# Patient Record
Sex: MALE | Race: WHITE | NOT HISPANIC OR LATINO | URBAN - METROPOLITAN AREA
[De-identification: names, ages, dates, MRNs, and addresses within clinical notes are randomized per-mention and may not be internally consistent; named-entity substitution may affect disease eponyms.]

---

## 2017-01-04 ENCOUNTER — OUTPATIENT (OUTPATIENT)
Dept: OUTPATIENT SERVICES | Facility: HOSPITAL | Age: 72
LOS: 1 days | End: 2017-01-04
Payer: COMMERCIAL

## 2017-01-04 VITALS
SYSTOLIC BLOOD PRESSURE: 147 MMHG | TEMPERATURE: 98 F | DIASTOLIC BLOOD PRESSURE: 78 MMHG | RESPIRATION RATE: 14 BRPM | HEIGHT: 68 IN | HEART RATE: 64 BPM | OXYGEN SATURATION: 98 % | WEIGHT: 224.87 LBS

## 2017-01-04 DIAGNOSIS — M17.11 UNILATERAL PRIMARY OSTEOARTHRITIS, RIGHT KNEE: ICD-10-CM

## 2017-01-04 DIAGNOSIS — Z01.818 ENCOUNTER FOR OTHER PREPROCEDURAL EXAMINATION: ICD-10-CM

## 2017-01-04 DIAGNOSIS — M19.90 UNSPECIFIED OSTEOARTHRITIS, UNSPECIFIED SITE: ICD-10-CM

## 2017-01-04 DIAGNOSIS — Z96.659 PRESENCE OF UNSPECIFIED ARTIFICIAL KNEE JOINT: Chronic | ICD-10-CM

## 2017-01-04 LAB
ALBUMIN SERPL ELPH-MCNC: 4.1 G/DL — SIGNIFICANT CHANGE UP (ref 3.3–5)
ALP SERPL-CCNC: 79 U/L — SIGNIFICANT CHANGE UP (ref 30–120)
ALT FLD-CCNC: 20 U/L DA — SIGNIFICANT CHANGE UP (ref 10–60)
ANION GAP SERPL CALC-SCNC: 5 MMOL/L — SIGNIFICANT CHANGE UP (ref 5–17)
APTT BLD: 34.5 SEC — SIGNIFICANT CHANGE UP (ref 27.5–37.4)
AST SERPL-CCNC: 14 U/L — SIGNIFICANT CHANGE UP (ref 10–40)
BILIRUB SERPL-MCNC: 0.8 MG/DL — SIGNIFICANT CHANGE UP (ref 0.2–1.2)
BLD GP AB SCN SERPL QL: SIGNIFICANT CHANGE UP
BUN SERPL-MCNC: 16 MG/DL — SIGNIFICANT CHANGE UP (ref 7–23)
CALCIUM SERPL-MCNC: 9.1 MG/DL — SIGNIFICANT CHANGE UP (ref 8.4–10.5)
CHLORIDE SERPL-SCNC: 104 MMOL/L — SIGNIFICANT CHANGE UP (ref 96–108)
CO2 SERPL-SCNC: 32 MMOL/L — HIGH (ref 22–31)
CREAT SERPL-MCNC: 0.92 MG/DL — SIGNIFICANT CHANGE UP (ref 0.5–1.3)
GLUCOSE SERPL-MCNC: 85 MG/DL — SIGNIFICANT CHANGE UP (ref 70–99)
HCT VFR BLD CALC: 41 % — SIGNIFICANT CHANGE UP (ref 39–50)
HGB BLD-MCNC: 13.2 G/DL — SIGNIFICANT CHANGE UP (ref 13–17)
INR BLD: 1.03 RATIO — SIGNIFICANT CHANGE UP (ref 0.88–1.16)
MCHC RBC-ENTMCNC: 28.4 PG — SIGNIFICANT CHANGE UP (ref 27–34)
MCHC RBC-ENTMCNC: 32.1 GM/DL — SIGNIFICANT CHANGE UP (ref 32–36)
MCV RBC AUTO: 88.6 FL — SIGNIFICANT CHANGE UP (ref 80–100)
MRSA PCR RESULT.: SIGNIFICANT CHANGE UP
PLATELET # BLD AUTO: 209 K/UL — SIGNIFICANT CHANGE UP (ref 150–400)
POTASSIUM SERPL-MCNC: 3.6 MMOL/L — SIGNIFICANT CHANGE UP (ref 3.5–5.3)
POTASSIUM SERPL-SCNC: 3.6 MMOL/L — SIGNIFICANT CHANGE UP (ref 3.5–5.3)
PROT SERPL-MCNC: 7.4 G/DL — SIGNIFICANT CHANGE UP (ref 6–8.3)
PROTHROM AB SERPL-ACNC: 11.5 SEC — SIGNIFICANT CHANGE UP (ref 10–13.1)
RBC # BLD: 4.63 M/UL — SIGNIFICANT CHANGE UP (ref 4.2–5.8)
RBC # FLD: 12 % — SIGNIFICANT CHANGE UP (ref 10.3–14.5)
S AUREUS DNA NOSE QL NAA+PROBE: SIGNIFICANT CHANGE UP
SODIUM SERPL-SCNC: 141 MMOL/L — SIGNIFICANT CHANGE UP (ref 135–145)
WBC # BLD: 5.6 K/UL — SIGNIFICANT CHANGE UP (ref 3.8–10.5)
WBC # FLD AUTO: 5.6 K/UL — SIGNIFICANT CHANGE UP (ref 3.8–10.5)

## 2017-01-04 PROCEDURE — 85027 COMPLETE CBC AUTOMATED: CPT

## 2017-01-04 PROCEDURE — 36415 COLL VENOUS BLD VENIPUNCTURE: CPT

## 2017-01-04 PROCEDURE — 80053 COMPREHEN METABOLIC PANEL: CPT

## 2017-01-04 PROCEDURE — 86900 BLOOD TYPING SEROLOGIC ABO: CPT

## 2017-01-04 PROCEDURE — 85730 THROMBOPLASTIN TIME PARTIAL: CPT

## 2017-01-04 PROCEDURE — 93010 ELECTROCARDIOGRAM REPORT: CPT

## 2017-01-04 PROCEDURE — G0463: CPT

## 2017-01-04 PROCEDURE — 86901 BLOOD TYPING SEROLOGIC RH(D): CPT

## 2017-01-04 PROCEDURE — 86850 RBC ANTIBODY SCREEN: CPT

## 2017-01-04 PROCEDURE — 93005 ELECTROCARDIOGRAM TRACING: CPT

## 2017-01-04 PROCEDURE — 85610 PROTHROMBIN TIME: CPT

## 2017-01-04 PROCEDURE — 87640 STAPH A DNA AMP PROBE: CPT

## 2017-01-04 NOTE — H&P PST ADULT - HISTORY OF PRESENT ILLNESS
This is a 70 y/o male who presents with long standing history of worsening right knee pain and swelling .Reports intermittent no pain at rest and worst pain 7/10  when walking and climbing stairs. Takes diclofenac and Cymbalta occasionally with moderate relief . Patient had left knee replacement on 10/14/ 16 with significant improvement . Now  to have right total knee replacement on 1/19/17 with Dr Montgomery This is a 72 y/o male who presents with long standing history of worsening right knee pain and swelling .Reports intermittent no pain at rest and worst pain 7/10  when walking and climbing stairs. Takes diclofenac and Cymbalta occasionally with moderate relief . Patient had left knee replacement on 10/14/ 16 with significant improvement in left knee . Now  to have right total knee replacement on 1/19/17 with Dr Montgomery This is a 72 y/o male who presents with long standing history of worsening right knee pain and swelling .Reports intermittent pain ,worst pain 7/10  when walking and climbing stairs. Takes diclofenac and Cymbalta occasionally with moderate relief . Patient had left knee replacement on 10/14/ 16 with significant improvement in left knee . Now  to have right total knee replacement on 1/19/17 with Dr Montgomery

## 2017-01-04 NOTE — H&P PST ADULT - MUSCULOSKELETAL COMMENTS
right knee pain right knee tender on palpation right knee tender on palpation, left knee healed surgical scar s/p knee replacement

## 2017-01-04 NOTE — H&P PST ADULT - NSANTHOSAYNRD_GEN_A_CORE
No. QUANG screening performed.  STOP BANG Legend: 0-2 = LOW Risk; 3-4 = INTERMEDIATE Risk; 5-8 = HIGH Risk

## 2017-01-04 NOTE — H&P PST ADULT - PROBLEM SELECTOR PLAN 1
Right knee replacement   Medical clearance   Pre op instructions Right knee replacement   Medical clearance   Pre op instructions  anesthesia consult done with previous surgery

## 2017-01-09 ENCOUNTER — APPOINTMENT (OUTPATIENT)
Dept: ORTHOPEDIC SURGERY | Facility: CLINIC | Age: 72
End: 2017-01-09

## 2017-01-09 DIAGNOSIS — M17.11 UNILATERAL PRIMARY OSTEOARTHRITIS, RIGHT KNEE: ICD-10-CM

## 2017-01-09 RX ORDER — ATORVASTATIN CALCIUM 10 MG/1
10 TABLET, FILM COATED ORAL
Qty: 30 | Refills: 0 | Status: ACTIVE | COMMUNITY
Start: 2016-05-24

## 2017-01-18 ENCOUNTER — RESULT REVIEW (OUTPATIENT)
Age: 72
End: 2017-01-18

## 2017-01-18 RX ORDER — ONDANSETRON 8 MG/1
4 TABLET, FILM COATED ORAL EVERY 6 HOURS
Qty: 0 | Refills: 0 | Status: DISCONTINUED | OUTPATIENT
Start: 2017-01-19 | End: 2017-01-21

## 2017-01-18 RX ORDER — POLYETHYLENE GLYCOL 3350 17 G/17G
17 POWDER, FOR SOLUTION ORAL DAILY
Qty: 0 | Refills: 0 | Status: DISCONTINUED | OUTPATIENT
Start: 2017-01-19 | End: 2017-01-21

## 2017-01-18 RX ORDER — CELECOXIB 200 MG/1
200 CAPSULE ORAL ONCE
Qty: 0 | Refills: 0 | Status: COMPLETED | OUTPATIENT
Start: 2017-01-19 | End: 2017-01-19

## 2017-01-18 RX ORDER — OXYCODONE HYDROCHLORIDE 5 MG/1
10 TABLET ORAL ONCE
Qty: 0 | Refills: 0 | Status: DISCONTINUED | OUTPATIENT
Start: 2017-01-19 | End: 2017-01-19

## 2017-01-18 RX ORDER — DOCUSATE SODIUM 100 MG
100 CAPSULE ORAL THREE TIMES A DAY
Qty: 0 | Refills: 0 | Status: DISCONTINUED | OUTPATIENT
Start: 2017-01-19 | End: 2017-01-21

## 2017-01-18 RX ORDER — MAGNESIUM HYDROXIDE 400 MG/1
30 TABLET, CHEWABLE ORAL DAILY
Qty: 0 | Refills: 0 | Status: DISCONTINUED | OUTPATIENT
Start: 2017-01-19 | End: 2017-01-21

## 2017-01-18 RX ORDER — SENNA PLUS 8.6 MG/1
2 TABLET ORAL AT BEDTIME
Qty: 0 | Refills: 0 | Status: DISCONTINUED | OUTPATIENT
Start: 2017-01-19 | End: 2017-01-21

## 2017-01-19 ENCOUNTER — APPOINTMENT (OUTPATIENT)
Dept: ORTHOPEDIC SURGERY | Facility: HOSPITAL | Age: 72
End: 2017-01-19

## 2017-01-19 ENCOUNTER — INPATIENT (INPATIENT)
Facility: HOSPITAL | Age: 72
LOS: 1 days | Discharge: NURSING, REHAB OR LONG-TERM CARE | DRG: 470 | End: 2017-01-21
Attending: ORTHOPAEDIC SURGERY | Admitting: ORTHOPAEDIC SURGERY
Payer: COMMERCIAL

## 2017-01-19 VITALS
SYSTOLIC BLOOD PRESSURE: 156 MMHG | WEIGHT: 220.24 LBS | TEMPERATURE: 98 F | HEIGHT: 68 IN | DIASTOLIC BLOOD PRESSURE: 71 MMHG | OXYGEN SATURATION: 97 % | RESPIRATION RATE: 20 BRPM | HEART RATE: 68 BPM

## 2017-01-19 DIAGNOSIS — M17.11 UNILATERAL PRIMARY OSTEOARTHRITIS, RIGHT KNEE: ICD-10-CM

## 2017-01-19 DIAGNOSIS — Z96.659 PRESENCE OF UNSPECIFIED ARTIFICIAL KNEE JOINT: Chronic | ICD-10-CM

## 2017-01-19 DIAGNOSIS — M17.12 UNILATERAL PRIMARY OSTEOARTHRITIS, LEFT KNEE: ICD-10-CM

## 2017-01-19 DIAGNOSIS — Z01.818 ENCOUNTER FOR OTHER PREPROCEDURAL EXAMINATION: ICD-10-CM

## 2017-01-19 LAB
ANION GAP SERPL CALC-SCNC: 10 MMOL/L — SIGNIFICANT CHANGE UP (ref 5–17)
BUN SERPL-MCNC: 12 MG/DL — SIGNIFICANT CHANGE UP (ref 7–23)
CALCIUM SERPL-MCNC: 8.6 MG/DL — SIGNIFICANT CHANGE UP (ref 8.4–10.5)
CHLORIDE SERPL-SCNC: 103 MMOL/L — SIGNIFICANT CHANGE UP (ref 96–108)
CO2 SERPL-SCNC: 27 MMOL/L — SIGNIFICANT CHANGE UP (ref 22–31)
CREAT SERPL-MCNC: 1 MG/DL — SIGNIFICANT CHANGE UP (ref 0.5–1.3)
GLUCOSE SERPL-MCNC: 193 MG/DL — HIGH (ref 70–99)
HCT VFR BLD CALC: 34.2 % — LOW (ref 39–50)
HGB BLD-MCNC: 11 G/DL — LOW (ref 13–17)
MCHC RBC-ENTMCNC: 27.8 PG — SIGNIFICANT CHANGE UP (ref 27–34)
MCHC RBC-ENTMCNC: 32.2 GM/DL — SIGNIFICANT CHANGE UP (ref 32–36)
MCV RBC AUTO: 86.3 FL — SIGNIFICANT CHANGE UP (ref 80–100)
PLATELET # BLD AUTO: 170 K/UL — SIGNIFICANT CHANGE UP (ref 150–400)
POTASSIUM SERPL-MCNC: 4.2 MMOL/L — SIGNIFICANT CHANGE UP (ref 3.5–5.3)
POTASSIUM SERPL-SCNC: 4.2 MMOL/L — SIGNIFICANT CHANGE UP (ref 3.5–5.3)
RBC # BLD: 3.96 M/UL — LOW (ref 4.2–5.8)
RBC # FLD: 12.2 % — SIGNIFICANT CHANGE UP (ref 10.3–14.5)
SODIUM SERPL-SCNC: 140 MMOL/L — SIGNIFICANT CHANGE UP (ref 135–145)
WBC # BLD: 7.5 K/UL — SIGNIFICANT CHANGE UP (ref 3.8–10.5)
WBC # FLD AUTO: 7.5 K/UL — SIGNIFICANT CHANGE UP (ref 3.8–10.5)

## 2017-01-19 PROCEDURE — 88305 TISSUE EXAM BY PATHOLOGIST: CPT | Mod: 26

## 2017-01-19 PROCEDURE — 99222 1ST HOSP IP/OBS MODERATE 55: CPT

## 2017-01-19 PROCEDURE — 27447 TOTAL KNEE ARTHROPLASTY: CPT | Mod: RT

## 2017-01-19 PROCEDURE — 73562 X-RAY EXAM OF KNEE 3: CPT | Mod: 26,RT

## 2017-01-19 PROCEDURE — 88311 DECALCIFY TISSUE: CPT | Mod: 26

## 2017-01-19 RX ORDER — CEFAZOLIN SODIUM 1 G
2000 VIAL (EA) INJECTION EVERY 8 HOURS
Qty: 0 | Refills: 0 | Status: COMPLETED | OUTPATIENT
Start: 2017-01-19 | End: 2017-01-20

## 2017-01-19 RX ORDER — TRANEXAMIC ACID 100 MG/ML
1000 INJECTION, SOLUTION INTRAVENOUS ONCE
Qty: 0 | Refills: 0 | Status: COMPLETED | OUTPATIENT
Start: 2017-01-19 | End: 2017-01-19

## 2017-01-19 RX ORDER — SODIUM CHLORIDE 9 MG/ML
1000 INJECTION, SOLUTION INTRAVENOUS
Qty: 0 | Refills: 0 | Status: DISCONTINUED | OUTPATIENT
Start: 2017-01-19 | End: 2017-01-20

## 2017-01-19 RX ORDER — ONDANSETRON 8 MG/1
4 TABLET, FILM COATED ORAL ONCE
Qty: 0 | Refills: 0 | Status: DISCONTINUED | OUTPATIENT
Start: 2017-01-19 | End: 2017-01-19

## 2017-01-19 RX ORDER — CELECOXIB 200 MG/1
200 CAPSULE ORAL
Qty: 0 | Refills: 0 | Status: DISCONTINUED | OUTPATIENT
Start: 2017-01-19 | End: 2017-01-21

## 2017-01-19 RX ORDER — ACETAMINOPHEN 500 MG
1000 TABLET ORAL EVERY 6 HOURS
Qty: 0 | Refills: 0 | Status: COMPLETED | OUTPATIENT
Start: 2017-01-19 | End: 2017-01-20

## 2017-01-19 RX ORDER — OXYCODONE HYDROCHLORIDE 5 MG/1
10 TABLET ORAL
Qty: 0 | Refills: 0 | Status: DISCONTINUED | OUTPATIENT
Start: 2017-01-19 | End: 2017-01-21

## 2017-01-19 RX ORDER — ACETAMINOPHEN 500 MG
1000 TABLET ORAL EVERY 8 HOURS
Qty: 0 | Refills: 0 | Status: DISCONTINUED | OUTPATIENT
Start: 2017-01-20 | End: 2017-01-21

## 2017-01-19 RX ORDER — LISINOPRIL 2.5 MG/1
40 TABLET ORAL DAILY
Qty: 0 | Refills: 0 | Status: DISCONTINUED | OUTPATIENT
Start: 2017-01-21 | End: 2017-01-21

## 2017-01-19 RX ORDER — HYDROMORPHONE HYDROCHLORIDE 2 MG/ML
0.5 INJECTION INTRAMUSCULAR; INTRAVENOUS; SUBCUTANEOUS
Qty: 0 | Refills: 0 | Status: DISCONTINUED | OUTPATIENT
Start: 2017-01-19 | End: 2017-01-19

## 2017-01-19 RX ORDER — ATORVASTATIN CALCIUM 80 MG/1
10 TABLET, FILM COATED ORAL AT BEDTIME
Qty: 0 | Refills: 0 | Status: DISCONTINUED | OUTPATIENT
Start: 2017-01-19 | End: 2017-01-21

## 2017-01-19 RX ORDER — PANTOPRAZOLE SODIUM 20 MG/1
40 TABLET, DELAYED RELEASE ORAL
Qty: 0 | Refills: 0 | Status: DISCONTINUED | OUTPATIENT
Start: 2017-01-19 | End: 2017-01-21

## 2017-01-19 RX ORDER — CEFAZOLIN SODIUM 1 G
2000 VIAL (EA) INJECTION ONCE
Qty: 0 | Refills: 0 | Status: COMPLETED | OUTPATIENT
Start: 2017-01-19 | End: 2017-01-19

## 2017-01-19 RX ORDER — SODIUM CHLORIDE 9 MG/ML
1000 INJECTION, SOLUTION INTRAVENOUS
Qty: 0 | Refills: 0 | Status: DISCONTINUED | OUTPATIENT
Start: 2017-01-19 | End: 2017-01-19

## 2017-01-19 RX ORDER — OXYCODONE HYDROCHLORIDE 5 MG/1
5 TABLET ORAL
Qty: 0 | Refills: 0 | Status: DISCONTINUED | OUTPATIENT
Start: 2017-01-19 | End: 2017-01-21

## 2017-01-19 RX ORDER — ACETAMINOPHEN 500 MG
1000 TABLET ORAL ONCE
Qty: 0 | Refills: 0 | Status: COMPLETED | OUTPATIENT
Start: 2017-01-19 | End: 2017-01-19

## 2017-01-19 RX ORDER — ASPIRIN/CALCIUM CARB/MAGNESIUM 324 MG
325 TABLET ORAL EVERY 12 HOURS
Qty: 0 | Refills: 0 | Status: DISCONTINUED | OUTPATIENT
Start: 2017-01-20 | End: 2017-01-21

## 2017-01-19 RX ORDER — HYDROMORPHONE HYDROCHLORIDE 2 MG/ML
0.5 INJECTION INTRAMUSCULAR; INTRAVENOUS; SUBCUTANEOUS
Qty: 0 | Refills: 0 | Status: DISCONTINUED | OUTPATIENT
Start: 2017-01-19 | End: 2017-01-21

## 2017-01-19 RX ORDER — DULOXETINE HYDROCHLORIDE 30 MG/1
60 CAPSULE, DELAYED RELEASE ORAL DAILY
Qty: 0 | Refills: 0 | Status: DISCONTINUED | OUTPATIENT
Start: 2017-01-19 | End: 2017-01-21

## 2017-01-19 RX ADMIN — OXYCODONE HYDROCHLORIDE 10 MILLIGRAM(S): 5 TABLET ORAL at 09:56

## 2017-01-19 RX ADMIN — SENNA PLUS 2 TABLET(S): 8.6 TABLET ORAL at 21:36

## 2017-01-19 RX ADMIN — HYDROMORPHONE HYDROCHLORIDE 0.5 MILLIGRAM(S): 2 INJECTION INTRAMUSCULAR; INTRAVENOUS; SUBCUTANEOUS at 15:05

## 2017-01-19 RX ADMIN — Medication 100 MILLIGRAM(S): at 21:36

## 2017-01-19 RX ADMIN — Medication 400 MILLIGRAM(S): at 18:19

## 2017-01-19 RX ADMIN — HYDROMORPHONE HYDROCHLORIDE 0.5 MILLIGRAM(S): 2 INJECTION INTRAMUSCULAR; INTRAVENOUS; SUBCUTANEOUS at 15:35

## 2017-01-19 RX ADMIN — Medication 200 MILLIGRAM(S): at 15:05

## 2017-01-19 RX ADMIN — Medication 100 MILLIGRAM(S): at 21:00

## 2017-01-19 RX ADMIN — HYDROMORPHONE HYDROCHLORIDE 0.5 MILLIGRAM(S): 2 INJECTION INTRAMUSCULAR; INTRAVENOUS; SUBCUTANEOUS at 15:20

## 2017-01-19 RX ADMIN — CELECOXIB 200 MILLIGRAM(S): 200 CAPSULE ORAL at 09:56

## 2017-01-19 RX ADMIN — ATORVASTATIN CALCIUM 10 MILLIGRAM(S): 80 TABLET, FILM COATED ORAL at 21:36

## 2017-01-19 RX ADMIN — SODIUM CHLORIDE 100 MILLILITER(S): 9 INJECTION, SOLUTION INTRAVENOUS at 15:20

## 2017-01-19 RX ADMIN — Medication 1000 MILLIGRAM(S): at 18:19

## 2017-01-19 NOTE — PHYSICAL THERAPY INITIAL EVALUATION ADULT - ADDITIONAL COMMENTS
Pt lives with wife in a house w/ 8 steps to enter with rail and 8 steps inside with rail to the main floor (high ranch house).  Pt has a rolling walker and straight cane

## 2017-01-19 NOTE — PHYSICAL THERAPY INITIAL EVALUATION ADULT - RANGE OF MOTION EXAMINATION, REHAB EVAL
bilateral upper extremity ROM was WFL (within functional limits)/R knee 0-60 deg/deficits as listed below/Left LE ROM was WFL (within functional limits)

## 2017-01-20 ENCOUNTER — TRANSCRIPTION ENCOUNTER (OUTPATIENT)
Age: 72
End: 2017-01-20

## 2017-01-20 LAB
ANION GAP SERPL CALC-SCNC: 8 MMOL/L — SIGNIFICANT CHANGE UP (ref 5–17)
BUN SERPL-MCNC: 10 MG/DL — SIGNIFICANT CHANGE UP (ref 7–23)
CALCIUM SERPL-MCNC: 8.4 MG/DL — SIGNIFICANT CHANGE UP (ref 8.4–10.5)
CHLORIDE SERPL-SCNC: 106 MMOL/L — SIGNIFICANT CHANGE UP (ref 96–108)
CO2 SERPL-SCNC: 26 MMOL/L — SIGNIFICANT CHANGE UP (ref 22–31)
CREAT SERPL-MCNC: 0.81 MG/DL — SIGNIFICANT CHANGE UP (ref 0.5–1.3)
GLUCOSE SERPL-MCNC: 109 MG/DL — HIGH (ref 70–99)
HCT VFR BLD CALC: 29.6 % — LOW (ref 39–50)
HGB BLD-MCNC: 9.9 G/DL — LOW (ref 13–17)
MCHC RBC-ENTMCNC: 28.7 PG — SIGNIFICANT CHANGE UP (ref 27–34)
MCHC RBC-ENTMCNC: 33.6 GM/DL — SIGNIFICANT CHANGE UP (ref 32–36)
MCV RBC AUTO: 85.5 FL — SIGNIFICANT CHANGE UP (ref 80–100)
PLATELET # BLD AUTO: 144 K/UL — LOW (ref 150–400)
POTASSIUM SERPL-MCNC: 3.9 MMOL/L — SIGNIFICANT CHANGE UP (ref 3.5–5.3)
POTASSIUM SERPL-SCNC: 3.9 MMOL/L — SIGNIFICANT CHANGE UP (ref 3.5–5.3)
RBC # BLD: 3.46 M/UL — LOW (ref 4.2–5.8)
RBC # FLD: 12.3 % — SIGNIFICANT CHANGE UP (ref 10.3–14.5)
SODIUM SERPL-SCNC: 140 MMOL/L — SIGNIFICANT CHANGE UP (ref 135–145)
WBC # BLD: 6.4 K/UL — SIGNIFICANT CHANGE UP (ref 3.8–10.5)
WBC # FLD AUTO: 6.4 K/UL — SIGNIFICANT CHANGE UP (ref 3.8–10.5)

## 2017-01-20 PROCEDURE — 99232 SBSQ HOSP IP/OBS MODERATE 35: CPT

## 2017-01-20 RX ORDER — OXYCODONE HYDROCHLORIDE 5 MG/1
1 TABLET ORAL
Qty: 0 | Refills: 0 | COMMUNITY
Start: 2017-01-20

## 2017-01-20 RX ORDER — ACETAMINOPHEN 500 MG
2 TABLET ORAL
Qty: 0 | Refills: 0 | COMMUNITY
Start: 2017-01-20 | End: 2017-02-02

## 2017-01-20 RX ORDER — DOCUSATE SODIUM 100 MG
1 CAPSULE ORAL
Qty: 0 | Refills: 0 | COMMUNITY
Start: 2017-01-20

## 2017-01-20 RX ORDER — SENNA PLUS 8.6 MG/1
2 TABLET ORAL
Qty: 0 | Refills: 0 | COMMUNITY
Start: 2017-01-20

## 2017-01-20 RX ORDER — ASPIRIN/CALCIUM CARB/MAGNESIUM 324 MG
1 TABLET ORAL
Qty: 0 | Refills: 0 | COMMUNITY
Start: 2017-01-20

## 2017-01-20 RX ORDER — POLYETHYLENE GLYCOL 3350 17 G/17G
17 POWDER, FOR SOLUTION ORAL
Qty: 0 | Refills: 0 | COMMUNITY
Start: 2017-01-20

## 2017-01-20 RX ADMIN — Medication 100 MILLIGRAM(S): at 22:10

## 2017-01-20 RX ADMIN — CELECOXIB 200 MILLIGRAM(S): 200 CAPSULE ORAL at 10:06

## 2017-01-20 RX ADMIN — OXYCODONE HYDROCHLORIDE 10 MILLIGRAM(S): 5 TABLET ORAL at 18:45

## 2017-01-20 RX ADMIN — Medication 100 MILLIGRAM(S): at 12:26

## 2017-01-20 RX ADMIN — Medication 100 MILLIGRAM(S): at 05:07

## 2017-01-20 RX ADMIN — OXYCODONE HYDROCHLORIDE 10 MILLIGRAM(S): 5 TABLET ORAL at 19:34

## 2017-01-20 RX ADMIN — Medication 1000 MILLIGRAM(S): at 12:26

## 2017-01-20 RX ADMIN — Medication 1000 MILLIGRAM(S): at 22:09

## 2017-01-20 RX ADMIN — CELECOXIB 200 MILLIGRAM(S): 200 CAPSULE ORAL at 09:05

## 2017-01-20 RX ADMIN — ATORVASTATIN CALCIUM 10 MILLIGRAM(S): 80 TABLET, FILM COATED ORAL at 22:10

## 2017-01-20 RX ADMIN — OXYCODONE HYDROCHLORIDE 10 MILLIGRAM(S): 5 TABLET ORAL at 22:10

## 2017-01-20 RX ADMIN — Medication 1000 MILLIGRAM(S): at 07:22

## 2017-01-20 RX ADMIN — Medication 1000 MILLIGRAM(S): at 03:58

## 2017-01-20 RX ADMIN — SENNA PLUS 2 TABLET(S): 8.6 TABLET ORAL at 22:10

## 2017-01-20 RX ADMIN — Medication 400 MILLIGRAM(S): at 07:22

## 2017-01-20 RX ADMIN — CELECOXIB 200 MILLIGRAM(S): 200 CAPSULE ORAL at 19:31

## 2017-01-20 RX ADMIN — Medication 400 MILLIGRAM(S): at 01:58

## 2017-01-20 RX ADMIN — Medication 325 MILLIGRAM(S): at 22:11

## 2017-01-20 RX ADMIN — Medication 325 MILLIGRAM(S): at 09:05

## 2017-01-20 RX ADMIN — OXYCODONE HYDROCHLORIDE 10 MILLIGRAM(S): 5 TABLET ORAL at 11:20

## 2017-01-20 RX ADMIN — OXYCODONE HYDROCHLORIDE 10 MILLIGRAM(S): 5 TABLET ORAL at 10:21

## 2017-01-20 RX ADMIN — OXYCODONE HYDROCHLORIDE 10 MILLIGRAM(S): 5 TABLET ORAL at 22:40

## 2017-01-20 RX ADMIN — Medication 1000 MILLIGRAM(S): at 22:28

## 2017-01-20 RX ADMIN — Medication 100 MILLIGRAM(S): at 05:16

## 2017-01-20 RX ADMIN — DULOXETINE HYDROCHLORIDE 60 MILLIGRAM(S): 30 CAPSULE, DELAYED RELEASE ORAL at 12:26

## 2017-01-20 RX ADMIN — CELECOXIB 200 MILLIGRAM(S): 200 CAPSULE ORAL at 18:44

## 2017-01-20 NOTE — DISCHARGE NOTE ADULT - NS AS ACTIVITY OBS
Walking-Indoors allowed/Do not drive or operate machinery/No Heavy lifting/straining/Walking-Outdoors allowed

## 2017-01-20 NOTE — DISCHARGE NOTE ADULT - CARE PROVIDER_API CALL
Chan Montgomery), Orthopaedic Surgery  [address] [place] [address]  Phone: [phone]  Fax: [phone]

## 2017-01-20 NOTE — OCCUPATIONAL THERAPY INITIAL EVALUATION ADULT - NS ASR FOLLOW COMMAND OT EVAL
Pt educated regarding fall prevention in hospital and recommendation to use call bell/ask for assistance with all ADL's/transfers etc./100% of the time

## 2017-01-20 NOTE — DISCHARGE NOTE ADULT - CARE PROVIDERS DIRECT ADDRESSES
[email or web address]

## 2017-01-20 NOTE — DISCHARGE NOTE ADULT - PLAN OF CARE
Improve ambulation, ADLs and quality of life Physical Therapy/Occupational Therapy for ambulation, transfers, stairs, ADL's, Range of Motion Exercises, Isometrics.  Full weight bearing as tolerated with rolling walker  Range of Motion Goals: Flexion 120 degrees; Extension 0 degrees  Keep incision clean and dry.  Suture/prineo dressing removal 14 days after surgery at rehab facility or Surgeon's office  May shower post-op day #5 if no drainage from incision For Constipation :   • Increase your water intake. Drink at least 8 glasses of water daily.  • Try adding fiber to your diet by eating fruits, vegetables and foods that are rich in grains.  • If you do experience constipation, you may take an over-the-counter stool softener/laxative such as Greer Colace, Senekot or  Milk of Magnesia. - Call your doctor if you experience:  • An increase in pain not controlled by pain medication or change in activity or  position.  • Temperature greater than 101° F.  • Redness, increased swelling or foul smelling drainage from or around the  incision.  • Numbness, tingling or a change in color or temperature of the operative leg.  • Call your doctor immediately if you experience chest pain, shortness of breath or calf pain.

## 2017-01-20 NOTE — DISCHARGE NOTE ADULT - CARE PLAN
Principal Discharge DX:	S/P knee replacement  Goal:	Improve ambulation, ADLs and quality of life  Instructions for follow-up, activity and diet:	Physical Therapy/Occupational Therapy for ambulation, transfers, stairs, ADL's, Range of Motion Exercises, Isometrics.  Full weight bearing as tolerated with rolling walker  Range of Motion Goals: Flexion 120 degrees; Extension 0 degrees  Keep incision clean and dry.  Suture/prineo dressing removal 14 days after surgery at rehab facility or Surgeon's office  May shower post-op day #5 if no drainage from incision  Instructions for follow-up, activity and diet:	For Constipation :   • Increase your water intake. Drink at least 8 glasses of water daily.  • Try adding fiber to your diet by eating fruits, vegetables and foods that are rich in grains.  • If you do experience constipation, you may take an over-the-counter stool softener/laxative such as Greer Colace, Senekot or  Milk of Magnesia.  Instructions for follow-up, activity and diet:	- Call your doctor if you experience:  • An increase in pain not controlled by pain medication or change in activity or  position.  • Temperature greater than 101° F.  • Redness, increased swelling or foul smelling drainage from or around the  incision.  • Numbness, tingling or a change in color or temperature of the operative leg.  • Call your doctor immediately if you experience chest pain, shortness of breath or calf pain.

## 2017-01-20 NOTE — OCCUPATIONAL THERAPY INITIAL EVALUATION ADULT - ADDITIONAL COMMENTS
Pt lives in a private house with 8 SANDY+ railing and 8 steps inside + railing. + stall shower. Pt owns a RW, SAC

## 2017-01-20 NOTE — DISCHARGE NOTE ADULT - MEDICATION SUMMARY - MEDICATIONS TO TAKE
I will START or STAY ON the medications listed below when I get home from the hospital:    acetaminophen 500 mg oral tablet  -- 2 tab(s) by mouth every 8 hours  -- Indication: For mild pain    oxyCODONE 5 mg oral tablet  -- 1 tab(s) by mouth every 3 hours, As needed, Mild Pain  -- Indication: For moderate pain    oxyCODONE 10 mg oral tablet  -- 1 tab(s) by mouth every 3 hours, As needed, Moderate Pain  -- Indication: For Severe pain    aspirin 325 mg oral delayed release tablet  -- 1 tab(s) by mouth every 12 hours  -- Indication: For Prevention of DVT clots    lisinopril 40 mg oral tablet  -- 1 tab(s) by mouth once a day  -- Indication: For blood pressure     DULoxetine 60 mg oral delayed release capsule  -- 1 cap(s) by mouth once a day  -- Indication: For mood    atorvastatin 10 mg oral tablet  -- 1 tab(s) by mouth once a day  -- Indication: For cholesterol    hydroCHLOROthiazide 25 mg oral tablet  -- 1 tab(s) by mouth once a day  -- Indication: For blood pressure    senna oral tablet  -- 2 tab(s) by mouth once a day (at bedtime)  -- Indication: For constipation    docusate sodium 100 mg oral capsule  -- 1 cap(s) by mouth 3 times a day  -- Indication: For constipation    polyethylene glycol 3350 oral powder for reconstitution  -- 17 gram(s) by mouth once a day, As needed, Constipation  -- Indication: For constipation    pantoprazole 40 mg oral delayed release tablet  -- 1 tab(s) by mouth once a day  -- Indication: For Stomach protection

## 2017-01-20 NOTE — DISCHARGE NOTE ADULT - HOSPITAL COURSE
This patient was admitted to [facility] with a history of severe degenerative joint disease of the right knee.  Patient went to Pre-Surgical Testing at [facility] and was medically cleared to undergo elective procedure. Patient underwent right total knee replacement by Dr. Chan Montgomery on 1/19/17. Procedure was well-tolerated.  No operative or cierra-operative complications arose during patients hospital course.  Patient received antibiotic according to SCIP guidelines for infection prevention. Ecotrin given for DVT prophylaxis.  Anesthesia, Medical Hospitalist, Physical Therapy and Occupational Therapy were consulted. Patient is stable for discharge with a good prognosis.  Appropriate discharge instructions and medications are provided in this document.

## 2017-01-20 NOTE — DISCHARGE NOTE ADULT - PATIENT PORTAL LINK FT
“You can access the FollowHealth Patient Portal, offered by [facility], by registering with the following website: [email or web address]”

## 2017-01-21 VITALS
DIASTOLIC BLOOD PRESSURE: 63 MMHG | TEMPERATURE: 98 F | OXYGEN SATURATION: 96 % | HEART RATE: 77 BPM | SYSTOLIC BLOOD PRESSURE: 128 MMHG | RESPIRATION RATE: 16 BRPM

## 2017-01-21 LAB
ANION GAP SERPL CALC-SCNC: 4 MMOL/L — LOW (ref 5–17)
BUN SERPL-MCNC: 13 MG/DL — SIGNIFICANT CHANGE UP (ref 7–23)
CALCIUM SERPL-MCNC: 8.4 MG/DL — SIGNIFICANT CHANGE UP (ref 8.4–10.5)
CHLORIDE SERPL-SCNC: 106 MMOL/L — SIGNIFICANT CHANGE UP (ref 96–108)
CO2 SERPL-SCNC: 31 MMOL/L — SIGNIFICANT CHANGE UP (ref 22–31)
CREAT SERPL-MCNC: 0.94 MG/DL — SIGNIFICANT CHANGE UP (ref 0.5–1.3)
GLUCOSE SERPL-MCNC: 118 MG/DL — HIGH (ref 70–99)
HCT VFR BLD CALC: 27.4 % — LOW (ref 39–50)
HGB BLD-MCNC: 9.5 G/DL — LOW (ref 13–17)
MCHC RBC-ENTMCNC: 29.6 PG — SIGNIFICANT CHANGE UP (ref 27–34)
MCHC RBC-ENTMCNC: 34.7 GM/DL — SIGNIFICANT CHANGE UP (ref 32–36)
MCV RBC AUTO: 85.4 FL — SIGNIFICANT CHANGE UP (ref 80–100)
PLATELET # BLD AUTO: 134 K/UL — LOW (ref 150–400)
POTASSIUM SERPL-MCNC: 4.3 MMOL/L — SIGNIFICANT CHANGE UP (ref 3.5–5.3)
POTASSIUM SERPL-SCNC: 4.3 MMOL/L — SIGNIFICANT CHANGE UP (ref 3.5–5.3)
RBC # BLD: 3.21 M/UL — LOW (ref 4.2–5.8)
RBC # FLD: 12.2 % — SIGNIFICANT CHANGE UP (ref 10.3–14.5)
SODIUM SERPL-SCNC: 141 MMOL/L — SIGNIFICANT CHANGE UP (ref 135–145)
WBC # BLD: 6 K/UL — SIGNIFICANT CHANGE UP (ref 3.8–10.5)
WBC # FLD AUTO: 6 K/UL — SIGNIFICANT CHANGE UP (ref 3.8–10.5)

## 2017-01-21 PROCEDURE — 99233 SBSQ HOSP IP/OBS HIGH 50: CPT

## 2017-01-21 RX ADMIN — MAGNESIUM HYDROXIDE 30 MILLILITER(S): 400 TABLET, CHEWABLE ORAL at 14:15

## 2017-01-21 RX ADMIN — DULOXETINE HYDROCHLORIDE 60 MILLIGRAM(S): 30 CAPSULE, DELAYED RELEASE ORAL at 13:37

## 2017-01-21 RX ADMIN — Medication 1000 MILLIGRAM(S): at 13:38

## 2017-01-21 RX ADMIN — CELECOXIB 200 MILLIGRAM(S): 200 CAPSULE ORAL at 08:32

## 2017-01-21 RX ADMIN — LISINOPRIL 40 MILLIGRAM(S): 2.5 TABLET ORAL at 05:42

## 2017-01-21 RX ADMIN — Medication 1000 MILLIGRAM(S): at 05:40

## 2017-01-21 RX ADMIN — CELECOXIB 200 MILLIGRAM(S): 200 CAPSULE ORAL at 08:33

## 2017-01-21 RX ADMIN — Medication 1000 MILLIGRAM(S): at 06:25

## 2017-01-21 RX ADMIN — OXYCODONE HYDROCHLORIDE 10 MILLIGRAM(S): 5 TABLET ORAL at 05:40

## 2017-01-21 RX ADMIN — OXYCODONE HYDROCHLORIDE 10 MILLIGRAM(S): 5 TABLET ORAL at 06:10

## 2017-01-21 RX ADMIN — Medication 100 MILLIGRAM(S): at 05:40

## 2017-01-21 RX ADMIN — Medication 100 MILLIGRAM(S): at 13:38

## 2017-01-21 RX ADMIN — Medication 325 MILLIGRAM(S): at 08:32

## 2017-01-24 LAB — SURGICAL PATHOLOGY FINAL REPORT - CH: SIGNIFICANT CHANGE UP

## 2017-01-28 PROBLEM — M19.90 UNSPECIFIED OSTEOARTHRITIS, UNSPECIFIED SITE: Chronic | Status: ACTIVE | Noted: 2017-01-04

## 2017-01-31 ENCOUNTER — APPOINTMENT (OUTPATIENT)
Dept: ORTHOPEDIC SURGERY | Facility: CLINIC | Age: 72
End: 2017-01-31

## 2017-01-31 VITALS
WEIGHT: 230 LBS | SYSTOLIC BLOOD PRESSURE: 121 MMHG | BODY MASS INDEX: 34.07 KG/M2 | HEART RATE: 78 BPM | DIASTOLIC BLOOD PRESSURE: 74 MMHG | HEIGHT: 69 IN

## 2017-03-07 ENCOUNTER — APPOINTMENT (OUTPATIENT)
Dept: ORTHOPEDIC SURGERY | Facility: CLINIC | Age: 72
End: 2017-03-07

## 2017-03-07 VITALS
HEIGHT: 69 IN | HEART RATE: 80 BPM | SYSTOLIC BLOOD PRESSURE: 134 MMHG | BODY MASS INDEX: 34.07 KG/M2 | WEIGHT: 230 LBS | DIASTOLIC BLOOD PRESSURE: 76 MMHG

## 2017-03-12 PROCEDURE — C1776: CPT

## 2017-03-12 PROCEDURE — 88305 TISSUE EXAM BY PATHOLOGIST: CPT

## 2017-03-12 PROCEDURE — 85027 COMPLETE CBC AUTOMATED: CPT

## 2017-03-12 PROCEDURE — 97001: CPT

## 2017-03-12 PROCEDURE — C1889: CPT

## 2017-03-12 PROCEDURE — 73562 X-RAY EXAM OF KNEE 3: CPT

## 2017-03-12 PROCEDURE — C1713: CPT

## 2017-03-12 PROCEDURE — 88311 DECALCIFY TISSUE: CPT

## 2017-03-12 PROCEDURE — 97165 OT EVAL LOW COMPLEX 30 MIN: CPT

## 2017-03-12 PROCEDURE — 80048 BASIC METABOLIC PNL TOTAL CA: CPT

## 2017-03-12 PROCEDURE — 97116 GAIT TRAINING THERAPY: CPT

## 2017-03-12 PROCEDURE — 97161 PT EVAL LOW COMPLEX 20 MIN: CPT

## 2017-03-12 PROCEDURE — 97535 SELF CARE MNGMENT TRAINING: CPT

## 2017-03-12 PROCEDURE — 97003: CPT

## 2017-03-12 PROCEDURE — 97110 THERAPEUTIC EXERCISES: CPT

## 2017-04-19 ENCOUNTER — APPOINTMENT (OUTPATIENT)
Dept: ORTHOPEDIC SURGERY | Facility: CLINIC | Age: 72
End: 2017-04-19

## 2017-04-19 VITALS
BODY MASS INDEX: 34.07 KG/M2 | DIASTOLIC BLOOD PRESSURE: 74 MMHG | SYSTOLIC BLOOD PRESSURE: 153 MMHG | HEART RATE: 74 BPM | WEIGHT: 230 LBS | HEIGHT: 69 IN

## 2017-05-17 ENCOUNTER — APPOINTMENT (OUTPATIENT)
Dept: ORTHOPEDIC SURGERY | Facility: CLINIC | Age: 72
End: 2017-05-17

## 2017-05-17 VITALS — HEIGHT: 69 IN | BODY MASS INDEX: 34.07 KG/M2 | WEIGHT: 230 LBS

## 2017-05-17 RX ORDER — NYSTATIN AND TRIAMCINOLONE ACETONIDE 100000; 1 MG/G; MG/G
100000-0.1 CREAM TOPICAL
Qty: 60 | Refills: 0 | Status: ACTIVE | COMMUNITY
Start: 2016-10-06

## 2017-05-17 RX ORDER — DICLOFENAC SODIUM AND MISOPROSTOL 75; 200 MG/1; UG/1
75-0.2 TABLET, DELAYED RELEASE ORAL
Qty: 60 | Refills: 0 | Status: ACTIVE | COMMUNITY
Start: 2017-05-17 | End: 1900-01-01

## 2017-05-17 RX ORDER — PANTOPRAZOLE 40 MG/1
40 TABLET, DELAYED RELEASE ORAL
Qty: 30 | Refills: 0 | Status: ACTIVE | COMMUNITY
Start: 2016-05-24

## 2017-07-17 ENCOUNTER — APPOINTMENT (OUTPATIENT)
Dept: ORTHOPEDIC SURGERY | Facility: CLINIC | Age: 72
End: 2017-07-17

## 2017-07-17 VITALS
SYSTOLIC BLOOD PRESSURE: 161 MMHG | DIASTOLIC BLOOD PRESSURE: 79 MMHG | HEART RATE: 69 BPM | WEIGHT: 227 LBS | HEIGHT: 69 IN | BODY MASS INDEX: 33.62 KG/M2

## 2017-07-17 RX ORDER — DICLOFENAC SODIUM AND MISOPROSTOL 75; 200 MG/1; UG/1
75-0.2 TABLET, DELAYED RELEASE ORAL
Qty: 60 | Refills: 0 | Status: ACTIVE | COMMUNITY
Start: 2017-07-17 | End: 1900-01-01

## 2017-12-05 ENCOUNTER — APPOINTMENT (OUTPATIENT)
Dept: ORTHOPEDIC SURGERY | Facility: CLINIC | Age: 72
End: 2017-12-05
Payer: MEDICARE

## 2017-12-05 VITALS
DIASTOLIC BLOOD PRESSURE: 69 MMHG | WEIGHT: 227 LBS | SYSTOLIC BLOOD PRESSURE: 108 MMHG | BODY MASS INDEX: 33.62 KG/M2 | HEIGHT: 69 IN | HEART RATE: 67 BPM

## 2017-12-05 DIAGNOSIS — M16.12 UNILATERAL PRIMARY OSTEOARTHRITIS, LEFT HIP: ICD-10-CM

## 2017-12-05 PROCEDURE — 99213 OFFICE O/P EST LOW 20 MIN: CPT

## 2017-12-05 PROCEDURE — 73501 X-RAY EXAM HIP UNI 1 VIEW: CPT

## 2017-12-11 ENCOUNTER — OUTPATIENT (OUTPATIENT)
Dept: OUTPATIENT SERVICES | Facility: HOSPITAL | Age: 72
LOS: 1 days | End: 2017-12-11
Payer: COMMERCIAL

## 2017-12-11 VITALS
HEIGHT: 68.5 IN | HEART RATE: 55 BPM | OXYGEN SATURATION: 96 % | TEMPERATURE: 98 F | SYSTOLIC BLOOD PRESSURE: 140 MMHG | RESPIRATION RATE: 16 BRPM | WEIGHT: 228.18 LBS | DIASTOLIC BLOOD PRESSURE: 80 MMHG

## 2017-12-11 DIAGNOSIS — M25.552 PAIN IN LEFT HIP: ICD-10-CM

## 2017-12-11 DIAGNOSIS — Z96.659 PRESENCE OF UNSPECIFIED ARTIFICIAL KNEE JOINT: Chronic | ICD-10-CM

## 2017-12-11 DIAGNOSIS — M16.12 UNILATERAL PRIMARY OSTEOARTHRITIS, LEFT HIP: ICD-10-CM

## 2017-12-11 DIAGNOSIS — Z01.818 ENCOUNTER FOR OTHER PREPROCEDURAL EXAMINATION: ICD-10-CM

## 2017-12-11 LAB
ALBUMIN SERPL ELPH-MCNC: 3.8 G/DL — SIGNIFICANT CHANGE UP (ref 3.3–5)
ALP SERPL-CCNC: 74 U/L — SIGNIFICANT CHANGE UP (ref 30–120)
ALT FLD-CCNC: 29 U/L DA — SIGNIFICANT CHANGE UP (ref 10–60)
ANION GAP SERPL CALC-SCNC: 4 MMOL/L — LOW (ref 5–17)
APTT BLD: 33.3 SEC — SIGNIFICANT CHANGE UP (ref 27.5–37.4)
AST SERPL-CCNC: 17 U/L — SIGNIFICANT CHANGE UP (ref 10–40)
BILIRUB SERPL-MCNC: 0.8 MG/DL — SIGNIFICANT CHANGE UP (ref 0.2–1.2)
BLD GP AB SCN SERPL QL: SIGNIFICANT CHANGE UP
BUN SERPL-MCNC: 17 MG/DL — SIGNIFICANT CHANGE UP (ref 7–23)
CALCIUM SERPL-MCNC: 9.2 MG/DL — SIGNIFICANT CHANGE UP (ref 8.4–10.5)
CHLORIDE SERPL-SCNC: 103 MMOL/L — SIGNIFICANT CHANGE UP (ref 96–108)
CO2 SERPL-SCNC: 33 MMOL/L — HIGH (ref 22–31)
CREAT SERPL-MCNC: 1.03 MG/DL — SIGNIFICANT CHANGE UP (ref 0.5–1.3)
GLUCOSE SERPL-MCNC: 108 MG/DL — HIGH (ref 70–99)
HCT VFR BLD CALC: 40.6 % — SIGNIFICANT CHANGE UP (ref 39–50)
HGB BLD-MCNC: 13.5 G/DL — SIGNIFICANT CHANGE UP (ref 13–17)
INR BLD: 1.05 RATIO — SIGNIFICANT CHANGE UP (ref 0.88–1.16)
MCHC RBC-ENTMCNC: 29.1 PG — SIGNIFICANT CHANGE UP (ref 27–34)
MCHC RBC-ENTMCNC: 33.1 GM/DL — SIGNIFICANT CHANGE UP (ref 32–36)
MCV RBC AUTO: 87.9 FL — SIGNIFICANT CHANGE UP (ref 80–100)
MRSA PCR RESULT.: SIGNIFICANT CHANGE UP
PLATELET # BLD AUTO: 182 K/UL — SIGNIFICANT CHANGE UP (ref 150–400)
POTASSIUM SERPL-MCNC: 4.6 MMOL/L — SIGNIFICANT CHANGE UP (ref 3.5–5.3)
POTASSIUM SERPL-SCNC: 4.6 MMOL/L — SIGNIFICANT CHANGE UP (ref 3.5–5.3)
PROT SERPL-MCNC: 7.6 G/DL — SIGNIFICANT CHANGE UP (ref 6–8.3)
PROTHROM AB SERPL-ACNC: 11.5 SEC — SIGNIFICANT CHANGE UP (ref 9.8–12.7)
RBC # BLD: 4.62 M/UL — SIGNIFICANT CHANGE UP (ref 4.2–5.8)
RBC # FLD: 13.7 % — SIGNIFICANT CHANGE UP (ref 10.3–14.5)
S AUREUS DNA NOSE QL NAA+PROBE: SIGNIFICANT CHANGE UP
SODIUM SERPL-SCNC: 140 MMOL/L — SIGNIFICANT CHANGE UP (ref 135–145)
WBC # BLD: 5 K/UL — SIGNIFICANT CHANGE UP (ref 3.8–10.5)
WBC # FLD AUTO: 5 K/UL — SIGNIFICANT CHANGE UP (ref 3.8–10.5)

## 2017-12-11 PROCEDURE — G0463: CPT

## 2017-12-11 PROCEDURE — 36415 COLL VENOUS BLD VENIPUNCTURE: CPT

## 2017-12-11 PROCEDURE — 85027 COMPLETE CBC AUTOMATED: CPT

## 2017-12-11 PROCEDURE — 87640 STAPH A DNA AMP PROBE: CPT

## 2017-12-11 PROCEDURE — 93010 ELECTROCARDIOGRAM REPORT: CPT | Mod: NC

## 2017-12-11 PROCEDURE — 86901 BLOOD TYPING SEROLOGIC RH(D): CPT

## 2017-12-11 PROCEDURE — 80053 COMPREHEN METABOLIC PANEL: CPT

## 2017-12-11 PROCEDURE — 93005 ELECTROCARDIOGRAM TRACING: CPT

## 2017-12-11 PROCEDURE — 85610 PROTHROMBIN TIME: CPT

## 2017-12-11 PROCEDURE — 85730 THROMBOPLASTIN TIME PARTIAL: CPT

## 2017-12-11 PROCEDURE — 86900 BLOOD TYPING SEROLOGIC ABO: CPT

## 2017-12-11 PROCEDURE — 86850 RBC ANTIBODY SCREEN: CPT

## 2017-12-11 RX ORDER — DULOXETINE HYDROCHLORIDE 30 MG/1
1 CAPSULE, DELAYED RELEASE ORAL
Qty: 0 | Refills: 0 | COMMUNITY

## 2017-12-11 NOTE — H&P PST ADULT - MUSCULOSKELETAL
details… detailed exam no joint warmth/no calf tenderness/diminished strength/decreased ROM due to pain

## 2017-12-11 NOTE — H&P PST ADULT - ASSESSMENT
Pt presents to PST.  Pre op instructions reviewed and understood.  Medical clearance apt is scheduled.

## 2017-12-11 NOTE — H&P PST ADULT - PMH
GERD (gastroesophageal reflux disease)    Hip pain, acute, left    Hyperlipidemia    Hypertension    Osteoarthritis

## 2017-12-11 NOTE — H&P PST ADULT - HISTORY OF PRESENT ILLNESS
71 yo male presents with 3-4 month history of left hip pain.  Pain is constant 8/10, mild relief with diclofenac. 73 yo male presents with 3-4 month history of left hip pain.  Pain is constant 8/10, mild relief with diclofenac.

## 2017-12-15 RX ORDER — ACETAMINOPHEN 500 MG
1000 TABLET ORAL ONCE
Qty: 0 | Refills: 0 | Status: DISCONTINUED | OUTPATIENT
Start: 2017-12-21 | End: 2017-12-21

## 2017-12-15 RX ORDER — TRANEXAMIC ACID 100 MG/ML
1000 INJECTION, SOLUTION INTRAVENOUS ONCE
Qty: 0 | Refills: 0 | Status: DISCONTINUED | OUTPATIENT
Start: 2017-12-21 | End: 2017-12-21

## 2017-12-15 RX ORDER — APREPITANT 80 MG/1
40 CAPSULE ORAL ONCE
Qty: 0 | Refills: 0 | Status: COMPLETED | OUTPATIENT
Start: 2017-12-21 | End: 2017-12-21

## 2017-12-20 RX ORDER — POLYETHYLENE GLYCOL 3350 17 G/17G
17 POWDER, FOR SOLUTION ORAL DAILY
Qty: 0 | Refills: 0 | Status: DISCONTINUED | OUTPATIENT
Start: 2017-12-21 | End: 2017-12-23

## 2017-12-20 RX ORDER — DOCUSATE SODIUM 100 MG
100 CAPSULE ORAL THREE TIMES A DAY
Qty: 0 | Refills: 0 | Status: DISCONTINUED | OUTPATIENT
Start: 2017-12-21 | End: 2017-12-23

## 2017-12-20 RX ORDER — SODIUM CHLORIDE 9 MG/ML
1000 INJECTION, SOLUTION INTRAVENOUS
Qty: 0 | Refills: 0 | Status: DISCONTINUED | OUTPATIENT
Start: 2017-12-21 | End: 2017-12-23

## 2017-12-20 RX ORDER — SENNA PLUS 8.6 MG/1
2 TABLET ORAL AT BEDTIME
Qty: 0 | Refills: 0 | Status: DISCONTINUED | OUTPATIENT
Start: 2017-12-21 | End: 2017-12-23

## 2017-12-20 RX ORDER — MAGNESIUM HYDROXIDE 400 MG/1
30 TABLET, CHEWABLE ORAL DAILY
Qty: 0 | Refills: 0 | Status: DISCONTINUED | OUTPATIENT
Start: 2017-12-21 | End: 2017-12-23

## 2017-12-20 RX ORDER — CELECOXIB 200 MG/1
200 CAPSULE ORAL ONCE
Qty: 0 | Refills: 0 | Status: COMPLETED | OUTPATIENT
Start: 2017-12-21 | End: 2017-12-21

## 2017-12-21 ENCOUNTER — APPOINTMENT (OUTPATIENT)
Dept: ORTHOPEDIC SURGERY | Facility: HOSPITAL | Age: 72
End: 2017-12-21

## 2017-12-21 ENCOUNTER — INPATIENT (INPATIENT)
Facility: HOSPITAL | Age: 72
LOS: 1 days | Discharge: HOME | DRG: 470 | End: 2017-12-23
Attending: ORTHOPAEDIC SURGERY | Admitting: ORTHOPAEDIC SURGERY
Payer: COMMERCIAL

## 2017-12-21 ENCOUNTER — TRANSCRIPTION ENCOUNTER (OUTPATIENT)
Age: 72
End: 2017-12-21

## 2017-12-21 ENCOUNTER — RESULT REVIEW (OUTPATIENT)
Age: 72
End: 2017-12-21

## 2017-12-21 VITALS
SYSTOLIC BLOOD PRESSURE: 143 MMHG | RESPIRATION RATE: 10 BRPM | OXYGEN SATURATION: 98 % | TEMPERATURE: 98 F | HEIGHT: 71 IN | DIASTOLIC BLOOD PRESSURE: 68 MMHG | HEART RATE: 63 BPM | WEIGHT: 227.74 LBS

## 2017-12-21 DIAGNOSIS — M16.12 UNILATERAL PRIMARY OSTEOARTHRITIS, LEFT HIP: ICD-10-CM

## 2017-12-21 DIAGNOSIS — Z96.659 PRESENCE OF UNSPECIFIED ARTIFICIAL KNEE JOINT: Chronic | ICD-10-CM

## 2017-12-21 LAB
ANION GAP SERPL CALC-SCNC: 10 MMOL/L — SIGNIFICANT CHANGE UP (ref 5–17)
BUN SERPL-MCNC: 14 MG/DL — SIGNIFICANT CHANGE UP (ref 7–23)
CALCIUM SERPL-MCNC: 8.8 MG/DL — SIGNIFICANT CHANGE UP (ref 8.4–10.5)
CHLORIDE SERPL-SCNC: 102 MMOL/L — SIGNIFICANT CHANGE UP (ref 96–108)
CO2 SERPL-SCNC: 24 MMOL/L — SIGNIFICANT CHANGE UP (ref 22–31)
CREAT SERPL-MCNC: 1.11 MG/DL — SIGNIFICANT CHANGE UP (ref 0.5–1.3)
GLUCOSE SERPL-MCNC: 237 MG/DL — HIGH (ref 70–99)
HCT VFR BLD CALC: 35.8 % — LOW (ref 39–50)
HGB BLD-MCNC: 11.7 G/DL — LOW (ref 13–17)
POTASSIUM SERPL-MCNC: 3.9 MMOL/L — SIGNIFICANT CHANGE UP (ref 3.5–5.3)
POTASSIUM SERPL-SCNC: 3.9 MMOL/L — SIGNIFICANT CHANGE UP (ref 3.5–5.3)
SODIUM SERPL-SCNC: 136 MMOL/L — SIGNIFICANT CHANGE UP (ref 135–145)

## 2017-12-21 PROCEDURE — 88311 DECALCIFY TISSUE: CPT | Mod: 26

## 2017-12-21 PROCEDURE — 27130 TOTAL HIP ARTHROPLASTY: CPT | Mod: LT

## 2017-12-21 PROCEDURE — 99223 1ST HOSP IP/OBS HIGH 75: CPT

## 2017-12-21 PROCEDURE — 88305 TISSUE EXAM BY PATHOLOGIST: CPT | Mod: 26

## 2017-12-21 RX ORDER — LISINOPRIL 2.5 MG/1
40 TABLET ORAL DAILY
Qty: 0 | Refills: 0 | Status: DISCONTINUED | OUTPATIENT
Start: 2017-12-23 | End: 2017-12-23

## 2017-12-21 RX ORDER — CELECOXIB 200 MG/1
200 CAPSULE ORAL
Qty: 0 | Refills: 0 | Status: DISCONTINUED | OUTPATIENT
Start: 2017-12-21 | End: 2017-12-23

## 2017-12-21 RX ORDER — ACETAMINOPHEN 500 MG
1000 TABLET ORAL EVERY 8 HOURS
Qty: 0 | Refills: 0 | Status: DISCONTINUED | OUTPATIENT
Start: 2017-12-22 | End: 2017-12-23

## 2017-12-21 RX ORDER — ATORVASTATIN CALCIUM 80 MG/1
10 TABLET, FILM COATED ORAL AT BEDTIME
Qty: 0 | Refills: 0 | Status: DISCONTINUED | OUTPATIENT
Start: 2017-12-21 | End: 2017-12-23

## 2017-12-21 RX ORDER — CEFAZOLIN SODIUM 1 G
2000 VIAL (EA) INJECTION EVERY 8 HOURS
Qty: 0 | Refills: 0 | Status: COMPLETED | OUTPATIENT
Start: 2017-12-21 | End: 2017-12-21

## 2017-12-21 RX ORDER — OXYCODONE HYDROCHLORIDE 5 MG/1
5 TABLET ORAL
Qty: 0 | Refills: 0 | Status: DISCONTINUED | OUTPATIENT
Start: 2017-12-21 | End: 2017-12-23

## 2017-12-21 RX ORDER — PANTOPRAZOLE SODIUM 20 MG/1
40 TABLET, DELAYED RELEASE ORAL
Qty: 0 | Refills: 0 | Status: DISCONTINUED | OUTPATIENT
Start: 2017-12-21 | End: 2017-12-23

## 2017-12-21 RX ORDER — PANTOPRAZOLE SODIUM 20 MG/1
40 TABLET, DELAYED RELEASE ORAL
Qty: 0 | Refills: 0 | Status: DISCONTINUED | OUTPATIENT
Start: 2017-12-21 | End: 2017-12-21

## 2017-12-21 RX ORDER — ASPIRIN/CALCIUM CARB/MAGNESIUM 324 MG
162 TABLET ORAL EVERY 12 HOURS
Qty: 0 | Refills: 0 | Status: DISCONTINUED | OUTPATIENT
Start: 2017-12-22 | End: 2017-12-23

## 2017-12-21 RX ORDER — CELECOXIB 200 MG/1
200 CAPSULE ORAL
Qty: 0 | Refills: 0 | Status: DISCONTINUED | OUTPATIENT
Start: 2017-12-21 | End: 2017-12-21

## 2017-12-21 RX ORDER — ONDANSETRON 8 MG/1
4 TABLET, FILM COATED ORAL ONCE
Qty: 0 | Refills: 0 | Status: DISCONTINUED | OUTPATIENT
Start: 2017-12-21 | End: 2017-12-21

## 2017-12-21 RX ORDER — HYDROMORPHONE HYDROCHLORIDE 2 MG/ML
0.5 INJECTION INTRAMUSCULAR; INTRAVENOUS; SUBCUTANEOUS
Qty: 0 | Refills: 0 | Status: DISCONTINUED | OUTPATIENT
Start: 2017-12-21 | End: 2017-12-23

## 2017-12-21 RX ORDER — SODIUM CHLORIDE 9 MG/ML
1000 INJECTION, SOLUTION INTRAVENOUS
Qty: 0 | Refills: 0 | Status: DISCONTINUED | OUTPATIENT
Start: 2017-12-21 | End: 2017-12-21

## 2017-12-21 RX ORDER — CEFAZOLIN SODIUM 1 G
2000 VIAL (EA) INJECTION ONCE
Qty: 0 | Refills: 0 | Status: COMPLETED | OUTPATIENT
Start: 2017-12-21 | End: 2017-12-21

## 2017-12-21 RX ORDER — HYDROMORPHONE HYDROCHLORIDE 2 MG/ML
0.5 INJECTION INTRAMUSCULAR; INTRAVENOUS; SUBCUTANEOUS
Qty: 0 | Refills: 0 | Status: DISCONTINUED | OUTPATIENT
Start: 2017-12-21 | End: 2017-12-21

## 2017-12-21 RX ORDER — OXYCODONE HYDROCHLORIDE 5 MG/1
1 TABLET ORAL
Qty: 80 | Refills: 0 | OUTPATIENT
Start: 2017-12-21

## 2017-12-21 RX ORDER — ACETAMINOPHEN 500 MG
1000 TABLET ORAL EVERY 6 HOURS
Qty: 0 | Refills: 0 | Status: COMPLETED | OUTPATIENT
Start: 2017-12-21 | End: 2017-12-22

## 2017-12-21 RX ORDER — OXYCODONE HYDROCHLORIDE 5 MG/1
10 TABLET ORAL
Qty: 0 | Refills: 0 | Status: DISCONTINUED | OUTPATIENT
Start: 2017-12-21 | End: 2017-12-23

## 2017-12-21 RX ORDER — ASPIRIN/CALCIUM CARB/MAGNESIUM 324 MG
2 TABLET ORAL
Qty: 160 | Refills: 0 | OUTPATIENT
Start: 2017-12-21 | End: 2018-01-29

## 2017-12-21 RX ORDER — ONDANSETRON 8 MG/1
4 TABLET, FILM COATED ORAL EVERY 6 HOURS
Qty: 0 | Refills: 0 | Status: DISCONTINUED | OUTPATIENT
Start: 2017-12-21 | End: 2017-12-23

## 2017-12-21 RX ORDER — ONDANSETRON 8 MG/1
4 TABLET, FILM COATED ORAL EVERY 6 HOURS
Qty: 0 | Refills: 0 | Status: DISCONTINUED | OUTPATIENT
Start: 2017-12-21 | End: 2017-12-21

## 2017-12-21 RX ORDER — CELECOXIB 200 MG/1
1 CAPSULE ORAL
Qty: 38 | Refills: 0 | OUTPATIENT
Start: 2017-12-21 | End: 2018-01-08

## 2017-12-21 RX ADMIN — Medication 1000 MILLIGRAM(S): at 21:07

## 2017-12-21 RX ADMIN — Medication 100 MILLIGRAM(S): at 21:02

## 2017-12-21 RX ADMIN — Medication 100 MILLIGRAM(S): at 16:23

## 2017-12-21 RX ADMIN — Medication 400 MILLIGRAM(S): at 15:46

## 2017-12-21 RX ADMIN — CELECOXIB 200 MILLIGRAM(S): 200 CAPSULE ORAL at 06:31

## 2017-12-21 RX ADMIN — SODIUM CHLORIDE 100 MILLILITER(S): 9 INJECTION, SOLUTION INTRAVENOUS at 10:58

## 2017-12-21 RX ADMIN — APREPITANT 40 MILLIGRAM(S): 80 CAPSULE ORAL at 06:30

## 2017-12-21 RX ADMIN — SODIUM CHLORIDE 100 MILLILITER(S): 9 INJECTION, SOLUTION INTRAVENOUS at 15:46

## 2017-12-21 RX ADMIN — SENNA PLUS 2 TABLET(S): 8.6 TABLET ORAL at 21:03

## 2017-12-21 RX ADMIN — Medication 1000 MILLIGRAM(S): at 16:30

## 2017-12-21 RX ADMIN — Medication 100 MILLIGRAM(S): at 23:25

## 2017-12-21 RX ADMIN — ATORVASTATIN CALCIUM 10 MILLIGRAM(S): 80 TABLET, FILM COATED ORAL at 21:02

## 2017-12-21 RX ADMIN — Medication 400 MILLIGRAM(S): at 21:02

## 2017-12-21 NOTE — OCCUPATIONAL THERAPY INITIAL EVALUATION ADULT - ADDITIONAL COMMENTS
Pt lives in a private house with 8 SANDY+ railing and 8 steps inside + railing. + stall shower. Pt owns a RW, SAC Pt lives in a private house with 6 SANDY 1HR and 8 SANDY 1 HR (4 x 2). + stall shower. Pt owns a RW, SAC, commode Pt lives in a private house with 6 SANDY 1HR and 8 SANDY 1 HR (4 x 2) inside. + stall shower. Pt owns a RW, SAC, commode

## 2017-12-21 NOTE — DISCHARGE NOTE ADULT - HOSPITAL COURSE
This patient was admitted to [facility] with a history of severe degenerative joint disease of the left hip .  Patient went to Pre-Surgical Testing at [facility] and was medically cleared to undergo elective procedure. Left THR performed on 12/21/17 by . No operative or cierra-operative complications arose during patients hospital course.  Patient received antibiotic according to SCIP guidelines for infection prevention.  Ecotrin was given for DVT prophylaxis.  Anesthesia, Medical Hospitalist, Physical Therapy and Occupational Therapy were consulted. Patient is stable for discharge with a good prognosis.  Appropriate discharge instructions and medications are provided in this document.

## 2017-12-21 NOTE — OCCUPATIONAL THERAPY INITIAL EVALUATION ADULT - PRECAUTIONS/LIMITATIONS, REHAB EVAL
fall precautions/surgical precautions fall precautions/surgical precautions/Anterior THPs/left hip precautions

## 2017-12-21 NOTE — OCCUPATIONAL THERAPY INITIAL EVALUATION ADULT - GENERAL OBSERVATIONS, REHAB EVAL
Pt found in bed  IV, PAS, hemovac, + foam wedges LE to prevent excessive external/internal rotation Pt found in bed  IV, PAS, hemovac,

## 2017-12-21 NOTE — OCCUPATIONAL THERAPY INITIAL EVALUATION ADULT - ANTICIPATED DISCHARGE DISPOSITION, OT EVAL
NORMAL     • Total weight loss less than 10% of birth weight Progressing home w/ level of assist/home w/ OT home w/ level of assist/(+ HCPT requested). HCOT tba

## 2017-12-21 NOTE — DISCHARGE NOTE ADULT - MEDICATION SUMMARY - MEDICATIONS TO STOP TAKING
I will STOP taking the medications listed below when I get home from the hospital:    diclofenac-misoprostol 75 mg-200 mcg oral tablet  -- 1 tab(s) by mouth 2 times a day  will stop 1 week pre op

## 2017-12-21 NOTE — DISCHARGE NOTE ADULT - MEDICATION SUMMARY - MEDICATIONS TO TAKE
I will START or STAY ON the medications listed below when I get home from the hospital:    celecoxib 200 mg oral capsule  -- 1 cap(s) by mouth 2 times a day (with meals)  -- Indication: For Prevent bony overgrowth    oxyCODONE 5 mg oral tablet  -- 1 tab(s) by mouth every 4 hours while awake, As Needed  Pain MDD:6  -- Indication: For moderate-severe pain as needed    aspirin 81 mg oral delayed release tablet  -- 2 tab(s) by mouth 2 times a day   -- Indication: For Prevent blood clots    acetaminophen 500 mg oral tablet  -- 2 tab(s) by mouth every 8 hours  -- Indication: For mild pain    lisinopril 40 mg oral tablet  -- 1 tab(s) by mouth once a day  -- Indication: For High blood pressure    atorvastatin 10 mg oral tablet  -- 1 tab(s) by mouth once a day  -- Indication: For High cholesterol    hydroCHLOROthiazide 25 mg oral tablet  -- 1 tab(s) by mouth once a day  -- Indication: For High blood pressure    senna oral tablet  -- 2 tab(s) by mouth once a day (at bedtime), As Needed  -- Indication: For constipation as needed    docusate sodium 100 mg oral capsule  -- 1 cap(s) by mouth 3 times a day, As Needed  -- Indication: For constipation as needed    pantoprazole 40 mg oral delayed release tablet  -- 1 tab(s) by mouth once a day   -- It is very important that you take or use this exactly as directed.  Do not skip doses or discontinue unless directed by your doctor.  Obtain medical advice before taking any non-prescription drugs as some may affect the action of this medication.  Swallow whole.  Do not crush.    -- Indication: For Prevent ulcers

## 2017-12-21 NOTE — DISCHARGE NOTE ADULT - INSTRUCTIONS
For Constipation :   • Increase your water intake. Drink at least 8 glasses of water daily.  • Try adding fiber to your diet by eating fruits, vegetables and foods that are rich in grains.  • If you do experience constipation, you may take an over-the-counter stool softener/laxative such as Greer Colace, Senekot or  Milk of Magnesia. left hip

## 2017-12-21 NOTE — DISCHARGE NOTE ADULT - PLAN OF CARE
Physical Therapy/Occupational Therapy for: Ambulation, transfers, stairs, & ADLs, isometrics.  Full weight bearing on both legs; Walker/cane use as instructed by Physical therapy/Occupational therapy. Anterior Total Hip Replacement precautions for  6 weeks: No straight leg raise; No external rotation of hip when extended-standing or lying flat; No hyperextension of hip when standing (kickback).   Ice packs to hip for 30 min. every 3 hours and after physical therapy.   Keep incision clean and dry. May shower 5 days after surgery if no drainage from incision.  Prineo tape/suture removal on/near after Post Op Day # 14 in rehab facility / Surgeon's office. - Call your doctor if you experience:  • An increase in pain not controlled by pain medication or change in activity or  position.  • Temperature greater than 101° F.  • Redness, increased swelling or foul smelling drainage from or around the  incision.  • Numbness, tingling or a change in color or temperature of the operative leg.  • Call your doctor immediately if you experience chest pain, shortness of breath or calf pain. Improve ambulation, ADLs and quality of life

## 2017-12-21 NOTE — OCCUPATIONAL THERAPY INITIAL EVALUATION ADULT - PHYSICAL ASSIST/NONPHYSICAL ASSIST: SIT/STAND, REHAB EVAL
verbal cues/1 person assist Pt performed functional mobility with RW  in hospital room with CG x 1/1 person assist/verbal cues

## 2017-12-21 NOTE — DISCHARGE NOTE ADULT - NS AS ACTIVITY OBS
Do not make important decisions/Do not drive or operate machinery/Showering allowed/No Heavy lifting/straining/Stairs allowed

## 2017-12-21 NOTE — DISCHARGE NOTE ADULT - PATIENT PORTAL LINK FT
“You can access the FollowHealth Patient Portal, offered by [facility], by registering with the following website: [email or web address]”

## 2017-12-21 NOTE — PROGRESS NOTE ADULT - SUBJECTIVE AND OBJECTIVE BOX
Post Op     ANTE MARKOVINA      72y        Male                                                                                                                 T(C): 37.1 (12-21-17 @ 10:05), Max: 37.1 (12-21-17 @ 10:05)  HR: 81 (12-21-17 @ 11:20) (63 - 88)  BP: 130/74 (12-21-17 @ 11:20) (130/74 - 151/74)  RR: 16 (12-21-17 @ 11:20) (10 - 16)  SpO2: 99% (12-21-17 @ 11:20) (96% - 100%)  Wt(kg): --    S/P   left total hip replacement    Patient denies shortness of breath, chest pain, dyspnea, No complaints  Pain is 3 /10    Physical Exam    Extremity: Bilaterally:  No holmon                                           No Cord                                          PAS on                                          Neurovascular intact                                          Motor intact EHL/FHL                                          Sensation intact                                          Pulses intact DP/PT                                         Calves Soft                                         Dressing Clean / Dry / Intact                                         Capillary refill with 5 seconds                                           hv  in place                                                  A/P  -- S/P total hip replacement anterior    -  Medicine To Follow   - DVT prophylaxis PAS /aspirin per protocol   - PT & OT   - Analgesia  - Incentive Spirometry  - Discharge Planning  - Safety Precautions  -  CBC , BMP daily   - PPI  _ Ho prophylaxis celebrex

## 2017-12-21 NOTE — DISCHARGE NOTE ADULT - CARE PLAN
Principal Discharge DX:	Primary osteoarthritis of left hip  Goal:	Improve ambulation, ADLs and quality of life  Instructions for follow-up, activity and diet:	Physical Therapy/Occupational Therapy for: Ambulation, transfers, stairs, & ADLs, isometrics.  Full weight bearing on both legs; Walker/cane use as instructed by Physical therapy/Occupational therapy. Anterior Total Hip Replacement precautions for  6 weeks: No straight leg raise; No external rotation of hip when extended-standing or lying flat; No hyperextension of hip when standing (kickback).   Ice packs to hip for 30 min. every 3 hours and after physical therapy.   Keep incision clean and dry. May shower 5 days after surgery if no drainage from incision.  Prineo tape/suture removal on/near after Post Op Day # 14 in rehab facility / Surgeon's office.  Instructions for follow-up, activity and diet:	- Call your doctor if you experience:  • An increase in pain not controlled by pain medication or change in activity or  position.  • Temperature greater than 101° F.  • Redness, increased swelling or foul smelling drainage from or around the  incision.  • Numbness, tingling or a change in color or temperature of the operative leg.  • Call your doctor immediately if you experience chest pain, shortness of breath or calf pain.

## 2017-12-21 NOTE — BRIEF OPERATIVE NOTE - PROCEDURE
<<-----Click on this checkbox to enter Procedure Hip arthroplasty  12/21/2017  left w anterior approach  Active  AROMANSK

## 2017-12-21 NOTE — DISCHARGE NOTE ADULT - NSFTFSERV1RD_GEN_ALL_CORE
observation and assessment/rehabilitation services/medication teaching and assessment/wound care and assessment/teaching and training

## 2017-12-22 DIAGNOSIS — E78.5 HYPERLIPIDEMIA, UNSPECIFIED: ICD-10-CM

## 2017-12-22 DIAGNOSIS — M16.12 UNILATERAL PRIMARY OSTEOARTHRITIS, LEFT HIP: ICD-10-CM

## 2017-12-22 DIAGNOSIS — I10 ESSENTIAL (PRIMARY) HYPERTENSION: ICD-10-CM

## 2017-12-22 LAB
ANION GAP SERPL CALC-SCNC: 6 MMOL/L — SIGNIFICANT CHANGE UP (ref 5–17)
BUN SERPL-MCNC: 12 MG/DL — SIGNIFICANT CHANGE UP (ref 7–23)
CALCIUM SERPL-MCNC: 8.9 MG/DL — SIGNIFICANT CHANGE UP (ref 8.4–10.5)
CHLORIDE SERPL-SCNC: 106 MMOL/L — SIGNIFICANT CHANGE UP (ref 96–108)
CO2 SERPL-SCNC: 28 MMOL/L — SIGNIFICANT CHANGE UP (ref 22–31)
CREAT SERPL-MCNC: 0.81 MG/DL — SIGNIFICANT CHANGE UP (ref 0.5–1.3)
GLUCOSE SERPL-MCNC: 129 MG/DL — HIGH (ref 70–99)
HCT VFR BLD CALC: 33.9 % — LOW (ref 39–50)
HGB BLD-MCNC: 10.4 G/DL — LOW (ref 13–17)
MCHC RBC-ENTMCNC: 27.5 PG — SIGNIFICANT CHANGE UP (ref 27–34)
MCHC RBC-ENTMCNC: 30.8 GM/DL — LOW (ref 32–36)
MCV RBC AUTO: 89.4 FL — SIGNIFICANT CHANGE UP (ref 80–100)
PLATELET # BLD AUTO: 154 K/UL — SIGNIFICANT CHANGE UP (ref 150–400)
POTASSIUM SERPL-MCNC: 3.9 MMOL/L — SIGNIFICANT CHANGE UP (ref 3.5–5.3)
POTASSIUM SERPL-SCNC: 3.9 MMOL/L — SIGNIFICANT CHANGE UP (ref 3.5–5.3)
RBC # BLD: 3.8 M/UL — LOW (ref 4.2–5.8)
RBC # FLD: 14 % — SIGNIFICANT CHANGE UP (ref 10.3–14.5)
SODIUM SERPL-SCNC: 140 MMOL/L — SIGNIFICANT CHANGE UP (ref 135–145)
WBC # BLD: 7.7 K/UL — SIGNIFICANT CHANGE UP (ref 3.8–10.5)
WBC # FLD AUTO: 7.7 K/UL — SIGNIFICANT CHANGE UP (ref 3.8–10.5)

## 2017-12-22 PROCEDURE — 99232 SBSQ HOSP IP/OBS MODERATE 35: CPT

## 2017-12-22 RX ORDER — OXYCODONE HYDROCHLORIDE 5 MG/1
1 TABLET ORAL
Qty: 80 | Refills: 0 | OUTPATIENT
Start: 2017-12-22

## 2017-12-22 RX ORDER — CELECOXIB 200 MG/1
1 CAPSULE ORAL
Qty: 40 | Refills: 0 | OUTPATIENT
Start: 2017-12-22 | End: 2018-01-10

## 2017-12-22 RX ORDER — PANTOPRAZOLE SODIUM 20 MG/1
1 TABLET, DELAYED RELEASE ORAL
Qty: 40 | Refills: 0 | OUTPATIENT
Start: 2017-12-22 | End: 2018-01-30

## 2017-12-22 RX ORDER — ASPIRIN/CALCIUM CARB/MAGNESIUM 324 MG
2 TABLET ORAL
Qty: 160 | Refills: 0 | OUTPATIENT
Start: 2017-12-22 | End: 2018-01-30

## 2017-12-22 RX ADMIN — ATORVASTATIN CALCIUM 10 MILLIGRAM(S): 80 TABLET, FILM COATED ORAL at 21:12

## 2017-12-22 RX ADMIN — CELECOXIB 200 MILLIGRAM(S): 200 CAPSULE ORAL at 08:09

## 2017-12-22 RX ADMIN — Medication 1000 MILLIGRAM(S): at 16:57

## 2017-12-22 RX ADMIN — Medication 1000 MILLIGRAM(S): at 08:10

## 2017-12-22 RX ADMIN — PANTOPRAZOLE SODIUM 40 MILLIGRAM(S): 20 TABLET, DELAYED RELEASE ORAL at 05:43

## 2017-12-22 RX ADMIN — CELECOXIB 200 MILLIGRAM(S): 200 CAPSULE ORAL at 08:08

## 2017-12-22 RX ADMIN — Medication 400 MILLIGRAM(S): at 03:20

## 2017-12-22 RX ADMIN — CELECOXIB 200 MILLIGRAM(S): 200 CAPSULE ORAL at 16:57

## 2017-12-22 RX ADMIN — Medication 100 MILLIGRAM(S): at 13:32

## 2017-12-22 RX ADMIN — CELECOXIB 200 MILLIGRAM(S): 200 CAPSULE ORAL at 18:50

## 2017-12-22 RX ADMIN — Medication 1000 MILLIGRAM(S): at 03:21

## 2017-12-22 RX ADMIN — Medication 162 MILLIGRAM(S): at 21:12

## 2017-12-22 RX ADMIN — Medication 162 MILLIGRAM(S): at 08:08

## 2017-12-22 RX ADMIN — Medication 100 MILLIGRAM(S): at 05:43

## 2017-12-22 NOTE — CONSULT NOTE ADULT - ASSESSMENT
Pt stable postop left THR.  Probably has sleep apnea--never did PSG.
72 male    1. primary severe osteoarthritis of joint s/p left thr: iv dilaudid + bowel regimen. monitor for lethargy or respiratory depression on opiate therapy.  Physical Therapy evaluation.  Discussed with orthopedic service Physician Assistant.     2. HTN: Blood pressure meds with hold parameters     3. HLD: statin    4. GERD: ppi    5. dvt prophylaxis per orthopedic protocol     6. dispo: home in 2-3 days

## 2017-12-22 NOTE — PROGRESS NOTE ADULT - SUBJECTIVE AND OBJECTIVE BOX
[facility]                                                               [id number]                                                     Allergies---No Known Allergies        Pt is a 72y year old Male s/p left THR.   Pain is 3/10.   Tolerating the diet.   The patient is A&O x 3, resting comfortably in bed with no complaints.   Denies any chest pain / shortness of breath / dyspnea/ nausea / vomiting / headaches or light headed ness.      Vital Signs Last 24 Hrs  T(F): 97.8 (12-22-17 @ 07:15), Max: 98.2 (12-21-17 @ 23:30)  HR: 70 (12-22-17 @ 07:15)  BP: 120/71 (12-22-17 @ 07:15)  RR: 16 (12-22-17 @ 07:15)  SpO2: 95% (12-22-17 @ 07:15)    I&O's Detail    21 Dec 2017 07:01  -  22 Dec 2017 07:00  --------------------------------------------------------  IN:    IV PiggyBack: 150 mL    lactated ringers.: 1200 mL    lactated ringers.: 1800 mL  Total IN: 3150 mL    OUT:    Accordian: 440 mL    Estimated Blood Loss: 100 mL    Voided: 1100 mL  Total OUT: 1640 mL    Total NET: 1510 mL          PE:   Left Lower Extremity:   Dressing is C/D/I.   NVI.   Sensation is intact to light touch distally.   No gross evidence of motor deficits.   EHL/FHL/TA intact.    +2 DP/PT pulses.   Toes are pink and mobile.   Capillary refill < 2 seconds.   Negative calf tenderness.   No evidence of impending compartment syndrome.   PAS stockings are in place.   HV in place                             10.4   7.7   )--------------( 154                          12-22-17 @ 07:34               33.9       140   |  106  |  12  -----------------------------<  129                  12-22-17 @ 07:34  3.9    |  28    |  0.81    Ca    8.9                A:   Pt is a 72y year old Male S/P left total hip replacement, Post Op Day #1        Plan:    - Follow up with Medicine   - OOB with PT/OT   - DVT ppx = PAS +  aspirin enteric coated 162 milliGRAM(s) Oral every 12 hours   - Continue hip dislocation precautions   - Pain control    - Incentive spirometry   - Labs in A.M.   - Dressing change tomorrow   - D/C planning                                                                                                                                                                           Bernardo Gonzalez RPA-C

## 2017-12-22 NOTE — PROGRESS NOTE ADULT - SUBJECTIVE AND OBJECTIVE BOX
INTERVAL HPI/OVERNIGHT EVENTS:   Patient seen and examined.  No fevers, chills, cp, sob, n/v/d, abd pain, calf pain, or focal weakness.  Eating, voiding, + flatus.    REVIEW OF SYSTEMS:  See HPI,  all others negative    PHYSICAL EXAM:  Vital Signs Last 24 Hrs  T(C): 36.6 (22 Dec 2017 07:15), Max: 36.8 (21 Dec 2017 15:48)  T(F): 97.8 (22 Dec 2017 07:15), Max: 98.3 (21 Dec 2017 19:01)  HR: 70 (22 Dec 2017 07:15) (66 - 86)  BP: 120/71 (22 Dec 2017 07:15) (120/71 - 140/57)  BP(mean): --  RR: 16 (22 Dec 2017 07:15) (16 - 18)  SpO2: 95% (22 Dec 2017 07:15) (93% - 100%)    GENERAL: NAD, well-groomed, well-developed, awake, alert, oriented x 3, fluent and coherent speech  HEAD:  Atraumatic, Normocephalic  EYES: EOMI, PERRLA, conjunctiva and sclera clear  ENMT: No tonsillar erythema, exudates, or enlargement; Moist mucous membranes, Good dentition, No lesions  NECK: Supple, No JVD, No Cervical LAD, No thyromegaly, No thyroid nodules felt  NERVOUS SYSTEM:  Good concentration; Moving all 4 extremities; No gross sensory deficits, No facial droop  CHEST WALL: No masses  CHEST/LUNG: Clear to auscultation bilaterally; No rales, rhonchi, wheezing, or rubs  HEART: Regular rate and rhythm; No murmurs, rubs, or gallops  ABDOMEN: Soft, Nontender, Nondistended, Bowel sounds present, No palpable masses or organomegaly, No bruits  EXTREMITIES:  2+ Peripheral Pulses, No clubbing, cyanosis, or edema  LYMPH: No lymphadenopathy noted  SKIN: No rashes or lesions  INCISION: dressing c/d/i    LABS:                        10.4   7.7   )-----------( 154      ( 22 Dec 2017 07:34 )             33.9     22 Dec 2017 07:34    140    |  106    |  12     ----------------------------<  129    3.9     |  28     |  0.81     Ca    8.9        22 Dec 2017 07:34

## 2017-12-22 NOTE — PROGRESS NOTE ADULT - SUBJECTIVE AND OBJECTIVE BOX
Discharge medication calendar:  ASA EC 162mg q12h x 6 weeks  APAP 1000mg q8h x 2-3 weeks  Celecoxib 200mg q12h x 21 days  Pantoprazole 40mg QAM (home med)  Narcotic PRN  Docusate 100mg TID while taking narcotic  Senna, bisacodyl, or Miralax PRN for treatment of constipation

## 2017-12-23 VITALS
HEART RATE: 71 BPM | SYSTOLIC BLOOD PRESSURE: 101 MMHG | RESPIRATION RATE: 18 BRPM | DIASTOLIC BLOOD PRESSURE: 61 MMHG | OXYGEN SATURATION: 98 % | TEMPERATURE: 98 F

## 2017-12-23 LAB
ANION GAP SERPL CALC-SCNC: 5 MMOL/L — SIGNIFICANT CHANGE UP (ref 5–17)
BUN SERPL-MCNC: 15 MG/DL — SIGNIFICANT CHANGE UP (ref 7–23)
CALCIUM SERPL-MCNC: 8.4 MG/DL — SIGNIFICANT CHANGE UP (ref 8.4–10.5)
CHLORIDE SERPL-SCNC: 106 MMOL/L — SIGNIFICANT CHANGE UP (ref 96–108)
CO2 SERPL-SCNC: 29 MMOL/L — SIGNIFICANT CHANGE UP (ref 22–31)
CREAT SERPL-MCNC: 0.86 MG/DL — SIGNIFICANT CHANGE UP (ref 0.5–1.3)
GLUCOSE SERPL-MCNC: 112 MG/DL — HIGH (ref 70–99)
HCT VFR BLD CALC: 31.7 % — LOW (ref 39–50)
HGB BLD-MCNC: 10.7 G/DL — LOW (ref 13–17)
MCHC RBC-ENTMCNC: 29.2 PG — SIGNIFICANT CHANGE UP (ref 27–34)
MCHC RBC-ENTMCNC: 33.6 GM/DL — SIGNIFICANT CHANGE UP (ref 32–36)
MCV RBC AUTO: 86.9 FL — SIGNIFICANT CHANGE UP (ref 80–100)
PLATELET # BLD AUTO: 133 K/UL — LOW (ref 150–400)
POTASSIUM SERPL-MCNC: 4.1 MMOL/L — SIGNIFICANT CHANGE UP (ref 3.5–5.3)
POTASSIUM SERPL-SCNC: 4.1 MMOL/L — SIGNIFICANT CHANGE UP (ref 3.5–5.3)
RBC # BLD: 3.65 M/UL — LOW (ref 4.2–5.8)
RBC # FLD: 13.7 % — SIGNIFICANT CHANGE UP (ref 10.3–14.5)
SODIUM SERPL-SCNC: 140 MMOL/L — SIGNIFICANT CHANGE UP (ref 135–145)
WBC # BLD: 6.1 K/UL — SIGNIFICANT CHANGE UP (ref 3.8–10.5)
WBC # FLD AUTO: 6.1 K/UL — SIGNIFICANT CHANGE UP (ref 3.8–10.5)

## 2017-12-23 PROCEDURE — 99239 HOSP IP/OBS DSCHRG MGMT >30: CPT

## 2017-12-23 PROCEDURE — 85027 COMPLETE CBC AUTOMATED: CPT

## 2017-12-23 PROCEDURE — 88305 TISSUE EXAM BY PATHOLOGIST: CPT

## 2017-12-23 PROCEDURE — 88311 DECALCIFY TISSUE: CPT

## 2017-12-23 PROCEDURE — 97110 THERAPEUTIC EXERCISES: CPT

## 2017-12-23 PROCEDURE — 85018 HEMOGLOBIN: CPT

## 2017-12-23 PROCEDURE — C1776: CPT

## 2017-12-23 PROCEDURE — 97535 SELF CARE MNGMENT TRAINING: CPT

## 2017-12-23 PROCEDURE — 97530 THERAPEUTIC ACTIVITIES: CPT

## 2017-12-23 PROCEDURE — 76000 FLUOROSCOPY <1 HR PHYS/QHP: CPT

## 2017-12-23 PROCEDURE — C1889: CPT

## 2017-12-23 PROCEDURE — 94664 DEMO&/EVAL PT USE INHALER: CPT

## 2017-12-23 PROCEDURE — 97165 OT EVAL LOW COMPLEX 30 MIN: CPT

## 2017-12-23 PROCEDURE — 97116 GAIT TRAINING THERAPY: CPT

## 2017-12-23 PROCEDURE — C1713: CPT

## 2017-12-23 PROCEDURE — 80048 BASIC METABOLIC PNL TOTAL CA: CPT

## 2017-12-23 PROCEDURE — 97161 PT EVAL LOW COMPLEX 20 MIN: CPT

## 2017-12-23 RX ORDER — SENNA PLUS 8.6 MG/1
2 TABLET ORAL
Qty: 0 | Refills: 0 | COMMUNITY
Start: 2017-12-23

## 2017-12-23 RX ORDER — DOCUSATE SODIUM 100 MG
1 CAPSULE ORAL
Qty: 0 | Refills: 0 | COMMUNITY
Start: 2017-12-23

## 2017-12-23 RX ORDER — ACETAMINOPHEN 500 MG
2 TABLET ORAL
Qty: 84 | Refills: 0 | OUTPATIENT
Start: 2017-12-23 | End: 2018-01-05

## 2017-12-23 RX ADMIN — Medication 162 MILLIGRAM(S): at 07:55

## 2017-12-23 RX ADMIN — Medication 100 MILLIGRAM(S): at 05:26

## 2017-12-23 RX ADMIN — CELECOXIB 200 MILLIGRAM(S): 200 CAPSULE ORAL at 07:56

## 2017-12-23 RX ADMIN — Medication 1000 MILLIGRAM(S): at 05:26

## 2017-12-23 RX ADMIN — LISINOPRIL 40 MILLIGRAM(S): 2.5 TABLET ORAL at 05:26

## 2017-12-23 RX ADMIN — PANTOPRAZOLE SODIUM 40 MILLIGRAM(S): 20 TABLET, DELAYED RELEASE ORAL at 05:26

## 2017-12-23 RX ADMIN — CELECOXIB 200 MILLIGRAM(S): 200 CAPSULE ORAL at 07:55

## 2017-12-23 NOTE — PROGRESS NOTE ADULT - ASSESSMENT
S/P LEFT ANT Total Hip Replacement.   POD#2   - Pain controll as per ortho.   - Bowel regimen  - PT/OT  - DVT PPx - per ortho - ASA BID x 6 weeks, then resume home dose of 81mg Daily  -Patient is medically stable for  DC  home today .     HTN  - c/w  ACEI  .   - resume HCTZ upon discharge.   Monitor BP closely.     HLD  - c/w statin    GERD- stable.   - c/w PPI
POD#1 S/P LEFT ANT THR  - Pain controlled  - Bowel regimen  - PT/OT  - DVT PPx - per ortho - ASA BID x 6 weeks, then resume home dose of 81mg Daily  - DC Planning for home tomorrow    HTN  - resume ACEI tomorrow (POD#2)  - resume HCTZ upon discharge    HLD  - c/w statin    GERD  - c/w PPI
Pt stable postop left THR.  Probably has sleep apnea--never did PSG.

## 2017-12-23 NOTE — PROGRESS NOTE ADULT - SUBJECTIVE AND OBJECTIVE BOX
PULMONARY/CRITICAL CARE      INTERVAL HPI/OVERNIGHT EVENTS:    72y MaleHPI:  Doing well, ambulating. Didn't use O2.      PAST MEDICAL & SURGICAL HISTORY:  Hip pain, acute, left  Osteoarthritis  GERD (gastroesophageal reflux disease)  Hyperlipidemia  Hypertension  S/P knee replacement: right- 1/2017  S/P knee replacement: left knee 2016        ICU Vital Signs Last 24 Hrs  T(C): 36.4 (23 Dec 2017 07:25), Max: 36.8 (22 Dec 2017 19:15)  T(F): 97.5 (23 Dec 2017 07:25), Max: 98.2 (22 Dec 2017 19:15)  HR: 71 (23 Dec 2017 07:25) (65 - 77)  BP: 101/61 (23 Dec 2017 07:25) (101/61 - 133/72)  BP(mean): --  ABP: --  ABP(mean): --  RR: 18 (23 Dec 2017 07:25) (16 - 18)  SpO2: 98% (23 Dec 2017 07:25) (93% - 98%)    Qtts:     I&O's Summary    22 Dec 2017 07:01  -  23 Dec 2017 07:00  --------------------------------------------------------  IN: 0 mL / OUT: 1270 mL / NET: -1270 mL            REVIEW OF SYSTEMS:    CONSTITUTIONAL: No fever, weight loss, or fatigue  EYES: No eye pain, visual disturbances, or discharge  ENMT:  No difficulty hearing, tinnitus, vertigo; No sinus or throat pain  NECK: No pain or stiffness  BREASTS: No pain, masses, or nipple discharge  RESPIRATORY: No cough, wheezing, chills or hemoptysis; No shortness of breath  CARDIOVASCULAR: No chest pain, palpitations, dizziness, or leg swelling  GASTROINTESTINAL: No abdominal or epigastric pain. No nausea, vomiting, or hematemesis; No diarrhea or constipation. No melena or hematochezia.  GENITOURINARY: No dysuria, frequency, hematuria, or incontinence  NEUROLOGICAL: No headaches, memory loss, loss of strength, numbness, or tremors  SKIN: No itching, burning, rashes, or lesions   LYMPH NODES: No enlarged glands  ENDOCRINE: No heat or cold intolerance; No hair loss  MUSCULOSKELETAL: mild left hip joint pain .; No muscle, back, or extremity pain, no calf tenderness  PSYCHIATRIC: No depression, anxiety, mood swings, or difficulty sleeping  HEME/LYMPH: No easy bruising, or bleeding gums  ALLERGY AND IMMUNOLOGIC: No hives or eczema      PHYSICAL EXAM:    GENERAL: NAD, well-groomed, well-developed, NAD  HEAD:  Atraumatic, Normocephalic  EYES: EOMI, PERRLA, conjunctiva and sclera clear  ENMT: No tonsillar erythema, exudates, or enlargement; Moist mucous membranes, Good dentition, No lesions  NECK: Supple, No JVD, Normal thyroid  NERVOUS SYSTEM:  Alert & Oriented X3, Good concentration; Motor Strength 5/5 B/L upper and lower extremities  CHEST/LUNG: Clear to percussion bilaterally; No rales, rhonchi, wheezing, or rubs  HEART: Regular rate and rhythm; No murmurs, rubs, or gallops  ABDOMEN: Soft, Nontender, Nondistended; Bowel sounds present  EXTREMITIES:  2+ Peripheral Pulses, No clubbing, cyanosis, or edema  left hip bandaged.  LYMPH: No lymphadenopathy noted  SKIN: No rashes or lesions        LABS:                        10.7   6.1   )-----------( 133      ( 23 Dec 2017 07:01 )             31.7     12-23    140  |  106  |  15  ----------------------------<  112<H>  4.1   |  29  |  0.86    Ca    8.4      23 Dec 2017 07:01            vanco through     RADIOLOGY & ADDITIONAL STUDIES:      CRITICAL CARE TIME SPENT:

## 2017-12-23 NOTE — PROGRESS NOTE ADULT - SUBJECTIVE AND OBJECTIVE BOX
KITTY PRITCHARD [id number]    Pt is a 72y year old Male s/p right THR. pain is 3/10. Tolerating regular diet, (+) voids.  Denies chest pain/shortness of breath/nausea/vomitting.     Vital Signs Last 24 Hrs  T(C): 36.4 (23 Dec 2017 07:25), Max: 36.8 (22 Dec 2017 19:15)  T(F): 97.5 (23 Dec 2017 07:25), Max: 98.2 (22 Dec 2017 19:15)  HR: 71 (23 Dec 2017 07:25) (65 - 77)  BP: 101/61 (23 Dec 2017 07:25) (101/61 - 133/72)  BP(mean): --  RR: 18 (23 Dec 2017 07:25) (16 - 18)  SpO2: 98% (23 Dec 2017 07:25) (93% - 98%)    I&O's Detail    22 Dec 2017 07:01  -  23 Dec 2017 07:00  --------------------------------------------------------  IN:  Total IN: 0 mL    OUT:    Accordian: 370 mL    Voided: 900 mL  Total OUT: 1270 mL    Total NET: -1270 mL                                10.7   6.1   )-----------( 133      ( 23 Dec 2017 07:01 )             31.7     12-22    140  |  106  |  12  ----------------------------<  129<H>  3.9   |  28  |  0.81    Ca    8.9      22 Dec 2017 07:34          PE:   RLE: Dressing changed, wound clean and intact, no erythema or drainage, prineo intact. Sensation intact to light touch distally, (+2) DP/PT pulses, EHL/FHL/TA intact, Capillary refill < 2 seconds. negative calf tenderness PAS on. HV removed without complication    A: 72y year old Male s/p right THR POD#2    Plan:   -DVT ppx = PAS +  aspirin enteric coated 162 milliGRAM(s) Oral every 12 hours    -PT/OT = OOB  -Hip dislocation precautions  -Pain control   -Medicine to follow   -Incentive spirometry  dispo: home today pending clearance from MD and PT KITTY PRITCHARD [id number]    Pt is a 72y year old Male s/p left THR. pain is 3/10. Tolerating regular diet, (+) voids.  Denies chest pain/shortness of breath/nausea/vomitting.     Vital Signs Last 24 Hrs  T(C): 36.4 (23 Dec 2017 07:25), Max: 36.8 (22 Dec 2017 19:15)  T(F): 97.5 (23 Dec 2017 07:25), Max: 98.2 (22 Dec 2017 19:15)  HR: 71 (23 Dec 2017 07:25) (65 - 77)  BP: 101/61 (23 Dec 2017 07:25) (101/61 - 133/72)  BP(mean): --  RR: 18 (23 Dec 2017 07:25) (16 - 18)  SpO2: 98% (23 Dec 2017 07:25) (93% - 98%)    I&O's Detail    22 Dec 2017 07:01  -  23 Dec 2017 07:00  --------------------------------------------------------  IN:  Total IN: 0 mL    OUT:    Accordian: 370 mL    Voided: 900 mL  Total OUT: 1270 mL    Total NET: -1270 mL                                10.7   6.1   )-----------( 133      ( 23 Dec 2017 07:01 )             31.7     12-22    140  |  106  |  12  ----------------------------<  129<H>  3.9   |  28  |  0.81    Ca    8.9      22 Dec 2017 07:34          PE:   LLE: Dressing changed, wound clean and intact, no erythema or drainage, prineo intact. Sensation intact to light touch distally, (+2) DP/PT pulses, EHL/FHL/TA intact, Capillary refill < 2 seconds. negative calf tenderness PAS on. HV removed without complication    A: 72y year old Male s/p left THR POD#2    Plan:   -DVT ppx = PAS +  aspirin enteric coated 162 milliGRAM(s) Oral every 12 hours    -PT/OT = OOB  -Hip dislocation precautions  -Pain control   -Medicine to follow   -Incentive spirometry  dispo: home today pending clearance from MD and PT

## 2017-12-23 NOTE — PROGRESS NOTE ADULT - SUBJECTIVE AND OBJECTIVE BOX
Patient is a 72y old  Male who presents with a chief complaint of " I have pain in my left hip." (21 Dec 2017 13:22)      INTERVAL HPI/OVERNIGHT EVENTS: pt is seen and examined at the bed side.  pt is feeling fine , ready to be discharged.     MEDICATIONS  (STANDING):  acetaminophen   Tablet 1000 milliGRAM(s) Oral every 8 hours  aspirin enteric coated 162 milliGRAM(s) Oral every 12 hours  atorvastatin 10 milliGRAM(s) Oral at bedtime  celecoxib 200 milliGRAM(s) Oral two times a day with meals  docusate sodium 100 milliGRAM(s) Oral three times a day  lactated ringers. 1000 milliLiter(s) (100 mL/Hr) IV Continuous <Continuous>  lisinopril 40 milliGRAM(s) Oral daily  pantoprazole    Tablet 40 milliGRAM(s) Oral before breakfast  senna 2 Tablet(s) Oral at bedtime    MEDICATIONS  (PRN):  aluminum hydroxide/magnesium hydroxide/simethicone Suspension 30 milliLiter(s) Oral four times a day PRN Indigestion  bisacodyl Suppository 10 milliGRAM(s) Rectal daily PRN If no bowel movement by POD#2  HYDROmorphone  Injectable 0.5 milliGRAM(s) IV Push every 3 hours PRN Severe Pain (7 - 10)  magnesium hydroxide Suspension 30 milliLiter(s) Oral daily PRN Constipation  ondansetron Injectable 4 milliGRAM(s) IV Push every 6 hours PRN Nausea and/or Vomiting  oxyCODONE    IR 5 milliGRAM(s) Oral every 3 hours PRN Mild Pain (1 - 3)  oxyCODONE    IR 10 milliGRAM(s) Oral every 3 hours PRN Moderate Pain (4 - 6)  polyethylene glycol 3350 17 Gram(s) Oral daily PRN Constipation      Allergies    No Known Allergies    Intolerances        REVIEW OF SYSTEMS:  CONSTITUTIONAL: No fever or chills  HEENT:  No headache, no sore throat  RESPIRATORY: No cough, wheezing, or shortness of breath  CARDIOVASCULAR: No chest pain, palpitations, or leg swelling  GASTROINTESTINAL: No nausea, vomiting, or diarrhea  GENITOURINARY: No dysuria, frequency, or hematuria  NEUROLOGICAL: no focal weakness or dizziness  SKIN:  No rashes or lesions   MUSCULOSKELETAL: no myalgias   PSYCHIATRIC: No depression or anxiety      Vital Signs Last 24 Hrs  T(C): 36.4 (23 Dec 2017 07:25), Max: 36.8 (22 Dec 2017 19:15)  T(F): 97.5 (23 Dec 2017 07:25), Max: 98.2 (22 Dec 2017 19:15)  HR: 71 (23 Dec 2017 07:25) (65 - 77)  BP: 101/61 (23 Dec 2017 07:25) (101/61 - 133/72)  BP(mean): --  RR: 18 (23 Dec 2017 07:25) (16 - 18)  SpO2: 98% (23 Dec 2017 07:25) (93% - 98%)    PHYSICAL EXAM:  GENERAL: NAD  HEENT:  EOMI, mmm  CHEST/LUNG:  CTA b/l, no rales, wheezes, or rhonchi  HEART:  RRR, S1, S2, []murmur  ABDOMEN:  BS+, soft, NT, ND  EXTREMITIES: no edema or calf tenderness , dressing dry and intact.   NERVOUS SYSTEM: AA&Ox3 , no focal neurology.     DIET:     Cultures:     LABS:                        10.7   6.1   )-----------( 133      ( 23 Dec 2017 07:01 )             31.7     CBC Full  -  ( 23 Dec 2017 07:01 )  WBC Count : 6.1 K/uL  Hemoglobin : 10.7 g/dL  Hematocrit : 31.7 %  Platelet Count - Automated : 133 K/uL  Mean Cell Volume : 86.9 fl  Mean Cell Hemoglobin : 29.2 pg  Mean Cell Hemoglobin Concentration : 33.6 gm/dL  Auto Neutrophil # : x  Auto Lymphocyte # : x  Auto Monocyte # : x  Auto Eosinophil # : x  Auto Basophil # : x  Auto Neutrophil % : x  Auto Lymphocyte % : x  Auto Monocyte % : x  Auto Eosinophil % : x  Auto Basophil % : x    23 Dec 2017 07:01    140    |  106    |  15     ----------------------------<  112    4.1     |  29     |  0.86     Ca    8.4        23 Dec 2017 07:01          CAPILLARY BLOOD GLUCOSE              RADIOLOGY & ADDITIONAL TESTS:    Personally reviewed.     Consultant(s) Notes Reviewed:  [x] YES  [ ] NO    Care Discussed with [ ] Consultants  [x] Patient  [ ] Family  [x]      [ ] Other; RN  DVT ppx  Advanced directive

## 2018-01-03 ENCOUNTER — APPOINTMENT (OUTPATIENT)
Dept: ORTHOPEDIC SURGERY | Facility: CLINIC | Age: 73
End: 2018-01-03
Payer: MEDICARE

## 2018-01-03 VITALS
SYSTOLIC BLOOD PRESSURE: 131 MMHG | HEART RATE: 86 BPM | HEIGHT: 69 IN | WEIGHT: 227 LBS | DIASTOLIC BLOOD PRESSURE: 82 MMHG | BODY MASS INDEX: 33.62 KG/M2

## 2018-01-03 PROCEDURE — 99024 POSTOP FOLLOW-UP VISIT: CPT

## 2018-01-03 PROCEDURE — 73502 X-RAY EXAM HIP UNI 2-3 VIEWS: CPT

## 2018-01-03 RX ORDER — AMOXICILLIN 500 MG/1
500 CAPSULE ORAL
Qty: 20 | Refills: 2 | Status: ACTIVE | COMMUNITY
Start: 2018-01-03 | End: 1900-01-01

## 2018-01-17 ENCOUNTER — APPOINTMENT (OUTPATIENT)
Dept: ORTHOPEDIC SURGERY | Facility: CLINIC | Age: 73
End: 2018-01-17
Payer: OTHER MISCELLANEOUS

## 2018-01-17 DIAGNOSIS — Z96.652 PRESENCE OF LEFT ARTIFICIAL KNEE JOINT: ICD-10-CM

## 2018-01-17 DIAGNOSIS — Z96.651 PRESENCE OF RIGHT ARTIFICIAL KNEE JOINT: ICD-10-CM

## 2018-01-17 PROCEDURE — 99214 OFFICE O/P EST MOD 30 MIN: CPT | Mod: 24

## 2018-01-17 PROCEDURE — 73562 X-RAY EXAM OF KNEE 3: CPT | Mod: LT

## 2018-02-13 ENCOUNTER — APPOINTMENT (OUTPATIENT)
Dept: ORTHOPEDIC SURGERY | Facility: CLINIC | Age: 73
End: 2018-02-13
Payer: MEDICARE

## 2018-02-13 VITALS
HEIGHT: 69 IN | WEIGHT: 227 LBS | BODY MASS INDEX: 33.62 KG/M2 | SYSTOLIC BLOOD PRESSURE: 126 MMHG | HEART RATE: 69 BPM | DIASTOLIC BLOOD PRESSURE: 76 MMHG

## 2018-02-13 PROCEDURE — 99024 POSTOP FOLLOW-UP VISIT: CPT

## 2018-02-13 PROCEDURE — 73502 X-RAY EXAM HIP UNI 2-3 VIEWS: CPT

## 2018-12-04 NOTE — CONSULT NOTE ADULT - SUBJECTIVE AND OBJECTIVE BOX
PULMONARY/CRITICAL CARE        Patient is a 72y old  Male who presents with a chief complaint of " I have pain in my left hip." (21 Dec 2017 13:22)  Had left THR. Clinically stable    BRIEF HOSPITAL COURSE: *** Has snoring. no daytime somnolence. Doesnt sleep well when on back.    Events last 24 hours: ***    PAST MEDICAL & SURGICAL HISTORY:  Hip pain, acute, left  Osteoarthritis  GERD (gastroesophageal reflux disease)  Hyperlipidemia  Hypertension  S/P knee replacement: right- 1/2017  S/P knee replacement: left knee 2016  No hx dvt  Allergies    No Known Allergies    Intolerances      FAMILY HISTORY:  Family history of heart attack (Sibling)      Review of Systems:  CONSTITUTIONAL: No fever, chills, or fatigue  EYES: No eye pain, visual disturbances, or discharge  ENMT:  No difficulty hearing, tinnitus, vertigo; No sinus or throat pain  NECK: No pain or stiffness  RESPIRATORY: No cough, wheezing, chills or hemoptysis; No shortness of breath  CARDIOVASCULAR: No chest pain, palpitations, dizziness, or leg swelling  GASTROINTESTINAL: No abdominal or epigastric pain. No nausea, vomiting, or hematemesis; No diarrhea or constipation. No melena or hematochezia.  GENITOURINARY: No dysuria, frequency, hematuria, or incontinence  NEUROLOGICAL: No headaches, memory loss, loss of strength, numbness, or tremors  SKIN: No itching, burning, rashes, or lesions   MUSCULOSKELETAL: left hip joint pain No muscle, back, or extremity pain  PSYCHIATRIC: No depression, anxiety, mood swings, or difficulty sleeping      Medications:        acetaminophen   Tablet 1000 milliGRAM(s) Oral every 8 hours  celecoxib 200 milliGRAM(s) Oral two times a day with meals  HYDROmorphone  Injectable 0.5 milliGRAM(s) IV Push every 3 hours PRN  ondansetron Injectable 4 milliGRAM(s) IV Push every 6 hours PRN  oxyCODONE    IR 5 milliGRAM(s) Oral every 3 hours PRN  oxyCODONE    IR 10 milliGRAM(s) Oral every 3 hours PRN      aspirin enteric coated 162 milliGRAM(s) Oral every 12 hours    aluminum hydroxide/magnesium hydroxide/simethicone Suspension 30 milliLiter(s) Oral four times a day PRN  docusate sodium 100 milliGRAM(s) Oral three times a day  magnesium hydroxide Suspension 30 milliLiter(s) Oral daily PRN  pantoprazole    Tablet 40 milliGRAM(s) Oral before breakfast  polyethylene glycol 3350 17 Gram(s) Oral daily PRN  senna 2 Tablet(s) Oral at bedtime      atorvastatin 10 milliGRAM(s) Oral at bedtime    lactated ringers. 1000 milliLiter(s) IV Continuous <Continuous>                ICU Vital Signs Last 24 Hrs  T(C): 36.6 (22 Dec 2017 07:15), Max: 37.1 (21 Dec 2017 10:05)  T(F): 97.8 (22 Dec 2017 07:15), Max: 98.7 (21 Dec 2017 10:05)  HR: 70 (22 Dec 2017 07:15) (66 - 88)  BP: 120/71 (22 Dec 2017 07:15) (120/71 - 151/74)  BP(mean): --  ABP: --  ABP(mean): --  RR: 16 (22 Dec 2017 07:15) (14 - 18)  SpO2: 95% (22 Dec 2017 07:15) (93% - 100%)    Vital Signs Last 24 Hrs  T(C): 36.6 (22 Dec 2017 07:15), Max: 37.1 (21 Dec 2017 10:05)  T(F): 97.8 (22 Dec 2017 07:15), Max: 98.7 (21 Dec 2017 10:05)  HR: 70 (22 Dec 2017 07:15) (66 - 88)  BP: 120/71 (22 Dec 2017 07:15) (120/71 - 151/74)  BP(mean): --  RR: 16 (22 Dec 2017 07:15) (14 - 18)  SpO2: 95% (22 Dec 2017 07:15) (93% - 100%)        I&O's Detail    21 Dec 2017 07:01  -  22 Dec 2017 07:00  --------------------------------------------------------  IN:    IV PiggyBack: 150 mL    lactated ringers.: 1200 mL    lactated ringers.: 1800 mL  Total IN: 3150 mL    OUT:    Accordian: 440 mL    Estimated Blood Loss: 100 mL    Voided: 1100 mL  Total OUT: 1640 mL    Total NET: 1510 mL            LABS:                        10.4   7.7   )-----------( 154      ( 22 Dec 2017 07:34 )             33.9     12-22    140  |  106  |  12  ----------------------------<  129<H>  3.9   |  28  |  0.81    Ca    8.9      22 Dec 2017 07:34            CAPILLARY BLOOD GLUCOSE            CULTURES:      Physical Examination:    General: No acute distress.      HEENT: Pupils equal, reactive to light.  Symmetric.    PULM: Clear to auscultation bilaterally, no significant sputum production    CVS: Regular rate and rhythm, no murmurs, rubs, or gallops    ABD: Soft, nondistended, nontender, normoactive bowel sounds, no masses    EXT: No edema, nontender  Left hip bandaged.    SKIN: Warm and well perfused, no rashes noted.    NEURO: Alert, oriented, interactive, nonfocal    RADIOLOGY: ***    CRITICAL CARE TIME SPENT: ***
Progress Notes by Gail Owen MA at 07/16/18 02:19 PM     Author:  Gail Owen MA Service:  (none) Author Type:  Medical Assistant     Filed:  07/16/18 02:19 PM Encounter Date:  7/16/2018 Status:  Signed     :  Gail Owen MA (Medical Assistant)              We have recommended to patient/parent/guardian that they should wait 15 minutes after receiving an injection.     Electronically Signed by:    Gail Owen MA , 7/16/2018[WO1.1T]      Revision History        User Key Date/Time User Provider Type Action    > WO1.1 07/16/18 02:19 PM Gail Owen MA Medical Assistant Sign    T - Template            
Patient is a 72y old  Male who presents with a chief complaint of " I have pain in my left hip." (21 Dec 2017 13:22)        HPI: s/p left thr.    feels well. pain tolerable   good appetite.  participating in PT.     HTN controlled    HLD on statin    GERD on PPI      PAST MEDICAL & SURGICAL HISTORY:  Hip pain, acute, left  Osteoarthritis  GERD (gastroesophageal reflux disease)  Hyperlipidemia  Hypertension  S/P knee replacement: right- 1/2017  S/P knee replacement: left knee 2016      REVIEW OF SYSTEMS:    negative unless otherwise specified in HPI.      MEDICATIONS  (STANDING):  acetaminophen  IVPB. 1000 milliGRAM(s) IV Intermittent every 6 hours  atorvastatin 10 milliGRAM(s) Oral at bedtime  ceFAZolin   IVPB 2000 milliGRAM(s) IV Intermittent every 8 hours  celecoxib 200 milliGRAM(s) Oral two times a day with meals  docusate sodium 100 milliGRAM(s) Oral three times a day  lactated ringers. 1000 milliLiter(s) (100 mL/Hr) IV Continuous <Continuous>  pantoprazole    Tablet 40 milliGRAM(s) Oral before breakfast  senna 2 Tablet(s) Oral at bedtime    MEDICATIONS  (PRN):  aluminum hydroxide/magnesium hydroxide/simethicone Suspension 30 milliLiter(s) Oral four times a day PRN Indigestion  HYDROmorphone  Injectable 0.5 milliGRAM(s) IV Push every 3 hours PRN Severe Pain (7 - 10)  magnesium hydroxide Suspension 30 milliLiter(s) Oral daily PRN Constipation  ondansetron Injectable 4 milliGRAM(s) IV Push every 6 hours PRN Nausea and/or Vomiting  oxyCODONE    IR 5 milliGRAM(s) Oral every 3 hours PRN Mild Pain (1 - 3)  oxyCODONE    IR 10 milliGRAM(s) Oral every 3 hours PRN Moderate Pain (4 - 6)  polyethylene glycol 3350 17 Gram(s) Oral daily PRN Constipation      Allergies    No Known Allergies    Intolerances        SOCIAL HISTORY: ex-smoker, quit 2011    FAMILY HISTORY:  Family history of heart attack (Sibling)      Vital Signs Last 24 Hrs  T(C): 36.4 (21 Dec 2017 12:26), Max: 37.1 (21 Dec 2017 10:05)  T(F): 97.6 (21 Dec 2017 12:26), Max: 98.7 (21 Dec 2017 10:05)  HR: 82 (21 Dec 2017 12:26) (63 - 88)  BP: 128/77 (21 Dec 2017 12:26) (128/77 - 151/74)  BP(mean): --  RR: 16 (21 Dec 2017 12:26) (10 - 18)  SpO2: 95% (21 Dec 2017 12:26) (95% - 100%)    PHYSICAL EXAM:  GENERAL: No apparent distress, well-groomed, well-developed  HEAD:  Atraumatic, Normocephalic  EYES: conjunctiva and sclera clear  ENMT: Moist mucous membranes  NECK: Supple, No Jugular venous distension.  NERVOUS SYSTEM:  Alert & Oriented X3, Good concentration; Bilateral Lower extremity mobile, sensation to light touch intact  CHEST/LUNG: Clear to auscultation bilaterally; No rales, rhonchi, wheezing, or rubs  HEART: Regular rate and rhythm; No murmurs, rubs, or gallops  ABDOMEN: Soft, Nontender, Nondistended; Bowel sounds present  EXTREMITIES: No clubbing, cyanosis or edema  LYMPH: No lymphadenopathy noted  SKIN: No rashes or lesions  INCISION:  Dressing dry and intact    LABS:                                              IMAGING: imaging reviewed personally    Consultant Notes Reviewed and Care Discussed with relevant Consultants/Other Providers.

## 2019-08-24 NOTE — H&P PST ADULT - NS PRO ABUSE SCREEN SUSPICION NEGLECT YN
Incision intact with glue, denies any pain or discomfort, urinating well with yellow and clear urine, VSS, up and ambulating to and from bathroom without difficulty, denies any nausea or dizziness.     Anais Fisher RN on 8/23/2019 at 6:40 PM    
Patient declined pain throughout shift. Lung sounds are clear in upper lobes and diminished in bases. Respirations 20. SpO2 91-96% RA. Reports shortness of breath on exertion. Bowel sounds are audible and active. Denies nausea. Ambulating independently. Voiding without difficulty. Vital signs:  Temp: 98.5  F (36.9  C) Temp src: Tympanic BP: 104/66 Pulse: 96 Heart Rate: 78 Resp: 20 SpO2: 91 % O2 Device: None (Room air)     Deanne Person RN on 8/24/2019 at 6:30 AM    
no

## 2021-09-28 PROBLEM — M25.552 PAIN IN LEFT HIP: Chronic | Status: ACTIVE | Noted: 2017-12-11

## 2021-10-29 ENCOUNTER — APPOINTMENT (OUTPATIENT)
Dept: ORTHOPEDIC SURGERY | Facility: CLINIC | Age: 76
End: 2021-10-29
Payer: MEDICARE

## 2021-10-29 VITALS
BODY MASS INDEX: 33.62 KG/M2 | DIASTOLIC BLOOD PRESSURE: 82 MMHG | SYSTOLIC BLOOD PRESSURE: 148 MMHG | HEIGHT: 69 IN | HEART RATE: 65 BPM | WEIGHT: 227 LBS

## 2021-10-29 DIAGNOSIS — M16.11 UNILATERAL PRIMARY OSTEOARTHRITIS, RIGHT HIP: ICD-10-CM

## 2021-10-29 DIAGNOSIS — Z96.642 PRESENCE OF LEFT ARTIFICIAL HIP JOINT: ICD-10-CM

## 2021-10-29 PROCEDURE — 99213 OFFICE O/P EST LOW 20 MIN: CPT

## 2021-10-29 PROCEDURE — 73502 X-RAY EXAM HIP UNI 2-3 VIEWS: CPT | Mod: LT

## 2021-10-29 NOTE — PHYSICAL EXAM
[Antalgic] : antalgic [UE/LE] : Sensory: Intact in bilateral upper & lower extremities [ALL] : dorsalis pedis, posterior tibial, femoral, popliteal, and radial 2+ and symmetric bilaterally [Normal RUE] : Right Upper Extremity: No scars, rashes, lesions, ulcers, skin intact [Normal LUE] : Left Upper Extremity: No scars, rashes, lesions, ulcers, skin intact [Normal RLE] : Right Lower Extremity: No scars, rashes, lesions, ulcers, skin intact [Normal LLE] : Left Lower Extremity: No scars, rashes, lesions, ulcers, skin intact [Normal] : No costovertebral angle tenderness and no spinal tenderness [de-identified] : The patient ambulates with an antalgic gait.  To the right patient using a no assistive devices for ambulation.  The patient has much difficulty getting up and down the examination table.\par Skin is clean, dry, and intact without erythema, or evidence of cellulitis.  There are left anterior thigh scar scars.\par Right hip: Skin is clean, dry, intact\par Flexion: 90 degrees\par Extension: 0 degrees\par External rotation: 20 degrees\par Internal rotation: 5 degrees\par Abduction: 15 degrees\par Adduction: 10 degrees\par Calves are soft, nontender, no evidence of DVT at this time.  Patient has good motor and sensory to both legs.\par Left hip: Well-healed surgical scar without erythema, drainage, or evidence of cellulitis.  No soft tissue swelling.\par Flexion: 100 degrees\par Extension: 0 degrees\par External rotation: 40 degrees\par Internal rotation: 35 degrees \par ABduction: 25 degrees\par Adduction: 15 degrees\par \par  [de-identified] : Intact [de-identified] : Moderate DJD to the right hip with joint space narrowing at the acetabulum the femoral head.\par \par \par Left total hip replacement, and anatomical alignment, excellent bone to prosthesis interface, there is no gross evidence of prosthetic failure, all the components are anatomically aligned.  There is no subsidence of the femoral stem.

## 2021-10-29 NOTE — DISCUSSION/SUMMARY
[Medication Risks Reviewed] : Medication risks reviewed [Surgical risks reviewed] : Surgical risks reviewed [de-identified] : The patient will continue an exercise program of walking, and strength training.  Although he does have arthritic changes to the right hip it is intermittent at this time.  We did offer him an ultrasound-guided cortisone injection in the future should he wish to temporize his symptoms through the holidays.  The patient would like to hold off on anything at this time.  He will return in several months for follow-up.  All of his questions were answered to his satisfaction.

## 2021-10-29 NOTE — END OF VISIT
[FreeTextEntry3] : I Dr Griffith personally performed the evaluation and management services for this new established patient. This includes conducting the initial examination, assessing all conditions, and establishing the plan of care. Today, my ACP Evin Lomax was here to observe my evaluation and management services for this patient to be followed going forward.\par \par Farhana Grier M.D\par \par

## 2021-10-29 NOTE — HISTORY OF PRESENT ILLNESS
[de-identified] : The patient is a 76-year-old male who presents today for follow-up of both of his hips.  He status post a left anterior total hip replacement done several years ago with excellent results.  He states he has no pain whatsoever in his left hip.  He does however have intermittent pain on the right hip in the groin.  He denies any specific injury or trauma.  He does take anti-inflammatories on a as needed basis for the discomfort.  He states it mostly bothers him when getting up from a seated position or sitting around for a period of time.  Occasionally has pain with putting her shoes and socks on or getting out of a car.  He is here for an initial evaluation of his right hip [Worsening] : worsening [5] : a current pain level of 5/10 [3] : an average pain level of 3/10 [1] : a minimum pain level of 1/10 [7] : a maximum pain level of 7/10 [Sitting] : sitting [Standing] : standing [Intermit.] : ~He/She~ states the symptoms seem to be intermittent [Hip Movement] : worsened by hip movement [Lifting] : worsened by lifting [Running] : worsened by running [Walking] : worsened by walking [Acetaminophen] : relieved by acetaminophen [Exercise Regimen] : relieved by exercise regimen [Heat] : relieved by heat [Ice] : relieved by ice [NSAIDs] : relieved by nonsteroidal anti-inflammatory drugs [Recumbency] : relieved by recumbency [Rest] : relieved by rest [Ataxia] : no ataxia [Incontinence] : no incontinence [Loss of Dexterity] : good dexterity [Urinary Ret.] : no urinary retention

## 2021-10-29 NOTE — REASON FOR VISIT
[Initial Visit] : an initial visit for [FreeTextEntry2] : Right hip pain, a history of a left anterior total hip replacement

## 2023-12-19 ENCOUNTER — INPATIENT (INPATIENT)
Facility: HOSPITAL | Age: 78
LOS: 7 days | DRG: 283 | End: 2023-12-27
Attending: STUDENT IN AN ORGANIZED HEALTH CARE EDUCATION/TRAINING PROGRAM | Admitting: INTERNAL MEDICINE
Payer: MEDICARE

## 2023-12-19 VITALS
RESPIRATION RATE: 14 BRPM | WEIGHT: 206.13 LBS | HEIGHT: 71 IN | HEART RATE: 75 BPM | TEMPERATURE: 98 F | OXYGEN SATURATION: 100 %

## 2023-12-19 DIAGNOSIS — Z96.659 PRESENCE OF UNSPECIFIED ARTIFICIAL KNEE JOINT: Chronic | ICD-10-CM

## 2023-12-19 DIAGNOSIS — I46.9 CARDIAC ARREST, CAUSE UNSPECIFIED: ICD-10-CM

## 2023-12-19 DIAGNOSIS — K13.79 OTHER LESIONS OF ORAL MUCOSA: ICD-10-CM

## 2023-12-19 LAB
ALBUMIN SERPL ELPH-MCNC: 3.6 G/DL — SIGNIFICANT CHANGE UP (ref 3.3–5)
ALBUMIN SERPL ELPH-MCNC: 3.6 G/DL — SIGNIFICANT CHANGE UP (ref 3.3–5)
ALP SERPL-CCNC: 55 U/L — SIGNIFICANT CHANGE UP (ref 40–120)
ALP SERPL-CCNC: 55 U/L — SIGNIFICANT CHANGE UP (ref 40–120)
ALT FLD-CCNC: 471 U/L — HIGH (ref 10–45)
ALT FLD-CCNC: 471 U/L — HIGH (ref 10–45)
ANION GAP SERPL CALC-SCNC: 14 MMOL/L — SIGNIFICANT CHANGE UP (ref 5–17)
ANION GAP SERPL CALC-SCNC: 14 MMOL/L — SIGNIFICANT CHANGE UP (ref 5–17)
APTT BLD: 30.8 SEC — SIGNIFICANT CHANGE UP (ref 24.5–35.6)
APTT BLD: 30.8 SEC — SIGNIFICANT CHANGE UP (ref 24.5–35.6)
AST SERPL-CCNC: 349 U/L — HIGH (ref 10–40)
AST SERPL-CCNC: 349 U/L — HIGH (ref 10–40)
BASOPHILS # BLD AUTO: 0.16 K/UL — SIGNIFICANT CHANGE UP (ref 0–0.2)
BASOPHILS # BLD AUTO: 0.16 K/UL — SIGNIFICANT CHANGE UP (ref 0–0.2)
BASOPHILS NFR BLD AUTO: 0.9 % — SIGNIFICANT CHANGE UP (ref 0–2)
BASOPHILS NFR BLD AUTO: 0.9 % — SIGNIFICANT CHANGE UP (ref 0–2)
BILIRUB SERPL-MCNC: 1.1 MG/DL — SIGNIFICANT CHANGE UP (ref 0.2–1.2)
BILIRUB SERPL-MCNC: 1.1 MG/DL — SIGNIFICANT CHANGE UP (ref 0.2–1.2)
BLD GP AB SCN SERPL QL: NEGATIVE — SIGNIFICANT CHANGE UP
BLD GP AB SCN SERPL QL: NEGATIVE — SIGNIFICANT CHANGE UP
BUN SERPL-MCNC: 26 MG/DL — HIGH (ref 7–23)
BUN SERPL-MCNC: 26 MG/DL — HIGH (ref 7–23)
CALCIUM SERPL-MCNC: 8.4 MG/DL — SIGNIFICANT CHANGE UP (ref 8.4–10.5)
CALCIUM SERPL-MCNC: 8.4 MG/DL — SIGNIFICANT CHANGE UP (ref 8.4–10.5)
CHLORIDE SERPL-SCNC: 104 MMOL/L — SIGNIFICANT CHANGE UP (ref 96–108)
CHLORIDE SERPL-SCNC: 104 MMOL/L — SIGNIFICANT CHANGE UP (ref 96–108)
CHOLEST SERPL-MCNC: 118 MG/DL — SIGNIFICANT CHANGE UP
CHOLEST SERPL-MCNC: 118 MG/DL — SIGNIFICANT CHANGE UP
CK MB CFR SERPL CALC: 129.2 NG/ML — HIGH (ref 0–6.7)
CK MB CFR SERPL CALC: 129.2 NG/ML — HIGH (ref 0–6.7)
CO2 SERPL-SCNC: 21 MMOL/L — LOW (ref 22–31)
CO2 SERPL-SCNC: 21 MMOL/L — LOW (ref 22–31)
CREAT SERPL-MCNC: 1.16 MG/DL — SIGNIFICANT CHANGE UP (ref 0.5–1.3)
CREAT SERPL-MCNC: 1.16 MG/DL — SIGNIFICANT CHANGE UP (ref 0.5–1.3)
EGFR: 65 ML/MIN/1.73M2 — SIGNIFICANT CHANGE UP
EGFR: 65 ML/MIN/1.73M2 — SIGNIFICANT CHANGE UP
EOSINOPHIL # BLD AUTO: 0 K/UL — SIGNIFICANT CHANGE UP (ref 0–0.5)
EOSINOPHIL # BLD AUTO: 0 K/UL — SIGNIFICANT CHANGE UP (ref 0–0.5)
EOSINOPHIL NFR BLD AUTO: 0 % — SIGNIFICANT CHANGE UP (ref 0–6)
EOSINOPHIL NFR BLD AUTO: 0 % — SIGNIFICANT CHANGE UP (ref 0–6)
GAS PNL BLDA: SIGNIFICANT CHANGE UP
GAS PNL BLDA: SIGNIFICANT CHANGE UP
GLUCOSE SERPL-MCNC: 288 MG/DL — HIGH (ref 70–99)
GLUCOSE SERPL-MCNC: 288 MG/DL — HIGH (ref 70–99)
HCT VFR BLD CALC: 36.8 % — LOW (ref 39–50)
HCT VFR BLD CALC: 36.8 % — LOW (ref 39–50)
HDLC SERPL-MCNC: 46 MG/DL — SIGNIFICANT CHANGE UP
HDLC SERPL-MCNC: 46 MG/DL — SIGNIFICANT CHANGE UP
HGB BLD-MCNC: 12.5 G/DL — LOW (ref 13–17)
HGB BLD-MCNC: 12.5 G/DL — LOW (ref 13–17)
INR BLD: 1.63 RATIO — HIGH (ref 0.85–1.18)
INR BLD: 1.63 RATIO — HIGH (ref 0.85–1.18)
LIPID PNL WITH DIRECT LDL SERPL: 57 MG/DL — SIGNIFICANT CHANGE UP
LIPID PNL WITH DIRECT LDL SERPL: 57 MG/DL — SIGNIFICANT CHANGE UP
LYMPHOCYTES # BLD AUTO: 11.4 % — LOW (ref 13–44)
LYMPHOCYTES # BLD AUTO: 11.4 % — LOW (ref 13–44)
LYMPHOCYTES # BLD AUTO: 2.02 K/UL — SIGNIFICANT CHANGE UP (ref 1–3.3)
LYMPHOCYTES # BLD AUTO: 2.02 K/UL — SIGNIFICANT CHANGE UP (ref 1–3.3)
MAGNESIUM SERPL-MCNC: 1.9 MG/DL — SIGNIFICANT CHANGE UP (ref 1.6–2.6)
MAGNESIUM SERPL-MCNC: 1.9 MG/DL — SIGNIFICANT CHANGE UP (ref 1.6–2.6)
MCHC RBC-ENTMCNC: 31.2 PG — SIGNIFICANT CHANGE UP (ref 27–34)
MCHC RBC-ENTMCNC: 31.2 PG — SIGNIFICANT CHANGE UP (ref 27–34)
MCHC RBC-ENTMCNC: 34 GM/DL — SIGNIFICANT CHANGE UP (ref 32–36)
MCHC RBC-ENTMCNC: 34 GM/DL — SIGNIFICANT CHANGE UP (ref 32–36)
MCV RBC AUTO: 91.8 FL — SIGNIFICANT CHANGE UP (ref 80–100)
MCV RBC AUTO: 91.8 FL — SIGNIFICANT CHANGE UP (ref 80–100)
MONOCYTES # BLD AUTO: 0.94 K/UL — HIGH (ref 0–0.9)
MONOCYTES # BLD AUTO: 0.94 K/UL — HIGH (ref 0–0.9)
MONOCYTES NFR BLD AUTO: 5.3 % — SIGNIFICANT CHANGE UP (ref 2–14)
MONOCYTES NFR BLD AUTO: 5.3 % — SIGNIFICANT CHANGE UP (ref 2–14)
NEUTROPHILS # BLD AUTO: 14.62 K/UL — HIGH (ref 1.8–7.4)
NEUTROPHILS # BLD AUTO: 14.62 K/UL — HIGH (ref 1.8–7.4)
NEUTROPHILS NFR BLD AUTO: 78.9 % — HIGH (ref 43–77)
NEUTROPHILS NFR BLD AUTO: 78.9 % — HIGH (ref 43–77)
NON HDL CHOLESTEROL: 72 MG/DL — SIGNIFICANT CHANGE UP
NON HDL CHOLESTEROL: 72 MG/DL — SIGNIFICANT CHANGE UP
NT-PROBNP SERPL-SCNC: 1232 PG/ML — HIGH (ref 0–300)
NT-PROBNP SERPL-SCNC: 1232 PG/ML — HIGH (ref 0–300)
PHOSPHATE SERPL-MCNC: 3.5 MG/DL — SIGNIFICANT CHANGE UP (ref 2.5–4.5)
PHOSPHATE SERPL-MCNC: 3.5 MG/DL — SIGNIFICANT CHANGE UP (ref 2.5–4.5)
PLATELET # BLD AUTO: 174 K/UL — SIGNIFICANT CHANGE UP (ref 150–400)
PLATELET # BLD AUTO: 174 K/UL — SIGNIFICANT CHANGE UP (ref 150–400)
POTASSIUM SERPL-MCNC: 3.5 MMOL/L — SIGNIFICANT CHANGE UP (ref 3.5–5.3)
POTASSIUM SERPL-MCNC: 3.5 MMOL/L — SIGNIFICANT CHANGE UP (ref 3.5–5.3)
POTASSIUM SERPL-SCNC: 3.5 MMOL/L — SIGNIFICANT CHANGE UP (ref 3.5–5.3)
POTASSIUM SERPL-SCNC: 3.5 MMOL/L — SIGNIFICANT CHANGE UP (ref 3.5–5.3)
PROT SERPL-MCNC: 5.7 G/DL — LOW (ref 6–8.3)
PROT SERPL-MCNC: 5.7 G/DL — LOW (ref 6–8.3)
PROTHROM AB SERPL-ACNC: 17.7 SEC — HIGH (ref 9.5–13)
PROTHROM AB SERPL-ACNC: 17.7 SEC — HIGH (ref 9.5–13)
RBC # BLD: 4.01 M/UL — LOW (ref 4.2–5.8)
RBC # BLD: 4.01 M/UL — LOW (ref 4.2–5.8)
RBC # FLD: 12.8 % — SIGNIFICANT CHANGE UP (ref 10.3–14.5)
RBC # FLD: 12.8 % — SIGNIFICANT CHANGE UP (ref 10.3–14.5)
RH IG SCN BLD-IMP: POSITIVE — SIGNIFICANT CHANGE UP
RH IG SCN BLD-IMP: POSITIVE — SIGNIFICANT CHANGE UP
SODIUM SERPL-SCNC: 139 MMOL/L — SIGNIFICANT CHANGE UP (ref 135–145)
SODIUM SERPL-SCNC: 139 MMOL/L — SIGNIFICANT CHANGE UP (ref 135–145)
TRIGL SERPL-MCNC: 72 MG/DL — SIGNIFICANT CHANGE UP
TRIGL SERPL-MCNC: 72 MG/DL — SIGNIFICANT CHANGE UP
TROPONIN T, HIGH SENSITIVITY RESULT: 1894 NG/L — HIGH (ref 0–51)
TROPONIN T, HIGH SENSITIVITY RESULT: 1894 NG/L — HIGH (ref 0–51)
WBC # BLD: 17.74 K/UL — HIGH (ref 3.8–10.5)
WBC # BLD: 17.74 K/UL — HIGH (ref 3.8–10.5)
WBC # FLD AUTO: 17.74 K/UL — HIGH (ref 3.8–10.5)
WBC # FLD AUTO: 17.74 K/UL — HIGH (ref 3.8–10.5)

## 2023-12-19 PROCEDURE — 95813 EEG EXTND MNTR 61-119 MIN: CPT | Mod: 26

## 2023-12-19 PROCEDURE — 99221 1ST HOSP IP/OBS SF/LOW 40: CPT | Mod: GC

## 2023-12-19 PROCEDURE — 42960 CONTROL THROAT BLEEDING: CPT

## 2023-12-19 PROCEDURE — 71045 X-RAY EXAM CHEST 1 VIEW: CPT | Mod: 26

## 2023-12-19 PROCEDURE — 99291 CRITICAL CARE FIRST HOUR: CPT

## 2023-12-19 PROCEDURE — 93010 ELECTROCARDIOGRAM REPORT: CPT

## 2023-12-19 PROCEDURE — 93306 TTE W/DOPPLER COMPLETE: CPT | Mod: 26

## 2023-12-19 PROCEDURE — 99292 CRITICAL CARE ADDL 30 MIN: CPT

## 2023-12-19 RX ORDER — CEFAZOLIN SODIUM 1 G
1000 VIAL (EA) INJECTION EVERY 8 HOURS
Refills: 0 | Status: DISCONTINUED | OUTPATIENT
Start: 2023-12-19 | End: 2023-12-20

## 2023-12-19 RX ORDER — CEFAZOLIN SODIUM 1 G
VIAL (EA) INJECTION
Refills: 0 | Status: DISCONTINUED | OUTPATIENT
Start: 2023-12-19 | End: 2023-12-20

## 2023-12-19 RX ORDER — DEXMEDETOMIDINE HYDROCHLORIDE IN 0.9% SODIUM CHLORIDE 4 UG/ML
0.3 INJECTION INTRAVENOUS
Qty: 200 | Refills: 0 | Status: DISCONTINUED | OUTPATIENT
Start: 2023-12-19 | End: 2023-12-21

## 2023-12-19 RX ORDER — MAGNESIUM SULFATE 500 MG/ML
2 VIAL (ML) INJECTION ONCE
Refills: 0 | Status: COMPLETED | OUTPATIENT
Start: 2023-12-19 | End: 2023-12-19

## 2023-12-19 RX ORDER — CEFAZOLIN SODIUM 1 G
1000 VIAL (EA) INJECTION ONCE
Refills: 0 | Status: COMPLETED | OUTPATIENT
Start: 2023-12-19 | End: 2023-12-19

## 2023-12-19 RX ORDER — LEVETIRACETAM 250 MG/1
2000 TABLET, FILM COATED ORAL ONCE
Refills: 0 | Status: COMPLETED | OUTPATIENT
Start: 2023-12-19 | End: 2023-12-19

## 2023-12-19 RX ORDER — LEVETIRACETAM 250 MG/1
1000 TABLET, FILM COATED ORAL EVERY 12 HOURS
Refills: 0 | Status: DISCONTINUED | OUTPATIENT
Start: 2023-12-20 | End: 2023-12-27

## 2023-12-19 RX ORDER — CHLORHEXIDINE GLUCONATE 213 G/1000ML
15 SOLUTION TOPICAL EVERY 12 HOURS
Refills: 0 | Status: DISCONTINUED | OUTPATIENT
Start: 2023-12-19 | End: 2023-12-27

## 2023-12-19 RX ORDER — POTASSIUM CHLORIDE 20 MEQ
20 PACKET (EA) ORAL
Refills: 0 | Status: COMPLETED | OUTPATIENT
Start: 2023-12-19 | End: 2023-12-19

## 2023-12-19 RX ORDER — CHLORHEXIDINE GLUCONATE 213 G/1000ML
1 SOLUTION TOPICAL DAILY
Refills: 0 | Status: DISCONTINUED | OUTPATIENT
Start: 2023-12-19 | End: 2023-12-27

## 2023-12-19 RX ORDER — PANTOPRAZOLE SODIUM 20 MG/1
40 TABLET, DELAYED RELEASE ORAL DAILY
Refills: 0 | Status: DISCONTINUED | OUTPATIENT
Start: 2023-12-19 | End: 2023-12-27

## 2023-12-19 RX ADMIN — Medication 50 MILLIEQUIVALENT(S): at 16:30

## 2023-12-19 RX ADMIN — Medication 100 MILLIGRAM(S): at 19:32

## 2023-12-19 RX ADMIN — Medication 2 MILLIGRAM(S): at 18:45

## 2023-12-19 RX ADMIN — Medication 25 GRAM(S): at 16:30

## 2023-12-19 RX ADMIN — Medication 50 MILLIEQUIVALENT(S): at 18:17

## 2023-12-19 RX ADMIN — LEVETIRACETAM 400 MILLIGRAM(S): 250 TABLET, FILM COATED ORAL at 22:06

## 2023-12-19 RX ADMIN — DEXMEDETOMIDINE HYDROCHLORIDE IN 0.9% SODIUM CHLORIDE 7.01 MICROGRAM(S)/KG/HR: 4 INJECTION INTRAVENOUS at 22:32

## 2023-12-19 NOTE — CONSULT NOTE ADULT - NS ATTEND AMEND GEN_ALL_CORE FT
ENT consulted for oral bleed    Patient is 78M w/ PMH HTN HLD presents as transfer from [facility] s/p cardiac arrest. Per reports patient had a witness fall and arrest at home. Pt was intubated  in the field.     Physical exam shows blood suctioned from posterior pharynx, area of ecchymosis noted behind left tonsillar pillar, oropharynx packed with kerlix x1.    Recommend:  Oral Bleed  - Keep packing in place, will reevaluate tomorrow  - ENT will continue to follow  - Call with questions or concerns ENT consulted for oral bleed    Patient is 78M w/ PMH HTN HLD presents as transfer from [facility] s/p cardiac arrest. Per reports patient had a witness fall and arrest at home. Pt was intubated  in the field.     Physical exam shows blood suctioned from posterior pharynx, area of ecchymosis noted behind left tonsillar pillar, oropharynx packed with kerlix x1.    Recommend:  Oral Bleed  - Keep packing in place, will reevaluate tomorrow  - ENT will continue to follow  - Call with questions or concerns

## 2023-12-19 NOTE — H&P ADULT - HISTORY OF PRESENT ILLNESS
78M w/ PMH HTN HLD presents as transfer from [facility] s/p cardiac arrest. Per reports patient had a witness fall and arrest at home. EMS performed CPR en route to hospital was noted to be in VTach and shocked x2. ROSC was obtained just prior to arrival at [facility]. Pt was intubated placed on levo gtt. On arrival pt noted to have lacerated to R forehead. Pt had CT scans that were negative for intracranial hemorrhage, C-spine fracture, facial fractures. A R frontal hematoma was noted.     On arrival patient w/ dried blood on scalp.  78M w/ PMH HTN HLD presents as transfer from [facility] s/p cardiac arrest. Per reports patient had a witness fall and arrest at home. EMS performed CPR en route to hospital was noted to be in VTach and shocked x2. ROSC was obtained just prior to arrival at [facility]. Pt was intubated placed on levo gtt. On arrival pt noted to have lacerated to R forehead. Pt had CT scans that were negative for intracranial hemorrhage, C-spine fracture, facial fractures. A R frontal hematoma was noted.     On arrival patient w/ dried blood on scalp.  78M w/ PMH HTN HLD presents as transfer from [facility] s/p cardiac arrest. Per reports patient had a witness fall and arrest at home. EMS performed CPR en route to hospital was noted to be in VTach and shocked x2. ROSC was obtained just prior to arrival at [facility]. Pt was intubated placed on levo gtt. On arrival pt noted to have lacerated to R forehead. Pt had CT scans that were negative for intracranial hemorrhage, C-spine fracture, facial fractures. A R frontal hematoma was noted.     On arrival patient w/ dried blood on scalp. Also noted to have bleeding from oral mucosa w/ clots requiring suctioning. Pt otherwise off sedation and non-responsive. Per EMS report pt did receive some sedation and paralytics at [facility].  78M w/ PMH HTN HLD presents as transfer from [facility] s/p cardiac arrest. Per reports patient had a witness fall and arrest at home. EMS performed CPR en route to hospital was noted to be in VTach and shocked x2. ROSC was obtained just prior to arrival at [facility]. Pt was intubated placed on levo gtt. On arrival pt noted to have lacerated to R forehead. Pt had CT scans that were negative for intracranial hemorrhage, C-spine fracture, facial fractures. A R frontal hematoma was noted.     On arrival patient w/ dried blood on scalp. Also noted to have bleeding from oral mucosa w/ clots requiring suctioning. Pt otherwise off sedation and non-responsive. Per EMS report pt did receive some sedation and paralytics at [facility].  78M w/ PMH HTN HLD presents as transfer from [facility] s/p cardiac arrest. Per reports patient had a witness fall and arrest at home. EMS performed CPR en route to hospital was noted to be in VTach and shocked x2. ROSC was obtained just prior to arrival at [facility]. Pt was intubated placed on levo gtt. On arrival pt noted to have lacerated to R forehead. Pt had CT scans that were negative for intracranial hemorrhage, C-spine fracture, facial fractures. A R frontal hematoma was noted.     On arrival patient w/ dried blood on scalp. Also noted to have bleeding from oral mucosa w/ clots requiring suctioning. Pt otherwise off sedation and non-responsive. Per EMS report pt did receive some sedation and paralytics at [facility].     Per family patient has been feeling 'off' but managing his day to day and did not see a physician. Pt prescribed klonopin by outpatient provider and family believes pt may have been taking more than prescribed. On day of arrest, pt was initially asymptomatic carrying out his routine. He went to the bathroom, wife heard a loud thud and found patient in a pool of blood prompting call to EMS. Pt has not followed with any cardiologist. Has not had any syncope or other events preceding this.  78M w/ PMH HTN HLD presents as transfer from [facility] s/p cardiac arrest. Per reports patient had a witness fall and arrest at home. EMS performed CPR en route to hospital was noted to be in VTach and shocked x2. ROSC was obtained just prior to arrival at [facility]. Pt was intubated placed on levo gtt. On arrival pt noted to have lacerated to R forehead. Pt had CT scans that were negative for intracranial hemorrhage, C-spine fracture, facial fractures. A R frontal hematoma was noted.     On arrival patient w/ dried blood on scalp. Also noted to have bleeding from oral mucosa w/ clots requiring suctioning. Pt otherwise off sedation and non-responsive. Per EMS report pt did receive some sedation and paralytics at [facility].     Per family patient has been feeling 'off' but managing his day to day and did not see a physician. Pt prescribed klonopin by outpatient provider and family believes pt may have been taking more than prescribed. On day of arrest, pt was initially asymptomatic carrying out his routine. He went to the bathroom, wife heard a loud thud and found patient in a pool of blood prompting call to EMS. Pt has not followed with any cardiologist. Has not had any syncope or other events preceding this.

## 2023-12-19 NOTE — PROVIDER CONTACT NOTE (OTHER) - BACKGROUND
Pt admitted s/p cardiac arrest transferred from [facility]. Pt was in VT, shocked x2, and intubated at [facility]. Bleeding from oral mucosa on admission. ENT was called and applied packing Pt admitted s/p cardiac arrest transferred from [facility]. Pt was in VT, shocked x2, and intubated at [facility]. Bleeding from oral mucosa on admission. ENT was called and applied packing

## 2023-12-19 NOTE — CHART NOTE - NSCHARTNOTEFT_GEN_A_CORE
EEG preliminary read (not final) on the initial recording hour(s) = x 1     Multiple brief <1 secs body jerking movements observed throughout the recording likely myoclonic seizures. EEG mostly obscured by myogenic and movement artifacts during these events.   Otherwise background diffusely attenuated.    Final report to follow tomorrow morning after completion of study.    [facility] EEG Reading Room Ph#: [phone]  Epilepsy Answering Service after 5PM and before 8:30AM: Ph#: [phone] EEG preliminary read (not final) on the initial recording hour(s) = x 1     Multiple brief <1 secs body jerking movements observed throughout the recording likely myoclonic seizures. EEG mostly obscured by myogenic and movement artifacts during these events.   Otherwise background diffusely attenuated.    Final report to follow tomorrow morning after completion of study.    [facility] EEG Reading Room Ph#: [phone]  Epilepsy Answering Service after 5PM and before 8:30AM: Ph#: [phone] EEG preliminary read (not final) on the initial recording hour(s) = x 1     Multiple brief <1 secs body jerking movements observed throughout the recording suggestive of myoclonus. EEG mostly obscured by myogenic and movement artifacts during these events. Cannot rule out myoclonic seizures.   Otherwise background diffusely attenuated.    Final report to follow tomorrow morning after completion of study.    [facility] EEG Reading Room Ph#: [phone]  Epilepsy Answering Service after 5PM and before 8:30AM: Ph#: [phone] EEG preliminary read (not final) on the initial recording hour(s) = x 1     Multiple brief <1 secs body jerking movements observed throughout the recording suggestive of myoclonus. EEG mostly obscured by myogenic and movement artifacts during these events. Cannot rule out myoclonic seizures.   Otherwise background diffusely attenuated.    Final report to follow tomorrow morning after completion of study.    [facility] EEG Reading Room Ph#: [phone]  Epilepsy Answering Service after 5PM and before 8:30AM: Ph#: [phone] EEG preliminary read (not final) on the initial recording hour(s) = x 1     Multiple brief <1 secs body jerking movements observed throughout the recording suggestive of myoclonus. EEG mostly obscured by myogenic and movement artifacts during these events. Cannot rule out myoclonic seizures.   Otherwise background diffusely attenuated.  Team notified over the phone.  Final report to follow tomorrow morning after completion of study.    [facility] EEG Reading Room Ph#: [phone]  Epilepsy Answering Service after 5PM and before 8:30AM: Ph#: [phone] EEG preliminary read (not final) on the initial recording hour(s) = x 1     Multiple brief <1 secs body jerking movements observed throughout the recording suggestive of myoclonus. EEG mostly obscured by myogenic and movement artifacts during these events. Cannot rule out myoclonic seizures.   Otherwise background diffusely attenuated.  Team notified over the phone.  Final report to follow tomorrow morning after completion of study.    [facility] EEG Reading Room Ph#: [phone]  Epilepsy Answering Service after 5PM and before 8:30AM: Ph#: [phone]

## 2023-12-19 NOTE — H&P ADULT - NSHPLABSRESULTS_GEN_ALL_CORE
ABG - ( 19 Dec 2023 15:15 )  pH, Arterial: 7.34  pH, Blood: x     /  pCO2: 41    /  pO2: 155   / HCO3: 22    / Base Excess: -3.5  /  SaO2: 99.1                              CAPILLARY BLOOD GLUCOSE 12.5   17.74 )-----------( 174      ( 19 Dec 2023 15:22 )             36.8       12-19    139  |  104  |  26<H>  ----------------------------<  288<H>  3.5   |  21<L>  |  1.16    Ca    8.4      19 Dec 2023 15:22  Phos  3.5     12-19  Mg     1.9     12-19    TPro  5.7<L>  /  Alb  3.6  /  TBili  1.1  /  DBili  x   /  AST  349<H>  /  ALT  471<H>  /  AlkPhos  55  12-19          ABG - ( 19 Dec 2023 15:15 )  pH, Arterial: 7.34  pH, Blood: x     /  pCO2: 41    /  pO2: 155   / HCO3: 22    / Base Excess: -3.5  /  SaO2: 99.1                Urinalysis Basic - ( 19 Dec 2023 15:22 )    Color: x / Appearance: x / SG: x / pH: x  Gluc: 288 mg/dL / Ketone: x  / Bili: x / Urobili: x   Blood: x / Protein: x / Nitrite: x   Leuk Esterase: x / RBC: x / WBC x   Sq Epi: x / Non Sq Epi: x / Bacteria: x        PT/INR - ( 19 Dec 2023 15:22 )   PT: 17.7 sec;   INR: 1.63 ratio         PTT - ( 19 Dec 2023 15:22 )  PTT:30.8 sec    CARDIAC MARKERS ( 19 Dec 2023 15:22 )  x     / x     / x     / x     / 129.2 ng/mL        CAPILLARY BLOOD GLUCOSE

## 2023-12-19 NOTE — CONSULT NOTE ADULT - ASSESSMENT
78M w/ PMH HTN HLD presents as transfer from [facility] s/p cardiac arrest. Per reports patient had a witness fall and arrest at home. Pt was intubated  in the field. ENT consulted for oral bleeding noted upon suctioning. On exam, blood suctioned from posterior pharynx, area of ecchymosis noted behind left tonsillar pillar, oropharynx packed with kerlix x1.  78M w/ PMH HTN HLD presents as transfer from [facility] s/p cardiac arrest. Per reports patient had a witness fall and arrest at home. Pt was intubated  in the field. ENT consulted for oral bleeding noted upon suctioning. On exam, blood suctioned from posterior pharynx, area of ecchymosis noted behind left tonsillar pillar, oropharynx packed with kerlix x1.

## 2023-12-19 NOTE — CONSULT NOTE ADULT - ASSESSMENT
78M w/ PMH HTN HLD presents as transfer from [facility] s/p cardiac arrest. Per reports patient had a witness fall and arrest at home. EMS performed CPR en route to hospital was noted to be in VTach and shocked x2. ROSC was obtained just prior to arrival at [facility]. Pt was intubated placed on levo gtt. On arrival pt noted to have lacerated to R forehead. Per [facility], Pt had CT scans that were negative for intracranial hemorrhage, C-spine fracture, facial fractures but radiology discs have not yet been obtained by [facility]. Trauma surgery consulted for c-spine clearance.     Plan pending    Trauma Surgery [id number] 78M w/ PMH HTN HLD presents as transfer from [facility] s/p cardiac arrest. Per reports patient had a witness fall and arrest at home. EMS performed CPR en route to hospital was noted to be in VTach and shocked x2. ROSC was obtained just prior to arrival at [facility]. Pt was intubated placed on levo gtt. On arrival pt noted to have lacerated to R forehead. Per [facility], Pt had CT scans that were negative for intracranial hemorrhage, C-spine fracture, facial fractures but radiology discs have not yet been obtained by [facility]. Trauma surgery consulted for c-spine clearance.     Plan pending    Trauma Surgery [id number] 78M w/ PMH HTN HLD presents as transfer from [facility] s/p cardiac arrest. Per reports patient had a witness fall and arrest at home. EMS performed CPR en route to hospital was noted to be in VTach and shocked x2. ROSC was obtained just prior to arrival at [facility]. Pt was intubated placed on levo gtt. On arrival pt noted to have lacerated to R forehead. Per [facility], Pt had CT scans that were negative for intracranial hemorrhage, C-spine fracture, facial fractures but radiology discs have not yet been obtained by [facility]. Trauma surgery consulted for c-spine clearance.     -Please obtain report of radiological imaging from [facility]  -Patient may be cleared of c-spine precautions if radiological read is negative for c-spine injury.  -Please reach out to Trauma surgery for official clearance once radiological read is obtained.     Plan discussed with Dr. Collins    Trauma Surgery [id number] 78M w/ PMH HTN HLD presents as transfer from [facility] s/p cardiac arrest. Per reports patient had a witness fall and arrest at home. EMS performed CPR en route to hospital was noted to be in VTach and shocked x2. ROSC was obtained just prior to arrival at [facility]. Pt was intubated placed on levo gtt. On arrival pt noted to have lacerated to R forehead. Per [facility], Pt had CT scans that were negative for intracranial hemorrhage, C-spine fracture, facial fractures but radiology discs have not yet been obtained by [facility]. Trauma surgery consulted for c-spine clearance.     -Please obtain report of radiological imaging from [facility]  -Patient may be cleared of c-spine precautions if radiological read is negative for c-spine injury.  -Please reach out to Trauma surgery for official clearance once radiological read is obtained.     Plan discussed with Dr. Collins    Trauma Surgery [id number]

## 2023-12-19 NOTE — CONSULT NOTE ADULT - SUBJECTIVE AND OBJECTIVE BOX
CC: oral bleeding    HPI: 78M w/ PMH HTN HLD presents as transfer from [facility] s/p cardiac arrest. Per reports patient had a witness fall and arrest at home. EMS performed CPR en route to hospital was noted to be in VTach and shocked x2. ROSC was obtained just prior to arrival at [facility]. Pt was intubated placed on levo gtt. On arrival pt noted to have lacerated to R forehead. Pt had CT scans that were negative for intracranial hemorrhage, C-spine fracture, facial fractures. A R frontal hematoma was noted. ENT consulted for oral bleeding noted upon suctioning. Unable to obtain further history from pt due to clinical condition.        PAST MEDICAL & SURGICAL HISTORY:  HTN (hypertension)      HLD (hyperlipidemia)        Allergies    Allergy Status Unknown    Intolerances      MEDICATIONS  (STANDING):  chlorhexidine 0.12% Liquid 15 milliLiter(s) Oral Mucosa every 12 hours  chlorhexidine 2% Cloths 1 Application(s) Topical daily  pantoprazole  Injectable 40 milliGRAM(s) IV Push daily  potassium chloride  20 mEq/100 mL IVPB 20 milliEquivalent(s) IV Intermittent every 2 hours    MEDICATIONS  (PRN):      Social History: unable to obtain due to pts clinical condition     Family history: unable to obtain due to pts clinical condition     ROS:   unable to obtain due to pts clinical condition     Vital Signs Last 24 Hrs  T(C): 36.4 (19 Dec 2023 14:45), Max: 36.4 (19 Dec 2023 14:45)  T(F): 97.5 (19 Dec 2023 14:45), Max: 97.5 (19 Dec 2023 14:45)  HR: 58 (19 Dec 2023 16:00) (58 - 75)  BP: --  BP(mean): --  RR: 14 (19 Dec 2023 16:00) (14 - 24)  SpO2: 99% (19 Dec 2023 16:00) (97% - 100%)    Parameters below as of 19 Dec 2023 16:00  Patient On (Oxygen Delivery Method): ventilator    O2 Concentration (%): 40                          12.5   17.74 )-----------( 174      ( 19 Dec 2023 15:22 )             36.8    12-19    139  |  104  |  26<H>  ----------------------------<  288<H>  3.5   |  21<L>  |  1.16    Ca    8.4      19 Dec 2023 15:22  Phos  3.5     12-19  Mg     1.9     12-19    TPro  5.7<L>  /  Alb  3.6  /  TBili  1.1  /  DBili  x   /  AST  349<H>  /  ALT  471<H>  /  AlkPhos  55  12-19   PT/INR - ( 19 Dec 2023 15:22 )   PT: 17.7 sec;   INR: 1.63 ratio         PTT - ( 19 Dec 2023 15:22 )  PTT:30.8 sec    PHYSICAL EXAM:  Gen: NAD  Skin: No rashes or lesions  Head: Normocephalic  Face: no edema, erythema, or fluctuance. Parotid glands soft without mass  Eyes: periorbital edema and ecchymosis  Nose: Nares bilaterally patent, no discharge  Mouth: ETT in place, blood suctioned from posterior pharynx, area of ecchymosis noted behind left tonsillar pillar, packed with kerlix x1. Mucosa moist, tongue/uvula midline  Neck: Flat, supple, no lymphadenopathy, trachea midline, no masses  Lymphatic: No lymphadenopathy  Resp: on vent  CV: no peripheral edema/cyanosis  GI: nondistended   Peripheral vascular: no JVD or edema  Neuro: facial nerve intact, no facial droop           CC: oral bleeding    HPI: 78M w/ PMH HTN HLD presents as transfer from [facility] s/p cardiac arrest. Per reports patient had a witness fall and arrest at home. EMS performed CPR en route to hospital was noted to be in VTach and shocked x2. ROSC was obtained just prior to arrival at [facility]. Pt was intubated placed on levo gtt. On arrival pt noted to have lacerated to R forehead. Pt had CT scans that were negative for intracranial hemorrhage, C-spine fracture, facial fractures. A R frontal hematoma was noted. ENT consulted for oral bleeding noted upon suctioning. Unable to obtain further history from pt due to clinical condition.        PAST MEDICAL & SURGICAL HISTORY:  HTN (hypertension)      HLD (hyperlipidemia)        Allergies    Allergy Status Unknown    Intolerances      MEDICATIONS  (STANDING):  chlorhexidine 0.12% Liquid 15 milliLiter(s) Oral Mucosa every 12 hours  chlorhexidine 2% Cloths 1 Application(s) Topical daily  pantoprazole  Injectable 40 milliGRAM(s) IV Push daily  potassium chloride  20 mEq/100 mL IVPB 20 milliEquivalent(s) IV Intermittent every 2 hours    MEDICATIONS  (PRN):      Social History: unable to obtain due to pts clinical condition     Family history: unable to obtain due to pts clinical condition     ROS:   unable to obtain due to pts clinical condition     Vital Signs Last 24 Hrs  T(C): 36.4 (19 Dec 2023 14:45), Max: 36.4 (19 Dec 2023 14:45)  T(F): 97.5 (19 Dec 2023 14:45), Max: 97.5 (19 Dec 2023 14:45)  HR: 58 (19 Dec 2023 16:00) (58 - 75)  BP: --  BP(mean): --  RR: 14 (19 Dec 2023 16:00) (14 - 24)  SpO2: 99% (19 Dec 2023 16:00) (97% - 100%)    Parameters below as of 19 Dec 2023 16:00  Patient On (Oxygen Delivery Method): ventilator    O2 Concentration (%): 40                          12.5   17.74 )-----------( 174      ( 19 Dec 2023 15:22 )             36.8    12-19    139  |  104  |  26<H>  ----------------------------<  288<H>  3.5   |  21<L>  |  1.16    Ca    8.4      19 Dec 2023 15:22  Phos  3.5     12-19  Mg     1.9     12-19    TPro  5.7<L>  /  Alb  3.6  /  TBili  1.1  /  DBili  x   /  AST  349<H>  /  ALT  471<H>  /  AlkPhos  55  12-19   PT/INR - ( 19 Dec 2023 15:22 )   PT: 17.7 sec;   INR: 1.63 ratio         PTT - ( 19 Dec 2023 15:22 )  PTT:30.8 sec    PHYSICAL EXAM:  Gen: NAD  Skin: No rashes or lesions  Head: Normocephalic  Face: no edema, erythema, or fluctuance. Parotid glands soft without mass  Eyes: periorbital edema and ecchymosis  Nose: Nares bilaterally patent, no discharge  Mouth: ETT in place, blood suctioned from posterior pharynx, area of ecchymosis noted behind left tonsillar pillar, packed with kerlix x1. Mucosa moist, tongue/uvula midline  Neck: Flat, supple, no lymphadenopathy, trachea midline, no masses  Lymphatic: No lymphadenopathy  Resp: on vent  CV: no peripheral edema/cyanosis  GI: nondistended   Peripheral vascular: no JVD or edema  Neuro: facial nerve intact, no facial droop

## 2023-12-19 NOTE — CONSULT NOTE ADULT - SUBJECTIVE AND OBJECTIVE BOX
HPI:  78M w/ PMH HTN HLD presents as transfer from [facility] s/p cardiac arrest. Per reports patient had a witness fall and arrest at home. EMS performed CPR en route to hospital was noted to be in VTach and shocked x2. ROSC was obtained just prior to arrival at [facility]. Pt was intubated placed on levo gtt. On arrival pt noted to have lacerated to R forehead. Per [facility], Pt had CT scans that were negative for intracranial hemorrhage, C-spine fracture, facial fractures but radiology discs have not yet been obtained by [facility].     Patient is currently intubated without sedation or pressors.           PAST MEDICAL & SURGICAL HISTORY:  HTN (hypertension)      HLD (hyperlipidemia)          MEDICATIONS  (STANDING):  chlorhexidine 0.12% Liquid 15 milliLiter(s) Oral Mucosa every 12 hours  chlorhexidine 2% Cloths 1 Application(s) Topical daily  pantoprazole  Injectable 40 milliGRAM(s) IV Push daily    MEDICATIONS  (PRN):      Allergies    Allergy Status Unknown    Intolerances        SOCIAL HISTORY:    FAMILY HISTORY:          Physical Exam:  General: Sedated, intubated  HEENT: right sided scalp hematoma w/ laceration. c-collar in place  Pulmonary: intubated, trachea midline  Cardiovascular: NSR  Abdominal: soft, ND/NT  Neuro: A/O x 0, sedated      Vital Signs Last 24 Hrs  T(C): 36.4 (19 Dec 2023 14:45), Max: 36.4 (19 Dec 2023 14:45)  T(F): 97.5 (19 Dec 2023 14:45), Max: 97.5 (19 Dec 2023 14:45)  HR: 65 (19 Dec 2023 15:15) (65 - 75)  BP: --  BP(mean): --  RR: 14 (19 Dec 2023 15:15) (14 - 24)  SpO2: 99% (19 Dec 2023 15:15) (99% - 100%)    Parameters below as of 19 Dec 2023 15:15  Patient On (Oxygen Delivery Method): ventilator        I&O's Summary    19 Dec 2023 07:01  -  19 Dec 2023 15:33  --------------------------------------------------------  IN: 0 mL / OUT: 35 mL / NET: -35 mL            LABS:                        12.5   17.74 )-----------( 174      ( 19 Dec 2023 15:22 )             36.8               CAPILLARY BLOOD GLUCOSE            Cultures:      RADIOLOGY & ADDITIONAL STUDIES:  Pending radiology           HPI:  78M w/ PMH HTN HLD presents as transfer from [facility] s/p cardiac arrest. Per reports patient had a witness fall and arrest at home. EMS performed CPR en route to hospital was noted to be in VTach and shocked x2. ROSC was obtained just prior to arrival at [facility]. Pt was intubated placed on levo gtt. On arrival pt noted to have lacerated to R forehead. Per [facility], Pt had CT scans that were negative for intracranial hemorrhage, C-spine fracture, facial fractures but radiology discs have not yet been obtained by [facility].     Patient is currently intubated without sedation or pressors.           PAST MEDICAL & SURGICAL HISTORY:  HTN (hypertension)      HLD (hyperlipidemia)          MEDICATIONS  (STANDING):  chlorhexidine 0.12% Liquid 15 milliLiter(s) Oral Mucosa every 12 hours  chlorhexidine 2% Cloths 1 Application(s) Topical daily  pantoprazole  Injectable 40 milliGRAM(s) IV Push daily    MEDICATIONS  (PRN):      Allergies    Allergy Status Unknown    Intolerances        SOCIAL HISTORY:    FAMILY HISTORY:          Physical Exam:  General: Sedated, intubated  HEENT: right sided scalp hematoma w/ laceration. c-collar in place  Pulmonary: intubated, trachea midline  Cardiovascular: NSR  Abdominal: soft, ND/NT  Neuro: A/O x 0, sedated      Vital Signs Last 24 Hrs  T(C): 36.4 (19 Dec 2023 14:45), Max: 36.4 (19 Dec 2023 14:45)  T(F): 97.5 (19 Dec 2023 14:45), Max: 97.5 (19 Dec 2023 14:45)  HR: 65 (19 Dec 2023 15:15) (65 - 75)  BP: --  BP(mean): --  RR: 14 (19 Dec 2023 15:15) (14 - 24)  SpO2: 99% (19 Dec 2023 15:15) (99% - 100%)    Parameters below as of 19 Dec 2023 15:15  Patient On (Oxygen Delivery Method): ventilator        I&O's Summary    19 Dec 2023 07:01  -  19 Dec 2023 15:33  --------------------------------------------------------  IN: 0 mL / OUT: 35 mL / NET: -35 mL            LABS:                        12.5   17.74 )-----------( 174      ( 19 Dec 2023 15:22 )             36.8               CAPILLARY BLOOD GLUCOSE            Cultures:      RADIOLOGY & ADDITIONAL STUDIES:  Pending radiology           HPI:  78M w/ PMH HTN HLD presents as transfer from [facility] s/p cardiac arrest. Per reports patient had a witness fall and arrest at home. EMS performed CPR en route to hospital was noted to be in VTach and shocked x2. ROSC was obtained just prior to arrival at [facility]. Pt was intubated placed on levo gtt. On arrival pt noted to have lacerated to R forehead. Per [facility], Pt had CT scans that were negative for intracranial hemorrhage, C-spine fracture, facial fractures but radiology discs have not yet been obtained by [facility].     Patient is currently intubated without sedation or pressors.           PAST MEDICAL & SURGICAL HISTORY:  HTN (hypertension)      HLD (hyperlipidemia)          MEDICATIONS  (STANDING):  chlorhexidine 0.12% Liquid 15 milliLiter(s) Oral Mucosa every 12 hours  chlorhexidine 2% Cloths 1 Application(s) Topical daily  pantoprazole  Injectable 40 milliGRAM(s) IV Push daily    MEDICATIONS  (PRN):      Allergies    Allergy Status Unknown    Intolerances        SOCIAL HISTORY:    FAMILY HISTORY:          Physical Exam:  General: Sedated, intubated  HEENT: right sided scalp hematoma w/ laceration. c-collar in place  Pulmonary: intubated, trachea midline  Abdominal: soft, ND/NT  Neuro: A/O x 0, sedated      Vital Signs Last 24 Hrs  T(C): 36.4 (19 Dec 2023 14:45), Max: 36.4 (19 Dec 2023 14:45)  T(F): 97.5 (19 Dec 2023 14:45), Max: 97.5 (19 Dec 2023 14:45)  HR: 65 (19 Dec 2023 15:15) (65 - 75)  BP: --  BP(mean): --  RR: 14 (19 Dec 2023 15:15) (14 - 24)  SpO2: 99% (19 Dec 2023 15:15) (99% - 100%)    Parameters below as of 19 Dec 2023 15:15  Patient On (Oxygen Delivery Method): ventilator        I&O's Summary    19 Dec 2023 07:01  -  19 Dec 2023 15:33  --------------------------------------------------------  IN: 0 mL / OUT: 35 mL / NET: -35 mL            LABS:                        12.5   17.74 )-----------( 174      ( 19 Dec 2023 15:22 )             36.8               CAPILLARY BLOOD GLUCOSE            Cultures:      RADIOLOGY & ADDITIONAL STUDIES:  Pending radiology           HPI:  78M w/ PMH HTN HLD presents as transfer from [facility] s/p cardiac arrest. Per reports patient had a witness fall and arrest at home. EMS performed CPR en route to hospital was noted to be in VTach and shocked x2. ROSC was obtained just prior to arrival at [facility]. Pt was intubated placed on levo gtt. On arrival pt noted to have lacerated to R forehead. Per [facility], Pt had CT scans that were negative for intracranial hemorrhage, C-spine fracture, facial fractures but radiology discs have not yet been obtained by [facility].     Patient is currently intubated without sedation or pressors.           PAST MEDICAL & SURGICAL HISTORY:  HTN (hypertension)      HLD (hyperlipidemia)          MEDICATIONS  (STANDING):  chlorhexidine 0.12% Liquid 15 milliLiter(s) Oral Mucosa every 12 hours  chlorhexidine 2% Cloths 1 Application(s) Topical daily  pantoprazole  Injectable 40 milliGRAM(s) IV Push daily    MEDICATIONS  (PRN):      Allergies    Allergy Status Unknown    Intolerances        SOCIAL HISTORY:    FAMILY HISTORY:          Physical Exam:  General: Sedated, intubated  HEENT: right sided scalp hematoma w/ laceration. c-collar in place  Pulmonary: intubated, trachea midline  Abdominal: soft, ND/NT  Neuro: A/O x 0, sedated      Vital Signs Last 24 Hrs  T(C): 36.4 (19 Dec 2023 14:45), Max: 36.4 (19 Dec 2023 14:45)  T(F): 97.5 (19 Dec 2023 14:45), Max: 97.5 (19 Dec 2023 14:45)  HR: 65 (19 Dec 2023 15:15) (65 - 75)  BP: --  BP(mean): --  RR: 14 (19 Dec 2023 15:15) (14 - 24)  SpO2: 99% (19 Dec 2023 15:15) (99% - 100%)    Parameters below as of 19 Dec 2023 15:15  Patient On (Oxygen Delivery Method): ventilator        I&O's Summary    19 Dec 2023 07:01  -  19 Dec 2023 15:33  --------------------------------------------------------  IN: 0 mL / OUT: 35 mL / NET: -35 mL            LABS:                        12.5   17.74 )-----------( 174      ( 19 Dec 2023 15:22 )             36.8               CAPILLARY BLOOD GLUCOSE            Cultures:      RADIOLOGY & ADDITIONAL STUDIES:  Pending radiology

## 2023-12-19 NOTE — CONSULT NOTE ADULT - ATTENDING COMMENTS
Patient examined by me in CICU. S/p cardiac arrest and fall from standing. Transferred from [facility] to [facility] CICU. Remains intubated poor mental status. Trauma asked to evaluate C-spine.    On my evaluation patient with active arrhythmias. Remains GCS 3 off sedation. Multiple superficial abrasions across face and small sutured laceration over forehead. No other evidence of other major traumatic injury.      Hb 12  Trop 1894  Transaminitis likely secondary to shock liver.     Imaging from [facility] reviewed.   CT Head - Official report negative.   CT C-Spine - Official report negative.   CT FACE - Official report with layering opacities in R maxillary and bilateral ethmoid sinus concerning for occult fx.   CT Chest images reviewed. Official report not available. Small retrosternal hematoma and multiple bilateral rib fx. No PTX.     A/P: S/p cardiac arrest and fall from stating with bilateral rib fx and small retrosternal hematoma.   -Given poor mental status with low likelihood of significant recovery over the next 24 hours, will clear C-collar based on radiography findings.   -CT Abdomen Pelvis deferred as patient is currently unstable with low energy mechanism and there is no evidence of abdominal trauma at this time.   -F/up ENT   -PXR Patient examined by me in CICU. S/p cardiac arrest and fall from standing. Transferred from [facility] to [facility] CICU. Remains intubated poor mental status. Trauma asked to evaluate C-spine.    On my evaluation patient with active arrhythmias. Remains GCS 3 off sedation. Multiple superficial abrasions across face and small sutured laceration over forehead. No other evidence of other major traumatic injury.      Hb 12  Trop 1894  Transaminitis likely secondary to shock liver.     Imaging from [facility] reviewed.   CT Head - Official report negative.   CT C-Spine - Official report negative.   CT FACE - Official report with layering opacities in R maxillary and bilateral ethmoid sinus concerning for occult fx.   CT Chest images reviewed. Official report not available. Small retrosternal hematoma and multiple bilateral rib fx. No PTX.     A/P: S/p cardiac arrest and fall from stating with bilateral rib fx and small retrosternal hematoma.   -Given poor mental status with low likelihood of significant recovery over the next 24 hours, will clear C-collar based on radiography findings.   -CT Abdomen Pelvis deferred as patient is currently unstable with low energy mechanism and there is no evidence of abdominal trauma at this time.   -F/up ENT   -PXR

## 2023-12-19 NOTE — PROVIDER CONTACT NOTE (OTHER) - ACTION/TREATMENT ORDERED:
Messaged ENT Sidney Salazar through teams, but offline. Called hospital ENT on call, waiting for response

## 2023-12-19 NOTE — H&P ADULT - NSHPPHYSICALEXAM_GEN_ALL_CORE
Vital Signs Last 24 Hrs  T(C): 36.4 (19 Dec 2023 14:45), Max: 36.4 (19 Dec 2023 14:45)  T(F): 97.5 (19 Dec 2023 14:45), Max: 97.5 (19 Dec 2023 14:45)  HR: 71 (19 Dec 2023 15:10) (71 - 75)  BP: --  BP(mean): --  RR: 24 (19 Dec 2023 15:00) (14 - 24)  SpO2: 99% (19 Dec 2023 15:10) (99% - 100%)      PHYSICAL EXAM:  GENERAL: Pt intubated, not responsive to sternal rub. Notable trauma to head.   HEAD:  +traumatic head, Normocephalic  EYES: conjunctiva and sclera clear. Pupils reactive. R 3mm; L 2mm  ENMT: ET tube in place. Dry mucous membranes. Notable blood in oral cavity   NECK: Supple, No JVD,   HEART: Regular rate and rhythm; No murmurs, rubs, or gallops  RESPIRATORY: Mechanical breath sounds b/l. No notable crackles, wheezes  ABDOMEN: Soft, Nontender, Nondistended  NEUROLOGY: A&Ox0. Pupils minimally reactive. Non-responsive to sternal stimuli off sedation.  EXTREMITIES:  1+ Peripheral Pulses, No clubbing, cyanosis, or edema  SKIN: warm, dry, normal color, no rash or abnormal lesions

## 2023-12-19 NOTE — H&P ADULT - ASSESSMENT
78M w/ PMH HTN HLD presents as transfer from [facility] s/p VT arrest s/p shock x2 w/ unknown downtime. Pt found to have trauma to head w/ superficial scalp bleeding and oral mucosal bleeding.       NEURO    #Intubated  #Enceaphalopathy  #Concern for anoxic brain injury   - Intubated w/o sedation s/p cardiac arrest w/ unknown down time. Reported to be 20minutes but unlikely given ROSC acheived in route to hospital  - CTH at [facility] w/o intracranial bleed/pathology. CTH likely too soon to show evidence of anoxic injury  - Pt non-responsive to sternal rub c/f anoxic injury vs residual sedation received at [facility].   - Will need neurologic workup to assess mental status if remains unresponsive  - Repeat CTH in 72hours   - Can check enolase    CARDIAC    #VT arrest  - Pt s/p VT arrest w/ unknown downtime. Pt reportedly received shock x2.   - Unclear prior cardiac history at this time  - Check TTE   - Monitor for arrhythmias   - Unlikely to be a candidate for [facility] given mental status     RESPIRATORY     #Intubated  Pt intubated s/p cardiac arrest  - On full vent support: RR 16 /  / PEEP 5 / FiO2 40  - Monitor ABGs    #Oropharynx bleed  - Pt w/ blood in oropharynx requiring frequent suctioning.   - Likely 2/2 trauma from fall/arrest  - ENT consult for laryngoscope evaluation       RENAL   Pending labs  - trend Cr   - montior I&Os    GI    - Diet: NPO    ENDO:  - f/u HbA1C, TSH  - monitor glucose    HEME/ONC  - Trend H/H  - A/C:    ID   #Leukocytosis  - Pt w/ WBC 17 likely reactive iso arrest but cannot r/o underlying infection  - Currently afebrile  - Low threshold for starting abx    LINES/PPX  - peripherals in tact  - GOC:  78M w/ PMH HTN HLD presents as transfer from [facility] s/p VT arrest s/p shock x2 w/ unknown downtime. Pt found to have trauma to head w/ superficial scalp bleeding and oral mucosal bleeding.       NEURO    #Intubated  #Enceaphalopathy  #Concern for anoxic brain injury   - Intubated w/o sedation s/p cardiac arrest w/ unknown down time. Reported to be 20minutes but unlikely given ROSC acheived in route to hospital  - CTH at [facility] w/o intracranial bleed/pathology. CTH likely too soon to show evidence of anoxic injury  - Pt non-responsive to sternal rub c/f anoxic injury vs residual sedation received at [facility].   - Will need neurologic workup to assess mental status if remains unresponsive  - Repeat CTH in 72hours   - Can check enolase    CARDIAC    #VT arrest  - Pt s/p VT arrest w/ unknown downtime. Pt reportedly received shock x2.   - Unclear prior cardiac history at this time  - Check TTE   - Monitor for arrhythmias   - Unlikely to be a candidate for [facility] given mental status     RESPIRATORY     #Intubated  Pt intubated s/p cardiac arrest  - On full vent support: RR 16 /  / PEEP 5 / FiO2 40  - Monitor ABGs    #Oropharynx bleed  - Pt w/ blood in oropharynx requiring frequent suctioning.   - Likely 2/2 trauma from fall/arrest  - ENT consult for laryngoscope evaluation       RENAL   Pending labs  - trend Cr   - montior I&Os    GI    - Diet: NPO    ENDO:  - f/u HbA1C, TSH  - monitor glucose    HEME/ONC  - Trend H/H  - A/C:    ID   #Leukocytosis  - Pt w/ WBC 17 likely reactive iso arrest but cannot r/o underlying infection  - Currently afebrile  - Low threshold for starting abx    LINES/PPX  - peripherals in tact  - GOC:  78M w/ PMH HTN HLD presents as transfer from [facility] s/p VT arrest s/p shock x2 w/ unknown downtime. Pt found to have trauma to head w/ superficial scalp bleeding and oral mucosal bleeding.       NEURO    #Intubated  #Enceaphalopathy  #Concern for anoxic brain injury   - Intubated w/o sedation s/p cardiac arrest w/ unknown down time. Reported to be 20minutes but unlikely given ROSC acheived in route to hospital  - CTH at [facility] w/o intracranial bleed/pathology. CTH likely too soon to show evidence of anoxic injury  - Pt non-responsive to sternal rub c/f anoxic injury vs residual sedation received at [facility].   - Will need neurologic workup to assess mental status if remains unresponsive  - Repeat CTH in 72hours   - Can check enolase    #C-spine trauma  Pt w/ fall at home presented w/ c-collar.   -Per [facility] radiology report no evidence of cervical fracture  -Trauma surgery consult for evaluation/clearance.     CARDIAC  #VT arrest  - Pt s/p VT arrest w/ unknown downtime. Pt reportedly received shock x2.   - Unclear prior cardiac history at this time  - Check TTE   - Check cardiac enzymes, BNP  - Monitor for arrhythmias   - Unlikely to be a candidate for [facility] given mental status     RESPIRATORY     #Intubated  Pt intubated s/p cardiac arrest  - On full vent support: RR 16 /  / PEEP 5 / FiO2 40  - Monitor ABGs    #Oropharynx bleed  - Pt w/ blood in oropharynx requiring frequent suctioning.   - Likely 2/2 trauma from fall/arrest  - ENT consult for laryngoscope evaluation       RENAL   Pt presented w/ SCr 1.16, likely near baseline  - trend Cr   - montior I&Os    GI  - Diet: NPO    ENDO:  - f/u HbA1C, TSH  - monitor glucose    HEME/ONC  - Trend H/H  - A/C:    ID   #Leukocytosis  - Pt w/ WBC 17 likely reactive iso arrest but cannot r/o underlying infection  - Currently afebrile  - Low threshold for starting abx    LINES/PPX  - peripherals in tact  - GOC:  78M w/ PMH HTN HLD presents as transfer from [facility] s/p VT arrest s/p shock x2 w/ unknown downtime. Pt found to have trauma to head w/ superficial scalp bleeding and oral mucosal bleeding.       NEURO    #Intubated  #Enceaphalopathy  #Concern for anoxic brain injury   - Intubated w/o sedation s/p cardiac arrest w/ unknown down time. Reported to be 20minutes but unlikely given ROSC acheived in route to hospital  - CTH at [facility] w/o intracranial bleed/pathology. CTH likely too soon to show evidence of anoxic injury  - Pt non-responsive to sternal rub c/f anoxic injury vs residual sedation received at [facility].   - Will need neurologic workup to assess mental status if remains unresponsive  - Repeat CTH in 72hours   - Can check enolase    #C-spine trauma  Pt w/ fall at home presented w/ c-collar.   -Per [facility] radiology report no evidence of cervical fracture  -Trauma surgery consult for evaluation/clearance.     CARDIAC  #VT arrest  - Pt s/p VT arrest w/ unknown downtime. Pt reportedly received shock x2.   - Unclear prior cardiac history at this time  - Check TTE   - Check cardiac enzymes, BNP  - Monitor for arrhythmias   - Unlikely to be a candidate for [facility] given mental status     RESPIRATORY     #Intubated  Pt intubated s/p cardiac arrest  - On full vent support: RR 16 /  / PEEP 5 / FiO2 40  - Monitor ABGs    #Oropharynx bleed  - Pt w/ blood in oropharynx requiring frequent suctioning.   - Likely 2/2 trauma from fall/arrest  - ENT consult for laryngoscope evaluation       RENAL   Pt presented w/ SCr 1.16, likely near baseline  - trend Cr   - montior I&Os    GI  - Diet: NPO    ENDO:  - f/u HbA1C, TSH  - monitor glucose    HEME/ONC  - Trend H/H  - A/C:    ID   #Leukocytosis  - Pt w/ WBC 17 likely reactive iso arrest but cannot r/o underlying infection  - Currently afebrile  - Low threshold for starting abx    LINES/PPX  - peripherals in tact  - GOC:  78M w/ PMH HTN HLD presents as transfer from [facility] s/p VT arrest s/p shock x2 w/ unknown downtime. Pt found to have trauma to head w/ superficial scalp bleeding and oral mucosal bleeding.       NEURO    #Intubated  #Enceaphalopathy  #Concern for anoxic brain injury   - Intubated w/o sedation s/p cardiac arrest w/ unknown down time. Reported to be 20minutes but unlikely given ROSC acheived in route to hospital  - CTH at [facility] w/o intracranial bleed/pathology. CTH likely too soon to show evidence of anoxic injury  - Pt non-responsive to sternal rub c/f anoxic injury vs residual sedation received at [facility].   - Will need neurologic workup to assess mental status if remains unresponsive  - Repeat CTH in 72hours   - Can check enolase    #C-spine trauma  Pt w/ fall at home presented w/ c-collar.   -Per [facility] radiology report no evidence of cervical fracture  -Trauma surgery consult for evaluation/clearance.     CARDIAC  #VT arrest  - Pt s/p VT arrest w/ unknown downtime. Pt reportedly received shock x2. No prior cardiac hx per family  - Check TTE   - Check cardiac enzymes, BNP  - Monitor for arrhythmias   - Pt not asa/plavix loaded 2/2 substernal hematoma noted on CT chest at [facility]  - Unlikely to be a candidate for [facility] given mental status     RESPIRATORY     #Intubated  Pt intubated s/p cardiac arrest  - On full vent support: RR 16 /  / PEEP 5 / FiO2 40  - Monitor ABGs    #Oropharynx bleed  - Pt w/ blood in oropharynx requiring frequent suctioning.   - Likely 2/2 trauma from fall/arrest  - ENT consult for laryngoscope evaluation       RENAL   Pt presented w/ SCr 1.16, likely near baseline  - trend Cr   - montior I&Os    GI  - Diet: NPO    ENDO:  - f/u HbA1C, TSH  - monitor glucose    HEME/ONC  #Substernal hematoma  - Pt w/ substernal hematoma noted on imaging at [facility] from trauma/cpr  - Continue to monitor.   - A/C: scd for dvt    ID   #Leukocytosis  - Pt w/ WBC 17 likely reactive iso arrest but cannot r/o underlying infection  - Currently afebrile  - Low threshold for starting abx    LINES/PPX  - peripherals in tact  - Fem   - GOC:  78M w/ PMH HTN HLD presents as transfer from [facility] s/p VT arrest s/p shock x2 w/ unknown downtime. Pt found to have trauma to head w/ superficial scalp bleeding and oral mucosal bleeding.       NEURO    #Intubated  #Enceaphalopathy  #Concern for anoxic brain injury   - Intubated w/o sedation s/p cardiac arrest w/ unknown down time. Reported to be 20minutes but unlikely given ROSC acheived in route to hospital  - CTH at [facility] w/o intracranial bleed/pathology. CTH likely too soon to show evidence of anoxic injury  - Pt non-responsive to sternal rub c/f anoxic injury vs residual sedation received at [facility].   - Will need neurologic workup to assess mental status if remains unresponsive  - Repeat CTH in 72hours   - Can check enolase    #C-spine trauma  Pt w/ fall at home presented w/ c-collar.   -Per [facility] radiology report no evidence of cervical fracture  -Trauma surgery consult for evaluation/clearance.     CARDIAC  #VT arrest  - Pt s/p VT arrest w/ unknown downtime. Pt reportedly received shock x2. No prior cardiac hx per family  - Check TTE   - Check cardiac enzymes, BNP  - Monitor for arrhythmias   - Pt not asa/plavix loaded 2/2 substernal hematoma noted on CT chest at [facility]  - Unlikely to be a candidate for [facility] given mental status     RESPIRATORY     #Intubated  Pt intubated s/p cardiac arrest  - On full vent support: RR 16 /  / PEEP 5 / FiO2 40  - Monitor ABGs    #Oropharynx bleed  - Pt w/ blood in oropharynx requiring frequent suctioning.   - Likely 2/2 trauma from fall/arrest  - ENT consult for laryngoscope evaluation       RENAL   Pt presented w/ SCr 1.16, likely near baseline  - trend Cr   - montior I&Os    GI  - Diet: NPO    ENDO:  - f/u HbA1C, TSH  - monitor glucose    HEME/ONC  #Substernal hematoma  - Pt w/ substernal hematoma noted on imaging at [facility] from trauma/cpr  - Continue to monitor.   - A/C: scd for dvt    ID   #Leukocytosis  - Pt w/ WBC 17 likely reactive iso arrest but cannot r/o underlying infection  - Currently afebrile  - Low threshold for starting abx    LINES/PPX  - peripherals in tact  - Fem   - GOC:  78M w/ PMH HTN HLD presents as transfer from [facility] s/p VT arrest s/p shock x2 w/ unknown downtime. Pt found to have trauma to head w/ superficial scalp bleeding and oral mucosal bleeding.       NEURO    #Intubated  Encephalopathy post arrest   - Intubated w/o sedation s/p cardiac arrest w/ unknown down time. Reported to be 20minutes but unlikely given ROSC achieved right before arrival to the hospital   - CTH at [facility] w/o intracranial bleed/pathology. CTH maybe too soon to show evidence of anoxic injury  - Will need neurologic workup to assess mental status if remains unresponsive  - Repeat CTH in 72hours or sooner depending on progress   - Can check enolase    #C-spine trauma  Pt w/ fall at home presented w/ c-collar.   -Per [facility] radiology report no evidence of cervical fracture  -Trauma surgery consult for evaluation/clearance.     CARDIAC  #VT arrest  - Pt s/p VT arrest w/ unknown downtime. Pt reportedly received shock x2. No prior cardiac hx per family  - Check TTE   - Check cardiac enzymes, BNP  - Monitor for arrhythmias   - Pt not asa/plavix loaded 2/2 substernal hematoma noted on CT chest at [facility]  - Unlikely to be a candidate for C at this time given mental status     RESPIRATORY     #Intubated  Pt intubated s/p cardiac arrest  - On full vent support: RR 16 /  / PEEP 5 / FiO2 40  - Monitor ABGs    #Oropharynx bleed  - Pt w/ blood in oropharynx requiring frequent suctioning.   - Likely 2/2 trauma from fall/arrest  - ENT consulted     RENAL   Pt presented w/ SCr 1.16, likely near baseline  - trend Cr   - montior I&Os    GI  - Diet: NPO for now     ENDO:  - f/u HbA1C, TSH  - monitor glucose    HEME/ONC  #Substernal hematoma  - Pt w/ substernal hematoma noted on imaging at [facility] from trauma/cpr  - Continue to monitor.   - A/C: scd for dvt    ID   #Leukocytosis  - Pt w/ WBC 17 likely reactive iso arrest but cannot r/o underlying infection  - Currently afebrile  - abx for pharyngeal packing     LINES/PPX  - peripherals in tact  - Fem   - GOC:  78M w/ PMH HTN HLD presents as transfer from [facility] s/p VT arrest s/p shock x2 w/ unknown downtime. Pt found to have trauma to head w/ superficial scalp bleeding and oral mucosal bleeding.       NEURO    #Intubated  Encephalopathy post arrest   - Intubated w/o sedation s/p cardiac arrest w/ unknown down time. Reported to be 20minutes but unlikely given ROSC achieved right before arrival to the hospital   - CTH at [facility] w/o intracranial bleed/pathology. CTH maybe too soon to show evidence of anoxic injury  - Will need neurologic workup to assess mental status if remains unresponsive  - Repeat CTH in 72hours or sooner depending on progress   - Can check enolase    #C-spine trauma  Pt w/ fall at home presented w/ c-collar.   -Per [facility] radiology report no evidence of cervical fracture  -Trauma surgery consult for evaluation/clearance.     CARDIAC  #VT arrest  - Pt s/p VT arrest w/ unknown downtime. Pt reportedly received shock x2. No prior cardiac hx per family  - Check TTE   - Check cardiac enzymes, BNP  - Monitor for arrhythmias   - Pt not asa/plavix loaded 2/2 substernal hematoma noted on CT chest at [facility]  - Unlikely to be a candidate for C at this time given mental status     RESPIRATORY     #Intubated  Pt intubated s/p cardiac arrest  - On full vent support: RR 16 /  / PEEP 5 / FiO2 40  - Monitor ABGs    #Oropharynx bleed  - Pt w/ blood in oropharynx requiring frequent suctioning.   - Likely 2/2 trauma from fall/arrest  - ENT consulted     RENAL   Pt presented w/ SCr 1.16, likely near baseline  - trend Cr   - montior I&Os    GI  - Diet: NPO for now     ENDO:  - f/u HbA1C, TSH  - monitor glucose    HEME/ONC  #Substernal hematoma  - Pt w/ substernal hematoma noted on imaging at [facility] from trauma/cpr  - Continue to monitor.   - A/C: scd for dvt    ID   #Leukocytosis  - Pt w/ WBC 17 likely reactive iso arrest but cannot r/o underlying infection  - Currently afebrile  - abx for pharyngeal packing     LINES/PPX  - peripherals in tact  - Fem   - GOC:

## 2023-12-19 NOTE — PROGRESS NOTE ADULT - SUBJECTIVE AND OBJECTIVE BOX
ASHLEY PRITCHARD  MRN-[id number]  Patient is a 78y old  Male who presents with a chief complaint of Cardiac Arrest (19 Dec 2023 16:44)    HPI:  78M w/ PMH HTN HLD presents as transfer from [facility] s/p cardiac arrest. Per reports patient had a witness fall and arrest at home. EMS performed CPR en route to hospital was noted to be in VTach and shocked x2. ROSC was obtained just prior to arrival at [facility]. Pt was intubated placed on levo gtt. On arrival pt noted to have lacerated to R forehead. Pt had CT scans that were negative for intracranial hemorrhage, C-spine fracture, facial fractures. A R frontal hematoma was noted.     On arrival patient w/ dried blood on scalp. Also noted to have bleeding from oral mucosa w/ clots requiring suctioning. Pt otherwise off sedation and non-responsive. Per EMS report pt did receive some sedation and paralytics at [facility].     Per family patient has been feeling 'off' but managing his day to day and did not see a physician. Pt prescribed klonopin by outpatient provider and family believes pt may have been taking more than prescribed. On day of arrest, pt was initially asymptomatic carrying out his routine. He went to the bathroom, wife heard a loud thud and found patient in a pool of blood prompting call to EMS. Pt has not followed with any cardiologist. Has not had any syncope or other events preceding this.  (19 Dec 2023 14:54)      24 HOUR EVENTS:    REVIEW OF SYSTEMS:    CONSTITUTIONAL: No weakness, fevers or chills  EYES/ENT: No visual changes;  No vertigo or throat pain   NECK: No pain or stiffness  RESPIRATORY: No cough, wheezing, hemoptysis; No shortness of breath  CARDIOVASCULAR: No chest pain or palpitations  GASTROINTESTINAL: No abdominal or epigastric pain. No nausea, vomiting, or hematemesis; No diarrhea or constipation. No melena or hematochezia.  GENITOURINARY: No dysuria, frequency or hematuria  NEUROLOGICAL: No numbness or weakness  SKIN: No itching, rashes      ICU Vital Signs Last 24 Hrs  T(C): 37.2 (19 Dec 2023 20:00), Max: 37.2 (19 Dec 2023 20:00)  T(F): 99 (19 Dec 2023 20:00), Max: 99 (19 Dec 2023 20:00)  HR: 75 (19 Dec 2023 21:00) (58 - 75)  BP: --  BP(mean): --  ABP: 155/72 (19 Dec 2023 21:00) (113/62 - 170/93)  ABP(mean): 100 (19 Dec 2023 21:00) (80 - 170)  RR: 16 (19 Dec 2023 21:00) (14 - 24)  SpO2: 100% (19 Dec 2023 21:00) (97% - 100%)    O2 Parameters below as of 19 Dec 2023 20:00  Patient On (Oxygen Delivery Method): ventilator    O2 Concentration (%): 40      Mode: AC/ CMV (Assist Control/ Continuous Mandatory Ventilation), RR (machine): 14, TV (machine): 500, FiO2: 40, PEEP: 5, ITime: 1  CVP(mm Hg): --  CO: --  CI: --  PA: --  PA(mean): --  PA(direct): --  PCWP: --  LA: --  RA: --  SVR: --  SVRI: --  PVR: --  PVRI: --  I&O's Summary    19 Dec 2023 07:01  -  19 Dec 2023 21:32  --------------------------------------------------------  IN: 50 mL / OUT: 155 mL / NET: -105 mL        CAPILLARY BLOOD GLUCOSE    CAPILLARY BLOOD GLUCOSE          PHYSICAL EXAM:  GENERAL: No acute distress, well-developed  HEAD:  Atraumatic, Normocephalic  EYES: EOMI, PERRLA, conjunctiva and sclera clear  NECK: Supple, no lymphadenopathy, no JVD  CHEST/LUNG: CTAB; No wheezes, rales, or rhonchi  HEART: Regular rate and rhythm. Normal S1/S2. No murmurs, rubs, or gallops  ABDOMEN: Soft, non-tender, non-distended; normal bowel sounds, no organomegaly  EXTREMITIES:  2+ peripheral pulses b/l, No clubbing, cyanosis, or edema  NEUROLOGY: A&O x 3, no focal deficits  SKIN: No rashes or lesions    ============================I/O===========================   I&O's Detail    19 Dec 2023 07:01  -  19 Dec 2023 21:32  --------------------------------------------------------  IN:    IV PiggyBack: 50 mL  Total IN: 50 mL    OUT:    Indwelling Catheter - Urethral (mL): 155 mL  Total OUT: 155 mL    Total NET: -105 mL        ============================ LABS =========================                        12.5   17.74 )-----------( 174      ( 19 Dec 2023 15:22 )             36.8     12-19    139  |  104  |  26<H>  ----------------------------<  288<H>  3.5   |  21<L>  |  1.16    Ca    8.4      19 Dec 2023 15:22  Phos  3.5     12-19  Mg     1.9     12-19    TPro  5.7<L>  /  Alb  3.6  /  TBili  1.1  /  DBili  x   /  AST  349<H>  /  ALT  471<H>  /  AlkPhos  55  12-19    Troponin T, High Sensitivity Result: 1894 ng/L (12-19-23 @ 15:22)    CKMB Units: 129.2 ng/mL (12-19-23 @ 15:22)            LIVER FUNCTIONS - ( 19 Dec 2023 15:22 )  Alb: 3.6 g/dL / Pro: 5.7 g/dL / ALK PHOS: 55 U/L / ALT: 471 U/L / AST: 349 U/L / GGT: x           PT/INR - ( 19 Dec 2023 15:22 )   PT: 17.7 sec;   INR: 1.63 ratio         PTT - ( 19 Dec 2023 15:22 )  PTT:30.8 sec  ABG - ( 19 Dec 2023 15:15 )  pH, Arterial: 7.34  pH, Blood: x     /  pCO2: 41    /  pO2: 155   / HCO3: 22    / Base Excess: -3.5  /  SaO2: 99.1              Blood Gas Arterial, Lactate: 2.0 mmol/L (12-19-23 @ 15:15)    Urinalysis Basic - ( 19 Dec 2023 15:22 )    Color: x / Appearance: x / SG: x / pH: x  Gluc: 288 mg/dL / Ketone: x  / Bili: x / Urobili: x   Blood: x / Protein: x / Nitrite: x   Leuk Esterase: x / RBC: x / WBC x   Sq Epi: x / Non Sq Epi: x / Bacteria: x                ASHLEY PRITCHARD  MRN-[id number]  Patient is a 78y old  Male who presents with a chief complaint of Cardiac Arrest (19 Dec 2023 16:44)    HPI:  78M w/ PMH HTN HLD presents as transfer from [facility] s/p cardiac arrest. Per reports patient had a witness fall and arrest at home. EMS performed CPR en route to hospital was noted to be in VTach and shocked x2. ROSC was obtained just prior to arrival at [facility]. Pt was intubated placed on levo gtt. On arrival pt noted to have lacerated to R forehead. Pt had CT scans that were negative for intracranial hemorrhage, C-spine fracture, facial fractures. A R frontal hematoma was noted.     On arrival patient w/ dried blood on scalp. Also noted to have bleeding from oral mucosa w/ clots requiring suctioning. Pt otherwise off sedation and non-responsive. Per EMS report pt did receive some sedation and paralytics at [facility].     Per family patient has been feeling 'off' but managing his day to day and did not see a physician. Pt prescribed klonopin by outpatient provider and family believes pt may have been taking more than prescribed. On day of arrest, pt was initially asymptomatic carrying out his routine. He went to the bathroom, wife heard a loud thud and found patient in a pool of blood prompting call to EMS. Pt has not followed with any cardiologist. Has not had any syncope or other events preceding this.  (19 Dec 2023 14:54)      24 HOUR EVENTS:    REVIEW OF SYSTEMS:    CONSTITUTIONAL: No weakness, fevers or chills  EYES/ENT: No visual changes;  No vertigo or throat pain   NECK: No pain or stiffness  RESPIRATORY: No cough, wheezing, hemoptysis; No shortness of breath  CARDIOVASCULAR: No chest pain or palpitations  GASTROINTESTINAL: No abdominal or epigastric pain. No nausea, vomiting, or hematemesis; No diarrhea or constipation. No melena or hematochezia.  GENITOURINARY: No dysuria, frequency or hematuria  NEUROLOGICAL: No numbness or weakness  SKIN: No itching, rashes      ICU Vital Signs Last 24 Hrs  T(C): 37.2 (19 Dec 2023 20:00), Max: 37.2 (19 Dec 2023 20:00)  T(F): 99 (19 Dec 2023 20:00), Max: 99 (19 Dec 2023 20:00)  HR: 75 (19 Dec 2023 21:00) (58 - 75)  BP: --  BP(mean): --  ABP: 155/72 (19 Dec 2023 21:00) (113/62 - 170/93)  ABP(mean): 100 (19 Dec 2023 21:00) (80 - 170)  RR: 16 (19 Dec 2023 21:00) (14 - 24)  SpO2: 100% (19 Dec 2023 21:00) (97% - 100%)    O2 Parameters below as of 19 Dec 2023 20:00  Patient On (Oxygen Delivery Method): ventilator    O2 Concentration (%): 40      Mode: AC/ CMV (Assist Control/ Continuous Mandatory Ventilation), RR (machine): 14, TV (machine): 500, FiO2: 40, PEEP: 5, ITime: 1  CVP(mm Hg): --  CO: --  CI: --  PA: --  PA(mean): --  PA(direct): --  PCWP: --  LA: --  RA: --  SVR: --  SVRI: --  PVR: --  PVRI: --  I&O's Summary    19 Dec 2023 07:01  -  19 Dec 2023 21:32  --------------------------------------------------------  IN: 50 mL / OUT: 155 mL / NET: -105 mL        CAPILLARY BLOOD GLUCOSE    CAPILLARY BLOOD GLUCOSE          PHYSICAL EXAM:  GENERAL: No acute distress, well-developed  HEAD:  Atraumatic, Normocephalic  EYES: EOMI, PERRLA, conjunctiva and sclera clear  NECK: Supple, no lymphadenopathy, no JVD  CHEST/LUNG: CTAB; No wheezes, rales, or rhonchi  HEART: Regular rate and rhythm. Normal S1/S2. No murmurs, rubs, or gallops  ABDOMEN: Soft, non-tender, non-distended; normal bowel sounds, no organomegaly  EXTREMITIES:  2+ peripheral pulses b/l, No clubbing, cyanosis, or edema  NEUROLOGY: A&O x 3, no focal deficits  SKIN: No rashes or lesions    ============================I/O===========================   I&O's Detail    19 Dec 2023 07:01  -  19 Dec 2023 21:32  --------------------------------------------------------  IN:    IV PiggyBack: 50 mL  Total IN: 50 mL    OUT:    Indwelling Catheter - Urethral (mL): 155 mL  Total OUT: 155 mL    Total NET: -105 mL        ============================ LABS =========================                        12.5   17.74 )-----------( 174      ( 19 Dec 2023 15:22 )             36.8     12-19    139  |  104  |  26<H>  ----------------------------<  288<H>  3.5   |  21<L>  |  1.16    Ca    8.4      19 Dec 2023 15:22  Phos  3.5     12-19  Mg     1.9     12-19    TPro  5.7<L>  /  Alb  3.6  /  TBili  1.1  /  DBili  x   /  AST  349<H>  /  ALT  471<H>  /  AlkPhos  55  12-19    Troponin T, High Sensitivity Result: 1894 ng/L (12-19-23 @ 15:22)    CKMB Units: 129.2 ng/mL (12-19-23 @ 15:22)            LIVER FUNCTIONS - ( 19 Dec 2023 15:22 )  Alb: 3.6 g/dL / Pro: 5.7 g/dL / ALK PHOS: 55 U/L / ALT: 471 U/L / AST: 349 U/L / GGT: x           PT/INR - ( 19 Dec 2023 15:22 )   PT: 17.7 sec;   INR: 1.63 ratio         PTT - ( 19 Dec 2023 15:22 )  PTT:30.8 sec  ABG - ( 19 Dec 2023 15:15 )  pH, Arterial: 7.34  pH, Blood: x     /  pCO2: 41    /  pO2: 155   / HCO3: 22    / Base Excess: -3.5  /  SaO2: 99.1              Blood Gas Arterial, Lactate: 2.0 mmol/L (12-19-23 @ 15:15)    Urinalysis Basic - ( 19 Dec 2023 15:22 )    Color: x / Appearance: x / SG: x / pH: x  Gluc: 288 mg/dL / Ketone: x  / Bili: x / Urobili: x   Blood: x / Protein: x / Nitrite: x   Leuk Esterase: x / RBC: x / WBC x   Sq Epi: x / Non Sq Epi: x / Bacteria: x                ASHLEY PRITCHARD  MRN-[id number]  Patient is a 78y old  Male who presents with a chief complaint of Cardiac Arrest (19 Dec 2023 16:44)    HPI:  78M w/ PMH HTN HLD presents as transfer from [facility] s/p cardiac arrest. Per reports patient had a witness fall and arrest at home. EMS performed CPR en route to hospital was noted to be in VTach and shocked x2. ROSC was obtained just prior to arrival at [facility]. Pt was intubated placed on levo gtt. On arrival pt noted to have lacerated to R forehead. Pt had CT scans that were negative for intracranial hemorrhage, C-spine fracture, facial fractures. A R frontal hematoma was noted.     On arrival patient w/ dried blood on scalp. Also noted to have bleeding from oral mucosa w/ clots requiring suctioning. Pt otherwise off sedation and non-responsive. Per EMS report pt did receive some sedation and paralytics at [facility].     Per family patient has been feeling 'off' but managing his day to day and did not see a physician. Pt prescribed klonopin by outpatient provider and family believes pt may have been taking more than prescribed. On day of arrest, pt was initially asymptomatic carrying out his routine. He went to the bathroom, wife heard a loud thud and found patient in a pool of blood prompting call to EMS. Pt has not followed with any cardiologist. Has not had any syncope or other events preceding this.  (19 Dec 2023 14:54)      24 HOUR EVENTS:  - Off sedation  - Myoclonic jerks concerning for seizure activity i/s/o prolonged downtime. vEEG started  - Started precedex gtt for seizure activity  - Started keppra load per neuro    REVIEW OF SYSTEMS: Unable to obtain 2/2 mental status    ICU Vital Signs Last 24 Hrs  T(C): 37.2 (19 Dec 2023 20:00), Max: 37.2 (19 Dec 2023 20:00)  T(F): 99 (19 Dec 2023 20:00), Max: 99 (19 Dec 2023 20:00)  HR: 75 (19 Dec 2023 21:00) (58 - 75)  BP: --  BP(mean): --  ABP: 155/72 (19 Dec 2023 21:00) (113/62 - 170/93)  ABP(mean): 100 (19 Dec 2023 21:00) (80 - 170)  RR: 16 (19 Dec 2023 21:00) (14 - 24)  SpO2: 100% (19 Dec 2023 21:00) (97% - 100%)    O2 Parameters below as of 19 Dec 2023 20:00  Patient On (Oxygen Delivery Method): ventilator    O2 Concentration (%): 40      Mode: AC/ CMV (Assist Control/ Continuous Mandatory Ventilation), RR (machine): 14, TV (machine): 500, FiO2: 40, PEEP: 5, ITime: 1  CVP(mm Hg): --  CO: --  CI: --  PA: --  PA(mean): --  PA(direct): --  PCWP: --  LA: --  RA: --  SVR: --  SVRI: --  PVR: --  PVRI: --  I&O's Summary    19 Dec 2023 07:01  -  19 Dec 2023 21:32  --------------------------------------------------------  IN: 50 mL / OUT: 155 mL / NET: -105 mL        CAPILLARY BLOOD GLUCOSE    CAPILLARY BLOOD GLUCOSE          PHYSICAL EXAM:  GENERAL: No acute distress, well-developed  HEAD: (+) forehead laceration on right temporal area  EYES: (+) hematoma on right occipital area  NECK: Supple, no lymphadenopathy, no JVD  CHEST/LUNG: CTAB; No wheezes, rales, or rhonchi  HEART: Regular rate and rhythm. Normal S1/S2. No murmurs, rubs, or gallops  ABDOMEN: Soft, non-tender, non-distended; normal bowel sounds, no organomegaly  EXTREMITIES:  2+ peripheral pulses b/l, No clubbing, cyanosis, or edema  NEUROLOGY: triggering breaths on vent, unresponsive to noxious stimuli or verbal commands  SKIN: No rashes or lesions    ============================I/O===========================   I&O's Detail    19 Dec 2023 07:01  -  19 Dec 2023 21:32  --------------------------------------------------------  IN:    IV PiggyBack: 50 mL  Total IN: 50 mL    OUT:    Indwelling Catheter - Urethral (mL): 155 mL  Total OUT: 155 mL    Total NET: -105 mL        ============================ LABS =========================                        12.5   17.74 )-----------( 174      ( 19 Dec 2023 15:22 )             36.8     12-19    139  |  104  |  26<H>  ----------------------------<  288<H>  3.5   |  21<L>  |  1.16    Ca    8.4      19 Dec 2023 15:22  Phos  3.5     12-19  Mg     1.9     12-19    TPro  5.7<L>  /  Alb  3.6  /  TBili  1.1  /  DBili  x   /  AST  349<H>  /  ALT  471<H>  /  AlkPhos  55  12-19    Troponin T, High Sensitivity Result: 1894 ng/L (12-19-23 @ 15:22)    CKMB Units: 129.2 ng/mL (12-19-23 @ 15:22)            LIVER FUNCTIONS - ( 19 Dec 2023 15:22 )  Alb: 3.6 g/dL / Pro: 5.7 g/dL / ALK PHOS: 55 U/L / ALT: 471 U/L / AST: 349 U/L / GGT: x           PT/INR - ( 19 Dec 2023 15:22 )   PT: 17.7 sec;   INR: 1.63 ratio         PTT - ( 19 Dec 2023 15:22 )  PTT:30.8 sec  ABG - ( 19 Dec 2023 15:15 )  pH, Arterial: 7.34  pH, Blood: x     /  pCO2: 41    /  pO2: 155   / HCO3: 22    / Base Excess: -3.5  /  SaO2: 99.1              Blood Gas Arterial, Lactate: 2.0 mmol/L (12-19-23 @ 15:15)    Urinalysis Basic - ( 19 Dec 2023 15:22 )    Color: x / Appearance: x / SG: x / pH: x  Gluc: 288 mg/dL / Ketone: x  / Bili: x / Urobili: x   Blood: x / Protein: x / Nitrite: x   Leuk Esterase: x / RBC: x / WBC x   Sq Epi: x / Non Sq Epi: x / Bacteria: x                ASHLEY PRITCHARD  MRN-[id number]  Patient is a 78y old  Male who presents with a chief complaint of Cardiac Arrest (19 Dec 2023 16:44)    HPI:  78M w/ PMH HTN HLD presents as transfer from [facility] s/p cardiac arrest. Per reports patient had a witness fall and arrest at home. EMS performed CPR en route to hospital was noted to be in VTach and shocked x2. ROSC was obtained just prior to arrival at [facility]. Pt was intubated placed on levo gtt. On arrival pt noted to have lacerated to R forehead. Pt had CT scans that were negative for intracranial hemorrhage, C-spine fracture, facial fractures. A R frontal hematoma was noted.     On arrival patient w/ dried blood on scalp. Also noted to have bleeding from oral mucosa w/ clots requiring suctioning. Pt otherwise off sedation and non-responsive. Per EMS report pt did receive some sedation and paralytics at [facility].     Per family patient has been feeling 'off' but managing his day to day and did not see a physician. Pt prescribed klonopin by outpatient provider and family believes pt may have been taking more than prescribed. On day of arrest, pt was initially asymptomatic carrying out his routine. He went to the bathroom, wife heard a loud thud and found patient in a pool of blood prompting call to EMS. Pt has not followed with any cardiologist. Has not had any syncope or other events preceding this.  (19 Dec 2023 14:54)      24 HOUR EVENTS:  - Off sedation  - Myoclonic jerks concerning for seizure activity i/s/o prolonged downtime. vEEG started  - Started precedex gtt for seizure activity  - Started keppra load per neuro    REVIEW OF SYSTEMS: Unable to obtain 2/2 mental status    ICU Vital Signs Last 24 Hrs  T(C): 37.2 (19 Dec 2023 20:00), Max: 37.2 (19 Dec 2023 20:00)  T(F): 99 (19 Dec 2023 20:00), Max: 99 (19 Dec 2023 20:00)  HR: 75 (19 Dec 2023 21:00) (58 - 75)  BP: --  BP(mean): --  ABP: 155/72 (19 Dec 2023 21:00) (113/62 - 170/93)  ABP(mean): 100 (19 Dec 2023 21:00) (80 - 170)  RR: 16 (19 Dec 2023 21:00) (14 - 24)  SpO2: 100% (19 Dec 2023 21:00) (97% - 100%)    O2 Parameters below as of 19 Dec 2023 20:00  Patient On (Oxygen Delivery Method): ventilator    O2 Concentration (%): 40      Mode: AC/ CMV (Assist Control/ Continuous Mandatory Ventilation), RR (machine): 14, TV (machine): 500, FiO2: 40, PEEP: 5, ITime: 1  CVP(mm Hg): --  CO: --  CI: --  PA: --  PA(mean): --  PA(direct): --  PCWP: --  LA: --  RA: --  SVR: --  SVRI: --  PVR: --  PVRI: --  I&O's Summary    19 Dec 2023 07:01  -  19 Dec 2023 21:32  --------------------------------------------------------  IN: 50 mL / OUT: 155 mL / NET: -105 mL        CAPILLARY BLOOD GLUCOSE    CAPILLARY BLOOD GLUCOSE          PHYSICAL EXAM:  GENERAL: No acute distress, well-developed  HEAD: (+) forehead laceration on right temporal area  EYES: (+) hematoma on right occipital area  NECK: Supple, no lymphadenopathy, no JVD  CHEST/LUNG: CTAB; No wheezes, rales, or rhonchi  HEART: Regular rate and rhythm. Normal S1/S2. No murmurs, rubs, or gallops  ABDOMEN: Soft, non-tender, non-distended; normal bowel sounds, no organomegaly  EXTREMITIES:  2+ peripheral pulses b/l, No clubbing, cyanosis, or edema  NEUROLOGY: triggering breaths on vent, unresponsive to noxious stimuli or verbal commands  SKIN: No rashes or lesions    ============================I/O===========================   I&O's Detail    19 Dec 2023 07:01  -  19 Dec 2023 21:32  --------------------------------------------------------  IN:    IV PiggyBack: 50 mL  Total IN: 50 mL    OUT:    Indwelling Catheter - Urethral (mL): 155 mL  Total OUT: 155 mL    Total NET: -105 mL        ============================ LABS =========================                        12.5   17.74 )-----------( 174      ( 19 Dec 2023 15:22 )             36.8     12-19    139  |  104  |  26<H>  ----------------------------<  288<H>  3.5   |  21<L>  |  1.16    Ca    8.4      19 Dec 2023 15:22  Phos  3.5     12-19  Mg     1.9     12-19    TPro  5.7<L>  /  Alb  3.6  /  TBili  1.1  /  DBili  x   /  AST  349<H>  /  ALT  471<H>  /  AlkPhos  55  12-19    Troponin T, High Sensitivity Result: 1894 ng/L (12-19-23 @ 15:22)    CKMB Units: 129.2 ng/mL (12-19-23 @ 15:22)            LIVER FUNCTIONS - ( 19 Dec 2023 15:22 )  Alb: 3.6 g/dL / Pro: 5.7 g/dL / ALK PHOS: 55 U/L / ALT: 471 U/L / AST: 349 U/L / GGT: x           PT/INR - ( 19 Dec 2023 15:22 )   PT: 17.7 sec;   INR: 1.63 ratio         PTT - ( 19 Dec 2023 15:22 )  PTT:30.8 sec  ABG - ( 19 Dec 2023 15:15 )  pH, Arterial: 7.34  pH, Blood: x     /  pCO2: 41    /  pO2: 155   / HCO3: 22    / Base Excess: -3.5  /  SaO2: 99.1              Blood Gas Arterial, Lactate: 2.0 mmol/L (12-19-23 @ 15:15)    Urinalysis Basic - ( 19 Dec 2023 15:22 )    Color: x / Appearance: x / SG: x / pH: x  Gluc: 288 mg/dL / Ketone: x  / Bili: x / Urobili: x   Blood: x / Protein: x / Nitrite: x   Leuk Esterase: x / RBC: x / WBC x   Sq Epi: x / Non Sq Epi: x / Bacteria: x

## 2023-12-19 NOTE — PROVIDER CONTACT NOTE (OTHER) - ASSESSMENT
Pt a&ox0, not following commands, myoclonic movements and bucking vent. Requiring RT for assessment. No visible bleeding, minimal secretions through ET tube

## 2023-12-19 NOTE — PROGRESS NOTE ADULT - ASSESSMENT
78M w/ PMH HTN HLD presents as transfer from [facility] s/p VT arrest s/p shock x2 w/ unknown downtime. Pt found to have trauma to head w/ superficial scalp bleeding and oral mucosal bleeding.       NEURO  #Intubated  Encephalopathy post arrest   - Intubated w/o sedation s/p cardiac arrest w/ unknown down time. Reported to be 20minutes but unlikely given ROSC achieved right before arrival to the hospital   - CTH at [facility] w/o intracranial bleed/pathology. CTH maybe too soon to show evidence of anoxic injury  - Will need neurologic workup to assess mental status if remains unresponsive  - Repeat CTH in 72hours or sooner depending on progress   - Can check enolase    #C-spine trauma  Pt w/ fall at home presented w/ c-collar.   -Per [facility] radiology report no evidence of cervical fracture  -Trauma surgery consult for evaluation/clearance.     #Myoclonic jerking  - vEEG: evidence of mycolonic seizures  - Start keppra load  - Precedex gtt    CARDIAC  #VT arrest  - Pt s/p VT arrest w/ unknown downtime. Pt reportedly received shock x2. No prior cardiac hx per family  - Check TTE   - Check cardiac enzymes, BNP  - Monitor for arrhythmias   - Pt not asa/plavix loaded 2/2 substernal hematoma noted on CT chest at [facility]  - Unlikely to be a candidate for C at this time given mental status     RESPIRATORY   #Intubated  Pt intubated s/p cardiac arrest  - On full vent support: RR 16 /  / PEEP 5 / FiO2 40  - Monitor ABGs    #Oropharynx bleed  - Pt w/ blood in oropharynx requiring frequent suctioning.   - Likely 2/2 trauma from fall/arrest  - ENT consulted     RENAL   Pt presented w/ SCr 1.16, likely near baseline  - trend Cr   - montior I&Os    GI  - Diet: NPO for now     ENDO:  - f/u HbA1C, TSH  - monitor glucose    HEME/ONC  #Substernal hematoma  - Pt w/ substernal hematoma noted on imaging at [facility] from trauma/cpr  - Continue to monitor.   - A/C: scd for dvt    ID   #Leukocytosis  - Pt w/ WBC 17 likely reactive iso arrest but cannot r/o underlying infection  - Currently afebrile  - ancef for oral packing    LINES/PPX  - peripherals in tact  - Fem   - GOC:     ======================= DISPOSITION  =====================  [X] Critical   [ ] Guarded    [ ] Stable    [X] Maintain in CICU  [ ] Downgrade to Telemtry  [ ] Discharge Home    Patient requires continuous monitoring with bedside rhythm monitoring, pulse ox monitoring, and intermittent blood gas analysis. Care plan discussed with ICU care team. Patient remained critical and at risk for life threatening decompensation.  Patient seen, examined and plan discussed with CCU team during rounds.     I have personally provided 35 minutes of critical care time excluding time spent on separate procedures, in addition to initial critical care time provided by the CICU Attending, Dr. Mejia .  78M w/ PMH HTN HLD presents as transfer from [facility] s/p VT arrest s/p shock x2 w/ unknown downtime. Pt found to have trauma to head w/ superficial scalp bleeding and oral mucosal bleeding.       NEURO  #Intubated  Encephalopathy post arrest   - Intubated w/o sedation s/p cardiac arrest w/ unknown down time. Reported to be 20minutes but unlikely given ROSC achieved right before arrival to the hospital   - CTH at [facility] w/o intracranial bleed/pathology. CTH maybe too soon to show evidence of anoxic injury  - Will need neurologic workup to assess mental status if remains unresponsive  - Repeat CTH in 72hours or sooner depending on progress   - Can check enolase    #C-spine trauma  Pt w/ fall at home presented w/ c-collar.   -Per [facility] radiology report no evidence of cervical fracture  -Trauma surgery consult for evaluation/clearance.     #Myoclonic jerking  - vEEG: evidence of mycolonic seizures  - Start keppra load  - Precedex gtt    CARDIAC  #VT arrest  - Pt s/p VT arrest w/ unknown downtime. Pt reportedly received shock x2. No prior cardiac hx per family  - Check TTE   - Check cardiac enzymes, BNP  - Monitor for arrhythmias   - Pt not asa/plavix loaded 2/2 substernal hematoma noted on CT chest at [facility]  - Unlikely to be a candidate for C at this time given mental status     RESPIRATORY   #Intubated  Pt intubated s/p cardiac arrest  - On full vent support: RR 16 /  / PEEP 5 / FiO2 40  - Monitor ABGs    #Oropharynx bleed  - Pt w/ blood in oropharynx requiring frequent suctioning.   - Likely 2/2 trauma from fall/arrest  - ENT consulted     RENAL   Pt presented w/ SCr 1.16, likely near baseline  - trend Cr   - montior I&Os    GI  - Diet: NPO for now     ENDO:  - f/u HbA1C, TSH  - monitor glucose    HEME/ONC  #Substernal hematoma  - Pt w/ substernal hematoma noted on imaging at [facility] from trauma/cpr  - Continue to monitor.   - A/C: scd for dvt    ID   #Leukocytosis  - Pt w/ WBC 17 likely reactive iso arrest but cannot r/o underlying infection  - Currently afebrile  - ancef for oral packing    LINES/PPX  - peripherals in tact  - Fem   - GOC:     ======================= DISPOSITION  =====================  [X] Critical   [ ] Guarded    [ ] Stable    [X] Maintain in CICU  [ ] Downgrade to Telemtry  [ ] Discharge Home    Patient requires continuous monitoring with bedside rhythm monitoring, pulse ox monitoring, and intermittent blood gas analysis. Care plan discussed with ICU care team. Patient remained critical and at risk for life threatening decompensation.  Patient seen, examined and plan discussed with CCU team during rounds.     I have personally provided 35 minutes of critical care time excluding time spent on separate procedures, in addition to initial critical care time provided by the CICU Attending, Dr. Mejia .

## 2023-12-19 NOTE — H&P ADULT - ATTENDING COMMENTS
77 yo man with HTN, HLD s/p out of hospital cardiac arrest     unresponsive despite being off sedation, per ED attending at OSH no sedation or paralytics were given by EMS or ED, this is concerning potentially, for now will monitor but he may have anoxic brain injury    Hemodynamics acceptable, no pressors or inotropes which were stopped on arrival to CICU, per ED attending started on levo due to hypotension post arrest  f/u TTE, CE trends, will hold DAPT and heaprin at this time given substernal hematoma at OSH CT   Acute respiratory failure requiring mechanical ventilation due to cardiac arrest   npo for now  Normal renal function, appears to be euvolemic   H/H low but acceptable  SCDs for DVT ppx for now given bleeding as mentioned above   Afebrile, abx due to pharyngeal packing   Sugars elevated, NISS   dc "crush" lines from OSH

## 2023-12-20 LAB
A1C WITH ESTIMATED AVERAGE GLUCOSE RESULT: 5.5 % — SIGNIFICANT CHANGE UP (ref 4–5.6)
A1C WITH ESTIMATED AVERAGE GLUCOSE RESULT: 5.5 % — SIGNIFICANT CHANGE UP (ref 4–5.6)
ALBUMIN SERPL ELPH-MCNC: 3.4 G/DL — SIGNIFICANT CHANGE UP (ref 3.3–5)
ALBUMIN SERPL ELPH-MCNC: 3.4 G/DL — SIGNIFICANT CHANGE UP (ref 3.3–5)
ALBUMIN SERPL ELPH-MCNC: 3.6 G/DL — SIGNIFICANT CHANGE UP (ref 3.3–5)
ALBUMIN SERPL ELPH-MCNC: 3.6 G/DL — SIGNIFICANT CHANGE UP (ref 3.3–5)
ALP SERPL-CCNC: 43 U/L — SIGNIFICANT CHANGE UP (ref 40–120)
ALP SERPL-CCNC: 43 U/L — SIGNIFICANT CHANGE UP (ref 40–120)
ALP SERPL-CCNC: 49 U/L — SIGNIFICANT CHANGE UP (ref 40–120)
ALP SERPL-CCNC: 49 U/L — SIGNIFICANT CHANGE UP (ref 40–120)
ALT FLD-CCNC: 196 U/L — HIGH (ref 10–45)
ALT FLD-CCNC: 196 U/L — HIGH (ref 10–45)
ALT FLD-CCNC: 335 U/L — HIGH (ref 10–45)
ALT FLD-CCNC: 335 U/L — HIGH (ref 10–45)
ANION GAP SERPL CALC-SCNC: 14 MMOL/L — SIGNIFICANT CHANGE UP (ref 5–17)
ANION GAP SERPL CALC-SCNC: 14 MMOL/L — SIGNIFICANT CHANGE UP (ref 5–17)
ANION GAP SERPL CALC-SCNC: 16 MMOL/L — SIGNIFICANT CHANGE UP (ref 5–17)
ANION GAP SERPL CALC-SCNC: 16 MMOL/L — SIGNIFICANT CHANGE UP (ref 5–17)
APPEARANCE UR: ABNORMAL
APPEARANCE UR: ABNORMAL
AST SERPL-CCNC: 206 U/L — HIGH (ref 10–40)
AST SERPL-CCNC: 206 U/L — HIGH (ref 10–40)
AST SERPL-CCNC: 95 U/L — HIGH (ref 10–40)
AST SERPL-CCNC: 95 U/L — HIGH (ref 10–40)
BACTERIA # UR AUTO: ABNORMAL /HPF
BACTERIA # UR AUTO: ABNORMAL /HPF
BASOPHILS # BLD AUTO: 0 K/UL — SIGNIFICANT CHANGE UP (ref 0–0.2)
BASOPHILS # BLD AUTO: 0 K/UL — SIGNIFICANT CHANGE UP (ref 0–0.2)
BASOPHILS # BLD AUTO: 0.02 K/UL — SIGNIFICANT CHANGE UP (ref 0–0.2)
BASOPHILS # BLD AUTO: 0.02 K/UL — SIGNIFICANT CHANGE UP (ref 0–0.2)
BASOPHILS NFR BLD AUTO: 0 % — SIGNIFICANT CHANGE UP (ref 0–2)
BASOPHILS NFR BLD AUTO: 0 % — SIGNIFICANT CHANGE UP (ref 0–2)
BASOPHILS NFR BLD AUTO: 0.1 % — SIGNIFICANT CHANGE UP (ref 0–2)
BASOPHILS NFR BLD AUTO: 0.1 % — SIGNIFICANT CHANGE UP (ref 0–2)
BILIRUB SERPL-MCNC: 0.9 MG/DL — SIGNIFICANT CHANGE UP (ref 0.2–1.2)
BILIRUB SERPL-MCNC: 0.9 MG/DL — SIGNIFICANT CHANGE UP (ref 0.2–1.2)
BILIRUB SERPL-MCNC: 1 MG/DL — SIGNIFICANT CHANGE UP (ref 0.2–1.2)
BILIRUB SERPL-MCNC: 1 MG/DL — SIGNIFICANT CHANGE UP (ref 0.2–1.2)
BILIRUB UR-MCNC: ABNORMAL
BILIRUB UR-MCNC: ABNORMAL
BUN SERPL-MCNC: 34 MG/DL — HIGH (ref 7–23)
BUN SERPL-MCNC: 34 MG/DL — HIGH (ref 7–23)
BUN SERPL-MCNC: 46 MG/DL — HIGH (ref 7–23)
BUN SERPL-MCNC: 46 MG/DL — HIGH (ref 7–23)
CALCIUM SERPL-MCNC: 8.1 MG/DL — LOW (ref 8.4–10.5)
CALCIUM SERPL-MCNC: 8.1 MG/DL — LOW (ref 8.4–10.5)
CALCIUM SERPL-MCNC: 8.2 MG/DL — LOW (ref 8.4–10.5)
CALCIUM SERPL-MCNC: 8.2 MG/DL — LOW (ref 8.4–10.5)
CHLORIDE SERPL-SCNC: 106 MMOL/L — SIGNIFICANT CHANGE UP (ref 96–108)
CHLORIDE SERPL-SCNC: 106 MMOL/L — SIGNIFICANT CHANGE UP (ref 96–108)
CHLORIDE SERPL-SCNC: 107 MMOL/L — SIGNIFICANT CHANGE UP (ref 96–108)
CHLORIDE SERPL-SCNC: 107 MMOL/L — SIGNIFICANT CHANGE UP (ref 96–108)
CK MB BLD-MCNC: 12.1 % — HIGH (ref 0–3.5)
CK MB BLD-MCNC: 12.1 % — HIGH (ref 0–3.5)
CK MB BLD-MCNC: 3.9 % — HIGH (ref 0–3.5)
CK MB BLD-MCNC: 3.9 % — HIGH (ref 0–3.5)
CK MB CFR SERPL CALC: 133.8 NG/ML — HIGH (ref 0–6.7)
CK MB CFR SERPL CALC: 133.8 NG/ML — HIGH (ref 0–6.7)
CK MB CFR SERPL CALC: 28.7 NG/ML — HIGH (ref 0–6.7)
CK MB CFR SERPL CALC: 28.7 NG/ML — HIGH (ref 0–6.7)
CK SERPL-CCNC: 1110 U/L — HIGH (ref 30–200)
CK SERPL-CCNC: 1110 U/L — HIGH (ref 30–200)
CK SERPL-CCNC: 745 U/L — HIGH (ref 30–200)
CK SERPL-CCNC: 745 U/L — HIGH (ref 30–200)
CO2 SERPL-SCNC: 21 MMOL/L — LOW (ref 22–31)
COLOR SPEC: ABNORMAL
COLOR SPEC: ABNORMAL
CREAT SERPL-MCNC: 1.7 MG/DL — HIGH (ref 0.5–1.3)
CREAT SERPL-MCNC: 1.7 MG/DL — HIGH (ref 0.5–1.3)
CREAT SERPL-MCNC: 2.36 MG/DL — HIGH (ref 0.5–1.3)
CREAT SERPL-MCNC: 2.36 MG/DL — HIGH (ref 0.5–1.3)
DIFF PNL FLD: ABNORMAL
DIFF PNL FLD: ABNORMAL
EGFR: 28 ML/MIN/1.73M2 — LOW
EGFR: 28 ML/MIN/1.73M2 — LOW
EGFR: 41 ML/MIN/1.73M2 — LOW
EGFR: 41 ML/MIN/1.73M2 — LOW
EOSINOPHIL # BLD AUTO: 0 K/UL — SIGNIFICANT CHANGE UP (ref 0–0.5)
EOSINOPHIL NFR BLD AUTO: 0 % — SIGNIFICANT CHANGE UP (ref 0–6)
EPI CELLS # UR: 4 — SIGNIFICANT CHANGE UP
EPI CELLS # UR: 4 — SIGNIFICANT CHANGE UP
ESTIMATED AVERAGE GLUCOSE: 111 MG/DL — SIGNIFICANT CHANGE UP (ref 68–114)
ESTIMATED AVERAGE GLUCOSE: 111 MG/DL — SIGNIFICANT CHANGE UP (ref 68–114)
GAS PNL BLDA: SIGNIFICANT CHANGE UP
GAS PNL BLDV: SIGNIFICANT CHANGE UP
GAS PNL BLDV: SIGNIFICANT CHANGE UP
GLUCOSE SERPL-MCNC: 206 MG/DL — HIGH (ref 70–99)
GLUCOSE SERPL-MCNC: 206 MG/DL — HIGH (ref 70–99)
GLUCOSE SERPL-MCNC: 245 MG/DL — HIGH (ref 70–99)
GLUCOSE SERPL-MCNC: 245 MG/DL — HIGH (ref 70–99)
GLUCOSE UR QL: NEGATIVE — SIGNIFICANT CHANGE UP
GLUCOSE UR QL: NEGATIVE — SIGNIFICANT CHANGE UP
GRAM STN FLD: ABNORMAL
GRAM STN FLD: ABNORMAL
GRAN CASTS # UR COMP ASSIST: 4 — SIGNIFICANT CHANGE UP
GRAN CASTS # UR COMP ASSIST: 4 — SIGNIFICANT CHANGE UP
HCT VFR BLD CALC: 28.2 % — LOW (ref 39–50)
HCT VFR BLD CALC: 28.2 % — LOW (ref 39–50)
HCT VFR BLD CALC: 31.3 % — LOW (ref 39–50)
HCT VFR BLD CALC: 31.3 % — LOW (ref 39–50)
HGB BLD-MCNC: 10.9 G/DL — LOW (ref 13–17)
HGB BLD-MCNC: 10.9 G/DL — LOW (ref 13–17)
HGB BLD-MCNC: 9.8 G/DL — LOW (ref 13–17)
HGB BLD-MCNC: 9.8 G/DL — LOW (ref 13–17)
HYALINE CASTS # UR AUTO: 2 — SIGNIFICANT CHANGE UP
HYALINE CASTS # UR AUTO: 2 — SIGNIFICANT CHANGE UP
IMM GRANULOCYTES NFR BLD AUTO: 0.5 % — SIGNIFICANT CHANGE UP (ref 0–0.9)
IMM GRANULOCYTES NFR BLD AUTO: 0.5 % — SIGNIFICANT CHANGE UP (ref 0–0.9)
IMM GRANULOCYTES NFR BLD AUTO: 0.7 % — SIGNIFICANT CHANGE UP (ref 0–0.9)
IMM GRANULOCYTES NFR BLD AUTO: 0.7 % — SIGNIFICANT CHANGE UP (ref 0–0.9)
KETONES UR-MCNC: SIGNIFICANT CHANGE UP
KETONES UR-MCNC: SIGNIFICANT CHANGE UP
LACTATE SERPL-SCNC: 2 MMOL/L — SIGNIFICANT CHANGE UP (ref 0.5–2)
LACTATE SERPL-SCNC: 2 MMOL/L — SIGNIFICANT CHANGE UP (ref 0.5–2)
LEUKOCYTE ESTERASE UR-ACNC: ABNORMAL
LEUKOCYTE ESTERASE UR-ACNC: ABNORMAL
LYMPHOCYTES # BLD AUTO: 1.01 K/UL — SIGNIFICANT CHANGE UP (ref 1–3.3)
LYMPHOCYTES # BLD AUTO: 1.01 K/UL — SIGNIFICANT CHANGE UP (ref 1–3.3)
LYMPHOCYTES # BLD AUTO: 1.19 K/UL — SIGNIFICANT CHANGE UP (ref 1–3.3)
LYMPHOCYTES # BLD AUTO: 1.19 K/UL — SIGNIFICANT CHANGE UP (ref 1–3.3)
LYMPHOCYTES # BLD AUTO: 7 % — LOW (ref 13–44)
LYMPHOCYTES # BLD AUTO: 7 % — LOW (ref 13–44)
LYMPHOCYTES # BLD AUTO: 9.9 % — LOW (ref 13–44)
LYMPHOCYTES # BLD AUTO: 9.9 % — LOW (ref 13–44)
MAGNESIUM SERPL-MCNC: 2.2 MG/DL — SIGNIFICANT CHANGE UP (ref 1.6–2.6)
MAGNESIUM SERPL-MCNC: 2.2 MG/DL — SIGNIFICANT CHANGE UP (ref 1.6–2.6)
MAGNESIUM SERPL-MCNC: 2.3 MG/DL — SIGNIFICANT CHANGE UP (ref 1.6–2.6)
MAGNESIUM SERPL-MCNC: 2.3 MG/DL — SIGNIFICANT CHANGE UP (ref 1.6–2.6)
MCHC RBC-ENTMCNC: 31.1 PG — SIGNIFICANT CHANGE UP (ref 27–34)
MCHC RBC-ENTMCNC: 31.1 PG — SIGNIFICANT CHANGE UP (ref 27–34)
MCHC RBC-ENTMCNC: 31.5 PG — SIGNIFICANT CHANGE UP (ref 27–34)
MCHC RBC-ENTMCNC: 31.5 PG — SIGNIFICANT CHANGE UP (ref 27–34)
MCHC RBC-ENTMCNC: 34.8 GM/DL — SIGNIFICANT CHANGE UP (ref 32–36)
MCV RBC AUTO: 89.4 FL — SIGNIFICANT CHANGE UP (ref 80–100)
MCV RBC AUTO: 89.4 FL — SIGNIFICANT CHANGE UP (ref 80–100)
MCV RBC AUTO: 90.7 FL — SIGNIFICANT CHANGE UP (ref 80–100)
MCV RBC AUTO: 90.7 FL — SIGNIFICANT CHANGE UP (ref 80–100)
MONOCYTES # BLD AUTO: 1.34 K/UL — HIGH (ref 0–0.9)
MONOCYTES # BLD AUTO: 1.34 K/UL — HIGH (ref 0–0.9)
MONOCYTES # BLD AUTO: 1.41 K/UL — HIGH (ref 0–0.9)
MONOCYTES # BLD AUTO: 1.41 K/UL — HIGH (ref 0–0.9)
MONOCYTES NFR BLD AUTO: 11.2 % — SIGNIFICANT CHANGE UP (ref 2–14)
MONOCYTES NFR BLD AUTO: 11.2 % — SIGNIFICANT CHANGE UP (ref 2–14)
MONOCYTES NFR BLD AUTO: 9.7 % — SIGNIFICANT CHANGE UP (ref 2–14)
MONOCYTES NFR BLD AUTO: 9.7 % — SIGNIFICANT CHANGE UP (ref 2–14)
MRSA PCR RESULT.: SIGNIFICANT CHANGE UP
MRSA PCR RESULT.: SIGNIFICANT CHANGE UP
NEUTROPHILS # BLD AUTO: 11.94 K/UL — HIGH (ref 1.8–7.4)
NEUTROPHILS # BLD AUTO: 11.94 K/UL — HIGH (ref 1.8–7.4)
NEUTROPHILS # BLD AUTO: 9.39 K/UL — HIGH (ref 1.8–7.4)
NEUTROPHILS # BLD AUTO: 9.39 K/UL — HIGH (ref 1.8–7.4)
NEUTROPHILS NFR BLD AUTO: 78.4 % — HIGH (ref 43–77)
NEUTROPHILS NFR BLD AUTO: 78.4 % — HIGH (ref 43–77)
NEUTROPHILS NFR BLD AUTO: 82.5 % — HIGH (ref 43–77)
NEUTROPHILS NFR BLD AUTO: 82.5 % — HIGH (ref 43–77)
NITRITE UR-MCNC: POSITIVE
NITRITE UR-MCNC: POSITIVE
NRBC # BLD: 0 /100 WBCS — SIGNIFICANT CHANGE UP (ref 0–0)
PH UR: 5 — SIGNIFICANT CHANGE UP (ref 5–8)
PH UR: 5 — SIGNIFICANT CHANGE UP (ref 5–8)
PHOSPHATE SERPL-MCNC: 3.6 MG/DL — SIGNIFICANT CHANGE UP (ref 2.5–4.5)
PHOSPHATE SERPL-MCNC: 3.6 MG/DL — SIGNIFICANT CHANGE UP (ref 2.5–4.5)
PHOSPHATE SERPL-MCNC: 3.7 MG/DL — SIGNIFICANT CHANGE UP (ref 2.5–4.5)
PHOSPHATE SERPL-MCNC: 3.7 MG/DL — SIGNIFICANT CHANGE UP (ref 2.5–4.5)
PLATELET # BLD AUTO: 123 K/UL — LOW (ref 150–400)
PLATELET # BLD AUTO: 123 K/UL — LOW (ref 150–400)
PLATELET # BLD AUTO: 167 K/UL — SIGNIFICANT CHANGE UP (ref 150–400)
PLATELET # BLD AUTO: 167 K/UL — SIGNIFICANT CHANGE UP (ref 150–400)
POTASSIUM SERPL-MCNC: 3.7 MMOL/L — SIGNIFICANT CHANGE UP (ref 3.5–5.3)
POTASSIUM SERPL-MCNC: 3.7 MMOL/L — SIGNIFICANT CHANGE UP (ref 3.5–5.3)
POTASSIUM SERPL-MCNC: 4.1 MMOL/L — SIGNIFICANT CHANGE UP (ref 3.5–5.3)
POTASSIUM SERPL-MCNC: 4.1 MMOL/L — SIGNIFICANT CHANGE UP (ref 3.5–5.3)
POTASSIUM SERPL-SCNC: 3.7 MMOL/L — SIGNIFICANT CHANGE UP (ref 3.5–5.3)
POTASSIUM SERPL-SCNC: 3.7 MMOL/L — SIGNIFICANT CHANGE UP (ref 3.5–5.3)
POTASSIUM SERPL-SCNC: 4.1 MMOL/L — SIGNIFICANT CHANGE UP (ref 3.5–5.3)
POTASSIUM SERPL-SCNC: 4.1 MMOL/L — SIGNIFICANT CHANGE UP (ref 3.5–5.3)
PROCALCITONIN SERPL-MCNC: 4.96 NG/ML — HIGH (ref 0.02–0.1)
PROCALCITONIN SERPL-MCNC: 4.96 NG/ML — HIGH (ref 0.02–0.1)
PROT SERPL-MCNC: 5.6 G/DL — LOW (ref 6–8.3)
PROT UR-MCNC: ABNORMAL
PROT UR-MCNC: ABNORMAL
RBC # BLD: 3.11 M/UL — LOW (ref 4.2–5.8)
RBC # BLD: 3.11 M/UL — LOW (ref 4.2–5.8)
RBC # BLD: 3.5 M/UL — LOW (ref 4.2–5.8)
RBC # BLD: 3.5 M/UL — LOW (ref 4.2–5.8)
RBC # FLD: 12.9 % — SIGNIFICANT CHANGE UP (ref 10.3–14.5)
RBC # FLD: 12.9 % — SIGNIFICANT CHANGE UP (ref 10.3–14.5)
RBC # FLD: 13.2 % — SIGNIFICANT CHANGE UP (ref 10.3–14.5)
RBC # FLD: 13.2 % — SIGNIFICANT CHANGE UP (ref 10.3–14.5)
RBC CASTS # UR COMP ASSIST: >50 /HPF — HIGH (ref 0–4)
RBC CASTS # UR COMP ASSIST: >50 /HPF — HIGH (ref 0–4)
S AUREUS DNA NOSE QL NAA+PROBE: SIGNIFICANT CHANGE UP
S AUREUS DNA NOSE QL NAA+PROBE: SIGNIFICANT CHANGE UP
SODIUM SERPL-SCNC: 141 MMOL/L — SIGNIFICANT CHANGE UP (ref 135–145)
SODIUM SERPL-SCNC: 141 MMOL/L — SIGNIFICANT CHANGE UP (ref 135–145)
SODIUM SERPL-SCNC: 144 MMOL/L — SIGNIFICANT CHANGE UP (ref 135–145)
SODIUM SERPL-SCNC: 144 MMOL/L — SIGNIFICANT CHANGE UP (ref 135–145)
SP GR SPEC: 1.05 — SIGNIFICANT CHANGE UP (ref 1–1.03)
SP GR SPEC: 1.05 — SIGNIFICANT CHANGE UP (ref 1–1.03)
SPECIMEN SOURCE: SIGNIFICANT CHANGE UP
SPECIMEN SOURCE: SIGNIFICANT CHANGE UP
TROPONIN T, HIGH SENSITIVITY RESULT: 2406 NG/L — HIGH (ref 0–51)
TROPONIN T, HIGH SENSITIVITY RESULT: 2406 NG/L — HIGH (ref 0–51)
TROPONIN T, HIGH SENSITIVITY RESULT: 4145 NG/L — HIGH (ref 0–51)
TROPONIN T, HIGH SENSITIVITY RESULT: 4145 NG/L — HIGH (ref 0–51)
TSH SERPL-MCNC: 0.8 UIU/ML — SIGNIFICANT CHANGE UP (ref 0.27–4.2)
TSH SERPL-MCNC: 0.8 UIU/ML — SIGNIFICANT CHANGE UP (ref 0.27–4.2)
UROBILINOGEN FLD QL: SIGNIFICANT CHANGE UP
UROBILINOGEN FLD QL: SIGNIFICANT CHANGE UP
WBC # BLD: 11.98 K/UL — HIGH (ref 3.8–10.5)
WBC # BLD: 11.98 K/UL — HIGH (ref 3.8–10.5)
WBC # BLD: 14.48 K/UL — HIGH (ref 3.8–10.5)
WBC # BLD: 14.48 K/UL — HIGH (ref 3.8–10.5)
WBC # FLD AUTO: 11.98 K/UL — HIGH (ref 3.8–10.5)
WBC # FLD AUTO: 11.98 K/UL — HIGH (ref 3.8–10.5)
WBC # FLD AUTO: 14.48 K/UL — HIGH (ref 3.8–10.5)
WBC # FLD AUTO: 14.48 K/UL — HIGH (ref 3.8–10.5)
WBC UR QL: 40 /HPF — HIGH (ref 0–5)
WBC UR QL: 40 /HPF — HIGH (ref 0–5)

## 2023-12-20 PROCEDURE — 71045 X-RAY EXAM CHEST 1 VIEW: CPT | Mod: 26

## 2023-12-20 PROCEDURE — 99292 CRITICAL CARE ADDL 30 MIN: CPT

## 2023-12-20 PROCEDURE — 72170 X-RAY EXAM OF PELVIS: CPT | Mod: 26

## 2023-12-20 PROCEDURE — 99291 CRITICAL CARE FIRST HOUR: CPT

## 2023-12-20 PROCEDURE — 36556 INSERT NON-TUNNEL CV CATH: CPT

## 2023-12-20 PROCEDURE — 95720 EEG PHY/QHP EA INCR W/VEEG: CPT

## 2023-12-20 PROCEDURE — 71250 CT THORAX DX C-: CPT | Mod: 26

## 2023-12-20 PROCEDURE — 36620 INSERTION CATHETER ARTERY: CPT

## 2023-12-20 PROCEDURE — 70450 CT HEAD/BRAIN W/O DYE: CPT | Mod: 26

## 2023-12-20 PROCEDURE — 99024 POSTOP FOLLOW-UP VISIT: CPT

## 2023-12-20 PROCEDURE — 99292 CRITICAL CARE ADDL 30 MIN: CPT | Mod: 25

## 2023-12-20 PROCEDURE — 93010 ELECTROCARDIOGRAM REPORT: CPT

## 2023-12-20 RX ORDER — NOREPINEPHRINE BITARTRATE/D5W 8 MG/250ML
0.09 PLASTIC BAG, INJECTION (ML) INTRAVENOUS
Qty: 8 | Refills: 0 | Status: DISCONTINUED | OUTPATIENT
Start: 2023-12-20 | End: 2023-12-20

## 2023-12-20 RX ORDER — FUROSEMIDE 40 MG
20 TABLET ORAL ONCE
Refills: 0 | Status: COMPLETED | OUTPATIENT
Start: 2023-12-20 | End: 2023-12-20

## 2023-12-20 RX ORDER — VANCOMYCIN HCL 1 G
1250 VIAL (EA) INTRAVENOUS ONCE
Refills: 0 | Status: COMPLETED | OUTPATIENT
Start: 2023-12-20 | End: 2023-12-20

## 2023-12-20 RX ORDER — PIPERACILLIN AND TAZOBACTAM 4; .5 G/20ML; G/20ML
3.38 INJECTION, POWDER, LYOPHILIZED, FOR SOLUTION INTRAVENOUS ONCE
Refills: 0 | Status: DISCONTINUED | OUTPATIENT
Start: 2023-12-20 | End: 2023-12-20

## 2023-12-20 RX ORDER — POTASSIUM CHLORIDE 20 MEQ
20 PACKET (EA) ORAL
Refills: 0 | Status: COMPLETED | OUTPATIENT
Start: 2023-12-20 | End: 2023-12-20

## 2023-12-20 RX ORDER — VASOPRESSIN 20 [USP'U]/ML
0.1 INJECTION INTRAVENOUS
Qty: 40 | Refills: 0 | Status: DISCONTINUED | OUTPATIENT
Start: 2023-12-20 | End: 2023-12-20

## 2023-12-20 RX ORDER — CEFTRIAXONE 500 MG/1
1000 INJECTION, POWDER, FOR SOLUTION INTRAMUSCULAR; INTRAVENOUS EVERY 24 HOURS
Refills: 0 | Status: DISCONTINUED | OUTPATIENT
Start: 2023-12-20 | End: 2023-12-22

## 2023-12-20 RX ORDER — PIPERACILLIN AND TAZOBACTAM 4; .5 G/20ML; G/20ML
3.38 INJECTION, POWDER, LYOPHILIZED, FOR SOLUTION INTRAVENOUS ONCE
Refills: 0 | Status: COMPLETED | OUTPATIENT
Start: 2023-12-20 | End: 2023-12-20

## 2023-12-20 RX ORDER — PIPERACILLIN AND TAZOBACTAM 4; .5 G/20ML; G/20ML
3.38 INJECTION, POWDER, LYOPHILIZED, FOR SOLUTION INTRAVENOUS EVERY 8 HOURS
Refills: 0 | Status: DISCONTINUED | OUTPATIENT
Start: 2023-12-20 | End: 2023-12-20

## 2023-12-20 RX ORDER — ACETAMINOPHEN 500 MG
1000 TABLET ORAL ONCE
Refills: 0 | Status: COMPLETED | OUTPATIENT
Start: 2023-12-20 | End: 2023-12-20

## 2023-12-20 RX ADMIN — CEFTRIAXONE 100 MILLIGRAM(S): 500 INJECTION, POWDER, FOR SOLUTION INTRAMUSCULAR; INTRAVENOUS at 13:51

## 2023-12-20 RX ADMIN — DEXMEDETOMIDINE HYDROCHLORIDE IN 0.9% SODIUM CHLORIDE 7.01 MICROGRAM(S)/KG/HR: 4 INJECTION INTRAVENOUS at 13:13

## 2023-12-20 RX ADMIN — LEVETIRACETAM 400 MILLIGRAM(S): 250 TABLET, FILM COATED ORAL at 17:56

## 2023-12-20 RX ADMIN — LEVETIRACETAM 400 MILLIGRAM(S): 250 TABLET, FILM COATED ORAL at 05:37

## 2023-12-20 RX ADMIN — PIPERACILLIN AND TAZOBACTAM 200 GRAM(S): 4; .5 INJECTION, POWDER, LYOPHILIZED, FOR SOLUTION INTRAVENOUS at 07:50

## 2023-12-20 RX ADMIN — DEXMEDETOMIDINE HYDROCHLORIDE IN 0.9% SODIUM CHLORIDE 7.01 MICROGRAM(S)/KG/HR: 4 INJECTION INTRAVENOUS at 20:16

## 2023-12-20 RX ADMIN — Medication 400 MILLIGRAM(S): at 05:37

## 2023-12-20 RX ADMIN — Medication 1 APPLICATION(S): at 13:50

## 2023-12-20 RX ADMIN — Medication 1000 MILLIGRAM(S): at 06:00

## 2023-12-20 RX ADMIN — Medication 15.8 MICROGRAM(S)/KG/MIN: at 02:43

## 2023-12-20 RX ADMIN — CHLORHEXIDINE GLUCONATE 1 APPLICATION(S): 213 SOLUTION TOPICAL at 21:52

## 2023-12-20 RX ADMIN — Medication 20 MILLIGRAM(S): at 22:50

## 2023-12-20 RX ADMIN — Medication 15.8 MICROGRAM(S)/KG/MIN: at 07:49

## 2023-12-20 RX ADMIN — Medication 1 APPLICATION(S): at 21:48

## 2023-12-20 RX ADMIN — Medication 1 DROP(S): at 17:55

## 2023-12-20 RX ADMIN — Medication 100 MILLIEQUIVALENT(S): at 21:45

## 2023-12-20 RX ADMIN — PANTOPRAZOLE SODIUM 40 MILLIGRAM(S): 20 TABLET, DELAYED RELEASE ORAL at 13:15

## 2023-12-20 RX ADMIN — CHLORHEXIDINE GLUCONATE 15 MILLILITER(S): 213 SOLUTION TOPICAL at 17:57

## 2023-12-20 RX ADMIN — Medication 2 MILLIGRAM(S): at 04:57

## 2023-12-20 RX ADMIN — Medication 100 MILLIEQUIVALENT(S): at 20:15

## 2023-12-20 RX ADMIN — Medication 166.67 MILLIGRAM(S): at 14:29

## 2023-12-20 RX ADMIN — DEXMEDETOMIDINE HYDROCHLORIDE IN 0.9% SODIUM CHLORIDE 7.01 MICROGRAM(S)/KG/HR: 4 INJECTION INTRAVENOUS at 07:49

## 2023-12-20 RX ADMIN — Medication 100 MILLIGRAM(S): at 04:49

## 2023-12-20 NOTE — PROGRESS NOTE ADULT - ASSESSMENT
77yo M h/o HTN, HLD presenting as a transfer from [facility] after a witnessed cardiac arrest and fall with headstrike. Patient intubated on the filed and ROSC achieved with CPR, though remains with GCS3 off sedation and in shock state requiring pressors. Trauma surgery consulted for c-spine clearance and trauma evaluation. At [facility], patient underwent CTH, CT maxface, CT c-spine and CT chest. Injuries identified:    - Layering opacities in R maxillary and bilateral ethmoid sinus concerning for occult fx  - Small retrosternal hematoma and bilateral rib fractures (per imaging review, no report available from [facility])    Plan/Recommendations:  - No trauma surgery intervention indicated  - C-collar cleared by trauma attending Dr. Collins (please refer to his note for further details)  - Will perform tertiary survey    Acute Care Surgery [id number]     77yo M h/o HTN, HLD presenting as a transfer from [facility] after a witnessed cardiac arrest and fall with headstrike. Patient intubated on the filed and ROSC achieved with CPR, though remains with GCS3 off sedation and in shock state requiring pressors. Trauma surgery consulted for c-spine clearance and trauma evaluation. At [facility], patient underwent CTH, CT maxface, CT c-spine and CT chest. Injuries identified:    - Layering opacities in R maxillary and bilateral ethmoid sinus concerning for occult fx  - Small retrosternal hematoma and bilateral rib fractures (per imaging review, no report available from [facility])    Plan/Recommendations:  - No trauma surgery intervention indicated  - C-collar cleared by trauma attending Dr. Collins (please refer to his note for further details)  - Will perform tertiary survey    Acute Care Surgery [id number]

## 2023-12-20 NOTE — PROGRESS NOTE ADULT - ASSESSMENT
78M w/ PMH HTN HLD presents as transfer from [facility] s/p cardiac arrest. Per reports patient had a witness fall and arrest at home. Pt was intubated  in the field. ENT consulted for oral bleeding noted upon suctioning. Oral packing reportedly removed overnight by RT in order to place bite block, however not currently in place. On exam, blood suctioned from posterior pharynx, area of ecchymosis noted behind left tonsillar pillar, oropharynx re-packed with kerlix x1.  78M w/ PMH HTN HLD presents as transfer from [facility] s/p cardiac arrest. Per reports patient had a witness fall and arrest at home. Pt was intubated  in the field. ENT consulted for oral bleeding noted upon suctioning. Oral packing reportedly removed overnight by RT in order to place bite block, however not currently in place. On exam, blood suctioned from posterior pharynx, area of ecchymosis noted behind left tonsillar pillar, oropharynx re-packed with kerlix x1.

## 2023-12-20 NOTE — PROGRESS NOTE ADULT - SUBJECTIVE AND OBJECTIVE BOX
SUBJECTIVE:  Unable to participate in exam. No mental status.    OBJECTIVE:  Vital Signs Last 24 Hrs  T(C): 38.4 (20 Dec 2023 08:00), Max: 38.4 (20 Dec 2023 04:00)  T(F): 101.1 (20 Dec 2023 08:00), Max: 101.1 (20 Dec 2023 04:00)  HR: 63 (20 Dec 2023 11:00) (58 - 87)  BP: 159/74 (20 Dec 2023 04:30) (159/74 - 159/74)  BP(mean): 107 (20 Dec 2023 04:30) (107 - 107)  RR: 22 (20 Dec 2023 11:00) (2 - 41)  SpO2: 100% (20 Dec 2023 11:00) (97% - 100%)    Parameters below as of 20 Dec 2023 11:00  Patient On (Oxygen Delivery Method): ventilator    O2 Concentration (%): 40      12-19-23 @ 07:01  -  12-20-23 @ 07:00  --------------------------------------------------------  IN: 569.2 mL / OUT: 355 mL / NET: 214.2 mL    12-20-23 @ 07:01  -  12-20-23 @ 11:19  --------------------------------------------------------  IN: 219.2 mL / OUT: 45 mL / NET: 174.2 mL        Physical Examination:  GEN: NAD  NEURO: GCS 3  HEENT: right periorbital ecchymosis, PERRLA, right scalp hematoma  PULM: intubated  ABD: soft, nondistended        LABS:                        10.9   14.48 )-----------( 167      ( 20 Dec 2023 02:09 )             31.3       12-20    141  |  106  |  34<H>  ----------------------------<  245<H>  4.1   |  21<L>  |  1.70<H>    Ca    8.2<L>      20 Dec 2023 02:09  Phos  3.6     12-20  Mg     2.2     12-20    TPro  5.6<L>  /  Alb  3.6  /  TBili  1.0  /  DBili  x   /  AST  206<H>  /  ALT  335<H>  /  AlkPhos  49  12-20

## 2023-12-20 NOTE — PROGRESS NOTE ADULT - SUBJECTIVE AND OBJECTIVE BOX
ASHLEY PRITCHARD  MRN-[id number]  Patient is a 78y old  Male who presents with a chief complaint of Cardiac Arrest (19 Dec 2023 21:32)    HPI:  78M w/ PMH HTN HLD presents as transfer from [facility] s/p cardiac arrest. Per reports patient had a witness fall and arrest at home. EMS performed CPR en route to hospital was noted to be in VTach and shocked x2. ROSC was obtained just prior to arrival at [facility]. Pt was intubated placed on levo gtt. On arrival pt noted to have lacerated to R forehead. Pt had CT scans that were negative for intracranial hemorrhage, C-spine fracture, facial fractures. A R frontal hematoma was noted.     On arrival patient w/ dried blood on scalp. Also noted to have bleeding from oral mucosa w/ clots requiring suctioning. Pt otherwise off sedation and non-responsive. Per EMS report pt did receive some sedation and paralytics at [facility].     Per family patient has been feeling 'off' but managing his day to day and did not see a physician. Pt prescribed klonopin by outpatient provider and family believes pt may have been taking more than prescribed. On day of arrest, pt was initially asymptomatic carrying out his routine. He went to the bathroom, wife heard a loud thud and found patient in a pool of blood prompting call to EMS. Pt has not followed with any cardiologist. Has not had any syncope or other events preceding this.  (19 Dec 2023 14:54)      24 HOUR EVENTS:    REVIEW OF SYSTEMS:    CONSTITUTIONAL: No weakness, fevers or chills  EYES/ENT: No visual changes;  No vertigo or throat pain   NECK: No pain or stiffness  RESPIRATORY: No cough, wheezing, hemoptysis; No shortness of breath  CARDIOVASCULAR: No chest pain or palpitations  GASTROINTESTINAL: No abdominal or epigastric pain. No nausea, vomiting, or hematemesis; No diarrhea or constipation. No melena or hematochezia.  GENITOURINARY: No dysuria, frequency or hematuria  NEUROLOGICAL: No numbness or weakness  SKIN: No itching, rashes      ICU Vital Signs Last 24 Hrs  T(C): 38.4 (20 Dec 2023 04:00), Max: 38.4 (20 Dec 2023 04:00)  T(F): 101.1 (20 Dec 2023 04:00), Max: 101.1 (20 Dec 2023 04:00)  HR: 63 (20 Dec 2023 07:00) (58 - 87)  BP: 159/74 (20 Dec 2023 04:30) (159/74 - 159/74)  BP(mean): 107 (20 Dec 2023 04:30) (107 - 107)  ABP: 111/50 (20 Dec 2023 07:00) (64/64 - 235/86)  ABP(mean): 67 (20 Dec 2023 07:00) (54 - 170)  RR: 6 (20 Dec 2023 07:00) (2 - 41)  SpO2: 100% (20 Dec 2023 07:00) (97% - 100%)    O2 Parameters below as of 20 Dec 2023 04:00  Patient On (Oxygen Delivery Method): ventilator    O2 Concentration (%): 40      Mode: AC/ CMV (Assist Control/ Continuous Mandatory Ventilation), RR (machine): 12, TV (machine): 500, FiO2: 40, PEEP: 5, ITime: 1  CVP(mm Hg): --  CO: --  CI: --  PA: --  PA(mean): --  PA(direct): --  PCWP: --  LA: --  RA: --  SVR: --  SVRI: --  PVR: --  PVRI: --  I&O's Summary    19 Dec 2023 07:01  -  20 Dec 2023 07:00  --------------------------------------------------------  IN: 519.2 mL / OUT: 355 mL / NET: 164.2 mL        CAPILLARY BLOOD GLUCOSE    CAPILLARY BLOOD GLUCOSE          PHYSICAL EXAM:  GENERAL: No acute distress, well-developed  HEAD:  Atraumatic, Normocephalic  EYES: EOMI, PERRLA, conjunctiva and sclera clear  NECK: Supple, no lymphadenopathy, no JVD  CHEST/LUNG: CTAB; No wheezes, rales, or rhonchi  HEART: Regular rate and rhythm. Normal S1/S2. No murmurs, rubs, or gallops  ABDOMEN: Soft, non-tender, non-distended; normal bowel sounds, no organomegaly  EXTREMITIES:  2+ peripheral pulses b/l, No clubbing, cyanosis, or edema  NEUROLOGY: A&O x 3, no focal deficits  SKIN: No rashes or lesions    ============================I/O===========================   I&O's Detail    19 Dec 2023 07:01  -  20 Dec 2023 07:00  --------------------------------------------------------  IN:    Dexmedetomidine: 91 mL    IV PiggyBack: 350 mL    Norepinephrine: 63.2 mL    Vasopressin: 15 mL  Total IN: 519.2 mL    OUT:    Indwelling Catheter - Urethral (mL): 355 mL  Total OUT: 355 mL    Total NET: 164.2 mL        ============================ LABS =========================                        10.9   14.48 )-----------( 167      ( 20 Dec 2023 02:09 )             31.3         141  |  106  |  34<H>  ----------------------------<  245<H>  4.1   |  21<L>  |  1.70<H>    Ca    8.2<L>      20 Dec 2023 02:09  Phos  3.6       Mg     2.2         TPro  5.6<L>  /  Alb  3.6  /  TBili  1.0  /  DBili  x   /  AST  206<H>  /  ALT  335<H>  /  AlkPhos  49      Troponin T, High Sensitivity Result: 4145 ng/L (23 @ 02:09)  Troponin T, High Sensitivity Result: 1894 ng/L (23 @ 15:22)    CKMB Units: 133.8 ng/mL (23 @ 02:09)  CKMB Units: 129.2 ng/mL (23 @ 15:22)    Creatine Kinase, Serum: 1110 U/L (23 @ 02:09)    CPK Mass Assay %: 12.1 % (23 @ 02:09)        LIVER FUNCTIONS - ( 20 Dec 2023 02:09 )  Alb: 3.6 g/dL / Pro: 5.6 g/dL / ALK PHOS: 49 U/L / ALT: 335 U/L / AST: 206 U/L / GGT: x           PT/INR - ( 19 Dec 2023 15:22 )   PT: 17.7 sec;   INR: 1.63 ratio         PTT - ( 19 Dec 2023 15:22 )  PTT:30.8 sec  ABG - ( 20 Dec 2023 01:54 )  pH, Arterial: 7.45  pH, Blood: x     /  pCO2: 31    /  pO2: 179   / HCO3: 22    / Base Excess: -1.8  /  SaO2: 98.8              Lactate, Blood: 2.0 mmol/L (23 @ 05:38)  Blood Gas Venous - Lactate: 2.3 mmol/L (23 @ 02:20)  Blood Gas Arterial, Lactate: 2.1 mmol/L (23 @ 01:54)  Blood Gas Arterial, Lactate: 2.0 mmol/L (23 @ 15:15)    Urinalysis Basic - ( 20 Dec 2023 05:38 )    Color: Red / Appearance: Turbid / S.048 / pH: x  Gluc: x / Ketone: Trace  / Bili: Small / Urobili: <2 mg/dL   Blood: x / Protein: 300 mg/dL / Nitrite: Positive   Leuk Esterase: Large / RBC: >50 /HPF / WBC 40 /HPF   Sq Epi: x / Non Sq Epi: x / Bacteria: Many /HPF      ======================Micro/Rad/Cardio=================  Telemetry: Reviewed   EKG: Reviewed  CXR: Reviewed  Culture: Reviewed   Echo:   Cath: see sunrise for details  ======================================================  PAST MEDICAL & SURGICAL HISTORY:  HTN (hypertension)      HLD (hyperlipidemia)         ASHLEY PRITCHARD  MRN-[id number]  Patient is a 78y old  Male who presents with a chief complaint of Cardiac Arrest (19 Dec 2023 21:32)    HPI:  78M w/ PMH HTN HLD presents as transfer from [facility] s/p cardiac arrest. Per reports patient had a witness fall and arrest at home. EMS performed CPR en route to hospital was noted to be in VTach and shocked x2. ROSC was obtained just prior to arrival at [facility]. Pt was intubated placed on levo gtt. On arrival pt noted to have lacerated to R forehead. Pt had CT scans that were negative for intracranial hemorrhage, C-spine fracture, facial fractures. A R frontal hematoma was noted.     On arrival patient w/ dried blood on scalp. Also noted to have bleeding from oral mucosa w/ clots requiring suctioning. Pt otherwise off sedation and non-responsive. Per EMS report pt did receive some sedation and paralytics at [facility].     Per family patient has been feeling 'off' but managing his day to day and did not see a physician. Pt prescribed klonopin by outpatient provider and family believes pt may have been taking more than prescribed. On day of arrest, pt was initially asymptomatic carrying out his routine. He went to the bathroom, wife heard a loud thud and found patient in a pool of blood prompting call to EMS. Pt has not followed with any cardiologist. Has not had any syncope or other events preceding this.  (19 Dec 2023 14:54)      24 HOUR EVENTS:    REVIEW OF SYSTEMS:    CONSTITUTIONAL: No weakness, fevers or chills  EYES/ENT: No visual changes;  No vertigo or throat pain   NECK: No pain or stiffness  RESPIRATORY: No cough, wheezing, hemoptysis; No shortness of breath  CARDIOVASCULAR: No chest pain or palpitations  GASTROINTESTINAL: No abdominal or epigastric pain. No nausea, vomiting, or hematemesis; No diarrhea or constipation. No melena or hematochezia.  GENITOURINARY: No dysuria, frequency or hematuria  NEUROLOGICAL: No numbness or weakness  SKIN: No itching, rashes      ICU Vital Signs Last 24 Hrs  T(C): 38.4 (20 Dec 2023 04:00), Max: 38.4 (20 Dec 2023 04:00)  T(F): 101.1 (20 Dec 2023 04:00), Max: 101.1 (20 Dec 2023 04:00)  HR: 63 (20 Dec 2023 07:00) (58 - 87)  BP: 159/74 (20 Dec 2023 04:30) (159/74 - 159/74)  BP(mean): 107 (20 Dec 2023 04:30) (107 - 107)  ABP: 111/50 (20 Dec 2023 07:00) (64/64 - 235/86)  ABP(mean): 67 (20 Dec 2023 07:00) (54 - 170)  RR: 6 (20 Dec 2023 07:00) (2 - 41)  SpO2: 100% (20 Dec 2023 07:00) (97% - 100%)    O2 Parameters below as of 20 Dec 2023 04:00  Patient On (Oxygen Delivery Method): ventilator    O2 Concentration (%): 40      Mode: AC/ CMV (Assist Control/ Continuous Mandatory Ventilation), RR (machine): 12, TV (machine): 500, FiO2: 40, PEEP: 5, ITime: 1  CVP(mm Hg): --  CO: --  CI: --  PA: --  PA(mean): --  PA(direct): --  PCWP: --  LA: --  RA: --  SVR: --  SVRI: --  PVR: --  PVRI: --  I&O's Summary    19 Dec 2023 07:01  -  20 Dec 2023 07:00  --------------------------------------------------------  IN: 519.2 mL / OUT: 355 mL / NET: 164.2 mL        CAPILLARY BLOOD GLUCOSE    CAPILLARY BLOOD GLUCOSE          PHYSICAL EXAM:  GENERAL: No acute distress, well-developed  HEAD:  Atraumatic, Normocephalic  EYES: EOMI, PERRLA, conjunctiva and sclera clear  NECK: Supple, no lymphadenopathy, no JVD  CHEST/LUNG: CTAB; No wheezes, rales, or rhonchi  HEART: Regular rate and rhythm. Normal S1/S2. No murmurs, rubs, or gallops  ABDOMEN: Soft, non-tender, non-distended; normal bowel sounds, no organomegaly  EXTREMITIES:  2+ peripheral pulses b/l, No clubbing, cyanosis, or edema  NEUROLOGY: A&O x 3, no focal deficits  SKIN: No rashes or lesions    ============================I/O===========================   I&O's Detail    19 Dec 2023 07:01  -  20 Dec 2023 07:00  --------------------------------------------------------  IN:    Dexmedetomidine: 91 mL    IV PiggyBack: 350 mL    Norepinephrine: 63.2 mL    Vasopressin: 15 mL  Total IN: 519.2 mL    OUT:    Indwelling Catheter - Urethral (mL): 355 mL  Total OUT: 355 mL    Total NET: 164.2 mL        ============================ LABS =========================                        10.9   14.48 )-----------( 167      ( 20 Dec 2023 02:09 )             31.3         141  |  106  |  34<H>  ----------------------------<  245<H>  4.1   |  21<L>  |  1.70<H>    Ca    8.2<L>      20 Dec 2023 02:09  Phos  3.6       Mg     2.2         TPro  5.6<L>  /  Alb  3.6  /  TBili  1.0  /  DBili  x   /  AST  206<H>  /  ALT  335<H>  /  AlkPhos  49      Troponin T, High Sensitivity Result: 4145 ng/L (23 @ 02:09)  Troponin T, High Sensitivity Result: 1894 ng/L (23 @ 15:22)    CKMB Units: 133.8 ng/mL (23 @ 02:09)  CKMB Units: 129.2 ng/mL (23 @ 15:22)    Creatine Kinase, Serum: 1110 U/L (23 @ 02:09)    CPK Mass Assay %: 12.1 % (23 @ 02:09)        LIVER FUNCTIONS - ( 20 Dec 2023 02:09 )  Alb: 3.6 g/dL / Pro: 5.6 g/dL / ALK PHOS: 49 U/L / ALT: 335 U/L / AST: 206 U/L / GGT: x           PT/INR - ( 19 Dec 2023 15:22 )   PT: 17.7 sec;   INR: 1.63 ratio         PTT - ( 19 Dec 2023 15:22 )  PTT:30.8 sec  ABG - ( 20 Dec 2023 01:54 )  pH, Arterial: 7.45  pH, Blood: x     /  pCO2: 31    /  pO2: 179   / HCO3: 22    / Base Excess: -1.8  /  SaO2: 98.8              Lactate, Blood: 2.0 mmol/L (23 @ 05:38)  Blood Gas Venous - Lactate: 2.3 mmol/L (23 @ 02:20)  Blood Gas Arterial, Lactate: 2.1 mmol/L (23 @ 01:54)  Blood Gas Arterial, Lactate: 2.0 mmol/L (23 @ 15:15)    Urinalysis Basic - ( 20 Dec 2023 05:38 )    Color: Red / Appearance: Turbid / S.048 / pH: x  Gluc: x / Ketone: Trace  / Bili: Small / Urobili: <2 mg/dL   Blood: x / Protein: 300 mg/dL / Nitrite: Positive   Leuk Esterase: Large / RBC: >50 /HPF / WBC 40 /HPF   Sq Epi: x / Non Sq Epi: x / Bacteria: Many /HPF      ======================Micro/Rad/Cardio=================  Telemetry: Reviewed   EKG: Reviewed  CXR: Reviewed  Culture: Reviewed   Echo:   Cath: see sunrise for details  ======================================================  PAST MEDICAL & SURGICAL HISTORY:  HTN (hypertension)      HLD (hyperlipidemia)         ASHLEY PRITCHARD  MRN-[id number]  Patient is a 78y old  Male who presents with a chief complaint of Cardiac Arrest (19 Dec 2023 21:32)    HPI:  78M w/ PMH HTN HLD presents as transfer from [facility] s/p cardiac arrest. Per reports patient had a witness fall and arrest at home. EMS performed CPR en route to hospital was noted to be in VTach and shocked x2. ROSC was obtained just prior to arrival at [facility]. Pt was intubated placed on levo gtt. On arrival pt noted to have lacerated to R forehead. Pt had CT scans that were negative for intracranial hemorrhage, C-spine fracture, facial fractures. A R frontal hematoma was noted.     On arrival patient w/ dried blood on scalp. Also noted to have bleeding from oral mucosa w/ clots requiring suctioning. Pt otherwise off sedation and non-responsive. Per EMS report pt did receive some sedation and paralytics at [facility].     Per family patient has been feeling 'off' but managing his day to day and did not see a physician. Pt prescribed klonopin by outpatient provider and family believes pt may have been taking more than prescribed. On day of arrest, pt was initially asymptomatic carrying out his routine. He went to the bathroom, wife heard a loud thud and found patient in a pool of blood prompting call to EMS. Pt has not followed with any cardiologist. Has not had any syncope or other events preceding this.  (19 Dec 2023 14:54)      24 HOUR EVENTS: Pt requiring vasopressors o/n. Still no purposeful movement. Had Myoclonic jerking activity started VEEG and keppra.     REVIEW OF SYSTEMS:    ICU Vital Signs Last 24 Hrs  T(C): 38.4 (20 Dec 2023 04:00), Max: 38.4 (20 Dec 2023 04:00)  T(F): 101.1 (20 Dec 2023 04:00), Max: 101.1 (20 Dec 2023 04:00)  HR: 63 (20 Dec 2023 07:00) (58 - 87)  BP: 159/74 (20 Dec 2023 04:30) (159/74 - 159/74)  BP(mean): 107 (20 Dec 2023 04:30) (107 - 107)  ABP: 111/50 (20 Dec 2023 07:00) (64/64 - 235/86)  ABP(mean): 67 (20 Dec 2023 07:00) (54 - 170)  RR: 6 (20 Dec 2023 07:00) (2 - 41)  SpO2: 100% (20 Dec 2023 07:00) (97% - 100%)    O2 Parameters below as of 20 Dec 2023 04:00  Patient On (Oxygen Delivery Method): ventilator    O2 Concentration (%): 40      Mode: AC/ CMV (Assist Control/ Continuous Mandatory Ventilation), RR (machine): 12, TV (machine): 500, FiO2: 40, PEEP: 5, ITime: 1  CVP(mm Hg): --  CO: --  CI: --  PA: --  PA(mean): --  PA(direct): --  PCWP: --  LA: --  RA: --  SVR: --  SVRI: --  PVR: --  PVRI: --  I&O's Summary    19 Dec 2023 07:01  -  20 Dec 2023 07:00  --------------------------------------------------------  IN: 519.2 mL / OUT: 355 mL / NET: 164.2 mL        CAPILLARY BLOOD GLUCOSE    CAPILLARY BLOOD GLUCOSE          PHYSICAL EXAM:  GENERAL: Pt intubated, not responsive to sternal rub. Notable trauma to head.   HEAD:  +traumatic head, Normocephalic  EYES: . Periorbital edema/erythma on R. conjunctiva and sclera clear. Pupils reactive  ENMT: ET tube in place. Dry mucous membranes. Notable blood in oral cavity   NECK: Supple, No JVD,   HEART: Regular rate and rhythm; No murmurs, rubs, or gallops  RESPIRATORY: Mechanical breath sounds b/l. No notable crackles, wheezes  ABDOMEN: Soft, Nontender, Nondistended  NEUROLOGY: A&Ox0. Pupils minimally reactive. Non-responsive to sternal stimuli off sedation.  EXTREMITIES:  1+ Peripheral Pulses, No clubbing, cyanosis, or edema  SKIN: warm, dry, normal color, no rash or abnormal lesions    ============================I/O===========================   I&O's Detail    19 Dec 2023 07:01  -  20 Dec 2023 07:00  --------------------------------------------------------  IN:    Dexmedetomidine: 91 mL    IV PiggyBack: 350 mL    Norepinephrine: 63.2 mL    Vasopressin: 15 mL  Total IN: 519.2 mL    OUT:    Indwelling Catheter - Urethral (mL): 355 mL  Total OUT: 355 mL    Total NET: 164.2 mL        ============================ LABS =========================                        10.9   14.48 )-----------( 167      ( 20 Dec 2023 02:09 )             31.3         141  |  106  |  34<H>  ----------------------------<  245<H>  4.1   |  21<L>  |  1.70<H>    Ca    8.2<L>      20 Dec 2023 02:09  Phos  3.6       Mg     2.2         TPro  5.6<L>  /  Alb  3.6  /  TBili  1.0  /  DBili  x   /  AST  206<H>  /  ALT  335<H>  /  AlkPhos  49      Troponin T, High Sensitivity Result: 4145 ng/L (23 @ 02:09)  Troponin T, High Sensitivity Result: 1894 ng/L (23 @ 15:22)    CKMB Units: 133.8 ng/mL (23 @ 02:09)  CKMB Units: 129.2 ng/mL (23 @ 15:22)    Creatine Kinase, Serum: 1110 U/L (23 @ 02:09)    CPK Mass Assay %: 12.1 % (23 @ 02:09)        LIVER FUNCTIONS - ( 20 Dec 2023 02:09 )  Alb: 3.6 g/dL / Pro: 5.6 g/dL / ALK PHOS: 49 U/L / ALT: 335 U/L / AST: 206 U/L / GGT: x           PT/INR - ( 19 Dec 2023 15:22 )   PT: 17.7 sec;   INR: 1.63 ratio         PTT - ( 19 Dec 2023 15:22 )  PTT:30.8 sec  ABG - ( 20 Dec 2023 01:54 )  pH, Arterial: 7.45  pH, Blood: x     /  pCO2: 31    /  pO2: 179   / HCO3: 22    / Base Excess: -1.8  /  SaO2: 98.8        Lactate, Blood: 2.0 mmol/L (23 @ 05:38)  Blood Gas Venous - Lactate: 2.3 mmol/L (23 @ 02:20)  Blood Gas Arterial, Lactate: 2.1 mmol/L (23 @ 01:54)  Blood Gas Arterial, Lactate: 2.0 mmol/L (23 @ 15:15)    Urinalysis Basic - ( 20 Dec 2023 05:38 )    Color: Red / Appearance: Turbid / S.048 / pH: x  Gluc: x / Ketone: Trace  / Bili: Small / Urobili: <2 mg/dL   Blood: x / Protein: 300 mg/dL / Nitrite: Positive   Leuk Esterase: Large / RBC: >50 /HPF / WBC 40 /HPF   Sq Epi: x / Non Sq Epi: x / Bacteria: Many /HPF      ======================Micro/Rad/Cardio=================  Telemetry: Reviewed   EKG: Reviewed  CXR: Reviewed  Culture: Reviewed   Echo:   Cath: see sunrise for details  ======================================================  PAST MEDICAL & SURGICAL HISTORY:  HTN (hypertension)      HLD (hyperlipidemia)         ASHLEY PRITCHARD  MRN-[id number]  Patient is a 78y old  Male who presents with a chief complaint of Cardiac Arrest (19 Dec 2023 21:32)    HPI:  78M w/ PMH HTN HLD presents as transfer from [facility] s/p cardiac arrest. Per reports patient had a witness fall and arrest at home. EMS performed CPR en route to hospital was noted to be in VTach and shocked x2. ROSC was obtained just prior to arrival at [facility]. Pt was intubated placed on levo gtt. On arrival pt noted to have lacerated to R forehead. Pt had CT scans that were negative for intracranial hemorrhage, C-spine fracture, facial fractures. A R frontal hematoma was noted.     On arrival patient w/ dried blood on scalp. Also noted to have bleeding from oral mucosa w/ clots requiring suctioning. Pt otherwise off sedation and non-responsive. Per EMS report pt did receive some sedation and paralytics at [facility].     Per family patient has been feeling 'off' but managing his day to day and did not see a physician. Pt prescribed klonopin by outpatient provider and family believes pt may have been taking more than prescribed. On day of arrest, pt was initially asymptomatic carrying out his routine. He went to the bathroom, wife heard a loud thud and found patient in a pool of blood prompting call to EMS. Pt has not followed with any cardiologist. Has not had any syncope or other events preceding this.  (19 Dec 2023 14:54)      24 HOUR EVENTS: Pt requiring vasopressors o/n. Still no purposeful movement. Had Myoclonic jerking activity started VEEG and keppra.     REVIEW OF SYSTEMS:    ICU Vital Signs Last 24 Hrs  T(C): 38.4 (20 Dec 2023 04:00), Max: 38.4 (20 Dec 2023 04:00)  T(F): 101.1 (20 Dec 2023 04:00), Max: 101.1 (20 Dec 2023 04:00)  HR: 63 (20 Dec 2023 07:00) (58 - 87)  BP: 159/74 (20 Dec 2023 04:30) (159/74 - 159/74)  BP(mean): 107 (20 Dec 2023 04:30) (107 - 107)  ABP: 111/50 (20 Dec 2023 07:00) (64/64 - 235/86)  ABP(mean): 67 (20 Dec 2023 07:00) (54 - 170)  RR: 6 (20 Dec 2023 07:00) (2 - 41)  SpO2: 100% (20 Dec 2023 07:00) (97% - 100%)    O2 Parameters below as of 20 Dec 2023 04:00  Patient On (Oxygen Delivery Method): ventilator    O2 Concentration (%): 40      Mode: AC/ CMV (Assist Control/ Continuous Mandatory Ventilation), RR (machine): 12, TV (machine): 500, FiO2: 40, PEEP: 5, ITime: 1  CVP(mm Hg): --  CO: --  CI: --  PA: --  PA(mean): --  PA(direct): --  PCWP: --  LA: --  RA: --  SVR: --  SVRI: --  PVR: --  PVRI: --  I&O's Summary    19 Dec 2023 07:01  -  20 Dec 2023 07:00  --------------------------------------------------------  IN: 519.2 mL / OUT: 355 mL / NET: 164.2 mL        CAPILLARY BLOOD GLUCOSE    CAPILLARY BLOOD GLUCOSE          PHYSICAL EXAM:  GENERAL: Pt intubated, not responsive to sternal rub. Notable trauma to head.   HEAD:  +traumatic head, Normocephalic  EYES: . Periorbital edema/erythma on R. conjunctiva and sclera clear. Pupils reactive  ENMT: ET tube in place. Dry mucous membranes. Notable blood in oral cavity   NECK: Supple, No JVD,   HEART: Regular rate and rhythm; No murmurs, rubs, or gallops  RESPIRATORY: Mechanical breath sounds b/l. No notable crackles, wheezes  ABDOMEN: Soft, Nontender, Nondistended  NEUROLOGY: A&Ox0. Pupils minimally reactive. Non-responsive to sternal stimuli off sedation.  EXTREMITIES:  1+ Peripheral Pulses, No clubbing, cyanosis, or edema  SKIN: warm, dry, normal color, no rash or abnormal lesions    ============================I/O===========================   I&O's Detail    19 Dec 2023 07:01  -  20 Dec 2023 07:00  --------------------------------------------------------  IN:    Dexmedetomidine: 91 mL    IV PiggyBack: 350 mL    Norepinephrine: 63.2 mL    Vasopressin: 15 mL  Total IN: 519.2 mL    OUT:    Indwelling Catheter - Urethral (mL): 355 mL  Total OUT: 355 mL    Total NET: 164.2 mL        ============================ LABS =========================                        10.9   14.48 )-----------( 167      ( 20 Dec 2023 02:09 )             31.3         141  |  106  |  34<H>  ----------------------------<  245<H>  4.1   |  21<L>  |  1.70<H>    Ca    8.2<L>      20 Dec 2023 02:09  Phos  3.6       Mg     2.2         TPro  5.6<L>  /  Alb  3.6  /  TBili  1.0  /  DBili  x   /  AST  206<H>  /  ALT  335<H>  /  AlkPhos  49      Troponin T, High Sensitivity Result: 4145 ng/L (23 @ 02:09)  Troponin T, High Sensitivity Result: 1894 ng/L (23 @ 15:22)    CKMB Units: 133.8 ng/mL (23 @ 02:09)  CKMB Units: 129.2 ng/mL (23 @ 15:22)    Creatine Kinase, Serum: 1110 U/L (23 @ 02:09)    CPK Mass Assay %: 12.1 % (23 @ 02:09)        LIVER FUNCTIONS - ( 20 Dec 2023 02:09 )  Alb: 3.6 g/dL / Pro: 5.6 g/dL / ALK PHOS: 49 U/L / ALT: 335 U/L / AST: 206 U/L / GGT: x           PT/INR - ( 19 Dec 2023 15:22 )   PT: 17.7 sec;   INR: 1.63 ratio         PTT - ( 19 Dec 2023 15:22 )  PTT:30.8 sec  ABG - ( 20 Dec 2023 01:54 )  pH, Arterial: 7.45  pH, Blood: x     /  pCO2: 31    /  pO2: 179   / HCO3: 22    / Base Excess: -1.8  /  SaO2: 98.8        Lactate, Blood: 2.0 mmol/L (23 @ 05:38)  Blood Gas Venous - Lactate: 2.3 mmol/L (23 @ 02:20)  Blood Gas Arterial, Lactate: 2.1 mmol/L (23 @ 01:54)  Blood Gas Arterial, Lactate: 2.0 mmol/L (23 @ 15:15)    Urinalysis Basic - ( 20 Dec 2023 05:38 )    Color: Red / Appearance: Turbid / S.048 / pH: x  Gluc: x / Ketone: Trace  / Bili: Small / Urobili: <2 mg/dL   Blood: x / Protein: 300 mg/dL / Nitrite: Positive   Leuk Esterase: Large / RBC: >50 /HPF / WBC 40 /HPF   Sq Epi: x / Non Sq Epi: x / Bacteria: Many /HPF      ======================Micro/Rad/Cardio=================  Telemetry: Reviewed   EKG: Reviewed  CXR: Reviewed  Culture: Reviewed   Echo:   Cath: see sunrise for details  ======================================================  PAST MEDICAL & SURGICAL HISTORY:  HTN (hypertension)      HLD (hyperlipidemia)         ASHLEY PRITCHARD  MRN-[id number]  Patient is a 78y old  Male who presents with a chief complaint of Cardiac Arrest (19 Dec 2023 21:32)    HPI:  78M w/ PMH HTN HLD presents as transfer from [facility] s/p cardiac arrest. Per reports patient had a witness fall and arrest at home. EMS performed CPR en route to hospital was noted to be in VTach and shocked x2. ROSC was obtained just prior to arrival at [facility]. Pt was intubated placed on levo gtt. On arrival pt noted to have lacerated to R forehead. Pt had CT scans that were negative for intracranial hemorrhage, C-spine fracture, facial fractures. A R frontal hematoma was noted.     On arrival patient w/ dried blood on scalp. Also noted to have bleeding from oral mucosa w/ clots requiring suctioning. Pt otherwise off sedation and non-responsive. Per EMS report pt did receive some sedation and paralytics at [facility].     Per family patient has been feeling 'off' but managing his day to day and did not see a physician. Pt prescribed klonopin by outpatient provider and family believes pt may have been taking more than prescribed. On day of arrest, pt was initially asymptomatic carrying out his routine. He went to the bathroom, wife heard a loud thud and found patient in a pool of blood prompting call to EMS. Pt has not followed with any cardiologist. Has not had any syncope or other events preceding this.  (19 Dec 2023 14:54)      24 HOUR EVENTS: Pt requiring vasopressors o/n. Still no purposeful movement. Had Myoclonic jerking activity started VEEG and keppra.     REVIEW OF SYSTEMS:    ICU Vital Signs Last 24 Hrs  T(C): 38.4 (20 Dec 2023 04:00), Max: 38.4 (20 Dec 2023 04:00)  T(F): 101.1 (20 Dec 2023 04:00), Max: 101.1 (20 Dec 2023 04:00)  HR: 63 (20 Dec 2023 07:00) (58 - 87)  BP: 159/74 (20 Dec 2023 04:30) (159/74 - 159/74)  BP(mean): 107 (20 Dec 2023 04:30) (107 - 107)  ABP: 111/50 (20 Dec 2023 07:00) (64/64 - 235/86)  ABP(mean): 67 (20 Dec 2023 07:00) (54 - 170)  RR: 6 (20 Dec 2023 07:00) (2 - 41)  SpO2: 100% (20 Dec 2023 07:00) (97% - 100%)    O2 Parameters below as of 20 Dec 2023 04:00  Patient On (Oxygen Delivery Method): ventilator    O2 Concentration (%): 40      Mode: AC/ CMV (Assist Control/ Continuous Mandatory Ventilation), RR (machine): 12, TV (machine): 500, FiO2: 40, PEEP: 5, ITime: 1  CVP(mm Hg): --  CO: --  CI: --  PA: --  PA(mean): --  PA(direct): --  PCWP: --  LA: --  RA: --  SVR: --  SVRI: --  PVR: --  PVRI: --  I&O's Summary    19 Dec 2023 07:01  -  20 Dec 2023 07:00  --------------------------------------------------------  IN: 519.2 mL / OUT: 355 mL / NET: 164.2 mL        CAPILLARY BLOOD GLUCOSE    CAPILLARY BLOOD GLUCOSE          PHYSICAL EXAM:  GENERAL: Pt intubated, not responsive to sternal rub or noxious stimuli  . Notable trauma to head.   HEAD:  +traumatic head, Normocephalic  EYES: . Periorbital edema/erythma on R. conjunctiva and sclera clear. Pupils reactive to light   ENMT: ET tube in place. Dry mucous membranes. Notable blood in oral cavity   NECK: Supple  HEART: Regular rate and rhythm; No murmurs, rubs, or gallops  RESPIRATORY: Mechanical breath sounds b/l. No notable crackles, wheezes  ABDOMEN: Soft, Nontender, Nondistended  NEUROLOGY: A&Ox0. Pupils reactive. Non-responsive to sternal stimuli off sedation.  EXTREMITIES:  1+ Peripheral Pulses, No clubbing, cyanosis, or edema  SKIN: warm, dry, normal color, no rash or abnormal lesions    ============================I/O===========================   I&O's Detail    19 Dec 2023 07:01  -  20 Dec 2023 07:00  --------------------------------------------------------  IN:    Dexmedetomidine: 91 mL    IV PiggyBack: 350 mL    Norepinephrine: 63.2 mL    Vasopressin: 15 mL  Total IN: 519.2 mL    OUT:    Indwelling Catheter - Urethral (mL): 355 mL  Total OUT: 355 mL    Total NET: 164.2 mL        ============================ LABS =========================                        10.9   14.48 )-----------( 167      ( 20 Dec 2023 02:09 )             31.3         141  |  106  |  34<H>  ----------------------------<  245<H>  4.1   |  21<L>  |  1.70<H>    Ca    8.2<L>      20 Dec 2023 02:09  Phos  3.6       Mg     2.2         TPro  5.6<L>  /  Alb  3.6  /  TBili  1.0  /  DBili  x   /  AST  206<H>  /  ALT  335<H>  /  AlkPhos  49      Troponin T, High Sensitivity Result: 4145 ng/L (23 @ 02:09)  Troponin T, High Sensitivity Result: 1894 ng/L (23 @ 15:22)    CKMB Units: 133.8 ng/mL (23 @ 02:09)  CKMB Units: 129.2 ng/mL (23 @ 15:22)    Creatine Kinase, Serum: 1110 U/L (23 @ 02:09)    CPK Mass Assay %: 12.1 % (23 @ 02:09)        LIVER FUNCTIONS - ( 20 Dec 2023 02:09 )  Alb: 3.6 g/dL / Pro: 5.6 g/dL / ALK PHOS: 49 U/L / ALT: 335 U/L / AST: 206 U/L / GGT: x           PT/INR - ( 19 Dec 2023 15:22 )   PT: 17.7 sec;   INR: 1.63 ratio         PTT - ( 19 Dec 2023 15:22 )  PTT:30.8 sec  ABG - ( 20 Dec 2023 01:54 )  pH, Arterial: 7.45  pH, Blood: x     /  pCO2: 31    /  pO2: 179   / HCO3: 22    / Base Excess: -1.8  /  SaO2: 98.8        Lactate, Blood: 2.0 mmol/L (23 @ 05:38)  Blood Gas Venous - Lactate: 2.3 mmol/L (23 @ 02:20)  Blood Gas Arterial, Lactate: 2.1 mmol/L (23 @ 01:54)  Blood Gas Arterial, Lactate: 2.0 mmol/L (23 @ 15:15)    Urinalysis Basic - ( 20 Dec 2023 05:38 )    Color: Red / Appearance: Turbid / S.048 / pH: x  Gluc: x / Ketone: Trace  / Bili: Small / Urobili: <2 mg/dL   Blood: x / Protein: 300 mg/dL / Nitrite: Positive   Leuk Esterase: Large / RBC: >50 /HPF / WBC 40 /HPF   Sq Epi: x / Non Sq Epi: x / Bacteria: Many /HPF      ======================Micro/Rad/Cardio=================  Telemetry: Reviewed   EKG: Reviewed  CXR: Reviewed  Culture: Reviewed   Echo:   Cath: see sunrise for details  ======================================================  PAST MEDICAL & SURGICAL HISTORY:  HTN (hypertension)      HLD (hyperlipidemia)         ASHLEY PRITCHARD  MRN-[id number]  Patient is a 78y old  Male who presents with a chief complaint of Cardiac Arrest (19 Dec 2023 21:32)    HPI:  78M w/ PMH HTN HLD presents as transfer from [facility] s/p cardiac arrest. Per reports patient had a witness fall and arrest at home. EMS performed CPR en route to hospital was noted to be in VTach and shocked x2. ROSC was obtained just prior to arrival at [facility]. Pt was intubated placed on levo gtt. On arrival pt noted to have lacerated to R forehead. Pt had CT scans that were negative for intracranial hemorrhage, C-spine fracture, facial fractures. A R frontal hematoma was noted.     On arrival patient w/ dried blood on scalp. Also noted to have bleeding from oral mucosa w/ clots requiring suctioning. Pt otherwise off sedation and non-responsive. Per EMS report pt did receive some sedation and paralytics at [facility].     Per family patient has been feeling 'off' but managing his day to day and did not see a physician. Pt prescribed klonopin by outpatient provider and family believes pt may have been taking more than prescribed. On day of arrest, pt was initially asymptomatic carrying out his routine. He went to the bathroom, wife heard a loud thud and found patient in a pool of blood prompting call to EMS. Pt has not followed with any cardiologist. Has not had any syncope or other events preceding this.  (19 Dec 2023 14:54)      24 HOUR EVENTS: Pt requiring vasopressors o/n. Still no purposeful movement. Had Myoclonic jerking activity started VEEG and keppra.     REVIEW OF SYSTEMS:    ICU Vital Signs Last 24 Hrs  T(C): 38.4 (20 Dec 2023 04:00), Max: 38.4 (20 Dec 2023 04:00)  T(F): 101.1 (20 Dec 2023 04:00), Max: 101.1 (20 Dec 2023 04:00)  HR: 63 (20 Dec 2023 07:00) (58 - 87)  BP: 159/74 (20 Dec 2023 04:30) (159/74 - 159/74)  BP(mean): 107 (20 Dec 2023 04:30) (107 - 107)  ABP: 111/50 (20 Dec 2023 07:00) (64/64 - 235/86)  ABP(mean): 67 (20 Dec 2023 07:00) (54 - 170)  RR: 6 (20 Dec 2023 07:00) (2 - 41)  SpO2: 100% (20 Dec 2023 07:00) (97% - 100%)    O2 Parameters below as of 20 Dec 2023 04:00  Patient On (Oxygen Delivery Method): ventilator    O2 Concentration (%): 40      Mode: AC/ CMV (Assist Control/ Continuous Mandatory Ventilation), RR (machine): 12, TV (machine): 500, FiO2: 40, PEEP: 5, ITime: 1  CVP(mm Hg): --  CO: --  CI: --  PA: --  PA(mean): --  PA(direct): --  PCWP: --  LA: --  RA: --  SVR: --  SVRI: --  PVR: --  PVRI: --  I&O's Summary    19 Dec 2023 07:01  -  20 Dec 2023 07:00  --------------------------------------------------------  IN: 519.2 mL / OUT: 355 mL / NET: 164.2 mL        CAPILLARY BLOOD GLUCOSE    CAPILLARY BLOOD GLUCOSE          PHYSICAL EXAM:  GENERAL: Pt intubated, not responsive to sternal rub or noxious stimuli  . Notable trauma to head.   HEAD:  +traumatic head, Normocephalic  EYES: . Periorbital edema/erythma on R. conjunctiva and sclera clear. Pupils reactive to light   ENMT: ET tube in place. Dry mucous membranes. Notable blood in oral cavity   NECK: Supple  HEART: Regular rate and rhythm; No murmurs, rubs, or gallops  RESPIRATORY: Mechanical breath sounds b/l. No notable crackles, wheezes  ABDOMEN: Soft, Nontender, Nondistended  NEUROLOGY: A&Ox0. Pupils reactive. Non-responsive to sternal stimuli off sedation.  EXTREMITIES:  1+ Peripheral Pulses, No clubbing, cyanosis, or edema  SKIN: warm, dry, normal color, no rash or abnormal lesions    ============================I/O===========================   I&O's Detail    19 Dec 2023 07:01  -  20 Dec 2023 07:00  --------------------------------------------------------  IN:    Dexmedetomidine: 91 mL    IV PiggyBack: 350 mL    Norepinephrine: 63.2 mL    Vasopressin: 15 mL  Total IN: 519.2 mL    OUT:    Indwelling Catheter - Urethral (mL): 355 mL  Total OUT: 355 mL    Total NET: 164.2 mL        ============================ LABS =========================                        10.9   14.48 )-----------( 167      ( 20 Dec 2023 02:09 )             31.3         141  |  106  |  34<H>  ----------------------------<  245<H>  4.1   |  21<L>  |  1.70<H>    Ca    8.2<L>      20 Dec 2023 02:09  Phos  3.6       Mg     2.2         TPro  5.6<L>  /  Alb  3.6  /  TBili  1.0  /  DBili  x   /  AST  206<H>  /  ALT  335<H>  /  AlkPhos  49      Troponin T, High Sensitivity Result: 4145 ng/L (23 @ 02:09)  Troponin T, High Sensitivity Result: 1894 ng/L (23 @ 15:22)    CKMB Units: 133.8 ng/mL (23 @ 02:09)  CKMB Units: 129.2 ng/mL (23 @ 15:22)    Creatine Kinase, Serum: 1110 U/L (23 @ 02:09)    CPK Mass Assay %: 12.1 % (23 @ 02:09)        LIVER FUNCTIONS - ( 20 Dec 2023 02:09 )  Alb: 3.6 g/dL / Pro: 5.6 g/dL / ALK PHOS: 49 U/L / ALT: 335 U/L / AST: 206 U/L / GGT: x           PT/INR - ( 19 Dec 2023 15:22 )   PT: 17.7 sec;   INR: 1.63 ratio         PTT - ( 19 Dec 2023 15:22 )  PTT:30.8 sec  ABG - ( 20 Dec 2023 01:54 )  pH, Arterial: 7.45  pH, Blood: x     /  pCO2: 31    /  pO2: 179   / HCO3: 22    / Base Excess: -1.8  /  SaO2: 98.8        Lactate, Blood: 2.0 mmol/L (23 @ 05:38)  Blood Gas Venous - Lactate: 2.3 mmol/L (23 @ 02:20)  Blood Gas Arterial, Lactate: 2.1 mmol/L (23 @ 01:54)  Blood Gas Arterial, Lactate: 2.0 mmol/L (23 @ 15:15)    Urinalysis Basic - ( 20 Dec 2023 05:38 )    Color: Red / Appearance: Turbid / S.048 / pH: x  Gluc: x / Ketone: Trace  / Bili: Small / Urobili: <2 mg/dL   Blood: x / Protein: 300 mg/dL / Nitrite: Positive   Leuk Esterase: Large / RBC: >50 /HPF / WBC 40 /HPF   Sq Epi: x / Non Sq Epi: x / Bacteria: Many /HPF      ======================Micro/Rad/Cardio=================  Telemetry: Reviewed   EKG: Reviewed  CXR: Reviewed  Culture: Reviewed   Echo:   Cath: see sunrise for details  ======================================================  PAST MEDICAL & SURGICAL HISTORY:  HTN (hypertension)      HLD (hyperlipidemia)

## 2023-12-20 NOTE — CONSULT NOTE ADULT - SUBJECTIVE AND OBJECTIVE BOX
HPI:  78M w/ PMH HTN HLD presents as transfer from [facility] s/p cardiac arrest on 23. Per reports patient had a witness fall and arrest at home. Pt was down for ~20 mins prior to EMS arrival. EMS performed CPR en route to hospital was noted to be in VTach and shocked x2. ROSC was obtained just prior to arrival at [facility], after about 30-40 mins.      Per family patient has been feeling 'off' but managing his day to day and did not see a physician. Pt prescribed klonopin by outpatient provider and family believes pt may have been taking more than prescribed. On day of arrest, pt was initially asymptomatic carrying out his routine. He went to the bathroom, wife heard a loud thud and found patient in a pool of blood prompting call to EMS.     REVIEW OF SYSTEMS    A 10-system ROS was performed and is negative except for those items noted above and/or in the HPI.    PAST MEDICAL & SURGICAL HISTORY:  HTN (hypertension)    HLD (hyperlipidemia)    FAMILY HISTORY:    SOCIAL HISTORY:     MEDICATIONS (HOME):  Home Medications:  atorvastatin 10 mg oral tablet: 1 tab(s) orally once a day (19 Dec 2023 17:09)  finasteride 5 mg oral tablet: 1 tab(s) orally once a day (19 Dec 2023 17:08)  gabapentin 300 mg oral tablet: 1 tab(s) orally once a day (19 Dec 2023 17:10)  hydroCHLOROthiazide 25 mg oral tablet: 1 tab(s) orally once a day (19 Dec 2023 17:09)  KlonoPIN 0.5 mg oral tablet: 1 tab(s) orally once a day (at bedtime) (19 Dec 2023 17:09)  lisinopril 40 mg oral tablet: 1 tab(s) orally once a day (19 Dec 2023 17:07)  Myrbetriq 25 mg oral tablet, extended release: 1 tab(s) orally once a day (19 Dec 2023 17:08)  tamsulosin 0.4 mg oral capsule: 1 cap(s) orally once a day (19 Dec 2023 17:08)    MEDICATIONS  (STANDING):  artificial  tears Solution 1 Drop(s) Both EYES two times a day  chlorhexidine 0.12% Liquid 15 milliLiter(s) Oral Mucosa every 12 hours  chlorhexidine 2% Cloths 1 Application(s) Topical daily  dexMEDEtomidine Infusion 0.3 MICROgram(s)/kG/Hr (7.01 mL/Hr) IV Continuous <Continuous>  levETIRAcetam  IVPB 1000 milliGRAM(s) IV Intermittent every 12 hours  norepinephrine Infusion 0.09 MICROgram(s)/kG/Min (15.8 mL/Hr) IV Continuous <Continuous>  pantoprazole  Injectable 40 milliGRAM(s) IV Push daily  piperacillin/tazobactam IVPB.- 3.375 Gram(s) IV Intermittent once  piperacillin/tazobactam IVPB.- 3.375 Gram(s) IV Intermittent once  piperacillin/tazobactam IVPB.. 3.375 Gram(s) IV Intermittent every 8 hours  vasopressin Infusion 0.1 Unit(s)/Min (15 mL/Hr) IV Continuous <Continuous>    MEDICATIONS  (PRN):    ALLERGIES/INTOLERANCES:  Allergies  Allergy Status Unknown    Intolerances    VITALS & EXAMINATION:  Vital Signs Last 24 Hrs  T(C): 38.4 (20 Dec 2023 08:00), Max: 38.4 (20 Dec 2023 04:00)  T(F): 101.1 (20 Dec 2023 08:00), Max: 101.1 (20 Dec 2023 04:00)  HR: 63 (20 Dec 2023 11:00) (58 - 87)  BP: 159/74 (20 Dec 2023 04:30) (159/74 - 159/74)  BP(mean): 107 (20 Dec 2023 04:30) (107 - 107)  RR: 22 (20 Dec 2023 11:00) (2 - 41)  SpO2: 100% (20 Dec 2023 11:00) (97% - 100%)    Parameters below as of 20 Dec 2023 11:00  Patient On (Oxygen Delivery Method): ventilator    O2 Concentration (%): 40  General:  Constitutional: Obese Male, appears stated age, in no apparent distress including pain  Head: Normocephalic & atraumatic.  Respiratory: intubated  Extremities: No cyanosis, clubbing, or edema.  Skin: No rashes, bruising, or discoloration.    Neurological (>12):       LABORATORY:  CBC                       10.9   14.48 )-----------( 167      ( 20 Dec 2023 02:09 )             31.3     Chem 12-20    141  |  106  |  34<H>  ----------------------------<  245<H>  4.1   |  21<L>  |  1.70<H>    Ca    8.2<L>      20 Dec 2023 02:09  Phos  3.6     12-20  Mg     2.2         TPro  5.6<L>  /  Alb  3.6  /  TBili  1.0  /  DBili  x   /  AST  206<H>  /  ALT  335<H>  /  AlkPhos  49  12    LFTs LIVER FUNCTIONS - ( 20 Dec 2023 02:09 )  Alb: 3.6 g/dL / Pro: 5.6 g/dL / ALK PHOS: 49 U/L / ALT: 335 U/L / AST: 206 U/L / GGT: x           Coagulopathy PT/INR - ( 19 Dec 2023 15:22 )   PT: 17.7 sec;   INR: 1.63 ratio         PTT - ( 19 Dec 2023 15:22 )  PTT:30.8 sec  Lipid Panel  Chol 118 LDL -- HDL 46 Trig 72  A1c   Cardiac enzymes CARDIAC MARKERS ( 20 Dec 2023 02:09 )  x     / x     / 1110 U/L / x     / 133.8 ng/mL  CARDIAC MARKERS ( 19 Dec 2023 15:22 )  x     / x     / x     / x     / 129.2 ng/mL      U/A Urinalysis Basic - ( 20 Dec 2023 05:38 )    Color: Red / Appearance: Turbid / S.048 / pH: x  Gluc: x / Ketone: Trace  / Bili: Small / Urobili: <2 mg/dL   Blood: x / Protein: 300 mg/dL / Nitrite: Positive   Leuk Esterase: Large / RBC: >50 /HPF / WBC 40 /HPF   Sq Epi: x / Non Sq Epi: x / Bacteria: Many /HPF      STUDIES & IMAGING:     HPI:  78M w/ PMH HTN HLD presents as transfer from [facility] s/p cardiac arrest on 23. Per reports patient had a witness fall and arrest at home. Pt was down for ~20 mins prior to EMS arrival. EMS performed CPR en route to hospital was noted to be in VTach and shocked x2. ROSC was obtained just prior to arrival at [facility], after about 30-40 mins.      Per family patient has been feeling 'off' but managing his day to day and did not see a physician. Pt prescribed klonopin by outpatient provider and family believes pt may have been taking more than prescribed. On day of arrest, pt was initially asymptomatic carrying out his routine. He went to the bathroom, wife heard a loud thud and found patient in a pool of blood prompting call to EMS.     REVIEW OF SYSTEMS    A 10-system ROS was performed and is negative except for those items noted above and/or in the HPI.    PAST MEDICAL & SURGICAL HISTORY:  HTN (hypertension)    HLD (hyperlipidemia)    FAMILY HISTORY:    SOCIAL HISTORY:     MEDICATIONS (HOME):  Home Medications:  atorvastatin 10 mg oral tablet: 1 tab(s) orally once a day (19 Dec 2023 17:09)  finasteride 5 mg oral tablet: 1 tab(s) orally once a day (19 Dec 2023 17:08)  gabapentin 300 mg oral tablet: 1 tab(s) orally once a day (19 Dec 2023 17:10)  hydroCHLOROthiazide 25 mg oral tablet: 1 tab(s) orally once a day (19 Dec 2023 17:09)  KlonoPIN 0.5 mg oral tablet: 1 tab(s) orally once a day (at bedtime) (19 Dec 2023 17:09)  lisinopril 40 mg oral tablet: 1 tab(s) orally once a day (19 Dec 2023 17:07)  Myrbetriq 25 mg oral tablet, extended release: 1 tab(s) orally once a day (19 Dec 2023 17:08)  tamsulosin 0.4 mg oral capsule: 1 cap(s) orally once a day (19 Dec 2023 17:08)    MEDICATIONS  (STANDING):  artificial  tears Solution 1 Drop(s) Both EYES two times a day  chlorhexidine 0.12% Liquid 15 milliLiter(s) Oral Mucosa every 12 hours  chlorhexidine 2% Cloths 1 Application(s) Topical daily  dexMEDEtomidine Infusion 0.3 MICROgram(s)/kG/Hr (7.01 mL/Hr) IV Continuous <Continuous>  levETIRAcetam  IVPB 1000 milliGRAM(s) IV Intermittent every 12 hours  norepinephrine Infusion 0.09 MICROgram(s)/kG/Min (15.8 mL/Hr) IV Continuous <Continuous>  pantoprazole  Injectable 40 milliGRAM(s) IV Push daily  piperacillin/tazobactam IVPB.- 3.375 Gram(s) IV Intermittent once  piperacillin/tazobactam IVPB.- 3.375 Gram(s) IV Intermittent once  piperacillin/tazobactam IVPB.. 3.375 Gram(s) IV Intermittent every 8 hours  vasopressin Infusion 0.1 Unit(s)/Min (15 mL/Hr) IV Continuous <Continuous>    MEDICATIONS  (PRN):    ALLERGIES/INTOLERANCES:  Allergies  Allergy Status Unknown    Intolerances    VITALS & EXAMINATION:  Vital Signs Last 24 Hrs  T(C): 38.4 (20 Dec 2023 08:00), Max: 38.4 (20 Dec 2023 04:00)  T(F): 101.1 (20 Dec 2023 08:00), Max: 101.1 (20 Dec 2023 04:00)  HR: 63 (20 Dec 2023 11:00) (58 - 87)  BP: 159/74 (20 Dec 2023 04:30) (159/74 - 159/74)  BP(mean): 107 (20 Dec 2023 04:30) (107 - 107)  RR: 22 (20 Dec 2023 11:00) (2 - 41)  SpO2: 100% (20 Dec 2023 11:00) (97% - 100%)    Parameters below as of 20 Dec 2023 11:00  Patient On (Oxygen Delivery Method): ventilator    O2 Concentration (%): 40  General:  Constitutional: Obese Male, appears stated age, in no apparent distress including pain  Head: Normocephalic & atraumatic.  Respiratory: intubated  Extremities: No cyanosis, clubbing, or edema.  Skin: No rashes, bruising, or discoloration.    Neurological (>12):       LABORATORY:  CBC                       10.9   14.48 )-----------( 167      ( 20 Dec 2023 02:09 )             31.3     Chem 12-20    141  |  106  |  34<H>  ----------------------------<  245<H>  4.1   |  21<L>  |  1.70<H>    Ca    8.2<L>      20 Dec 2023 02:09  Phos  3.6     12-20  Mg     2.2         TPro  5.6<L>  /  Alb  3.6  /  TBili  1.0  /  DBili  x   /  AST  206<H>  /  ALT  335<H>  /  AlkPhos  49  12    LFTs LIVER FUNCTIONS - ( 20 Dec 2023 02:09 )  Alb: 3.6 g/dL / Pro: 5.6 g/dL / ALK PHOS: 49 U/L / ALT: 335 U/L / AST: 206 U/L / GGT: x           Coagulopathy PT/INR - ( 19 Dec 2023 15:22 )   PT: 17.7 sec;   INR: 1.63 ratio         PTT - ( 19 Dec 2023 15:22 )  PTT:30.8 sec  Lipid Panel  Chol 118 LDL -- HDL 46 Trig 72  A1c   Cardiac enzymes CARDIAC MARKERS ( 20 Dec 2023 02:09 )  x     / x     / 1110 U/L / x     / 133.8 ng/mL  CARDIAC MARKERS ( 19 Dec 2023 15:22 )  x     / x     / x     / x     / 129.2 ng/mL      U/A Urinalysis Basic - ( 20 Dec 2023 05:38 )    Color: Red / Appearance: Turbid / S.048 / pH: x  Gluc: x / Ketone: Trace  / Bili: Small / Urobili: <2 mg/dL   Blood: x / Protein: 300 mg/dL / Nitrite: Positive   Leuk Esterase: Large / RBC: >50 /HPF / WBC 40 /HPF   Sq Epi: x / Non Sq Epi: x / Bacteria: Many /HPF      STUDIES & IMAGING:     HPI:  78M RH w/ PMH HTN, HLD presents as transfer from [facility] s/p cardiac arrest on 23. Per daughter and wife at bedside, pt's wife heard a thud and found pt facedown in a pool of blood on 23 at ~10am. Pt was down for 5-10 mins prior to EMS arrival. EMS performed CPR en route to hospital was noted to be in VTach and shocked x2. ROSC was obtained just prior to arrival at [facility], after about 20-40 mins.  Per daughter, pt was at baseline health, ambulating without any assistance or assistive devices, did all ADLs, and did not see a physician. Pt prescribed klonopin by outpatient provider and family believes pt may have been taking more than prescribed.  No hx of neurologic conditions. No sedation given per EMS or [facility], only placed on precedex    REVIEW OF SYSTEMS    A 10-system ROS was performed and is negative except for those items noted above and/or in the HPI.    PAST MEDICAL & SURGICAL HISTORY:  HTN (hypertension)    HLD (hyperlipidemia)    FAMILY HISTORY:    SOCIAL HISTORY:     MEDICATIONS (HOME):  Home Medications:  atorvastatin 10 mg oral tablet: 1 tab(s) orally once a day (19 Dec 2023 17:09)  finasteride 5 mg oral tablet: 1 tab(s) orally once a day (19 Dec 2023 17:08)  gabapentin 300 mg oral tablet: 1 tab(s) orally once a day (19 Dec 2023 17:10)  hydroCHLOROthiazide 25 mg oral tablet: 1 tab(s) orally once a day (19 Dec 2023 17:09)  KlonoPIN 0.5 mg oral tablet: 1 tab(s) orally once a day (at bedtime) (19 Dec 2023 17:09)  lisinopril 40 mg oral tablet: 1 tab(s) orally once a day (19 Dec 2023 17:07)  Myrbetriq 25 mg oral tablet, extended release: 1 tab(s) orally once a day (19 Dec 2023 17:08)  tamsulosin 0.4 mg oral capsule: 1 cap(s) orally once a day (19 Dec 2023 17:08)    MEDICATIONS  (STANDING):  artificial  tears Solution 1 Drop(s) Both EYES two times a day  chlorhexidine 0.12% Liquid 15 milliLiter(s) Oral Mucosa every 12 hours  chlorhexidine 2% Cloths 1 Application(s) Topical daily  dexMEDEtomidine Infusion 0.3 MICROgram(s)/kG/Hr (7.01 mL/Hr) IV Continuous <Continuous>  levETIRAcetam  IVPB 1000 milliGRAM(s) IV Intermittent every 12 hours  norepinephrine Infusion 0.09 MICROgram(s)/kG/Min (15.8 mL/Hr) IV Continuous <Continuous>  pantoprazole  Injectable 40 milliGRAM(s) IV Push daily  piperacillin/tazobactam IVPB.- 3.375 Gram(s) IV Intermittent once  piperacillin/tazobactam IVPB.- 3.375 Gram(s) IV Intermittent once  piperacillin/tazobactam IVPB.. 3.375 Gram(s) IV Intermittent every 8 hours  vasopressin Infusion 0.1 Unit(s)/Min (15 mL/Hr) IV Continuous <Continuous>    MEDICATIONS  (PRN):    ALLERGIES/INTOLERANCES:  Allergies  Allergy Status Unknown    Intolerances    VITALS & EXAMINATION:  Vital Signs Last 24 Hrs  T(C): 38.4 (20 Dec 2023 08:00), Max: 38.4 (20 Dec 2023 04:00)  T(F): 101.1 (20 Dec 2023 08:00), Max: 101.1 (20 Dec 2023 04:00)  HR: 63 (20 Dec 2023 11:00) (58 - 87)  BP: 159/74 (20 Dec 2023 04:30) (159/74 - 159/74)  BP(mean): 107 (20 Dec 2023 04:30) (107 - 107)  RR: 22 (20 Dec 2023 11:00) (2 - 41)  SpO2: 100% (20 Dec 2023 11:00) (97% - 100%)    Parameters below as of 20 Dec 2023 11:00  Patient On (Oxygen Delivery Method): ventilator    O2 Concentration (%): 40  General:  Constitutional: Obese Male, appears stated age, in no apparent distress including pain  Head: Normocephalic & atraumatic.  Respiratory: intubated  Extremities: No cyanosis, clubbing, or edema.  Skin: R eye edema and ecchymosis, b/l UE echhymosis    Neurological (>12): on precedex  MS: no response to verbal or noxious stimuli  Language: intubated  CNs: Pupils b/l equal 4mm with brisk reaction, persistent upward gaze, corneal reflex not present, VOR  intact, face symmetric, rest of cranial nerves exam is limited by mental status  Motor: no response to noxious stimulation 0/5 throughout  Sensory: no response to noxious stimulation  Coordination: unable to assess due to mental status  Gait: unable to assess due to mental status      LABORATORY:  CBC                       10.9   14.48 )-----------( 167      ( 20 Dec 2023 02:09 )             31.3     Chem 12-20    141  |  106  |  34<H>  ----------------------------<  245<H>  4.1   |  21<L>  |  1.70<H>    Ca    8.2<L>      20 Dec 2023 02:09  Phos  3.6     12-20  Mg     2.2     12-20    TPro  5.6<L>  /  Alb  3.6  /  TBili  1.0  /  DBili  x   /  AST  206<H>  /  ALT  335<H>  /  AlkPhos  49  12-20    LFTs LIVER FUNCTIONS - ( 20 Dec 2023 02:09 )  Alb: 3.6 g/dL / Pro: 5.6 g/dL / ALK PHOS: 49 U/L / ALT: 335 U/L / AST: 206 U/L / GGT: x           Coagulopathy PT/INR - ( 19 Dec 2023 15:22 )   PT: 17.7 sec;   INR: 1.63 ratio         PTT - ( 19 Dec 2023 15:22 )  PTT:30.8 sec  Lipid Panel - Chol 118 LDL -- HDL 46 Trig 72  A1c   Cardiac enzymes CARDIAC MARKERS ( 20 Dec 2023 02:09 )  x     / x     / 1110 U/L / x     / 133.8 ng/mL  CARDIAC MARKERS ( 19 Dec 2023 15:22 )  x     / x     / x     / x     / 129.2 ng/mL      U/A Urinalysis Basic - ( 20 Dec 2023 05:38 )    Color: Red / Appearance: Turbid / S.048 / pH: x  Gluc: x / Ketone: Trace  / Bili: Small / Urobili: <2 mg/dL   Blood: x / Protein: 300 mg/dL / Nitrite: Positive   Leuk Esterase: Large / RBC: >50 /HPF / WBC 40 /HPF   Sq Epi: x / Non Sq Epi: x / Bacteria: Many /HPF      STUDIES & IMAGING:     HPI:  78M RH w/ PMH HTN, HLD presents as transfer from [facility] s/p cardiac arrest on 23. Per daughter and wife at bedside, pt's wife heard a thud and found pt facedown in a pool of blood on 23 at ~10am. Pt was down for 5-10 mins prior to EMS arrival. EMS performed CPR en route to hospital was noted to be in VTach and shocked x2. ROSC was obtained just prior to arrival at [facility], after about 20-40 mins.  Per daughter, pt was at baseline health, ambulating without any assistance or assistive devices, did all ADLs, and did not see a physician. Pt prescribed klonopin by outpatient provider and family believes pt may have been taking more than prescribed.  No hx of neurologic conditions. No sedation given per EMS or [facility], only placed on precedex    REVIEW OF SYSTEMS    A 10-system ROS was performed and is negative except for those items noted above and/or in the HPI.    PAST MEDICAL & SURGICAL HISTORY:  HTN (hypertension)    HLD (hyperlipidemia)    FAMILY HISTORY:    SOCIAL HISTORY:     MEDICATIONS (HOME):  Home Medications:  atorvastatin 10 mg oral tablet: 1 tab(s) orally once a day (19 Dec 2023 17:09)  finasteride 5 mg oral tablet: 1 tab(s) orally once a day (19 Dec 2023 17:08)  gabapentin 300 mg oral tablet: 1 tab(s) orally once a day (19 Dec 2023 17:10)  hydroCHLOROthiazide 25 mg oral tablet: 1 tab(s) orally once a day (19 Dec 2023 17:09)  KlonoPIN 0.5 mg oral tablet: 1 tab(s) orally once a day (at bedtime) (19 Dec 2023 17:09)  lisinopril 40 mg oral tablet: 1 tab(s) orally once a day (19 Dec 2023 17:07)  Myrbetriq 25 mg oral tablet, extended release: 1 tab(s) orally once a day (19 Dec 2023 17:08)  tamsulosin 0.4 mg oral capsule: 1 cap(s) orally once a day (19 Dec 2023 17:08)    MEDICATIONS  (STANDING):  artificial  tears Solution 1 Drop(s) Both EYES two times a day  chlorhexidine 0.12% Liquid 15 milliLiter(s) Oral Mucosa every 12 hours  chlorhexidine 2% Cloths 1 Application(s) Topical daily  dexMEDEtomidine Infusion 0.3 MICROgram(s)/kG/Hr (7.01 mL/Hr) IV Continuous <Continuous>  levETIRAcetam  IVPB 1000 milliGRAM(s) IV Intermittent every 12 hours  norepinephrine Infusion 0.09 MICROgram(s)/kG/Min (15.8 mL/Hr) IV Continuous <Continuous>  pantoprazole  Injectable 40 milliGRAM(s) IV Push daily  piperacillin/tazobactam IVPB.- 3.375 Gram(s) IV Intermittent once  piperacillin/tazobactam IVPB.- 3.375 Gram(s) IV Intermittent once  piperacillin/tazobactam IVPB.. 3.375 Gram(s) IV Intermittent every 8 hours  vasopressin Infusion 0.1 Unit(s)/Min (15 mL/Hr) IV Continuous <Continuous>    MEDICATIONS  (PRN):    ALLERGIES/INTOLERANCES:  Allergies  Allergy Status Unknown    Intolerances    VITALS & EXAMINATION:  Vital Signs Last 24 Hrs  T(C): 38.4 (20 Dec 2023 08:00), Max: 38.4 (20 Dec 2023 04:00)  T(F): 101.1 (20 Dec 2023 08:00), Max: 101.1 (20 Dec 2023 04:00)  HR: 63 (20 Dec 2023 11:00) (58 - 87)  BP: 159/74 (20 Dec 2023 04:30) (159/74 - 159/74)  BP(mean): 107 (20 Dec 2023 04:30) (107 - 107)  RR: 22 (20 Dec 2023 11:00) (2 - 41)  SpO2: 100% (20 Dec 2023 11:00) (97% - 100%)    Parameters below as of 20 Dec 2023 11:00  Patient On (Oxygen Delivery Method): ventilator    O2 Concentration (%): 40  General:  Constitutional: Obese Male, appears stated age, in no apparent distress including pain  Head: Normocephalic & atraumatic.  Respiratory: intubated  Extremities: No cyanosis, clubbing, or edema.  Skin: R eye edema and ecchymosis, b/l UE echhymosis    Neurological (>12): on precedex  MS: no response to verbal or noxious stimuli  Language: intubated  CNs: Pupils b/l equal 4mm with brisk reaction, persistent upward gaze, corneal reflex not present, VOR  intact, face symmetric, rest of cranial nerves exam is limited by mental status  Motor: no response to noxious stimulation 0/5 throughout  Sensory: no response to noxious stimulation  Coordination: unable to assess due to mental status  Gait: unable to assess due to mental status      LABORATORY:  CBC                       10.9   14.48 )-----------( 167      ( 20 Dec 2023 02:09 )             31.3     Chem 12-20    141  |  106  |  34<H>  ----------------------------<  245<H>  4.1   |  21<L>  |  1.70<H>    Ca    8.2<L>      20 Dec 2023 02:09  Phos  3.6     12-20  Mg     2.2     12-20    TPro  5.6<L>  /  Alb  3.6  /  TBili  1.0  /  DBili  x   /  AST  206<H>  /  ALT  335<H>  /  AlkPhos  49  12-20    LFTs LIVER FUNCTIONS - ( 20 Dec 2023 02:09 )  Alb: 3.6 g/dL / Pro: 5.6 g/dL / ALK PHOS: 49 U/L / ALT: 335 U/L / AST: 206 U/L / GGT: x           Coagulopathy PT/INR - ( 19 Dec 2023 15:22 )   PT: 17.7 sec;   INR: 1.63 ratio         PTT - ( 19 Dec 2023 15:22 )  PTT:30.8 sec  Lipid Panel - Chol 118 LDL -- HDL 46 Trig 72  A1c   Cardiac enzymes CARDIAC MARKERS ( 20 Dec 2023 02:09 )  x     / x     / 1110 U/L / x     / 133.8 ng/mL  CARDIAC MARKERS ( 19 Dec 2023 15:22 )  x     / x     / x     / x     / 129.2 ng/mL      U/A Urinalysis Basic - ( 20 Dec 2023 05:38 )    Color: Red / Appearance: Turbid / S.048 / pH: x  Gluc: x / Ketone: Trace  / Bili: Small / Urobili: <2 mg/dL   Blood: x / Protein: 300 mg/dL / Nitrite: Positive   Leuk Esterase: Large / RBC: >50 /HPF / WBC 40 /HPF   Sq Epi: x / Non Sq Epi: x / Bacteria: Many /HPF      STUDIES & IMAGING:

## 2023-12-20 NOTE — PROGRESS NOTE ADULT - ASSESSMENT
78M w/ PMH HTN HLD presents as transfer from [facility] s/p VT arrest s/p shock x2 w/ unknown downtime. Pt found to have trauma to head w/ superficial scalp bleeding and oral mucosal bleeding.       NEURO  #Intubated  Encephalopathy post arrest   - Intubated w/o sedation s/p cardiac arrest w/ unknown down time. Reported to be 20minutes but unlikely given ROSC achieved right before arrival to the hospital   - CTH at [facility] w/o intracranial bleed/pathology. CTH maybe too soon to show evidence of anoxic injury  - Will need neurologic workup to assess mental status if remains unresponsive  - Repeat CTH in 72hours or sooner depending on progress   - Can check enolase    #C-spine trauma  Pt w/ fall at home presented w/ c-collar.   -Per [facility] radiology report no evidence of cervical fracture  -Trauma surgery consult for evaluation/clearance.     #Myoclonic jerking  - vEEG: evidence of mycolonic seizures  - Start keppra load  - Precedex gtt    CARDIAC  #VT arrest  - Pt s/p VT arrest w/ unknown downtime. Pt reportedly received shock x2. No prior cardiac hx per family  - Check TTE   - Check cardiac enzymes, BNP  - Monitor for arrhythmias   - Pt not asa/plavix loaded 2/2 substernal hematoma noted on CT chest at [facility]  - Unlikely to be a candidate for C at this time given mental status     RESPIRATORY   #Intubated  Pt intubated s/p cardiac arrest  - On full vent support: RR 16 /  / PEEP 5 / FiO2 40  - Monitor ABGs    #Oropharynx bleed  - Pt w/ blood in oropharynx requiring frequent suctioning.   - Likely 2/2 trauma from fall/arrest  - ENT consulted     RENAL   Pt presented w/ SCr 1.16, likely near baseline  - trend Cr   - montior I&Os    GI  - Diet: NPO for now     ENDO:  - f/u HbA1C, TSH  - monitor glucose    HEME/ONC  #Substernal hematoma  - Pt w/ substernal hematoma noted on imaging at [facility] from trauma/cpr  - Continue to monitor.   - A/C: scd for dvt    ID   #Leukocytosis  - Pt w/ WBC 17 likely reactive iso arrest but cannot r/o underlying infection  - Currently afebrile  - ancef for oral packing    LINES/PPX  - peripherals in tact  - LIJ triple lumen: 12/20  - GOC: Full   78M w/ PMH HTN HLD presents as transfer from [facility] s/p VT arrest s/p shock x2 w/ unknown downtime. Pt found to have trauma to head w/ superficial scalp bleeding and oral mucosal bleeding.       NEURO  #Intubated  Encephalopathy post arrest   - Intubated w/o sedation s/p cardiac arrest w/ unknown down time. Reported to be 20minutes but unlikely given ROSC achieved right before arrival to the hospital   - CTH at [facility] w/o intracranial bleed/pathology. CTH maybe too soon to show evidence of anoxic injury  - Will need neurologic workup to assess mental status if remains unresponsive  - Repeat CTH in 72hours or sooner depending on progress   - Can check enolase    #C-spine trauma  Pt w/ fall at home presented w/ c-collar.   -Per [facility] radiology report no evidence of cervical fracture  -Trauma surgery consult for evaluation/clearance.     #Myoclonic jerking  - vEEG: evidence of mycolonic seizures  - Start keppra load  - Precedex gtt    CARDIAC  #VT arrest  - Pt s/p VT arrest w/ unknown downtime. Pt reportedly received shock x2. No prior cardiac hx per family  - Check TTE   - Check cardiac enzymes, BNP  - Monitor for arrhythmias   - Pt not asa/plavix loaded 2/2 substernal hematoma noted on CT chest at [facility]  - Unlikely to be a candidate for C at this time given mental status     RESPIRATORY   #Intubated  Pt intubated s/p cardiac arrest  - On full vent support: RR 16 /  / PEEP 5 / FiO2 40  - Monitor ABGs    #Oropharynx bleed  - Pt w/ blood in oropharynx requiring frequent suctioning.   - Likely 2/2 trauma from fall/arrest  - ENT consulted     RENAL   Pt presented w/ SCr 1.16, likely near baseline  - trend Cr   - montior I&Os    GI  - Diet: NPO for now     ENDO:  - f/u HbA1C, TSH  - monitor glucose    HEME/ONC  #Substernal hematoma  - Pt w/ substernal hematoma noted on imaging at [facility] from trauma/cpr  - Continue to monitor.   - A/C: scd for dvt    ID   #Leukocytosis  - Pt w/ WBC 17 likely reactive iso arrest but cannot r/o underlying infection  - Currently afebrile  - ancef for oral packing    LINES/PPX  - peripherals in tact  - LIJ triple lumen: 12/20  - GOC: Full   78M w/ PMH HTN HLD presents as transfer from [facility] s/p VT arrest s/p shock x2 w/ unknown downtime. Pt found to have trauma to head w/ superficial scalp bleeding and oral mucosal bleeding.       NEURO  #Intubated  Encephalopathy post arrest   - Intubated w/o sedation s/p cardiac arrest w/ unknown down time. Reported to be 20minutes but unlikely given ROSC achieved right before arrival to the hospital   - CTH at [facility] w/o intracranial bleed/pathology. CTH maybe too soon to show evidence of anoxic injury  - Pt remains w/o purposeful movement.   - Neuro consult placed for prognostication  - VEEG in place   - Repeat CTH   - check neuron specific enolase    #C-spine trauma  Pt w/ fall at home presented w/ c-collar.   -Per [facility] radiology report no evidence of cervical fracture  -Trauma surgery cleared pt of Cspine collar    #Myoclonic jerking  - vEEG: evidence of mycolonic seizures  - Start keppra load  - Precedex gtt    CARDIAC  #VT arrest  - Pt s/p VT arrest w/ unknown downtime. Pt reportedly received shock x2. No prior cardiac hx per family  - Check TTE   - Check cardiac enzymes, BNP  - Monitor for arrhythmias   - Pt not asa/plavix loaded 2/2 substernal hematoma noted on CT chest at [facility]  - Unlikely to be a candidate for [facility] at this time given mental status         RESPIRATORY   #Intubated  Pt intubated s/p cardiac arrest  - On full vent support: RR 16 /  / PEEP 5 / FiO2 40  - Monitor ABGs    #Oropharynx bleed  - Pt w/ blood in oropharynx requiring frequent suctioning.   - Likely 2/2 trauma from fall/arrest  - ENT consulted     RENAL   Pt presented w/ SCr 1.16, likely near baseline  - trend Cr   - montior I&Os    GI  - Diet: NPO for now     ENDO:  #Hypothyroidism  - A1c=5.5.   - monitor glucose    HEME/ONC  #Substernal hematoma  - Pt w/ substernal hematoma noted on imaging at [facility] from trauma/cpr  - Repeat CT chest to monitor hematoma  - Will consider resumption of DAPT if CT chest stable  - A/C: scd for dvt    ID   #Sepsis  - Pt w/ leukocytosis w/ fevers possibly 2/2  infection  - U/A grossly positive. F/u BCx, UCx  - Pt had crash lines placed at [facility]. Possible source of infection   - Will cover w/ CTX and vanc, by level given worsening renal function  - CTX to cover UTI and strep    LINES/PPX  - peripherals in tact  - LIJ triple lumen: 12/20  - GOC: Full   78M w/ PMH HTN HLD presents as transfer from [facility] s/p VT arrest s/p shock x2 w/ unknown downtime. Pt found to have trauma to head w/ superficial scalp bleeding and oral mucosal bleeding.       NEURO  #Intubated  Encephalopathy post arrest   - Intubated w/o sedation s/p cardiac arrest w/ unknown down time. Reported to be 20minutes but unlikely given ROSC achieved right before arrival to the hospital   - CTH at [facility] w/o intracranial bleed/pathology. CTH maybe too soon to show evidence of anoxic injury  - Pt remains w/o purposeful movement.   - Neuro consult placed for prognostication  - VEEG in place   - Repeat CTH   - check neuron specific enolase    #C-spine trauma  Pt w/ fall at home presented w/ c-collar.   -Per [facility] radiology report no evidence of cervical fracture  -Trauma surgery cleared pt of Cspine collar    #Myoclonic jerking  - vEEG: evidence of mycolonic seizures  - Start keppra load  - Precedex gtt    CARDIAC  #VT arrest  - Pt s/p VT arrest w/ unknown downtime. Pt reportedly received shock x2. No prior cardiac hx per family  - Check TTE   - Check cardiac enzymes, BNP  - Monitor for arrhythmias   - Pt not asa/plavix loaded 2/2 substernal hematoma noted on CT chest at [facility]  - Unlikely to be a candidate for [facility] at this time given mental status         RESPIRATORY   #Intubated  Pt intubated s/p cardiac arrest  - On full vent support: RR 16 /  / PEEP 5 / FiO2 40  - Monitor ABGs    #Oropharynx bleed  - Pt w/ blood in oropharynx requiring frequent suctioning.   - Likely 2/2 trauma from fall/arrest  - ENT consulted     RENAL   Pt presented w/ SCr 1.16, likely near baseline  - trend Cr   - montior I&Os    GI  - Diet: NPO for now     ENDO:  #Hypothyroidism  - A1c=5.5.   - monitor glucose    HEME/ONC  #Substernal hematoma  - Pt w/ substernal hematoma noted on imaging at [facility] from trauma/cpr  - Repeat CT chest to monitor hematoma  - Will consider resumption of DAPT if CT chest stable  - A/C: scd for dvt    ID   #Sepsis  - Pt w/ leukocytosis w/ fevers possibly 2/2  infection  - U/A grossly positive. F/u BCx, UCx  - Pt had crash lines placed at [facility]. Possible source of infection   - Will cover w/ CTX and vanc, by level given worsening renal function  - CTX to cover UTI and strep    LINES/PPX  - peripherals in tact  - LIJ triple lumen: 12/20  - GOC: Full   78M w/ PMH HTN HLD presents as transfer from [facility] s/p VT arrest s/p shock x2 w/ unknown downtime. Pt found to have trauma to head w/ superficial scalp bleeding and oral mucosal bleeding.       NEURO  #Intubated  Encephalopathy post arrest   - Intubated w/o sedation s/p cardiac arrest w/ unknown down time. Reported to be 20minutes but can be longer given ROSC achieved right before arrival to the hospital   - CTH at [facility] w/o intracranial bleed/pathology. CTH maybe too soon to show evidence of anoxic injury  - Pt remains w/o purposeful movement.   - Neuro consult placed for prognostication  - VEEG in place   - Repeat CTH today  - check neuron specific enolase    #C-spine trauma  Pt w/ fall at home presented w/ c-collar.   -Per [facility] radiology report no evidence of cervical fracture  -Trauma surgery cleared pt of Cspine collar    #Myoclonic jerking  - vEEG: evidence of mycolonic seizures  - Start keppra load  - Precedex gtt    CARDIAC  #VT arrest  - Pt s/p VT arrest w/ unknown downtime. Pt reportedly received shock x2. No prior cardiac hx per family  - Check TTE   - Check cardiac enzymes, BNP  - Monitor for arrhythmias   - Pt not asa/plavix loaded 2/2 substernal hematoma noted on CT chest at [facility]  - Unlikely to be a candidate for [facility] at this time given mental status         RESPIRATORY   #Intubated  Pt intubated s/p cardiac arrest  - On full vent support: RR 16 /  / PEEP 5 / FiO2 40  - Monitor ABGs    #Oropharynx bleed  - Pt w/ blood in oropharynx requiring frequent suctioning.   - ENT consulted s/p packing     RENAL   Pt presented w/ SCr 1.16, likely near baseline  - trend Cr   - montior I&Os    GI  - Diet: NPO for now     ENDO:  #Hypothyroidism  - A1c=5.5.   - monitor glucose    HEME/ONC  #Substernal hematoma  - Pt w/ substernal hematoma noted on imaging at [facility] from trauma/cpr  - Repeat CT chest to monitor hematoma  - Will consider resumption of DAPT/heparin if CT chest stable  - A/C: scd for dvt    ID   #Sepsis  - Pt w/ leukocytosis w/ fevers possibly 2/2  infection  - U/A grossly positive. F/u BCx, UCx  - Pt had crash lines placed at [facility]. Possible source of infection   - Will cover w/ CTX and vanc, by level given worsening renal function  - CTX to cover UTI and strep    LINES/PPX  - peripherals in tact  - LIJ triple lumen: 12/20  - GOC: Full   78M w/ PMH HTN HLD presents as transfer from [facility] s/p VT arrest s/p shock x2 w/ unknown downtime. Pt found to have trauma to head w/ superficial scalp bleeding and oral mucosal bleeding.       NEURO  #Intubated  Encephalopathy post arrest   - Intubated w/o sedation s/p cardiac arrest w/ unknown down time. Reported to be 20minutes but can be longer given ROSC achieved right before arrival to the hospital   - CTH at [facility] w/o intracranial bleed/pathology. CTH maybe too soon to show evidence of anoxic injury  - Pt remains w/o purposeful movement.   - Neuro consult placed for prognostication  - VEEG in place   - Repeat CTH today  - check neuron specific enolase    #C-spine trauma  Pt w/ fall at home presented w/ c-collar.   -Per [facility] radiology report no evidence of cervical fracture  -Trauma surgery cleared pt of Cspine collar    #Myoclonic jerking  - vEEG: evidence of mycolonic seizures  - Start keppra load  - Precedex gtt    CARDIAC  #VT arrest  - Pt s/p VT arrest w/ unknown downtime. Pt reportedly received shock x2. No prior cardiac hx per family  - Check TTE   - Check cardiac enzymes, BNP  - Monitor for arrhythmias   - Pt not asa/plavix loaded 2/2 substernal hematoma noted on CT chest at [facility]  - Unlikely to be a candidate for [facility] at this time given mental status         RESPIRATORY   #Intubated  Pt intubated s/p cardiac arrest  - On full vent support: RR 16 /  / PEEP 5 / FiO2 40  - Monitor ABGs    #Oropharynx bleed  - Pt w/ blood in oropharynx requiring frequent suctioning.   - ENT consulted s/p packing     RENAL   Pt presented w/ SCr 1.16, likely near baseline  - trend Cr   - montior I&Os    GI  - Diet: NPO for now     ENDO:  #Hypothyroidism  - A1c=5.5.   - monitor glucose    HEME/ONC  #Substernal hematoma  - Pt w/ substernal hematoma noted on imaging at [facility] from trauma/cpr  - Repeat CT chest to monitor hematoma  - Will consider resumption of DAPT/heparin if CT chest stable  - A/C: scd for dvt    ID   #Sepsis  - Pt w/ leukocytosis w/ fevers possibly 2/2  infection  - U/A grossly positive. F/u BCx, UCx  - Pt had crash lines placed at [facility]. Possible source of infection   - Will cover w/ CTX and vanc, by level given worsening renal function  - CTX to cover UTI and strep    LINES/PPX  - peripherals in tact  - LIJ triple lumen: 12/20  - GOC: Full

## 2023-12-20 NOTE — CHART NOTE - NSCHARTNOTEFT_GEN_A_CORE
Tertiary survey:    78M w/ PMH HTN HLD presents as transfer from [facility] s/p cardiac arrest. Per reports patient had a witness fall and arrest at home. EMS performed CPR en route to hospital was noted to be in VTach and shocked x2. ROSC was obtained just prior to arrival at [facility]. Pt was intubated placed on levo gtt. On arrival pt noted to have lacerated to R forehead. Pt had CT scans that were negative for intracranial hemorrhage, C-spine fracture, facial fractures. A R frontal hematoma was noted. On arrival patient w/ dried blood on scalp. Also noted to have bleeding from oral mucosa w/ clots requiring suctioning. Pt otherwise off sedation and non-responsive. Per EMS report pt did receive some sedation and paralytics at [facility]. Per family patient has been feeling 'off' but managing his day to day and did not see a physician. Pt prescribed klonopin by outpatient provider and family believes pt may have been taking more than prescribed. On day of arrest, pt was initially asymptomatic carrying out his routine. He went to the bathroom, wife heard a loud thud and found patient in a pool of blood prompting call to EMS. Pt has not followed with any cardiologist. Has not had any syncope or other events preceding this. Patient placed a C-collar during transport, and trauma surgery consulted for safe removal of C-collar.    PHYSICAL EXAM:  CONSTITUTIONAL: Intubated, sedated, on EEG   EYES: Eyes deviated to the right upper side, difficult to assess reactivity. Right orbital hematoma and conjunctival and scleral injection, non-icteric  ENMT: Intubated with a gauze in the mouth  NECK: Supple, symmetric and without tracheal deviation   RESP: intubated of mechanical ventilator  CV: RRR, A-line, BP MAP>65mmHg, on pressors.   GI: Soft, slightly distended, unable to assess tenderness or voluntary guarding given condition of patient, no hepatosplenomegaly; no hernia palpated  LYMPH: No cervical, axillary, inguinal LAD  MSK: Unable to assess gait given condition of patient. No digital clubbing or cyanosis; examination of the (head/neck/spine/ribs/pelvis, RUE, LUE, RLE, LLE) without misalignment. Decreased tone unable to assess full ROM given EEG.  SKIN: No rashes or ulcers noted; no subcutaneous nodules or induration palpable.   NEURO: Intubated, AOOx0, GCS 3    Did the patient have any lacerations repaired? YES  - If yes, what is the plan for the repair (suture/staple removal)?    Were the labs reviewed? YES  - Note any clinically significant abnormal values and plan.  Pro-Brain Natriuretic Peptide: 1232 pg/mL  CKMB Units: 129.2 ng/mL  Blood Gas Arterial, Lactate: 2.1: Elevated lactate  Troponin T, High Sensitivity Result: 4145  Creatine Kinase, Serum: 1110 U/L    What imaging did the patient have? (List what X-rays, CTs, etc)?    Imaging from [facility] reviewed.   CT Head - Official report negative.   CT C-Spine - Official report negative.   CT FACE - Official report with layering opacities in R maxillary and bilateral ethmoid sinus concerning for occult fx.   CT Chest images reviewed. Official report not available. Small retrosternal hematoma and multiple bilateral rib fx. No PTX.       < from: Xray Chest 1 View- PORTABLE-Urgent (Xray Chest 1 View- PORTABLE-Urgent .) (12.19.23 @ 15:30) >     XR CHEST PORTABLE URGENT    Mild bibasilar patchy opacities, likely represent atelectasis.  Small bilateral pleural effusions    < end of copied text >    < from: Xray Pelvis AP only (12.20.23 @ 10:02) >    XR PELVIS AP ONLY 1-2 VIEWS     Patient is status post left total hip arthroplasty. There is an   acetabular augmentation screw. Femoral component is incompletely imaged.   No evidence of acute fracture. Mild right hip arthrosis. Lower lumbar   spondylosis. Contrast material is seen within the rectum.    < end of copied text >      --------------------------------------------------------------------------------------------------------------------------  Injuries:  - Small retrosternal hematoma and multiple bilateral rib fx.  - Right frontal hematoma  - Right periorbital hematoma    Relevant medical problems and plan:  AMS s/p cardiac arrest, prognostication --> Neurology following  Oral bleeding --> ENT following      Other:    Does the patient have neurologic findings that are not explained by known injuries? YES  - If yes, was CTA head/neck OR “whole body CT” done to rule out blunt cerebrovascular injury? YES      Does the patient have significant maxillofacial/ head trauma (seatbelt sign across neck, direct blow to the neck, Lefort II or III basilar skull fracture, diffuse axonal injury)? YES  - If yes, was CTA head/neck or “whole body CT” done to rule out blunt cerebrovascular injury? YES      Does the patient have a cervical spine fracture? NO  - If yes, are they wearing a properly fitting rigid cervical spine collar (Liu HAQUE, Lena)? If not, either change the collar or call Orthotics to assist in appropriate collar fitting.  - If yes, was CTA head/neck or “whole body CT” done to rule out blunt cerebrovascular injury?      Does the patient have rib fractures? YES  - If yes, did they have a repeat CXR in 24 hours to rule out interval development of hemo/pneumothorax? NO      Does the patient have a pneumothorax and/or hemothorax? NO  - If the patient was observed with an occult PTX or small BATSHEVA, was repeat CXR done in 24 hours?      Does the patient have a traumatic intracranial hemorrhage? NO  - If yes, was there a repeat head CT to demonstrate ICH stability done 12-24 hours after the initial head CT?  - Was it stable?  - If stable, was lovenox ordered as 40mg subcutaneous q24 hours to start at least 24 hours after the stable head CT?    				  Pain management: NO  - Is the patient on pain medication consistent with the ACS Pain Management- Acute Post-Traumatic clinical management guideline?    Chemical VTE prophylaxis:  - If not addressed above, is the patient on chemical VTE prophylaxis? NO  - If not, why? Care per SICU, possible Diffuse axonal injury post cardiac arrest    High-energy mechanism?	  - If yes, was CTA head/neck AND CTA chest OR “whole body CT” done to rule out blunt cerebrovascular injury and blunt aortic injury? YES  		If not, discuss with Trauma Attending and document rationale.  - If yes, was EKG done to evaluate for blunt myocardial injury? YES  		If not, discuss with Trauma Attending and document rationale.  - If EKG shows unexplained sinus tachycardia, atrial/junctional/ventricular arrhythmia, conduction abnormalities, ST and T wave changes that are new, discuss possibility of BCI with Trauma Attending.    ACS Trauma 9018 Tertiary survey:    78M w/ PMH HTN HLD presents as transfer from [facility] s/p cardiac arrest. Per reports patient had a witness fall and arrest at home. EMS performed CPR en route to hospital was noted to be in VTach and shocked x2. ROSC was obtained just prior to arrival at [facility]. Pt was intubated placed on levo gtt. On arrival pt noted to have lacerated to R forehead. Pt had CT scans that were negative for intracranial hemorrhage, C-spine fracture, facial fractures. A R frontal hematoma was noted. On arrival patient w/ dried blood on scalp. Also noted to have bleeding from oral mucosa w/ clots requiring suctioning. Pt otherwise off sedation and non-responsive. Per EMS report pt did receive some sedation and paralytics at [facility]. Per family patient has been feeling 'off' but managing his day to day and did not see a physician. Pt prescribed klonopin by outpatient provider and family believes pt may have been taking more than prescribed. On day of arrest, pt was initially asymptomatic carrying out his routine. He went to the bathroom, wife heard a loud thud and found patient in a pool of blood prompting call to EMS. Pt has not followed with any cardiologist. Has not had any syncope or other events preceding this. Patient placed a C-collar during transport, and trauma surgery consulted for safe removal of C-collar.    PHYSICAL EXAM:  CONSTITUTIONAL: Intubated, sedated, on EEG   EYES: Eyes deviated to the right upper side, difficult to assess reactivity. Right orbital hematoma and conjunctival and scleral injection, non-icteric  ENMT: Intubated with a gauze in the mouth  NECK: Supple, symmetric and without tracheal deviation   RESP: intubated of mechanical ventilator  CV: RRR, A-line, BP MAP>65mmHg, on pressors.   GI: Soft, slightly distended, unable to assess tenderness or voluntary guarding given condition of patient, no hepatosplenomegaly; no hernia palpated  LYMPH: No cervical, axillary, inguinal LAD  MSK: Unable to assess gait given condition of patient. No digital clubbing or cyanosis; examination of the (head/neck/spine/ribs/pelvis, RUE, LUE, RLE, LLE) without misalignment. Decreased tone unable to assess full ROM given EEG.  SKIN: No rashes or ulcers noted; no subcutaneous nodules or induration palpable.   NEURO: Intubated, AOOx0, GCS 3    Did the patient have any lacerations repaired? YES  - If yes, what is the plan for the repair (suture/staple removal)?    Were the labs reviewed? YES  - Note any clinically significant abnormal values and plan.  Pro-Brain Natriuretic Peptide: 1232 pg/mL  CKMB Units: 129.2 ng/mL  Blood Gas Arterial, Lactate: 2.1: Elevated lactate  Troponin T, High Sensitivity Result: 4145  Creatine Kinase, Serum: 1110 U/L    What imaging did the patient have? (List what X-rays, CTs, etc)?    Imaging from [facility] reviewed.   CT Head - Official report negative.   CT C-Spine - Official report negative.   CT FACE - Official report with layering opacities in R maxillary and bilateral ethmoid sinus concerning for occult fx.   CT Chest images reviewed. Official report not available. Small retrosternal hematoma and multiple bilateral rib fx. No PTX.       < from: Xray Chest 1 View- PORTABLE-Urgent (Xray Chest 1 View- PORTABLE-Urgent .) (12.19.23 @ 15:30) >     XR CHEST PORTABLE URGENT    Mild bibasilar patchy opacities, likely represent atelectasis.  Small bilateral pleural effusions    < end of copied text >    < from: Xray Pelvis AP only (12.20.23 @ 10:02) >    XR PELVIS AP ONLY 1-2 VIEWS     Patient is status post left total hip arthroplasty. There is an   acetabular augmentation screw. Femoral component is incompletely imaged.   No evidence of acute fracture. Mild right hip arthrosis. Lower lumbar   spondylosis. Contrast material is seen within the rectum.    < end of copied text >      --------------------------------------------------------------------------------------------------------------------------  Injuries:  - Small retrosternal hematoma and multiple bilateral rib fx.  - Right frontal hematoma  - Right periorbital hematoma    Relevant medical problems and plan:  AMS s/p cardiac arrest, prognostication --> Neurology following  Oral bleeding --> ENT following      Other:    Does the patient have neurologic findings that are not explained by known injuries? YES  - If yes, was CTA head/neck OR “whole body CT” done to rule out blunt cerebrovascular injury? YES      Does the patient have significant maxillofacial/ head trauma (seatbelt sign across neck, direct blow to the neck, Lefort II or III basilar skull fracture, diffuse axonal injury)? YES  - If yes, was CTA head/neck or “whole body CT” done to rule out blunt cerebrovascular injury? YES      Does the patient have a cervical spine fracture? NO  - If yes, are they wearing a properly fitting rigid cervical spine collar (Liu HAQUE, Lena)? If not, either change the collar or call Orthotics to assist in appropriate collar fitting.  - If yes, was CTA head/neck or “whole body CT” done to rule out blunt cerebrovascular injury?      Does the patient have rib fractures? YES  - If yes, did they have a repeat CXR in 24 hours to rule out interval development of hemo/pneumothorax? NO      Does the patient have a pneumothorax and/or hemothorax? NO  - If the patient was observed with an occult PTX or small BATSHEVA, was repeat CXR done in 24 hours?      Does the patient have a traumatic intracranial hemorrhage? NO  - If yes, was there a repeat head CT to demonstrate ICH stability done 12-24 hours after the initial head CT?  - Was it stable?  - If stable, was lovenox ordered as 40mg subcutaneous q24 hours to start at least 24 hours after the stable head CT?    				  Pain management: NO  - Is the patient on pain medication consistent with the ACS Pain Management- Acute Post-Traumatic clinical management guideline?    Chemical VTE prophylaxis:  - If not addressed above, is the patient on chemical VTE prophylaxis? NO  - If not, why? Care per SICU, possible Diffuse axonal injury post cardiac arrest    High-energy mechanism?	  - If yes, was CTA head/neck AND CTA chest OR “whole body CT” done to rule out blunt cerebrovascular injury and blunt aortic injury? YES  		If not, discuss with Trauma Attending and document rationale.  - If yes, was EKG done to evaluate for blunt myocardial injury? YES  		If not, discuss with Trauma Attending and document rationale.  - If EKG shows unexplained sinus tachycardia, atrial/junctional/ventricular arrhythmia, conduction abnormalities, ST and T wave changes that are new, discuss possibility of BCI with Trauma Attending.    ACS Trauma 90

## 2023-12-20 NOTE — PROCEDURE NOTE - NSINDICATIONS_GEN_A_CORE
arterial puncture to obtain ABG's/critical patient/monitoring purposes
critical illness/hemodynamic monitoring

## 2023-12-20 NOTE — PROGRESS NOTE ADULT - NS ATTEND AMEND GEN_ALL_CORE FT
ENT following for oral bleed    Patient is 78M w/ PMH HTN HLD presents as transfer from [facility] s/p cardiac arrest. Per reports patient had a witness fall and arrest at home. Pt was intubated  in the field.     12/19/23 blood suctioned from posterior pharynx, area of ecchymosis noted behind left tonsillar pillar, oropharynx packed with kerlix x1.    Physical exam shows some blood from posterior oral pharynx repacked with Kerlix X1    Recommend:  Oral Bleed  - Keep packing in place, will remove tomorrow  - ENT will continue to follow  - Call with questions or concerns. ENT following for oral bleed    Patient is 78M w/ PMH HTN HLD presents as transfer from [facility] s/p cardiac arrest. Per reports patient had a witness fall and arrest at home. Pt was intubated  in the field.     12/19/23 blood suctioned from posterior pharynx, area of ecchymosis noted behind left tonsillar pillar, oropharynx packed with kerlix x1.    Physical exam shows some blood from posterior oral pharynx repacked with Kerlix X1    Recommend:  Oral Bleed  - Keep packing in place, will remove tomorrow  - ENT will continue to follow  - Call with questions or concerns.

## 2023-12-20 NOTE — CONSULT NOTE ADULT - ASSESSMENT
Incomplete    Impression:    Recommendations:  [] 24hr vEEG  [] if EEG negative for seizures or epileptiform acitivity, wean sedation as tolerated  [] CTH non-contrast  [] if helpful in for GOC discussion, consider MRI brain w/ and w/o contrast to evaluate for anoxic brain injury (cortical injury between day 2-5 post-cardiac arrest and subcortical injury day 5-10 post-cardiac arrest)  [] check neuron specific enolase on 24-72hrs post-cardiac arrest (<30 better prognosis, 30-60 indeterminate, >60 poor prognosis)  [] GOC per primary team    Case to be seen and discussed with Dr. Corrigan 78M RH w/ PMH HTN, HLD presents as transfer from [facility] s/p cardiac arrest on 12/19/23. Per daughter and wife at bedside, pt's wife heard a thud and found pt facedown in a pool of blood on 12/19/23 at ~10am. Pt was down for 5-10 mins prior to EMS arrival. EMS performed CPR en route to hospital was noted to be in VTach and shocked x2. ROSC was obtained just prior to arrival at [facility], after about 20-40 mins.       Impression: AMS s/p cardiac arrest, prognostication    Recommendations:  [] 24hr vEEG  [] if EEG negative for seizures or epileptiform acitivity, wean sedation as tolerated  [x] initial CTH non-contast immediately after arrest at [facility]  [] repeat CTH non-contrast  [] if helpful in for GOC discussion, consider MRI brain w/ and w/o contrast to evaluate for anoxic brain injury (cortical injury between day 2-5 post-cardiac arrest and subcortical injury day 5-10 post-cardiac arrest)  [] check neuron specific enolase on 24-72hrs post-cardiac arrest (<30 better prognosis, 30-60 indeterminate, >60 poor prognosis)  [] GOC per primary team    Case to be seen and discussed with Dr. Corrigan 78M RH w/ PMH HTN, HLD presents as transfer from [facility] s/p cardiac arrest on 12/19/23. Per daughter and wife at bedside, pt's wife heard a thud and found pt facedown in a pool of blood on 12/19/23 at ~10am. Pt was down for 5-10 mins prior to EMS arrival. EMS performed CPR en route to hospital was noted to be in VTach and shocked x2. ROSC was obtained just prior to arrival at [facility], after about 20-40 mins.       Impression: AMS s/p cardiac arrest, prognostication    Recommendations:  [] 24hr vEEG  [] if EEG negative for seizures or epileptiform acitivity, wean sedation as tolerated  [x] initial CTH non-contast immediately after arrest at [facility]  [] repeat CTH non-contrast  [] if helpful in for GOC discussion, consider MRI brain w/ and w/o contrast to evaluate for anoxic brain injury (cortical injury between day 2-5 post-cardiac arrest and subcortical injury day 5-10 post-cardiac arrest)  [] check neuron specific enolase on 24-72hrs post-cardiac arrest (<30 better prognosis, 30-60 indeterminate, >60 poor prognosis)  [] GOC per primary team    Case to be seen and discussed with Dr. Corrigan

## 2023-12-20 NOTE — PROGRESS NOTE ADULT - PROBLEM SELECTOR PLAN 1
- Keep packing in place, will remove thursday or friday   - ENT will continue to follow  - Call with questions or concerns. - Keep packing in place, will remove tomorrow  - ENT will continue to follow  - Call with questions or concerns.

## 2023-12-20 NOTE — PROGRESS NOTE ADULT - SUBJECTIVE AND OBJECTIVE BOX
ENT ISSUE/POD: oral bleeding    HPI: 78M w/ PMH HTN HLD presents as transfer from [facility] s/p cardiac arrest. Per reports patient had a witness fall and arrest at home. EMS performed CPR en route to hospital was noted to be in VTach and shocked x2. ROSC was obtained just prior to arrival at [facility]. Pt was intubated placed on levo gtt. On arrival pt noted to have lacerated to R forehead. Pt had CT scans that were negative for intracranial hemorrhage, C-spine fracture, facial fractures. A R frontal hematoma was noted. ENT consulted for oral bleeding noted upon suctioning. Unable to obtain further history from pt due to clinical condition.        PAST MEDICAL & SURGICAL HISTORY:  HTN (hypertension)      HLD (hyperlipidemia)        Allergies    Allergy Status Unknown    Intolerances      MEDICATIONS  (STANDING):  artificial  tears Solution 1 Drop(s) Both EYES two times a day  chlorhexidine 0.12% Liquid 15 milliLiter(s) Oral Mucosa every 12 hours  chlorhexidine 2% Cloths 1 Application(s) Topical daily  dexMEDEtomidine Infusion 0.3 MICROgram(s)/kG/Hr (7.01 mL/Hr) IV Continuous <Continuous>  levETIRAcetam  IVPB 1000 milliGRAM(s) IV Intermittent every 12 hours  norepinephrine Infusion 0.09 MICROgram(s)/kG/Min (15.8 mL/Hr) IV Continuous <Continuous>  pantoprazole  Injectable 40 milliGRAM(s) IV Push daily  piperacillin/tazobactam IVPB.- 3.375 Gram(s) IV Intermittent once  piperacillin/tazobactam IVPB.- 3.375 Gram(s) IV Intermittent once  piperacillin/tazobactam IVPB.. 3.375 Gram(s) IV Intermittent every 8 hours  vasopressin Infusion 0.1 Unit(s)/Min (15 mL/Hr) IV Continuous <Continuous>    MEDICATIONS  (PRN):      Social History: see consult    Family history: see consult    ROS:   ENT: all negative except as noted in HPI   Pulm: denies SOB, cough, hemoptysis  Neuro: denies numbness/tingling, loss of sensation  Endo: denies heat/cold intolerance, excessive sweating      Vital Signs Last 24 Hrs  T(C): 38.4 (20 Dec 2023 08:00), Max: 38.4 (20 Dec 2023 04:00)  T(F): 101.1 (20 Dec 2023 08:00), Max: 101.1 (20 Dec 2023 04:00)  HR: 63 (20 Dec 2023 08:00) (58 - 87)  BP: 159/74 (20 Dec 2023 04:30) (159/74 - 159/74)  BP(mean): 107 (20 Dec 2023 04:30) (107 - 107)  RR: 24 (20 Dec 2023 08:00) (2 - 41)  SpO2: 100% (20 Dec 2023 08:00) (97% - 100%)    Parameters below as of 20 Dec 2023 04:00  Patient On (Oxygen Delivery Method): ventilator    O2 Concentration (%): 40                          10.9   14.48 )-----------( 167      ( 20 Dec 2023 02:09 )             31.3    12-20    141  |  106  |  34<H>  ----------------------------<  245<H>  4.1   |  21<L>  |  1.70<H>    Ca    8.2<L>      20 Dec 2023 02:09  Phos  3.6     12-20  Mg     2.2     12-20    TPro  5.6<L>  /  Alb  3.6  /  TBili  1.0  /  DBili  x   /  AST  206<H>  /  ALT  335<H>  /  AlkPhos  49  12-20   PT/INR - ( 19 Dec 2023 15:22 )   PT: 17.7 sec;   INR: 1.63 ratio         PTT - ( 19 Dec 2023 15:22 )  PTT:30.8 sec        PHYSICAL EXAM:  Gen: NAD  Skin: No rashes or lesions  Head: Normocephalic  Face: no edema, erythema, or fluctuance. Parotid glands soft without mass  Eyes: periorbital edema and ecchymosis  Nose: Nares bilaterally patent, no discharge  Mouth: ETT in place, blood suctioned from posterior pharynx, area of ecchymosis noted behind left tonsillar pillar, packed with kerlix x1. Mucosa moist, tongue/uvula midline  Neck: Flat, supple, no lymphadenopathy, trachea midline, no masses  Lymphatic: No lymphadenopathy  Resp: on vent  CV: no peripheral edema/cyanosis  GI: nondistended   Peripheral vascular: no JVD or edema  Neuro: facial nerve intact, no facial droop         ENT ISSUE/POD: oral bleeding    HPI: 78M w/ PMH HTN HLD presents as transfer from [facility] s/p cardiac arrest. Per reports patient had a witness fall and arrest at home. EMS performed CPR en route to hospital was noted to be in VTach and shocked x2. ROSC was obtained just prior to arrival at [facility]. Pt was intubated placed on levo gtt. On arrival pt noted to have lacerated to R forehead. Pt had CT scans that were negative for intracranial hemorrhage, C-spine fracture, facial fractures. A R frontal hematoma was noted. ENT consulted for oral bleeding noted upon suctioning. Unable to obtain further history from pt due to clinical condition.        PAST MEDICAL & SURGICAL HISTORY:  HTN (hypertension)      HLD (hyperlipidemia)        Allergies    Allergy Status Unknown    Intolerances      MEDICATIONS  (STANDING):  artificial  tears Solution 1 Drop(s) Both EYES two times a day  chlorhexidine 0.12% Liquid 15 milliLiter(s) Oral Mucosa every 12 hours  chlorhexidine 2% Cloths 1 Application(s) Topical daily  dexMEDEtomidine Infusion 0.3 MICROgram(s)/kG/Hr (7.01 mL/Hr) IV Continuous <Continuous>  levETIRAcetam  IVPB 1000 milliGRAM(s) IV Intermittent every 12 hours  norepinephrine Infusion 0.09 MICROgram(s)/kG/Min (15.8 mL/Hr) IV Continuous <Continuous>  pantoprazole  Injectable 40 milliGRAM(s) IV Push daily  piperacillin/tazobactam IVPB.- 3.375 Gram(s) IV Intermittent once  piperacillin/tazobactam IVPB.- 3.375 Gram(s) IV Intermittent once  piperacillin/tazobactam IVPB.. 3.375 Gram(s) IV Intermittent every 8 hours  vasopressin Infusion 0.1 Unit(s)/Min (15 mL/Hr) IV Continuous <Continuous>    MEDICATIONS  (PRN):      Social History: see consult    Family history: see consult    ROS:   ENT: all negative except as noted in HPI   Pulm: denies SOB, cough, hemoptysis  Neuro: denies numbness/tingling, loss of sensation  Endo: denies heat/cold intolerance, excessive sweating      Vital Signs Last 24 Hrs  T(C): 38.4 (20 Dec 2023 08:00), Max: 38.4 (20 Dec 2023 04:00)  T(F): 101.1 (20 Dec 2023 08:00), Max: 101.1 (20 Dec 2023 04:00)  HR: 63 (20 Dec 2023 08:00) (58 - 87)  BP: 159/74 (20 Dec 2023 04:30) (159/74 - 159/74)  BP(mean): 107 (20 Dec 2023 04:30) (107 - 107)  RR: 24 (20 Dec 2023 08:00) (2 - 41)  SpO2: 100% (20 Dec 2023 08:00) (97% - 100%)    Parameters below as of 20 Dec 2023 04:00  Patient On (Oxygen Delivery Method): ventilator    O2 Concentration (%): 40                          10.9   14.48 )-----------( 167      ( 20 Dec 2023 02:09 )             31.3    12-20    141  |  106  |  34<H>  ----------------------------<  245<H>  4.1   |  21<L>  |  1.70<H>    Ca    8.2<L>      20 Dec 2023 02:09  Phos  3.6     12-20  Mg     2.2     12-20    TPro  5.6<L>  /  Alb  3.6  /  TBili  1.0  /  DBili  x   /  AST  206<H>  /  ALT  335<H>  /  AlkPhos  49  12-20   PT/INR - ( 19 Dec 2023 15:22 )   PT: 17.7 sec;   INR: 1.63 ratio         PTT - ( 19 Dec 2023 15:22 )  PTT:30.8 sec        PHYSICAL EXAM:  Gen: NAD  Skin: No rashes or lesions  Head: Normocephalic  Face: no edema, erythema, or fluctuance. Parotid glands soft without mass  Eyes: periorbital edema and ecchymosis  Nose: Nares bilaterally patent, no discharge  Mouth: ETT in place, blood suctioned from posterior pharynx, area of ecchymosis noted behind left tonsillar pillar, packed with kerlix x1. Mucosa moist, tongue/uvula midline  Neck: Flat, supple, no lymphadenopathy, trachea midline, no masses  Lymphatic: No lymphadenopathy  Resp: on vent  CV: no peripheral edema/cyanosis  GI: nondistended   Peripheral vascular: no JVD or edema  Neuro: facial nerve intact, no facial droop

## 2023-12-20 NOTE — PROGRESS NOTE ADULT - SUBJECTIVE AND OBJECTIVE BOX
Patient is a 78y old  Male who presents with a chief complaint of Cardiac Arrest (20 Dec 2023 11:26)      INTERVAL HISTORY:  -    SUBJECTIVE  - Patient seen and evaluated at bedside.     MEDICATIONS:  MEDICATIONS  (STANDING):  artificial  tears Solution 1 Drop(s) Both EYES two times a day  cefTRIAXone   IVPB 1000 milliGRAM(s) IV Intermittent every 24 hours  chlorhexidine 0.12% Liquid 15 milliLiter(s) Oral Mucosa every 12 hours  chlorhexidine 2% Cloths 1 Application(s) Topical daily  dexMEDEtomidine Infusion 0.3 MICROgram(s)/kG/Hr (7.01 mL/Hr) IV Continuous <Continuous>  levETIRAcetam  IVPB 1000 milliGRAM(s) IV Intermittent every 12 hours  norepinephrine Infusion 0.09 MICROgram(s)/kG/Min (15.8 mL/Hr) IV Continuous <Continuous>  pantoprazole  Injectable 40 milliGRAM(s) IV Push daily  petrolatum Ophthalmic Ointment 1 Application(s) Both EYES three times a day  potassium chloride  20 mEq/100 mL IVPB 20 milliEquivalent(s) IV Intermittent every 2 hours  vasopressin Infusion 0.1 Unit(s)/Min (15 mL/Hr) IV Continuous <Continuous>    MEDICATIONS  (PRN):      OBJECTIVE:  ICU Vital Signs Last 24 Hrs  T(C): 37.3 (20 Dec 2023 16:00), Max: 38.4 (20 Dec 2023 04:00)  T(F): 99.2 (20 Dec 2023 16:00), Max: 101.1 (20 Dec 2023 04:00)  HR: 72 (20 Dec 2023 19:00) (61 - 87)  BP: 159/74 (20 Dec 2023 04:30) (159/74 - 159/74)  BP(mean): 107 (20 Dec 2023 04:30) (107 - 107)  ABP: 132/58 (20 Dec 2023 19:00) (64/64 - 235/86)  ABP(mean): 81 (20 Dec 2023 19:00) (54 - 126)  RR: 23 (20 Dec 2023 19:00) (2 - 41)  SpO2: 98% (20 Dec 2023 19:00) (98% - 100%)    O2 Parameters below as of 20 Dec 2023 15:00  Patient On (Oxygen Delivery Method): ventilator    O2 Concentration (%): 40      Mode: AC/ CMV (Assist Control/ Continuous Mandatory Ventilation)  RR (machine): 14  TV (machine): 500  FiO2: 40  PEEP: 5  ITime: 1  MAP: 10  PIP: 21    Adult Advanced Hemodynamics Last 24 Hrs  CVP(mm Hg): 8 (20 Dec 2023 19:00) (2 - 19)  CVP(cm H2O): --  CO: --  CI: --  PA: --  PA(mean): --  PCWP: --  SVR: --  SVRI: --  PVR: --  PVRI: --  CAPILLARY BLOOD GLUCOSE        CAPILLARY BLOOD GLUCOSE        I&O's Summary    19 Dec 2023 07:01  -  20 Dec 2023 07:00  --------------------------------------------------------  IN: 569.2 mL / OUT: 355 mL / NET: 214.2 mL    20 Dec 2023 07:01  -  20 Dec 2023 19:51  --------------------------------------------------------  IN: 782.2 mL / OUT: 345 mL / NET: 437.2 mL      Daily     Daily Weight in k.2 (20 Dec 2023 05:00)    PHYSICAL EXAM:  General: NAD, well-groomed, well-developed  Eyes: Conjunctiva and sclera clear  ENMT: Moist mucous membranes  Neck: Supple  Chest: Clear to auscultation bilaterally; no rales, rhonchi, or wheezing  Heart: Regular rate and rhythm; normal S1 and S2; no murmurs, rubs, or gallops  Abd: Soft, nontender, nondistended  Nervous System: AAOX3  Ext: no peripheral LE edema bilaterally  Lines/Tubes: IV peripheral    LABS:  ABG - ( 20 Dec 2023 17:50 )  pH, Arterial: 7.48  pH, Blood: x     /  pCO2: 32    /  pO2: 136   / HCO3: 24    / Base Excess: 0.7   /  SaO2: 98.5                                    9.8    11.98 )-----------( 123      ( 20 Dec 2023 18:01 )             28.2         144  |  107  |  46<H>  ----------------------------<  206<H>  3.7   |  21<L>  |  2.36<H>    Ca    8.1<L>      20 Dec 2023 18:01  Phos  3.7       Mg     2.3         TPro  5.6<L>  /  Alb  3.4  /  TBili  0.9  /  DBili  x   /  AST  95<H>  /  ALT  196<H>  /  AlkPhos  43      LIVER FUNCTIONS - ( 20 Dec 2023 18:01 )  Alb: 3.4 g/dL / Pro: 5.6 g/dL / ALK PHOS: 43 U/L / ALT: 196 U/L / AST: 95 U/L / GGT: x           PT/INR - ( 19 Dec 2023 15:22 )   PT: 17.7 sec;   INR: 1.63 ratio         PTT - ( 19 Dec 2023 15:22 )  PTT:30.8 sec  CKMB Units: 28.7 ng/mL ( @ 18:01)  Creatine Kinase, Serum: 745 U/L ( @ 18:01)  Creatine Kinase, Serum: 1110 U/L ( @ 02:09)  CKMB Units: 133.8 ng/mL ( @ 02:09)    CARDIAC MARKERS ( 20 Dec 2023 18:01 )  x     / x     / 745 U/L / x     / 28.7 ng/mL  CARDIAC MARKERS ( 20 Dec 2023 02:09 )  x     / x     / 1110 U/L / x     / 133.8 ng/mL  CARDIAC MARKERS ( 19 Dec 2023 15:22 )  x     / x     / x     / x     / 129.2 ng/mL      Urinalysis Basic - ( 20 Dec 2023 18:01 )    Color: x / Appearance: x / SG: x / pH: x  Gluc: 206 mg/dL / Ketone: x  / Bili: x / Urobili: x   Blood: x / Protein: x / Nitrite: x   Leuk Esterase: x / RBC: x / WBC x   Sq Epi: x / Non Sq Epi: x / Bacteria: x          Plan:  ====================== NEUROLOGY=====================  - continue to monitor mental status as per protocol     ==================== RESPIRATORY======================  - continue to monitor SpO2 with goal >94%    Mechanical Vent: Mode: AC/ CMV (Assist Control/ Continuous Mandatory Ventilation)  RR (machine): 14  TV (machine): 500  FiO2: 40  PEEP: 5  ITime: 1  MAP: 10  PIP: 21      ====================CARDIOVASCULAR==================      ===================== RENAL =========================  - Continue monitoring urine output, lytes, SCr/ BUN  - replete lytes prn with goal K >4 and Mg >2    =============== GASTROINTESTINAL===================      ===================ENDO====================      ===================HEMATOLOGIC/ONC ===================  - Monitor H/H and plts  - DVT PPX:     ==================INFECTIOUS DISEASE================  - monitor and trend WBC and temperature curve     Dispo:    Patient requires continuous monitoring with bedside rhythm monitoring, arterial line, pulse oximetry, ventilator monitoring and intermittent blood gas analysis.  Care plan discussed with ICU care team.  I have spent 35 minutes providing critical care, in addition to initial critical time provided by CICU attending Dr. Mejia, re-evaluated multiple times during the day.    Aziza Ross PA-C Patient is a 78y old  Male who presents with a chief complaint of Cardiac Arrest (20 Dec 2023 11:26)    INTERVAL HISTORY:  - s/p CT chest     SUBJECTIVE  - Patient seen and evaluated at bedside.     MEDICATIONS:  MEDICATIONS  (STANDING):  artificial  tears Solution 1 Drop(s) Both EYES two times a day  cefTRIAXone   IVPB 1000 milliGRAM(s) IV Intermittent every 24 hours  chlorhexidine 0.12% Liquid 15 milliLiter(s) Oral Mucosa every 12 hours  chlorhexidine 2% Cloths 1 Application(s) Topical daily  dexMEDEtomidine Infusion 0.3 MICROgram(s)/kG/Hr (7.01 mL/Hr) IV Continuous <Continuous>  levETIRAcetam  IVPB 1000 milliGRAM(s) IV Intermittent every 12 hours  norepinephrine Infusion 0.09 MICROgram(s)/kG/Min (15.8 mL/Hr) IV Continuous <Continuous>  pantoprazole  Injectable 40 milliGRAM(s) IV Push daily  petrolatum Ophthalmic Ointment 1 Application(s) Both EYES three times a day  potassium chloride  20 mEq/100 mL IVPB 20 milliEquivalent(s) IV Intermittent every 2 hours  vasopressin Infusion 0.1 Unit(s)/Min (15 mL/Hr) IV Continuous <Continuous>    OBJECTIVE:  ICU Vital Signs Last 24 Hrs  T(C): 37.3 (20 Dec 2023 16:00), Max: 38.4 (20 Dec 2023 04:00)  T(F): 99.2 (20 Dec 2023 16:00), Max: 101.1 (20 Dec 2023 04:00)  HR: 72 (20 Dec 2023 19:00) (61 - 87)  BP: 159/74 (20 Dec 2023 04:30) (159/74 - 159/74)  BP(mean): 107 (20 Dec 2023 04:30) (107 - 107)  ABP: 132/58 (20 Dec 2023 19:00) (64/64 - 235/86)  ABP(mean): 81 (20 Dec 2023 19:00) (54 - 126)  RR: 23 (20 Dec 2023 19:00) (2 - 41)  SpO2: 98% (20 Dec 2023 19:00) (98% - 100%)    O2 Parameters below as of 20 Dec 2023 15:00  Patient On (Oxygen Delivery Method): ventilator    O2 Concentration (%): 40    Mode: AC/ CMV (Assist Control/ Continuous Mandatory Ventilation)  RR (machine): 14  TV (machine): 500  FiO2: 40  PEEP: 5  ITime: 1  MAP: 10  PIP: 21    Adult Advanced Hemodynamics Last 24 Hrs  CVP(mm Hg): 8 (20 Dec 2023 19:00) (2 - 19)    I&O's Summary    19 Dec 2023 07:01  -  20 Dec 2023 07:00  --------------------------------------------------------  IN: 569.2 mL / OUT: 355 mL / NET: 214.2 mL    20 Dec 2023 07:01  -  20 Dec 2023 19:51  --------------------------------------------------------  IN: 782.2 mL / OUT: 345 mL / NET: 437.2 mL      Daily Weight in k.2 (20 Dec 2023 05:00)    PHYSICAL EXAM:  General: NAD, well-groomed, well-developed  Head: Right eye hematoma (s/p fall)  Chest: Clear to auscultation bilaterally; no rales, rhonchi, or wheezing  Heart: Regular rate and rhythm; normal S1 and S2; no murmurs, rubs, or gallops  Abd: Soft, nontender, nondistended  Nervous System: intubated, sedated on precedex  Ext: no peripheral LE edema bilaterally    LABS:  ABG - ( 20 Dec 2023 17:50 )  pH, Arterial: 7.48  pH, Blood: x     /  pCO2: 32    /  pO2: 136   / HCO3: 24    / Base Excess: 0.7   /  SaO2: 98.5                          9.8    11.98 )-----------( 123      ( 20 Dec 2023 18:01 )             28.2     12-20    144  |  107  |  46<H>  ----------------------------<  206<H>  3.7   |  21<L>  |  2.36<H>    Ca    8.1<L>      20 Dec 2023 18:01  Phos  3.7       Mg     2.3         TPro  5.6<L>  /  Alb  3.4  /  TBili  0.9  /  DBili  x   /  AST  95<H>  /  ALT  196<H>  /  AlkPhos  43      LIVER FUNCTIONS - ( 20 Dec 2023 18:01 )  Alb: 3.4 g/dL / Pro: 5.6 g/dL / ALK PHOS: 43 U/L / ALT: 196 U/L / AST: 95 U/L / GGT: x           PT/INR - ( 19 Dec 2023 15:22 )   PT: 17.7 sec;   INR: 1.63 ratio    PTT - ( 19 Dec 2023 15:22 )  PTT:30.8 sec  CKMB Units: 28.7 ng/mL ( @ 18:01)  Creatine Kinase, Serum: 745 U/L ( @ 18:01)  Creatine Kinase, Serum: 1110 U/L ( @ 02:09)  CKMB Units: 133.8 ng/mL ( @ 02:09)    CARDIAC MARKERS ( 20 Dec 2023 18:01 )  x     / x     / 745 U/L / x     / 28.7 ng/mL  CARDIAC MARKERS ( 20 Dec 2023 02:09 )  x     / x     / 1110 U/L / x     / 133.8 ng/mL  CARDIAC MARKERS ( 19 Dec 2023 15:22 )  x     / x     / x     / x     / 129.2 ng/mL    Urinalysis Basic - ( 20 Dec 2023 18:01 )    Color: x / Appearance: x / SG: x / pH: x  Gluc: 206 mg/dL / Ketone: x  / Bili: x / Urobili: x   Blood: x / Protein: x / Nitrite: x   Leuk Esterase: x / RBC: x / WBC x   Sq Epi: x / Non Sq Epi: x / Bacteria: x      Assessment: 78M w/ PMH HTN HLD presents as transfer from [facility] s/p VT arrest s/p shock x2 w/ unknown downtime. Pt found to have trauma to head w/ superficial scalp bleeding and oral mucosal bleeding.     Plan:  ====================== NEUROLOGY=====================  Encephalopathy post arrest   - Intubated w/o sedation s/p cardiac arrest w/ unknown down time. Reported to be 20-40min but can be longer given ROSC achieved right before arrival to the hospital   - CTH at [facility] w/o intracranial bleed/pathology  - Pt remains w/o purposeful movement  - Neuro consult placed for prognostication  - VEEG in place   - on precedex .5, weaning as tolerated  - Repeat CTH today  - check neuron specific enolase  - continue to monitor mental status as per protocol     C-spine trauma  Pt w/ fall at home presented w/ c-collar.   -Per [facility] radiology report no evidence of cervical fracture  -Trauma surgery cleared pt of Cspine collar    Myoclonic jerking  - vEEG: evidence of mycolonic seizures  - Start keppra load  - Precedex gtt, wean as tolerated    ==================== RESPIRATORY======================  Intubated  Pt intubated s/p cardiac arrest  - On full vent support: RR 16 /  / PEEP 5 / FiO2 40  - Monitor ABGs  - continue to monitor SpO2 with goal >94%    Oropharynx bleed  - Pt w/ blood in oropharynx requiring frequent suctioning.   - ENT consulted s/p packing     Mechanical Vent: Mode: AC/ CMV (Assist Control/ Continuous Mandatory Ventilation)  RR (machine): 14  TV (machine): 500  FiO2: 40  PEEP: 5  ITime: 1  MAP: 10  PIP: 21    ====================CARDIOVASCULAR==================  VF arrest  - Pt s/p VT arrest w/ unknown downtime. Pt reportedly received shock x2. No prior cardiac hx per family  - Check TTE   - Check cardiac enzymes, BNP  - Monitor for arrhythmias   - Pt not asa/plavix loaded 2/2 substernal hematoma noted on CT chest at [facility]  - Unlikely to be a candidate for LHC at this time given mental status     ===================== RENAL =========================  Cr 2.36, uptrending  - Continue monitoring urine output, lytes, SCr/ BUN  - replete lytes prn with goal K >4 and Mg >2    =============== GASTROINTESTINAL===================  NPO    ===================ENDO====================  A1c 5.5  - monitor FS    ===================HEMATOLOGIC/ONC ===================  Substernal hematoma  - Pt w/ substernal hematoma noted on imaging at [facility] from trauma/cpr  - s/p Repeat CT chest to monitor hematoma  - Will consider resumption of DAPT/heparin if CT chest stable  - Monitor H/H and plts  - DVT PPX: SCDs    ==================INFECTIOUS DISEASE================  Sepsis  - Pt w/ leukocytosis w/ fevers possibly 2/2  infection  - U/A grossly positive. F/u BCx, UCx  - Pt had crash lines placed at [facility]. Possible source of infection   - Will cover w/ CTX and vanc, by level given worsening renal function  - CTX to cover UTI and strep  - monitor and trend WBC and temperature curve     Dispo: Maintain in ICU    Patient requires continuous monitoring with bedside rhythm monitoring, arterial line, pulse oximetry, ventilator monitoring and intermittent blood gas analysis.  Care plan discussed with ICU care team.  I have spent 35 minutes providing critical care, in addition to initial critical time provided by CICU attending Dr. Mejia, re-evaluated multiple times during the day.    Aziza Ross PA-C Patient is a 78y old  Male who presents with a chief complaint of Cardiac Arrest (20 Dec 2023 11:26)    INTERVAL HISTORY:  - s/p CT chest     SUBJECTIVE  - Patient seen and evaluated at bedside.     MEDICATIONS:  MEDICATIONS  (STANDING):  artificial  tears Solution 1 Drop(s) Both EYES two times a day  cefTRIAXone   IVPB 1000 milliGRAM(s) IV Intermittent every 24 hours  chlorhexidine 0.12% Liquid 15 milliLiter(s) Oral Mucosa every 12 hours  chlorhexidine 2% Cloths 1 Application(s) Topical daily  dexMEDEtomidine Infusion 0.3 MICROgram(s)/kG/Hr (7.01 mL/Hr) IV Continuous <Continuous>  levETIRAcetam  IVPB 1000 milliGRAM(s) IV Intermittent every 12 hours  norepinephrine Infusion 0.09 MICROgram(s)/kG/Min (15.8 mL/Hr) IV Continuous <Continuous>  pantoprazole  Injectable 40 milliGRAM(s) IV Push daily  petrolatum Ophthalmic Ointment 1 Application(s) Both EYES three times a day  potassium chloride  20 mEq/100 mL IVPB 20 milliEquivalent(s) IV Intermittent every 2 hours  vasopressin Infusion 0.1 Unit(s)/Min (15 mL/Hr) IV Continuous <Continuous>    OBJECTIVE:  ICU Vital Signs Last 24 Hrs  T(C): 37.3 (20 Dec 2023 16:00), Max: 38.4 (20 Dec 2023 04:00)  T(F): 99.2 (20 Dec 2023 16:00), Max: 101.1 (20 Dec 2023 04:00)  HR: 72 (20 Dec 2023 19:00) (61 - 87)  BP: 159/74 (20 Dec 2023 04:30) (159/74 - 159/74)  BP(mean): 107 (20 Dec 2023 04:30) (107 - 107)  ABP: 132/58 (20 Dec 2023 19:00) (64/64 - 235/86)  ABP(mean): 81 (20 Dec 2023 19:00) (54 - 126)  RR: 23 (20 Dec 2023 19:00) (2 - 41)  SpO2: 98% (20 Dec 2023 19:00) (98% - 100%)    O2 Parameters below as of 20 Dec 2023 15:00  Patient On (Oxygen Delivery Method): ventilator    O2 Concentration (%): 40    Mode: AC/ CMV (Assist Control/ Continuous Mandatory Ventilation)  RR (machine): 14  TV (machine): 500  FiO2: 40  PEEP: 5  ITime: 1  MAP: 10  PIP: 21    Adult Advanced Hemodynamics Last 24 Hrs  CVP(mm Hg): 8 (20 Dec 2023 19:00) (2 - 19)    I&O's Summary    19 Dec 2023 07:01  -  20 Dec 2023 07:00  --------------------------------------------------------  IN: 569.2 mL / OUT: 355 mL / NET: 214.2 mL    20 Dec 2023 07:01  -  20 Dec 2023 19:51  --------------------------------------------------------  IN: 782.2 mL / OUT: 345 mL / NET: 437.2 mL      Daily Weight in k.2 (20 Dec 2023 05:00)    PHYSICAL EXAM:  General: NAD, well-groomed, well-developed  Head: Right eye hematoma (s/p fall)  Chest: Clear to auscultation bilaterally; no rales, rhonchi, or wheezing  Heart: Regular rate and rhythm; normal S1 and S2; no murmurs, rubs, or gallops  Abd: Soft, nontender, nondistended  Nervous System: intubated, sedated on precedex  Ext: no peripheral LE edema bilaterally    LABS:  ABG - ( 20 Dec 2023 17:50 )  pH, Arterial: 7.48  pH, Blood: x     /  pCO2: 32    /  pO2: 136   / HCO3: 24    / Base Excess: 0.7   /  SaO2: 98.5                          9.8    11.98 )-----------( 123      ( 20 Dec 2023 18:01 )             28.2     12-20    144  |  107  |  46<H>  ----------------------------<  206<H>  3.7   |  21<L>  |  2.36<H>    Ca    8.1<L>      20 Dec 2023 18:01  Phos  3.7       Mg     2.3         TPro  5.6<L>  /  Alb  3.4  /  TBili  0.9  /  DBili  x   /  AST  95<H>  /  ALT  196<H>  /  AlkPhos  43      LIVER FUNCTIONS - ( 20 Dec 2023 18:01 )  Alb: 3.4 g/dL / Pro: 5.6 g/dL / ALK PHOS: 43 U/L / ALT: 196 U/L / AST: 95 U/L / GGT: x           PT/INR - ( 19 Dec 2023 15:22 )   PT: 17.7 sec;   INR: 1.63 ratio    PTT - ( 19 Dec 2023 15:22 )  PTT:30.8 sec  CKMB Units: 28.7 ng/mL ( @ 18:01)  Creatine Kinase, Serum: 745 U/L ( @ 18:01)  Creatine Kinase, Serum: 1110 U/L ( @ 02:09)  CKMB Units: 133.8 ng/mL ( @ 02:09)    CARDIAC MARKERS ( 20 Dec 2023 18:01 )  x     / x     / 745 U/L / x     / 28.7 ng/mL  CARDIAC MARKERS ( 20 Dec 2023 02:09 )  x     / x     / 1110 U/L / x     / 133.8 ng/mL  CARDIAC MARKERS ( 19 Dec 2023 15:22 )  x     / x     / x     / x     / 129.2 ng/mL    Urinalysis Basic - ( 20 Dec 2023 18:01 )    Color: x / Appearance: x / SG: x / pH: x  Gluc: 206 mg/dL / Ketone: x  / Bili: x / Urobili: x   Blood: x / Protein: x / Nitrite: x   Leuk Esterase: x / RBC: x / WBC x   Sq Epi: x / Non Sq Epi: x / Bacteria: x      Assessment: 78M w/ PMH HTN HLD presents as transfer from [facility] s/p VT arrest s/p shock x2 w/ unknown downtime. Pt found to have trauma to head w/ superficial scalp bleeding and oral mucosal bleeding.     Plan:  ====================== NEUROLOGY=====================  Encephalopathy post arrest   - Intubated w/o sedation s/p cardiac arrest w/ unknown down time. Reported to be 20-40min but can be longer given ROSC achieved right before arrival to the hospital   - CTH at [facility] w/o intracranial bleed/pathology  - Pt remains w/o purposeful movement  - Neuro consult placed for prognostication  - VEEG in place   - on precedex .5, weaning as tolerated  - Repeat CTH today  - check neuron specific enolase  - continue to monitor mental status as per protocol     C-spine trauma  Pt w/ fall at home presented w/ c-collar.   -Per [facility] radiology report no evidence of cervical fracture  -Trauma surgery cleared pt of Cspine collar    Myoclonic jerking  - vEEG: evidence of mycolonic seizures  - Start keppra load  - Precedex gtt, wean as tolerated    ==================== RESPIRATORY======================  Intubated  Pt intubated s/p cardiac arrest  - On full vent support: RR 16 /  / PEEP 5 / FiO2 40  - Monitor ABGs  - continue to monitor SpO2 with goal >94%    Oropharynx bleed  - Pt w/ blood in oropharynx requiring frequent suctioning.   - ENT consulted s/p packing     Mechanical Vent: Mode: AC/ CMV (Assist Control/ Continuous Mandatory Ventilation)  RR (machine): 14  TV (machine): 500  FiO2: 40  PEEP: 5  ITime: 1  MAP: 10  PIP: 21    ====================CARDIOVASCULAR==================  VF arrest  - Pt s/p VT arrest w/ unknown downtime. Pt reportedly received shock x2. No prior cardiac hx per family  - Check TTE   - Check cardiac enzymes, BNP  - Monitor for arrhythmias   - Pt not asa/plavix loaded 2/2 substernal hematoma noted on CT chest at [facility]  - Unlikely to be a candidate for LHC at this time given mental status     ===================== RENAL =========================  Cr 2.36, uptrending  - Continue monitoring urine output, lytes, SCr/ BUN  - replete lytes prn with goal K >4 and Mg >2    =============== GASTROINTESTINAL===================  NPO    ===================ENDO====================  A1c 5.5  - monitor FS    ===================HEMATOLOGIC/ONC ===================  Substernal hematoma  - Pt w/ substernal hematoma noted on imaging at [facility] from trauma/cpr  - s/p Repeat CT chest to monitor hematoma  - Will consider resumption of DAPT/heparin if CT chest stable  - Monitor H/H and plts  - DVT PPX: SCDs    ==================INFECTIOUS DISEASE================  Sepsis  - Pt w/ leukocytosis w/ fevers possibly 2/2  infection  - U/A grossly positive. F/u BCx, UCx  - Pt had crash lines placed at [facility]. Possible source of infection   - Will cover w/ CTX and vanc, by level given worsening renal function  - CTX to cover UTI and strep  - monitor and trend WBC and temperature curve     Dispo: Maintain in ICU    Patient requires continuous monitoring with bedside rhythm monitoring, arterial line, pulse oximetry, ventilator monitoring and intermittent blood gas analysis.  Care plan discussed with ICU care team.  I have spent 35 minutes providing critical care, in addition to initial critical time provided by CICU attending Dr. Mejia, re-evaluated multiple times during the day.    Aziza Ross PA-C

## 2023-12-20 NOTE — CONSULT NOTE ADULT - ATTENDING COMMENTS
Mr. Kuhn is a 78 year old  unknown handed man with PMHx of vascular risk factors who presents as transfer from [facility] s/p cardiac arrest.    Detailed neuro exam:  ROS: Unable to obtain due to the patient is currently orally intubated.  Please note, neuro exam is very limited due to the patient on mechanical ventilation via orally intubated and off the sedatopm.  General: Patient is comfortably lying on bed without any acute distress.  Mental status: On verbal command, patient unable to follow any simple or complex commands.  Cranial exams: Brainstem reflexes are intact cornea, conjunctiva and gag reflexes. Face looks symmetric. Pupils are symmetric, reactive to light bilaterally.  Power: On noxious stimuli the patient had 0/5 bilateral four extremities.  Sensation: On noxious stimuli patient did not grimace at all four extremities.  Coordination and gait: Patient did not participate in the setting of comatose.     Mr. Kuhn is a 78 year old  unknown handed man with PMHx of vascular risk factors who presents as transfer from [facility] s/p cardiac arrest.  Neurology consulted because of altered mental status and prognostication. 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      Patient would benefit from MRI brain with and without contrast once patient stable.  Discontinue EEG  Continue medical management, neuro- check and fall precaution.  GI and DVT prophylaxis.    Patient is at high risk for complications and morbidity or mortality. There is high probability of imminent or life threatening deterioration in the patient's condition.  Patient is unable or incompetent to participate in giving a history and/or making decisions and discussion is necessary for determining treatment decisions.    I discussed the diagnosis, treatment plan and prognosis with the patient's family. Risk benefit and alternatives to the treatment were discussed. All questions and concerns were addressed. The patient's family demonstrated good understanding of the treatment plan.  My cumulative time taking care of this critically ill patient is 80 minutes  If you have any further questions, please do not hesitate to contact our team.  Thank you for allowing us to participate in this patient care. Mr. Kuhn is a 78 year old  unknown handed man with PMHx of vascular risk factors who presents as transfer from [facility] s/p cardiac arrest.    Detailed neuro exam:  ROS: Unable to obtain due to the patient is currently orally intubated.  Please note, neuro exam is very limited due to the patient on mechanical ventilation via orally intubated and off the sedatopm.  General: Patient is comfortably lying on bed without any acute distress.  Mental status: On verbal command, patient unable to follow any simple or complex commands.  Cranial exams: Brainstem reflexes are intact cornea, conjunctiva and gag reflexes. Face looks symmetric. Pupils are symmetric, reactive to light bilaterally.  Power: On noxious stimuli the patient had 0/5 bilateral four extremities.  Sensation: On noxious stimuli patient did not grimace at all four extremities.  Coordination and gait: Patient did not participate in the setting of comatose.     Mr. Kuhn is a 78 year old  unknown handed man with PMHx of vascular risk factors who presents as transfer from [facility] s/p cardiac arrest.  Neurology consulted because of altered mental status and prognostication. 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      Patient would benefit from MRI brain with and without contrast once patient stable.  Discontinue EEG  Continue medical management, neuro- check and fall precaution.  GI and DVT prophylaxis.    Patient is at high risk for complications and morbidity or mortality. There is high probability of imminent or life threatening deterioration in the patient's condition.  Patient is unable or incompetent to participate in giving a history and/or making decisions and discussion is necessary for determining treatment decisions.    I discussed the diagnosis, treatment plan and prognosis with the patient's family. Risk benefit and alternatives to the treatment were discussed. All questions and concerns were addressed. The patient's family demonstrated good understanding of the treatment plan.  My cumulative time taking care of this critically ill patient is 80 minutes  If you have any further questions, please do not hesitate to contact our team.  Thank you for allowing us to participate in this patient care.

## 2023-12-20 NOTE — EEG REPORT - NS EEG TEXT BOX
[facility]   COMPREHENSIVE EPILEPSY CENTER   REPORT OF CONTINUOUS VIDEO EEG     [facility]: [address], [place] [address], Ph#: [phone]  [facility]:  [address] [place] [address], Ph#: [phone]  [facility]: [address] [place] [address], Ph#: [phone]    Patient Name: ASHLEY PRITCHARD  Age and : 78y ([birth year])  MRN #: [id number]  Location: [place] [address]  Referring Physician: Kaiden Mejia    Start Time/Date:  on 23  End Time/Date: 08:00 on 23  Duration: 12H    _____________________________________________________________  STUDY INFORMATION    EEG Recording Technique:  The patient underwent continuous Video-EEG monitoring, using Telemetry System hardware on the XLTek Digital System. EEG and video data were stored on a computer hard drive with important events saved in digital archive files. The material was reviewed by a physician (electroencephalographer / epileptologist) on a daily basis. Jovani and seizure detection algorithms were utilized and reviewed. An EEG Technician attended to the patient, and was available throughout daytime work hours.  The epilepsy center neurologist was available in person or on call 24-hours per day.    EEG Placement and Labeling of Electrodes:  The EEG was performed utilizing 20 channel referential EEG connections (coronal over temporal over parasagittal montage) using all standard 10-20 electrode placements with EKG, with additional electrodes placed in the inferior temporal region using the modified 10-10 montage electrode placements for elective admissions, or if deemed necessary. Recording was at a sampling rate of 256 samples per second per channel. Time synchronized digital video recording was done simultaneously with EEG recording. A low light infrared camera was used for low light recording.     _____________________________________________________________  HISTORY    Patient is a 78y old  Male who presents with a chief complaint of Cardiac Arrest (20 Dec 2023 08:29)      PERTINENT MEDICATION:  MEDICATIONS  (STANDING):  artificial  tears Solution 1 Drop(s) Both EYES two times a day  chlorhexidine 0.12% Liquid 15 milliLiter(s) Oral Mucosa every 12 hours  chlorhexidine 2% Cloths 1 Application(s) Topical daily  dexMEDEtomidine Infusion 0.3 MICROgram(s)/kG/Hr (7.01 mL/Hr) IV Continuous <Continuous>  levETIRAcetam  IVPB 1000 milliGRAM(s) IV Intermittent every 12 hours  norepinephrine Infusion 0.09 MICROgram(s)/kG/Min (15.8 mL/Hr) IV Continuous <Continuous>  pantoprazole  Injectable 40 milliGRAM(s) IV Push daily  piperacillin/tazobactam IVPB.- 3.375 Gram(s) IV Intermittent once  piperacillin/tazobactam IVPB.- 3.375 Gram(s) IV Intermittent once  piperacillin/tazobactam IVPB.. 3.375 Gram(s) IV Intermittent every 8 hours  vasopressin Infusion 0.1 Unit(s)/Min (15 mL/Hr) IV Continuous <Continuous>    _____________________________________________________________  STUDY INTERPRETATION    Findings: The background initially non-reactive with burst-suppression pattern having 1-3s burst of mixed delta/theta activity and 3-8s periods of diffuse suppression. After 01:00, the background gradually became more continuous with prolonged cerebral activity and 1-3s periods of attenuation, showing increased reactivity.   No posterior dominant rhythm seen.    Background Slowing:  As above.    Focal Slowing:   None were present.    Sleep Background:  Drowsiness and stage II sleep transients were not recorded.    Other Non-Epileptiform Findings:  None were present.    Interictal Epileptiform Activity:   None were present.    Events:  Clinical events: Prior to 01:00, multiple episodes of diffuse myoclonus. EEG showed myogenic artifact with the events and not other abnormalities noted.    Activation Procedures:   Hyperventilation was not performed.    Photic stimulation was not performed.     Artifacts:  Intermittent myogenic and movement artifacts were noted.  _____________________________________________________________  EEG SUMMARY/CLASSIFICATION    Abnormal EEG   - Episodes of myoclonus with myogenic artifact and no interictal abnormalities  - Severe generalized slowing improving to more moderate generalized slowing.  _____________________________________________________________  EEG IMPRESSION/CLINICAL CORRELATE    Abnormal EEG study.  Severe nonspecific diffuse or multifocal cerebral dysfunction, improving to a more moderate dysfunction in the latter part of the recording.   No epileptiform pattern or definite seizure seen. Episodes of myoclonus with no clear epileptiform changes on EEG. However, cannot rule out myoclonic seizures.    Declan Angeles MD   of Neurology  Epilepsy/EEG Attending   [facility]   COMPREHENSIVE EPILEPSY CENTER   REPORT OF CONTINUOUS VIDEO EEG     [facility]: [address], [place] [address], Ph#: [phone]  [facility]:  [address] [place] [address], Ph#: [phone]  [facility]: [address] [place] [address], Ph#: [phone]    Patient Name: ASHLEY PRITCHARD  Age and : 78y (49)  MRN #: [id number]  Location: [place] [address]  Referring Physician: Kaiden Mejia    Start Time/Date:  on 23  End Time/Date: 08:00 on 23  Duration: 12H    _____________________________________________________________  STUDY INFORMATION    EEG Recording Technique:  The patient underwent continuous Video-EEG monitoring, using Telemetry System hardware on the XLTek Digital System. EEG and video data were stored on a computer hard drive with important events saved in digital archive files. The material was reviewed by a physician (electroencephalographer / epileptologist) on a daily basis. Jovani and seizure detection algorithms were utilized and reviewed. An EEG Technician attended to the patient, and was available throughout daytime work hours.  The epilepsy center neurologist was available in person or on call 24-hours per day.    EEG Placement and Labeling of Electrodes:  The EEG was performed utilizing 20 channel referential EEG connections (coronal over temporal over parasagittal montage) using all standard 10-20 electrode placements with EKG, with additional electrodes placed in the inferior temporal region using the modified 10-10 montage electrode placements for elective admissions, or if deemed necessary. Recording was at a sampling rate of 256 samples per second per channel. Time synchronized digital video recording was done simultaneously with EEG recording. A low light infrared camera was used for low light recording.     _____________________________________________________________  HISTORY    Patient is a 78y old  Male who presents with a chief complaint of Cardiac Arrest (20 Dec 2023 08:29)      PERTINENT MEDICATION:  MEDICATIONS  (STANDING):  artificial  tears Solution 1 Drop(s) Both EYES two times a day  chlorhexidine 0.12% Liquid 15 milliLiter(s) Oral Mucosa every 12 hours  chlorhexidine 2% Cloths 1 Application(s) Topical daily  dexMEDEtomidine Infusion 0.3 MICROgram(s)/kG/Hr (7.01 mL/Hr) IV Continuous <Continuous>  levETIRAcetam  IVPB 1000 milliGRAM(s) IV Intermittent every 12 hours  norepinephrine Infusion 0.09 MICROgram(s)/kG/Min (15.8 mL/Hr) IV Continuous <Continuous>  pantoprazole  Injectable 40 milliGRAM(s) IV Push daily  piperacillin/tazobactam IVPB.- 3.375 Gram(s) IV Intermittent once  piperacillin/tazobactam IVPB.- 3.375 Gram(s) IV Intermittent once  piperacillin/tazobactam IVPB.. 3.375 Gram(s) IV Intermittent every 8 hours  vasopressin Infusion 0.1 Unit(s)/Min (15 mL/Hr) IV Continuous <Continuous>    _____________________________________________________________  STUDY INTERPRETATION    Findings: The background initially non-reactive with burst-suppression pattern having 1-3s burst of mixed delta/theta activity and 3-8s periods of diffuse suppression. After 01:00, the background gradually became more continuous with prolonged cerebral activity and 1-3s periods of attenuation, showing increased reactivity.   No posterior dominant rhythm seen.    Background Slowing:  As above.    Focal Slowing:   None were present.    Sleep Background:  Drowsiness and stage II sleep transients were not recorded.    Other Non-Epileptiform Findings:  None were present.    Interictal Epileptiform Activity:   None were present.    Events:  Clinical events: Prior to 01:00, multiple episodes of diffuse myoclonus. EEG showed myogenic artifact with the events and not other abnormalities noted.    Activation Procedures:   Hyperventilation was not performed.    Photic stimulation was not performed.     Artifacts:  Intermittent myogenic and movement artifacts were noted.  _____________________________________________________________  EEG SUMMARY/CLASSIFICATION    Abnormal EEG   - Episodes of myoclonus with myogenic artifact and no interictal abnormalities  - Severe generalized slowing improving to more moderate generalized slowing.  _____________________________________________________________  EEG IMPRESSION/CLINICAL CORRELATE    Abnormal EEG study.  Severe nonspecific diffuse or multifocal cerebral dysfunction, improving to a more moderate dysfunction in the latter part of the recording.   No epileptiform pattern or definite seizure seen. Episodes of myoclonus with no clear epileptiform changes on EEG. However, cannot rule out myoclonic seizures.    Declan Angeles MD   of Neurology  Epilepsy/EEG Attending

## 2023-12-20 NOTE — PROGRESS NOTE ADULT - ATTENDING COMMENTS
79 yo man with HTN, HLD s/p out of hospital cardiac arrest     unresponsive, no response to pain, + gag and pupils respond to light on min precedex for myoclonic jerks, neuro consulted for prognostication, repeat CTH today, enolase ordered   Likely vasoplegic shock on small dose levo  For now will continue to hold DAPT and heparin given substernal hematoma at OSH CT, will obtain repeat imaging here   possible afib at OSH per EKG thus he may need AC eventually    Acute respiratory failure requiring mechanical ventilation due to cardiac arrest, SBT as tolerated    npo for now, place OGT if possible and start enteral feeds   LONA likely due to shock post arrest ?ATN    H/H low but acceptable  SCDs for DVT ppx for now given bleeding as mentioned above   febrile, vanc and ceftriaxone (cover for UTI, packing and MSRA), f/u cx    Sugars elevated, adjust coverage   [facility] TLC 12/20 77 yo man with HTN, HLD s/p out of hospital cardiac arrest     unresponsive, no response to pain, + gag and pupils respond to light on min precedex for myoclonic jerks, neuro consulted for prognostication, repeat CTH today, enolase ordered   Likely vasoplegic shock on small dose levo  For now will continue to hold DAPT and heparin given substernal hematoma at OSH CT, will obtain repeat imaging here   possible afib at OSH per EKG thus he may need AC eventually    Acute respiratory failure requiring mechanical ventilation due to cardiac arrest, SBT as tolerated    npo for now, place OGT if possible and start enteral feeds   LONA likely due to shock post arrest ?ATN    H/H low but acceptable  SCDs for DVT ppx for now given bleeding as mentioned above   febrile, vanc and ceftriaxone (cover for UTI, packing and MSRA), f/u cx    Sugars elevated, adjust coverage   [facility] TLC 12/20    Long discussion with pt's wife and daughter including updates.  I have extensively explained to them the concerns regarding pt's mental status specially post cardiac arrest.  Daughter asked appropriate questions and she understands that at this point we will manage cardiac issues in regards to an MI medically given pt's mental status.  I have explained to her lack of wakefulness, myoclonic jerks on EEG are concerning.  Both wife and daughter verbalized understanding.

## 2023-12-21 LAB
ALBUMIN SERPL ELPH-MCNC: 3.3 G/DL — SIGNIFICANT CHANGE UP (ref 3.3–5)
ALBUMIN SERPL ELPH-MCNC: 3.3 G/DL — SIGNIFICANT CHANGE UP (ref 3.3–5)
ALP SERPL-CCNC: 46 U/L — SIGNIFICANT CHANGE UP (ref 40–120)
ALP SERPL-CCNC: 46 U/L — SIGNIFICANT CHANGE UP (ref 40–120)
ALT FLD-CCNC: 166 U/L — HIGH (ref 10–45)
ALT FLD-CCNC: 166 U/L — HIGH (ref 10–45)
ANION GAP SERPL CALC-SCNC: 13 MMOL/L — SIGNIFICANT CHANGE UP (ref 5–17)
ANION GAP SERPL CALC-SCNC: 13 MMOL/L — SIGNIFICANT CHANGE UP (ref 5–17)
AST SERPL-CCNC: 79 U/L — HIGH (ref 10–40)
AST SERPL-CCNC: 79 U/L — HIGH (ref 10–40)
BASOPHILS # BLD AUTO: 0.01 K/UL — SIGNIFICANT CHANGE UP (ref 0–0.2)
BASOPHILS # BLD AUTO: 0.01 K/UL — SIGNIFICANT CHANGE UP (ref 0–0.2)
BASOPHILS NFR BLD AUTO: 0.1 % — SIGNIFICANT CHANGE UP (ref 0–2)
BASOPHILS NFR BLD AUTO: 0.1 % — SIGNIFICANT CHANGE UP (ref 0–2)
BILIRUB SERPL-MCNC: 0.9 MG/DL — SIGNIFICANT CHANGE UP (ref 0.2–1.2)
BILIRUB SERPL-MCNC: 0.9 MG/DL — SIGNIFICANT CHANGE UP (ref 0.2–1.2)
BUN SERPL-MCNC: 48 MG/DL — HIGH (ref 7–23)
BUN SERPL-MCNC: 48 MG/DL — HIGH (ref 7–23)
CALCIUM SERPL-MCNC: 8.2 MG/DL — LOW (ref 8.4–10.5)
CALCIUM SERPL-MCNC: 8.2 MG/DL — LOW (ref 8.4–10.5)
CHLORIDE SERPL-SCNC: 106 MMOL/L — SIGNIFICANT CHANGE UP (ref 96–108)
CHLORIDE SERPL-SCNC: 106 MMOL/L — SIGNIFICANT CHANGE UP (ref 96–108)
CO2 SERPL-SCNC: 21 MMOL/L — LOW (ref 22–31)
CO2 SERPL-SCNC: 21 MMOL/L — LOW (ref 22–31)
CREAT SERPL-MCNC: 2.31 MG/DL — HIGH (ref 0.5–1.3)
CREAT SERPL-MCNC: 2.31 MG/DL — HIGH (ref 0.5–1.3)
CULTURE RESULTS: NO GROWTH — SIGNIFICANT CHANGE UP
CULTURE RESULTS: NO GROWTH — SIGNIFICANT CHANGE UP
EGFR: 29 ML/MIN/1.73M2 — LOW
EGFR: 29 ML/MIN/1.73M2 — LOW
EOSINOPHIL # BLD AUTO: 0 K/UL — SIGNIFICANT CHANGE UP (ref 0–0.5)
EOSINOPHIL # BLD AUTO: 0 K/UL — SIGNIFICANT CHANGE UP (ref 0–0.5)
EOSINOPHIL NFR BLD AUTO: 0 % — SIGNIFICANT CHANGE UP (ref 0–6)
EOSINOPHIL NFR BLD AUTO: 0 % — SIGNIFICANT CHANGE UP (ref 0–6)
GAS PNL BLDA: SIGNIFICANT CHANGE UP
GAS PNL BLDA: SIGNIFICANT CHANGE UP
GLUCOSE SERPL-MCNC: 168 MG/DL — HIGH (ref 70–99)
GLUCOSE SERPL-MCNC: 168 MG/DL — HIGH (ref 70–99)
HCT VFR BLD CALC: 27.9 % — LOW (ref 39–50)
HCT VFR BLD CALC: 27.9 % — LOW (ref 39–50)
HGB BLD-MCNC: 9.6 G/DL — LOW (ref 13–17)
HGB BLD-MCNC: 9.6 G/DL — LOW (ref 13–17)
IMM GRANULOCYTES NFR BLD AUTO: 0.3 % — SIGNIFICANT CHANGE UP (ref 0–0.9)
IMM GRANULOCYTES NFR BLD AUTO: 0.3 % — SIGNIFICANT CHANGE UP (ref 0–0.9)
INR BLD: 1.17 RATIO — SIGNIFICANT CHANGE UP (ref 0.85–1.18)
INR BLD: 1.17 RATIO — SIGNIFICANT CHANGE UP (ref 0.85–1.18)
LYMPHOCYTES # BLD AUTO: 0.96 K/UL — LOW (ref 1–3.3)
LYMPHOCYTES # BLD AUTO: 0.96 K/UL — LOW (ref 1–3.3)
LYMPHOCYTES # BLD AUTO: 9 % — LOW (ref 13–44)
LYMPHOCYTES # BLD AUTO: 9 % — LOW (ref 13–44)
MAGNESIUM SERPL-MCNC: 2.3 MG/DL — SIGNIFICANT CHANGE UP (ref 1.6–2.6)
MAGNESIUM SERPL-MCNC: 2.3 MG/DL — SIGNIFICANT CHANGE UP (ref 1.6–2.6)
MCHC RBC-ENTMCNC: 31.2 PG — SIGNIFICANT CHANGE UP (ref 27–34)
MCHC RBC-ENTMCNC: 31.2 PG — SIGNIFICANT CHANGE UP (ref 27–34)
MCHC RBC-ENTMCNC: 34.4 GM/DL — SIGNIFICANT CHANGE UP (ref 32–36)
MCHC RBC-ENTMCNC: 34.4 GM/DL — SIGNIFICANT CHANGE UP (ref 32–36)
MCV RBC AUTO: 90.6 FL — SIGNIFICANT CHANGE UP (ref 80–100)
MCV RBC AUTO: 90.6 FL — SIGNIFICANT CHANGE UP (ref 80–100)
MONOCYTES # BLD AUTO: 1.22 K/UL — HIGH (ref 0–0.9)
MONOCYTES # BLD AUTO: 1.22 K/UL — HIGH (ref 0–0.9)
MONOCYTES NFR BLD AUTO: 11.5 % — SIGNIFICANT CHANGE UP (ref 2–14)
MONOCYTES NFR BLD AUTO: 11.5 % — SIGNIFICANT CHANGE UP (ref 2–14)
NEUTROPHILS # BLD AUTO: 8.41 K/UL — HIGH (ref 1.8–7.4)
NEUTROPHILS # BLD AUTO: 8.41 K/UL — HIGH (ref 1.8–7.4)
NEUTROPHILS NFR BLD AUTO: 79.1 % — HIGH (ref 43–77)
NEUTROPHILS NFR BLD AUTO: 79.1 % — HIGH (ref 43–77)
NRBC # BLD: 0 /100 WBCS — SIGNIFICANT CHANGE UP (ref 0–0)
NRBC # BLD: 0 /100 WBCS — SIGNIFICANT CHANGE UP (ref 0–0)
PHOSPHATE SERPL-MCNC: 4 MG/DL — SIGNIFICANT CHANGE UP (ref 2.5–4.5)
PHOSPHATE SERPL-MCNC: 4 MG/DL — SIGNIFICANT CHANGE UP (ref 2.5–4.5)
PLATELET # BLD AUTO: 105 K/UL — LOW (ref 150–400)
PLATELET # BLD AUTO: 105 K/UL — LOW (ref 150–400)
POTASSIUM SERPL-MCNC: 3.6 MMOL/L — SIGNIFICANT CHANGE UP (ref 3.5–5.3)
POTASSIUM SERPL-MCNC: 3.6 MMOL/L — SIGNIFICANT CHANGE UP (ref 3.5–5.3)
POTASSIUM SERPL-SCNC: 3.6 MMOL/L — SIGNIFICANT CHANGE UP (ref 3.5–5.3)
POTASSIUM SERPL-SCNC: 3.6 MMOL/L — SIGNIFICANT CHANGE UP (ref 3.5–5.3)
PROT SERPL-MCNC: 5.8 G/DL — LOW (ref 6–8.3)
PROT SERPL-MCNC: 5.8 G/DL — LOW (ref 6–8.3)
PROTHROM AB SERPL-ACNC: 12.2 SEC — SIGNIFICANT CHANGE UP (ref 9.5–13)
PROTHROM AB SERPL-ACNC: 12.2 SEC — SIGNIFICANT CHANGE UP (ref 9.5–13)
RBC # BLD: 3.08 M/UL — LOW (ref 4.2–5.8)
RBC # BLD: 3.08 M/UL — LOW (ref 4.2–5.8)
RBC # FLD: 13.1 % — SIGNIFICANT CHANGE UP (ref 10.3–14.5)
RBC # FLD: 13.1 % — SIGNIFICANT CHANGE UP (ref 10.3–14.5)
SODIUM SERPL-SCNC: 140 MMOL/L — SIGNIFICANT CHANGE UP (ref 135–145)
SODIUM SERPL-SCNC: 140 MMOL/L — SIGNIFICANT CHANGE UP (ref 135–145)
SPECIMEN SOURCE: SIGNIFICANT CHANGE UP
SPECIMEN SOURCE: SIGNIFICANT CHANGE UP
WBC # BLD: 10.63 K/UL — HIGH (ref 3.8–10.5)
WBC # BLD: 10.63 K/UL — HIGH (ref 3.8–10.5)
WBC # FLD AUTO: 10.63 K/UL — HIGH (ref 3.8–10.5)
WBC # FLD AUTO: 10.63 K/UL — HIGH (ref 3.8–10.5)

## 2023-12-21 PROCEDURE — 99291 CRITICAL CARE FIRST HOUR: CPT

## 2023-12-21 PROCEDURE — 93010 ELECTROCARDIOGRAM REPORT: CPT

## 2023-12-21 PROCEDURE — 95720 EEG PHY/QHP EA INCR W/VEEG: CPT

## 2023-12-21 PROCEDURE — 99292 CRITICAL CARE ADDL 30 MIN: CPT

## 2023-12-21 PROCEDURE — 99221 1ST HOSP IP/OBS SF/LOW 40: CPT

## 2023-12-21 PROCEDURE — 71045 X-RAY EXAM CHEST 1 VIEW: CPT | Mod: 26

## 2023-12-21 PROCEDURE — 70450 CT HEAD/BRAIN W/O DYE: CPT | Mod: 26

## 2023-12-21 PROCEDURE — 99223 1ST HOSP IP/OBS HIGH 75: CPT | Mod: GC

## 2023-12-21 PROCEDURE — 99253 IP/OBS CNSLTJ NEW/EST LOW 45: CPT

## 2023-12-21 PROCEDURE — 31575 DIAGNOSTIC LARYNGOSCOPY: CPT | Mod: 78

## 2023-12-21 RX ORDER — CARVEDILOL PHOSPHATE 80 MG/1
3.12 CAPSULE, EXTENDED RELEASE ORAL EVERY 12 HOURS
Refills: 0 | Status: DISCONTINUED | OUTPATIENT
Start: 2023-12-21 | End: 2023-12-27

## 2023-12-21 RX ORDER — AMLODIPINE BESYLATE 2.5 MG/1
5 TABLET ORAL DAILY
Refills: 0 | Status: DISCONTINUED | OUTPATIENT
Start: 2023-12-21 | End: 2023-12-27

## 2023-12-21 RX ORDER — DEXMEDETOMIDINE HYDROCHLORIDE IN 0.9% SODIUM CHLORIDE 4 UG/ML
0.2 INJECTION INTRAVENOUS
Qty: 200 | Refills: 0 | Status: DISCONTINUED | OUTPATIENT
Start: 2023-12-21 | End: 2023-12-27

## 2023-12-21 RX ORDER — CARVEDILOL PHOSPHATE 80 MG/1
3.12 CAPSULE, EXTENDED RELEASE ORAL EVERY 12 HOURS
Refills: 0 | Status: DISCONTINUED | OUTPATIENT
Start: 2023-12-21 | End: 2023-12-21

## 2023-12-21 RX ORDER — HYDRALAZINE HCL 50 MG
10 TABLET ORAL ONCE
Refills: 0 | Status: COMPLETED | OUTPATIENT
Start: 2023-12-21 | End: 2023-12-21

## 2023-12-21 RX ORDER — METOPROLOL TARTRATE 50 MG
5 TABLET ORAL ONCE
Refills: 0 | Status: COMPLETED | OUTPATIENT
Start: 2023-12-21 | End: 2023-12-21

## 2023-12-21 RX ORDER — INSULIN LISPRO 100/ML
VIAL (ML) SUBCUTANEOUS EVERY 6 HOURS
Refills: 0 | Status: DISCONTINUED | OUTPATIENT
Start: 2023-12-21 | End: 2023-12-27

## 2023-12-21 RX ORDER — LABETALOL HCL 100 MG
10 TABLET ORAL ONCE
Refills: 0 | Status: COMPLETED | OUTPATIENT
Start: 2023-12-21 | End: 2023-12-21

## 2023-12-21 RX ORDER — FENTANYL CITRATE 50 UG/ML
50 INJECTION INTRAVENOUS ONCE
Refills: 0 | Status: DISCONTINUED | OUTPATIENT
Start: 2023-12-21 | End: 2023-12-21

## 2023-12-21 RX ORDER — POTASSIUM CHLORIDE 20 MEQ
20 PACKET (EA) ORAL ONCE
Refills: 0 | Status: COMPLETED | OUTPATIENT
Start: 2023-12-21 | End: 2023-12-21

## 2023-12-21 RX ORDER — ACETAMINOPHEN 500 MG
1000 TABLET ORAL ONCE
Refills: 0 | Status: COMPLETED | OUTPATIENT
Start: 2023-12-21 | End: 2023-12-21

## 2023-12-21 RX ORDER — HYDROMORPHONE HYDROCHLORIDE 2 MG/ML
0.5 INJECTION INTRAMUSCULAR; INTRAVENOUS; SUBCUTANEOUS ONCE
Refills: 0 | Status: DISCONTINUED | OUTPATIENT
Start: 2023-12-21 | End: 2023-12-21

## 2023-12-21 RX ORDER — CARVEDILOL PHOSPHATE 80 MG/1
3.12 CAPSULE, EXTENDED RELEASE ORAL
Refills: 0 | Status: DISCONTINUED | OUTPATIENT
Start: 2023-12-21 | End: 2023-12-21

## 2023-12-21 RX ORDER — AMLODIPINE BESYLATE 2.5 MG/1
10 TABLET ORAL DAILY
Refills: 0 | Status: DISCONTINUED | OUTPATIENT
Start: 2023-12-21 | End: 2023-12-21

## 2023-12-21 RX ADMIN — CHLORHEXIDINE GLUCONATE 15 MILLILITER(S): 213 SOLUTION TOPICAL at 17:22

## 2023-12-21 RX ADMIN — FENTANYL CITRATE 50 MICROGRAM(S): 50 INJECTION INTRAVENOUS at 06:45

## 2023-12-21 RX ADMIN — LEVETIRACETAM 400 MILLIGRAM(S): 250 TABLET, FILM COATED ORAL at 17:22

## 2023-12-21 RX ADMIN — Medication 10 MILLIGRAM(S): at 01:41

## 2023-12-21 RX ADMIN — Medication 5 MILLIGRAM(S): at 05:30

## 2023-12-21 RX ADMIN — DEXMEDETOMIDINE HYDROCHLORIDE IN 0.9% SODIUM CHLORIDE 4.68 MICROGRAM(S)/KG/HR: 4 INJECTION INTRAVENOUS at 21:49

## 2023-12-21 RX ADMIN — PANTOPRAZOLE SODIUM 40 MILLIGRAM(S): 20 TABLET, DELAYED RELEASE ORAL at 13:21

## 2023-12-21 RX ADMIN — CHLORHEXIDINE GLUCONATE 15 MILLILITER(S): 213 SOLUTION TOPICAL at 05:01

## 2023-12-21 RX ADMIN — Medication 1 APPLICATION(S): at 21:46

## 2023-12-21 RX ADMIN — Medication 10 MILLIGRAM(S): at 08:04

## 2023-12-21 RX ADMIN — Medication 10 MILLIGRAM(S): at 15:25

## 2023-12-21 RX ADMIN — Medication 1: at 17:42

## 2023-12-21 RX ADMIN — Medication 5 MILLIGRAM(S): at 05:12

## 2023-12-21 RX ADMIN — CEFTRIAXONE 100 MILLIGRAM(S): 500 INJECTION, POWDER, FOR SOLUTION INTRAMUSCULAR; INTRAVENOUS at 14:51

## 2023-12-21 RX ADMIN — AMLODIPINE BESYLATE 5 MILLIGRAM(S): 2.5 TABLET ORAL at 17:23

## 2023-12-21 RX ADMIN — Medication 5 MILLIGRAM(S): at 04:54

## 2023-12-21 RX ADMIN — LEVETIRACETAM 400 MILLIGRAM(S): 250 TABLET, FILM COATED ORAL at 05:01

## 2023-12-21 RX ADMIN — Medication 400 MILLIGRAM(S): at 17:43

## 2023-12-21 RX ADMIN — Medication 1 APPLICATION(S): at 05:01

## 2023-12-21 RX ADMIN — FENTANYL CITRATE 50 MICROGRAM(S): 50 INJECTION INTRAVENOUS at 02:09

## 2023-12-21 RX ADMIN — FENTANYL CITRATE 50 MICROGRAM(S): 50 INJECTION INTRAVENOUS at 02:30

## 2023-12-21 RX ADMIN — Medication 1 APPLICATION(S): at 14:51

## 2023-12-21 RX ADMIN — Medication 100 MILLIEQUIVALENT(S): at 02:27

## 2023-12-21 RX ADMIN — HYDROMORPHONE HYDROCHLORIDE 0.5 MILLIGRAM(S): 2 INJECTION INTRAMUSCULAR; INTRAVENOUS; SUBCUTANEOUS at 08:14

## 2023-12-21 RX ADMIN — CHLORHEXIDINE GLUCONATE 1 APPLICATION(S): 213 SOLUTION TOPICAL at 21:46

## 2023-12-21 RX ADMIN — DEXMEDETOMIDINE HYDROCHLORIDE IN 0.9% SODIUM CHLORIDE 4.68 MICROGRAM(S)/KG/HR: 4 INJECTION INTRAVENOUS at 17:23

## 2023-12-21 RX ADMIN — Medication 1 DROP(S): at 17:23

## 2023-12-21 RX ADMIN — HYDROMORPHONE HYDROCHLORIDE 0.5 MILLIGRAM(S): 2 INJECTION INTRAMUSCULAR; INTRAVENOUS; SUBCUTANEOUS at 08:29

## 2023-12-21 RX ADMIN — Medication 1 DROP(S): at 05:01

## 2023-12-21 RX ADMIN — FENTANYL CITRATE 50 MICROGRAM(S): 50 INJECTION INTRAVENOUS at 06:31

## 2023-12-21 NOTE — ED CLERICAL - NSCLERICAL TASK_GEN_ALL_ED
Pre-Hospital Care Report (PCR) Kilograms Preamble Statement (Weight Entered In Details Tab): Reported Weight in kilograms:

## 2023-12-21 NOTE — DIETITIAN INITIAL EVALUATION ADULT - PHYSCIAL ASSESSMENT
Dosing wt: 205 pounds (12/19/23). Daily bed scale wt of 207.6 pounds (12/20). No additional weights in [facility] at this time. Will monitor trends as available.    IBW:172 pounds Dosing wt: 205 pounds (12/19/23). Daily bed scale wt of 207.6 pounds (12/20). No additional weights in [facility] at this time. Will monitor trends as available.    IBW:172 pounds

## 2023-12-21 NOTE — PROGRESS NOTE ADULT - PROBLEM SELECTOR PLAN 1
Plan for packing removal today  Plan for NGT placement Avoid oral trauma  Suction oral cavity through the right side  CXR for confirmation  Call ENT with questions

## 2023-12-21 NOTE — DIETITIAN INITIAL EVALUATION ADULT - REASON FOR ADMISSION
"79 y/o Male w/ PMH HTN HLD presents as transfer from [facility] s/p VT arrest s/p shock x2 w/ unknown downtime. Pt found to have trauma to head w/ superficial scalp bleeding and oral mucosal bleeding."   "79 y/o Male w/ PMH HTN HLD presents as transfer from [facility] s/p VT arrest s/p shock x2 w/ unknown downtime. Pt found to have trauma to head w/ superficial scalp bleeding and oral mucosal bleeding."

## 2023-12-21 NOTE — DIETITIAN INITIAL EVALUATION ADULT - PERTINENT LABORATORY DATA
12-21    140  |  106  |  48<H>  ----------------------------<  168<H>  3.6   |  21<L>  |  2.31<H>    Ca    8.2<L>      21 Dec 2023 01:02  Phos  4.0     12-21  Mg     2.3     12-21    TPro  5.8<L>  /  Alb  3.3  /  TBili  0.9  /  DBili  x   /  AST  79<H>  /  ALT  166<H>  /  AlkPhos  46  12-21  A1C with Estimated Average Glucose Result: 5.5 % (12-19-23 @ 15:22)

## 2023-12-21 NOTE — DIETITIAN INITIAL EVALUATION ADULT - OTHER CALCULATIONS
estimated energy & protein needs based on dosing wt of 205 pounds with consideration for intubation. Defer fluid needs to team.  The [employer] State Equation (PSU) 2003b was used to calculate resting energy expenditure: 1824 kcal (12-21)

## 2023-12-21 NOTE — PROGRESS NOTE ADULT - SUBJECTIVE AND OBJECTIVE BOX
Patient is a 78y old  Male who presents with a chief complaint of "77 y/o Male w/ PMH HTN HLD presents as transfer from [facility] s/p VT arrest s/p shock x2 w/ unknown downtime. Pt found to have trauma to head w/ superficial scalp bleeding and oral mucosal bleeding."   (21 Dec 2023 14:42)      INTERVAL HISTORY:  -    SUBJECTIVE  - Patient seen and evaluated at bedside.     MEDICATIONS:  MEDICATIONS  (STANDING):  amLODIPine   Tablet 5 milliGRAM(s) Oral daily  artificial  tears Solution 1 Drop(s) Both EYES two times a day  cefTRIAXone   IVPB 1000 milliGRAM(s) IV Intermittent every 24 hours  chlorhexidine 0.12% Liquid 15 milliLiter(s) Oral Mucosa every 12 hours  chlorhexidine 2% Cloths 1 Application(s) Topical daily  dexMEDEtomidine Infusion 0.2 MICROgram(s)/kG/Hr (4.68 mL/Hr) IV Continuous <Continuous>  insulin lispro (ADMELOG) corrective regimen sliding scale   SubCutaneous every 6 hours  levETIRAcetam  IVPB 1000 milliGRAM(s) IV Intermittent every 12 hours  pantoprazole  Injectable 40 milliGRAM(s) IV Push daily  petrolatum Ophthalmic Ointment 1 Application(s) Both EYES three times a day    MEDICATIONS  (PRN):      OBJECTIVE:  ICU Vital Signs Last 24 Hrs  T(C): 38.7 (21 Dec 2023 17:30), Max: 38.7 (21 Dec 2023 17:30)  T(F): 101.6 (21 Dec 2023 17:30), Max: 101.6 (21 Dec 2023 17:30)  HR: 76 (21 Dec 2023 18:30) (72 - 150)  BP: --  BP(mean): --  ABP: 125/53 (21 Dec 2023 18:30) (125/53 - 206/94)  ABP(mean): 74 (21 Dec 2023 18:30) (74 - 182)  RR: 22 (21 Dec 2023 18:30) (6 - 37)  SpO2: 98% (21 Dec 2023 18:30) (97% - 100%)    O2 Parameters below as of 21 Dec 2023 18:00  Patient On (Oxygen Delivery Method): ventilator    O2 Concentration (%): 40      Mode: AC/ CMV (Assist Control/ Continuous Mandatory Ventilation)  RR (machine): 14  TV (machine): 500  FiO2: 40  PEEP: 5  ITime: 1  MAP: 10  PIP: 25    Adult Advanced Hemodynamics Last 24 Hrs  CVP(mm Hg): 7 (21 Dec 2023 18:30) (4 - 14)  CVP(cm H2O): --  CO: --  CI: --  PA: --  PA(mean): --  PCWP: --  SVR: --  SVRI: --  PVR: --  PVRI: --  CAPILLARY BLOOD GLUCOSE      POCT Blood Glucose.: 158 mg/dL (21 Dec 2023 16:58)    CAPILLARY BLOOD GLUCOSE      POCT Blood Glucose.: 158 mg/dL (21 Dec 2023 16:58)    I&O's Summary    20 Dec 2023 07:01  -  21 Dec 2023 07:00  --------------------------------------------------------  IN: 1138.2 mL / OUT: 1385 mL / NET: -246.8 mL    21 Dec 2023 07:01  -  21 Dec 2023 19:15  --------------------------------------------------------  IN: 314.1 mL / OUT: 600 mL / NET: -285.9 mL      Daily     Daily     PHYSICAL EXAM:  General: NAD, well-groomed, well-developed  Eyes: Conjunctiva and sclera clear  ENMT: Moist mucous membranes  Neck: Supple  Chest: Clear to auscultation bilaterally; no rales, rhonchi, or wheezing  Heart: Regular rate and rhythm; normal S1 and S2; no murmurs, rubs, or gallops  Abd: Soft, nontender, nondistended  Nervous System: AAOX3  Ext: no peripheral LE edema bilaterally  Lines/Tubes: IV peripheral    LABS:  ABG - ( 21 Dec 2023 00:53 )  pH, Arterial: 7.47  pH, Blood: x     /  pCO2: 32    /  pO2: 145   / HCO3: 23    / Base Excess: 0.0   /  SaO2: 98.8                                    9.6    10.63 )-----------( 105      ( 21 Dec 2023 01:02 )             27.9     12-21    140  |  106  |  48<H>  ----------------------------<  168<H>  3.6   |  21<L>  |  2.31<H>    Ca    8.2<L>      21 Dec 2023 01:02  Phos  4.0     12-21  Mg     2.3     12-21    TPro  5.8<L>  /  Alb  3.3  /  TBili  0.9  /  DBili  x   /  AST  79<H>  /  ALT  166<H>  /  AlkPhos  46  12-21    LIVER FUNCTIONS - ( 21 Dec 2023 01:02 )  Alb: 3.3 g/dL / Pro: 5.8 g/dL / ALK PHOS: 46 U/L / ALT: 166 U/L / AST: 79 U/L / GGT: x           PT/INR - ( 21 Dec 2023 01:02 )   PT: 12.2 sec;   INR: 1.17 ratio             CARDIAC MARKERS ( 20 Dec 2023 18:01 )  x     / x     / 745 U/L / x     / 28.7 ng/mL  CARDIAC MARKERS ( 20 Dec 2023 02:09 )  x     / x     / 1110 U/L / x     / 133.8 ng/mL      Urinalysis Basic - ( 21 Dec 2023 01:02 )    Color: x / Appearance: x / SG: x / pH: x  Gluc: 168 mg/dL / Ketone: x  / Bili: x / Urobili: x   Blood: x / Protein: x / Nitrite: x   Leuk Esterase: x / RBC: x / WBC x   Sq Epi: x / Non Sq Epi: x / Bacteria: x          Plan:  ====================== NEUROLOGY=====================  - continue to monitor mental status as per protocol     ==================== RESPIRATORY======================  - continue to monitor SpO2 with goal >94%    Mechanical Vent: Mode: AC/ CMV (Assist Control/ Continuous Mandatory Ventilation)  RR (machine): 14  TV (machine): 500  FiO2: 40  PEEP: 5  ITime: 1  MAP: 10  PIP: 25      ====================CARDIOVASCULAR==================      ===================== RENAL =========================  - Continue monitoring urine output, lytes, SCr/ BUN  - replete lytes prn with goal K >4 and Mg >2    =============== GASTROINTESTINAL===================      ===================ENDO====================      ===================HEMATOLOGIC/ONC ===================  - Monitor H/H and plts  - DVT PPX:     ==================INFECTIOUS DISEASE================  - monitor and trend WBC and temperature curve     Dispo:    Patient requires continuous monitoring with bedside rhythm monitoring, arterial line, pulse oximetry, ventilator monitoring and intermittent blood gas analysis.  Care plan discussed with ICU care team.  I have spent 35 minutes providing critical care, in addition to initial critical time provided by CICU attending Dr. Mejia, re-evaluated multiple times during the day.    Aziza Ross PA-C Patient is a 78y old  Male who presents with a chief complaint of "77 y/o Male w/ PMH HTN HLD presents as transfer from [facility] s/p VT arrest s/p shock x2 w/ unknown downtime. Pt found to have trauma to head w/ superficial scalp bleeding and oral mucosal bleeding."   (21 Dec 2023 14:42)      INTERVAL HISTORY:  -    SUBJECTIVE  - Patient seen and evaluated at bedside.     MEDICATIONS:  MEDICATIONS  (STANDING):  amLODIPine   Tablet 5 milliGRAM(s) Oral daily  artificial  tears Solution 1 Drop(s) Both EYES two times a day  cefTRIAXone   IVPB 1000 milliGRAM(s) IV Intermittent every 24 hours  chlorhexidine 0.12% Liquid 15 milliLiter(s) Oral Mucosa every 12 hours  chlorhexidine 2% Cloths 1 Application(s) Topical daily  dexMEDEtomidine Infusion 0.2 MICROgram(s)/kG/Hr (4.68 mL/Hr) IV Continuous <Continuous>  insulin lispro (ADMELOG) corrective regimen sliding scale   SubCutaneous every 6 hours  levETIRAcetam  IVPB 1000 milliGRAM(s) IV Intermittent every 12 hours  pantoprazole  Injectable 40 milliGRAM(s) IV Push daily  petrolatum Ophthalmic Ointment 1 Application(s) Both EYES three times a day    MEDICATIONS  (PRN):      OBJECTIVE:  ICU Vital Signs Last 24 Hrs  T(C): 38.7 (21 Dec 2023 17:30), Max: 38.7 (21 Dec 2023 17:30)  T(F): 101.6 (21 Dec 2023 17:30), Max: 101.6 (21 Dec 2023 17:30)  HR: 76 (21 Dec 2023 18:30) (72 - 150)  BP: --  BP(mean): --  ABP: 125/53 (21 Dec 2023 18:30) (125/53 - 206/94)  ABP(mean): 74 (21 Dec 2023 18:30) (74 - 182)  RR: 22 (21 Dec 2023 18:30) (6 - 37)  SpO2: 98% (21 Dec 2023 18:30) (97% - 100%)    O2 Parameters below as of 21 Dec 2023 18:00  Patient On (Oxygen Delivery Method): ventilator    O2 Concentration (%): 40      Mode: AC/ CMV (Assist Control/ Continuous Mandatory Ventilation)  RR (machine): 14  TV (machine): 500  FiO2: 40  PEEP: 5  ITime: 1  MAP: 10  PIP: 25    Adult Advanced Hemodynamics Last 24 Hrs  CVP(mm Hg): 7 (21 Dec 2023 18:30) (4 - 14)  CVP(cm H2O): --  CO: --  CI: --  PA: --  PA(mean): --  PCWP: --  SVR: --  SVRI: --  PVR: --  PVRI: --  CAPILLARY BLOOD GLUCOSE      POCT Blood Glucose.: 158 mg/dL (21 Dec 2023 16:58)    CAPILLARY BLOOD GLUCOSE      POCT Blood Glucose.: 158 mg/dL (21 Dec 2023 16:58)    I&O's Summary    20 Dec 2023 07:01  -  21 Dec 2023 07:00  --------------------------------------------------------  IN: 1138.2 mL / OUT: 1385 mL / NET: -246.8 mL    21 Dec 2023 07:01  -  21 Dec 2023 19:15  --------------------------------------------------------  IN: 314.1 mL / OUT: 600 mL / NET: -285.9 mL      Daily     Daily     PHYSICAL EXAM:  General: NAD, well-groomed, well-developed  Eyes: Conjunctiva and sclera clear  ENMT: Moist mucous membranes  Neck: Supple  Chest: Clear to auscultation bilaterally; no rales, rhonchi, or wheezing  Heart: Regular rate and rhythm; normal S1 and S2; no murmurs, rubs, or gallops  Abd: Soft, nontender, nondistended  Nervous System: AAOX3  Ext: no peripheral LE edema bilaterally  Lines/Tubes: IV peripheral    LABS:  ABG - ( 21 Dec 2023 00:53 )  pH, Arterial: 7.47  pH, Blood: x     /  pCO2: 32    /  pO2: 145   / HCO3: 23    / Base Excess: 0.0   /  SaO2: 98.8                                    9.6    10.63 )-----------( 105      ( 21 Dec 2023 01:02 )             27.9     12-21    140  |  106  |  48<H>  ----------------------------<  168<H>  3.6   |  21<L>  |  2.31<H>    Ca    8.2<L>      21 Dec 2023 01:02  Phos  4.0     12-21  Mg     2.3     12-21    TPro  5.8<L>  /  Alb  3.3  /  TBili  0.9  /  DBili  x   /  AST  79<H>  /  ALT  166<H>  /  AlkPhos  46  12-21    LIVER FUNCTIONS - ( 21 Dec 2023 01:02 )  Alb: 3.3 g/dL / Pro: 5.8 g/dL / ALK PHOS: 46 U/L / ALT: 166 U/L / AST: 79 U/L / GGT: x           PT/INR - ( 21 Dec 2023 01:02 )   PT: 12.2 sec;   INR: 1.17 ratio             CARDIAC MARKERS ( 20 Dec 2023 18:01 )  x     / x     / 745 U/L / x     / 28.7 ng/mL  CARDIAC MARKERS ( 20 Dec 2023 02:09 )  x     / x     / 1110 U/L / x     / 133.8 ng/mL      Urinalysis Basic - ( 21 Dec 2023 01:02 )    Color: x / Appearance: x / SG: x / pH: x  Gluc: 168 mg/dL / Ketone: x  / Bili: x / Urobili: x   Blood: x / Protein: x / Nitrite: x   Leuk Esterase: x / RBC: x / WBC x   Sq Epi: x / Non Sq Epi: x / Bacteria: x          Plan:  ====================== NEUROLOGY=====================  - continue to monitor mental status as per protocol     ==================== RESPIRATORY======================  - continue to monitor SpO2 with goal >94%    Mechanical Vent: Mode: AC/ CMV (Assist Control/ Continuous Mandatory Ventilation)  RR (machine): 14  TV (machine): 500  FiO2: 40  PEEP: 5  ITime: 1  MAP: 10  PIP: 25      ====================CARDIOVASCULAR==================      ===================== RENAL =========================  - Continue monitoring urine output, lytes, SCr/ BUN  - replete lytes prn with goal K >4 and Mg >2    =============== GASTROINTESTINAL===================      ===================ENDO====================      ===================HEMATOLOGIC/ONC ===================  - Monitor H/H and plts  - DVT PPX:     ==================INFECTIOUS DISEASE================  - monitor and trend WBC and temperature curve     Dispo:    Patient requires continuous monitoring with bedside rhythm monitoring, arterial line, pulse oximetry, ventilator monitoring and intermittent blood gas analysis.  Care plan discussed with ICU care team.  I have spent 35 minutes providing critical care, in addition to initial critical time provided by CICU attending Dr. Mejia, re-evaluated multiple times during the day.    Aziza Ross PA-C Patient is a 78y old  Male who presents from [facility] s/p cardiac arrest.   (21 Dec 2023 14:42)    INTERVAL HISTORY:  - OGT placed by ENT  - started on Amlodipine 5 and Coreg 3.125 for BP control    SUBJECTIVE  - Patient seen and evaluated at bedside.     MEDICATIONS:  MEDICATIONS  (STANDING):  amLODIPine   Tablet 5 milliGRAM(s) Oral daily  artificial  tears Solution 1 Drop(s) Both EYES two times a day  cefTRIAXone   IVPB 1000 milliGRAM(s) IV Intermittent every 24 hours  chlorhexidine 0.12% Liquid 15 milliLiter(s) Oral Mucosa every 12 hours  chlorhexidine 2% Cloths 1 Application(s) Topical daily  dexMEDEtomidine Infusion 0.2 MICROgram(s)/kG/Hr (4.68 mL/Hr) IV Continuous <Continuous>  insulin lispro (ADMELOG) corrective regimen sliding scale   SubCutaneous every 6 hours  levETIRAcetam  IVPB 1000 milliGRAM(s) IV Intermittent every 12 hours  pantoprazole  Injectable 40 milliGRAM(s) IV Push daily  petrolatum Ophthalmic Ointment 1 Application(s) Both EYES three times a day    OBJECTIVE:  ICU Vital Signs Last 24 Hrs  T(C): 38.7 (21 Dec 2023 17:30), Max: 38.7 (21 Dec 2023 17:30)  T(F): 101.6 (21 Dec 2023 17:30), Max: 101.6 (21 Dec 2023 17:30)  HR: 76 (21 Dec 2023 18:30) (72 - 150)  ABP: 125/53 (21 Dec 2023 18:30) (125/53 - 206/94)  ABP(mean): 74 (21 Dec 2023 18:30) (74 - 182)  RR: 22 (21 Dec 2023 18:30) (6 - 37)  SpO2: 98% (21 Dec 2023 18:30) (97% - 100%)    O2 Parameters below as of 21 Dec 2023 18:00  Patient On (Oxygen Delivery Method): ventilator    O2 Concentration (%): 40    Mode: AC/ CMV (Assist Control/ Continuous Mandatory Ventilation)  RR (machine): 14  TV (machine): 500  FiO2: 40  PEEP: 5  ITime: 1  MAP: 10  PIP: 25    Adult Advanced Hemodynamics Last 24 Hrs  CVP(mm Hg): 7 (21 Dec 2023 18:30) (4 - 14)    CAPILLARY BLOOD GLUCOSE  POCT Blood Glucose.: 158 mg/dL (21 Dec 2023 16:58)    CAPILLARY BLOOD GLUCOSE  POCT Blood Glucose.: 158 mg/dL (21 Dec 2023 16:58)    I&O's Summary    20 Dec 2023 07:01  -  21 Dec 2023 07:00  --------------------------------------------------------  IN: 1138.2 mL / OUT: 1385 mL / NET: -246.8 mL    21 Dec 2023 07:01  -  21 Dec 2023 19:15  --------------------------------------------------------  IN: 314.1 mL / OUT: 600 mL / NET: -285.9 mL    PHYSICAL EXAM:  General: NAD  Head: Traumatic, Right eye hematoma  Chest: Clear to auscultation bilaterally; no rales, rhonchi, or wheezing  Heart: Regular rate and rhythm; normal S1 and S2; no murmurs, rubs, or gallops  Abd: Soft, nontender, nondistended  Nervous System: sedated on precedex, not following commands  Ext: no peripheral LE edema bilaterally    LABS:  ABG - ( 21 Dec 2023 00:53 )  pH, Arterial: 7.47  pH, Blood: x     /  pCO2: 32    /  pO2: 145   / HCO3: 23    / Base Excess: 0.0   /  SaO2: 98.8                          9.6    10.63 )-----------( 105      ( 21 Dec 2023 01:02 )             27.9     12-21    140  |  106  |  48<H>  ----------------------------<  168<H>  3.6   |  21<L>  |  2.31<H>    Ca    8.2<L>      21 Dec 2023 01:02  Phos  4.0     12-21  Mg     2.3     12-21    TPro  5.8<L>  /  Alb  3.3  /  TBili  0.9  /  DBili  x   /  AST  79<H>  /  ALT  166<H>  /  AlkPhos  46  12-21    LIVER FUNCTIONS - ( 21 Dec 2023 01:02 )  Alb: 3.3 g/dL / Pro: 5.8 g/dL / ALK PHOS: 46 U/L / ALT: 166 U/L / AST: 79 U/L / GGT: x           PT/INR - ( 21 Dec 2023 01:02 )   PT: 12.2 sec;   INR: 1.17 ratio      CARDIAC MARKERS ( 20 Dec 2023 18:01 )  x     / x     / 745 U/L / x     / 28.7 ng/mL  CARDIAC MARKERS ( 20 Dec 2023 02:09 )  x     / x     / 1110 U/L / x     / 133.8 ng/mL      Urinalysis Basic - ( 21 Dec 2023 01:02 )    Color: x / Appearance: x / SG: x / pH: x  Gluc: 168 mg/dL / Ketone: x  / Bili: x / Urobili: x   Blood: x / Protein: x / Nitrite: x   Leuk Esterase: x / RBC: x / WBC x   Sq Epi: x / Non Sq Epi: x / Bacteria: x      Assessment: 78M w/ PMH HTN HLD presents as transfer from [facility] s/p VT arrest s/p shock x2 w/ unknown downtime. Pt found to have trauma to head w/ superficial scalp bleeding and oral mucosal bleeding.     Plan:  ====================== NEUROLOGY=====================  Encephalopathy post arrest   - Intubated w/o sedation s/p cardiac arrest w/ unknown down time. Reported to be 20minutes but can be longer given ROSC achieved right before arrival to the hospital   - CTH at [facility] w/o intracranial bleed/pathology. CTH maybe too soon to show evidence of anoxic injury  - Pt remains w/o purposeful movement. Not following commands.  - Neuro consult placed for prognostication  - VEEG in place   - Repeat CTH today given abn CTH yesterday, neuro surg consulted   - check neuron specific enolase  - continue to monitor mental status as per protocol    SDH  - Pt w/ acute SAH and SDH 2/2 trauma  - Was not initially seen in outside scan at [facility] likely performed too early for radiologic evidence  - Neurology following, rec NSGY eval  - Likely no intervention given poor prognosis. Will obtain stability scans  - Maintain BP control     C-spine trauma  Pt w/ fall at home presented w/ c-collar.   -Per [facility] radiology report no evidence of cervical fracture  -Trauma surgery cleared pt of Cspine collar    Myoclonic jerking  - vEEG: evidence of mycolonic seizures  - Started keppra load    ==================== RESPIRATORY======================    - continue to monitor SpO2 with goal >94%    Mechanical Vent: Mode: AC/ CMV (Assist Control/ Continuous Mandatory Ventilation)  RR (machine): 14  TV (machine): 500  FiO2: 40  PEEP: 5  ITime: 1  MAP: 10  PIP: 25      ====================CARDIOVASCULAR==================  VT arrest  - Pt s/p VT arrest w/ unknown downtime. Pt reportedly received shock x2. No prior cardiac hx per family  - Check TTE   - Check cardiac enzymes, BNP  - Monitor for arrhythmias   - Pt not asa/plavix loaded 2/2 substernal hematoma noted on CT chest at [facility]  - Unlikely to be a candidate for LHC at this time given mental status     ===================== RENAL =========================  - Continue monitoring urine output, lytes, SCr/ BUN  - replete lytes prn with goal K >4 and Mg >2    =============== GASTROINTESTINAL===================      ===================ENDO====================      ===================HEMATOLOGIC/ONC ===================  - Monitor H/H and plts  - DVT PPX:     ==================INFECTIOUS DISEASE================  - monitor and trend WBC and temperature curve     Dispo:    Patient requires continuous monitoring with bedside rhythm monitoring, arterial line, pulse oximetry, ventilator monitoring and intermittent blood gas analysis.  Care plan discussed with ICU care team.  I have spent 35 minutes providing critical care, in addition to initial critical time provided by CICU attending Dr. Mejia, re-evaluated multiple times during the day.    Aziza Ross PA-C Patient is a 78y old  Male who presents from [facility] s/p cardiac arrest.   (21 Dec 2023 14:42)    INTERVAL HISTORY:  - OGT placed by ENT  - started on Amlodipine 5 and Coreg 3.125 for BP control    SUBJECTIVE  - Patient seen and evaluated at bedside.     MEDICATIONS:  MEDICATIONS  (STANDING):  amLODIPine   Tablet 5 milliGRAM(s) Oral daily  artificial  tears Solution 1 Drop(s) Both EYES two times a day  cefTRIAXone   IVPB 1000 milliGRAM(s) IV Intermittent every 24 hours  chlorhexidine 0.12% Liquid 15 milliLiter(s) Oral Mucosa every 12 hours  chlorhexidine 2% Cloths 1 Application(s) Topical daily  dexMEDEtomidine Infusion 0.2 MICROgram(s)/kG/Hr (4.68 mL/Hr) IV Continuous <Continuous>  insulin lispro (ADMELOG) corrective regimen sliding scale   SubCutaneous every 6 hours  levETIRAcetam  IVPB 1000 milliGRAM(s) IV Intermittent every 12 hours  pantoprazole  Injectable 40 milliGRAM(s) IV Push daily  petrolatum Ophthalmic Ointment 1 Application(s) Both EYES three times a day    OBJECTIVE:  ICU Vital Signs Last 24 Hrs  T(C): 38.7 (21 Dec 2023 17:30), Max: 38.7 (21 Dec 2023 17:30)  T(F): 101.6 (21 Dec 2023 17:30), Max: 101.6 (21 Dec 2023 17:30)  HR: 76 (21 Dec 2023 18:30) (72 - 150)  ABP: 125/53 (21 Dec 2023 18:30) (125/53 - 206/94)  ABP(mean): 74 (21 Dec 2023 18:30) (74 - 182)  RR: 22 (21 Dec 2023 18:30) (6 - 37)  SpO2: 98% (21 Dec 2023 18:30) (97% - 100%)    O2 Parameters below as of 21 Dec 2023 18:00  Patient On (Oxygen Delivery Method): ventilator    O2 Concentration (%): 40    Mode: AC/ CMV (Assist Control/ Continuous Mandatory Ventilation)  RR (machine): 14  TV (machine): 500  FiO2: 40  PEEP: 5  ITime: 1  MAP: 10  PIP: 25    Adult Advanced Hemodynamics Last 24 Hrs  CVP(mm Hg): 7 (21 Dec 2023 18:30) (4 - 14)    CAPILLARY BLOOD GLUCOSE  POCT Blood Glucose.: 158 mg/dL (21 Dec 2023 16:58)    CAPILLARY BLOOD GLUCOSE  POCT Blood Glucose.: 158 mg/dL (21 Dec 2023 16:58)    I&O's Summary    20 Dec 2023 07:01  -  21 Dec 2023 07:00  --------------------------------------------------------  IN: 1138.2 mL / OUT: 1385 mL / NET: -246.8 mL    21 Dec 2023 07:01  -  21 Dec 2023 19:15  --------------------------------------------------------  IN: 314.1 mL / OUT: 600 mL / NET: -285.9 mL    PHYSICAL EXAM:  General: NAD  Head: Traumatic, Right eye hematoma  Chest: Clear to auscultation bilaterally; no rales, rhonchi, or wheezing  Heart: Regular rate and rhythm; normal S1 and S2; no murmurs, rubs, or gallops  Abd: Soft, nontender, nondistended  Nervous System: sedated on precedex, not following commands  Ext: no peripheral LE edema bilaterally    LABS:  ABG - ( 21 Dec 2023 00:53 )  pH, Arterial: 7.47  pH, Blood: x     /  pCO2: 32    /  pO2: 145   / HCO3: 23    / Base Excess: 0.0   /  SaO2: 98.8                          9.6    10.63 )-----------( 105      ( 21 Dec 2023 01:02 )             27.9     12-21    140  |  106  |  48<H>  ----------------------------<  168<H>  3.6   |  21<L>  |  2.31<H>    Ca    8.2<L>      21 Dec 2023 01:02  Phos  4.0     12-21  Mg     2.3     12-21    TPro  5.8<L>  /  Alb  3.3  /  TBili  0.9  /  DBili  x   /  AST  79<H>  /  ALT  166<H>  /  AlkPhos  46  12-21    LIVER FUNCTIONS - ( 21 Dec 2023 01:02 )  Alb: 3.3 g/dL / Pro: 5.8 g/dL / ALK PHOS: 46 U/L / ALT: 166 U/L / AST: 79 U/L / GGT: x           PT/INR - ( 21 Dec 2023 01:02 )   PT: 12.2 sec;   INR: 1.17 ratio      CARDIAC MARKERS ( 20 Dec 2023 18:01 )  x     / x     / 745 U/L / x     / 28.7 ng/mL  CARDIAC MARKERS ( 20 Dec 2023 02:09 )  x     / x     / 1110 U/L / x     / 133.8 ng/mL      Urinalysis Basic - ( 21 Dec 2023 01:02 )    Color: x / Appearance: x / SG: x / pH: x  Gluc: 168 mg/dL / Ketone: x  / Bili: x / Urobili: x   Blood: x / Protein: x / Nitrite: x   Leuk Esterase: x / RBC: x / WBC x   Sq Epi: x / Non Sq Epi: x / Bacteria: x      Assessment: 78M w/ PMH HTN HLD presents as transfer from [facility] s/p VT arrest s/p shock x2 w/ unknown downtime. Pt found to have trauma to head w/ superficial scalp bleeding and oral mucosal bleeding.     Plan:  ====================== NEUROLOGY=====================  Encephalopathy post arrest   - Intubated w/o sedation s/p cardiac arrest w/ unknown down time. Reported to be 20minutes but can be longer given ROSC achieved right before arrival to the hospital   - CTH at [facility] w/o intracranial bleed/pathology. CTH maybe too soon to show evidence of anoxic injury  - Pt remains w/o purposeful movement. Not following commands.  - Neuro consult placed for prognostication  - VEEG in place   - Repeat CTH today given abn CTH yesterday, neuro surg consulted   - check neuron specific enolase  - continue to monitor mental status as per protocol    SDH  - Pt w/ acute SAH and SDH 2/2 trauma  - Was not initially seen in outside scan at [facility] likely performed too early for radiologic evidence  - Neurology following, rec NSGY eval  - Likely no intervention given poor prognosis. Will obtain stability scans  - Maintain BP control     C-spine trauma  Pt w/ fall at home presented w/ c-collar.   -Per [facility] radiology report no evidence of cervical fracture  -Trauma surgery cleared pt of Cspine collar    Myoclonic jerking  - vEEG: evidence of mycolonic seizures  - Started keppra load    ==================== RESPIRATORY======================    - continue to monitor SpO2 with goal >94%    Mechanical Vent: Mode: AC/ CMV (Assist Control/ Continuous Mandatory Ventilation)  RR (machine): 14  TV (machine): 500  FiO2: 40  PEEP: 5  ITime: 1  MAP: 10  PIP: 25      ====================CARDIOVASCULAR==================  VT arrest  - Pt s/p VT arrest w/ unknown downtime. Pt reportedly received shock x2. No prior cardiac hx per family  - Check TTE   - Check cardiac enzymes, BNP  - Monitor for arrhythmias   - Pt not asa/plavix loaded 2/2 substernal hematoma noted on CT chest at [facility]  - Unlikely to be a candidate for LHC at this time given mental status     ===================== RENAL =========================  - Continue monitoring urine output, lytes, SCr/ BUN  - replete lytes prn with goal K >4 and Mg >2    =============== GASTROINTESTINAL===================      ===================ENDO====================      ===================HEMATOLOGIC/ONC ===================  - Monitor H/H and plts  - DVT PPX:     ==================INFECTIOUS DISEASE================  - monitor and trend WBC and temperature curve     Dispo:    Patient requires continuous monitoring with bedside rhythm monitoring, arterial line, pulse oximetry, ventilator monitoring and intermittent blood gas analysis.  Care plan discussed with ICU care team.  I have spent 35 minutes providing critical care, in addition to initial critical time provided by CICU attending Dr. Mejia, re-evaluated multiple times during the day.    Aziza Ross PA-C Patient is a 78y old  Male who presents from [facility] s/p cardiac arrest.   (21 Dec 2023 14:42)    INTERVAL HISTORY:  - OGT placed by ENT  - started on Amlodipine 5 and Coreg 3.125 for BP control    SUBJECTIVE  - Patient seen and evaluated at bedside.     MEDICATIONS:  MEDICATIONS  (STANDING):  amLODIPine   Tablet 5 milliGRAM(s) Oral daily  artificial  tears Solution 1 Drop(s) Both EYES two times a day  cefTRIAXone   IVPB 1000 milliGRAM(s) IV Intermittent every 24 hours  chlorhexidine 0.12% Liquid 15 milliLiter(s) Oral Mucosa every 12 hours  chlorhexidine 2% Cloths 1 Application(s) Topical daily  dexMEDEtomidine Infusion 0.2 MICROgram(s)/kG/Hr (4.68 mL/Hr) IV Continuous <Continuous>  insulin lispro (ADMELOG) corrective regimen sliding scale   SubCutaneous every 6 hours  levETIRAcetam  IVPB 1000 milliGRAM(s) IV Intermittent every 12 hours  pantoprazole  Injectable 40 milliGRAM(s) IV Push daily  petrolatum Ophthalmic Ointment 1 Application(s) Both EYES three times a day    OBJECTIVE:  ICU Vital Signs Last 24 Hrs  T(C): 38.7 (21 Dec 2023 17:30), Max: 38.7 (21 Dec 2023 17:30)  T(F): 101.6 (21 Dec 2023 17:30), Max: 101.6 (21 Dec 2023 17:30)  HR: 76 (21 Dec 2023 18:30) (72 - 150)  ABP: 125/53 (21 Dec 2023 18:30) (125/53 - 206/94)  ABP(mean): 74 (21 Dec 2023 18:30) (74 - 182)  RR: 22 (21 Dec 2023 18:30) (6 - 37)  SpO2: 98% (21 Dec 2023 18:30) (97% - 100%)    O2 Parameters below as of 21 Dec 2023 18:00  Patient On (Oxygen Delivery Method): ventilator    O2 Concentration (%): 40    Mode: AC/ CMV (Assist Control/ Continuous Mandatory Ventilation)  RR (machine): 14  TV (machine): 500  FiO2: 40  PEEP: 5  ITime: 1  MAP: 10  PIP: 25    Adult Advanced Hemodynamics Last 24 Hrs  CVP(mm Hg): 7 (21 Dec 2023 18:30) (4 - 14)    CAPILLARY BLOOD GLUCOSE  POCT Blood Glucose.: 158 mg/dL (21 Dec 2023 16:58)    CAPILLARY BLOOD GLUCOSE  POCT Blood Glucose.: 158 mg/dL (21 Dec 2023 16:58)    I&O's Summary    20 Dec 2023 07:01  -  21 Dec 2023 07:00  --------------------------------------------------------  IN: 1138.2 mL / OUT: 1385 mL / NET: -246.8 mL    21 Dec 2023 07:01  -  21 Dec 2023 19:15  --------------------------------------------------------  IN: 314.1 mL / OUT: 600 mL / NET: -285.9 mL    PHYSICAL EXAM:  General: NAD  Head: Traumatic, Right eye hematoma  Chest: Clear to auscultation bilaterally; no rales, rhonchi, or wheezing  Heart: Regular rate and rhythm; normal S1 and S2; no murmurs, rubs, or gallops  Abd: Soft, nontender, nondistended  Nervous System: sedated on precedex, not following commands  Ext: no peripheral LE edema bilaterally    LABS:  ABG - ( 21 Dec 2023 00:53 )  pH, Arterial: 7.47  pH, Blood: x     /  pCO2: 32    /  pO2: 145   / HCO3: 23    / Base Excess: 0.0   /  SaO2: 98.8                          9.6    10.63 )-----------( 105      ( 21 Dec 2023 01:02 )             27.9     12-21    140  |  106  |  48<H>  ----------------------------<  168<H>  3.6   |  21<L>  |  2.31<H>    Ca    8.2<L>      21 Dec 2023 01:02  Phos  4.0     12-21  Mg     2.3     12-21    TPro  5.8<L>  /  Alb  3.3  /  TBili  0.9  /  DBili  x   /  AST  79<H>  /  ALT  166<H>  /  AlkPhos  46  12-21    LIVER FUNCTIONS - ( 21 Dec 2023 01:02 )  Alb: 3.3 g/dL / Pro: 5.8 g/dL / ALK PHOS: 46 U/L / ALT: 166 U/L / AST: 79 U/L / GGT: x           PT/INR - ( 21 Dec 2023 01:02 )   PT: 12.2 sec;   INR: 1.17 ratio      CARDIAC MARKERS ( 20 Dec 2023 18:01 )  x     / x     / 745 U/L / x     / 28.7 ng/mL  CARDIAC MARKERS ( 20 Dec 2023 02:09 )  x     / x     / 1110 U/L / x     / 133.8 ng/mL      Urinalysis Basic - ( 21 Dec 2023 01:02 )    Color: x / Appearance: x / SG: x / pH: x  Gluc: 168 mg/dL / Ketone: x  / Bili: x / Urobili: x   Blood: x / Protein: x / Nitrite: x   Leuk Esterase: x / RBC: x / WBC x   Sq Epi: x / Non Sq Epi: x / Bacteria: x      Assessment: 78M w/ PMH HTN HLD presents as transfer from [facility] s/p VT arrest s/p shock x2 w/ unknown downtime. Pt found to have trauma to head w/ superficial scalp bleeding and oral mucosal bleeding.     Plan:  ====================== NEUROLOGY=====================  Encephalopathy post arrest   - Intubated w/o sedation s/p cardiac arrest w/ unknown down time. Reported to be 20minutes but can be longer given ROSC achieved right before arrival to the hospital   - CTH at [facility] w/o intracranial bleed/pathology. CTH maybe too soon to show evidence of anoxic injury  - Pt remains w/o purposeful movement. Not following commands.  - Neuro consult placed for prognostication  - VEEG in place  - Repeat CTH today given abn CTH yesterday, neuro surg consulted   - check neuron specific enolase  - continue to monitor mental status as per protocol    SDH  - Pt w/ acute SAH and SDH 2/2 trauma  - Was not initially seen in outside scan at [facility] likely performed too early for radiologic evidence  - Neurology following, rec NSGY eval  - Likely no intervention given poor prognosis. Will obtain stability scans  - Maintain BP control     C-spine trauma  Pt w/ fall at home presented w/ c-collar.   -Per [facility] radiology report no evidence of cervical fracture  -Trauma surgery cleared pt of Cspine collar    Myoclonic jerking  - vEEG: moderate to severe nonspecific diffuse or multifocal cerebral dysfunction. No epileptiform   - Started keppra load    ==================== RESPIRATORY======================    - continue to monitor SpO2 with goal >94%    Mechanical Vent: Mode: AC/ CMV (Assist Control/ Continuous Mandatory Ventilation)  RR (machine): 14  TV (machine): 500  FiO2: 40  PEEP: 5  ITime: 1  MAP: 10  PIP: 25      ====================CARDIOVASCULAR==================  VT arrest  - Pt s/p VT arrest w/ unknown downtime. Pt reportedly received shock x2. No prior cardiac hx per family  - Check TTE   - Check cardiac enzymes, BNP  - Monitor for arrhythmias   - Pt not asa/plavix loaded 2/2 substernal hematoma noted on CT chest at [facility]  - Unlikely to be a candidate for LHC at this time given mental status     ===================== RENAL =========================  - Continue monitoring urine output, lytes, SCr/ BUN  - replete lytes prn with goal K >4 and Mg >2    =============== GASTROINTESTINAL===================      ===================ENDO====================      ===================HEMATOLOGIC/ONC ===================  - Monitor H/H and plts  - DVT PPX:     ==================INFECTIOUS DISEASE================  - monitor and trend WBC and temperature curve     Dispo:    Patient requires continuous monitoring with bedside rhythm monitoring, arterial line, pulse oximetry, ventilator monitoring and intermittent blood gas analysis.  Care plan discussed with ICU care team.  I have spent 35 minutes providing critical care, in addition to initial critical time provided by CICU attending Dr. Mejia, re-evaluated multiple times during the day.    Aziza Ross PA-C Patient is a 78y old  Male who presents from [facility] s/p cardiac arrest.   (21 Dec 2023 14:42)    INTERVAL HISTORY:  - OGT placed by ENT  - started on Amlodipine 5 and Coreg 3.125 for BP control    SUBJECTIVE  - Patient seen and evaluated at bedside.     MEDICATIONS:  MEDICATIONS  (STANDING):  amLODIPine   Tablet 5 milliGRAM(s) Oral daily  artificial  tears Solution 1 Drop(s) Both EYES two times a day  cefTRIAXone   IVPB 1000 milliGRAM(s) IV Intermittent every 24 hours  chlorhexidine 0.12% Liquid 15 milliLiter(s) Oral Mucosa every 12 hours  chlorhexidine 2% Cloths 1 Application(s) Topical daily  dexMEDEtomidine Infusion 0.2 MICROgram(s)/kG/Hr (4.68 mL/Hr) IV Continuous <Continuous>  insulin lispro (ADMELOG) corrective regimen sliding scale   SubCutaneous every 6 hours  levETIRAcetam  IVPB 1000 milliGRAM(s) IV Intermittent every 12 hours  pantoprazole  Injectable 40 milliGRAM(s) IV Push daily  petrolatum Ophthalmic Ointment 1 Application(s) Both EYES three times a day    OBJECTIVE:  ICU Vital Signs Last 24 Hrs  T(C): 38.7 (21 Dec 2023 17:30), Max: 38.7 (21 Dec 2023 17:30)  T(F): 101.6 (21 Dec 2023 17:30), Max: 101.6 (21 Dec 2023 17:30)  HR: 76 (21 Dec 2023 18:30) (72 - 150)  ABP: 125/53 (21 Dec 2023 18:30) (125/53 - 206/94)  ABP(mean): 74 (21 Dec 2023 18:30) (74 - 182)  RR: 22 (21 Dec 2023 18:30) (6 - 37)  SpO2: 98% (21 Dec 2023 18:30) (97% - 100%)    O2 Parameters below as of 21 Dec 2023 18:00  Patient On (Oxygen Delivery Method): ventilator    O2 Concentration (%): 40    Mode: AC/ CMV (Assist Control/ Continuous Mandatory Ventilation)  RR (machine): 14  TV (machine): 500  FiO2: 40  PEEP: 5  ITime: 1  MAP: 10  PIP: 25    Adult Advanced Hemodynamics Last 24 Hrs  CVP(mm Hg): 7 (21 Dec 2023 18:30) (4 - 14)    CAPILLARY BLOOD GLUCOSE  POCT Blood Glucose.: 158 mg/dL (21 Dec 2023 16:58)    CAPILLARY BLOOD GLUCOSE  POCT Blood Glucose.: 158 mg/dL (21 Dec 2023 16:58)    I&O's Summary    20 Dec 2023 07:01  -  21 Dec 2023 07:00  --------------------------------------------------------  IN: 1138.2 mL / OUT: 1385 mL / NET: -246.8 mL    21 Dec 2023 07:01  -  21 Dec 2023 19:15  --------------------------------------------------------  IN: 314.1 mL / OUT: 600 mL / NET: -285.9 mL    PHYSICAL EXAM:  General: NAD  Head: Traumatic, Right eye hematoma  Chest: Clear to auscultation bilaterally; no rales, rhonchi, or wheezing  Heart: Regular rate and rhythm; normal S1 and S2; no murmurs, rubs, or gallops  Abd: Soft, nontender, nondistended  Nervous System: sedated on precedex, not following commands  Ext: no peripheral LE edema bilaterally    LABS:  ABG - ( 21 Dec 2023 00:53 )  pH, Arterial: 7.47  pH, Blood: x     /  pCO2: 32    /  pO2: 145   / HCO3: 23    / Base Excess: 0.0   /  SaO2: 98.8                          9.6    10.63 )-----------( 105      ( 21 Dec 2023 01:02 )             27.9     12-21    140  |  106  |  48<H>  ----------------------------<  168<H>  3.6   |  21<L>  |  2.31<H>    Ca    8.2<L>      21 Dec 2023 01:02  Phos  4.0     12-21  Mg     2.3     12-21    TPro  5.8<L>  /  Alb  3.3  /  TBili  0.9  /  DBili  x   /  AST  79<H>  /  ALT  166<H>  /  AlkPhos  46  12-21    LIVER FUNCTIONS - ( 21 Dec 2023 01:02 )  Alb: 3.3 g/dL / Pro: 5.8 g/dL / ALK PHOS: 46 U/L / ALT: 166 U/L / AST: 79 U/L / GGT: x           PT/INR - ( 21 Dec 2023 01:02 )   PT: 12.2 sec;   INR: 1.17 ratio      CARDIAC MARKERS ( 20 Dec 2023 18:01 )  x     / x     / 745 U/L / x     / 28.7 ng/mL  CARDIAC MARKERS ( 20 Dec 2023 02:09 )  x     / x     / 1110 U/L / x     / 133.8 ng/mL      Urinalysis Basic - ( 21 Dec 2023 01:02 )    Color: x / Appearance: x / SG: x / pH: x  Gluc: 168 mg/dL / Ketone: x  / Bili: x / Urobili: x   Blood: x / Protein: x / Nitrite: x   Leuk Esterase: x / RBC: x / WBC x   Sq Epi: x / Non Sq Epi: x / Bacteria: x      Assessment: 78M w/ PMH HTN HLD presents as transfer from [facility] s/p VT arrest s/p shock x2 w/ unknown downtime. Pt found to have trauma to head w/ superficial scalp bleeding and oral mucosal bleeding.     Plan:  ====================== NEUROLOGY=====================  Encephalopathy post arrest   - Intubated w/o sedation s/p cardiac arrest w/ unknown down time. Reported to be 20minutes but can be longer given ROSC achieved right before arrival to the hospital   - CTH at [facility] w/o intracranial bleed/pathology. CTH maybe too soon to show evidence of anoxic injury  - Pt remains w/o purposeful movement. Not following commands.  - Neuro consult placed for prognostication  - VEEG in place  - Repeat CTH today given abn CTH yesterday, neuro surg consulted   - check neuron specific enolase  - continue to monitor mental status as per protocol    SDH  - Pt w/ acute SAH and SDH 2/2 trauma  - Was not initially seen in outside scan at [facility] likely performed too early for radiologic evidence  - Neurology following, rec NSGY eval  - Likely no intervention given poor prognosis. Will obtain stability scans  - Maintain BP control     C-spine trauma  Pt w/ fall at home presented w/ c-collar.   -Per [facility] radiology report no evidence of cervical fracture  -Trauma surgery cleared pt of Cspine collar    Myoclonic jerking  - vEEG: moderate to severe nonspecific diffuse or multifocal cerebral dysfunction. No epileptiform   - Started keppra load    ==================== RESPIRATORY======================    - continue to monitor SpO2 with goal >94%    Mechanical Vent: Mode: AC/ CMV (Assist Control/ Continuous Mandatory Ventilation)  RR (machine): 14  TV (machine): 500  FiO2: 40  PEEP: 5  ITime: 1  MAP: 10  PIP: 25      ====================CARDIOVASCULAR==================  VT arrest  - Pt s/p VT arrest w/ unknown downtime. Pt reportedly received shock x2. No prior cardiac hx per family  - Check TTE   - Check cardiac enzymes, BNP  - Monitor for arrhythmias   - Pt not asa/plavix loaded 2/2 substernal hematoma noted on CT chest at [facility]  - Unlikely to be a candidate for LHC at this time given mental status     ===================== RENAL =========================  - Continue monitoring urine output, lytes, SCr/ BUN  - replete lytes prn with goal K >4 and Mg >2    =============== GASTROINTESTINAL===================      ===================ENDO====================      ===================HEMATOLOGIC/ONC ===================  - Monitor H/H and plts  - DVT PPX:     ==================INFECTIOUS DISEASE================  - monitor and trend WBC and temperature curve     Dispo:    Patient requires continuous monitoring with bedside rhythm monitoring, arterial line, pulse oximetry, ventilator monitoring and intermittent blood gas analysis.  Care plan discussed with ICU care team.  I have spent 35 minutes providing critical care, in addition to initial critical time provided by CICU attending Dr. Mejia, re-evaluated multiple times during the day.    Aziza Ross PA-C Patient is a 78y old  Male who presents from [facility] s/p cardiac arrest.   (21 Dec 2023 14:42)    INTERVAL HISTORY:  - OGT placed by ENT  - started on Amlodipine 5 and Coreg 3.125 for BP control  - pending repeat CTH to evaluate hemorrhage stability    SUBJECTIVE  - Patient seen and evaluated at bedside.     MEDICATIONS:  MEDICATIONS  (STANDING):  amLODIPine   Tablet 5 milliGRAM(s) Oral daily  artificial  tears Solution 1 Drop(s) Both EYES two times a day  cefTRIAXone   IVPB 1000 milliGRAM(s) IV Intermittent every 24 hours  chlorhexidine 0.12% Liquid 15 milliLiter(s) Oral Mucosa every 12 hours  chlorhexidine 2% Cloths 1 Application(s) Topical daily  dexMEDEtomidine Infusion 0.2 MICROgram(s)/kG/Hr (4.68 mL/Hr) IV Continuous <Continuous>  insulin lispro (ADMELOG) corrective regimen sliding scale   SubCutaneous every 6 hours  levETIRAcetam  IVPB 1000 milliGRAM(s) IV Intermittent every 12 hours  pantoprazole  Injectable 40 milliGRAM(s) IV Push daily  petrolatum Ophthalmic Ointment 1 Application(s) Both EYES three times a day    OBJECTIVE:  ICU Vital Signs Last 24 Hrs  T(C): 38.7 (21 Dec 2023 17:30), Max: 38.7 (21 Dec 2023 17:30)  T(F): 101.6 (21 Dec 2023 17:30), Max: 101.6 (21 Dec 2023 17:30)  HR: 76 (21 Dec 2023 18:30) (72 - 150)  ABP: 125/53 (21 Dec 2023 18:30) (125/53 - 206/94)  ABP(mean): 74 (21 Dec 2023 18:30) (74 - 182)  RR: 22 (21 Dec 2023 18:30) (6 - 37)  SpO2: 98% (21 Dec 2023 18:30) (97% - 100%)    O2 Parameters below as of 21 Dec 2023 18:00  Patient On (Oxygen Delivery Method): ventilator    O2 Concentration (%): 40    Mode: AC/ CMV (Assist Control/ Continuous Mandatory Ventilation)  RR (machine): 14  TV (machine): 500  FiO2: 40  PEEP: 5  ITime: 1  MAP: 10  PIP: 25    Adult Advanced Hemodynamics Last 24 Hrs  CVP(mm Hg): 7 (21 Dec 2023 18:30) (4 - 14)    CAPILLARY BLOOD GLUCOSE  POCT Blood Glucose.: 158 mg/dL (21 Dec 2023 16:58)    CAPILLARY BLOOD GLUCOSE  POCT Blood Glucose.: 158 mg/dL (21 Dec 2023 16:58)    I&O's Summary    20 Dec 2023 07:01  -  21 Dec 2023 07:00  --------------------------------------------------------  IN: 1138.2 mL / OUT: 1385 mL / NET: -246.8 mL    21 Dec 2023 07:01  -  21 Dec 2023 19:15  --------------------------------------------------------  IN: 314.1 mL / OUT: 600 mL / NET: -285.9 mL    PHYSICAL EXAM:  General: NAD  Head: Traumatic, Right eye hematoma  Chest: Clear to auscultation bilaterally; no rales, rhonchi, or wheezing  Heart: Regular rate and rhythm; normal S1 and S2; no murmurs, rubs, or gallops  Abd: Soft, nontender, nondistended  Nervous System: sedated on precedex, not following commands  Ext: no peripheral LE edema bilaterally    LABS:  ABG - ( 21 Dec 2023 00:53 )  pH, Arterial: 7.47  pH, Blood: x     /  pCO2: 32    /  pO2: 145   / HCO3: 23    / Base Excess: 0.0   /  SaO2: 98.8                          9.6    10.63 )-----------( 105      ( 21 Dec 2023 01:02 )             27.9     12-21    140  |  106  |  48<H>  ----------------------------<  168<H>  3.6   |  21<L>  |  2.31<H>    Ca    8.2<L>      21 Dec 2023 01:02  Phos  4.0     12-21  Mg     2.3     12-21    TPro  5.8<L>  /  Alb  3.3  /  TBili  0.9  /  DBili  x   /  AST  79<H>  /  ALT  166<H>  /  AlkPhos  46  12-21    LIVER FUNCTIONS - ( 21 Dec 2023 01:02 )  Alb: 3.3 g/dL / Pro: 5.8 g/dL / ALK PHOS: 46 U/L / ALT: 166 U/L / AST: 79 U/L / GGT: x           PT/INR - ( 21 Dec 2023 01:02 )   PT: 12.2 sec;   INR: 1.17 ratio      CARDIAC MARKERS ( 20 Dec 2023 18:01 )  x     / x     / 745 U/L / x     / 28.7 ng/mL  CARDIAC MARKERS ( 20 Dec 2023 02:09 )  x     / x     / 1110 U/L / x     / 133.8 ng/mL    Urinalysis Basic - ( 21 Dec 2023 01:02 )    Color: x / Appearance: x / SG: x / pH: x  Gluc: 168 mg/dL / Ketone: x  / Bili: x / Urobili: x   Blood: x / Protein: x / Nitrite: x   Leuk Esterase: x / RBC: x / WBC x   Sq Epi: x / Non Sq Epi: x / Bacteria: x      Assessment: 78M w/ PMH HTN HLD presents as transfer from [facility] s/p VT arrest s/p shock x2 w/ unknown downtime. Pt found to have trauma to head w/ superficial scalp bleeding and oral mucosal bleeding.     Plan:  ====================== NEUROLOGY=====================  Encephalopathy post arrest   - Intubated w/o sedation s/p cardiac arrest w/ unknown down time. Reported to be 20minutes but can be longer given ROSC achieved right before arrival to the hospital   - CTH at [facility] w/o intracranial bleed/pathology. CTH maybe too soon to show evidence of anoxic injury  - Pt remains w/o purposeful movement. Not following commands.  - Neuro consulted for prognostication  - VEEG in place  - Repeat CTH today given abn CTH yesterday, neuro surg consulted  - check neuron specific enolase  - continue to monitor mental status as per protocol    SDH  - Pt w/ acute SAH and SDH 2/2 trauma  - Was not initially seen in outside scan at [facility] likely performed too early for radiologic evidence  - Neurology and neurosurgery following, no acute neurosurgical intervention  - repeat CTH for stability  - Maintain BP control   - holding AC    C-spine trauma  - Pt w/ fall at home presented w/ c-collar.   -Per [facility] radiology report no evidence of cervical fracture  -Trauma surgery cleared pt of C-spine collar    Myoclonic jerking  - vEEG: moderate to severe nonspecific diffuse or multifocal cerebral dysfunction. No epileptiform   - Started keppra load    ==================== RESPIRATORY======================  Intubated  Pt intubated s/p cardiac arrest  - On full vent support: RR 16 /  / PEEP 5 / FiO2 40  - Monitor ABGs    Oropharynx bleed  - Pt w/ blood in oropharynx requiring frequent suctioning.   - ENT consulted s/p packing   - ENT placed OGT today  - continue to monitor SpO2 with goal >94%    Mechanical Vent: Mode: AC/ CMV (Assist Control/ Continuous Mandatory Ventilation)  RR (machine): 14  TV (machine): 500  FiO2: 40  PEEP: 5  ITime: 1  MAP: 10  PIP: 25    ====================CARDIOVASCULAR==================  VT arrest  - Pt s/p VT arrest w/ unknown downtime. Pt reportedly received shock x2. No prior cardiac hx per family  - Check TTE   - Check cardiac enzymes, BNP  - Monitor for arrhythmias   - Pt not asa/plavix loaded 2/2 substernal hematoma noted on CT chest at [facility] & acute intracranial hemorrhages found on CTH  - Unlikely to be a candidate for [facility] at this time given mental status     ===================== RENAL =========================  LONA  - likely i/s/o hypoperfusion   - Cr 2.3  - Continue monitoring urine output, lytes, SCr/ BUN  - replete lytes prn with goal K >4 and Mg >2    =============== GASTROINTESTINAL===================  TF initiated w/ OGT in place    ===================ENDO====================  Hypothyroidism  - A1c=5.5.   - monitor glucose    ===================HEMATOLOGIC/ONC ===================  H/H & plts stable  - Monitor H/H and plts  - DVT PPX: SCDs  - hold off on chemical systemic AC at this time i/s/o intracranial hemorrhage    Substernal hematoma  - Pt w/ substernal hematoma noted on imaging at [facility] from trauma/cpr    ==================INFECTIOUS DISEASE================  Sepsis  - Pt w/ leukocytosis w/ fevers possibly 2/2  infection  - U/A grossly positive. F/u BCx, UCx  - Pt had crash lines placed at [facility]. Possible source of infection. MRSA negative 12/20  - CTX to cover UTI and strep  - Sputum Cx + for Klebsiella PNA, c/w CTX  - monitor and trend WBC and temperature curve     Dispo: Maintain in CICU.  GOC to be discussed with family.     Patient requires continuous monitoring with bedside rhythm monitoring, arterial line, pulse oximetry, ventilator monitoring and intermittent blood gas analysis.  Care plan discussed with ICU care team.  I have spent 35 minutes providing critical care, in addition to initial critical time provided by CICU attending Dr. Mejia, re-evaluated multiple times during the day.    Aziza Ross PA-C Patient is a 78y old  Male who presents from [facility] s/p cardiac arrest.   (21 Dec 2023 14:42)    INTERVAL HISTORY:  - OGT placed by ENT  - started on Amlodipine 5 and Coreg 3.125 for BP control  - pending repeat CTH to evaluate hemorrhage stability    SUBJECTIVE  - Patient seen and evaluated at bedside.     MEDICATIONS:  MEDICATIONS  (STANDING):  amLODIPine   Tablet 5 milliGRAM(s) Oral daily  artificial  tears Solution 1 Drop(s) Both EYES two times a day  cefTRIAXone   IVPB 1000 milliGRAM(s) IV Intermittent every 24 hours  chlorhexidine 0.12% Liquid 15 milliLiter(s) Oral Mucosa every 12 hours  chlorhexidine 2% Cloths 1 Application(s) Topical daily  dexMEDEtomidine Infusion 0.2 MICROgram(s)/kG/Hr (4.68 mL/Hr) IV Continuous <Continuous>  insulin lispro (ADMELOG) corrective regimen sliding scale   SubCutaneous every 6 hours  levETIRAcetam  IVPB 1000 milliGRAM(s) IV Intermittent every 12 hours  pantoprazole  Injectable 40 milliGRAM(s) IV Push daily  petrolatum Ophthalmic Ointment 1 Application(s) Both EYES three times a day    OBJECTIVE:  ICU Vital Signs Last 24 Hrs  T(C): 38.7 (21 Dec 2023 17:30), Max: 38.7 (21 Dec 2023 17:30)  T(F): 101.6 (21 Dec 2023 17:30), Max: 101.6 (21 Dec 2023 17:30)  HR: 76 (21 Dec 2023 18:30) (72 - 150)  ABP: 125/53 (21 Dec 2023 18:30) (125/53 - 206/94)  ABP(mean): 74 (21 Dec 2023 18:30) (74 - 182)  RR: 22 (21 Dec 2023 18:30) (6 - 37)  SpO2: 98% (21 Dec 2023 18:30) (97% - 100%)    O2 Parameters below as of 21 Dec 2023 18:00  Patient On (Oxygen Delivery Method): ventilator    O2 Concentration (%): 40    Mode: AC/ CMV (Assist Control/ Continuous Mandatory Ventilation)  RR (machine): 14  TV (machine): 500  FiO2: 40  PEEP: 5  ITime: 1  MAP: 10  PIP: 25    Adult Advanced Hemodynamics Last 24 Hrs  CVP(mm Hg): 7 (21 Dec 2023 18:30) (4 - 14)    CAPILLARY BLOOD GLUCOSE  POCT Blood Glucose.: 158 mg/dL (21 Dec 2023 16:58)    CAPILLARY BLOOD GLUCOSE  POCT Blood Glucose.: 158 mg/dL (21 Dec 2023 16:58)    I&O's Summary    20 Dec 2023 07:01  -  21 Dec 2023 07:00  --------------------------------------------------------  IN: 1138.2 mL / OUT: 1385 mL / NET: -246.8 mL    21 Dec 2023 07:01  -  21 Dec 2023 19:15  --------------------------------------------------------  IN: 314.1 mL / OUT: 600 mL / NET: -285.9 mL    PHYSICAL EXAM:  General: NAD  Head: Traumatic, Right eye hematoma  Chest: Clear to auscultation bilaterally; no rales, rhonchi, or wheezing  Heart: Regular rate and rhythm; normal S1 and S2; no murmurs, rubs, or gallops  Abd: Soft, nontender, nondistended  Nervous System: sedated on precedex, not following commands  Ext: no peripheral LE edema bilaterally    LABS:  ABG - ( 21 Dec 2023 00:53 )  pH, Arterial: 7.47  pH, Blood: x     /  pCO2: 32    /  pO2: 145   / HCO3: 23    / Base Excess: 0.0   /  SaO2: 98.8                          9.6    10.63 )-----------( 105      ( 21 Dec 2023 01:02 )             27.9     12-21    140  |  106  |  48<H>  ----------------------------<  168<H>  3.6   |  21<L>  |  2.31<H>    Ca    8.2<L>      21 Dec 2023 01:02  Phos  4.0     12-21  Mg     2.3     12-21    TPro  5.8<L>  /  Alb  3.3  /  TBili  0.9  /  DBili  x   /  AST  79<H>  /  ALT  166<H>  /  AlkPhos  46  12-21    LIVER FUNCTIONS - ( 21 Dec 2023 01:02 )  Alb: 3.3 g/dL / Pro: 5.8 g/dL / ALK PHOS: 46 U/L / ALT: 166 U/L / AST: 79 U/L / GGT: x           PT/INR - ( 21 Dec 2023 01:02 )   PT: 12.2 sec;   INR: 1.17 ratio      CARDIAC MARKERS ( 20 Dec 2023 18:01 )  x     / x     / 745 U/L / x     / 28.7 ng/mL  CARDIAC MARKERS ( 20 Dec 2023 02:09 )  x     / x     / 1110 U/L / x     / 133.8 ng/mL    Urinalysis Basic - ( 21 Dec 2023 01:02 )    Color: x / Appearance: x / SG: x / pH: x  Gluc: 168 mg/dL / Ketone: x  / Bili: x / Urobili: x   Blood: x / Protein: x / Nitrite: x   Leuk Esterase: x / RBC: x / WBC x   Sq Epi: x / Non Sq Epi: x / Bacteria: x      Assessment: 78M w/ PMH HTN HLD presents as transfer from [facility] s/p VT arrest s/p shock x2 w/ unknown downtime. Pt found to have trauma to head w/ superficial scalp bleeding and oral mucosal bleeding.     Plan:  ====================== NEUROLOGY=====================  Encephalopathy post arrest   - Intubated w/o sedation s/p cardiac arrest w/ unknown down time. Reported to be 20minutes but can be longer given ROSC achieved right before arrival to the hospital   - CTH at [facility] w/o intracranial bleed/pathology. CTH maybe too soon to show evidence of anoxic injury  - Pt remains w/o purposeful movement. Not following commands.  - Neuro consulted for prognostication  - VEEG in place  - Repeat CTH today given abn CTH yesterday, neuro surg consulted  - check neuron specific enolase  - continue to monitor mental status as per protocol    SDH  - Pt w/ acute SAH and SDH 2/2 trauma  - Was not initially seen in outside scan at [facility] likely performed too early for radiologic evidence  - Neurology and neurosurgery following, no acute neurosurgical intervention  - repeat CTH for stability  - Maintain BP control   - holding AC    C-spine trauma  - Pt w/ fall at home presented w/ c-collar.   -Per [facility] radiology report no evidence of cervical fracture  -Trauma surgery cleared pt of C-spine collar    Myoclonic jerking  - vEEG: moderate to severe nonspecific diffuse or multifocal cerebral dysfunction. No epileptiform   - Started keppra load    ==================== RESPIRATORY======================  Intubated  Pt intubated s/p cardiac arrest  - On full vent support: RR 16 /  / PEEP 5 / FiO2 40  - Monitor ABGs    Oropharynx bleed  - Pt w/ blood in oropharynx requiring frequent suctioning.   - ENT consulted s/p packing   - ENT placed OGT today  - continue to monitor SpO2 with goal >94%    Mechanical Vent: Mode: AC/ CMV (Assist Control/ Continuous Mandatory Ventilation)  RR (machine): 14  TV (machine): 500  FiO2: 40  PEEP: 5  ITime: 1  MAP: 10  PIP: 25    ====================CARDIOVASCULAR==================  VT arrest  - Pt s/p VT arrest w/ unknown downtime. Pt reportedly received shock x2. No prior cardiac hx per family  - Check TTE   - Check cardiac enzymes, BNP  - Monitor for arrhythmias   - Pt not asa/plavix loaded 2/2 substernal hematoma noted on CT chest at [facility] & acute intracranial hemorrhages found on CTH  - Unlikely to be a candidate for [facility] at this time given mental status     ===================== RENAL =========================  LONA  - likely i/s/o hypoperfusion   - Cr 2.3  - Continue monitoring urine output, lytes, SCr/ BUN  - replete lytes prn with goal K >4 and Mg >2    =============== GASTROINTESTINAL===================  TF initiated w/ OGT in place    ===================ENDO====================  Hypothyroidism  - A1c=5.5.   - monitor glucose    ===================HEMATOLOGIC/ONC ===================  H/H & plts stable  - Monitor H/H and plts  - DVT PPX: SCDs  - hold off on chemical systemic AC at this time i/s/o intracranial hemorrhage    Substernal hematoma  - Pt w/ substernal hematoma noted on imaging at [facility] from trauma/cpr    ==================INFECTIOUS DISEASE================  Sepsis  - Pt w/ leukocytosis w/ fevers possibly 2/2  infection  - U/A grossly positive. F/u BCx, UCx  - Pt had crash lines placed at [facility]. Possible source of infection. MRSA negative 12/20  - CTX to cover UTI and strep  - Sputum Cx + for Klebsiella PNA, c/w CTX  - monitor and trend WBC and temperature curve     Dispo: Maintain in CICU.  GOC to be discussed with family.     Patient requires continuous monitoring with bedside rhythm monitoring, arterial line, pulse oximetry, ventilator monitoring and intermittent blood gas analysis.  Care plan discussed with ICU care team.  I have spent 35 minutes providing critical care, in addition to initial critical time provided by CICU attending Dr. Mejia, re-evaluated multiple times during the day.    Aziza Ross PA-C

## 2023-12-21 NOTE — PROGRESS NOTE ADULT - SUBJECTIVE AND OBJECTIVE BOX
ASHLEY PRITCHARD  MRN-[id number]  Patient is a 78y old  Male who presents with a chief complaint of Cardiac Arrest (20 Dec 2023 19:51)    HPI:  78M w/ PMH HTN HLD presents as transfer from [facility] s/p cardiac arrest. Per reports patient had a witness fall and arrest at home. EMS performed CPR en route to hospital was noted to be in VTach and shocked x2. ROSC was obtained just prior to arrival at [facility]. Pt was intubated placed on levo gtt. On arrival pt noted to have lacerated to R forehead. Pt had CT scans that were negative for intracranial hemorrhage, C-spine fracture, facial fractures. A R frontal hematoma was noted.     On arrival patient w/ dried blood on scalp. Also noted to have bleeding from oral mucosa w/ clots requiring suctioning. Pt otherwise off sedation and non-responsive. Per EMS report pt did receive some sedation and paralytics at [facility].     Per family patient has been feeling 'off' but managing his day to day and did not see a physician. Pt prescribed klonopin by outpatient provider and family believes pt may have been taking more than prescribed. On day of arrest, pt was initially asymptomatic carrying out his routine. He went to the bathroom, wife heard a loud thud and found patient in a pool of blood prompting call to EMS. Pt has not followed with any cardiologist. Has not had any syncope or other events preceding this.  (19 Dec 2023 14:54)      24 HOUR EVENTS:    REVIEW OF SYSTEMS:    CONSTITUTIONAL: No weakness, fevers or chills  EYES/ENT: No visual changes;  No vertigo or throat pain   NECK: No pain or stiffness  RESPIRATORY: No cough, wheezing, hemoptysis; No shortness of breath  CARDIOVASCULAR: No chest pain or palpitations  GASTROINTESTINAL: No abdominal or epigastric pain. No nausea, vomiting, or hematemesis; No diarrhea or constipation. No melena or hematochezia.  GENITOURINARY: No dysuria, frequency or hematuria  NEUROLOGICAL: No numbness or weakness  SKIN: No itching, rashes      ICU Vital Signs Last 24 Hrs  T(C): 36.9 (21 Dec 2023 03:00), Max: 38.4 (20 Dec 2023 08:00)  T(F): 98.4 (21 Dec 2023 03:00), Max: 101.1 (20 Dec 2023 08:00)  HR: 74 (21 Dec 2023 06:45) (61 - 146)  BP: --  BP(mean): --  ABP: 160/68 (21 Dec 2023 06:45) (111/46 - 197/92)  ABP(mean): 98 (21 Dec 2023 06:45) (65 - 182)  RR: 16 (21 Dec 2023 06:45) (6 - 37)  SpO2: 100% (21 Dec 2023 06:45) (98% - 100%)    O2 Parameters below as of 21 Dec 2023 03:00  Patient On (Oxygen Delivery Method): ventilator    O2 Concentration (%): 40      Mode: AC/ CMV (Assist Control/ Continuous Mandatory Ventilation), RR (machine): 14, TV (machine): 500, FiO2: 40, PEEP: 5, ITime: 1  CVP(mm Hg): 12 (12-21-23 @ 06:45) (2 - 19)  CO: --  CI: --  PA: --  PA(mean): --  PA(direct): --  PCWP: --  LA: --  RA: --  SVR: --  SVRI: --  PVR: --  PVRI: --  I&O's Summary    20 Dec 2023 07:01  -  21 Dec 2023 07:00  --------------------------------------------------------  IN: 1138.2 mL / OUT: 1385 mL / NET: -246.8 mL        CAPILLARY BLOOD GLUCOSE    CAPILLARY BLOOD GLUCOSE          PHYSICAL EXAM:  GENERAL: No acute distress, well-developed  HEAD:  Atraumatic, Normocephalic  EYES: EOMI, PERRLA, conjunctiva and sclera clear  NECK: Supple, no lymphadenopathy, no JVD  CHEST/LUNG: CTAB; No wheezes, rales, or rhonchi  HEART: Regular rate and rhythm. Normal S1/S2. No murmurs, rubs, or gallops  ABDOMEN: Soft, non-tender, non-distended; normal bowel sounds, no organomegaly  EXTREMITIES:  2+ peripheral pulses b/l, No clubbing, cyanosis, or edema  NEUROLOGY: A&O x 3, no focal deficits  SKIN: No rashes or lesions    ============================I/O===========================   I&O's Detail    20 Dec 2023 07:01  -  21 Dec 2023 07:00  --------------------------------------------------------  IN:    Dexmedetomidine: 210 mL    IV PiggyBack: 800 mL    Norepinephrine: 128.2 mL  Total IN: 1138.2 mL    OUT:    Indwelling Catheter - Urethral (mL): 1385 mL    Vasopressin: 0 mL  Total OUT: 1385 mL    Total NET: -246.8 mL        ============================ LABS =========================                        9.6    10.63 )-----------( 105      ( 21 Dec 2023 01:02 )             27.9     12-21    140  |  106  |  48<H>  ----------------------------<  168<H>  3.6   |  21<L>  |  2.31<H>    Ca    8.2<L>      21 Dec 2023 01:02  Phos  4.0     12-21  Mg     2.3     12-21    TPro  5.8<L>  /  Alb  3.3  /  TBili  0.9  /  DBili  x   /  AST  79<H>  /  ALT  166<H>  /  AlkPhos  46  12-21    Troponin T, High Sensitivity Result: 2406 ng/L (12-20-23 @ 18:01)  Troponin T, High Sensitivity Result: 4145 ng/L (12-20-23 @ 02:09)  Troponin T, High Sensitivity Result: 1894 ng/L (12-19-23 @ 15:22)    CKMB Units: 28.7 ng/mL (12-20-23 @ 18:01)  CKMB Units: 133.8 ng/mL (12-20-23 @ 02:09)  CKMB Units: 129.2 ng/mL (12-19-23 @ 15:22)    Creatine Kinase, Serum: 745 U/L (12-20-23 @ 18:01)  Creatine Kinase, Serum: 1110 U/L (12-20-23 @ 02:09)    CPK Mass Assay %: 3.9 % (12-20-23 @ 18:01)  CPK Mass Assay %: 12.1 % (12-20-23 @ 02:09)        LIVER FUNCTIONS - ( 21 Dec 2023 01:02 )  Alb: 3.3 g/dL / Pro: 5.8 g/dL / ALK PHOS: 46 U/L / ALT: 166 U/L / AST: 79 U/L / GGT: x           PT/INR - ( 21 Dec 2023 01:02 )   PT: 12.2 sec;   INR: 1.17 ratio         PTT - ( 19 Dec 2023 15:22 )  PTT:30.8 sec  ABG - ( 21 Dec 2023 00:53 )  pH, Arterial: 7.47  pH, Blood: x     /  pCO2: 32    /  pO2: 145   / HCO3: 23    / Base Excess: 0.0   /  SaO2: 98.8              Blood Gas Arterial, Lactate: 1.0 mmol/L (12-21-23 @ 00:53)  Blood Gas Arterial, Lactate: 1.7 mmol/L (12-20-23 @ 17:50)  Lactate, Blood: 2.0 mmol/L (12-20-23 @ 05:38)  Blood Gas Venous - Lactate: 2.3 mmol/L (12-20-23 @ 02:20)  Blood Gas Arterial, Lactate: 2.1 mmol/L (12-20-23 @ 01:54)  Blood Gas Arterial, Lactate: 2.0 mmol/L (12-19-23 @ 15:15)    Urinalysis Basic - ( 21 Dec 2023 01:02 )    Color: x / Appearance: x / SG: x / pH: x  Gluc: 168 mg/dL / Ketone: x  / Bili: x / Urobili: x   Blood: x / Protein: x / Nitrite: x   Leuk Esterase: x / RBC: x / WBC x   Sq Epi: x / Non Sq Epi: x / Bacteria: x      ======================Micro/Rad/Cardio=================  Telemetry: Reviewed   EKG: Reviewed  CXR: Reviewed  Culture: Reviewed   Echo:   Cath: see sunrise for details  ======================================================  PAST MEDICAL & SURGICAL HISTORY:  HTN (hypertension)      HLD (hyperlipidemia)         ASHLEY PRITCHARD  MRN-[id number]  Patient is a 78y old  Male who presents with a chief complaint of Cardiac Arrest (20 Dec 2023 19:51)    HPI:  78M w/ PMH HTN HLD presents as transfer from [facility] s/p cardiac arrest. Per reports patient had a witness fall and arrest at home. EMS performed CPR en route to hospital was noted to be in VTach and shocked x2. ROSC was obtained just prior to arrival at [facility]. Pt was intubated placed on levo gtt. On arrival pt noted to have lacerated to R forehead. Pt had CT scans that were negative for intracranial hemorrhage, C-spine fracture, facial fractures. A R frontal hematoma was noted.     On arrival patient w/ dried blood on scalp. Also noted to have bleeding from oral mucosa w/ clots requiring suctioning. Pt otherwise off sedation and non-responsive. Per EMS report pt did receive some sedation and paralytics at [facility].     Per family patient has been feeling 'off' but managing his day to day and did not see a physician. Pt prescribed klonopin by outpatient provider and family believes pt may have been taking more than prescribed. On day of arrest, pt was initially asymptomatic carrying out his routine. He went to the bathroom, wife heard a loud thud and found patient in a pool of blood prompting call to EMS. Pt has not followed with any cardiologist. Has not had any syncope or other events preceding this.  (19 Dec 2023 14:54)      24 HOUR EVENTS:    REVIEW OF SYSTEMS:    CONSTITUTIONAL: No weakness, fevers or chills  EYES/ENT: No visual changes;  No vertigo or throat pain   NECK: No pain or stiffness  RESPIRATORY: No cough, wheezing, hemoptysis; No shortness of breath  CARDIOVASCULAR: No chest pain or palpitations  GASTROINTESTINAL: No abdominal or epigastric pain. No nausea, vomiting, or hematemesis; No diarrhea or constipation. No melena or hematochezia.  GENITOURINARY: No dysuria, frequency or hematuria  NEUROLOGICAL: No numbness or weakness  SKIN: No itching, rashes      ICU Vital Signs Last 24 Hrs  T(C): 36.9 (21 Dec 2023 03:00), Max: 38.4 (20 Dec 2023 08:00)  T(F): 98.4 (21 Dec 2023 03:00), Max: 101.1 (20 Dec 2023 08:00)  HR: 74 (21 Dec 2023 06:45) (61 - 146)  BP: --  BP(mean): --  ABP: 160/68 (21 Dec 2023 06:45) (111/46 - 197/92)  ABP(mean): 98 (21 Dec 2023 06:45) (65 - 182)  RR: 16 (21 Dec 2023 06:45) (6 - 37)  SpO2: 100% (21 Dec 2023 06:45) (98% - 100%)    O2 Parameters below as of 21 Dec 2023 03:00  Patient On (Oxygen Delivery Method): ventilator    O2 Concentration (%): 40      Mode: AC/ CMV (Assist Control/ Continuous Mandatory Ventilation), RR (machine): 14, TV (machine): 500, FiO2: 40, PEEP: 5, ITime: 1  CVP(mm Hg): 12 (12-21-23 @ 06:45) (2 - 19)  CO: --  CI: --  PA: --  PA(mean): --  PA(direct): --  PCWP: --  LA: --  RA: --  SVR: --  SVRI: --  PVR: --  PVRI: --  I&O's Summary    20 Dec 2023 07:01  -  21 Dec 2023 07:00  --------------------------------------------------------  IN: 1138.2 mL / OUT: 1385 mL / NET: -246.8 mL        CAPILLARY BLOOD GLUCOSE    CAPILLARY BLOOD GLUCOSE          PHYSICAL EXAM:  GENERAL: No acute distress, well-developed  HEAD:  Atraumatic, Normocephalic  EYES: EOMI, PERRLA, conjunctiva and sclera clear  NECK: Supple, no lymphadenopathy, no JVD  CHEST/LUNG: CTAB; No wheezes, rales, or rhonchi  HEART: Regular rate and rhythm. Normal S1/S2. No murmurs, rubs, or gallops  ABDOMEN: Soft, non-tender, non-distended; normal bowel sounds, no organomegaly  EXTREMITIES:  2+ peripheral pulses b/l, No clubbing, cyanosis, or edema  NEUROLOGY: A&O x 3, no focal deficits  SKIN: No rashes or lesions    ============================I/O===========================   I&O's Detail    20 Dec 2023 07:01  -  21 Dec 2023 07:00  --------------------------------------------------------  IN:    Dexmedetomidine: 210 mL    IV PiggyBack: 800 mL    Norepinephrine: 128.2 mL  Total IN: 1138.2 mL    OUT:    Indwelling Catheter - Urethral (mL): 1385 mL    Vasopressin: 0 mL  Total OUT: 1385 mL    Total NET: -246.8 mL        ============================ LABS =========================                        9.6    10.63 )-----------( 105      ( 21 Dec 2023 01:02 )             27.9     12-21    140  |  106  |  48<H>  ----------------------------<  168<H>  3.6   |  21<L>  |  2.31<H>    Ca    8.2<L>      21 Dec 2023 01:02  Phos  4.0     12-21  Mg     2.3     12-21    TPro  5.8<L>  /  Alb  3.3  /  TBili  0.9  /  DBili  x   /  AST  79<H>  /  ALT  166<H>  /  AlkPhos  46  12-21    Troponin T, High Sensitivity Result: 2406 ng/L (12-20-23 @ 18:01)  Troponin T, High Sensitivity Result: 4145 ng/L (12-20-23 @ 02:09)  Troponin T, High Sensitivity Result: 1894 ng/L (12-19-23 @ 15:22)    CKMB Units: 28.7 ng/mL (12-20-23 @ 18:01)  CKMB Units: 133.8 ng/mL (12-20-23 @ 02:09)  CKMB Units: 129.2 ng/mL (12-19-23 @ 15:22)    Creatine Kinase, Serum: 745 U/L (12-20-23 @ 18:01)  Creatine Kinase, Serum: 1110 U/L (12-20-23 @ 02:09)    CPK Mass Assay %: 3.9 % (12-20-23 @ 18:01)  CPK Mass Assay %: 12.1 % (12-20-23 @ 02:09)        LIVER FUNCTIONS - ( 21 Dec 2023 01:02 )  Alb: 3.3 g/dL / Pro: 5.8 g/dL / ALK PHOS: 46 U/L / ALT: 166 U/L / AST: 79 U/L / GGT: x           PT/INR - ( 21 Dec 2023 01:02 )   PT: 12.2 sec;   INR: 1.17 ratio         PTT - ( 19 Dec 2023 15:22 )  PTT:30.8 sec  ABG - ( 21 Dec 2023 00:53 )  pH, Arterial: 7.47  pH, Blood: x     /  pCO2: 32    /  pO2: 145   / HCO3: 23    / Base Excess: 0.0   /  SaO2: 98.8              Blood Gas Arterial, Lactate: 1.0 mmol/L (12-21-23 @ 00:53)  Blood Gas Arterial, Lactate: 1.7 mmol/L (12-20-23 @ 17:50)  Lactate, Blood: 2.0 mmol/L (12-20-23 @ 05:38)  Blood Gas Venous - Lactate: 2.3 mmol/L (12-20-23 @ 02:20)  Blood Gas Arterial, Lactate: 2.1 mmol/L (12-20-23 @ 01:54)  Blood Gas Arterial, Lactate: 2.0 mmol/L (12-19-23 @ 15:15)    Urinalysis Basic - ( 21 Dec 2023 01:02 )    Color: x / Appearance: x / SG: x / pH: x  Gluc: 168 mg/dL / Ketone: x  / Bili: x / Urobili: x   Blood: x / Protein: x / Nitrite: x   Leuk Esterase: x / RBC: x / WBC x   Sq Epi: x / Non Sq Epi: x / Bacteria: x      ======================Micro/Rad/Cardio=================  Telemetry: Reviewed   EKG: Reviewed  CXR: Reviewed  Culture: Reviewed   Echo:   Cath: see sunrise for details  ======================================================  PAST MEDICAL & SURGICAL HISTORY:  HTN (hypertension)      HLD (hyperlipidemia)         ASHLEY PRITCHARD  MRN-[id number]  Patient is a 78y old  Male who presents with a chief complaint of Cardiac Arrest (20 Dec 2023 19:51)    HPI:  78M w/ PMH HTN HLD presents as transfer from [facility] s/p cardiac arrest. Per reports patient had a witness fall and arrest at home. EMS performed CPR en route to hospital was noted to be in VTach and shocked x2. ROSC was obtained just prior to arrival at [facility]. Pt was intubated placed on levo gtt. On arrival pt noted to have lacerated to R forehead. Pt had CT scans that were negative for intracranial hemorrhage, C-spine fracture, facial fractures. A R frontal hematoma was noted.     On arrival patient w/ dried blood on scalp. Also noted to have bleeding from oral mucosa w/ clots requiring suctioning. Pt otherwise off sedation and non-responsive. Per EMS report pt did receive some sedation and paralytics at [facility].     Per family patient has been feeling 'off' but managing his day to day and did not see a physician. Pt prescribed klonopin by outpatient provider and family believes pt may have been taking more than prescribed. On day of arrest, pt was initially asymptomatic carrying out his routine. He went to the bathroom, wife heard a loud thud and found patient in a pool of blood prompting call to EMS. Pt has not followed with any cardiologist. Has not had any syncope or other events preceding this.  (19 Dec 2023 14:54)      24 HOUR EVENTS:  Pt tachypneic, hypertensive overnight. Had several episodes of narrow complex tachycardia - Atach vs afib.     ICU Vital Signs Last 24 Hrs  T(C): 36.9 (21 Dec 2023 03:00), Max: 38.4 (20 Dec 2023 08:00)  T(F): 98.4 (21 Dec 2023 03:00), Max: 101.1 (20 Dec 2023 08:00)  HR: 74 (21 Dec 2023 06:45) (61 - 146)  BP: --  BP(mean): --  ABP: 160/68 (21 Dec 2023 06:45) (111/46 - 197/92)  ABP(mean): 98 (21 Dec 2023 06:45) (65 - 182)  RR: 16 (21 Dec 2023 06:45) (6 - 37)  SpO2: 100% (21 Dec 2023 06:45) (98% - 100%)    O2 Parameters below as of 21 Dec 2023 03:00  Patient On (Oxygen Delivery Method): ventilator    O2 Concentration (%): 40      Mode: AC/ CMV (Assist Control/ Continuous Mandatory Ventilation), RR (machine): 14, TV (machine): 500, FiO2: 40, PEEP: 5, ITime: 1  CVP(mm Hg): 12 (12-21-23 @ 06:45) (2 - 19)  CO: --  CI: --  PA: --  PA(mean): --  PA(direct): --  PCWP: --  LA: --  RA: --  SVR: --  SVRI: --  PVR: --  PVRI: --  I&O's Summary    20 Dec 2023 07:01  -  21 Dec 2023 07:00  --------------------------------------------------------  IN: 1138.2 mL / OUT: 1385 mL / NET: -246.8 mL      CAPILLARY BLOOD GLUCOSE    CAPILLARY BLOOD GLUCOSE        ============================I/O===========================   I&O's Detail    20 Dec 2023 07:01  -  21 Dec 2023 07:00  --------------------------------------------------------  IN:    Dexmedetomidine: 210 mL    IV PiggyBack: 800 mL    Norepinephrine: 128.2 mL  Total IN: 1138.2 mL    OUT:    Indwelling Catheter - Urethral (mL): 1385 mL    Vasopressin: 0 mL  Total OUT: 1385 mL    Total NET: -246.8 mL    GENERAL: Pt intubated, not responsive to sternal rub or noxious stimuli  . Notable trauma to head.   HEAD:  +traumatic head, Normocephalic  EYES: . Periorbital edema/erythma on R. conjunctiva and sclera clear. Pupils reactive to light   ENMT: ET tube in place. Dry mucous membranes. Notable blood in oral cavity   NECK: Supple  HEART: Regular rate and rhythm; No murmurs, rubs, or gallops  RESPIRATORY: Mechanical breath sounds b/l. No notable crackles, wheezes  ABDOMEN: Soft, Nontender, Nondistended  NEUROLOGY: A&Ox0. Pupils reactive. Non-responsive to sternal stimuli off sedation.  EXTREMITIES:  1+ Peripheral Pulses, No clubbing, cyanosis, or edema  SKIN: warm, dry, normal color, no rash or abnormal     ============================ LABS =========================                        9.6    10.63 )-----------( 105      ( 21 Dec 2023 01:02 )             27.9     12-21    140  |  106  |  48<H>  ----------------------------<  168<H>  3.6   |  21<L>  |  2.31<H>    Ca    8.2<L>      21 Dec 2023 01:02  Phos  4.0     12-21  Mg     2.3     12-21    TPro  5.8<L>  /  Alb  3.3  /  TBili  0.9  /  DBili  x   /  AST  79<H>  /  ALT  166<H>  /  AlkPhos  46  12-21    Troponin T, High Sensitivity Result: 2406 ng/L (12-20-23 @ 18:01)  Troponin T, High Sensitivity Result: 4145 ng/L (12-20-23 @ 02:09)  Troponin T, High Sensitivity Result: 1894 ng/L (12-19-23 @ 15:22)    CKMB Units: 28.7 ng/mL (12-20-23 @ 18:01)  CKMB Units: 133.8 ng/mL (12-20-23 @ 02:09)  CKMB Units: 129.2 ng/mL (12-19-23 @ 15:22)    Creatine Kinase, Serum: 745 U/L (12-20-23 @ 18:01)  Creatine Kinase, Serum: 1110 U/L (12-20-23 @ 02:09)    CPK Mass Assay %: 3.9 % (12-20-23 @ 18:01)  CPK Mass Assay %: 12.1 % (12-20-23 @ 02:09)        LIVER FUNCTIONS - ( 21 Dec 2023 01:02 )  Alb: 3.3 g/dL / Pro: 5.8 g/dL / ALK PHOS: 46 U/L / ALT: 166 U/L / AST: 79 U/L / GGT: x           PT/INR - ( 21 Dec 2023 01:02 )   PT: 12.2 sec;   INR: 1.17 ratio         PTT - ( 19 Dec 2023 15:22 )  PTT:30.8 sec  ABG - ( 21 Dec 2023 00:53 )  pH, Arterial: 7.47  pH, Blood: x     /  pCO2: 32    /  pO2: 145   / HCO3: 23    / Base Excess: 0.0   /  SaO2: 98.8              Blood Gas Arterial, Lactate: 1.0 mmol/L (12-21-23 @ 00:53)  Blood Gas Arterial, Lactate: 1.7 mmol/L (12-20-23 @ 17:50)  Lactate, Blood: 2.0 mmol/L (12-20-23 @ 05:38)  Blood Gas Venous - Lactate: 2.3 mmol/L (12-20-23 @ 02:20)  Blood Gas Arterial, Lactate: 2.1 mmol/L (12-20-23 @ 01:54)  Blood Gas Arterial, Lactate: 2.0 mmol/L (12-19-23 @ 15:15)    Urinalysis Basic - ( 21 Dec 2023 01:02 )    Color: x / Appearance: x / SG: x / pH: x  Gluc: 168 mg/dL / Ketone: x  / Bili: x / Urobili: x   Blood: x / Protein: x / Nitrite: x   Leuk Esterase: x / RBC: x / WBC x   Sq Epi: x / Non Sq Epi: x / Bacteria: x      ======================Micro/Rad/Cardio=================  Telemetry: Reviewed   EKG: Reviewed  CXR: Reviewed  Culture: Reviewed   Echo:   Cath: see sunrise for details  ======================================================  PAST MEDICAL & SURGICAL HISTORY:  HTN (hypertension)      HLD (hyperlipidemia)         ASHLEY PRITCHARD  MRN-[id number]  Patient is a 78y old  Male who presents with a chief complaint of Cardiac Arrest (20 Dec 2023 19:51)    HPI:  78M w/ PMH HTN HLD presents as transfer from [facility] s/p cardiac arrest. Per reports patient had a witness fall and arrest at home. EMS performed CPR en route to hospital was noted to be in VTach and shocked x2. ROSC was obtained just prior to arrival at [facility]. Pt was intubated placed on levo gtt. On arrival pt noted to have lacerated to R forehead. Pt had CT scans that were negative for intracranial hemorrhage, C-spine fracture, facial fractures. A R frontal hematoma was noted.     On arrival patient w/ dried blood on scalp. Also noted to have bleeding from oral mucosa w/ clots requiring suctioning. Pt otherwise off sedation and non-responsive. Per EMS report pt did receive some sedation and paralytics at [facility].     Per family patient has been feeling 'off' but managing his day to day and did not see a physician. Pt prescribed klonopin by outpatient provider and family believes pt may have been taking more than prescribed. On day of arrest, pt was initially asymptomatic carrying out his routine. He went to the bathroom, wife heard a loud thud and found patient in a pool of blood prompting call to EMS. Pt has not followed with any cardiologist. Has not had any syncope or other events preceding this.  (19 Dec 2023 14:54)      24 HOUR EVENTS:  Pt tachypneic, hypertensive overnight. Had several episodes of narrow complex tachycardia - Atach vs afib.     ICU Vital Signs Last 24 Hrs  T(C): 36.9 (21 Dec 2023 03:00), Max: 38.4 (20 Dec 2023 08:00)  T(F): 98.4 (21 Dec 2023 03:00), Max: 101.1 (20 Dec 2023 08:00)  HR: 74 (21 Dec 2023 06:45) (61 - 146)  BP: --  BP(mean): --  ABP: 160/68 (21 Dec 2023 06:45) (111/46 - 197/92)  ABP(mean): 98 (21 Dec 2023 06:45) (65 - 182)  RR: 16 (21 Dec 2023 06:45) (6 - 37)  SpO2: 100% (21 Dec 2023 06:45) (98% - 100%)    O2 Parameters below as of 21 Dec 2023 03:00  Patient On (Oxygen Delivery Method): ventilator    O2 Concentration (%): 40      Mode: AC/ CMV (Assist Control/ Continuous Mandatory Ventilation), RR (machine): 14, TV (machine): 500, FiO2: 40, PEEP: 5, ITime: 1  CVP(mm Hg): 12 (12-21-23 @ 06:45) (2 - 19)  CO: --  CI: --  PA: --  PA(mean): --  PA(direct): --  PCWP: --  LA: --  RA: --  SVR: --  SVRI: --  PVR: --  PVRI: --  I&O's Summary    20 Dec 2023 07:01  -  21 Dec 2023 07:00  --------------------------------------------------------  IN: 1138.2 mL / OUT: 1385 mL / NET: -246.8 mL      CAPILLARY BLOOD GLUCOSE    CAPILLARY BLOOD GLUCOSE        ============================I/O===========================   I&O's Detail    20 Dec 2023 07:01  -  21 Dec 2023 07:00  --------------------------------------------------------  IN:    Dexmedetomidine: 210 mL    IV PiggyBack: 800 mL    Norepinephrine: 128.2 mL  Total IN: 1138.2 mL    OUT:    Indwelling Catheter - Urethral (mL): 1385 mL    Vasopressin: 0 mL  Total OUT: 1385 mL    Total NET: -246.8 mL    GENERAL: Pt intubated, not responsive to sternal rub or noxious stimuli  . Notable trauma to head.   HEAD:  +traumatic head, Normocephalic  EYES: . Periorbital edema/erythma on R. conjunctiva and sclera clear. Pupils reactive to light   ENMT: ET tube in place. Dry mucous membranes. Notable blood in oral cavity   NECK: Supple  HEART: Regular rate and rhythm; No murmurs, rubs, or gallops  RESPIRATORY: Mechanical breath sounds b/l. No notable crackles, wheezes  ABDOMEN: Soft, Nontender, Nondistended  NEUROLOGY: A&Ox0. Pupils reactive. Non-responsive to sternal stimuli off sedation.  EXTREMITIES:  1+ Peripheral Pulses, No clubbing, cyanosis, or edema  SKIN: warm, dry, normal color, no rash or abnormal     ============================ LABS =========================                        9.6    10.63 )-----------( 105      ( 21 Dec 2023 01:02 )             27.9     12-21    140  |  106  |  48<H>  ----------------------------<  168<H>  3.6   |  21<L>  |  2.31<H>    Ca    8.2<L>      21 Dec 2023 01:02  Phos  4.0     12-21  Mg     2.3     12-21    TPro  5.8<L>  /  Alb  3.3  /  TBili  0.9  /  DBili  x   /  AST  79<H>  /  ALT  166<H>  /  AlkPhos  46  12-21    Troponin T, High Sensitivity Result: 2406 ng/L (12-20-23 @ 18:01)  Troponin T, High Sensitivity Result: 4145 ng/L (12-20-23 @ 02:09)  Troponin T, High Sensitivity Result: 1894 ng/L (12-19-23 @ 15:22)    CKMB Units: 28.7 ng/mL (12-20-23 @ 18:01)  CKMB Units: 133.8 ng/mL (12-20-23 @ 02:09)  CKMB Units: 129.2 ng/mL (12-19-23 @ 15:22)    Creatine Kinase, Serum: 745 U/L (12-20-23 @ 18:01)  Creatine Kinase, Serum: 1110 U/L (12-20-23 @ 02:09)    CPK Mass Assay %: 3.9 % (12-20-23 @ 18:01)  CPK Mass Assay %: 12.1 % (12-20-23 @ 02:09)        LIVER FUNCTIONS - ( 21 Dec 2023 01:02 )  Alb: 3.3 g/dL / Pro: 5.8 g/dL / ALK PHOS: 46 U/L / ALT: 166 U/L / AST: 79 U/L / GGT: x           PT/INR - ( 21 Dec 2023 01:02 )   PT: 12.2 sec;   INR: 1.17 ratio         PTT - ( 19 Dec 2023 15:22 )  PTT:30.8 sec  ABG - ( 21 Dec 2023 00:53 )  pH, Arterial: 7.47  pH, Blood: x     /  pCO2: 32    /  pO2: 145   / HCO3: 23    / Base Excess: 0.0   /  SaO2: 98.8              Blood Gas Arterial, Lactate: 1.0 mmol/L (12-21-23 @ 00:53)  Blood Gas Arterial, Lactate: 1.7 mmol/L (12-20-23 @ 17:50)  Lactate, Blood: 2.0 mmol/L (12-20-23 @ 05:38)  Blood Gas Venous - Lactate: 2.3 mmol/L (12-20-23 @ 02:20)  Blood Gas Arterial, Lactate: 2.1 mmol/L (12-20-23 @ 01:54)  Blood Gas Arterial, Lactate: 2.0 mmol/L (12-19-23 @ 15:15)    Urinalysis Basic - ( 21 Dec 2023 01:02 )    Color: x / Appearance: x / SG: x / pH: x  Gluc: 168 mg/dL / Ketone: x  / Bili: x / Urobili: x   Blood: x / Protein: x / Nitrite: x   Leuk Esterase: x / RBC: x / WBC x   Sq Epi: x / Non Sq Epi: x / Bacteria: x      ======================Micro/Rad/Cardio=================  Telemetry: Reviewed   EKG: Reviewed  CXR: Reviewed  Culture: Reviewed   Echo:   Cath: see sunrise for details  ======================================================  PAST MEDICAL & SURGICAL HISTORY:  HTN (hypertension)      HLD (hyperlipidemia)         ASHLEY PRITCHARD  MRN-[id number]  Patient is a 78y old  Male who presents with a chief complaint of Cardiac Arrest (20 Dec 2023 19:51)    HPI:  78M w/ PMH HTN HLD presents as transfer from [facility] s/p cardiac arrest. Per reports patient had a witness fall and arrest at home. EMS performed CPR en route to hospital was noted to be in VTach and shocked x2. ROSC was obtained just prior to arrival at [facility]. Pt was intubated placed on levo gtt. On arrival pt noted to have lacerated to R forehead. Pt had CT scans that were negative for intracranial hemorrhage, C-spine fracture, facial fractures. A R frontal hematoma was noted.     On arrival patient w/ dried blood on scalp. Also noted to have bleeding from oral mucosa w/ clots requiring suctioning. Pt otherwise off sedation and non-responsive. Per EMS report pt did receive some sedation and paralytics at [facility].     Per family patient has been feeling 'off' but managing his day to day and did not see a physician. Pt prescribed klonopin by outpatient provider and family believes pt may have been taking more than prescribed. On day of arrest, pt was initially asymptomatic carrying out his routine. He went to the bathroom, wife heard a loud thud and found patient in a pool of blood prompting call to EMS. Pt has not followed with any cardiologist. Has not had any syncope or other events preceding this.  (19 Dec 2023 14:54)      24 HOUR EVENTS:  Pt tachypneic, hypertensive overnight. Had several episodes of narrow complex tachycardia - Atach vs afib.     ICU Vital Signs Last 24 Hrs  T(C): 36.9 (21 Dec 2023 03:00), Max: 38.4 (20 Dec 2023 08:00)  T(F): 98.4 (21 Dec 2023 03:00), Max: 101.1 (20 Dec 2023 08:00)  HR: 74 (21 Dec 2023 06:45) (61 - 146)  BP: --  BP(mean): --  ABP: 160/68 (21 Dec 2023 06:45) (111/46 - 197/92)  ABP(mean): 98 (21 Dec 2023 06:45) (65 - 182)  RR: 16 (21 Dec 2023 06:45) (6 - 37)  SpO2: 100% (21 Dec 2023 06:45) (98% - 100%)    O2 Parameters below as of 21 Dec 2023 03:00  Patient On (Oxygen Delivery Method): ventilator    O2 Concentration (%): 40      Mode: AC/ CMV (Assist Control/ Continuous Mandatory Ventilation), RR (machine): 14, TV (machine): 500, FiO2: 40, PEEP: 5, ITime: 1  CVP(mm Hg): 12 (12-21-23 @ 06:45) (2 - 19)  CO: --  CI: --  PA: --  PA(mean): --  PA(direct): --  PCWP: --  LA: --  RA: --  SVR: --  SVRI: --  PVR: --  PVRI: --  I&O's Summary    20 Dec 2023 07:01  -  21 Dec 2023 07:00  --------------------------------------------------------  IN: 1138.2 mL / OUT: 1385 mL / NET: -246.8 mL      CAPILLARY BLOOD GLUCOSE    CAPILLARY BLOOD GLUCOSE        ============================I/O===========================   I&O's Detail    20 Dec 2023 07:01  -  21 Dec 2023 07:00  --------------------------------------------------------  IN:    Dexmedetomidine: 210 mL    IV PiggyBack: 800 mL    Norepinephrine: 128.2 mL  Total IN: 1138.2 mL    OUT:    Indwelling Catheter - Urethral (mL): 1385 mL    Vasopressin: 0 mL  Total OUT: 1385 mL    Total NET: -246.8 mL    GENERAL: Pt intubated, not responsive to sternal rub or noxious stimuli  . Notable trauma to head.   HEAD:  +traumatic head, Normocephalic  EYES: . Periorbital edema/erythma on R. conjunctiva and sclera clear. Pupils reactive to light   ENMT: ET tube in place. Dry mucous membranes. Notable blood in oral cavity   NECK: Supple  HEART: Regular rate and rhythm; No murmurs, rubs, or gallops  RESPIRATORY: Mechanical breath sounds b/l. No notable crackles, wheezes  ABDOMEN: Soft, Nontender, Nondistended  NEUROLOGY: A&Ox0. Pupils reactive. Non-responsive to sternal stimuli off sedation.  EXTREMITIES:  1+ Peripheral Pulses, No clubbing, cyanosis, or edema  SKIN: warm, dry, normal color    ============================ LABS =========================                        9.6    10.63 )-----------( 105      ( 21 Dec 2023 01:02 )             27.9     12-21    140  |  106  |  48<H>  ----------------------------<  168<H>  3.6   |  21<L>  |  2.31<H>    Ca    8.2<L>      21 Dec 2023 01:02  Phos  4.0     12-21  Mg     2.3     12-21    TPro  5.8<L>  /  Alb  3.3  /  TBili  0.9  /  DBili  x   /  AST  79<H>  /  ALT  166<H>  /  AlkPhos  46  12-21    Troponin T, High Sensitivity Result: 2406 ng/L (12-20-23 @ 18:01)  Troponin T, High Sensitivity Result: 4145 ng/L (12-20-23 @ 02:09)  Troponin T, High Sensitivity Result: 1894 ng/L (12-19-23 @ 15:22)    CKMB Units: 28.7 ng/mL (12-20-23 @ 18:01)  CKMB Units: 133.8 ng/mL (12-20-23 @ 02:09)  CKMB Units: 129.2 ng/mL (12-19-23 @ 15:22)    Creatine Kinase, Serum: 745 U/L (12-20-23 @ 18:01)  Creatine Kinase, Serum: 1110 U/L (12-20-23 @ 02:09)    CPK Mass Assay %: 3.9 % (12-20-23 @ 18:01)  CPK Mass Assay %: 12.1 % (12-20-23 @ 02:09)        LIVER FUNCTIONS - ( 21 Dec 2023 01:02 )  Alb: 3.3 g/dL / Pro: 5.8 g/dL / ALK PHOS: 46 U/L / ALT: 166 U/L / AST: 79 U/L / GGT: x           PT/INR - ( 21 Dec 2023 01:02 )   PT: 12.2 sec;   INR: 1.17 ratio         PTT - ( 19 Dec 2023 15:22 )  PTT:30.8 sec  ABG - ( 21 Dec 2023 00:53 )  pH, Arterial: 7.47  pH, Blood: x     /  pCO2: 32    /  pO2: 145   / HCO3: 23    / Base Excess: 0.0   /  SaO2: 98.8              Blood Gas Arterial, Lactate: 1.0 mmol/L (12-21-23 @ 00:53)  Blood Gas Arterial, Lactate: 1.7 mmol/L (12-20-23 @ 17:50)  Lactate, Blood: 2.0 mmol/L (12-20-23 @ 05:38)  Blood Gas Venous - Lactate: 2.3 mmol/L (12-20-23 @ 02:20)  Blood Gas Arterial, Lactate: 2.1 mmol/L (12-20-23 @ 01:54)  Blood Gas Arterial, Lactate: 2.0 mmol/L (12-19-23 @ 15:15)    Urinalysis Basic - ( 21 Dec 2023 01:02 )    Color: x / Appearance: x / SG: x / pH: x  Gluc: 168 mg/dL / Ketone: x  / Bili: x / Urobili: x   Blood: x / Protein: x / Nitrite: x   Leuk Esterase: x / RBC: x / WBC x   Sq Epi: x / Non Sq Epi: x / Bacteria: x      ======================Micro/Rad/Cardio=================  Telemetry: Reviewed   EKG: Reviewed  CXR: Reviewed  Culture: Reviewed   Echo:   Cath: see sunrise for details  ======================================================  PAST MEDICAL & SURGICAL HISTORY:  HTN (hypertension)      HLD (hyperlipidemia)         ASHLEY PRITCHARD  MRN-[id number]  Patient is a 78y old  Male who presents with a chief complaint of Cardiac Arrest (20 Dec 2023 19:51)    HPI:  78M w/ PMH HTN HLD presents as transfer from [facility] s/p cardiac arrest. Per reports patient had a witness fall and arrest at home. EMS performed CPR en route to hospital was noted to be in VTach and shocked x2. ROSC was obtained just prior to arrival at [facility]. Pt was intubated placed on levo gtt. On arrival pt noted to have lacerated to R forehead. Pt had CT scans that were negative for intracranial hemorrhage, C-spine fracture, facial fractures. A R frontal hematoma was noted.     On arrival patient w/ dried blood on scalp. Also noted to have bleeding from oral mucosa w/ clots requiring suctioning. Pt otherwise off sedation and non-responsive. Per EMS report pt did receive some sedation and paralytics at [facility].     Per family patient has been feeling 'off' but managing his day to day and did not see a physician. Pt prescribed klonopin by outpatient provider and family believes pt may have been taking more than prescribed. On day of arrest, pt was initially asymptomatic carrying out his routine. He went to the bathroom, wife heard a loud thud and found patient in a pool of blood prompting call to EMS. Pt has not followed with any cardiologist. Has not had any syncope or other events preceding this.  (19 Dec 2023 14:54)      24 HOUR EVENTS:  Pt tachypneic, hypertensive overnight. Had several episodes of narrow complex tachycardia - Atach vs afib.     ICU Vital Signs Last 24 Hrs  T(C): 36.9 (21 Dec 2023 03:00), Max: 38.4 (20 Dec 2023 08:00)  T(F): 98.4 (21 Dec 2023 03:00), Max: 101.1 (20 Dec 2023 08:00)  HR: 74 (21 Dec 2023 06:45) (61 - 146)  BP: --  BP(mean): --  ABP: 160/68 (21 Dec 2023 06:45) (111/46 - 197/92)  ABP(mean): 98 (21 Dec 2023 06:45) (65 - 182)  RR: 16 (21 Dec 2023 06:45) (6 - 37)  SpO2: 100% (21 Dec 2023 06:45) (98% - 100%)    O2 Parameters below as of 21 Dec 2023 03:00  Patient On (Oxygen Delivery Method): ventilator    O2 Concentration (%): 40      Mode: AC/ CMV (Assist Control/ Continuous Mandatory Ventilation), RR (machine): 14, TV (machine): 500, FiO2: 40, PEEP: 5, ITime: 1  CVP(mm Hg): 12 (12-21-23 @ 06:45) (2 - 19)  CO: --  CI: --  PA: --  PA(mean): --  PA(direct): --  PCWP: --  LA: --  RA: --  SVR: --  SVRI: --  PVR: --  PVRI: --  I&O's Summary    20 Dec 2023 07:01  -  21 Dec 2023 07:00  --------------------------------------------------------  IN: 1138.2 mL / OUT: 1385 mL / NET: -246.8 mL      CAPILLARY BLOOD GLUCOSE    CAPILLARY BLOOD GLUCOSE        ============================I/O===========================   I&O's Detail    20 Dec 2023 07:01  -  21 Dec 2023 07:00  --------------------------------------------------------  IN:    Dexmedetomidine: 210 mL    IV PiggyBack: 800 mL    Norepinephrine: 128.2 mL  Total IN: 1138.2 mL    OUT:    Indwelling Catheter - Urethral (mL): 1385 mL    Vasopressin: 0 mL  Total OUT: 1385 mL    Total NET: -246.8 mL    GENERAL: Pt intubated, not responsive to sternal rub or noxious stimuli  . Notable trauma to head.   HEAD:  +traumatic head, Normocephalic  EYES: . Periorbital edema/erythma on R. conjunctiva and sclera clear. Pupils reactive to light   ENMT: ET tube in place. Dry mucous membranes. Notable blood in oral cavity   NECK: Supple  HEART: Regular rate and rhythm; No murmurs, rubs, or gallops  RESPIRATORY: Mechanical breath sounds b/l. No notable crackles, wheezes  ABDOMEN: Soft, Nontender, Nondistended  NEUROLOGY: A&Ox0. Pupils reactive. Non-responsive to sternal stimuli off sedation.  EXTREMITIES:  1+ Peripheral Pulses, No clubbing, cyanosis, or edema  SKIN: warm, dry, normal color    ============================ LABS =========================                        9.6    10.63 )-----------( 105      ( 21 Dec 2023 01:02 )             27.9     12-21    140  |  106  |  48<H>  ----------------------------<  168<H>  3.6   |  21<L>  |  2.31<H>    Ca    8.2<L>      21 Dec 2023 01:02  Phos  4.0     12-21  Mg     2.3     12-21    TPro  5.8<L>  /  Alb  3.3  /  TBili  0.9  /  DBili  x   /  AST  79<H>  /  ALT  166<H>  /  AlkPhos  46  12-21    Troponin T, High Sensitivity Result: 2406 ng/L (12-20-23 @ 18:01)  Troponin T, High Sensitivity Result: 4145 ng/L (12-20-23 @ 02:09)  Troponin T, High Sensitivity Result: 1894 ng/L (12-19-23 @ 15:22)    CKMB Units: 28.7 ng/mL (12-20-23 @ 18:01)  CKMB Units: 133.8 ng/mL (12-20-23 @ 02:09)  CKMB Units: 129.2 ng/mL (12-19-23 @ 15:22)    Creatine Kinase, Serum: 745 U/L (12-20-23 @ 18:01)  Creatine Kinase, Serum: 1110 U/L (12-20-23 @ 02:09)    CPK Mass Assay %: 3.9 % (12-20-23 @ 18:01)  CPK Mass Assay %: 12.1 % (12-20-23 @ 02:09)        LIVER FUNCTIONS - ( 21 Dec 2023 01:02 )  Alb: 3.3 g/dL / Pro: 5.8 g/dL / ALK PHOS: 46 U/L / ALT: 166 U/L / AST: 79 U/L / GGT: x           PT/INR - ( 21 Dec 2023 01:02 )   PT: 12.2 sec;   INR: 1.17 ratio         PTT - ( 19 Dec 2023 15:22 )  PTT:30.8 sec  ABG - ( 21 Dec 2023 00:53 )  pH, Arterial: 7.47  pH, Blood: x     /  pCO2: 32    /  pO2: 145   / HCO3: 23    / Base Excess: 0.0   /  SaO2: 98.8              Blood Gas Arterial, Lactate: 1.0 mmol/L (12-21-23 @ 00:53)  Blood Gas Arterial, Lactate: 1.7 mmol/L (12-20-23 @ 17:50)  Lactate, Blood: 2.0 mmol/L (12-20-23 @ 05:38)  Blood Gas Venous - Lactate: 2.3 mmol/L (12-20-23 @ 02:20)  Blood Gas Arterial, Lactate: 2.1 mmol/L (12-20-23 @ 01:54)  Blood Gas Arterial, Lactate: 2.0 mmol/L (12-19-23 @ 15:15)    Urinalysis Basic - ( 21 Dec 2023 01:02 )    Color: x / Appearance: x / SG: x / pH: x  Gluc: 168 mg/dL / Ketone: x  / Bili: x / Urobili: x   Blood: x / Protein: x / Nitrite: x   Leuk Esterase: x / RBC: x / WBC x   Sq Epi: x / Non Sq Epi: x / Bacteria: x      ======================Micro/Rad/Cardio=================  Telemetry: Reviewed   EKG: Reviewed  CXR: Reviewed  Culture: Reviewed   Echo:   Cath: see sunrise for details  ======================================================  PAST MEDICAL & SURGICAL HISTORY:  HTN (hypertension)      HLD (hyperlipidemia)

## 2023-12-21 NOTE — DIETITIAN INITIAL EVALUATION ADULT - ENTERAL
- If EN feeds to be initiated/ within GOC, consider Vital 1.5 at 10 ml/hr and advance as tolerated to goal rate of 60 ml/hr x 24 hours to provide 1440 ml total volume, 2160 kcal, 97 gm protein and 1100 ml free water.  EN feeds at goal would meet 23 kcal/kg and 1.0 g/kg protein based on wt of 93.5 kG. Defer fluid needs to team.  -> RD remains available to adjust EN regimen PRN.

## 2023-12-21 NOTE — PROGRESS NOTE ADULT - ASSESSMENT
78M w/ PMH HTN HLD presents as transfer from [facility] s/p VT arrest s/p shock x2 w/ unknown downtime. Pt found to have trauma to head w/ superficial scalp bleeding and oral mucosal bleeding.       NEURO  #Intubated  Encephalopathy post arrest   - Intubated w/o sedation s/p cardiac arrest w/ unknown down time. Reported to be 20minutes but can be longer given ROSC achieved right before arrival to the hospital   - CTH at [facility] w/o intracranial bleed/pathology. CTH maybe too soon to show evidence of anoxic injury  - Pt remains w/o purposeful movement.   - Neuro consult placed for prognostication  - VEEG in place   - Repeat CTH today  - check neuron specific enolase    #SAH  #SDH  - Pt w/ acute SAH and SDH 2/2 trauma  - Was not initially seen in outside scan at [facility] likely performed too early for radiologic evidence  - Neurology following, rec NSGY eval  - Likely no intervention given poor prognosis. Will obtain stability scans    #C-spine trauma  Pt w/ fall at home presented w/ c-collar.   -Per [facility] radiology report no evidence of cervical fracture  -Trauma surgery cleared pt of Cspine collar    #Myoclonic jerking  - vEEG: evidence of mycolonic seizures  - Start keppra load  - Precedex gtt    CARDIAC  #VT arrest  - Pt s/p VT arrest w/ unknown downtime. Pt reportedly received shock x2. No prior cardiac hx per family  - Check TTE   - Check cardiac enzymes, BNP  - Monitor for arrhythmias   - Pt not asa/plavix loaded 2/2 substernal hematoma noted on CT chest at [facility]  - Unlikely to be a candidate for [facility] at this time given mental status       RESPIRATORY   #Intubated  Pt intubated s/p cardiac arrest  - On full vent support: RR 16 /  / PEEP 5 / FiO2 40  - Monitor ABGs    #Oropharynx bleed  - Pt w/ blood in oropharynx requiring frequent suctioning.   - ENT consulted s/p packing     RENAL   Pt presented w/ SCr 1.16, likely near baseline  - trend Cr   - montior I&Os    GI  - Diet: NPO for now     ENDO:  #Hypothyroidism  - A1c=5.5.   - monitor glucose    HEME/ONC  #Substernal hematoma  - Pt w/ substernal hematoma noted on imaging at [facility] from trauma/cpr  - Repeat CT chest to monitor hematoma  - Will consider resumption of DAPT/heparin if CT chest stable  - A/C: scd for dvt    ID   #Sepsis  - Pt w/ leukocytosis w/ fevers possibly 2/2  infection  - U/A grossly positive. F/u BCx, UCx  - Pt had crash lines placed at [facility]. Possible source of infection   - Will cover w/ CTX and vanc, by level given worsening renal function  - CTX to cover UTI and strep    LINES/PPX  - peripherals in tact  - LIJ triple lumen: 12/20  - GOC: Full   78M w/ PMH HTN HLD presents as transfer from [facility] s/p VT arrest s/p shock x2 w/ unknown downtime. Pt found to have trauma to head w/ superficial scalp bleeding and oral mucosal bleeding.       NEURO  #Intubated  Encephalopathy post arrest   - Intubated w/o sedation s/p cardiac arrest w/ unknown down time. Reported to be 20minutes but can be longer given ROSC achieved right before arrival to the hospital   - CTH at [facility] w/o intracranial bleed/pathology. CTH maybe too soon to show evidence of anoxic injury  - Pt remains w/o purposeful movement.   - Neuro consult placed for prognostication  - VEEG in place   - Repeat CTH today  - check neuron specific enolase    #SAH  #SDH  - Pt w/ acute SAH and SDH 2/2 trauma  - Was not initially seen in outside scan at [facility] likely performed too early for radiologic evidence  - Neurology following, rec NSGY eval  - Likely no intervention given poor prognosis. Will obtain stability scans    #C-spine trauma  Pt w/ fall at home presented w/ c-collar.   -Per [facility] radiology report no evidence of cervical fracture  -Trauma surgery cleared pt of Cspine collar    #Myoclonic jerking  - vEEG: evidence of mycolonic seizures  - Start keppra load  - Precedex gtt    CARDIAC  #VT arrest  - Pt s/p VT arrest w/ unknown downtime. Pt reportedly received shock x2. No prior cardiac hx per family  - Check TTE   - Check cardiac enzymes, BNP  - Monitor for arrhythmias   - Pt not asa/plavix loaded 2/2 substernal hematoma noted on CT chest at [facility]  - Unlikely to be a candidate for [facility] at this time given mental status       RESPIRATORY   #Intubated  Pt intubated s/p cardiac arrest  - On full vent support: RR 16 /  / PEEP 5 / FiO2 40  - Monitor ABGs    #Oropharynx bleed  - Pt w/ blood in oropharynx requiring frequent suctioning.   - ENT consulted s/p packing     RENAL   Pt presented w/ SCr 1.16, likely near baseline  - trend Cr   - montior I&Os    GI  - Diet: NPO for now     ENDO:  #Hypothyroidism  - A1c=5.5.   - monitor glucose    HEME/ONC  #Substernal hematoma  - Pt w/ substernal hematoma noted on imaging at [facility] from trauma/cpr  - Repeat CT chest to monitor hematoma  - Will consider resumption of DAPT/heparin if CT chest stable  - A/C: scd for dvt    ID   #Sepsis  - Pt w/ leukocytosis w/ fevers possibly 2/2  infection  - U/A grossly positive. F/u BCx, UCx  - Pt had crash lines placed at [facility]. Possible source of infection   - Will cover w/ CTX and vanc, by level given worsening renal function  - CTX to cover UTI and strep    LINES/PPX  - peripherals in tact  - LIJ triple lumen: 12/20  - GOC: Full   78M w/ PMH HTN HLD presents as transfer from [facility] s/p VT arrest s/p shock x2 w/ unknown downtime. Pt found to have trauma to head w/ superficial scalp bleeding and oral mucosal bleeding.       NEURO  #Intubated  Encephalopathy post arrest   - Intubated w/o sedation s/p cardiac arrest w/ unknown down time. Reported to be 20minutes but can be longer given ROSC achieved right before arrival to the hospital   - CTH at [facility] w/o intracranial bleed/pathology. CTH maybe too soon to show evidence of anoxic injury  - Pt remains w/o purposeful movement.   - Neuro consult placed for prognostication  - VEEG in place   - Repeat CTH today  - check neuron specific enolase    #SAH  #SDH  - Pt w/ acute SAH and SDH 2/2 trauma  - Was not initially seen in outside scan at [facility] likely performed too early for radiologic evidence  - Neurology following, rec NSGY eval  - Likely no intervention given poor prognosis. Will obtain stability scans  - Maintain BP control     #C-spine trauma  Pt w/ fall at home presented w/ c-collar.   -Per [facility] radiology report no evidence of cervical fracture  -Trauma surgery cleared pt of Cspine collar    #Myoclonic jerking  - vEEG: evidence of mycolonic seizures  - Start keppra load  - Precedex gtt    CARDIAC  #VT arrest  - Pt s/p VT arrest w/ unknown downtime. Pt reportedly received shock x2. No prior cardiac hx per family  - Check TTE   - Check cardiac enzymes, BNP  - Monitor for arrhythmias   - Pt not asa/plavix loaded 2/2 substernal hematoma noted on CT chest at [facility]  - Unlikely to be a candidate for [facility] at this time given mental status     RESPIRATORY   #Intubated  Pt intubated s/p cardiac arrest  - On full vent support: RR 16 /  / PEEP 5 / FiO2 40  - Monitor ABGs    #Oropharynx bleed  - Pt w/ blood in oropharynx requiring frequent suctioning.   - ENT consulted s/p packing     RENAL   Pt presented w/ SCr 1.16, likely near baseline  - trend Cr   - montior I&Os    GI  - Diet: NPO for now     ENDO:  #Hypothyroidism  - A1c=5.5.   - monitor glucose    HEME/ONC  #Substernal hematoma  - Pt w/ substernal hematoma noted on imaging at [facility] from trauma/cpr  - Repeat CT chest to monitor hematoma  - Will consider resumption of DAPT/heparin if CT chest stable  - A/C: scd for dvt    ID   #Sepsis  - Pt w/ leukocytosis w/ fevers possibly 2/2  infection  - U/A grossly positive. F/u BCx, UCx  - Pt had crash lines placed at [facility]. Possible source of infection. MRSA negative 12/20  - CTX to cover UTI and strep    LINES/PPX  - peripherals in tact  - LIJ triple lumen: 12/20  - GOC: Full   78M w/ PMH HTN HLD presents as transfer from [facility] s/p VT arrest s/p shock x2 w/ unknown downtime. Pt found to have trauma to head w/ superficial scalp bleeding and oral mucosal bleeding.       NEURO  #Intubated  Encephalopathy post arrest   - Intubated w/o sedation s/p cardiac arrest w/ unknown down time. Reported to be 20minutes but can be longer given ROSC achieved right before arrival to the hospital   - CTH at [facility] w/o intracranial bleed/pathology. CTH maybe too soon to show evidence of anoxic injury  - Pt remains w/o purposeful movement.   - Neuro consult placed for prognostication  - VEEG in place   - Repeat CTH today  - check neuron specific enolase    #SAH  #SDH  - Pt w/ acute SAH and SDH 2/2 trauma  - Was not initially seen in outside scan at [facility] likely performed too early for radiologic evidence  - Neurology following, rec NSGY eval  - Likely no intervention given poor prognosis. Will obtain stability scans  - Maintain BP control     #C-spine trauma  Pt w/ fall at home presented w/ c-collar.   -Per [facility] radiology report no evidence of cervical fracture  -Trauma surgery cleared pt of Cspine collar    #Myoclonic jerking  - vEEG: evidence of mycolonic seizures  - Start keppra load  - Precedex gtt    CARDIAC  #VT arrest  - Pt s/p VT arrest w/ unknown downtime. Pt reportedly received shock x2. No prior cardiac hx per family  - Check TTE   - Check cardiac enzymes, BNP  - Monitor for arrhythmias   - Pt not asa/plavix loaded 2/2 substernal hematoma noted on CT chest at [facility]  - Unlikely to be a candidate for [facility] at this time given mental status     RESPIRATORY   #Intubated  Pt intubated s/p cardiac arrest  - On full vent support: RR 16 /  / PEEP 5 / FiO2 40  - Monitor ABGs    #Oropharynx bleed  - Pt w/ blood in oropharynx requiring frequent suctioning.   - ENT consulted s/p packing     RENAL   Pt presented w/ SCr 1.16, likely near baseline  - trend Cr   - montior I&Os    GI  - Diet: NPO for now     ENDO:  #Hypothyroidism  - A1c=5.5.   - monitor glucose    HEME/ONC  #Substernal hematoma  - Pt w/ substernal hematoma noted on imaging at [facility] from trauma/cpr  - Repeat CT chest to monitor hematoma  - Will consider resumption of DAPT/heparin if CT chest stable  - A/C: scd for dvt    ID   #Sepsis  - Pt w/ leukocytosis w/ fevers possibly 2/2  infection  - U/A grossly positive. F/u BCx, UCx  - Pt had crash lines placed at [facility]. Possible source of infection. MRSA negative 12/20  - CTX to cover UTI and strep    LINES/PPX  - peripherals in tact  - LIJ triple lumen: 12/20  - GOC: Full   78M w/ PMH HTN HLD presents as transfer from [facility] s/p VT arrest s/p shock x2 w/ unknown downtime. Pt found to have trauma to head w/ superficial scalp bleeding and oral mucosal bleeding.       NEURO  #Intubated  Encephalopathy post arrest   - Intubated w/o sedation s/p cardiac arrest w/ unknown down time. Reported to be 20minutes but can be longer given ROSC achieved right before arrival to the hospital   - CTH at [facility] w/o intracranial bleed/pathology. CTH maybe too soon to show evidence of anoxic injury  - Pt remains w/o purposeful movement.   - Neuro consult placed for prognostication  - VEEG in place   - Repeat CTH today given abn CTH yesterday, neuro surg consulted   - check neuron specific enolase    #SAH  #SDH  - Pt w/ acute SAH and SDH 2/2 trauma  - Was not initially seen in outside scan at [facility] likely performed too early for radiologic evidence  - Neurology following, rec NSGY eval  - Likely no intervention given poor prognosis. Will obtain stability scans  - Maintain BP control     #C-spine trauma  Pt w/ fall at home presented w/ c-collar.   -Per [facility] radiology report no evidence of cervical fracture  -Trauma surgery cleared pt of Cspine collar    #Myoclonic jerking  - vEEG: evidence of mycolonic seizures  - Started keppra load    CARDIAC  #VT arrest  - Pt s/p VT arrest w/ unknown downtime. Pt reportedly received shock x2. No prior cardiac hx per family  - Check TTE   - Check cardiac enzymes, BNP  - Monitor for arrhythmias   - Pt not asa/plavix loaded 2/2 substernal hematoma noted on CT chest at [facility]  - Unlikely to be a candidate for [facility] at this time given mental status     RESPIRATORY   #Intubated  Pt intubated s/p cardiac arrest  - On full vent support: RR 16 /  / PEEP 5 / FiO2 40  - Monitor ABGs    #Oropharynx bleed  - Pt w/ blood in oropharynx requiring frequent suctioning.   - ENT consulted s/p packing     RENAL   Pt presented w/ SCr 1.16, likely near baseline  - trend Cr   - montior I&Os    GI  - Diet: NPO for now pending NGT placement when able     ENDO:  #Hypothyroidism  - A1c=5.5.   - monitor glucose    HEME/ONC  #Substernal hematoma  - Pt w/ substernal hematoma noted on imaging at [facility] from trauma/cpr  - Repeat CT chest to monitor hematoma  - A/C: scd for dvt    ID   #Sepsis  - Pt w/ leukocytosis w/ fevers possibly 2/2  infection  - U/A grossly positive. F/u BCx, UCx  - Pt had crash lines placed at [facility]. Possible source of infection. MRSA negative 12/20  - CTX to cover UTI and strep    LINES/PPX  - peripherals in tact  - LIJ triple lumen: 12/20  - GOC: Full   78M w/ PMH HTN HLD presents as transfer from [facility] s/p VT arrest s/p shock x2 w/ unknown downtime. Pt found to have trauma to head w/ superficial scalp bleeding and oral mucosal bleeding.       NEURO  #Intubated  Encephalopathy post arrest   - Intubated w/o sedation s/p cardiac arrest w/ unknown down time. Reported to be 20minutes but can be longer given ROSC achieved right before arrival to the hospital   - CTH at [facility] w/o intracranial bleed/pathology. CTH maybe too soon to show evidence of anoxic injury  - Pt remains w/o purposeful movement.   - Neuro consult placed for prognostication  - VEEG in place   - Repeat CTH today given abn CTH yesterday, neuro surg consulted   - check neuron specific enolase    #SAH  #SDH  - Pt w/ acute SAH and SDH 2/2 trauma  - Was not initially seen in outside scan at [facility] likely performed too early for radiologic evidence  - Neurology following, rec NSGY eval  - Likely no intervention given poor prognosis. Will obtain stability scans  - Maintain BP control     #C-spine trauma  Pt w/ fall at home presented w/ c-collar.   -Per [facility] radiology report no evidence of cervical fracture  -Trauma surgery cleared pt of Cspine collar    #Myoclonic jerking  - vEEG: evidence of mycolonic seizures  - Started keppra load    CARDIAC  #VT arrest  - Pt s/p VT arrest w/ unknown downtime. Pt reportedly received shock x2. No prior cardiac hx per family  - Check TTE   - Check cardiac enzymes, BNP  - Monitor for arrhythmias   - Pt not asa/plavix loaded 2/2 substernal hematoma noted on CT chest at [facility]  - Unlikely to be a candidate for [facility] at this time given mental status     RESPIRATORY   #Intubated  Pt intubated s/p cardiac arrest  - On full vent support: RR 16 /  / PEEP 5 / FiO2 40  - Monitor ABGs    #Oropharynx bleed  - Pt w/ blood in oropharynx requiring frequent suctioning.   - ENT consulted s/p packing     RENAL   Pt presented w/ SCr 1.16, likely near baseline  - trend Cr   - montior I&Os    GI  - Diet: NPO for now pending NGT placement when able     ENDO:  #Hypothyroidism  - A1c=5.5.   - monitor glucose    HEME/ONC  #Substernal hematoma  - Pt w/ substernal hematoma noted on imaging at [facility] from trauma/cpr  - Repeat CT chest to monitor hematoma  - A/C: scd for dvt    ID   #Sepsis  - Pt w/ leukocytosis w/ fevers possibly 2/2  infection  - U/A grossly positive. F/u BCx, UCx  - Pt had crash lines placed at [facility]. Possible source of infection. MRSA negative 12/20  - CTX to cover UTI and strep    LINES/PPX  - peripherals in tact  - LIJ triple lumen: 12/20  - GOC: Full

## 2023-12-21 NOTE — PROGRESS NOTE ADULT - ATTENDING COMMENTS
77 yo man with HTN, HLD s/p out of hospital cardiac arrest     unresponsive, no response to pain, + gag and pupils respond to light, neuro consulted for prognostication, repeat CTH showed ICB, neurosurg consulted, repeat CTH today   For now will continue to hold DAPT and heparin given ICH   possible afib at OSH per EKG, also SVT overnight ?AT thus he may need AC eventually, but at this point given CHT findings certainly on hold     Acute respiratory failure requiring mechanical ventilation due to cardiac arrest, SBT as tolerated    npo for now, place NGT if possible per ENT   non oliguric LONA likely due to shock post arrest ?ATN, cont to monitor      H/H low but acceptable  SCDs for DVT ppx for now given bleeding as mentioned above   febrile, cont ceftriaxone (cover for UTI, packing), f/u cx    Sugars overall controlled ,cont coverage    LIJ TLC 12/20    Another long discussion today with son, daughter and wife about no improvement in neuro status, new findings of ICH, we discussed that more discussions will take place with neuro as well for prognostication.  I have explained to them that given long down time post arrest and no neuro improvement despite no significant use of sedation is a poor prognosis.  Daughter wished to speak to neuro and I updated neuro attending about it, he was willing and ready to speak to the family but they already left.  Will attempt tomorrow again.

## 2023-12-21 NOTE — CONSULT NOTE ADULT - ASSESSMENT
78M Hx HTN, HLD xfer [facility] s/p cardiac arrest 12/19. ROSC was obtained 20-40min later on arrival to [facility]. CTH shows tiny R posterior frontal SAH, tiny occipital falcine SDH. Coags wnl, PLTs 105<123<167 likely dilutional. Exam off all sedation: intubated, no EO, PERRL, (+) very weak cough/gag, (-) corneals, (+) occasional overbreathe, flaccid x4    -no acute nsgy intervention    -hold all ac/ap    -rpt CTH this AM for stability   78M Hx HTN, HLD xfer [facility] s/p cardiac arrest 12/19. ROSC was obtained 20-40min later on arrival to [facility]. CTH shows tiny R posterior frontal SAH, tiny occipital falcine SDH. Coags wnl, PLTs 105<123<167 likely dilutional. Exam off all sedation: intubated, no EO, PERRL, (+) very weak cough/gag, (-) corneals, (+) occasional overbreathe, flaccid x4    -no acute nsgy intervention    -hold all ac/ap    -rpt CTH this AM for stability

## 2023-12-21 NOTE — PROGRESS NOTE ADULT - NS ATTEND AMEND GEN_ALL_CORE FT
ENT following for oral bleed and OG tube placement    Patient is 78M w/ PMH HTN HLD presents as transfer from [facility] s/p cardiac arrest. Per reports patient had a witness fall and arrest at home. Pt was intubated  in the field.     12/19/23 blood suctioned from posterior pharynx, area of ecchymosis noted behind left tonsillar pillar, oropharynx packed with kerlix x1.  12/20/23blood from posterior oral pharynx repacked with Kerlix X1    Oral packing was removed today, no evidence of active bleeding, OG tube placed after failed placement of NGT using laryngoscopy.     Recommend:  Oral Bleed  -Avoid oral trauma  -Suction oral cavity through the right side  -CXR for confirmation of OG tube  -Call ENT with questions ENT following for oral bleed and OG tube placement    Patient is 78M w/ PMH HTN HLD presents as transfer from [facility] s/p cardiac arrest. Per reports patient had a witness fall and arrest at home. Pt was intubated  in the field.     12/19/23 blood suctioned from posterior pharynx, area of ecchymosis noted behind left tonsillar pillar, oropharynx packed with kerlix x1.  12/20/23blood from posterior oral pharynx repacked with Kerlix X1    Oral packing was removed today, no evidence of active bleeding, OG tube placed after failed placement of NGT using laryngoscopy.     Recommend:  Oral Bleed  -Avoid oral trauma  -Suction oral cavity through the right side  -CXR for confirmation of OG tube  -Call ENT with questions

## 2023-12-21 NOTE — DIETITIAN INITIAL EVALUATION ADULT - OTHER INFO
- status post cardiac arrest. Pt remains intubated, off sedation  - Neuro consulted; EEG monitoring in place for encephalopathy post arrest per chart  - ENT consulted for blood in oropharynx for packing  - HbA1c and CHOL within normal limits

## 2023-12-21 NOTE — DIETITIAN INITIAL EVALUATION ADULT - PERTINENT MEDS FT
MEDICATIONS  (STANDING):  artificial  tears Solution 1 Drop(s) Both EYES two times a day  cefTRIAXone   IVPB 1000 milliGRAM(s) IV Intermittent every 24 hours  chlorhexidine 0.12% Liquid 15 milliLiter(s) Oral Mucosa every 12 hours  chlorhexidine 2% Cloths 1 Application(s) Topical daily  dexMEDEtomidine Infusion 0.3 MICROgram(s)/kG/Hr (7.01 mL/Hr) IV Continuous <Continuous>  levETIRAcetam  IVPB 1000 milliGRAM(s) IV Intermittent every 12 hours  pantoprazole  Injectable 40 milliGRAM(s) IV Push daily  petrolatum Ophthalmic Ointment 1 Application(s) Both EYES three times a day    MEDICATIONS  (PRN):

## 2023-12-21 NOTE — PROGRESS NOTE ADULT - SUBJECTIVE AND OBJECTIVE BOX
ENT ISSUE/POD: oral bleeding    HPI: 78M w/ PMH HTN HLD presents as transfer from [facility] s/p cardiac arrest. Per reports patient had a witness fall and arrest at home. EMS performed CPR en route to hospital was noted to be in VTach and shocked x2. ROSC was obtained just prior to arrival at [facility]. Pt was intubated placed on levo gtt. On arrival pt noted to have lacerated to R forehead. Pt had CT scans that were negative for intracranial hemorrhage, C-spine fracture, facial fractures. A R frontal hematoma was noted. ENT consulted for oral bleeding noted upon suctioning. Unable to obtain further history from pt due to clinical condition.          PAST MEDICAL & SURGICAL HISTORY:  HTN (hypertension)      HLD (hyperlipidemia)        Allergies    No Known Allergies    Intolerances      MEDICATIONS  (STANDING):  artificial  tears Solution 1 Drop(s) Both EYES two times a day  cefTRIAXone   IVPB 1000 milliGRAM(s) IV Intermittent every 24 hours  chlorhexidine 0.12% Liquid 15 milliLiter(s) Oral Mucosa every 12 hours  chlorhexidine 2% Cloths 1 Application(s) Topical daily  dexMEDEtomidine Infusion 0.3 MICROgram(s)/kG/Hr (7.01 mL/Hr) IV Continuous <Continuous>  levETIRAcetam  IVPB 1000 milliGRAM(s) IV Intermittent every 12 hours  pantoprazole  Injectable 40 milliGRAM(s) IV Push daily  petrolatum Ophthalmic Ointment 1 Application(s) Both EYES three times a day    MEDICATIONS  (PRN):      Social History: see consult    Family history: see consult    ROS:   ENT: all negative except as noted in HPI   Pulm: denies SOB, cough, hemoptysis  Neuro: denies numbness/tingling, loss of sensation  Endo: denies heat/cold intolerance, excessive sweating      Vital Signs Last 24 Hrs  T(C): 36.9 (21 Dec 2023 08:00), Max: 37.9 (20 Dec 2023 12:00)  T(F): 98.4 (21 Dec 2023 08:00), Max: 100.3 (20 Dec 2023 12:00)  HR: 74 (21 Dec 2023 09:00) (61 - 150)  BP: --  BP(mean): --  RR: 19 (21 Dec 2023 09:00) (6 - 37)  SpO2: 100% (21 Dec 2023 09:00) (98% - 100%)    Parameters below as of 21 Dec 2023 09:00  Patient On (Oxygen Delivery Method): ventilator    O2 Concentration (%): 40                          9.6    10.63 )-----------( 105      ( 21 Dec 2023 01:02 )             27.9    12-21    140  |  106  |  48<H>  ----------------------------<  168<H>  3.6   |  21<L>  |  2.31<H>    Ca    8.2<L>      21 Dec 2023 01:02  Phos  4.0     12-21  Mg     2.3     12-21    TPro  5.8<L>  /  Alb  3.3  /  TBili  0.9  /  DBili  x   /  AST  79<H>  /  ALT  166<H>  /  AlkPhos  46  12-21   PT/INR - ( 21 Dec 2023 01:02 )   PT: 12.2 sec;   INR: 1.17 ratio         PTT - ( 19 Dec 2023 15:22 )  PTT:30.8 sec    PHYSICAL EXAM:  Gen: NAD  Skin: No rashes or lesions  Head: Normocephalic  Face: no edema, erythema, or fluctuance. Parotid glands soft without mass  Eyes: periorbital edema and ecchymosis  Nose: Nares bilaterally patent, no discharge  Mouth: ETT in place, packed with kerlix x1, no active bleeding. unable to visualize oropharynx  Neck: Flat, supple, no lymphadenopathy, trachea midline, no masses  Lymphatic: No lymphadenopathy  Resp: on vent  CV: no peripheral edema/cyanosis  GI: nondistended   Peripheral vascular: no JVD or edema  Neuro: facial nerve intact, no facial droop           ENT ISSUE/POD: oral bleeding    HPI: 78M w/ PMH HTN HLD presents as transfer from [facility] s/p cardiac arrest. Per reports patient had a witness fall and arrest at home. EMS performed CPR en route to hospital was noted to be in VTach and shocked x2. ROSC was obtained just prior to arrival at [facility]. Pt was intubated placed on levo gtt. On arrival pt noted to have lacerated to R forehead. Pt had CT scans that were negative for intracranial hemorrhage, C-spine fracture, facial fractures. A R frontal hematoma was noted. ENT consulted for oral bleeding noted upon suctioning. Unable to obtain further history from pt due to clinical condition.          PAST MEDICAL & SURGICAL HISTORY:  HTN (hypertension)      HLD (hyperlipidemia)        Allergies    No Known Allergies    Intolerances      MEDICATIONS  (STANDING):  artificial  tears Solution 1 Drop(s) Both EYES two times a day  cefTRIAXone   IVPB 1000 milliGRAM(s) IV Intermittent every 24 hours  chlorhexidine 0.12% Liquid 15 milliLiter(s) Oral Mucosa every 12 hours  chlorhexidine 2% Cloths 1 Application(s) Topical daily  dexMEDEtomidine Infusion 0.3 MICROgram(s)/kG/Hr (7.01 mL/Hr) IV Continuous <Continuous>  levETIRAcetam  IVPB 1000 milliGRAM(s) IV Intermittent every 12 hours  pantoprazole  Injectable 40 milliGRAM(s) IV Push daily  petrolatum Ophthalmic Ointment 1 Application(s) Both EYES three times a day    MEDICATIONS  (PRN):      Social History: see consult    Family history: see consult    ROS:   ENT: all negative except as noted in HPI   Pulm: denies SOB, cough, hemoptysis  Neuro: denies numbness/tingling, loss of sensation  Endo: denies heat/cold intolerance, excessive sweating      Vital Signs Last 24 Hrs  T(C): 36.9 (21 Dec 2023 08:00), Max: 37.9 (20 Dec 2023 12:00)  T(F): 98.4 (21 Dec 2023 08:00), Max: 100.3 (20 Dec 2023 12:00)  HR: 74 (21 Dec 2023 09:00) (61 - 150)  BP: --  BP(mean): --  RR: 19 (21 Dec 2023 09:00) (6 - 37)  SpO2: 100% (21 Dec 2023 09:00) (98% - 100%)    Parameters below as of 21 Dec 2023 09:00  Patient On (Oxygen Delivery Method): ventilator    O2 Concentration (%): 40                          9.6    10.63 )-----------( 105      ( 21 Dec 2023 01:02 )             27.9    12-21    140  |  106  |  48<H>  ----------------------------<  168<H>  3.6   |  21<L>  |  2.31<H>    Ca    8.2<L>      21 Dec 2023 01:02  Phos  4.0     12-21  Mg     2.3     12-21    TPro  5.8<L>  /  Alb  3.3  /  TBili  0.9  /  DBili  x   /  AST  79<H>  /  ALT  166<H>  /  AlkPhos  46  12-21   PT/INR - ( 21 Dec 2023 01:02 )   PT: 12.2 sec;   INR: 1.17 ratio         PTT - ( 19 Dec 2023 15:22 )  PTT:30.8 sec    PHYSICAL EXAM:  Gen: NAD  Skin: No rashes or lesions  Head: Normocephalic  Face: no edema, erythema, or fluctuance. Parotid glands soft without mass  Eyes: periorbital edema and ecchymosis  Nose: Nares bilaterally patent, no discharge  Mouth: ETT in place, packed with kerlix x1, no active bleeding. unable to visualize oropharynx  Neck: Flat, supple, no lymphadenopathy, trachea midline, no masses  Lymphatic: No lymphadenopathy  Resp: on vent  CV: no peripheral edema/cyanosis  GI: nondistended   Peripheral vascular: no JVD or edema  Neuro: facial nerve intact, no facial droop           ENT ISSUE/POD: oral bleeding    HPI: 78M w/ PMH HTN HLD presents as transfer from [facility] s/p cardiac arrest. Per reports patient had a witness fall and arrest at home. EMS performed CPR en route to hospital was noted to be in VTach and shocked x2. ROSC was obtained just prior to arrival at [facility]. Pt was intubated placed on levo gtt. On arrival pt noted to have lacerated to R forehead. Pt had CT scans that were negative for intracranial hemorrhage, C-spine fracture, facial fractures. A R frontal hematoma was noted. ENT consulted for oral bleeding noted upon suctioning and was packed with 1 kerlix. Unable to obtain further history from pt due to clinical condition. No oral bleeding overnight as per nurse.          PAST MEDICAL & SURGICAL HISTORY:  HTN (hypertension)      HLD (hyperlipidemia)        Allergies    No Known Allergies    Intolerances      MEDICATIONS  (STANDING):  artificial  tears Solution 1 Drop(s) Both EYES two times a day  cefTRIAXone   IVPB 1000 milliGRAM(s) IV Intermittent every 24 hours  chlorhexidine 0.12% Liquid 15 milliLiter(s) Oral Mucosa every 12 hours  chlorhexidine 2% Cloths 1 Application(s) Topical daily  dexMEDEtomidine Infusion 0.3 MICROgram(s)/kG/Hr (7.01 mL/Hr) IV Continuous <Continuous>  levETIRAcetam  IVPB 1000 milliGRAM(s) IV Intermittent every 12 hours  pantoprazole  Injectable 40 milliGRAM(s) IV Push daily  petrolatum Ophthalmic Ointment 1 Application(s) Both EYES three times a day    MEDICATIONS  (PRN):      Social History: see consult    Family history: see consult    ROS:   ENT: all negative except as noted in HPI   Pulm: denies SOB, cough, hemoptysis  Neuro: denies numbness/tingling, loss of sensation  Endo: denies heat/cold intolerance, excessive sweating      Vital Signs Last 24 Hrs  T(C): 36.9 (21 Dec 2023 08:00), Max: 37.9 (20 Dec 2023 12:00)  T(F): 98.4 (21 Dec 2023 08:00), Max: 100.3 (20 Dec 2023 12:00)  HR: 74 (21 Dec 2023 09:00) (61 - 150)  BP: --  BP(mean): --  RR: 19 (21 Dec 2023 09:00) (6 - 37)  SpO2: 100% (21 Dec 2023 09:00) (98% - 100%)    Parameters below as of 21 Dec 2023 09:00  Patient On (Oxygen Delivery Method): ventilator    O2 Concentration (%): 40                          9.6    10.63 )-----------( 105      ( 21 Dec 2023 01:02 )             27.9    12-21    140  |  106  |  48<H>  ----------------------------<  168<H>  3.6   |  21<L>  |  2.31<H>    Ca    8.2<L>      21 Dec 2023 01:02  Phos  4.0     12-21  Mg     2.3     12-21    TPro  5.8<L>  /  Alb  3.3  /  TBili  0.9  /  DBili  x   /  AST  79<H>  /  ALT  166<H>  /  AlkPhos  46  12-21   PT/INR - ( 21 Dec 2023 01:02 )   PT: 12.2 sec;   INR: 1.17 ratio         PTT - ( 19 Dec 2023 15:22 )  PTT:30.8 sec    PHYSICAL EXAM:  Gen: NAD  Skin: No rashes or lesions  Head: Normocephalic  Face: no edema, erythema, or fluctuance. Parotid glands soft without mass  Eyes: periorbital edema and ecchymosis  Nose: Nares bilaterally patent, no discharge  Mouth: ETT in place, packed with kerlix x1, no active bleeding. unable to visualize oropharynx  Neck: Flat, supple, no lymphadenopathy, trachea midline, no masses  Lymphatic: No lymphadenopathy  Resp: on vent  CV: no peripheral edema/cyanosis  GI: nondistended   Peripheral vascular: no JVD or edema  Neuro: facial nerve intact, no facial droop           ENT ISSUE/POD: oral bleeding    HPI: 78M w/ PMH HTN HLD presents as transfer from [facility] s/p cardiac arrest. Per reports patient had a witness fall and arrest at home. EMS performed CPR en route to hospital was noted to be in VTach and shocked x2. ROSC was obtained just prior to arrival at [facility]. Pt was intubated placed on levo gtt. On arrival pt noted to have lacerated to R forehead. Pt had CT scans that were negative for intracranial hemorrhage, C-spine fracture, facial fractures. A R frontal hematoma was noted. ENT consulted for oral bleeding noted upon suctioning and was packed with 1 kerlix. Unable to obtain further history from pt due to clinical condition. No oral bleeding overnight as per nurse.          PAST MEDICAL & SURGICAL HISTORY:  HTN (hypertension)      HLD (hyperlipidemia)        Allergies    No Known Allergies    Intolerances      MEDICATIONS  (STANDING):  artificial  tears Solution 1 Drop(s) Both EYES two times a day  cefTRIAXone   IVPB 1000 milliGRAM(s) IV Intermittent every 24 hours  chlorhexidine 0.12% Liquid 15 milliLiter(s) Oral Mucosa every 12 hours  chlorhexidine 2% Cloths 1 Application(s) Topical daily  dexMEDEtomidine Infusion 0.3 MICROgram(s)/kG/Hr (7.01 mL/Hr) IV Continuous <Continuous>  levETIRAcetam  IVPB 1000 milliGRAM(s) IV Intermittent every 12 hours  pantoprazole  Injectable 40 milliGRAM(s) IV Push daily  petrolatum Ophthalmic Ointment 1 Application(s) Both EYES three times a day    MEDICATIONS  (PRN):      Social History: see consult    Family history: see consult    ROS:   ENT: all negative except as noted in HPI   Pulm: denies SOB, cough, hemoptysis  Neuro: denies numbness/tingling, loss of sensation  Endo: denies heat/cold intolerance, excessive sweating      Vital Signs Last 24 Hrs  T(C): 36.9 (21 Dec 2023 08:00), Max: 37.9 (20 Dec 2023 12:00)  T(F): 98.4 (21 Dec 2023 08:00), Max: 100.3 (20 Dec 2023 12:00)  HR: 74 (21 Dec 2023 09:00) (61 - 150)  BP: --  BP(mean): --  RR: 19 (21 Dec 2023 09:00) (6 - 37)  SpO2: 100% (21 Dec 2023 09:00) (98% - 100%)    Parameters below as of 21 Dec 2023 09:00  Patient On (Oxygen Delivery Method): ventilator    O2 Concentration (%): 40                          9.6    10.63 )-----------( 105      ( 21 Dec 2023 01:02 )             27.9    12-21    140  |  106  |  48<H>  ----------------------------<  168<H>  3.6   |  21<L>  |  2.31<H>    Ca    8.2<L>      21 Dec 2023 01:02  Phos  4.0     12-21  Mg     2.3     12-21    TPro  5.8<L>  /  Alb  3.3  /  TBili  0.9  /  DBili  x   /  AST  79<H>  /  ALT  166<H>  /  AlkPhos  46  12-21   PT/INR - ( 21 Dec 2023 01:02 )   PT: 12.2 sec;   INR: 1.17 ratio         PTT - ( 19 Dec 2023 15:22 )  PTT:30.8 sec    PHYSICAL EXAM:  Gen: NAD  Skin: No rashes or lesions  Head: Normocephalic  Face: no edema, erythema, or fluctuance. Parotid glands soft without mass  Eyes: periorbital edema and ecchymosis  Nose: Nares bilaterally patent, no discharge  Mouth: ETT in place, packed with kerlix x1, no active bleeding. unable to visualize oropharynx  Neck: Flat, supple, no lymphadenopathy, trachea midline, no masses  Lymphatic: No lymphadenopathy  Resp: on vent  CV: no peripheral edema/cyanosis  GI: nondistended   Peripheral vascular: no JVD or edema  Neuro: facial nerve intact, no facial droop           ENT ISSUE/POD: oral bleeding    HPI: 78M w/ PMH HTN HLD presents as transfer from [facility] s/p cardiac arrest. Per reports patient had a witness fall and arrest at home. EMS performed CPR en route to hospital was noted to be in VTach and shocked x2. ROSC was obtained just prior to arrival at [facility]. Pt was intubated placed on levo gtt. On arrival pt noted to have lacerated to R forehead. Pt had CT scans that were negative for intracranial hemorrhage, C-spine fracture, facial fractures. A R frontal hematoma was noted. ENT consulted for oral bleeding noted upon suctioning and was packed with 1 kerlix. Unable to obtain further history from pt due to clinical condition. No oral bleeding overnight as per nurse.          PAST MEDICAL & SURGICAL HISTORY:  HTN (hypertension)      HLD (hyperlipidemia)        Allergies    No Known Allergies    Intolerances      MEDICATIONS  (STANDING):  artificial  tears Solution 1 Drop(s) Both EYES two times a day  cefTRIAXone   IVPB 1000 milliGRAM(s) IV Intermittent every 24 hours  chlorhexidine 0.12% Liquid 15 milliLiter(s) Oral Mucosa every 12 hours  chlorhexidine 2% Cloths 1 Application(s) Topical daily  dexMEDEtomidine Infusion 0.3 MICROgram(s)/kG/Hr (7.01 mL/Hr) IV Continuous <Continuous>  levETIRAcetam  IVPB 1000 milliGRAM(s) IV Intermittent every 12 hours  pantoprazole  Injectable 40 milliGRAM(s) IV Push daily  petrolatum Ophthalmic Ointment 1 Application(s) Both EYES three times a day    MEDICATIONS  (PRN):      Social History: see consult    Family history: see consult    ROS:   ENT: all negative except as noted in HPI   Pulm: denies SOB, cough, hemoptysis  Neuro: denies numbness/tingling, loss of sensation  Endo: denies heat/cold intolerance, excessive sweating      Vital Signs Last 24 Hrs  T(C): 36.9 (21 Dec 2023 08:00), Max: 37.9 (20 Dec 2023 12:00)  T(F): 98.4 (21 Dec 2023 08:00), Max: 100.3 (20 Dec 2023 12:00)  HR: 74 (21 Dec 2023 09:00) (61 - 150)  BP: --  BP(mean): --  RR: 19 (21 Dec 2023 09:00) (6 - 37)  SpO2: 100% (21 Dec 2023 09:00) (98% - 100%)    Parameters below as of 21 Dec 2023 09:00  Patient On (Oxygen Delivery Method): ventilator    O2 Concentration (%): 40                          9.6    10.63 )-----------( 105      ( 21 Dec 2023 01:02 )             27.9    12-21    140  |  106  |  48<H>  ----------------------------<  168<H>  3.6   |  21<L>  |  2.31<H>    Ca    8.2<L>      21 Dec 2023 01:02  Phos  4.0     12-21  Mg     2.3     12-21    TPro  5.8<L>  /  Alb  3.3  /  TBili  0.9  /  DBili  x   /  AST  79<H>  /  ALT  166<H>  /  AlkPhos  46  12-21   PT/INR - ( 21 Dec 2023 01:02 )   PT: 12.2 sec;   INR: 1.17 ratio         PTT - ( 19 Dec 2023 15:22 )  PTT:30.8 sec    PHYSICAL EXAM:  Gen: NAD  Skin: No rashes or lesions  Head: Normocephalic  Face: no edema, erythema, or fluctuance. Parotid glands soft without mass  Eyes: periorbital edema and ecchymosis  Nose: Nares bilaterally patent, no discharge  Mouth: ETT in place, packing kerlix x1 removed, no active bleeding, mild uvular edema  Neck: Flat, supple, no lymphadenopathy, trachea midline, no masses  Lymphatic: No lymphadenopathy  Resp: on vent  CV: no peripheral edema/cyanosis  GI: nondistended   Peripheral vascular: no JVD or edema  Neuro: facial nerve intact, no facial droop    Procedure Note:  Procedure: OG Tube placement  Diagnosis: dysphagia  Verbal consent obtained from patient. Lube applied to small keofeed tube. Keofeed advanced through the oral cavity without resistance under visualization using indirect laryngoscope. Tip of tube visualized in pyriform sinus. Tube advanced through esophageal inlet without resistance. Gastric contents noted in the tube. Placement confirmed with auscultation of air in stomach. Pending CXR confirmation.        ENT ISSUE/POD: oral bleeding    HPI: 78M w/ PMH HTN HLD presents as transfer from [facility] s/p cardiac arrest. Per reports patient had a witness fall and arrest at home. EMS performed CPR en route to hospital was noted to be in VTach and shocked x2. ROSC was obtained just prior to arrival at [facility]. Pt was intubated placed on levo gtt. On arrival pt noted to have lacerated to R forehead. Pt had CT scans that were negative for intracranial hemorrhage, C-spine fracture, facial fractures. A R frontal hematoma was noted. ENT consulted for oral bleeding noted upon suctioning and was packed with 1 kerlix. Unable to obtain further history from pt due to clinical condition. No oral bleeding overnight as per nurse.          PAST MEDICAL & SURGICAL HISTORY:  HTN (hypertension)      HLD (hyperlipidemia)        Allergies    No Known Allergies    Intolerances      MEDICATIONS  (STANDING):  artificial  tears Solution 1 Drop(s) Both EYES two times a day  cefTRIAXone   IVPB 1000 milliGRAM(s) IV Intermittent every 24 hours  chlorhexidine 0.12% Liquid 15 milliLiter(s) Oral Mucosa every 12 hours  chlorhexidine 2% Cloths 1 Application(s) Topical daily  dexMEDEtomidine Infusion 0.3 MICROgram(s)/kG/Hr (7.01 mL/Hr) IV Continuous <Continuous>  levETIRAcetam  IVPB 1000 milliGRAM(s) IV Intermittent every 12 hours  pantoprazole  Injectable 40 milliGRAM(s) IV Push daily  petrolatum Ophthalmic Ointment 1 Application(s) Both EYES three times a day    MEDICATIONS  (PRN):      Social History: see consult    Family history: see consult    ROS:   ENT: all negative except as noted in HPI   Pulm: denies SOB, cough, hemoptysis  Neuro: denies numbness/tingling, loss of sensation  Endo: denies heat/cold intolerance, excessive sweating      Vital Signs Last 24 Hrs  T(C): 36.9 (21 Dec 2023 08:00), Max: 37.9 (20 Dec 2023 12:00)  T(F): 98.4 (21 Dec 2023 08:00), Max: 100.3 (20 Dec 2023 12:00)  HR: 74 (21 Dec 2023 09:00) (61 - 150)  BP: --  BP(mean): --  RR: 19 (21 Dec 2023 09:00) (6 - 37)  SpO2: 100% (21 Dec 2023 09:00) (98% - 100%)    Parameters below as of 21 Dec 2023 09:00  Patient On (Oxygen Delivery Method): ventilator    O2 Concentration (%): 40                          9.6    10.63 )-----------( 105      ( 21 Dec 2023 01:02 )             27.9    12-21    140  |  106  |  48<H>  ----------------------------<  168<H>  3.6   |  21<L>  |  2.31<H>    Ca    8.2<L>      21 Dec 2023 01:02  Phos  4.0     12-21  Mg     2.3     12-21    TPro  5.8<L>  /  Alb  3.3  /  TBili  0.9  /  DBili  x   /  AST  79<H>  /  ALT  166<H>  /  AlkPhos  46  12-21   PT/INR - ( 21 Dec 2023 01:02 )   PT: 12.2 sec;   INR: 1.17 ratio         PTT - ( 19 Dec 2023 15:22 )  PTT:30.8 sec    PHYSICAL EXAM:  Gen: NAD  Skin: No rashes or lesions  Head: Normocephalic  Face: no edema, erythema, or fluctuance. Parotid glands soft without mass  Eyes: periorbital edema and ecchymosis  Nose: Nares bilaterally patent, no discharge  Mouth: ETT in place, packing kerlix x1 removed, no active bleeding, mild uvular edema  Neck: Flat, supple, no lymphadenopathy, trachea midline, no masses  Lymphatic: No lymphadenopathy  Resp: on vent  CV: no peripheral edema/cyanosis  GI: nondistended   Peripheral vascular: no JVD or edema  Neuro: facial nerve intact, no facial droop    Procedure Note:  Procedure: OG Tube placement  Diagnosis: dysphagia  Verbal consent obtained from patient. Lube applied to small keofeed tube. Keofeed advanced through the oral cavity without resistance under visualization using indirect laryngoscope. Tip of tube visualized in pyriform sinus. Tube advanced through esophageal inlet without resistance. Gastric contents noted in the tube. Placement confirmed with auscultation of air in stomach. Pending CXR confirmation.

## 2023-12-21 NOTE — EEG REPORT - NS EEG TEXT BOX
[facility]   COMPREHENSIVE EPILEPSY CENTER   REPORT OF CONTINUOUS VIDEO EEG     [facility]: [address], [place] [address], Ph#: [phone]  [facility]:  [address] [place] [address], Ph#: [phone]  [facility]: [address] [place] [address], Ph#: [phone]    Patient Name: ASHLEY PRITCHARD  Age and : 78y ([birth year])  MRN #: [id number]  Location: [place] [address]  Referring Physician: Kaiden Mejia    Start Time/Date: 0800 on 23  End Time/Date: 08:00 on 23  Duration: 24H    _____________________________________________________________  STUDY INFORMATION    EEG Recording Technique:  The patient underwent continuous Video-EEG monitoring, using Telemetry System hardware on the XLTek Digital System. EEG and video data were stored on a computer hard drive with important events saved in digital archive files. The material was reviewed by a physician (electroencephalographer / epileptologist) on a daily basis. Jovani and seizure detection algorithms were utilized and reviewed. An EEG Technician attended to the patient, and was available throughout daytime work hours.  The epilepsy center neurologist was available in person or on call 24-hours per day.    EEG Placement and Labeling of Electrodes:  The EEG was performed utilizing 20 channel referential EEG connections (coronal over temporal over parasagittal montage) using all standard 10-20 electrode placements with EKG, with additional electrodes placed in the inferior temporal region using the modified 10-10 montage electrode placements for elective admissions, or if deemed necessary. Recording was at a sampling rate of 256 samples per second per channel. Time synchronized digital video recording was done simultaneously with EEG recording. A low light infrared camera was used for low light recording.     _____________________________________________________________  HISTORY    Patient is a 78y old  Male who presents with a chief complaint of Cardiac Arrest (20 Dec 2023 08:29)    _____________________________________________________________  STUDY INTERPRETATION    Findings: The background was mostly continuous with 1-3s periods of attenuation, and minimal reactivity.  No posterior dominant rhythm seen.    Background Slowing:  As above.    Focal Slowing:   None were present.    Sleep Background:  Drowsiness and stage II sleep transients were not recorded.    Other Non-Epileptiform Findings:  None were present.    Interictal Epileptiform Activity:   None were present.    Events:  Clinical events: No push button events or seizures    Activation Procedures:   Hyperventilation was not performed.    Photic stimulation was not performed.     Artifacts:  Intermittent myogenic and movement artifacts were noted.  _____________________________________________________________  EEG SUMMARY/CLASSIFICATION    Abnormal EEG   - Moderate to Severe generalized slowing   _____________________________________________________________  EEG IMPRESSION/CLINICAL CORRELATE    Abnormal EEG study.  Moderate to Severe nonspecific diffuse or multifocal cerebral dysfunction,   No epileptiform pattern or seizure seen.    Declan Angeles MD   of Neurology  Epilepsy/EEG Attending   [facility]   COMPREHENSIVE EPILEPSY CENTER   REPORT OF CONTINUOUS VIDEO EEG     [facility]: [address], [place] [address], Ph#: [phone]  [facility]:  [address] [place] [address], Ph#: [phone]  [facility]: [address] [place] [address], Ph#: [phone]    Patient Name: ASHLEY PRITCHARD  Age and : 78y ([birth year])  MRN #: [id number]  Location: [place] [address]  Referring Physician: Kaiden Mejia    Start Time/Date: 0800 on 23  End Time/Date: 08:00 on 23  Duration: 24H    _____________________________________________________________  STUDY INFORMATION    EEG Recording Technique:  The patient underwent continuous Video-EEG monitoring, using Telemetry System hardware on the XLTek Digital System. EEG and video data were stored on a computer hard drive with important events saved in digital archive files. The material was reviewed by a physician (electroencephalographer / epileptologist) on a daily basis. Jovani and seizure detection algorithms were utilized and reviewed. An EEG Technician attended to the patient, and was available throughout daytime work hours.  The epilepsy center neurologist was available in person or on call 24-hours per day.    EEG Placement and Labeling of Electrodes:  The EEG was performed utilizing 20 channel referential EEG connections (coronal over temporal over parasagittal montage) using all standard 10-20 electrode placements with EKG, with additional electrodes placed in the inferior temporal region using the modified 10-10 montage electrode placements for elective admissions, or if deemed necessary. Recording was at a sampling rate of 256 samples per second per channel. Time synchronized digital video recording was done simultaneously with EEG recording. A low light infrared camera was used for low light recording.     _____________________________________________________________  HISTORY    Patient is a 78y old  Male who presents with a chief complaint of Cardiac Arrest (20 Dec 2023 08:29)    _____________________________________________________________  STUDY INTERPRETATION    Findings: The background was mostly continuous with 1-3s periods of attenuation, and minimal reactivity.  No posterior dominant rhythm seen.    Background Slowing:  As above.    Focal Slowing:   None were present.    Sleep Background:  Drowsiness and stage II sleep transients were not recorded.    Other Non-Epileptiform Findings:  None were present.    Interictal Epileptiform Activity:   None were present.    Events:  Clinical events: No push button events or seizures    Activation Procedures:   Hyperventilation was not performed.    Photic stimulation was not performed.     Artifacts:  Intermittent myogenic and movement artifacts were noted.  _____________________________________________________________  EEG SUMMARY/CLASSIFICATION    Abnormal EEG   - Moderate to Severe generalized slowing   _____________________________________________________________  EEG IMPRESSION/CLINICAL CORRELATE    Abnormal EEG study.  Moderate to Severe nonspecific diffuse or multifocal cerebral dysfunction,   No epileptiform pattern or seizure seen.    Declan Angeles MD   of Neurology  Epilepsy/EEG Attending

## 2023-12-21 NOTE — CONSULT NOTE ADULT - SUBJECTIVE AND OBJECTIVE BOX
p (1480)     HPI:  78M Hx HTN, HLD xfer [facility] s/p cardiac arrest 12/19. ROSC was obtained 20-40min later on arrival to [facility]. CTH shows tiny R posterior frontal SAH, tiny occipital falcine SDH. Coags wnl, PLTs 105<123<167 likely dilutional. Exam off all sedation: intubated, no EO, PERRL, (+) very weak cough/gag, (-) corneals, (+) occasional overbreathe, flaccid x4    =====================  PAST MEDICAL HISTORY   HTN (hypertension)    HLD (hyperlipidemia)      PAST SURGICAL HISTORY     No Known Allergies      MEDICATIONS:  Antibiotics:  cefTRIAXone   IVPB 1000 milliGRAM(s) IV Intermittent every 24 hours    Neuro:  dexMEDEtomidine Infusion 0.3 MICROgram(s)/kG/Hr IV Continuous <Continuous>  levETIRAcetam  IVPB 1000 milliGRAM(s) IV Intermittent every 12 hours    Other:  pantoprazole  Injectable 40 milliGRAM(s) IV Push daily      SOCIAL HISTORY:   Occupation:   Marital Status:     FAMILY HISTORY:      ROS: Negative except per HPI    LABS:  PT/INR - ( 21 Dec 2023 01:02 )   PT: 12.2 sec;   INR: 1.17 ratio         PTT - ( 19 Dec 2023 15:22 )  PTT:30.8 sec                        9.6    10.63 )-----------( 105      ( 21 Dec 2023 01:02 )             27.9     12-21    140  |  106  |  48<H>  ----------------------------<  168<H>  3.6   |  21<L>  |  2.31<H>    Ca    8.2<L>      21 Dec 2023 01:02  Phos  4.0     12-21  Mg     2.3     12-21    TPro  5.8<L>  /  Alb  3.3  /  TBili  0.9  /  DBili  x   /  AST  79<H>  /  ALT  166<H>  /  AlkPhos  46  12-21       p (1480)     HPI:  78M Hx HTN, HLD xfer [facility] s/p cardiac arrest 12/19. ROSC was obtained 20-40min later on arrival to [facility]. CTH shows tiny R posterior frontal SAH, tiny occipital falcine SDH. Coags wnl, PLTs 105<123<167 likely dilutional. Exam off all sedation: intubated, no EO, PERRL, (+) very weak cough/gag, (-) corneals, (+) occasional overbreathe, flaccid x4    =====================  PAST MEDICAL HISTORY   HTN (hypertension)    HLD (hyperlipidemia)      PAST SURGICAL HISTORY     No Known Allergies      MEDICATIONS:  Antibiotics:  cefTRIAXone   IVPB 1000 milliGRAM(s) IV Intermittent every 24 hours    Neuro:  dexMEDEtomidine Infusion 0.3 MICROgram(s)/kG/Hr IV Continuous <Continuous>  levETIRAcetam  IVPB 1000 milliGRAM(s) IV Intermittent every 12 hours    Other:  pantoprazole  Injectable 40 milliGRAM(s) IV Push daily      SOCIAL HISTORY:   Occupation:   Marital Status:     FAMILY HISTORY:      ROS: Negative except per HPI    LABS:  PT/INR - ( 21 Dec 2023 01:02 )   PT: 12.2 sec;   INR: 1.17 ratio         PTT - ( 19 Dec 2023 15:22 )  PTT:30.8 sec                        9.6    10.63 )-----------( 105      ( 21 Dec 2023 01:02 )             27.9     12-21    140  |  106  |  48<H>  ----------------------------<  168<H>  3.6   |  21<L>  |  2.31<H>    Ca    8.2<L>      21 Dec 2023 01:02  Phos  4.0     12-21  Mg     2.3     12-21    TPro  5.8<L>  /  Alb  3.3  /  TBili  0.9  /  DBili  x   /  AST  79<H>  /  ALT  166<H>  /  AlkPhos  46  12-21

## 2023-12-21 NOTE — DIETITIAN INITIAL EVALUATION ADULT - REASON INDICATOR FOR ASSESSMENT
Initial Nutrition Assessment for Length Of Stay on CICU  Source: electronic medical record, medical team; pt intubated

## 2023-12-22 LAB
-  AMOXICILLIN/CLAVULANIC ACID: SIGNIFICANT CHANGE UP
-  AMOXICILLIN/CLAVULANIC ACID: SIGNIFICANT CHANGE UP
-  AMPICILLIN/SULBACTAM: SIGNIFICANT CHANGE UP
-  AMPICILLIN/SULBACTAM: SIGNIFICANT CHANGE UP
-  AMPICILLIN: SIGNIFICANT CHANGE UP
-  AMPICILLIN: SIGNIFICANT CHANGE UP
-  AZTREONAM: SIGNIFICANT CHANGE UP
-  AZTREONAM: SIGNIFICANT CHANGE UP
-  CEFAZOLIN: SIGNIFICANT CHANGE UP
-  CEFAZOLIN: SIGNIFICANT CHANGE UP
-  CEFEPIME: SIGNIFICANT CHANGE UP
-  CEFEPIME: SIGNIFICANT CHANGE UP
-  CEFOXITIN: SIGNIFICANT CHANGE UP
-  CEFOXITIN: SIGNIFICANT CHANGE UP
-  CEFTRIAXONE: SIGNIFICANT CHANGE UP
-  CEFTRIAXONE: SIGNIFICANT CHANGE UP
-  CIPROFLOXACIN: SIGNIFICANT CHANGE UP
-  CIPROFLOXACIN: SIGNIFICANT CHANGE UP
-  ERTAPENEM: SIGNIFICANT CHANGE UP
-  ERTAPENEM: SIGNIFICANT CHANGE UP
-  GENTAMICIN: SIGNIFICANT CHANGE UP
-  GENTAMICIN: SIGNIFICANT CHANGE UP
-  IMIPENEM: SIGNIFICANT CHANGE UP
-  IMIPENEM: SIGNIFICANT CHANGE UP
-  LEVOFLOXACIN: SIGNIFICANT CHANGE UP
-  LEVOFLOXACIN: SIGNIFICANT CHANGE UP
-  MEROPENEM: SIGNIFICANT CHANGE UP
-  MEROPENEM: SIGNIFICANT CHANGE UP
-  PIPERACILLIN/TAZOBACTAM: SIGNIFICANT CHANGE UP
-  PIPERACILLIN/TAZOBACTAM: SIGNIFICANT CHANGE UP
-  TOBRAMYCIN: SIGNIFICANT CHANGE UP
-  TOBRAMYCIN: SIGNIFICANT CHANGE UP
-  TRIMETHOPRIM/SULFAMETHOXAZOLE: SIGNIFICANT CHANGE UP
-  TRIMETHOPRIM/SULFAMETHOXAZOLE: SIGNIFICANT CHANGE UP
ALBUMIN SERPL ELPH-MCNC: 3.3 G/DL — SIGNIFICANT CHANGE UP (ref 3.3–5)
ALBUMIN SERPL ELPH-MCNC: 3.3 G/DL — SIGNIFICANT CHANGE UP (ref 3.3–5)
ALP SERPL-CCNC: 42 U/L — SIGNIFICANT CHANGE UP (ref 40–120)
ALP SERPL-CCNC: 42 U/L — SIGNIFICANT CHANGE UP (ref 40–120)
ALT FLD-CCNC: 113 U/L — HIGH (ref 10–45)
ALT FLD-CCNC: 113 U/L — HIGH (ref 10–45)
ANION GAP SERPL CALC-SCNC: 14 MMOL/L — SIGNIFICANT CHANGE UP (ref 5–17)
ANION GAP SERPL CALC-SCNC: 14 MMOL/L — SIGNIFICANT CHANGE UP (ref 5–17)
AST SERPL-CCNC: 66 U/L — HIGH (ref 10–40)
AST SERPL-CCNC: 66 U/L — HIGH (ref 10–40)
BASOPHILS # BLD AUTO: 0 K/UL — SIGNIFICANT CHANGE UP (ref 0–0.2)
BASOPHILS # BLD AUTO: 0 K/UL — SIGNIFICANT CHANGE UP (ref 0–0.2)
BASOPHILS NFR BLD AUTO: 0 % — SIGNIFICANT CHANGE UP (ref 0–2)
BASOPHILS NFR BLD AUTO: 0 % — SIGNIFICANT CHANGE UP (ref 0–2)
BILIRUB SERPL-MCNC: 0.8 MG/DL — SIGNIFICANT CHANGE UP (ref 0.2–1.2)
BILIRUB SERPL-MCNC: 0.8 MG/DL — SIGNIFICANT CHANGE UP (ref 0.2–1.2)
BUN SERPL-MCNC: 57 MG/DL — HIGH (ref 7–23)
BUN SERPL-MCNC: 57 MG/DL — HIGH (ref 7–23)
CALCIUM SERPL-MCNC: 8.1 MG/DL — LOW (ref 8.4–10.5)
CALCIUM SERPL-MCNC: 8.1 MG/DL — LOW (ref 8.4–10.5)
CHLORIDE SERPL-SCNC: 107 MMOL/L — SIGNIFICANT CHANGE UP (ref 96–108)
CHLORIDE SERPL-SCNC: 107 MMOL/L — SIGNIFICANT CHANGE UP (ref 96–108)
CO2 SERPL-SCNC: 21 MMOL/L — LOW (ref 22–31)
CO2 SERPL-SCNC: 21 MMOL/L — LOW (ref 22–31)
CREAT SERPL-MCNC: 2.19 MG/DL — HIGH (ref 0.5–1.3)
CREAT SERPL-MCNC: 2.19 MG/DL — HIGH (ref 0.5–1.3)
CULTURE RESULTS: ABNORMAL
CULTURE RESULTS: ABNORMAL
EGFR: 30 ML/MIN/1.73M2 — LOW
EGFR: 30 ML/MIN/1.73M2 — LOW
EOSINOPHIL # BLD AUTO: 0 K/UL — SIGNIFICANT CHANGE UP (ref 0–0.5)
EOSINOPHIL # BLD AUTO: 0 K/UL — SIGNIFICANT CHANGE UP (ref 0–0.5)
EOSINOPHIL NFR BLD AUTO: 0 % — SIGNIFICANT CHANGE UP (ref 0–6)
EOSINOPHIL NFR BLD AUTO: 0 % — SIGNIFICANT CHANGE UP (ref 0–6)
GAS PNL BLDA: SIGNIFICANT CHANGE UP
GAS PNL BLDA: SIGNIFICANT CHANGE UP
GLUCOSE SERPL-MCNC: 158 MG/DL — HIGH (ref 70–99)
GLUCOSE SERPL-MCNC: 158 MG/DL — HIGH (ref 70–99)
GRAM STN FLD: SIGNIFICANT CHANGE UP
GRAM STN FLD: SIGNIFICANT CHANGE UP
HCT VFR BLD CALC: 25.5 % — LOW (ref 39–50)
HCT VFR BLD CALC: 25.5 % — LOW (ref 39–50)
HGB BLD-MCNC: 8.7 G/DL — LOW (ref 13–17)
HGB BLD-MCNC: 8.7 G/DL — LOW (ref 13–17)
IMM GRANULOCYTES NFR BLD AUTO: 0.5 % — SIGNIFICANT CHANGE UP (ref 0–0.9)
IMM GRANULOCYTES NFR BLD AUTO: 0.5 % — SIGNIFICANT CHANGE UP (ref 0–0.9)
LYMPHOCYTES # BLD AUTO: 0.75 K/UL — LOW (ref 1–3.3)
LYMPHOCYTES # BLD AUTO: 0.75 K/UL — LOW (ref 1–3.3)
LYMPHOCYTES # BLD AUTO: 9.8 % — LOW (ref 13–44)
LYMPHOCYTES # BLD AUTO: 9.8 % — LOW (ref 13–44)
MAGNESIUM SERPL-MCNC: 2.6 MG/DL — SIGNIFICANT CHANGE UP (ref 1.6–2.6)
MAGNESIUM SERPL-MCNC: 2.6 MG/DL — SIGNIFICANT CHANGE UP (ref 1.6–2.6)
MCHC RBC-ENTMCNC: 31.6 PG — SIGNIFICANT CHANGE UP (ref 27–34)
MCHC RBC-ENTMCNC: 31.6 PG — SIGNIFICANT CHANGE UP (ref 27–34)
MCHC RBC-ENTMCNC: 34.1 GM/DL — SIGNIFICANT CHANGE UP (ref 32–36)
MCHC RBC-ENTMCNC: 34.1 GM/DL — SIGNIFICANT CHANGE UP (ref 32–36)
MCV RBC AUTO: 92.7 FL — SIGNIFICANT CHANGE UP (ref 80–100)
MCV RBC AUTO: 92.7 FL — SIGNIFICANT CHANGE UP (ref 80–100)
METHOD TYPE: SIGNIFICANT CHANGE UP
METHOD TYPE: SIGNIFICANT CHANGE UP
MONOCYTES # BLD AUTO: 0.92 K/UL — HIGH (ref 0–0.9)
MONOCYTES # BLD AUTO: 0.92 K/UL — HIGH (ref 0–0.9)
MONOCYTES NFR BLD AUTO: 12 % — SIGNIFICANT CHANGE UP (ref 2–14)
MONOCYTES NFR BLD AUTO: 12 % — SIGNIFICANT CHANGE UP (ref 2–14)
NEUTROPHILS # BLD AUTO: 5.94 K/UL — SIGNIFICANT CHANGE UP (ref 1.8–7.4)
NEUTROPHILS # BLD AUTO: 5.94 K/UL — SIGNIFICANT CHANGE UP (ref 1.8–7.4)
NEUTROPHILS NFR BLD AUTO: 77.7 % — HIGH (ref 43–77)
NEUTROPHILS NFR BLD AUTO: 77.7 % — HIGH (ref 43–77)
NRBC # BLD: 0 /100 WBCS — SIGNIFICANT CHANGE UP (ref 0–0)
NRBC # BLD: 0 /100 WBCS — SIGNIFICANT CHANGE UP (ref 0–0)
ORGANISM # SPEC MICROSCOPIC CNT: ABNORMAL
PHOSPHATE SERPL-MCNC: 5.2 MG/DL — HIGH (ref 2.5–4.5)
PHOSPHATE SERPL-MCNC: 5.2 MG/DL — HIGH (ref 2.5–4.5)
PLATELET # BLD AUTO: 117 K/UL — LOW (ref 150–400)
PLATELET # BLD AUTO: 117 K/UL — LOW (ref 150–400)
POTASSIUM SERPL-MCNC: 3.9 MMOL/L — SIGNIFICANT CHANGE UP (ref 3.5–5.3)
POTASSIUM SERPL-MCNC: 3.9 MMOL/L — SIGNIFICANT CHANGE UP (ref 3.5–5.3)
POTASSIUM SERPL-SCNC: 3.9 MMOL/L — SIGNIFICANT CHANGE UP (ref 3.5–5.3)
POTASSIUM SERPL-SCNC: 3.9 MMOL/L — SIGNIFICANT CHANGE UP (ref 3.5–5.3)
PROT SERPL-MCNC: 5.6 G/DL — LOW (ref 6–8.3)
PROT SERPL-MCNC: 5.6 G/DL — LOW (ref 6–8.3)
RBC # BLD: 2.75 M/UL — LOW (ref 4.2–5.8)
RBC # BLD: 2.75 M/UL — LOW (ref 4.2–5.8)
RBC # FLD: 13.2 % — SIGNIFICANT CHANGE UP (ref 10.3–14.5)
RBC # FLD: 13.2 % — SIGNIFICANT CHANGE UP (ref 10.3–14.5)
SODIUM SERPL-SCNC: 142 MMOL/L — SIGNIFICANT CHANGE UP (ref 135–145)
SODIUM SERPL-SCNC: 142 MMOL/L — SIGNIFICANT CHANGE UP (ref 135–145)
SPECIMEN SOURCE: SIGNIFICANT CHANGE UP
WBC # BLD: 7.65 K/UL — SIGNIFICANT CHANGE UP (ref 3.8–10.5)
WBC # BLD: 7.65 K/UL — SIGNIFICANT CHANGE UP (ref 3.8–10.5)
WBC # FLD AUTO: 7.65 K/UL — SIGNIFICANT CHANGE UP (ref 3.8–10.5)
WBC # FLD AUTO: 7.65 K/UL — SIGNIFICANT CHANGE UP (ref 3.8–10.5)

## 2023-12-22 PROCEDURE — 99291 CRITICAL CARE FIRST HOUR: CPT

## 2023-12-22 PROCEDURE — 93010 ELECTROCARDIOGRAM REPORT: CPT

## 2023-12-22 PROCEDURE — 95720 EEG PHY/QHP EA INCR W/VEEG: CPT

## 2023-12-22 PROCEDURE — 99292 CRITICAL CARE ADDL 30 MIN: CPT

## 2023-12-22 PROCEDURE — 93970 EXTREMITY STUDY: CPT | Mod: 26

## 2023-12-22 PROCEDURE — 71045 X-RAY EXAM CHEST 1 VIEW: CPT | Mod: 26,76

## 2023-12-22 RX ORDER — PIPERACILLIN AND TAZOBACTAM 4; .5 G/20ML; G/20ML
3.38 INJECTION, POWDER, LYOPHILIZED, FOR SOLUTION INTRAVENOUS ONCE
Refills: 0 | Status: COMPLETED | OUTPATIENT
Start: 2023-12-22 | End: 2023-12-23

## 2023-12-22 RX ORDER — PIPERACILLIN AND TAZOBACTAM 4; .5 G/20ML; G/20ML
3.38 INJECTION, POWDER, LYOPHILIZED, FOR SOLUTION INTRAVENOUS ONCE
Refills: 0 | Status: COMPLETED | OUTPATIENT
Start: 2023-12-22 | End: 2023-12-22

## 2023-12-22 RX ORDER — VANCOMYCIN HCL 1 G
1000 VIAL (EA) INTRAVENOUS EVERY 12 HOURS
Refills: 0 | Status: DISCONTINUED | OUTPATIENT
Start: 2023-12-22 | End: 2023-12-25

## 2023-12-22 RX ORDER — PIPERACILLIN AND TAZOBACTAM 4; .5 G/20ML; G/20ML
3.38 INJECTION, POWDER, LYOPHILIZED, FOR SOLUTION INTRAVENOUS EVERY 8 HOURS
Refills: 0 | Status: DISCONTINUED | OUTPATIENT
Start: 2023-12-23 | End: 2023-12-24

## 2023-12-22 RX ORDER — HEPARIN SODIUM 5000 [USP'U]/ML
5000 INJECTION INTRAVENOUS; SUBCUTANEOUS EVERY 8 HOURS
Refills: 0 | Status: DISCONTINUED | OUTPATIENT
Start: 2023-12-22 | End: 2023-12-24

## 2023-12-22 RX ORDER — ACETAMINOPHEN 500 MG
650 TABLET ORAL ONCE
Refills: 0 | Status: COMPLETED | OUTPATIENT
Start: 2023-12-22 | End: 2023-12-22

## 2023-12-22 RX ORDER — POTASSIUM CHLORIDE 20 MEQ
20 PACKET (EA) ORAL ONCE
Refills: 0 | Status: COMPLETED | OUTPATIENT
Start: 2023-12-22 | End: 2023-12-22

## 2023-12-22 RX ADMIN — DEXMEDETOMIDINE HYDROCHLORIDE IN 0.9% SODIUM CHLORIDE 4.68 MICROGRAM(S)/KG/HR: 4 INJECTION INTRAVENOUS at 20:42

## 2023-12-22 RX ADMIN — PIPERACILLIN AND TAZOBACTAM 200 GRAM(S): 4; .5 INJECTION, POWDER, LYOPHILIZED, FOR SOLUTION INTRAVENOUS at 20:27

## 2023-12-22 RX ADMIN — Medication 1: at 11:25

## 2023-12-22 RX ADMIN — PANTOPRAZOLE SODIUM 40 MILLIGRAM(S): 20 TABLET, DELAYED RELEASE ORAL at 11:25

## 2023-12-22 RX ADMIN — Medication 1: at 19:32

## 2023-12-22 RX ADMIN — Medication 650 MILLIGRAM(S): at 23:55

## 2023-12-22 RX ADMIN — CARVEDILOL PHOSPHATE 3.12 MILLIGRAM(S): 80 CAPSULE, EXTENDED RELEASE ORAL at 19:32

## 2023-12-22 RX ADMIN — Medication 1: at 01:02

## 2023-12-22 RX ADMIN — Medication 1 DROP(S): at 05:25

## 2023-12-22 RX ADMIN — AMLODIPINE BESYLATE 5 MILLIGRAM(S): 2.5 TABLET ORAL at 05:26

## 2023-12-22 RX ADMIN — CARVEDILOL PHOSPHATE 3.12 MILLIGRAM(S): 80 CAPSULE, EXTENDED RELEASE ORAL at 05:25

## 2023-12-22 RX ADMIN — LEVETIRACETAM 400 MILLIGRAM(S): 250 TABLET, FILM COATED ORAL at 05:26

## 2023-12-22 RX ADMIN — Medication 1 DROP(S): at 19:32

## 2023-12-22 RX ADMIN — Medication 1 APPLICATION(S): at 13:54

## 2023-12-22 RX ADMIN — DEXMEDETOMIDINE HYDROCHLORIDE IN 0.9% SODIUM CHLORIDE 4.68 MICROGRAM(S)/KG/HR: 4 INJECTION INTRAVENOUS at 13:54

## 2023-12-22 RX ADMIN — CHLORHEXIDINE GLUCONATE 15 MILLILITER(S): 213 SOLUTION TOPICAL at 05:25

## 2023-12-22 RX ADMIN — Medication 650 MILLIGRAM(S): at 22:30

## 2023-12-22 RX ADMIN — Medication 650 MILLIGRAM(S): at 14:30

## 2023-12-22 RX ADMIN — HEPARIN SODIUM 5000 UNIT(S): 5000 INJECTION INTRAVENOUS; SUBCUTANEOUS at 22:26

## 2023-12-22 RX ADMIN — Medication 1 APPLICATION(S): at 22:25

## 2023-12-22 RX ADMIN — CHLORHEXIDINE GLUCONATE 1 APPLICATION(S): 213 SOLUTION TOPICAL at 22:25

## 2023-12-22 RX ADMIN — Medication 650 MILLIGRAM(S): at 05:25

## 2023-12-22 RX ADMIN — Medication 1: at 05:32

## 2023-12-22 RX ADMIN — Medication 650 MILLIGRAM(S): at 05:30

## 2023-12-22 RX ADMIN — Medication 650 MILLIGRAM(S): at 13:54

## 2023-12-22 RX ADMIN — LEVETIRACETAM 400 MILLIGRAM(S): 250 TABLET, FILM COATED ORAL at 19:32

## 2023-12-22 RX ADMIN — Medication 100 MILLIEQUIVALENT(S): at 04:13

## 2023-12-22 RX ADMIN — CEFTRIAXONE 100 MILLIGRAM(S): 500 INJECTION, POWDER, FOR SOLUTION INTRAMUSCULAR; INTRAVENOUS at 11:43

## 2023-12-22 RX ADMIN — Medication 250 MILLIGRAM(S): at 21:12

## 2023-12-22 RX ADMIN — CHLORHEXIDINE GLUCONATE 15 MILLILITER(S): 213 SOLUTION TOPICAL at 19:31

## 2023-12-22 RX ADMIN — Medication 1 APPLICATION(S): at 05:25

## 2023-12-22 NOTE — EEG REPORT - NS EEG TEXT BOX
[facility]   COMPREHENSIVE EPILEPSY CENTER   REPORT OF CONTINUOUS VIDEO EEG     [facility]: [address], [place] [address], Ph#: [phone]  [facility]:  [address] [place] [address], Ph#: [phone]  [facility]: [address] [place] [address], Ph#: [phone]    Patient Name: ASHLEY PRITCHARD  Age and : 78y ([birth year])  MRN #: [id number]  Location: [place] [address]  Referring Physician: Kaiden Mejia    Start Time/Date: 0800 on 23  End Time/Date: 08:00 on 23  Duration: 24H    _____________________________________________________________  STUDY INFORMATION    EEG Recording Technique:  The patient underwent continuous Video-EEG monitoring, using Telemetry System hardware on the XLTek Digital System. EEG and video data were stored on a computer hard drive with important events saved in digital archive files. The material was reviewed by a physician (electroencephalographer / epileptologist) on a daily basis. Jovani and seizure detection algorithms were utilized and reviewed. An EEG Technician attended to the patient, and was available throughout daytime work hours.  The epilepsy center neurologist was available in person or on call 24-hours per day.    EEG Placement and Labeling of Electrodes:  The EEG was performed utilizing 20 channel referential EEG connections (coronal over temporal over parasagittal montage) using all standard 10-20 electrode placements with EKG, with additional electrodes placed in the inferior temporal region using the modified 10-10 montage electrode placements for elective admissions, or if deemed necessary. Recording was at a sampling rate of 256 samples per second per channel. Time synchronized digital video recording was done simultaneously with EEG recording. A low light infrared camera was used for low light recording.     _____________________________________________________________  HISTORY    Patient is a 78y old  Male who presents with a chief complaint of Cardiac Arrest (20 Dec 2023 08:29)    _____________________________________________________________  STUDY INTERPRETATION    Findings: The background was mostly suppressed with minimal reactivity.  No posterior dominant rhythm seen.    Background Slowing:  As above.    Focal Slowing:   None were present.    Sleep Background:  Drowsiness and stage II sleep transients were not recorded.    Other Non-Epileptiform Findings:  None were present.    Interictal Epileptiform Activity:   None were present.    Events:  Clinical events: No push button events or seizures    Activation Procedures:   Hyperventilation was not performed.    Photic stimulation was not performed.     Artifacts:  Intermittent myogenic and movement artifacts were noted.  _____________________________________________________________  EEG SUMMARY/CLASSIFICATION    Abnormal EEG   - Severe generalized slowing   _____________________________________________________________  EEG IMPRESSION/CLINICAL CORRELATE    Abnormal EEG study.  Severe nonspecific diffuse or multifocal cerebral dysfunction,   No epileptiform pattern or seizure seen.    Declan Angeles MD   of Neurology  Epilepsy/EEG Attending   [facility]   COMPREHENSIVE EPILEPSY CENTER   REPORT OF CONTINUOUS VIDEO EEG     [facility]: [address], [place] [address], Ph#: [phone]  [facility]:  [address] [place] [address], Ph#: [phone]  [facility]: [address] [place] [address], Ph#: [phone]    Patient Name: ASHLEY PRITCHARD  Age and : 78y ([birth year])  MRN #: [id number]  Location: [place] [address]  Referring Physician: Kaiden Mejia    Start Time/Date: 0800 on 23  End Time/Date: 08:00 on 23  Duration: 24H    _____________________________________________________________  STUDY INFORMATION    EEG Recording Technique:  The patient underwent continuous Video-EEG monitoring, using Telemetry System hardware on the XLTek Digital System. EEG and video data were stored on a computer hard drive with important events saved in digital archive files. The material was reviewed by a physician (electroencephalographer / epileptologist) on a daily basis. Jovani and seizure detection algorithms were utilized and reviewed. An EEG Technician attended to the patient, and was available throughout daytime work hours.  The epilepsy center neurologist was available in person or on call 24-hours per day.    EEG Placement and Labeling of Electrodes:  The EEG was performed utilizing 20 channel referential EEG connections (coronal over temporal over parasagittal montage) using all standard 10-20 electrode placements with EKG, with additional electrodes placed in the inferior temporal region using the modified 10-10 montage electrode placements for elective admissions, or if deemed necessary. Recording was at a sampling rate of 256 samples per second per channel. Time synchronized digital video recording was done simultaneously with EEG recording. A low light infrared camera was used for low light recording.     _____________________________________________________________  HISTORY    Patient is a 78y old  Male who presents with a chief complaint of Cardiac Arrest (20 Dec 2023 08:29)    _____________________________________________________________  STUDY INTERPRETATION    Findings: The background was mostly suppressed with minimal reactivity.  No posterior dominant rhythm seen.    Background Slowing:  As above.    Focal Slowing:   None were present.    Sleep Background:  Drowsiness and stage II sleep transients were not recorded.    Other Non-Epileptiform Findings:  None were present.    Interictal Epileptiform Activity:   None were present.    Events:  Clinical events: No push button events or seizures    Activation Procedures:   Hyperventilation was not performed.    Photic stimulation was not performed.     Artifacts:  Intermittent myogenic and movement artifacts were noted.  _____________________________________________________________  EEG SUMMARY/CLASSIFICATION    Abnormal EEG   - Severe generalized slowing   _____________________________________________________________  EEG IMPRESSION/CLINICAL CORRELATE    Abnormal EEG study.  Severe nonspecific diffuse or multifocal cerebral dysfunction,   No epileptiform pattern or seizure seen.    Declan Angeles MD   of Neurology  Epilepsy/EEG Attending

## 2023-12-22 NOTE — PROGRESS NOTE ADULT - SUBJECTIVE AND OBJECTIVE BOX
ASHLEY PRITCHARD  MRN-[id number]  Patient is a 78y old  Male who presents with a chief complaint of Cardiac Arrest (21 Dec 2023 19:14)    HPI:  78M w/ PMH HTN HLD presents as transfer from [facility] s/p cardiac arrest. Per reports patient had a witness fall and arrest at home. EMS performed CPR en route to hospital was noted to be in VTach and shocked x2. ROSC was obtained just prior to arrival at [facility]. Pt was intubated placed on levo gtt. On arrival pt noted to have lacerated to R forehead. Pt had CT scans that were negative for intracranial hemorrhage, C-spine fracture, facial fractures. A R frontal hematoma was noted.     On arrival patient w/ dried blood on scalp. Also noted to have bleeding from oral mucosa w/ clots requiring suctioning. Pt otherwise off sedation and non-responsive. Per EMS report pt did receive some sedation and paralytics at [facility].     Per family patient has been feeling 'off' but managing his day to day and did not see a physician. Pt prescribed klonopin by outpatient provider and family believes pt may have been taking more than prescribed. On day of arrest, pt was initially asymptomatic carrying out his routine. He went to the bathroom, wife heard a loud thud and found patient in a pool of blood prompting call to EMS. Pt has not followed with any cardiologist. Has not had any syncope or other events preceding this.  (19 Dec 2023 14:54)      24 HOUR EVENTS:    REVIEW OF SYSTEMS:    CONSTITUTIONAL: No weakness, fevers or chills  EYES/ENT: No visual changes;  No vertigo or throat pain   NECK: No pain or stiffness  RESPIRATORY: No cough, wheezing, hemoptysis; No shortness of breath  CARDIOVASCULAR: No chest pain or palpitations  GASTROINTESTINAL: No abdominal or epigastric pain. No nausea, vomiting, or hematemesis; No diarrhea or constipation. No melena or hematochezia.  GENITOURINARY: No dysuria, frequency or hematuria  NEUROLOGICAL: No numbness or weakness  SKIN: No itching, rashes      ICU Vital Signs Last 24 Hrs  T(C): 37.7 (22 Dec 2023 06:30), Max: 38.7 (21 Dec 2023 17:30)  T(F): 99.9 (22 Dec 2023 06:30), Max: 101.6 (21 Dec 2023 17:30)  HR: 75 (22 Dec 2023 06:00) (67 - 150)  BP: 102/57 (21 Dec 2023 22:00) (102/57 - 102/57)  BP(mean): 77 (21 Dec 2023 22:00) (77 - 77)  ABP: 149/62 (22 Dec 2023 06:00) (108/40 - 206/94)  ABP(mean): 86 (22 Dec 2023 06:00) (61 - 130)  RR: 25 (22 Dec 2023 06:00) (17 - 29)  SpO2: 99% (22 Dec 2023 06:00) (97% - 100%)    O2 Parameters below as of 22 Dec 2023 03:00  Patient On (Oxygen Delivery Method): ventilator    O2 Concentration (%): 40      Mode: AC/ CMV (Assist Control/ Continuous Mandatory Ventilation), RR (machine): 14, TV (machine): 500, FiO2: 40, PEEP: 5, ITime: 1  CVP(mm Hg): 11 (12-22-23 @ 06:00) (2 - 19)  CO: --  CI: --  PA: --  PA(mean): --  PA(direct): --  PCWP: --  LA: --  RA: --  SVR: --  SVRI: --  PVR: --  PVRI: --  I&O's Summary    21 Dec 2023 07:01  -  22 Dec 2023 07:00  --------------------------------------------------------  IN: 401.3 mL / OUT: 1190 mL / NET: -788.7 mL        CAPILLARY BLOOD GLUCOSE    CAPILLARY BLOOD GLUCOSE      POCT Blood Glucose.: 157 mg/dL (22 Dec 2023 05:31)      PHYSICAL EXAM:  GENERAL: No acute distress, well-developed  HEAD:  Atraumatic, Normocephalic  EYES: EOMI, PERRLA, conjunctiva and sclera clear  NECK: Supple, no lymphadenopathy, no JVD  CHEST/LUNG: CTAB; No wheezes, rales, or rhonchi  HEART: Regular rate and rhythm. Normal S1/S2. No murmurs, rubs, or gallops  ABDOMEN: Soft, non-tender, non-distended; normal bowel sounds, no organomegaly  EXTREMITIES:  2+ peripheral pulses b/l, No clubbing, cyanosis, or edema  NEUROLOGY: A&O x 3, no focal deficits  SKIN: No rashes or lesions    ============================I/O===========================   I&O's Detail    21 Dec 2023 07:01  -  22 Dec 2023 07:00  --------------------------------------------------------  IN:    Dexmedetomidine: 101.3 mL    Enteral Tube Flush: 50 mL    IV PiggyBack: 250 mL  Total IN: 401.3 mL    OUT:    Dexmedetomidine: 0 mL    Indwelling Catheter - Urethral (mL): 1190 mL  Total OUT: 1190 mL    Total NET: -788.7 mL        ============================ LABS =========================                        8.7    7.65  )-----------( 117      ( 22 Dec 2023 00:51 )             25.5     12-22    142  |  107  |  57<H>  ----------------------------<  158<H>  3.9   |  21<L>  |  2.19<H>    Ca    8.1<L>      22 Dec 2023 00:51  Phos  5.2     12-22  Mg     2.6     12-22    TPro  5.6<L>  /  Alb  3.3  /  TBili  0.8  /  DBili  x   /  AST  66<H>  /  ALT  113<H>  /  AlkPhos  42  12-22    Troponin T, High Sensitivity Result: 2406 ng/L (12-20-23 @ 18:01)  Troponin T, High Sensitivity Result: 4145 ng/L (12-20-23 @ 02:09)  Troponin T, High Sensitivity Result: 1894 ng/L (12-19-23 @ 15:22)    CKMB Units: 28.7 ng/mL (12-20-23 @ 18:01)  CKMB Units: 133.8 ng/mL (12-20-23 @ 02:09)  CKMB Units: 129.2 ng/mL (12-19-23 @ 15:22)    Creatine Kinase, Serum: 745 U/L (12-20-23 @ 18:01)  Creatine Kinase, Serum: 1110 U/L (12-20-23 @ 02:09)    CPK Mass Assay %: 3.9 % (12-20-23 @ 18:01)  CPK Mass Assay %: 12.1 % (12-20-23 @ 02:09)        LIVER FUNCTIONS - ( 22 Dec 2023 00:51 )  Alb: 3.3 g/dL / Pro: 5.6 g/dL / ALK PHOS: 42 U/L / ALT: 113 U/L / AST: 66 U/L / GGT: x           PT/INR - ( 21 Dec 2023 01:02 )   PT: 12.2 sec;   INR: 1.17 ratio           ABG - ( 22 Dec 2023 00:43 )  pH, Arterial: 7.46  pH, Blood: x     /  pCO2: 34    /  pO2: 151   / HCO3: 24    / Base Excess: 0.5   /  SaO2: 98.4              Blood Gas Arterial, Lactate: 1.4 mmol/L (12-22-23 @ 00:43)  Blood Gas Arterial, Lactate: 1.0 mmol/L (12-21-23 @ 00:53)  Blood Gas Arterial, Lactate: 1.7 mmol/L (12-20-23 @ 17:50)  Lactate, Blood: 2.0 mmol/L (12-20-23 @ 05:38)  Blood Gas Venous - Lactate: 2.3 mmol/L (12-20-23 @ 02:20)  Blood Gas Arterial, Lactate: 2.1 mmol/L (12-20-23 @ 01:54)  Blood Gas Arterial, Lactate: 2.0 mmol/L (12-19-23 @ 15:15)    Urinalysis Basic - ( 22 Dec 2023 00:51 )    Color: x / Appearance: x / SG: x / pH: x  Gluc: 158 mg/dL / Ketone: x  / Bili: x / Urobili: x   Blood: x / Protein: x / Nitrite: x   Leuk Esterase: x / RBC: x / WBC x   Sq Epi: x / Non Sq Epi: x / Bacteria: x      ======================Micro/Rad/Cardio=================  Telemetry: Reviewed   EKG: Reviewed  CXR: Reviewed  Culture: Reviewed   Echo:   Cath: see sunrise for details  ======================================================  PAST MEDICAL & SURGICAL HISTORY:  HTN (hypertension)      HLD (hyperlipidemia)       ASHLEY PRITCHARD  MRN-[id number]  Patient is a 78y old  Male who presents with a chief complaint of Cardiac Arrest (21 Dec 2023 19:14)    HPI:  78M w/ PMH HTN HLD presents as transfer from [facility] s/p cardiac arrest. Per reports patient had a witness fall and arrest at home. EMS performed CPR en route to hospital was noted to be in VTach and shocked x2. ROSC was obtained just prior to arrival at [facility]. Pt was intubated placed on levo gtt. On arrival pt noted to have lacerated to R forehead. Pt had CT scans that were negative for intracranial hemorrhage, C-spine fracture, facial fractures. A R frontal hematoma was noted.     On arrival patient w/ dried blood on scalp. Also noted to have bleeding from oral mucosa w/ clots requiring suctioning. Pt otherwise off sedation and non-responsive. Per EMS report pt did receive some sedation and paralytics at [facility].     Per family patient has been feeling 'off' but managing his day to day and did not see a physician. Pt prescribed klonopin by outpatient provider and family believes pt may have been taking more than prescribed. On day of arrest, pt was initially asymptomatic carrying out his routine. He went to the bathroom, wife heard a loud thud and found patient in a pool of blood prompting call to EMS. Pt has not followed with any cardiologist. Has not had any syncope or other events preceding this.  (19 Dec 2023 14:54)      24 HOUR EVENTS:    REVIEW OF SYSTEMS:    CONSTITUTIONAL: No weakness, fevers or chills  EYES/ENT: No visual changes;  No vertigo or throat pain   NECK: No pain or stiffness  RESPIRATORY: No cough, wheezing, hemoptysis; No shortness of breath  CARDIOVASCULAR: No chest pain or palpitations  GASTROINTESTINAL: No abdominal or epigastric pain. No nausea, vomiting, or hematemesis; No diarrhea or constipation. No melena or hematochezia.  GENITOURINARY: No dysuria, frequency or hematuria  NEUROLOGICAL: No numbness or weakness  SKIN: No itching, rashes      ICU Vital Signs Last 24 Hrs  T(C): 37.7 (22 Dec 2023 06:30), Max: 38.7 (21 Dec 2023 17:30)  T(F): 99.9 (22 Dec 2023 06:30), Max: 101.6 (21 Dec 2023 17:30)  HR: 75 (22 Dec 2023 06:00) (67 - 150)  BP: 102/57 (21 Dec 2023 22:00) (102/57 - 102/57)  BP(mean): 77 (21 Dec 2023 22:00) (77 - 77)  ABP: 149/62 (22 Dec 2023 06:00) (108/40 - 206/94)  ABP(mean): 86 (22 Dec 2023 06:00) (61 - 130)  RR: 25 (22 Dec 2023 06:00) (17 - 29)  SpO2: 99% (22 Dec 2023 06:00) (97% - 100%)    O2 Parameters below as of 22 Dec 2023 03:00  Patient On (Oxygen Delivery Method): ventilator    O2 Concentration (%): 40      Mode: AC/ CMV (Assist Control/ Continuous Mandatory Ventilation), RR (machine): 14, TV (machine): 500, FiO2: 40, PEEP: 5, ITime: 1  CVP(mm Hg): 11 (12-22-23 @ 06:00) (2 - 19)  CO: --  CI: --  PA: --  PA(mean): --  PA(direct): --  PCWP: --  LA: --  RA: --  SVR: --  SVRI: --  PVR: --  PVRI: --  I&O's Summary    21 Dec 2023 07:01  -  22 Dec 2023 07:00  --------------------------------------------------------  IN: 401.3 mL / OUT: 1190 mL / NET: -788.7 mL        CAPILLARY BLOOD GLUCOSE    CAPILLARY BLOOD GLUCOSE      POCT Blood Glucose.: 157 mg/dL (22 Dec 2023 05:31)      PHYSICAL EXAM:  GENERAL: No acute distress, well-developed  HEAD:  Atraumatic, Normocephalic  EYES: EOMI, PERRLA, conjunctiva and sclera clear  NECK: Supple, no lymphadenopathy, no JVD  CHEST/LUNG: CTAB; No wheezes, rales, or rhonchi  HEART: Regular rate and rhythm. Normal S1/S2. No murmurs, rubs, or gallops  ABDOMEN: Soft, non-tender, non-distended; normal bowel sounds, no organomegaly  EXTREMITIES:  2+ peripheral pulses b/l, No clubbing, cyanosis, or edema  NEUROLOGY: A&O x 3, no focal deficits  SKIN: No rashes or lesions    ============================I/O===========================   I&O's Detail    21 Dec 2023 07:01  -  22 Dec 2023 07:00  --------------------------------------------------------  IN:    Dexmedetomidine: 101.3 mL    Enteral Tube Flush: 50 mL    IV PiggyBack: 250 mL  Total IN: 401.3 mL    OUT:    Dexmedetomidine: 0 mL    Indwelling Catheter - Urethral (mL): 1190 mL  Total OUT: 1190 mL    Total NET: -788.7 mL        ============================ LABS =========================                        8.7    7.65  )-----------( 117      ( 22 Dec 2023 00:51 )             25.5     12-22    142  |  107  |  57<H>  ----------------------------<  158<H>  3.9   |  21<L>  |  2.19<H>    Ca    8.1<L>      22 Dec 2023 00:51  Phos  5.2     12-22  Mg     2.6     12-22    TPro  5.6<L>  /  Alb  3.3  /  TBili  0.8  /  DBili  x   /  AST  66<H>  /  ALT  113<H>  /  AlkPhos  42  12-22    Troponin T, High Sensitivity Result: 2406 ng/L (12-20-23 @ 18:01)  Troponin T, High Sensitivity Result: 4145 ng/L (12-20-23 @ 02:09)  Troponin T, High Sensitivity Result: 1894 ng/L (12-19-23 @ 15:22)    CKMB Units: 28.7 ng/mL (12-20-23 @ 18:01)  CKMB Units: 133.8 ng/mL (12-20-23 @ 02:09)  CKMB Units: 129.2 ng/mL (12-19-23 @ 15:22)    Creatine Kinase, Serum: 745 U/L (12-20-23 @ 18:01)  Creatine Kinase, Serum: 1110 U/L (12-20-23 @ 02:09)    CPK Mass Assay %: 3.9 % (12-20-23 @ 18:01)  CPK Mass Assay %: 12.1 % (12-20-23 @ 02:09)        LIVER FUNCTIONS - ( 22 Dec 2023 00:51 )  Alb: 3.3 g/dL / Pro: 5.6 g/dL / ALK PHOS: 42 U/L / ALT: 113 U/L / AST: 66 U/L / GGT: x           PT/INR - ( 21 Dec 2023 01:02 )   PT: 12.2 sec;   INR: 1.17 ratio           ABG - ( 22 Dec 2023 00:43 )  pH, Arterial: 7.46  pH, Blood: x     /  pCO2: 34    /  pO2: 151   / HCO3: 24    / Base Excess: 0.5   /  SaO2: 98.4              Blood Gas Arterial, Lactate: 1.4 mmol/L (12-22-23 @ 00:43)  Blood Gas Arterial, Lactate: 1.0 mmol/L (12-21-23 @ 00:53)  Blood Gas Arterial, Lactate: 1.7 mmol/L (12-20-23 @ 17:50)  Lactate, Blood: 2.0 mmol/L (12-20-23 @ 05:38)  Blood Gas Venous - Lactate: 2.3 mmol/L (12-20-23 @ 02:20)  Blood Gas Arterial, Lactate: 2.1 mmol/L (12-20-23 @ 01:54)  Blood Gas Arterial, Lactate: 2.0 mmol/L (12-19-23 @ 15:15)    Urinalysis Basic - ( 22 Dec 2023 00:51 )    Color: x / Appearance: x / SG: x / pH: x  Gluc: 158 mg/dL / Ketone: x  / Bili: x / Urobili: x   Blood: x / Protein: x / Nitrite: x   Leuk Esterase: x / RBC: x / WBC x   Sq Epi: x / Non Sq Epi: x / Bacteria: x      ======================Micro/Rad/Cardio=================  Telemetry: Reviewed   EKG: Reviewed  CXR: Reviewed  Culture: Reviewed   Echo:   Cath: see sunrise for details  ======================================================  PAST MEDICAL & SURGICAL HISTORY:  HTN (hypertension)      HLD (hyperlipidemia)       ASHLEY PRITCHARD  MRN-[id number]  Patient is a 78y old  Male who presents with a chief complaint of Cardiac Arrest (21 Dec 2023 19:14)    HPI:  78M w/ PMH HTN HLD presents as transfer from [facility] s/p cardiac arrest. Per reports patient had a witness fall and arrest at home. EMS performed CPR en route to hospital was noted to be in VTach and shocked x2. ROSC was obtained just prior to arrival at [facility]. Pt was intubated placed on levo gtt. On arrival pt noted to have lacerated to R forehead. Pt had CT scans that were negative for intracranial hemorrhage, C-spine fracture, facial fractures. A R frontal hematoma was noted.     On arrival patient w/ dried blood on scalp. Also noted to have bleeding from oral mucosa w/ clots requiring suctioning. Pt otherwise off sedation and non-responsive. Per EMS report pt did receive some sedation and paralytics at [facility].     Per family patient has been feeling 'off' but managing his day to day and did not see a physician. Pt prescribed klonopin by outpatient provider and family believes pt may have been taking more than prescribed. On day of arrest, pt was initially asymptomatic carrying out his routine. He went to the bathroom, wife heard a loud thud and found patient in a pool of blood prompting call to EMS. Pt has not followed with any cardiologist. Has not had any syncope or other events preceding this.  (19 Dec 2023 14:54)      24 HOUR EVENTS:         ICU Vital Signs Last 24 Hrs  T(C): 37.7 (22 Dec 2023 06:30), Max: 38.7 (21 Dec 2023 17:30)  T(F): 99.9 (22 Dec 2023 06:30), Max: 101.6 (21 Dec 2023 17:30)  HR: 75 (22 Dec 2023 06:00) (67 - 150)  BP: 102/57 (21 Dec 2023 22:00) (102/57 - 102/57)  BP(mean): 77 (21 Dec 2023 22:00) (77 - 77)  ABP: 149/62 (22 Dec 2023 06:00) (108/40 - 206/94)  ABP(mean): 86 (22 Dec 2023 06:00) (61 - 130)  RR: 25 (22 Dec 2023 06:00) (17 - 29)  SpO2: 99% (22 Dec 2023 06:00) (97% - 100%)    O2 Parameters below as of 22 Dec 2023 03:00  Patient On (Oxygen Delivery Method): ventilator    O2 Concentration (%): 40      Mode: AC/ CMV (Assist Control/ Continuous Mandatory Ventilation), RR (machine): 14, TV (machine): 500, FiO2: 40, PEEP: 5, ITime: 1  CVP(mm Hg): 11 (12-22-23 @ 06:00) (2 - 19)  CO: --  CI: --  PA: --  PA(mean): --  PA(direct): --  PCWP: --  LA: --  RA: --  SVR: --  SVRI: --  PVR: --  PVRI: --  I&O's Summary    21 Dec 2023 07:01  -  22 Dec 2023 07:00  --------------------------------------------------------  IN: 401.3 mL / OUT: 1190 mL / NET: -788.7 mL        CAPILLARY BLOOD GLUCOSE    CAPILLARY BLOOD GLUCOSE      POCT Blood Glucose.: 157 mg/dL (22 Dec 2023 05:31)      PHYSICAL EXAM:  GENERAL: Pt intubated, not responsive to sternal rub or noxious stimuli  . Notable trauma to head.   HEAD:  +traumatic head, Normocephalic  EYES: . Periorbital edema/erythma on R. conjunctiva and sclera clear. Pupils reactive to light   ENMT: ET tube in place. Dry mucous membranes. Notable blood in oral cavity   NECK: Supple  HEART: Regular rate and rhythm; No murmurs, rubs, or gallops  RESPIRATORY: Mechanical breath sounds b/l. No notable crackles, wheezes  ABDOMEN: Soft, Nontender, Nondistended  NEUROLOGY: A&Ox0. Pupils reactive. Non-responsive to sternal stimuli off sedation.  EXTREMITIES:  1+ Peripheral Pulses, No clubbing, cyanosis, or edema  SKIN: warm, dry, normal color    ============================I/O===========================   I&O's Detail    21 Dec 2023 07:01  -  22 Dec 2023 07:00  --------------------------------------------------------  IN:    Dexmedetomidine: 101.3 mL    Enteral Tube Flush: 50 mL    IV PiggyBack: 250 mL  Total IN: 401.3 mL    OUT:    Dexmedetomidine: 0 mL    Indwelling Catheter - Urethral (mL): 1190 mL  Total OUT: 1190 mL    Total NET: -788.7 mL        ============================ LABS =========================                        8.7    7.65  )-----------( 117      ( 22 Dec 2023 00:51 )             25.5     12-22    142  |  107  |  57<H>  ----------------------------<  158<H>  3.9   |  21<L>  |  2.19<H>    Ca    8.1<L>      22 Dec 2023 00:51  Phos  5.2     12-22  Mg     2.6     12-22    TPro  5.6<L>  /  Alb  3.3  /  TBili  0.8  /  DBili  x   /  AST  66<H>  /  ALT  113<H>  /  AlkPhos  42  12-22    Troponin T, High Sensitivity Result: 2406 ng/L (12-20-23 @ 18:01)  Troponin T, High Sensitivity Result: 4145 ng/L (12-20-23 @ 02:09)  Troponin T, High Sensitivity Result: 1894 ng/L (12-19-23 @ 15:22)    CKMB Units: 28.7 ng/mL (12-20-23 @ 18:01)  CKMB Units: 133.8 ng/mL (12-20-23 @ 02:09)  CKMB Units: 129.2 ng/mL (12-19-23 @ 15:22)    Creatine Kinase, Serum: 745 U/L (12-20-23 @ 18:01)  Creatine Kinase, Serum: 1110 U/L (12-20-23 @ 02:09)    CPK Mass Assay %: 3.9 % (12-20-23 @ 18:01)  CPK Mass Assay %: 12.1 % (12-20-23 @ 02:09)        LIVER FUNCTIONS - ( 22 Dec 2023 00:51 )  Alb: 3.3 g/dL / Pro: 5.6 g/dL / ALK PHOS: 42 U/L / ALT: 113 U/L / AST: 66 U/L / GGT: x           PT/INR - ( 21 Dec 2023 01:02 )   PT: 12.2 sec;   INR: 1.17 ratio           ABG - ( 22 Dec 2023 00:43 )  pH, Arterial: 7.46  pH, Blood: x     /  pCO2: 34    /  pO2: 151   / HCO3: 24    / Base Excess: 0.5   /  SaO2: 98.4              Blood Gas Arterial, Lactate: 1.4 mmol/L (12-22-23 @ 00:43)  Blood Gas Arterial, Lactate: 1.0 mmol/L (12-21-23 @ 00:53)  Blood Gas Arterial, Lactate: 1.7 mmol/L (12-20-23 @ 17:50)  Lactate, Blood: 2.0 mmol/L (12-20-23 @ 05:38)  Blood Gas Venous - Lactate: 2.3 mmol/L (12-20-23 @ 02:20)  Blood Gas Arterial, Lactate: 2.1 mmol/L (12-20-23 @ 01:54)  Blood Gas Arterial, Lactate: 2.0 mmol/L (12-19-23 @ 15:15)    Urinalysis Basic - ( 22 Dec 2023 00:51 )    Color: x / Appearance: x / SG: x / pH: x  Gluc: 158 mg/dL / Ketone: x  / Bili: x / Urobili: x   Blood: x / Protein: x / Nitrite: x   Leuk Esterase: x / RBC: x / WBC x   Sq Epi: x / Non Sq Epi: x / Bacteria: x      ======================Micro/Rad/Cardio=================  Telemetry: Reviewed   EKG: Reviewed  CXR: Reviewed  Culture: Reviewed   Echo:   Cath: see sunrise for details  ======================================================  PAST MEDICAL & SURGICAL HISTORY:  HTN (hypertension)      HLD (hyperlipidemia)       ASHLEY PRITCHARD  MRN-[id number]  Patient is a 78y old  Male who presents with a chief complaint of Cardiac Arrest (21 Dec 2023 19:14)    HPI:  78M w/ PMH HTN HLD presents as transfer from [facility] s/p cardiac arrest. Per reports patient had a witness fall and arrest at home. EMS performed CPR en route to hospital was noted to be in VTach and shocked x2. ROSC was obtained just prior to arrival at [facility]. Pt was intubated placed on levo gtt. On arrival pt noted to have lacerated to R forehead. Pt had CT scans that were negative for intracranial hemorrhage, C-spine fracture, facial fractures. A R frontal hematoma was noted.     On arrival patient w/ dried blood on scalp. Also noted to have bleeding from oral mucosa w/ clots requiring suctioning. Pt otherwise off sedation and non-responsive. Per EMS report pt did receive some sedation and paralytics at [facility].     Per family patient has been feeling 'off' but managing his day to day and did not see a physician. Pt prescribed klonopin by outpatient provider and family believes pt may have been taking more than prescribed. On day of arrest, pt was initially asymptomatic carrying out his routine. He went to the bathroom, wife heard a loud thud and found patient in a pool of blood prompting call to EMS. Pt has not followed with any cardiologist. Has not had any syncope or other events preceding this.  (19 Dec 2023 14:54)      24 HOUR EVENTS:         ICU Vital Signs Last 24 Hrs  T(C): 37.7 (22 Dec 2023 06:30), Max: 38.7 (21 Dec 2023 17:30)  T(F): 99.9 (22 Dec 2023 06:30), Max: 101.6 (21 Dec 2023 17:30)  HR: 75 (22 Dec 2023 06:00) (67 - 150)  BP: 102/57 (21 Dec 2023 22:00) (102/57 - 102/57)  BP(mean): 77 (21 Dec 2023 22:00) (77 - 77)  ABP: 149/62 (22 Dec 2023 06:00) (108/40 - 206/94)  ABP(mean): 86 (22 Dec 2023 06:00) (61 - 130)  RR: 25 (22 Dec 2023 06:00) (17 - 29)  SpO2: 99% (22 Dec 2023 06:00) (97% - 100%)    O2 Parameters below as of 22 Dec 2023 03:00  Patient On (Oxygen Delivery Method): ventilator    O2 Concentration (%): 40      Mode: AC/ CMV (Assist Control/ Continuous Mandatory Ventilation), RR (machine): 14, TV (machine): 500, FiO2: 40, PEEP: 5, ITime: 1  CVP(mm Hg): 11 (12-22-23 @ 06:00) (2 - 19)  CO: --  CI: --  PA: --  PA(mean): --  PA(direct): --  PCWP: --  LA: --  RA: --  SVR: --  SVRI: --  PVR: --  PVRI: --  I&O's Summary    21 Dec 2023 07:01  -  22 Dec 2023 07:00  --------------------------------------------------------  IN: 401.3 mL / OUT: 1190 mL / NET: -788.7 mL        CAPILLARY BLOOD GLUCOSE    CAPILLARY BLOOD GLUCOSE      POCT Blood Glucose.: 157 mg/dL (22 Dec 2023 05:31)      PHYSICAL EXAM:  GENERAL: Pt intubated, not responsive to sternal rub or noxious stimuli  . Notable trauma to head.   HEAD:  +traumatic head, Normocephalic  EYES: . Periorbital edema/erythma on R. conjunctiva and sclera clear. Pupils reactive to light   ENMT: ET tube in place. Dry mucous membranes. Notable blood in oral cavity   NECK: Supple  HEART: Regular rate and rhythm; No murmurs, rubs, or gallops  RESPIRATORY: Mechanical breath sounds b/l. No notable crackles, wheezes  ABDOMEN: Soft, Nontender, Nondistended  NEUROLOGY: A&Ox0. Pupils reactive. Non-responsive to sternal stimuli off sedation.  EXTREMITIES:  1+ Peripheral Pulses, No clubbing, cyanosis, or edema  SKIN: warm, dry, normal color    ============================I/O===========================   I&O's Detail    21 Dec 2023 07:01  -  22 Dec 2023 07:00  --------------------------------------------------------  IN:    Dexmedetomidine: 101.3 mL    Enteral Tube Flush: 50 mL    IV PiggyBack: 250 mL  Total IN: 401.3 mL    OUT:    Dexmedetomidine: 0 mL    Indwelling Catheter - Urethral (mL): 1190 mL  Total OUT: 1190 mL    Total NET: -788.7 mL        ============================ LABS =========================                        8.7    7.65  )-----------( 117      ( 22 Dec 2023 00:51 )             25.5     12-22    142  |  107  |  57<H>  ----------------------------<  158<H>  3.9   |  21<L>  |  2.19<H>    Ca    8.1<L>      22 Dec 2023 00:51  Phos  5.2     12-22  Mg     2.6     12-22    TPro  5.6<L>  /  Alb  3.3  /  TBili  0.8  /  DBili  x   /  AST  66<H>  /  ALT  113<H>  /  AlkPhos  42  12-22    Troponin T, High Sensitivity Result: 2406 ng/L (12-20-23 @ 18:01)  Troponin T, High Sensitivity Result: 4145 ng/L (12-20-23 @ 02:09)  Troponin T, High Sensitivity Result: 1894 ng/L (12-19-23 @ 15:22)    CKMB Units: 28.7 ng/mL (12-20-23 @ 18:01)  CKMB Units: 133.8 ng/mL (12-20-23 @ 02:09)  CKMB Units: 129.2 ng/mL (12-19-23 @ 15:22)    Creatine Kinase, Serum: 745 U/L (12-20-23 @ 18:01)  Creatine Kinase, Serum: 1110 U/L (12-20-23 @ 02:09)    CPK Mass Assay %: 3.9 % (12-20-23 @ 18:01)  CPK Mass Assay %: 12.1 % (12-20-23 @ 02:09)        LIVER FUNCTIONS - ( 22 Dec 2023 00:51 )  Alb: 3.3 g/dL / Pro: 5.6 g/dL / ALK PHOS: 42 U/L / ALT: 113 U/L / AST: 66 U/L / GGT: x           PT/INR - ( 21 Dec 2023 01:02 )   PT: 12.2 sec;   INR: 1.17 ratio           ABG - ( 22 Dec 2023 00:43 )  pH, Arterial: 7.46  pH, Blood: x     /  pCO2: 34    /  pO2: 151   / HCO3: 24    / Base Excess: 0.5   /  SaO2: 98.4              Blood Gas Arterial, Lactate: 1.4 mmol/L (12-22-23 @ 00:43)  Blood Gas Arterial, Lactate: 1.0 mmol/L (12-21-23 @ 00:53)  Blood Gas Arterial, Lactate: 1.7 mmol/L (12-20-23 @ 17:50)  Lactate, Blood: 2.0 mmol/L (12-20-23 @ 05:38)  Blood Gas Venous - Lactate: 2.3 mmol/L (12-20-23 @ 02:20)  Blood Gas Arterial, Lactate: 2.1 mmol/L (12-20-23 @ 01:54)  Blood Gas Arterial, Lactate: 2.0 mmol/L (12-19-23 @ 15:15)    Urinalysis Basic - ( 22 Dec 2023 00:51 )    Color: x / Appearance: x / SG: x / pH: x  Gluc: 158 mg/dL / Ketone: x  / Bili: x / Urobili: x   Blood: x / Protein: x / Nitrite: x   Leuk Esterase: x / RBC: x / WBC x   Sq Epi: x / Non Sq Epi: x / Bacteria: x      ======================Micro/Rad/Cardio=================  Telemetry: Reviewed   EKG: Reviewed  CXR: Reviewed  Culture: Reviewed   Echo:   Cath: see sunrise for details  ======================================================  PAST MEDICAL & SURGICAL HISTORY:  HTN (hypertension)      HLD (hyperlipidemia)

## 2023-12-22 NOTE — PROGRESS NOTE ADULT - SUBJECTIVE AND OBJECTIVE BOX
ASHLEY PRITCHARD  MRN-[id number]  Patient is a 78y old  Male who presents with a chief complaint of Cardiac Arrest (22 Dec 2023 07:33)    HPI:  78M w/ PMH HTN HLD presents as transfer from [facility] s/p cardiac arrest. Per reports patient had a witness fall and arrest at home. EMS performed CPR en route to hospital was noted to be in VTach and shocked x2. ROSC was obtained just prior to arrival at [facility]. Pt was intubated placed on levo gtt. On arrival pt noted to have lacerated to R forehead. Pt had CT scans that were negative for intracranial hemorrhage, C-spine fracture, facial fractures. A R frontal hematoma was noted.     On arrival patient w/ dried blood on scalp. Also noted to have bleeding from oral mucosa w/ clots requiring suctioning. Pt otherwise off sedation and non-responsive. Per EMS report pt did receive some sedation and paralytics at [facility].     Per family patient has been feeling 'off' but managing his day to day and did not see a physician. Pt prescribed klonopin by outpatient provider and family believes pt may have been taking more than prescribed. On day of arrest, pt was initially asymptomatic carrying out his routine. He went to the bathroom, wife heard a loud thud and found patient in a pool of blood prompting call to EMS. Pt has not followed with any cardiologist. Has not had any syncope or other events preceding this.  (19 Dec 2023 14:54)      Hospital Course:    24 HOUR EVENTS:    REVIEW OF SYSTEMS:    CONSTITUTIONAL: No weakness, fevers or chills  EYES/ENT: No visual changes;  No vertigo or throat pain   NECK: No pain or stiffness  RESPIRATORY: No cough, wheezing, hemoptysis; No shortness of breath  CARDIOVASCULAR: No chest pain or palpitations  GASTROINTESTINAL: No abdominal or epigastric pain. No nausea, vomiting, or hematemesis; No diarrhea or constipation. No melena or hematochezia.  GENITOURINARY: No dysuria, frequency or hematuria  NEUROLOGICAL: No numbness or weakness  SKIN: No itching, rashes      ICU Vital Signs Last 24 Hrs  T(C): 39.4 (22 Dec 2023 13:00), Max: 39.4 (22 Dec 2023 13:00)  T(F): 103 (22 Dec 2023 13:00), Max: 103 (22 Dec 2023 13:00)  HR: 79 (22 Dec 2023 20:00) (67 - 91)  BP: 102/57 (21 Dec 2023 22:00) (102/57 - 102/57)  BP(mean): 77 (21 Dec 2023 22:00) (77 - 77)  ABP: 165/52 (22 Dec 2023 20:00) (108/40 - 183/57)  ABP(mean): 80 (22 Dec 2023 20:00) (61 - 92)  RR: 27 (22 Dec 2023 20:00) (13 - 35)  SpO2: 100% (22 Dec 2023 20:00) (98% - 100%)    O2 Parameters below as of 22 Dec 2023 20:00  Patient On (Oxygen Delivery Method): ventilator    O2 Concentration (%): 40      Mode: AC/ CMV (Assist Control/ Continuous Mandatory Ventilation), RR (machine): 14, TV (machine): 500, FiO2: 40, PEEP: 5, ITime: 1  CVP(mm Hg): 9 (12-22-23 @ 09:30) (4 - 18)  CO: --  CI: --  PA: --  PA(mean): --  PA(direct): --  PCWP: --  LA: --  RA: --  SVR: --  SVRI: --  PVR: --  PVRI: --  I&O's Summary    21 Dec 2023 07:01  -  22 Dec 2023 07:00  --------------------------------------------------------  IN: 401.3 mL / OUT: 1190 mL / NET: -788.7 mL    22 Dec 2023 07:01  -  22 Dec 2023 21:01  --------------------------------------------------------  IN: 518.8 mL / OUT: 630 mL / NET: -111.2 mL        CAPILLARY BLOOD GLUCOSE    CAPILLARY BLOOD GLUCOSE      POCT Blood Glucose.: 164 mg/dL (22 Dec 2023 19:31)      PHYSICAL EXAM:  GENERAL: No acute distress, well-developed  HEAD:  Atraumatic, Normocephalic  EYES: EOMI, PERRLA, conjunctiva and sclera clear  NECK: Supple, no lymphadenopathy, no JVD  CHEST/LUNG: CTAB; No wheezes, rales, or rhonchi  HEART: Regular rate and rhythm. Normal S1/S2. No murmurs, rubs, or gallops  ABDOMEN: Soft, non-tender, non-distended; normal bowel sounds, no organomegaly  EXTREMITIES:  2+ peripheral pulses b/l, No clubbing, cyanosis, or edema  NEUROLOGY: A&O x 3, no focal deficits  SKIN: No rashes or lesions    ============================I/O===========================   I&O's Detail    21 Dec 2023 07:01  -  22 Dec 2023 07:00  --------------------------------------------------------  IN:    Dexmedetomidine: 101.3 mL    Enteral Tube Flush: 50 mL    IV PiggyBack: 250 mL  Total IN: 401.3 mL    OUT:    Dexmedetomidine: 0 mL    Indwelling Catheter - Urethral (mL): 1190 mL  Total OUT: 1190 mL    Total NET: -788.7 mL      22 Dec 2023 07:01  -  22 Dec 2023 21:01  --------------------------------------------------------  IN:    Dexmedetomidine: 18.8 mL    Enteral Tube Flush: 120 mL    IV PiggyBack: 50 mL    Jevity 1.2: 330 mL  Total IN: 518.8 mL    OUT:    Indwelling Catheter - Urethral (mL): 630 mL  Total OUT: 630 mL    Total NET: -111.2 mL        ============================ LABS =========================                        8.7    7.65  )-----------( 117      ( 22 Dec 2023 00:51 )             25.5     12-22    142  |  107  |  57<H>  ----------------------------<  158<H>  3.9   |  21<L>  |  2.19<H>    Ca    8.1<L>      22 Dec 2023 00:51  Phos  5.2     12-22  Mg     2.6     12-22    TPro  5.6<L>  /  Alb  3.3  /  TBili  0.8  /  DBili  x   /  AST  66<H>  /  ALT  113<H>  /  AlkPhos  42  12-22    Troponin T, High Sensitivity Result: 2406 ng/L (12-20-23 @ 18:01)  Troponin T, High Sensitivity Result: 4145 ng/L (12-20-23 @ 02:09)    CKMB Units: 28.7 ng/mL (12-20-23 @ 18:01)  CKMB Units: 133.8 ng/mL (12-20-23 @ 02:09)    Creatine Kinase, Serum: 745 U/L (12-20-23 @ 18:01)  Creatine Kinase, Serum: 1110 U/L (12-20-23 @ 02:09)    CPK Mass Assay %: 3.9 % (12-20-23 @ 18:01)  CPK Mass Assay %: 12.1 % (12-20-23 @ 02:09)        LIVER FUNCTIONS - ( 22 Dec 2023 00:51 )  Alb: 3.3 g/dL / Pro: 5.6 g/dL / ALK PHOS: 42 U/L / ALT: 113 U/L / AST: 66 U/L / GGT: x           PT/INR - ( 21 Dec 2023 01:02 )   PT: 12.2 sec;   INR: 1.17 ratio           ABG - ( 22 Dec 2023 00:43 )  pH, Arterial: 7.46  pH, Blood: x     /  pCO2: 34    /  pO2: 151   / HCO3: 24    / Base Excess: 0.5   /  SaO2: 98.4              Blood Gas Arterial, Lactate: 1.4 mmol/L (12-22-23 @ 00:43)  Blood Gas Arterial, Lactate: 1.0 mmol/L (12-21-23 @ 00:53)  Blood Gas Arterial, Lactate: 1.7 mmol/L (12-20-23 @ 17:50)  Lactate, Blood: 2.0 mmol/L (12-20-23 @ 05:38)  Blood Gas Venous - Lactate: 2.3 mmol/L (12-20-23 @ 02:20)  Blood Gas Arterial, Lactate: 2.1 mmol/L (12-20-23 @ 01:54)    Urinalysis Basic - ( 22 Dec 2023 00:51 )    Color: x / Appearance: x / SG: x / pH: x  Gluc: 158 mg/dL / Ketone: x  / Bili: x / Urobili: x   Blood: x / Protein: x / Nitrite: x   Leuk Esterase: x / RBC: x / WBC x   Sq Epi: x / Non Sq Epi: x / Bacteria: x      ======================Micro/Rad/Cardio=================  Telemtry: Reviewed   EKG: Reviewed  CXR: Reviewed  Culture: Reviewed   Echo:   Cath:   ======================================================  PAST MEDICAL & SURGICAL HISTORY:  HTN (hypertension)      HLD (hyperlipidemia)  ====================ASSESSMENT ==============  78M w/ PMH HTN HLD presents as transfer from [facility] s/p VT arrest s/p shock x2 w/ unknown downtime. Pt found to have trauma to head w/ superficial scalp bleeding and oral mucosal bleeding.       NEURO  #Intubated  Encephalopathy post arrest   - Intubated w/o sedation s/p cardiac arrest w/ unknown down time. Reported to be 20minutes but can be longer given ROSC achieved right before arrival to the hospital   - CTH at [facility] w/o intracranial bleed/pathology. CTH maybe too soon to show evidence of anoxic injury  - Pt remains w/o purposeful movement.   - Neuro consult placed for prognostication  - VEEG in place - no epileptiform activity; diffuse cerebral dysfunction   - Repeat CTH shows stability of bleed and emerging signs of anoxic injury  - f/u neuron specific enolase  - Family meeting w/ neurology attending 12/22 discussed no meaningful recovery. Pending family decision on comfort care.     #SAH  #SDH  - Pt w/ acute SAH and SDH 2/2 trauma  - Was not initially seen in outside scan at [facility] likely performed too early for radiologic evidence  - Neurology following,  - NSGY: no intervention  - Repeat CTH shows stability of bleeds   - Maintain BP control     #C-spine trauma  Pt w/ fall at home presented w/ c-collar.   -Per [facility] radiology report no evidence of cervical fracture  -Trauma surgery cleared pt of Cspine collar    #Myoclonic jerking  - vEEG: evidence of mycolonic seizures  - Started keppra load    CARDIAC  #VT arrest  - Pt s/p VT arrest w/ unknown downtime. Pt reportedly received shock x2. No prior cardiac hx per family  - Check TTE   - Check cardiac enzymes, BNP  - Monitor for arrhythmias   - Pt not asa/plavix loaded 2/2 substernal hematoma noted on CT chest at [facility]  - Unlikely to be a candidate for [facility] at this time given mental status     #HTN  - Maintain BP control iso brain bleed  - Amlodipine; coreg  - PRN IVP labetalol for elevated SBP  - SBP goal<160    RESPIRATORY   #Intubated  Pt intubated s/p cardiac arrest  - On full vent support: RR 16 /  / PEEP 5 / FiO2 40  - Monitor ABGs    #Oropharynx bleed  - Pt w/ blood in oropharynx requiring frequent suctioning.   - ENT consulted s/p packing     RENAL LONA  Pt presented w/ SCr 1.16, likely near baseline  - trend Cr   - montior I&Os    GI  - Diet: enteral feeds     ENDO:  #Hypothyroidism  - A1c=5.5.   - monitor glucose    HEME/ONC  #Substernal hematoma  - Pt w/ substernal hematoma noted on imaging at [facility] from trauma/cpr  - Repeat CT chest to monitor hematoma  - A/C: scd for dvt    ID   #Sepsis  - Pt w/ leukocytosis w/ fevers possibly 2/2  infection  - U/A grossly positive. F/u BCx, UCx  - Pt had crash lines placed at [facility]. Possible source of infection. MRSA negative 12/20  - Sputum Cx + for klebsiella  - c/w CTX; f/u sensitivities      LINES/PPX  - peripherals in tact  - LIJ triple lumen: 12/20  - GOC: Full        Patient requires continuous monitoring with bedside rhythm monitoring, pulse ox monitoring, and intermittent blood gas analysis. Care plan discussed with ICU care team. Patient remained critical and at risk for life threatening decompensation.  Patient seen, examined and plan discussed with CCU team during rounds.     I have personally provided ____ minutes of critical care time excluding time spent on separate procedures, in addition to initial critical care time provided by the CICU Attending, Dr. Mejia.    By signing my name below, I, Kristy Ding, attest that this documentation has been prepared under the direction and in the presence of JEANNIE Teixeira._  Electronically signed: Emilia Franklin, 12-22-23 @ 21:01    I, Kamini Gutierrez , personally performed the services described in this documentation. all medical record entries made by the rezaibe were at my direction and in my presence. I have reviewed the chart and agree that the record reflects my personal performance and is accurate and complete  Electronically signed: JEANNIE Teixeira.       ASHLEY PRITCHARD  MRN-[id number]  Patient is a 78y old  Male who presents with a chief complaint of Cardiac Arrest (22 Dec 2023 07:33)    HPI:  78M w/ PMH HTN HLD presents as transfer from [facility] s/p cardiac arrest. Per reports patient had a witness fall and arrest at home. EMS performed CPR en route to hospital was noted to be in VTach and shocked x2. ROSC was obtained just prior to arrival at [facility]. Pt was intubated placed on levo gtt. On arrival pt noted to have lacerated to R forehead. Pt had CT scans that were negative for intracranial hemorrhage, C-spine fracture, facial fractures. A R frontal hematoma was noted.     On arrival patient w/ dried blood on scalp. Also noted to have bleeding from oral mucosa w/ clots requiring suctioning. Pt otherwise off sedation and non-responsive. Per EMS report pt did receive some sedation and paralytics at [facility].     Per family patient has been feeling 'off' but managing his day to day and did not see a physician. Pt prescribed klonopin by outpatient provider and family believes pt may have been taking more than prescribed. On day of arrest, pt was initially asymptomatic carrying out his routine. He went to the bathroom, wife heard a loud thud and found patient in a pool of blood prompting call to EMS. Pt has not followed with any cardiologist. Has not had any syncope or other events preceding this.  (19 Dec 2023 14:54)      Hospital Course:    24 HOUR EVENTS:    REVIEW OF SYSTEMS:    CONSTITUTIONAL: No weakness, fevers or chills  EYES/ENT: No visual changes;  No vertigo or throat pain   NECK: No pain or stiffness  RESPIRATORY: No cough, wheezing, hemoptysis; No shortness of breath  CARDIOVASCULAR: No chest pain or palpitations  GASTROINTESTINAL: No abdominal or epigastric pain. No nausea, vomiting, or hematemesis; No diarrhea or constipation. No melena or hematochezia.  GENITOURINARY: No dysuria, frequency or hematuria  NEUROLOGICAL: No numbness or weakness  SKIN: No itching, rashes      ICU Vital Signs Last 24 Hrs  T(C): 39.4 (22 Dec 2023 13:00), Max: 39.4 (22 Dec 2023 13:00)  T(F): 103 (22 Dec 2023 13:00), Max: 103 (22 Dec 2023 13:00)  HR: 79 (22 Dec 2023 20:00) (67 - 91)  BP: 102/57 (21 Dec 2023 22:00) (102/57 - 102/57)  BP(mean): 77 (21 Dec 2023 22:00) (77 - 77)  ABP: 165/52 (22 Dec 2023 20:00) (108/40 - 183/57)  ABP(mean): 80 (22 Dec 2023 20:00) (61 - 92)  RR: 27 (22 Dec 2023 20:00) (13 - 35)  SpO2: 100% (22 Dec 2023 20:00) (98% - 100%)    O2 Parameters below as of 22 Dec 2023 20:00  Patient On (Oxygen Delivery Method): ventilator    O2 Concentration (%): 40      Mode: AC/ CMV (Assist Control/ Continuous Mandatory Ventilation), RR (machine): 14, TV (machine): 500, FiO2: 40, PEEP: 5, ITime: 1  CVP(mm Hg): 9 (12-22-23 @ 09:30) (4 - 18)  CO: --  CI: --  PA: --  PA(mean): --  PA(direct): --  PCWP: --  LA: --  RA: --  SVR: --  SVRI: --  PVR: --  PVRI: --  I&O's Summary    21 Dec 2023 07:01  -  22 Dec 2023 07:00  --------------------------------------------------------  IN: 401.3 mL / OUT: 1190 mL / NET: -788.7 mL    22 Dec 2023 07:01  -  22 Dec 2023 21:01  --------------------------------------------------------  IN: 518.8 mL / OUT: 630 mL / NET: -111.2 mL        CAPILLARY BLOOD GLUCOSE    CAPILLARY BLOOD GLUCOSE      POCT Blood Glucose.: 164 mg/dL (22 Dec 2023 19:31)      PHYSICAL EXAM:  GENERAL: No acute distress, well-developed  HEAD:  Atraumatic, Normocephalic  EYES: EOMI, PERRLA, conjunctiva and sclera clear  NECK: Supple, no lymphadenopathy, no JVD  CHEST/LUNG: CTAB; No wheezes, rales, or rhonchi  HEART: Regular rate and rhythm. Normal S1/S2. No murmurs, rubs, or gallops  ABDOMEN: Soft, non-tender, non-distended; normal bowel sounds, no organomegaly  EXTREMITIES:  2+ peripheral pulses b/l, No clubbing, cyanosis, or edema  NEUROLOGY: A&O x 3, no focal deficits  SKIN: No rashes or lesions    ============================I/O===========================   I&O's Detail    21 Dec 2023 07:01  -  22 Dec 2023 07:00  --------------------------------------------------------  IN:    Dexmedetomidine: 101.3 mL    Enteral Tube Flush: 50 mL    IV PiggyBack: 250 mL  Total IN: 401.3 mL    OUT:    Dexmedetomidine: 0 mL    Indwelling Catheter - Urethral (mL): 1190 mL  Total OUT: 1190 mL    Total NET: -788.7 mL      22 Dec 2023 07:01  -  22 Dec 2023 21:01  --------------------------------------------------------  IN:    Dexmedetomidine: 18.8 mL    Enteral Tube Flush: 120 mL    IV PiggyBack: 50 mL    Jevity 1.2: 330 mL  Total IN: 518.8 mL    OUT:    Indwelling Catheter - Urethral (mL): 630 mL  Total OUT: 630 mL    Total NET: -111.2 mL        ============================ LABS =========================                        8.7    7.65  )-----------( 117      ( 22 Dec 2023 00:51 )             25.5     12-22    142  |  107  |  57<H>  ----------------------------<  158<H>  3.9   |  21<L>  |  2.19<H>    Ca    8.1<L>      22 Dec 2023 00:51  Phos  5.2     12-22  Mg     2.6     12-22    TPro  5.6<L>  /  Alb  3.3  /  TBili  0.8  /  DBili  x   /  AST  66<H>  /  ALT  113<H>  /  AlkPhos  42  12-22    Troponin T, High Sensitivity Result: 2406 ng/L (12-20-23 @ 18:01)  Troponin T, High Sensitivity Result: 4145 ng/L (12-20-23 @ 02:09)    CKMB Units: 28.7 ng/mL (12-20-23 @ 18:01)  CKMB Units: 133.8 ng/mL (12-20-23 @ 02:09)    Creatine Kinase, Serum: 745 U/L (12-20-23 @ 18:01)  Creatine Kinase, Serum: 1110 U/L (12-20-23 @ 02:09)    CPK Mass Assay %: 3.9 % (12-20-23 @ 18:01)  CPK Mass Assay %: 12.1 % (12-20-23 @ 02:09)        LIVER FUNCTIONS - ( 22 Dec 2023 00:51 )  Alb: 3.3 g/dL / Pro: 5.6 g/dL / ALK PHOS: 42 U/L / ALT: 113 U/L / AST: 66 U/L / GGT: x           PT/INR - ( 21 Dec 2023 01:02 )   PT: 12.2 sec;   INR: 1.17 ratio           ABG - ( 22 Dec 2023 00:43 )  pH, Arterial: 7.46  pH, Blood: x     /  pCO2: 34    /  pO2: 151   / HCO3: 24    / Base Excess: 0.5   /  SaO2: 98.4              Blood Gas Arterial, Lactate: 1.4 mmol/L (12-22-23 @ 00:43)  Blood Gas Arterial, Lactate: 1.0 mmol/L (12-21-23 @ 00:53)  Blood Gas Arterial, Lactate: 1.7 mmol/L (12-20-23 @ 17:50)  Lactate, Blood: 2.0 mmol/L (12-20-23 @ 05:38)  Blood Gas Venous - Lactate: 2.3 mmol/L (12-20-23 @ 02:20)  Blood Gas Arterial, Lactate: 2.1 mmol/L (12-20-23 @ 01:54)    Urinalysis Basic - ( 22 Dec 2023 00:51 )    Color: x / Appearance: x / SG: x / pH: x  Gluc: 158 mg/dL / Ketone: x  / Bili: x / Urobili: x   Blood: x / Protein: x / Nitrite: x   Leuk Esterase: x / RBC: x / WBC x   Sq Epi: x / Non Sq Epi: x / Bacteria: x      ======================Micro/Rad/Cardio=================  Telemtry: Reviewed   EKG: Reviewed  CXR: Reviewed  Culture: Reviewed   Echo:   Cath:   ======================================================  PAST MEDICAL & SURGICAL HISTORY:  HTN (hypertension)      HLD (hyperlipidemia)  ====================ASSESSMENT ==============  78M w/ PMH HTN HLD presents as transfer from [facility] s/p VT arrest s/p shock x2 w/ unknown downtime. Pt found to have trauma to head w/ superficial scalp bleeding and oral mucosal bleeding.       NEURO  #Intubated  Encephalopathy post arrest   - Intubated w/o sedation s/p cardiac arrest w/ unknown down time. Reported to be 20minutes but can be longer given ROSC achieved right before arrival to the hospital   - CTH at [facility] w/o intracranial bleed/pathology. CTH maybe too soon to show evidence of anoxic injury  - Pt remains w/o purposeful movement.   - Neuro consult placed for prognostication  - VEEG in place - no epileptiform activity; diffuse cerebral dysfunction   - Repeat CTH shows stability of bleed and emerging signs of anoxic injury  - f/u neuron specific enolase  - Family meeting w/ neurology attending 12/22 discussed no meaningful recovery. Pending family decision on comfort care.     #SAH  #SDH  - Pt w/ acute SAH and SDH 2/2 trauma  - Was not initially seen in outside scan at [facility] likely performed too early for radiologic evidence  - Neurology following,  - NSGY: no intervention  - Repeat CTH shows stability of bleeds   - Maintain BP control     #C-spine trauma  Pt w/ fall at home presented w/ c-collar.   -Per [facility] radiology report no evidence of cervical fracture  -Trauma surgery cleared pt of Cspine collar    #Myoclonic jerking  - vEEG: evidence of mycolonic seizures  - Started keppra load    CARDIAC  #VT arrest  - Pt s/p VT arrest w/ unknown downtime. Pt reportedly received shock x2. No prior cardiac hx per family  - Check TTE   - Check cardiac enzymes, BNP  - Monitor for arrhythmias   - Pt not asa/plavix loaded 2/2 substernal hematoma noted on CT chest at [facility]  - Unlikely to be a candidate for [facility] at this time given mental status     #HTN  - Maintain BP control iso brain bleed  - Amlodipine; coreg  - PRN IVP labetalol for elevated SBP  - SBP goal<160    RESPIRATORY   #Intubated  Pt intubated s/p cardiac arrest  - On full vent support: RR 16 /  / PEEP 5 / FiO2 40  - Monitor ABGs    #Oropharynx bleed  - Pt w/ blood in oropharynx requiring frequent suctioning.   - ENT consulted s/p packing     RENAL LONA  Pt presented w/ SCr 1.16, likely near baseline  - trend Cr   - montior I&Os    GI  - Diet: enteral feeds     ENDO:  #Hypothyroidism  - A1c=5.5.   - monitor glucose    HEME/ONC  #Substernal hematoma  - Pt w/ substernal hematoma noted on imaging at [facility] from trauma/cpr  - Repeat CT chest to monitor hematoma  - A/C: scd for dvt    ID   #Sepsis  - Pt w/ leukocytosis w/ fevers possibly 2/2  infection  - U/A grossly positive. F/u BCx, UCx  - Pt had crash lines placed at [facility]. Possible source of infection. MRSA negative 12/20  - Sputum Cx + for klebsiella  - c/w CTX; f/u sensitivities      LINES/PPX  - peripherals in tact  - LIJ triple lumen: 12/20  - GOC: Full        Patient requires continuous monitoring with bedside rhythm monitoring, pulse ox monitoring, and intermittent blood gas analysis. Care plan discussed with ICU care team. Patient remained critical and at risk for life threatening decompensation.  Patient seen, examined and plan discussed with CCU team during rounds.     I have personally provided ____ minutes of critical care time excluding time spent on separate procedures, in addition to initial critical care time provided by the CICU Attending, Dr. Mejia.    By signing my name below, I, Kristy Ding, attest that this documentation has been prepared under the direction and in the presence of JEANNIE Teixeira._  Electronically signed: Emilia Franklin, 12-22-23 @ 21:01    I, Kamini Gutierrez , personally performed the services described in this documentation. all medical record entries made by the rezaibe were at my direction and in my presence. I have reviewed the chart and agree that the record reflects my personal performance and is accurate and complete  Electronically signed: JEANNIE Teixeira.       ASHLEY PRITCHARD  MRN-[id number]  Patient is a 78y old  Male who presents with a chief complaint of Cardiac Arrest (22 Dec 2023 07:33)    HPI:  78M w/ PMH HTN HLD presents as transfer from [facility] s/p cardiac arrest. Per reports patient had a witness fall and arrest at home. EMS performed CPR en route to hospital was noted to be in VTach and shocked x2. ROSC was obtained just prior to arrival at [facility]. Pt was intubated placed on levo gtt. On arrival pt noted to have lacerated to R forehead. Pt had CT scans that were negative for intracranial hemorrhage, C-spine fracture, facial fractures. A R frontal hematoma was noted.     On arrival patient w/ dried blood on scalp. Also noted to have bleeding from oral mucosa w/ clots requiring suctioning. Pt otherwise off sedation and non-responsive. Per EMS report pt did receive some sedation and paralytics at [facility].     Per family patient has been feeling 'off' but managing his day to day and did not see a physician. Pt prescribed klonopin by outpatient provider and family believes pt may have been taking more than prescribed. On day of arrest, pt was initially asymptomatic carrying out his routine. He went to the bathroom, wife heard a loud thud and found patient in a pool of blood prompting call to EMS. Pt has not followed with any cardiologist. Has not had any syncope or other events preceding this.  (19 Dec 2023 14:54)    24 HOUR EVENTS:  - VA duplex: R axillary DVT, R common femoral DVT, L soleal DVT  - Start heparin SQ  - vanco + zosyn    REVIEW OF SYSTEMS: Unable to obtain 2/2 mental status    ICU Vital Signs Last 24 Hrs  T(C): 39.4 (22 Dec 2023 13:00), Max: 39.4 (22 Dec 2023 13:00)  T(F): 103 (22 Dec 2023 13:00), Max: 103 (22 Dec 2023 13:00)  HR: 79 (22 Dec 2023 20:00) (67 - 91)  BP: 102/57 (21 Dec 2023 22:00) (102/57 - 102/57)  BP(mean): 77 (21 Dec 2023 22:00) (77 - 77)  ABP: 165/52 (22 Dec 2023 20:00) (108/40 - 183/57)  ABP(mean): 80 (22 Dec 2023 20:00) (61 - 92)  RR: 27 (22 Dec 2023 20:00) (13 - 35)  SpO2: 100% (22 Dec 2023 20:00) (98% - 100%)    O2 Parameters below as of 22 Dec 2023 20:00  Patient On (Oxygen Delivery Method): ventilator    O2 Concentration (%): 40      Mode: AC/ CMV (Assist Control/ Continuous Mandatory Ventilation), RR (machine): 14, TV (machine): 500, FiO2: 40, PEEP: 5, ITime: 1  CVP(mm Hg): 9 (12-22-23 @ 09:30) (4 - 18)    I&O's Summary    21 Dec 2023 07:01  -  22 Dec 2023 07:00  --------------------------------------------------------  IN: 401.3 mL / OUT: 1190 mL / NET: -788.7 mL    22 Dec 2023 07:01  -  22 Dec 2023 21:01  --------------------------------------------------------  IN: 518.8 mL / OUT: 630 mL / NET: -111.2 mL        CAPILLARY BLOOD GLUCOSE    CAPILLARY BLOOD GLUCOSE      POCT Blood Glucose.: 164 mg/dL (22 Dec 2023 19:31)      PHYSICAL EXAM:  GENERAL: No acute distress, well-developed  HEAD:  Atraumatic, Normocephalic  EYES: conjunctiva and sclera clear  NECK: Supple, no lymphadenopathy, no JVD  CHEST/LUNG: CTAB; No wheezes, rales, or rhonchi  HEART: Regular rate and rhythm. Normal S1/S2. No murmurs, rubs, or gallops  ABDOMEN: Soft, non-tender, non-distended; normal bowel sounds, no organomegaly  EXTREMITIES:  1+ peripheral pulses b/l, No clubbing, cyanosis, or edema  NEUROLOGY: unresponsive to verbal commands  SKIN: No rashes or lesions    ============================I/O===========================   I&O's Detail    21 Dec 2023 07:01  -  22 Dec 2023 07:00  --------------------------------------------------------  IN:    Dexmedetomidine: 101.3 mL    Enteral Tube Flush: 50 mL    IV PiggyBack: 250 mL  Total IN: 401.3 mL    OUT:    Dexmedetomidine: 0 mL    Indwelling Catheter - Urethral (mL): 1190 mL  Total OUT: 1190 mL    Total NET: -788.7 mL      22 Dec 2023 07:01  -  22 Dec 2023 21:01  --------------------------------------------------------  IN:    Dexmedetomidine: 18.8 mL    Enteral Tube Flush: 120 mL    IV PiggyBack: 50 mL    Jevity 1.2: 330 mL  Total IN: 518.8 mL    OUT:    Indwelling Catheter - Urethral (mL): 630 mL  Total OUT: 630 mL    Total NET: -111.2 mL        ============================ LABS =========================                        8.7    7.65  )-----------( 117      ( 22 Dec 2023 00:51 )             25.5     12-22    142  |  107  |  57<H>  ----------------------------<  158<H>  3.9   |  21<L>  |  2.19<H>    Ca    8.1<L>      22 Dec 2023 00:51  Phos  5.2     12-22  Mg     2.6     12-22    TPro  5.6<L>  /  Alb  3.3  /  TBili  0.8  /  DBili  x   /  AST  66<H>  /  ALT  113<H>  /  AlkPhos  42  12-22    Troponin T, High Sensitivity Result: 2406 ng/L (12-20-23 @ 18:01)  Troponin T, High Sensitivity Result: 4145 ng/L (12-20-23 @ 02:09)    CKMB Units: 28.7 ng/mL (12-20-23 @ 18:01)  CKMB Units: 133.8 ng/mL (12-20-23 @ 02:09)    Creatine Kinase, Serum: 745 U/L (12-20-23 @ 18:01)  Creatine Kinase, Serum: 1110 U/L (12-20-23 @ 02:09)    CPK Mass Assay %: 3.9 % (12-20-23 @ 18:01)  CPK Mass Assay %: 12.1 % (12-20-23 @ 02:09)        LIVER FUNCTIONS - ( 22 Dec 2023 00:51 )  Alb: 3.3 g/dL / Pro: 5.6 g/dL / ALK PHOS: 42 U/L / ALT: 113 U/L / AST: 66 U/L / GGT: x           PT/INR - ( 21 Dec 2023 01:02 )   PT: 12.2 sec;   INR: 1.17 ratio           ABG - ( 22 Dec 2023 00:43 )  pH, Arterial: 7.46  pH, Blood: x     /  pCO2: 34    /  pO2: 151   / HCO3: 24    / Base Excess: 0.5   /  SaO2: 98.4              Blood Gas Arterial, Lactate: 1.4 mmol/L (12-22-23 @ 00:43)  Blood Gas Arterial, Lactate: 1.0 mmol/L (12-21-23 @ 00:53)  Blood Gas Arterial, Lactate: 1.7 mmol/L (12-20-23 @ 17:50)  Lactate, Blood: 2.0 mmol/L (12-20-23 @ 05:38)  Blood Gas Venous - Lactate: 2.3 mmol/L (12-20-23 @ 02:20)  Blood Gas Arterial, Lactate: 2.1 mmol/L (12-20-23 @ 01:54)    Urinalysis Basic - ( 22 Dec 2023 00:51 )    Color: x / Appearance: x / SG: x / pH: x  Gluc: 158 mg/dL / Ketone: x  / Bili: x / Urobili: x   Blood: x / Protein: x / Nitrite: x   Leuk Esterase: x / RBC: x / WBC x   Sq Epi: x / Non Sq Epi: x / Bacteria: x      ======================Micro/Rad/Cardio=================  Telemtry: Reviewed   EKG: Reviewed  CXR: Reviewed  Culture: Reviewed   Echo:   Cath:   ======================================================  PAST MEDICAL & SURGICAL HISTORY:  HTN (hypertension)      HLD (hyperlipidemia)  ====================ASSESSMENT ==============  78M w/ PMH HTN HLD presents as transfer from [facility] s/p VT arrest s/p shock x2 w/ unknown downtime. Pt found to have trauma to head w/ superficial scalp bleeding and oral mucosal bleeding.       NEURO  #Intubated  Encephalopathy post arrest   - Intubated w/o sedation s/p cardiac arrest w/ unknown down time. Reported to be 20minutes but can be longer given ROSC achieved right before arrival to the hospital   - CTH at [facility] w/o intracranial bleed/pathology. CTH maybe too soon to show evidence of anoxic injury  - Pt remains w/o purposeful movement.   - Neuro consult placed for prognostication  - VEEG in place - no epileptiform activity; diffuse cerebral dysfunction   - Repeat CTH shows stability of bleed and emerging signs of anoxic injury  - f/u neuron specific enolase  - Family meeting w/ neurology attending 12/22 discussed no meaningful recovery. Pending family decision on comfort care.     #SAH  #SDH  - Pt w/ acute SAH and SDH 2/2 trauma  - Was not initially seen in outside scan at [facility] likely performed too early for radiologic evidence  - Neurology following,  - NSGY: no intervention  - Repeat CTH shows stability of bleeds   - Maintain BP control     #C-spine trauma  Pt w/ fall at home presented w/ c-collar.   -Per [facility] radiology report no evidence of cervical fracture  -Trauma surgery cleared pt of Cspine collar    #Myoclonic jerking  - vEEG: evidence of mycolonic seizures  - keppra load    CARDIAC  #VT arrest  - Pt s/p VT arrest w/ unknown downtime. Pt reportedly received shock x2. No prior cardiac hx per family  - TTE 12/19: EF 42%, regional WMAs   - Monitor for arrhythmias   - Pt not asa/plavix loaded 2/2 substernal hematoma noted on CT chest at [facility]  - Unlikely to be a candidate for [facility] at this time given mental status     #HTN  - Maintain BP control iso brain bleed  - Amlodipine; coreg  - PRN IVP labetalol for elevated SBP  - SBP goal<160    RESPIRATORY   #Intubated  Pt intubated s/p cardiac arrest  - On full vent support: RR 16 /  / PEEP 5 / FiO2 40  - Monitor ABGs    #Oropharynx bleed  - Pt w/ blood in oropharynx requiring frequent suctioning.   - ENT consulted s/p packing     RENAL  #LONA  - Pt presented w/ SCr 1.16, likely near baseline  - trend Cr   - montior I&Os    GI  - Diet: enteral feeds     ENDO:  #Hypothyroidism  - A1c=5.5.   - monitor glucose    HEME/ONC  #Substernal hematoma  - Pt w/ substernal hematoma noted on imaging at [facility] from trauma/cpr  - Repeat CT chest to monitor hematoma  - VA duplex 12/22: R axillary DVT, R common femoral DVT, left soleal DVT    ID   #Sepsis  - Pt w/ leukocytosis w/ fevers possibly 2/2  infection  - U/A grossly positive. F/u BCx, UCx  - Pt had crash lines placed at [facility]. Possible source of infection. MRSA negative 12/20  - Sputum Cx + for klebsiella  - persistently febrile, broadened abx to Vanco and zosyn    LINES/PPX  - peripherals in tact  - LIJ triple lumen: 12/20  - GOC: Full        Patient requires continuous monitoring with bedside rhythm monitoring, pulse ox monitoring, and intermittent blood gas analysis. Care plan discussed with ICU care team. Patient remained critical and at risk for life threatening decompensation.  Patient seen, examined and plan discussed with CCU team during rounds.     I have personally provided 35 minutes of critical care time excluding time spent on separate procedures, in addition to initial critical care time provided by the CICU Attending, Dr. Mejia.    By signing my name below, I, Kristy Ding, attest that this documentation has been prepared under the direction and in the presence of JEANNIE Teixeira._  Electronically signed: Emilia Franklin, 12-22-23 @ 21:01    I, Kamini Gutierrez , personally performed the services described in this documentation. all medical record entries made by the scribe were at my direction and in my presence. I have reviewed the chart and agree that the record reflects my personal performance and is accurate and complete  Electronically signed: JEANNIE Teixeira.       ASHLEY PRITCHARD  MRN-[id number]  Patient is a 78y old  Male who presents with a chief complaint of Cardiac Arrest (22 Dec 2023 07:33)    HPI:  78M w/ PMH HTN HLD presents as transfer from [facility] s/p cardiac arrest. Per reports patient had a witness fall and arrest at home. EMS performed CPR en route to hospital was noted to be in VTach and shocked x2. ROSC was obtained just prior to arrival at [facility]. Pt was intubated placed on levo gtt. On arrival pt noted to have lacerated to R forehead. Pt had CT scans that were negative for intracranial hemorrhage, C-spine fracture, facial fractures. A R frontal hematoma was noted.     On arrival patient w/ dried blood on scalp. Also noted to have bleeding from oral mucosa w/ clots requiring suctioning. Pt otherwise off sedation and non-responsive. Per EMS report pt did receive some sedation and paralytics at [facility].     Per family patient has been feeling 'off' but managing his day to day and did not see a physician. Pt prescribed klonopin by outpatient provider and family believes pt may have been taking more than prescribed. On day of arrest, pt was initially asymptomatic carrying out his routine. He went to the bathroom, wife heard a loud thud and found patient in a pool of blood prompting call to EMS. Pt has not followed with any cardiologist. Has not had any syncope or other events preceding this.  (19 Dec 2023 14:54)    24 HOUR EVENTS:  - VA duplex: R axillary DVT, R common femoral DVT, L soleal DVT  - Start heparin SQ  - vanco + zosyn    REVIEW OF SYSTEMS: Unable to obtain 2/2 mental status    ICU Vital Signs Last 24 Hrs  T(C): 39.4 (22 Dec 2023 13:00), Max: 39.4 (22 Dec 2023 13:00)  T(F): 103 (22 Dec 2023 13:00), Max: 103 (22 Dec 2023 13:00)  HR: 79 (22 Dec 2023 20:00) (67 - 91)  BP: 102/57 (21 Dec 2023 22:00) (102/57 - 102/57)  BP(mean): 77 (21 Dec 2023 22:00) (77 - 77)  ABP: 165/52 (22 Dec 2023 20:00) (108/40 - 183/57)  ABP(mean): 80 (22 Dec 2023 20:00) (61 - 92)  RR: 27 (22 Dec 2023 20:00) (13 - 35)  SpO2: 100% (22 Dec 2023 20:00) (98% - 100%)    O2 Parameters below as of 22 Dec 2023 20:00  Patient On (Oxygen Delivery Method): ventilator    O2 Concentration (%): 40      Mode: AC/ CMV (Assist Control/ Continuous Mandatory Ventilation), RR (machine): 14, TV (machine): 500, FiO2: 40, PEEP: 5, ITime: 1  CVP(mm Hg): 9 (12-22-23 @ 09:30) (4 - 18)    I&O's Summary    21 Dec 2023 07:01  -  22 Dec 2023 07:00  --------------------------------------------------------  IN: 401.3 mL / OUT: 1190 mL / NET: -788.7 mL    22 Dec 2023 07:01  -  22 Dec 2023 21:01  --------------------------------------------------------  IN: 518.8 mL / OUT: 630 mL / NET: -111.2 mL        CAPILLARY BLOOD GLUCOSE    CAPILLARY BLOOD GLUCOSE      POCT Blood Glucose.: 164 mg/dL (22 Dec 2023 19:31)      PHYSICAL EXAM:  GENERAL: No acute distress, well-developed  HEAD:  Atraumatic, Normocephalic  EYES: conjunctiva and sclera clear  NECK: Supple, no lymphadenopathy, no JVD  CHEST/LUNG: CTAB; No wheezes, rales, or rhonchi  HEART: Regular rate and rhythm. Normal S1/S2. No murmurs, rubs, or gallops  ABDOMEN: Soft, non-tender, non-distended; normal bowel sounds, no organomegaly  EXTREMITIES:  1+ peripheral pulses b/l, No clubbing, cyanosis, or edema  NEUROLOGY: unresponsive to verbal commands  SKIN: No rashes or lesions    ============================I/O===========================   I&O's Detail    21 Dec 2023 07:01  -  22 Dec 2023 07:00  --------------------------------------------------------  IN:    Dexmedetomidine: 101.3 mL    Enteral Tube Flush: 50 mL    IV PiggyBack: 250 mL  Total IN: 401.3 mL    OUT:    Dexmedetomidine: 0 mL    Indwelling Catheter - Urethral (mL): 1190 mL  Total OUT: 1190 mL    Total NET: -788.7 mL      22 Dec 2023 07:01  -  22 Dec 2023 21:01  --------------------------------------------------------  IN:    Dexmedetomidine: 18.8 mL    Enteral Tube Flush: 120 mL    IV PiggyBack: 50 mL    Jevity 1.2: 330 mL  Total IN: 518.8 mL    OUT:    Indwelling Catheter - Urethral (mL): 630 mL  Total OUT: 630 mL    Total NET: -111.2 mL        ============================ LABS =========================                        8.7    7.65  )-----------( 117      ( 22 Dec 2023 00:51 )             25.5     12-22    142  |  107  |  57<H>  ----------------------------<  158<H>  3.9   |  21<L>  |  2.19<H>    Ca    8.1<L>      22 Dec 2023 00:51  Phos  5.2     12-22  Mg     2.6     12-22    TPro  5.6<L>  /  Alb  3.3  /  TBili  0.8  /  DBili  x   /  AST  66<H>  /  ALT  113<H>  /  AlkPhos  42  12-22    Troponin T, High Sensitivity Result: 2406 ng/L (12-20-23 @ 18:01)  Troponin T, High Sensitivity Result: 4145 ng/L (12-20-23 @ 02:09)    CKMB Units: 28.7 ng/mL (12-20-23 @ 18:01)  CKMB Units: 133.8 ng/mL (12-20-23 @ 02:09)    Creatine Kinase, Serum: 745 U/L (12-20-23 @ 18:01)  Creatine Kinase, Serum: 1110 U/L (12-20-23 @ 02:09)    CPK Mass Assay %: 3.9 % (12-20-23 @ 18:01)  CPK Mass Assay %: 12.1 % (12-20-23 @ 02:09)        LIVER FUNCTIONS - ( 22 Dec 2023 00:51 )  Alb: 3.3 g/dL / Pro: 5.6 g/dL / ALK PHOS: 42 U/L / ALT: 113 U/L / AST: 66 U/L / GGT: x           PT/INR - ( 21 Dec 2023 01:02 )   PT: 12.2 sec;   INR: 1.17 ratio           ABG - ( 22 Dec 2023 00:43 )  pH, Arterial: 7.46  pH, Blood: x     /  pCO2: 34    /  pO2: 151   / HCO3: 24    / Base Excess: 0.5   /  SaO2: 98.4              Blood Gas Arterial, Lactate: 1.4 mmol/L (12-22-23 @ 00:43)  Blood Gas Arterial, Lactate: 1.0 mmol/L (12-21-23 @ 00:53)  Blood Gas Arterial, Lactate: 1.7 mmol/L (12-20-23 @ 17:50)  Lactate, Blood: 2.0 mmol/L (12-20-23 @ 05:38)  Blood Gas Venous - Lactate: 2.3 mmol/L (12-20-23 @ 02:20)  Blood Gas Arterial, Lactate: 2.1 mmol/L (12-20-23 @ 01:54)    Urinalysis Basic - ( 22 Dec 2023 00:51 )    Color: x / Appearance: x / SG: x / pH: x  Gluc: 158 mg/dL / Ketone: x  / Bili: x / Urobili: x   Blood: x / Protein: x / Nitrite: x   Leuk Esterase: x / RBC: x / WBC x   Sq Epi: x / Non Sq Epi: x / Bacteria: x      ======================Micro/Rad/Cardio=================  Telemtry: Reviewed   EKG: Reviewed  CXR: Reviewed  Culture: Reviewed   Echo:   Cath:   ======================================================  PAST MEDICAL & SURGICAL HISTORY:  HTN (hypertension)      HLD (hyperlipidemia)  ====================ASSESSMENT ==============  78M w/ PMH HTN HLD presents as transfer from [facility] s/p VT arrest s/p shock x2 w/ unknown downtime. Pt found to have trauma to head w/ superficial scalp bleeding and oral mucosal bleeding.       NEURO  #Intubated  Encephalopathy post arrest   - Intubated w/o sedation s/p cardiac arrest w/ unknown down time. Reported to be 20minutes but can be longer given ROSC achieved right before arrival to the hospital   - CTH at [facility] w/o intracranial bleed/pathology. CTH maybe too soon to show evidence of anoxic injury  - Pt remains w/o purposeful movement.   - Neuro consult placed for prognostication  - VEEG in place - no epileptiform activity; diffuse cerebral dysfunction   - Repeat CTH shows stability of bleed and emerging signs of anoxic injury  - f/u neuron specific enolase  - Family meeting w/ neurology attending 12/22 discussed no meaningful recovery. Pending family decision on comfort care.     #SAH  #SDH  - Pt w/ acute SAH and SDH 2/2 trauma  - Was not initially seen in outside scan at [facility] likely performed too early for radiologic evidence  - Neurology following,  - NSGY: no intervention  - Repeat CTH shows stability of bleeds   - Maintain BP control     #C-spine trauma  Pt w/ fall at home presented w/ c-collar.   -Per [facility] radiology report no evidence of cervical fracture  -Trauma surgery cleared pt of Cspine collar    #Myoclonic jerking  - vEEG: evidence of mycolonic seizures  - keppra load    CARDIAC  #VT arrest  - Pt s/p VT arrest w/ unknown downtime. Pt reportedly received shock x2. No prior cardiac hx per family  - TTE 12/19: EF 42%, regional WMAs   - Monitor for arrhythmias   - Pt not asa/plavix loaded 2/2 substernal hematoma noted on CT chest at [facility]  - Unlikely to be a candidate for [facility] at this time given mental status     #HTN  - Maintain BP control iso brain bleed  - Amlodipine; coreg  - PRN IVP labetalol for elevated SBP  - SBP goal<160    RESPIRATORY   #Intubated  Pt intubated s/p cardiac arrest  - On full vent support: RR 16 /  / PEEP 5 / FiO2 40  - Monitor ABGs    #Oropharynx bleed  - Pt w/ blood in oropharynx requiring frequent suctioning.   - ENT consulted s/p packing     RENAL  #LNOA  - Pt presented w/ SCr 1.16, likely near baseline  - trend Cr   - montior I&Os    GI  - Diet: enteral feeds     ENDO:  #Hypothyroidism  - A1c=5.5.   - monitor glucose    HEME/ONC  #Substernal hematoma  - Pt w/ substernal hematoma noted on imaging at [facility] from trauma/cpr  - Repeat CT chest to monitor hematoma  - VA duplex 12/22: R axillary DVT, R common femoral DVT, left soleal DVT    ID   #Sepsis  - Pt w/ leukocytosis w/ fevers possibly 2/2  infection  - U/A grossly positive. F/u BCx, UCx  - Pt had crash lines placed at [facility]. Possible source of infection. MRSA negative 12/20  - Sputum Cx + for klebsiella  - persistently febrile, broadened abx to Vanco and zosyn    LINES/PPX  - peripherals in tact  - LIJ triple lumen: 12/20  - GOC: Full        Patient requires continuous monitoring with bedside rhythm monitoring, pulse ox monitoring, and intermittent blood gas analysis. Care plan discussed with ICU care team. Patient remained critical and at risk for life threatening decompensation.  Patient seen, examined and plan discussed with CCU team during rounds.     I have personally provided 35 minutes of critical care time excluding time spent on separate procedures, in addition to initial critical care time provided by the CICU Attending, Dr. Mejia.    By signing my name below, I, Kristy Ding, attest that this documentation has been prepared under the direction and in the presence of JEANNIE Teixeira._  Electronically signed: Emilia Franklin, 12-22-23 @ 21:01    I, Kamini Gutierrez , personally performed the services described in this documentation. all medical record entries made by the scribe were at my direction and in my presence. I have reviewed the chart and agree that the record reflects my personal performance and is accurate and complete  Electronically signed: JEANNIE Teixeira.

## 2023-12-22 NOTE — PROGRESS NOTE ADULT - ASSESSMENT
78M w/ PMH HTN HLD presents as transfer from [facility] s/p VT arrest s/p shock x2 w/ unknown downtime. Pt found to have trauma to head w/ superficial scalp bleeding and oral mucosal bleeding.       NEURO  #Intubated  Encephalopathy post arrest   - Intubated w/o sedation s/p cardiac arrest w/ unknown down time. Reported to be 20minutes but can be longer given ROSC achieved right before arrival to the hospital   - CTH at [facility] w/o intracranial bleed/pathology. CTH maybe too soon to show evidence of anoxic injury  - Pt remains w/o purposeful movement.   - Neuro consult placed for prognostication  - VEEG in place - no epileptiform activity; diffuse cerebral dysfunction   - Repeat CTH shows stability of bleed and emerging signs of anoxic injury  - f/u neuron specific enolase    #SAH  #SDH  - Pt w/ acute SAH and SDH 2/2 trauma  - Was not initially seen in outside scan at [facility] likely performed too early for radiologic evidence  - Neurology following,  - NSGY: no intervention  - Repeat CTH shows stability of bleeds   - Maintain BP control     #C-spine trauma  Pt w/ fall at home presented w/ c-collar.   -Per [facility] radiology report no evidence of cervical fracture  -Trauma surgery cleared pt of Cspine collar    #Myoclonic jerking  - vEEG: evidence of mycolonic seizures  - Started keppra load    CARDIAC  #VT arrest  - Pt s/p VT arrest w/ unknown downtime. Pt reportedly received shock x2. No prior cardiac hx per family  - Check TTE   - Check cardiac enzymes, BNP  - Monitor for arrhythmias   - Pt not asa/plavix loaded 2/2 substernal hematoma noted on CT chest at [facility]  - Unlikely to be a candidate for [facility] at this time given mental status     #HTN  - Maintain BP control iso brain bleed  - Amlodipine; coreg  - PRN IVP labetalol for elevated SBP    RESPIRATORY   #Intubated  Pt intubated s/p cardiac arrest  - On full vent support: RR 16 /  / PEEP 5 / FiO2 40  - Monitor ABGs    #Oropharynx bleed  - Pt w/ blood in oropharynx requiring frequent suctioning.   - ENT consulted s/p packing     RENAL   Pt presented w/ SCr 1.16, likely near baseline  - trend Cr   - montior I&Os    GI  - Diet: NPO for now pending NGT placement when able     ENDO:  #Hypothyroidism  - A1c=5.5.   - monitor glucose    HEME/ONC  #Substernal hematoma  - Pt w/ substernal hematoma noted on imaging at [facility] from trauma/cpr  - Repeat CT chest to monitor hematoma  - A/C: scd for dvt    ID   #Sepsis  - Pt w/ leukocytosis w/ fevers possibly 2/2  infection  - U/A grossly positive. F/u BCx, UCx  - Pt had crash lines placed at [facility]. Possible source of infection. MRSA negative 12/20  - CTX to cover UTI and strep    LINES/PPX  - peripherals in tact  - LIJ triple lumen: 12/20  - GOC: Full   78M w/ PMH HTN HLD presents as transfer from [facility] s/p VT arrest s/p shock x2 w/ unknown downtime. Pt found to have trauma to head w/ superficial scalp bleeding and oral mucosal bleeding.       NEURO  #Intubated  Encephalopathy post arrest   - Intubated w/o sedation s/p cardiac arrest w/ unknown down time. Reported to be 20minutes but can be longer given ROSC achieved right before arrival to the hospital   - CTH at [facility] w/o intracranial bleed/pathology. CTH maybe too soon to show evidence of anoxic injury  - Pt remains w/o purposeful movement.   - Neuro consult placed for prognostication  - VEEG in place - no epileptiform activity; diffuse cerebral dysfunction   - Repeat CTH shows stability of bleed and emerging signs of anoxic injury  - f/u neuron specific enolase    #SAH  #SDH  - Pt w/ acute SAH and SDH 2/2 trauma  - Was not initially seen in outside scan at [facility] likely performed too early for radiologic evidence  - Neurology following,  - NSGY: no intervention  - Repeat CTH shows stability of bleeds   - Maintain BP control     #C-spine trauma  Pt w/ fall at home presented w/ c-collar.   -Per [facility] radiology report no evidence of cervical fracture  -Trauma surgery cleared pt of Cspine collar    #Myoclonic jerking  - vEEG: evidence of mycolonic seizures  - Started keppra load    CARDIAC  #VT arrest  - Pt s/p VT arrest w/ unknown downtime. Pt reportedly received shock x2. No prior cardiac hx per family  - Check TTE   - Check cardiac enzymes, BNP  - Monitor for arrhythmias   - Pt not asa/plavix loaded 2/2 substernal hematoma noted on CT chest at [facility]  - Unlikely to be a candidate for [facility] at this time given mental status     #HTN  - Maintain BP control iso brain bleed  - Amlodipine; coreg  - PRN IVP labetalol for elevated SBP    RESPIRATORY   #Intubated  Pt intubated s/p cardiac arrest  - On full vent support: RR 16 /  / PEEP 5 / FiO2 40  - Monitor ABGs    #Oropharynx bleed  - Pt w/ blood in oropharynx requiring frequent suctioning.   - ENT consulted s/p packing     RENAL   Pt presented w/ SCr 1.16, likely near baseline  - trend Cr   - montior I&Os    GI  - Diet: NPO for now pending NGT placement when able     ENDO:  #Hypothyroidism  - A1c=5.5.   - monitor glucose    HEME/ONC  #Substernal hematoma  - Pt w/ substernal hematoma noted on imaging at [facility] from trauma/cpr  - Repeat CT chest to monitor hematoma  - A/C: scd for dvt    ID   #Sepsis  - Pt w/ leukocytosis w/ fevers possibly 2/2  infection  - U/A grossly positive. F/u BCx, UCx  - Pt had crash lines placed at [facility]. Possible source of infection. MRSA negative 12/20  - CTX to cover UTI and strep    LINES/PPX  - peripherals in tact  - LIJ triple lumen: 12/20  - GOC: Full   78M w/ PMH HTN HLD presents as transfer from [facility] s/p VT arrest s/p shock x2 w/ unknown downtime. Pt found to have trauma to head w/ superficial scalp bleeding and oral mucosal bleeding.       NEURO  #Intubated  Encephalopathy post arrest   - Intubated w/o sedation s/p cardiac arrest w/ unknown down time. Reported to be 20minutes but can be longer given ROSC achieved right before arrival to the hospital   - CTH at [facility] w/o intracranial bleed/pathology. CTH maybe too soon to show evidence of anoxic injury  - Pt remains w/o purposeful movement.   - Neuro consult placed for prognostication  - VEEG in place - no epileptiform activity; diffuse cerebral dysfunction   - Repeat CTH shows stability of bleed and emerging signs of anoxic injury  - f/u neuron specific enolase  - Family meeting w/ neurology attending 12/22 discussed no meaningful recovery. Pending family decision on comfort care.     #SAH  #SDH  - Pt w/ acute SAH and SDH 2/2 trauma  - Was not initially seen in outside scan at [facility] likely performed too early for radiologic evidence  - Neurology following,  - NSGY: no intervention  - Repeat CTH shows stability of bleeds   - Maintain BP control     #C-spine trauma  Pt w/ fall at home presented w/ c-collar.   -Per [facility] radiology report no evidence of cervical fracture  -Trauma surgery cleared pt of Cspine collar    #Myoclonic jerking  - vEEG: evidence of mycolonic seizures  - Started keppra load    CARDIAC  #VT arrest  - Pt s/p VT arrest w/ unknown downtime. Pt reportedly received shock x2. No prior cardiac hx per family  - Check TTE   - Check cardiac enzymes, BNP  - Monitor for arrhythmias   - Pt not asa/plavix loaded 2/2 substernal hematoma noted on CT chest at [facility]  - Unlikely to be a candidate for [facility] at this time given mental status     #HTN  - Maintain BP control iso brain bleed  - Amlodipine; coreg  - PRN IVP labetalol for elevated SBP  - SBP goal<160    RESPIRATORY   #Intubated  Pt intubated s/p cardiac arrest  - On full vent support: RR 16 /  / PEEP 5 / FiO2 40  - Monitor ABGs    #Oropharynx bleed  - Pt w/ blood in oropharynx requiring frequent suctioning.   - ENT consulted s/p packing     RENAL   Pt presented w/ SCr 1.16, likely near baseline  - trend Cr   - montior I&Os    GI  - Diet: NPO for now pending NGT placement when able     ENDO:  #Hypothyroidism  - A1c=5.5.   - monitor glucose    HEME/ONC  #Substernal hematoma  - Pt w/ substernal hematoma noted on imaging at [facility] from trauma/cpr  - Repeat CT chest to monitor hematoma  - A/C: scd for dvt    ID   #Sepsis  - Pt w/ leukocytosis w/ fevers possibly 2/2  infection  - U/A grossly positive. F/u BCx, UCx  - Pt had crash lines placed at [facility]. Possible source of infection. MRSA negative 12/20  - Sputum Cx + for klebsiella  - c/w CTX; f/u sensitivities      LINES/PPX  - peripherals in tact  - LIJ triple lumen: 12/20  - GOC: Full   78M w/ PMH HTN HLD presents as transfer from [facility] s/p VT arrest s/p shock x2 w/ unknown downtime. Pt found to have trauma to head w/ superficial scalp bleeding and oral mucosal bleeding.       NEURO  #Intubated  Encephalopathy post arrest   - Intubated w/o sedation s/p cardiac arrest w/ unknown down time. Reported to be 20minutes but can be longer given ROSC achieved right before arrival to the hospital   - CTH at [facility] w/o intracranial bleed/pathology. CTH maybe too soon to show evidence of anoxic injury  - Pt remains w/o purposeful movement.   - Neuro consult placed for prognostication  - VEEG in place - no epileptiform activity; diffuse cerebral dysfunction   - Repeat CTH shows stability of bleed and emerging signs of anoxic injury  - f/u neuron specific enolase  - Family meeting w/ neurology attending 12/22 discussed no meaningful recovery. Pending family decision on comfort care.     #SAH  #SDH  - Pt w/ acute SAH and SDH 2/2 trauma  - Was not initially seen in outside scan at [facility] likely performed too early for radiologic evidence  - Neurology following,  - NSGY: no intervention  - Repeat CTH shows stability of bleeds   - Maintain BP control     #C-spine trauma  Pt w/ fall at home presented w/ c-collar.   -Per [facility] radiology report no evidence of cervical fracture  -Trauma surgery cleared pt of Cspine collar    #Myoclonic jerking  - vEEG: evidence of mycolonic seizures  - Started keppra load    CARDIAC  #VT arrest  - Pt s/p VT arrest w/ unknown downtime. Pt reportedly received shock x2. No prior cardiac hx per family  - Check TTE   - Check cardiac enzymes, BNP  - Monitor for arrhythmias   - Pt not asa/plavix loaded 2/2 substernal hematoma noted on CT chest at [facility]  - Unlikely to be a candidate for [facility] at this time given mental status     #HTN  - Maintain BP control iso brain bleed  - Amlodipine; coreg  - PRN IVP labetalol for elevated SBP  - SBP goal<160    RESPIRATORY   #Intubated  Pt intubated s/p cardiac arrest  - On full vent support: RR 16 /  / PEEP 5 / FiO2 40  - Monitor ABGs    #Oropharynx bleed  - Pt w/ blood in oropharynx requiring frequent suctioning.   - ENT consulted s/p packing     RENAL   Pt presented w/ SCr 1.16, likely near baseline  - trend Cr   - montior I&Os    GI  - Diet: NPO for now pending NGT placement when able     ENDO:  #Hypothyroidism  - A1c=5.5.   - monitor glucose    HEME/ONC  #Substernal hematoma  - Pt w/ substernal hematoma noted on imaging at [facility] from trauma/cpr  - Repeat CT chest to monitor hematoma  - A/C: scd for dvt    ID   #Sepsis  - Pt w/ leukocytosis w/ fevers possibly 2/2  infection  - U/A grossly positive. F/u BCx, UCx  - Pt had crash lines placed at [facility]. Possible source of infection. MRSA negative 12/20  - Sputum Cx + for klebsiella  - c/w CTX; f/u sensitivities      LINES/PPX  - peripherals in tact  - LIJ triple lumen: 12/20  - GOC: Full   78M w/ PMH HTN HLD presents as transfer from [facility] s/p VT arrest s/p shock x2 w/ unknown downtime. Pt found to have trauma to head w/ superficial scalp bleeding and oral mucosal bleeding.       NEURO  #Intubated  Encephalopathy post arrest   - Intubated w/o sedation s/p cardiac arrest w/ unknown down time. Reported to be 20minutes but can be longer given ROSC achieved right before arrival to the hospital   - CTH at [facility] w/o intracranial bleed/pathology. CTH maybe too soon to show evidence of anoxic injury  - Pt remains w/o purposeful movement.   - Neuro consult placed for prognostication  - VEEG in place - no epileptiform activity; diffuse cerebral dysfunction   - Repeat CTH shows stability of bleed and emerging signs of anoxic injury  - f/u neuron specific enolase  - Family meeting w/ neurology attending 12/22 discussed no meaningful recovery. Pending family decision on comfort care.     #SAH  #SDH  - Pt w/ acute SAH and SDH 2/2 trauma  - Was not initially seen in outside scan at [facility] likely performed too early for radiologic evidence  - Neurology following,  - NSGY: no intervention  - Repeat CTH shows stability of bleeds   - Maintain BP control     #C-spine trauma  Pt w/ fall at home presented w/ c-collar.   -Per [facility] radiology report no evidence of cervical fracture  -Trauma surgery cleared pt of Cspine collar    #Myoclonic jerking  - vEEG: evidence of mycolonic seizures  - Started keppra load    CARDIAC  #VT arrest  - Pt s/p VT arrest w/ unknown downtime. Pt reportedly received shock x2. No prior cardiac hx per family  - Check TTE   - Check cardiac enzymes, BNP  - Monitor for arrhythmias   - Pt not asa/plavix loaded 2/2 substernal hematoma noted on CT chest at [facility]  - Unlikely to be a candidate for [facility] at this time given mental status     #HTN  - Maintain BP control iso brain bleed  - Amlodipine; coreg  - PRN IVP labetalol for elevated SBP  - SBP goal<160    RESPIRATORY   #Intubated  Pt intubated s/p cardiac arrest  - On full vent support: RR 16 /  / PEEP 5 / FiO2 40  - Monitor ABGs    #Oropharynx bleed  - Pt w/ blood in oropharynx requiring frequent suctioning.   - ENT consulted s/p packing     RENAL LONA  Pt presented w/ SCr 1.16, likely near baseline  - trend Cr   - montior I&Os    GI  - Diet: enteral feeds     ENDO:  #Hypothyroidism  - A1c=5.5.   - monitor glucose    HEME/ONC  #Substernal hematoma  - Pt w/ substernal hematoma noted on imaging at [facility] from trauma/cpr  - Repeat CT chest to monitor hematoma  - A/C: scd for dvt    ID   #Sepsis  - Pt w/ leukocytosis w/ fevers possibly 2/2  infection  - U/A grossly positive. F/u BCx, UCx  - Pt had crash lines placed at [facility]. Possible source of infection. MRSA negative 12/20  - Sputum Cx + for klebsiella  - c/w CTX; f/u sensitivities      LINES/PPX  - peripherals in tact  - LIJ triple lumen: 12/20  - GOC: Full   78M w/ PMH HTN HLD presents as transfer from [facility] s/p VT arrest s/p shock x2 w/ unknown downtime. Pt found to have trauma to head w/ superficial scalp bleeding and oral mucosal bleeding.       NEURO  #Intubated  Encephalopathy post arrest   - Intubated w/o sedation s/p cardiac arrest w/ unknown down time. Reported to be 20minutes but can be longer given ROSC achieved right before arrival to the hospital   - CTH at [facility] w/o intracranial bleed/pathology. CTH maybe too soon to show evidence of anoxic injury  - Pt remains w/o purposeful movement.   - Neuro consult placed for prognostication  - VEEG in place - no epileptiform activity; diffuse cerebral dysfunction   - Repeat CTH shows stability of bleed and emerging signs of anoxic injury  - f/u neuron specific enolase  - Family meeting w/ neurology attending 12/22 discussed no meaningful recovery. Pending family decision on comfort care.     #SAH  #SDH  - Pt w/ acute SAH and SDH 2/2 trauma  - Was not initially seen in outside scan at [facility] likely performed too early for radiologic evidence  - Neurology following,  - NSGY: no intervention  - Repeat CTH shows stability of bleeds   - Maintain BP control     #C-spine trauma  Pt w/ fall at home presented w/ c-collar.   -Per [facility] radiology report no evidence of cervical fracture  -Trauma surgery cleared pt of Cspine collar    #Myoclonic jerking  - vEEG: evidence of mycolonic seizures  - Started keppra load    CARDIAC  #VT arrest  - Pt s/p VT arrest w/ unknown downtime. Pt reportedly received shock x2. No prior cardiac hx per family  - Check TTE   - Check cardiac enzymes, BNP  - Monitor for arrhythmias   - Pt not asa/plavix loaded 2/2 substernal hematoma noted on CT chest at [facility]  - Unlikely to be a candidate for [facility] at this time given mental status     #HTN  - Maintain BP control iso brain bleed  - Amlodipine; coreg  - PRN IVP labetalol for elevated SBP  - SBP goal<160    RESPIRATORY   #Intubated  Pt intubated s/p cardiac arrest  - On full vent support: RR 16 /  / PEEP 5 / FiO2 40  - Monitor ABGs    #Oropharynx bleed  - Pt w/ blood in oropharynx requiring frequent suctioning.   - ENT consulted s/p packing     RENAL LONA  Pt presented w/ SCr 1.16, likely near baseline  - trend Cr   - montior I&Os    GI  - Diet: enteral feeds     ENDO:  #Hypothyroidism  - A1c=5.5.   - monitor glucose    HEME/ONC  #Substernal hematoma  - Pt w/ substernal hematoma noted on imaging at [facility] from trauma/cpr  - Repeat CT chest to monitor hematoma  - A/C: scd for dvt    ID   #Sepsis  - Pt w/ leukocytosis w/ fevers possibly 2/2  infection  - U/A grossly positive. F/u BCx, UCx  - Pt had crash lines placed at [facility]. Possible source of infection. MRSA negative 12/20  - Sputum Cx + for klebsiella  - c/w CTX; f/u sensitivities      LINES/PPX  - peripherals in tact  - LIJ triple lumen: 12/20  - GOC: Full

## 2023-12-22 NOTE — PROGRESS NOTE ADULT - ATTENDING COMMENTS
79 yo man with HTN, HLD s/p out of hospital cardiac arrest     unresponsive, no response to pain, + gag and pupils respond to light, neuro is following, repeat CTH showed ICB, neurosurg consulted, repeat CTH showed stability but signs of anoxia   For now will continue to hold DAPT and heparin given ICH   possible afib at OSH per EKG, also SVT overnight ?AT thus he may need AC eventually, but at this point given CHT findings certainly on hold     Acute respiratory failure requiring mechanical ventilation due to cardiac arrest, SBT as tolerated    cont enteral feeds   non oliguric LONA likely due to shock post arrest ?ATN, cont to monitor, CVP 8       H/H low but acceptable  SQH DVT ppx given stable bleed, will need repeat CTH    febrile, started on vanc and zosyn, check DVT studies.  Possibly due to central fevers   Sugars overall controlled ,cont coverage    LIJ TLC 12/20 dc     Long discussion today with family (son, daughter, wife and granddaughter) and neuro attending regarding prognostication, poor current clinical and neuro status.  Family verbalized understanding and wished to think about their options which I provided to them including withdrawal of care, long term facilities for pt's on vent.  They wished to think about their options and discuss together.

## 2023-12-23 LAB
-  STAPHYLOCOCCUS EPIDERMIDIS, METHICILLIN RESISTANT: SIGNIFICANT CHANGE UP
-  STAPHYLOCOCCUS EPIDERMIDIS, METHICILLIN RESISTANT: SIGNIFICANT CHANGE UP
ALBUMIN SERPL ELPH-MCNC: 3.1 G/DL — LOW (ref 3.3–5)
ALBUMIN SERPL ELPH-MCNC: 3.1 G/DL — LOW (ref 3.3–5)
ALP SERPL-CCNC: 41 U/L — SIGNIFICANT CHANGE UP (ref 40–120)
ALP SERPL-CCNC: 41 U/L — SIGNIFICANT CHANGE UP (ref 40–120)
ALT FLD-CCNC: 80 U/L — HIGH (ref 10–45)
ALT FLD-CCNC: 80 U/L — HIGH (ref 10–45)
ANION GAP SERPL CALC-SCNC: 8 MMOL/L — SIGNIFICANT CHANGE UP (ref 5–17)
ANION GAP SERPL CALC-SCNC: 8 MMOL/L — SIGNIFICANT CHANGE UP (ref 5–17)
APTT BLD: 26.4 SEC — SIGNIFICANT CHANGE UP (ref 24.5–35.6)
APTT BLD: 26.4 SEC — SIGNIFICANT CHANGE UP (ref 24.5–35.6)
AST SERPL-CCNC: 58 U/L — HIGH (ref 10–40)
AST SERPL-CCNC: 58 U/L — HIGH (ref 10–40)
BASOPHILS # BLD AUTO: 0 K/UL — SIGNIFICANT CHANGE UP (ref 0–0.2)
BASOPHILS # BLD AUTO: 0 K/UL — SIGNIFICANT CHANGE UP (ref 0–0.2)
BASOPHILS NFR BLD AUTO: 0 % — SIGNIFICANT CHANGE UP (ref 0–2)
BASOPHILS NFR BLD AUTO: 0 % — SIGNIFICANT CHANGE UP (ref 0–2)
BILIRUB SERPL-MCNC: 0.8 MG/DL — SIGNIFICANT CHANGE UP (ref 0.2–1.2)
BILIRUB SERPL-MCNC: 0.8 MG/DL — SIGNIFICANT CHANGE UP (ref 0.2–1.2)
BLD GP AB SCN SERPL QL: NEGATIVE — SIGNIFICANT CHANGE UP
BLD GP AB SCN SERPL QL: NEGATIVE — SIGNIFICANT CHANGE UP
BUN SERPL-MCNC: 60 MG/DL — HIGH (ref 7–23)
BUN SERPL-MCNC: 60 MG/DL — HIGH (ref 7–23)
CALCIUM SERPL-MCNC: 7.8 MG/DL — LOW (ref 8.4–10.5)
CALCIUM SERPL-MCNC: 7.8 MG/DL — LOW (ref 8.4–10.5)
CHLORIDE SERPL-SCNC: 111 MMOL/L — HIGH (ref 96–108)
CHLORIDE SERPL-SCNC: 111 MMOL/L — HIGH (ref 96–108)
CLOSURE TME COLL+EPINEP BLD: 106 K/UL — LOW (ref 150–400)
CLOSURE TME COLL+EPINEP BLD: 106 K/UL — LOW (ref 150–400)
CO2 SERPL-SCNC: 26 MMOL/L — SIGNIFICANT CHANGE UP (ref 22–31)
CO2 SERPL-SCNC: 26 MMOL/L — SIGNIFICANT CHANGE UP (ref 22–31)
CREAT SERPL-MCNC: 1.98 MG/DL — HIGH (ref 0.5–1.3)
CREAT SERPL-MCNC: 1.98 MG/DL — HIGH (ref 0.5–1.3)
EGFR: 34 ML/MIN/1.73M2 — LOW
EGFR: 34 ML/MIN/1.73M2 — LOW
EOSINOPHIL # BLD AUTO: 0 K/UL — SIGNIFICANT CHANGE UP (ref 0–0.5)
EOSINOPHIL # BLD AUTO: 0 K/UL — SIGNIFICANT CHANGE UP (ref 0–0.5)
EOSINOPHIL NFR BLD AUTO: 0 % — SIGNIFICANT CHANGE UP (ref 0–6)
EOSINOPHIL NFR BLD AUTO: 0 % — SIGNIFICANT CHANGE UP (ref 0–6)
GAS PNL BLDA: SIGNIFICANT CHANGE UP
GAS PNL BLDA: SIGNIFICANT CHANGE UP
GLUCOSE SERPL-MCNC: 176 MG/DL — HIGH (ref 70–99)
GLUCOSE SERPL-MCNC: 176 MG/DL — HIGH (ref 70–99)
GRAM STN FLD: ABNORMAL
GRAM STN FLD: ABNORMAL
HCT VFR BLD CALC: 21.7 % — LOW (ref 39–50)
HCT VFR BLD CALC: 21.7 % — LOW (ref 39–50)
HGB BLD-MCNC: 7.3 G/DL — LOW (ref 13–17)
HGB BLD-MCNC: 7.3 G/DL — LOW (ref 13–17)
IMM GRANULOCYTES NFR BLD AUTO: 0.2 % — SIGNIFICANT CHANGE UP (ref 0–0.9)
IMM GRANULOCYTES NFR BLD AUTO: 0.2 % — SIGNIFICANT CHANGE UP (ref 0–0.9)
INR BLD: 1.09 RATIO — SIGNIFICANT CHANGE UP (ref 0.85–1.18)
INR BLD: 1.09 RATIO — SIGNIFICANT CHANGE UP (ref 0.85–1.18)
LYMPHOCYTES # BLD AUTO: 0.93 K/UL — LOW (ref 1–3.3)
LYMPHOCYTES # BLD AUTO: 0.93 K/UL — LOW (ref 1–3.3)
LYMPHOCYTES # BLD AUTO: 15.6 % — SIGNIFICANT CHANGE UP (ref 13–44)
LYMPHOCYTES # BLD AUTO: 15.6 % — SIGNIFICANT CHANGE UP (ref 13–44)
MAGNESIUM SERPL-MCNC: 2.5 MG/DL — SIGNIFICANT CHANGE UP (ref 1.6–2.6)
MAGNESIUM SERPL-MCNC: 2.5 MG/DL — SIGNIFICANT CHANGE UP (ref 1.6–2.6)
MCHC RBC-ENTMCNC: 31.5 PG — SIGNIFICANT CHANGE UP (ref 27–34)
MCHC RBC-ENTMCNC: 31.5 PG — SIGNIFICANT CHANGE UP (ref 27–34)
MCHC RBC-ENTMCNC: 33.6 GM/DL — SIGNIFICANT CHANGE UP (ref 32–36)
MCHC RBC-ENTMCNC: 33.6 GM/DL — SIGNIFICANT CHANGE UP (ref 32–36)
MCV RBC AUTO: 93.5 FL — SIGNIFICANT CHANGE UP (ref 80–100)
MCV RBC AUTO: 93.5 FL — SIGNIFICANT CHANGE UP (ref 80–100)
METHOD TYPE: SIGNIFICANT CHANGE UP
METHOD TYPE: SIGNIFICANT CHANGE UP
MONOCYTES # BLD AUTO: 0.68 K/UL — SIGNIFICANT CHANGE UP (ref 0–0.9)
MONOCYTES # BLD AUTO: 0.68 K/UL — SIGNIFICANT CHANGE UP (ref 0–0.9)
MONOCYTES NFR BLD AUTO: 11.4 % — SIGNIFICANT CHANGE UP (ref 2–14)
MONOCYTES NFR BLD AUTO: 11.4 % — SIGNIFICANT CHANGE UP (ref 2–14)
NEUTROPHILS # BLD AUTO: 4.36 K/UL — SIGNIFICANT CHANGE UP (ref 1.8–7.4)
NEUTROPHILS # BLD AUTO: 4.36 K/UL — SIGNIFICANT CHANGE UP (ref 1.8–7.4)
NEUTROPHILS NFR BLD AUTO: 72.8 % — SIGNIFICANT CHANGE UP (ref 43–77)
NEUTROPHILS NFR BLD AUTO: 72.8 % — SIGNIFICANT CHANGE UP (ref 43–77)
NRBC # BLD: 0 /100 WBCS — SIGNIFICANT CHANGE UP (ref 0–0)
NRBC # BLD: 0 /100 WBCS — SIGNIFICANT CHANGE UP (ref 0–0)
PHOSPHATE SERPL-MCNC: 3.3 MG/DL — SIGNIFICANT CHANGE UP (ref 2.5–4.5)
PHOSPHATE SERPL-MCNC: 3.3 MG/DL — SIGNIFICANT CHANGE UP (ref 2.5–4.5)
PLATELET # BLD AUTO: 100 K/UL — LOW (ref 150–400)
PLATELET # BLD AUTO: 100 K/UL — LOW (ref 150–400)
POTASSIUM SERPL-MCNC: 3.6 MMOL/L — SIGNIFICANT CHANGE UP (ref 3.5–5.3)
POTASSIUM SERPL-MCNC: 3.6 MMOL/L — SIGNIFICANT CHANGE UP (ref 3.5–5.3)
POTASSIUM SERPL-SCNC: 3.6 MMOL/L — SIGNIFICANT CHANGE UP (ref 3.5–5.3)
POTASSIUM SERPL-SCNC: 3.6 MMOL/L — SIGNIFICANT CHANGE UP (ref 3.5–5.3)
PROT SERPL-MCNC: 5.2 G/DL — LOW (ref 6–8.3)
PROT SERPL-MCNC: 5.2 G/DL — LOW (ref 6–8.3)
PROTHROM AB SERPL-ACNC: 11.4 SEC — SIGNIFICANT CHANGE UP (ref 9.5–13)
PROTHROM AB SERPL-ACNC: 11.4 SEC — SIGNIFICANT CHANGE UP (ref 9.5–13)
RBC # BLD: 2.32 M/UL — LOW (ref 4.2–5.8)
RBC # BLD: 2.32 M/UL — LOW (ref 4.2–5.8)
RBC # FLD: 13.2 % — SIGNIFICANT CHANGE UP (ref 10.3–14.5)
RBC # FLD: 13.2 % — SIGNIFICANT CHANGE UP (ref 10.3–14.5)
RH IG SCN BLD-IMP: POSITIVE — SIGNIFICANT CHANGE UP
RH IG SCN BLD-IMP: POSITIVE — SIGNIFICANT CHANGE UP
SODIUM SERPL-SCNC: 145 MMOL/L — SIGNIFICANT CHANGE UP (ref 135–145)
SODIUM SERPL-SCNC: 145 MMOL/L — SIGNIFICANT CHANGE UP (ref 135–145)
SPECIMEN SOURCE: SIGNIFICANT CHANGE UP
SPECIMEN SOURCE: SIGNIFICANT CHANGE UP
WBC # BLD: 5.98 K/UL — SIGNIFICANT CHANGE UP (ref 3.8–10.5)
WBC # BLD: 5.98 K/UL — SIGNIFICANT CHANGE UP (ref 3.8–10.5)
WBC # FLD AUTO: 5.98 K/UL — SIGNIFICANT CHANGE UP (ref 3.8–10.5)
WBC # FLD AUTO: 5.98 K/UL — SIGNIFICANT CHANGE UP (ref 3.8–10.5)

## 2023-12-23 PROCEDURE — 99291 CRITICAL CARE FIRST HOUR: CPT

## 2023-12-23 PROCEDURE — 99292 CRITICAL CARE ADDL 30 MIN: CPT

## 2023-12-23 PROCEDURE — 71045 X-RAY EXAM CHEST 1 VIEW: CPT | Mod: 26

## 2023-12-23 PROCEDURE — 95718 EEG PHYS/QHP 2-12 HR W/VEEG: CPT

## 2023-12-23 PROCEDURE — 93010 ELECTROCARDIOGRAM REPORT: CPT

## 2023-12-23 PROCEDURE — 70450 CT HEAD/BRAIN W/O DYE: CPT | Mod: 26

## 2023-12-23 RX ORDER — ACETAMINOPHEN 500 MG
1000 TABLET ORAL ONCE
Refills: 0 | Status: COMPLETED | OUTPATIENT
Start: 2023-12-23 | End: 2023-12-23

## 2023-12-23 RX ORDER — FENTANYL CITRATE 50 UG/ML
50 INJECTION INTRAVENOUS ONCE
Refills: 0 | Status: DISCONTINUED | OUTPATIENT
Start: 2023-12-23 | End: 2023-12-24

## 2023-12-23 RX ORDER — POTASSIUM CHLORIDE 20 MEQ
40 PACKET (EA) ORAL ONCE
Refills: 0 | Status: COMPLETED | OUTPATIENT
Start: 2023-12-23 | End: 2023-12-23

## 2023-12-23 RX ADMIN — Medication 1000 MILLIGRAM(S): at 14:35

## 2023-12-23 RX ADMIN — CARVEDILOL PHOSPHATE 3.12 MILLIGRAM(S): 80 CAPSULE, EXTENDED RELEASE ORAL at 18:09

## 2023-12-23 RX ADMIN — Medication 1: at 17:39

## 2023-12-23 RX ADMIN — DEXMEDETOMIDINE HYDROCHLORIDE IN 0.9% SODIUM CHLORIDE 4.68 MICROGRAM(S)/KG/HR: 4 INJECTION INTRAVENOUS at 06:36

## 2023-12-23 RX ADMIN — Medication 2 MILLIGRAM(S): at 22:13

## 2023-12-23 RX ADMIN — Medication 1: at 01:12

## 2023-12-23 RX ADMIN — Medication 1 DROP(S): at 05:45

## 2023-12-23 RX ADMIN — Medication 1 APPLICATION(S): at 13:28

## 2023-12-23 RX ADMIN — PANTOPRAZOLE SODIUM 40 MILLIGRAM(S): 20 TABLET, DELAYED RELEASE ORAL at 11:46

## 2023-12-23 RX ADMIN — Medication 250 MILLIGRAM(S): at 17:38

## 2023-12-23 RX ADMIN — PIPERACILLIN AND TAZOBACTAM 25 GRAM(S): 4; .5 INJECTION, POWDER, LYOPHILIZED, FOR SOLUTION INTRAVENOUS at 22:16

## 2023-12-23 RX ADMIN — Medication 1: at 05:56

## 2023-12-23 RX ADMIN — Medication 1: at 11:47

## 2023-12-23 RX ADMIN — DEXMEDETOMIDINE HYDROCHLORIDE IN 0.9% SODIUM CHLORIDE 4.68 MICROGRAM(S)/KG/HR: 4 INJECTION INTRAVENOUS at 22:15

## 2023-12-23 RX ADMIN — PIPERACILLIN AND TAZOBACTAM 25 GRAM(S): 4; .5 INJECTION, POWDER, LYOPHILIZED, FOR SOLUTION INTRAVENOUS at 00:48

## 2023-12-23 RX ADMIN — DEXMEDETOMIDINE HYDROCHLORIDE IN 0.9% SODIUM CHLORIDE 4.68 MICROGRAM(S)/KG/HR: 4 INJECTION INTRAVENOUS at 08:15

## 2023-12-23 RX ADMIN — Medication 1 DROP(S): at 17:38

## 2023-12-23 RX ADMIN — PIPERACILLIN AND TAZOBACTAM 25 GRAM(S): 4; .5 INJECTION, POWDER, LYOPHILIZED, FOR SOLUTION INTRAVENOUS at 06:52

## 2023-12-23 RX ADMIN — CHLORHEXIDINE GLUCONATE 1 APPLICATION(S): 213 SOLUTION TOPICAL at 22:23

## 2023-12-23 RX ADMIN — CHLORHEXIDINE GLUCONATE 15 MILLILITER(S): 213 SOLUTION TOPICAL at 17:39

## 2023-12-23 RX ADMIN — LEVETIRACETAM 400 MILLIGRAM(S): 250 TABLET, FILM COATED ORAL at 17:38

## 2023-12-23 RX ADMIN — CARVEDILOL PHOSPHATE 3.12 MILLIGRAM(S): 80 CAPSULE, EXTENDED RELEASE ORAL at 05:42

## 2023-12-23 RX ADMIN — Medication 40 MILLIEQUIVALENT(S): at 02:45

## 2023-12-23 RX ADMIN — PIPERACILLIN AND TAZOBACTAM 25 GRAM(S): 4; .5 INJECTION, POWDER, LYOPHILIZED, FOR SOLUTION INTRAVENOUS at 13:28

## 2023-12-23 RX ADMIN — AMLODIPINE BESYLATE 5 MILLIGRAM(S): 2.5 TABLET ORAL at 05:41

## 2023-12-23 RX ADMIN — HEPARIN SODIUM 5000 UNIT(S): 5000 INJECTION INTRAVENOUS; SUBCUTANEOUS at 13:28

## 2023-12-23 RX ADMIN — Medication 250 MILLIGRAM(S): at 05:37

## 2023-12-23 RX ADMIN — HEPARIN SODIUM 5000 UNIT(S): 5000 INJECTION INTRAVENOUS; SUBCUTANEOUS at 05:41

## 2023-12-23 RX ADMIN — Medication 400 MILLIGRAM(S): at 14:11

## 2023-12-23 RX ADMIN — Medication 1 APPLICATION(S): at 05:46

## 2023-12-23 RX ADMIN — LEVETIRACETAM 400 MILLIGRAM(S): 250 TABLET, FILM COATED ORAL at 06:37

## 2023-12-23 RX ADMIN — CHLORHEXIDINE GLUCONATE 15 MILLILITER(S): 213 SOLUTION TOPICAL at 05:46

## 2023-12-23 RX ADMIN — Medication 1 APPLICATION(S): at 22:16

## 2023-12-23 RX ADMIN — HEPARIN SODIUM 5000 UNIT(S): 5000 INJECTION INTRAVENOUS; SUBCUTANEOUS at 22:16

## 2023-12-23 NOTE — PROGRESS NOTE ADULT - SUBJECTIVE AND OBJECTIVE BOX
ASHLEY PRITCHARD  MRN-[id number]  Patient is a 78y old  Male who presents with a chief complaint of Cardiac Arrest (23 Dec 2023 06:45)    HPI:  78M w/ PMH HTN HLD presents as transfer from [facility] s/p cardiac arrest. Per reports patient had a witness fall and arrest at home. EMS performed CPR en route to hospital was noted to be in VTach and shocked x2. ROSC was obtained just prior to arrival at [facility]. Pt was intubated placed on levo gtt. On arrival pt noted to have lacerated to R forehead. Pt had CT scans that were negative for intracranial hemorrhage, C-spine fracture, facial fractures. A R frontal hematoma was noted.     On arrival patient w/ dried blood on scalp. Also noted to have bleeding from oral mucosa w/ clots requiring suctioning. Pt otherwise off sedation and non-responsive. Per EMS report pt did receive some sedation and paralytics at [facility].     Per family patient has been feeling 'off' but managing his day to day and did not see a physician. Pt prescribed klonopin by outpatient provider and family believes pt may have been taking more than prescribed. On day of arrest, pt was initially asymptomatic carrying out his routine. He went to the bathroom, wife heard a loud thud and found patient in a pool of blood prompting call to EMS. Pt has not followed with any cardiologist. Has not had any syncope or other events preceding this.  (19 Dec 2023 14:54)      Hospital Course:    24 HOUR EVENTS:    REVIEW OF SYSTEMS:    CONSTITUTIONAL: No weakness, fevers or chills  EYES/ENT: No visual changes;  No vertigo or throat pain   NECK: No pain or stiffness  RESPIRATORY: No cough, wheezing, hemoptysis; No shortness of breath  CARDIOVASCULAR: No chest pain or palpitations  GASTROINTESTINAL: No abdominal or epigastric pain. No nausea, vomiting, or hematemesis; No diarrhea or constipation. No melena or hematochezia.  GENITOURINARY: No dysuria, frequency or hematuria  NEUROLOGICAL: No numbness or weakness  SKIN: No itching, rashes      ICU Vital Signs Last 24 Hrs  T(C): 37.6 (23 Dec 2023 16:00), Max: 38.6 (23 Dec 2023 07:00)  T(F): 99.7 (23 Dec 2023 16:00), Max: 101.5 (23 Dec 2023 07:00)  HR: 65 (23 Dec 2023 18:15) (56 - 79)  BP: --  BP(mean): --  ABP: 182/55 (23 Dec 2023 18:15) (100/50 - 182/55)  ABP(mean): 85 (23 Dec 2023 18:15) (50 - 85)  RR: 25 (23 Dec 2023 18:15) (16 - 37)  SpO2: 100% (23 Dec 2023 18:15) (99% - 100%)    O2 Parameters below as of 23 Dec 2023 18:00  Patient On (Oxygen Delivery Method): ventilator    O2 Concentration (%): 40      Mode: AC/ CMV (Assist Control/ Continuous Mandatory Ventilation), RR (machine): 14, TV (machine): 500, FiO2: 40, PEEP: 5, ITime: 1  CVP(mm Hg): 9 (12-22-23 @ 09:30) (7 - 18)  CO: --  CI: --  PA: --  PA(mean): --  PA(direct): --  PCWP: --  LA: --  RA: --  SVR: --  SVRI: --  PVR: --  PVRI: --  I&O's Summary    22 Dec 2023 07:01  -  23 Dec 2023 07:00  --------------------------------------------------------  IN: 1955.2 mL / OUT: 1425 mL / NET: 530.2 mL    23 Dec 2023 07:01  -  23 Dec 2023 19:35  --------------------------------------------------------  IN: 934.6 mL / OUT: 190 mL / NET: 744.6 mL        CAPILLARY BLOOD GLUCOSE    CAPILLARY BLOOD GLUCOSE      POCT Blood Glucose.: 200 mg/dL (23 Dec 2023 17:36)      PHYSICAL EXAM:  GENERAL: Pt intubated, not responsive to sternal rub or noxious stimuli  . Notable trauma to head.   HEAD:  +traumatic head, Normocephalic  EYES: . Periorbital edema/erythma on R. conjunctiva and sclera clear. Pupils reactive to light   ENMT: ET tube in place. Dry mucous membranes. Notable blood in oral cavity   NECK: Supple  HEART: Regular rate and rhythm; No murmurs, rubs, or gallops  RESPIRATORY: Mechanical breath sounds b/l. No notable crackles, wheezes  ABDOMEN: Soft, Nontender, Nondistended  NEUROLOGY: A&Ox0. Pupils reactive. Non-responsive to sternal stimuli off sedation, + gag   EXTREMITIES:  1+ Peripheral Pulses, No clubbing, cyanosis, or edema  SKIN: warm, dry, normal color    ============================I/O===========================   I&O's Detail    22 Dec 2023 07:01  -  23 Dec 2023 07:00  --------------------------------------------------------  IN:    Dexmedetomidine: 75.2 mL    Enteral Tube Flush: 120 mL    IV PiggyBack: 600 mL    IV PiggyBack: 350 mL    Jevity 1.2: 810 mL  Total IN: 1955.2 mL    OUT:    Indwelling Catheter - Urethral (mL): 1425 mL  Total OUT: 1425 mL    Total NET: 530.2 mL      23 Dec 2023 07:01  -  23 Dec 2023 19:35  --------------------------------------------------------  IN:    Dexmedetomidine: 44.6 mL    IV PiggyBack: 450 mL    Jevity 1.2: 440 mL  Total IN: 934.6 mL    OUT:    Indwelling Catheter - Urethral (mL): 190 mL    Voided (mL): 0 mL  Total OUT: 190 mL    Total NET: 744.6 mL        ============================ LABS =========================                        7.3    5.98  )-----------( 100      ( 23 Dec 2023 01:23 )             21.7     12-23    145  |  111<H>  |  60<H>  ----------------------------<  176<H>  3.6   |  26  |  1.98<H>    Ca    7.8<L>      23 Dec 2023 01:23  Phos  3.3     12-23  Mg     2.5     12-23    TPro  5.2<L>  /  Alb  3.1<L>  /  TBili  0.8  /  DBili  x   /  AST  58<H>  /  ALT  80<H>  /  AlkPhos  41  12-23                LIVER FUNCTIONS - ( 23 Dec 2023 01:23 )  Alb: 3.1 g/dL / Pro: 5.2 g/dL / ALK PHOS: 41 U/L / ALT: 80 U/L / AST: 58 U/L / GGT: x           PT/INR - ( 23 Dec 2023 01:23 )   PT: 11.4 sec;   INR: 1.09 ratio         PTT - ( 23 Dec 2023 01:23 )  PTT:26.4 sec  ABG - ( 23 Dec 2023 00:50 )  pH, Arterial: 7.47  pH, Blood: x     /  pCO2: 36    /  pO2: 167   / HCO3: 26    / Base Excess: 2.4   /  SaO2: 98.3              Blood Gas Arterial, Lactate: 1.2 mmol/L (12-23-23 @ 00:50)  Blood Gas Arterial, Lactate: 1.4 mmol/L (12-22-23 @ 00:43)  Blood Gas Arterial, Lactate: 1.0 mmol/L (12-21-23 @ 00:53)    Urinalysis Basic - ( 23 Dec 2023 01:23 )    Color: x / Appearance: x / SG: x / pH: x  Gluc: 176 mg/dL / Ketone: x  / Bili: x / Urobili: x   Blood: x / Protein: x / Nitrite: x   Leuk Esterase: x / RBC: x / WBC x   Sq Epi: x / Non Sq Epi: x / Bacteria: x      ======================Micro/Rad/Cardio=================  Telemtry: Reviewed   EKG: Reviewed  CXR: Reviewed  Culture: Reviewed   Echo:   Cath:   ======================================================  PAST MEDICAL & SURGICAL HISTORY:  HTN (hypertension)      HLD (hyperlipidemia)        ====================ASSESSMENT ==============  78M w/ PMH HTN HLD presents as transfer from [facility] s/p VT arrest s/p shock x2 w/ unknown downtime. Pt found to have trauma to head w/ superficial scalp bleeding and oral mucosal bleeding.     Plan:  ====================== NEUROLOGY=====================  #Intubated  Encephalopathy post arrest   - Intubated w/o sedation s/p cardiac arrest w/ unknown down time. Reported to be 20minutes but can be longer given ROSC achieved right before arrival to the hospital   - CTH at [facility] w/o intracranial bleed/pathology. CTH maybe too soon to show evidence of anoxic injury  - Pt remains w/o purposeful movement.   - Neuro consulted for prognostication  - VEEG in place - no epileptiform activity; diffuse cerebral dysfunction   - Repeat CTH shows stability of bleed and emerging signs of anoxic injury  - f/u neuron specific enolase  - Family meeting w/ neurology attending 12/22 discussed no meaningful recovery. Pending family decision on comfort care.     #SAH  #SDH  - Pt w/ acute SAH and SDH 2/2 trauma  - Was not initially seen in outside scan at [facility] likely performed too early for radiologic evidence  - Neurology following,  - NSGY: no intervention  - Repeat CTH shows stability of bleeds   - Maintain BP control   - Started on HSQ for DVT ppx, will obtain repeat CT scan 12/23/23  - Would need full AC for DVTs. Will discuss w/ NSGY    #C-spine trauma  Pt w/ fall at home presented w/ c-collar.   -Per [facility] radiology report no evidence of cervical fracture  -Trauma surgery cleared pt of Cspine collar    #Myoclonic jerking  - vEEG: evidence of mycolonic seizures  - Started keppra load  - d/c VEEG    ==================== RESPIRATORY======================  #Intubated  Pt intubated s/p cardiac arrest  - On full vent support: RR 14 /  / PEEP 5 / FiO2 40  - Monitor ABGs    #Oropharynx bleed  - Pt w/ blood in oropharynx requiring frequent suctioning.   - ENT consulted s/p packing     ====================CARDIOVASCULAR==================  #VT arrest  - Pt s/p VT arrest w/ unknown downtime. Pt reportedly received shock x2. No prior cardiac hx per family  - Check TTE   - Check cardiac enzymes, BNP  - Monitor for arrhythmias   - Pt not asa/plavix loaded 2/2 substernal hematoma noted on CT chest at [facility]  - Unlikely to be a candidate for [facility] at this time given mental status     #HTN  - Maintain BP control iso brain bleed  - Amlodipine; coreg  - PRN IVP labetalol for elevated SBP  - SBP goal<160    ===================HEMATOLOGIC/ONC ===================  #Substernal hematoma  - Pt w/ substernal hematoma noted on imaging at [facility] from trauma/cpr  - Repeat CT chest to monitor hematoma  - A/C: HSQ for dvt    #Thrombocytopenia  - Pt w/ downtrending PLT. Started downtrending prior to HSQ initiation.   - Likely iso critical illness   - Will send blue top PLT w/ next CBC    ===================== RENAL =========================  Pt presented w/ SCr 1.16, likely near baseline  - trend Cr   - montior I&Os    ==================== GASTROINTESTINAL===================  - Diet: enteral feeds   - Protonix    =======================    ENDOCRINE  =====================  #Hypothyroidism  - A1c=5.5.   - monitor glucose    ========================INFECTIOUS DISEASE================  #Sepsis  - Pt w/ leukocytosis w/ fevers possibly 2/2  infection  - Pt w/ persistent fevers likely multifactorial 2/2 DVTs, underlying infection, possible ?neurogenic component  - U/A grossly positive. F/u BCx, UCx  - Pt had crash lines placed at [facility]. Possible source of infection. MRSA negative 12/20  - Sputum Cx + for klebsiella  - 12/22 BCx 1 bottle w/ MRSE. Possible contaminant. Check repeat BCx  - Broaden to zosyn, vanc      Patient requires continuous monitoring with bedside rhythm monitoring, pulse ox monitoring, and intermittent blood gas analysis. Care plan discussed with ICU care team. Patient remained critical and at risk for life threatening decompensation.  Patient seen, examined and plan discussed with CCU team during rounds.     I have personally provided ____ minutes of critical care time excluding time spent on separate procedures, in addition to initial critical care time provided by the CICU Attending, Dr. Mejia.     By signing my name below, I, Apple Forman, attest that this documentation has been prepared under the direction and in the presence of JEANNIE Teixeira.  Electronically signed: Emilia Villar, 12-23-23 @ 19:35    I, JEANNIE Teixeira, personally performed the services described in this documentation. all medical record entries made by the rezaibe were at my direction and in my presence. I have reviewed the chart and agree that the record reflects my personal performance and is accurate and complete  Electronically signed: JEANNIE Teixeira.       ASHLEY PRITCHARD  MRN-[id number]  Patient is a 78y old  Male who presents with a chief complaint of Cardiac Arrest (23 Dec 2023 06:45)    HPI:  78M w/ PMH HTN HLD presents as transfer from [facility] s/p cardiac arrest. Per reports patient had a witness fall and arrest at home. EMS performed CPR en route to hospital was noted to be in VTach and shocked x2. ROSC was obtained just prior to arrival at [facility]. Pt was intubated placed on levo gtt. On arrival pt noted to have lacerated to R forehead. Pt had CT scans that were negative for intracranial hemorrhage, C-spine fracture, facial fractures. A R frontal hematoma was noted.     On arrival patient w/ dried blood on scalp. Also noted to have bleeding from oral mucosa w/ clots requiring suctioning. Pt otherwise off sedation and non-responsive. Per EMS report pt did receive some sedation and paralytics at [facility].     Per family patient has been feeling 'off' but managing his day to day and did not see a physician. Pt prescribed klonopin by outpatient provider and family believes pt may have been taking more than prescribed. On day of arrest, pt was initially asymptomatic carrying out his routine. He went to the bathroom, wife heard a loud thud and found patient in a pool of blood prompting call to EMS. Pt has not followed with any cardiologist. Has not had any syncope or other events preceding this.  (19 Dec 2023 14:54)      Hospital Course:    24 HOUR EVENTS:    REVIEW OF SYSTEMS:    CONSTITUTIONAL: No weakness, fevers or chills  EYES/ENT: No visual changes;  No vertigo or throat pain   NECK: No pain or stiffness  RESPIRATORY: No cough, wheezing, hemoptysis; No shortness of breath  CARDIOVASCULAR: No chest pain or palpitations  GASTROINTESTINAL: No abdominal or epigastric pain. No nausea, vomiting, or hematemesis; No diarrhea or constipation. No melena or hematochezia.  GENITOURINARY: No dysuria, frequency or hematuria  NEUROLOGICAL: No numbness or weakness  SKIN: No itching, rashes      ICU Vital Signs Last 24 Hrs  T(C): 37.6 (23 Dec 2023 16:00), Max: 38.6 (23 Dec 2023 07:00)  T(F): 99.7 (23 Dec 2023 16:00), Max: 101.5 (23 Dec 2023 07:00)  HR: 65 (23 Dec 2023 18:15) (56 - 79)  BP: --  BP(mean): --  ABP: 182/55 (23 Dec 2023 18:15) (100/50 - 182/55)  ABP(mean): 85 (23 Dec 2023 18:15) (50 - 85)  RR: 25 (23 Dec 2023 18:15) (16 - 37)  SpO2: 100% (23 Dec 2023 18:15) (99% - 100%)    O2 Parameters below as of 23 Dec 2023 18:00  Patient On (Oxygen Delivery Method): ventilator    O2 Concentration (%): 40      Mode: AC/ CMV (Assist Control/ Continuous Mandatory Ventilation), RR (machine): 14, TV (machine): 500, FiO2: 40, PEEP: 5, ITime: 1  CVP(mm Hg): 9 (12-22-23 @ 09:30) (7 - 18)  CO: --  CI: --  PA: --  PA(mean): --  PA(direct): --  PCWP: --  LA: --  RA: --  SVR: --  SVRI: --  PVR: --  PVRI: --  I&O's Summary    22 Dec 2023 07:01  -  23 Dec 2023 07:00  --------------------------------------------------------  IN: 1955.2 mL / OUT: 1425 mL / NET: 530.2 mL    23 Dec 2023 07:01  -  23 Dec 2023 19:35  --------------------------------------------------------  IN: 934.6 mL / OUT: 190 mL / NET: 744.6 mL        CAPILLARY BLOOD GLUCOSE    CAPILLARY BLOOD GLUCOSE      POCT Blood Glucose.: 200 mg/dL (23 Dec 2023 17:36)      PHYSICAL EXAM:  GENERAL: Pt intubated, not responsive to sternal rub or noxious stimuli  . Notable trauma to head.   HEAD:  +traumatic head, Normocephalic  EYES: . Periorbital edema/erythma on R. conjunctiva and sclera clear. Pupils reactive to light   ENMT: ET tube in place. Dry mucous membranes. Notable blood in oral cavity   NECK: Supple  HEART: Regular rate and rhythm; No murmurs, rubs, or gallops  RESPIRATORY: Mechanical breath sounds b/l. No notable crackles, wheezes  ABDOMEN: Soft, Nontender, Nondistended  NEUROLOGY: A&Ox0. Pupils reactive. Non-responsive to sternal stimuli off sedation, + gag   EXTREMITIES:  1+ Peripheral Pulses, No clubbing, cyanosis, or edema  SKIN: warm, dry, normal color    ============================I/O===========================   I&O's Detail    22 Dec 2023 07:01  -  23 Dec 2023 07:00  --------------------------------------------------------  IN:    Dexmedetomidine: 75.2 mL    Enteral Tube Flush: 120 mL    IV PiggyBack: 600 mL    IV PiggyBack: 350 mL    Jevity 1.2: 810 mL  Total IN: 1955.2 mL    OUT:    Indwelling Catheter - Urethral (mL): 1425 mL  Total OUT: 1425 mL    Total NET: 530.2 mL      23 Dec 2023 07:01  -  23 Dec 2023 19:35  --------------------------------------------------------  IN:    Dexmedetomidine: 44.6 mL    IV PiggyBack: 450 mL    Jevity 1.2: 440 mL  Total IN: 934.6 mL    OUT:    Indwelling Catheter - Urethral (mL): 190 mL    Voided (mL): 0 mL  Total OUT: 190 mL    Total NET: 744.6 mL        ============================ LABS =========================                        7.3    5.98  )-----------( 100      ( 23 Dec 2023 01:23 )             21.7     12-23    145  |  111<H>  |  60<H>  ----------------------------<  176<H>  3.6   |  26  |  1.98<H>    Ca    7.8<L>      23 Dec 2023 01:23  Phos  3.3     12-23  Mg     2.5     12-23    TPro  5.2<L>  /  Alb  3.1<L>  /  TBili  0.8  /  DBili  x   /  AST  58<H>  /  ALT  80<H>  /  AlkPhos  41  12-23                LIVER FUNCTIONS - ( 23 Dec 2023 01:23 )  Alb: 3.1 g/dL / Pro: 5.2 g/dL / ALK PHOS: 41 U/L / ALT: 80 U/L / AST: 58 U/L / GGT: x           PT/INR - ( 23 Dec 2023 01:23 )   PT: 11.4 sec;   INR: 1.09 ratio         PTT - ( 23 Dec 2023 01:23 )  PTT:26.4 sec  ABG - ( 23 Dec 2023 00:50 )  pH, Arterial: 7.47  pH, Blood: x     /  pCO2: 36    /  pO2: 167   / HCO3: 26    / Base Excess: 2.4   /  SaO2: 98.3              Blood Gas Arterial, Lactate: 1.2 mmol/L (12-23-23 @ 00:50)  Blood Gas Arterial, Lactate: 1.4 mmol/L (12-22-23 @ 00:43)  Blood Gas Arterial, Lactate: 1.0 mmol/L (12-21-23 @ 00:53)    Urinalysis Basic - ( 23 Dec 2023 01:23 )    Color: x / Appearance: x / SG: x / pH: x  Gluc: 176 mg/dL / Ketone: x  / Bili: x / Urobili: x   Blood: x / Protein: x / Nitrite: x   Leuk Esterase: x / RBC: x / WBC x   Sq Epi: x / Non Sq Epi: x / Bacteria: x      ======================Micro/Rad/Cardio=================  Telemtry: Reviewed   EKG: Reviewed  CXR: Reviewed  Culture: Reviewed   Echo:   Cath:   ======================================================  PAST MEDICAL & SURGICAL HISTORY:  HTN (hypertension)      HLD (hyperlipidemia)        ====================ASSESSMENT ==============  78M w/ PMH HTN HLD presents as transfer from [facility] s/p VT arrest s/p shock x2 w/ unknown downtime. Pt found to have trauma to head w/ superficial scalp bleeding and oral mucosal bleeding.     Plan:  ====================== NEUROLOGY=====================  #Intubated  Encephalopathy post arrest   - Intubated w/o sedation s/p cardiac arrest w/ unknown down time. Reported to be 20minutes but can be longer given ROSC achieved right before arrival to the hospital   - CTH at [facility] w/o intracranial bleed/pathology. CTH maybe too soon to show evidence of anoxic injury  - Pt remains w/o purposeful movement.   - Neuro consulted for prognostication  - VEEG in place - no epileptiform activity; diffuse cerebral dysfunction   - Repeat CTH shows stability of bleed and emerging signs of anoxic injury  - f/u neuron specific enolase  - Family meeting w/ neurology attending 12/22 discussed no meaningful recovery. Pending family decision on comfort care.     #SAH  #SDH  - Pt w/ acute SAH and SDH 2/2 trauma  - Was not initially seen in outside scan at [facility] likely performed too early for radiologic evidence  - Neurology following,  - NSGY: no intervention  - Repeat CTH shows stability of bleeds   - Maintain BP control   - Started on HSQ for DVT ppx, will obtain repeat CT scan 12/23/23  - Would need full AC for DVTs. Will discuss w/ NSGY    #C-spine trauma  Pt w/ fall at home presented w/ c-collar.   -Per [facility] radiology report no evidence of cervical fracture  -Trauma surgery cleared pt of Cspine collar    #Myoclonic jerking  - vEEG: evidence of mycolonic seizures  - Started keppra load  - d/c VEEG    ==================== RESPIRATORY======================  #Intubated  Pt intubated s/p cardiac arrest  - On full vent support: RR 14 /  / PEEP 5 / FiO2 40  - Monitor ABGs    #Oropharynx bleed  - Pt w/ blood in oropharynx requiring frequent suctioning.   - ENT consulted s/p packing     ====================CARDIOVASCULAR==================  #VT arrest  - Pt s/p VT arrest w/ unknown downtime. Pt reportedly received shock x2. No prior cardiac hx per family  - Check TTE   - Check cardiac enzymes, BNP  - Monitor for arrhythmias   - Pt not asa/plavix loaded 2/2 substernal hematoma noted on CT chest at [facility]  - Unlikely to be a candidate for [facility] at this time given mental status     #HTN  - Maintain BP control iso brain bleed  - Amlodipine; coreg  - PRN IVP labetalol for elevated SBP  - SBP goal<160    ===================HEMATOLOGIC/ONC ===================  #Substernal hematoma  - Pt w/ substernal hematoma noted on imaging at [facility] from trauma/cpr  - Repeat CT chest to monitor hematoma  - A/C: HSQ for dvt    #Thrombocytopenia  - Pt w/ downtrending PLT. Started downtrending prior to HSQ initiation.   - Likely iso critical illness   - Will send blue top PLT w/ next CBC    ===================== RENAL =========================  Pt presented w/ SCr 1.16, likely near baseline  - trend Cr   - montior I&Os    ==================== GASTROINTESTINAL===================  - Diet: enteral feeds   - Protonix    =======================    ENDOCRINE  =====================  #Hypothyroidism  - A1c=5.5.   - monitor glucose    ========================INFECTIOUS DISEASE================  #Sepsis  - Pt w/ leukocytosis w/ fevers possibly 2/2  infection  - Pt w/ persistent fevers likely multifactorial 2/2 DVTs, underlying infection, possible ?neurogenic component  - U/A grossly positive. F/u BCx, UCx  - Pt had crash lines placed at [facility]. Possible source of infection. MRSA negative 12/20  - Sputum Cx + for klebsiella  - 12/22 BCx 1 bottle w/ MRSE. Possible contaminant. Check repeat BCx  - Broaden to zosyn, vanc      Patient requires continuous monitoring with bedside rhythm monitoring, pulse ox monitoring, and intermittent blood gas analysis. Care plan discussed with ICU care team. Patient remained critical and at risk for life threatening decompensation.  Patient seen, examined and plan discussed with CCU team during rounds.     I have personally provided ____ minutes of critical care time excluding time spent on separate procedures, in addition to initial critical care time provided by the CICU Attending, Dr. Mejia.     By signing my name below, I, Apple Forman, attest that this documentation has been prepared under the direction and in the presence of JEANNIE Teixeira.  Electronically signed: Emilia Villar, 12-23-23 @ 19:35    I, JEANNIE Teixeira, personally performed the services described in this documentation. all medical record entries made by the rezaibe were at my direction and in my presence. I have reviewed the chart and agree that the record reflects my personal performance and is accurate and complete  Electronically signed: JEANNIE Teixeira.       ASHLEY PRITCHARD  MRN-[id number]  Patient is a 78y old  Male who presents with a chief complaint of Cardiac Arrest (23 Dec 2023 06:45)    HPI:  78M w/ PMH HTN HLD presents as transfer from [facility] s/p cardiac arrest. Per reports patient had a witness fall and arrest at home. EMS performed CPR en route to hospital was noted to be in VTach and shocked x2. ROSC was obtained just prior to arrival at [facility]. Pt was intubated placed on levo gtt. On arrival pt noted to have lacerated to R forehead. Pt had CT scans that were negative for intracranial hemorrhage, C-spine fracture, facial fractures. A R frontal hematoma was noted.     On arrival patient w/ dried blood on scalp. Also noted to have bleeding from oral mucosa w/ clots requiring suctioning. Pt otherwise off sedation and non-responsive. Per EMS report pt did receive some sedation and paralytics at [facility].     Per family patient has been feeling 'off' but managing his day to day and did not see a physician. Pt prescribed klonopin by outpatient provider and family believes pt may have been taking more than prescribed. On day of arrest, pt was initially asymptomatic carrying out his routine. He went to the bathroom, wife heard a loud thud and found patient in a pool of blood prompting call to EMS. Pt has not followed with any cardiologist. Has not had any syncope or other events preceding this.  (19 Dec 2023 14:54)    24 HOUR EVENTS:  - Repeat infx workup sent after 1 Blood culture positive for MRSE  - persistently febrile    REVIEW OF SYSTEMS: Unable to obtain 2/2 mental status    ICU Vital Signs Last 24 Hrs  T(C): 37.6 (23 Dec 2023 16:00), Max: 38.6 (23 Dec 2023 07:00)  T(F): 99.7 (23 Dec 2023 16:00), Max: 101.5 (23 Dec 2023 07:00)  HR: 65 (23 Dec 2023 18:15) (56 - 79)  BP: --  BP(mean): --  ABP: 182/55 (23 Dec 2023 18:15) (100/50 - 182/55)  ABP(mean): 85 (23 Dec 2023 18:15) (50 - 85)  RR: 25 (23 Dec 2023 18:15) (16 - 37)  SpO2: 100% (23 Dec 2023 18:15) (99% - 100%)    O2 Parameters below as of 23 Dec 2023 18:00  Patient On (Oxygen Delivery Method): ventilator    O2 Concentration (%): 40      Mode: AC/ CMV (Assist Control/ Continuous Mandatory Ventilation), RR (machine): 14, TV (machine): 500, FiO2: 40, PEEP: 5, ITime: 1  CVP(mm Hg): 9 (12-22-23 @ 09:30) (7 - 18)  CO: --  CI: --  PA: --  PA(mean): --  PA(direct): --  PCWP: --  LA: --  RA: --  SVR: --  SVRI: --  PVR: --  PVRI: --  I&O's Summary    22 Dec 2023 07:01  -  23 Dec 2023 07:00  --------------------------------------------------------  IN: 1955.2 mL / OUT: 1425 mL / NET: 530.2 mL    23 Dec 2023 07:01  -  23 Dec 2023 19:35  --------------------------------------------------------  IN: 934.6 mL / OUT: 190 mL / NET: 744.6 mL        CAPILLARY BLOOD GLUCOSE    CAPILLARY BLOOD GLUCOSE      POCT Blood Glucose.: 200 mg/dL (23 Dec 2023 17:36)      PHYSICAL EXAM:  GENERAL: Pt intubated, not responsive to sternal rub or noxious stimuli  . Notable trauma to head.   HEAD:  +traumatic head, Normocephalic  EYES: . Periorbital edema/erythma on R. conjunctiva and sclera clear. Pupils reactive to light   ENMT: ET tube in place. Dry mucous membranes. Notable blood in oral cavity   NECK: Supple  HEART: Regular rate and rhythm; No murmurs, rubs, or gallops  RESPIRATORY: Mechanical breath sounds b/l. No notable crackles, wheezes  ABDOMEN: Soft, Nontender, Nondistended  NEUROLOGY: A&Ox0. Pupils reactive. Non-responsive to sternal stimuli off sedation, + gag   EXTREMITIES:  1+ Peripheral Pulses, No clubbing, cyanosis, or edema  SKIN: warm, dry, normal color    ============================I/O===========================   I&O's Detail    22 Dec 2023 07:01  -  23 Dec 2023 07:00  --------------------------------------------------------  IN:    Dexmedetomidine: 75.2 mL    Enteral Tube Flush: 120 mL    IV PiggyBack: 600 mL    IV PiggyBack: 350 mL    Jevity 1.2: 810 mL  Total IN: 1955.2 mL    OUT:    Indwelling Catheter - Urethral (mL): 1425 mL  Total OUT: 1425 mL    Total NET: 530.2 mL      23 Dec 2023 07:01  -  23 Dec 2023 19:35  --------------------------------------------------------  IN:    Dexmedetomidine: 44.6 mL    IV PiggyBack: 450 mL    Jevity 1.2: 440 mL  Total IN: 934.6 mL    OUT:    Indwelling Catheter - Urethral (mL): 190 mL    Voided (mL): 0 mL  Total OUT: 190 mL    Total NET: 744.6 mL        ============================ LABS =========================                        7.3    5.98  )-----------( 100      ( 23 Dec 2023 01:23 )             21.7     12-23    145  |  111<H>  |  60<H>  ----------------------------<  176<H>  3.6   |  26  |  1.98<H>    Ca    7.8<L>      23 Dec 2023 01:23  Phos  3.3     12-23  Mg     2.5     12-23    TPro  5.2<L>  /  Alb  3.1<L>  /  TBili  0.8  /  DBili  x   /  AST  58<H>  /  ALT  80<H>  /  AlkPhos  41  12-23                LIVER FUNCTIONS - ( 23 Dec 2023 01:23 )  Alb: 3.1 g/dL / Pro: 5.2 g/dL / ALK PHOS: 41 U/L / ALT: 80 U/L / AST: 58 U/L / GGT: x           PT/INR - ( 23 Dec 2023 01:23 )   PT: 11.4 sec;   INR: 1.09 ratio         PTT - ( 23 Dec 2023 01:23 )  PTT:26.4 sec  ABG - ( 23 Dec 2023 00:50 )  pH, Arterial: 7.47  pH, Blood: x     /  pCO2: 36    /  pO2: 167   / HCO3: 26    / Base Excess: 2.4   /  SaO2: 98.3              Blood Gas Arterial, Lactate: 1.2 mmol/L (12-23-23 @ 00:50)  Blood Gas Arterial, Lactate: 1.4 mmol/L (12-22-23 @ 00:43)  Blood Gas Arterial, Lactate: 1.0 mmol/L (12-21-23 @ 00:53)    Urinalysis Basic - ( 23 Dec 2023 01:23 )    Color: x / Appearance: x / SG: x / pH: x  Gluc: 176 mg/dL / Ketone: x  / Bili: x / Urobili: x   Blood: x / Protein: x / Nitrite: x   Leuk Esterase: x / RBC: x / WBC x   Sq Epi: x / Non Sq Epi: x / Bacteria: x      ======================Micro/Rad/Cardio=================  Telemtry: Reviewed   EKG: Reviewed  CXR: Reviewed  Culture: Reviewed   Echo:   Cath:   ======================================================  PAST MEDICAL & SURGICAL HISTORY:  HTN (hypertension)      HLD (hyperlipidemia)        ====================ASSESSMENT ==============  78M w/ PMH HTN HLD presents as transfer from [facility] s/p VT arrest s/p shock x2 w/ unknown downtime. Pt found to have trauma to head w/ superficial scalp bleeding and oral mucosal bleeding.     Plan:  ====================== NEUROLOGY=====================  #Intubated  Encephalopathy post arrest   - Intubated w/o sedation s/p cardiac arrest w/ unknown down time. Reported to be 20minutes but can be longer given ROSC achieved right before arrival to the hospital   - CTH at [facility] w/o intracranial bleed/pathology. CTH maybe too soon to show evidence of anoxic injury  - Pt remains w/o purposeful movement.   - Neuro consulted for prognostication  - VEEG in place - no epileptiform activity; diffuse cerebral dysfunction   - Repeat CTH shows stability of bleed and emerging signs of anoxic injury  - f/u neuron specific enolase  - Family meeting w/ neurology attending 12/22 discussed no meaningful recovery. Pending family decision on comfort care.     #SAH  #SDH  - Pt w/ acute SAH and SDH 2/2 trauma  - Was not initially seen in outside scan at [facility] likely performed too early for radiologic evidence  - Neurology following,  - NSGY: no intervention  - Repeat CTH shows stability of bleeds   - Maintain BP control   - Started on HSQ for DVT ppx, will obtain repeat CT scan 12/23/23  - Would need full AC for DVTs. Will discuss w/ NSGY    #C-spine trauma  Pt w/ fall at home presented w/ c-collar.   -Per [facility] radiology report no evidence of cervical fracture  -Trauma surgery cleared pt of Cspine collar    #Myoclonic jerking  - vEEG: evidence of mycolonic seizures  - Started keppra load  - d/c VEEG    ==================== RESPIRATORY======================  #Intubated  Pt intubated s/p cardiac arrest  - On full vent support: RR 14 /  / PEEP 5 / FiO2 40  - Monitor ABGs    #Oropharynx bleed  - Pt w/ blood in oropharynx requiring frequent suctioning.   - ENT consulted s/p packing     ====================CARDIOVASCULAR==================  #VT arrest  - Pt s/p VT arrest w/ unknown downtime. Pt reportedly received shock x2. No prior cardiac hx per family  - Check TTE   - Check cardiac enzymes, BNP  - Monitor for arrhythmias   - Pt not asa/plavix loaded 2/2 substernal hematoma noted on CT chest at [facility]  - Unlikely to be a candidate for LHC at this time given mental status     #HTN  - Maintain BP control iso brain bleed  - Amlodipine; coreg  - PRN IVP labetalol for elevated SBP  - SBP goal<160    ===================HEMATOLOGIC/ONC ===================  #Substernal hematoma  - Pt w/ substernal hematoma noted on imaging at [facility] from trauma/cpr  - Repeat CT chest to monitor hematoma  - A/C: HSQ for dvt    #Thrombocytopenia  - Pt w/ downtrending PLT. Started downtrending prior to HSQ initiation.   - Likely iso critical illness   - Will send blue top PLT w/ next CBC    ===================== RENAL =========================  Pt presented w/ SCr 1.16, likely near baseline  - trend Cr   - montior I&Os    ==================== GASTROINTESTINAL===================  - Diet: enteral feeds   - Protonix    =======================    ENDOCRINE  =====================  #Hypothyroidism  - A1c=5.5.   - monitor glucose    ========================INFECTIOUS DISEASE================  #Sepsis  - Pt w/ leukocytosis w/ fevers possibly 2/2  infection  - Pt w/ persistent fevers likely multifactorial 2/2 DVTs, underlying infection, possible ?neurogenic component  - U/A grossly positive. F/u BCx, UCx  - Pt had crash lines placed at [facility]. Possible source of infection. MRSA negative 12/20  - Sputum Cx + for klebsiella  - 12/22 BCx 1 bottle w/ MRSE. Possible contaminant. Check repeat BCx  - Broaden to zosyn, vanc      Patient requires continuous monitoring with bedside rhythm monitoring, pulse ox monitoring, and intermittent blood gas analysis. Care plan discussed with ICU care team. Patient remained critical and at risk for life threatening decompensation.  Patient seen, examined and plan discussed with CCU team during rounds.     I have personally provided ____ minutes of critical care time excluding time spent on separate procedures, in addition to initial critical care time provided by the CICU Attending, Dr. Mejia.     By signing my name below, I, Apple Forman, attest that this documentation has been prepared under the direction and in the presence of JEANNIE Teixeira.  Electronically signed: Emilia Villar, 12-23-23 @ 19:35    I, JEANNIE Teixeira, personally performed the services described in this documentation. all medical record entries made by the scribe were at my direction and in my presence. I have reviewed the chart and agree that the record reflects my personal performance and is accurate and complete  Electronically signed: JEANNIE Teixeira.       ASHLEY PRITCHARD  MRN-[id number]  Patient is a 78y old  Male who presents with a chief complaint of Cardiac Arrest (23 Dec 2023 06:45)    HPI:  78M w/ PMH HTN HLD presents as transfer from [facility] s/p cardiac arrest. Per reports patient had a witness fall and arrest at home. EMS performed CPR en route to hospital was noted to be in VTach and shocked x2. ROSC was obtained just prior to arrival at [facility]. Pt was intubated placed on levo gtt. On arrival pt noted to have lacerated to R forehead. Pt had CT scans that were negative for intracranial hemorrhage, C-spine fracture, facial fractures. A R frontal hematoma was noted.     On arrival patient w/ dried blood on scalp. Also noted to have bleeding from oral mucosa w/ clots requiring suctioning. Pt otherwise off sedation and non-responsive. Per EMS report pt did receive some sedation and paralytics at [facility].     Per family patient has been feeling 'off' but managing his day to day and did not see a physician. Pt prescribed klonopin by outpatient provider and family believes pt may have been taking more than prescribed. On day of arrest, pt was initially asymptomatic carrying out his routine. He went to the bathroom, wife heard a loud thud and found patient in a pool of blood prompting call to EMS. Pt has not followed with any cardiologist. Has not had any syncope or other events preceding this.  (19 Dec 2023 14:54)    24 HOUR EVENTS:  - Repeat infx workup sent after 1 Blood culture positive for MRSE  - persistently febrile    REVIEW OF SYSTEMS: Unable to obtain 2/2 mental status    ICU Vital Signs Last 24 Hrs  T(C): 37.6 (23 Dec 2023 16:00), Max: 38.6 (23 Dec 2023 07:00)  T(F): 99.7 (23 Dec 2023 16:00), Max: 101.5 (23 Dec 2023 07:00)  HR: 65 (23 Dec 2023 18:15) (56 - 79)  BP: --  BP(mean): --  ABP: 182/55 (23 Dec 2023 18:15) (100/50 - 182/55)  ABP(mean): 85 (23 Dec 2023 18:15) (50 - 85)  RR: 25 (23 Dec 2023 18:15) (16 - 37)  SpO2: 100% (23 Dec 2023 18:15) (99% - 100%)    O2 Parameters below as of 23 Dec 2023 18:00  Patient On (Oxygen Delivery Method): ventilator    O2 Concentration (%): 40      Mode: AC/ CMV (Assist Control/ Continuous Mandatory Ventilation), RR (machine): 14, TV (machine): 500, FiO2: 40, PEEP: 5, ITime: 1  CVP(mm Hg): 9 (12-22-23 @ 09:30) (7 - 18)  CO: --  CI: --  PA: --  PA(mean): --  PA(direct): --  PCWP: --  LA: --  RA: --  SVR: --  SVRI: --  PVR: --  PVRI: --  I&O's Summary    22 Dec 2023 07:01  -  23 Dec 2023 07:00  --------------------------------------------------------  IN: 1955.2 mL / OUT: 1425 mL / NET: 530.2 mL    23 Dec 2023 07:01  -  23 Dec 2023 19:35  --------------------------------------------------------  IN: 934.6 mL / OUT: 190 mL / NET: 744.6 mL        CAPILLARY BLOOD GLUCOSE    CAPILLARY BLOOD GLUCOSE      POCT Blood Glucose.: 200 mg/dL (23 Dec 2023 17:36)      PHYSICAL EXAM:  GENERAL: Pt intubated, not responsive to sternal rub or noxious stimuli  . Notable trauma to head.   HEAD:  +traumatic head, Normocephalic  EYES: . Periorbital edema/erythma on R. conjunctiva and sclera clear. Pupils reactive to light   ENMT: ET tube in place. Dry mucous membranes. Notable blood in oral cavity   NECK: Supple  HEART: Regular rate and rhythm; No murmurs, rubs, or gallops  RESPIRATORY: Mechanical breath sounds b/l. No notable crackles, wheezes  ABDOMEN: Soft, Nontender, Nondistended  NEUROLOGY: A&Ox0. Pupils reactive. Non-responsive to sternal stimuli off sedation, + gag   EXTREMITIES:  1+ Peripheral Pulses, No clubbing, cyanosis, or edema  SKIN: warm, dry, normal color    ============================I/O===========================   I&O's Detail    22 Dec 2023 07:01  -  23 Dec 2023 07:00  --------------------------------------------------------  IN:    Dexmedetomidine: 75.2 mL    Enteral Tube Flush: 120 mL    IV PiggyBack: 600 mL    IV PiggyBack: 350 mL    Jevity 1.2: 810 mL  Total IN: 1955.2 mL    OUT:    Indwelling Catheter - Urethral (mL): 1425 mL  Total OUT: 1425 mL    Total NET: 530.2 mL      23 Dec 2023 07:01  -  23 Dec 2023 19:35  --------------------------------------------------------  IN:    Dexmedetomidine: 44.6 mL    IV PiggyBack: 450 mL    Jevity 1.2: 440 mL  Total IN: 934.6 mL    OUT:    Indwelling Catheter - Urethral (mL): 190 mL    Voided (mL): 0 mL  Total OUT: 190 mL    Total NET: 744.6 mL        ============================ LABS =========================                        7.3    5.98  )-----------( 100      ( 23 Dec 2023 01:23 )             21.7     12-23    145  |  111<H>  |  60<H>  ----------------------------<  176<H>  3.6   |  26  |  1.98<H>    Ca    7.8<L>      23 Dec 2023 01:23  Phos  3.3     12-23  Mg     2.5     12-23    TPro  5.2<L>  /  Alb  3.1<L>  /  TBili  0.8  /  DBili  x   /  AST  58<H>  /  ALT  80<H>  /  AlkPhos  41  12-23                LIVER FUNCTIONS - ( 23 Dec 2023 01:23 )  Alb: 3.1 g/dL / Pro: 5.2 g/dL / ALK PHOS: 41 U/L / ALT: 80 U/L / AST: 58 U/L / GGT: x           PT/INR - ( 23 Dec 2023 01:23 )   PT: 11.4 sec;   INR: 1.09 ratio         PTT - ( 23 Dec 2023 01:23 )  PTT:26.4 sec  ABG - ( 23 Dec 2023 00:50 )  pH, Arterial: 7.47  pH, Blood: x     /  pCO2: 36    /  pO2: 167   / HCO3: 26    / Base Excess: 2.4   /  SaO2: 98.3              Blood Gas Arterial, Lactate: 1.2 mmol/L (12-23-23 @ 00:50)  Blood Gas Arterial, Lactate: 1.4 mmol/L (12-22-23 @ 00:43)  Blood Gas Arterial, Lactate: 1.0 mmol/L (12-21-23 @ 00:53)    Urinalysis Basic - ( 23 Dec 2023 01:23 )    Color: x / Appearance: x / SG: x / pH: x  Gluc: 176 mg/dL / Ketone: x  / Bili: x / Urobili: x   Blood: x / Protein: x / Nitrite: x   Leuk Esterase: x / RBC: x / WBC x   Sq Epi: x / Non Sq Epi: x / Bacteria: x      ======================Micro/Rad/Cardio=================  Telemtry: Reviewed   EKG: Reviewed  CXR: Reviewed  Culture: Reviewed   Echo:   Cath:   ======================================================  PAST MEDICAL & SURGICAL HISTORY:  HTN (hypertension)      HLD (hyperlipidemia)        ====================ASSESSMENT ==============  78M w/ PMH HTN HLD presents as transfer from [facility] s/p VT arrest s/p shock x2 w/ unknown downtime. Pt found to have trauma to head w/ superficial scalp bleeding and oral mucosal bleeding.     Plan:  ====================== NEUROLOGY=====================  #Intubated  Encephalopathy post arrest   - Intubated w/o sedation s/p cardiac arrest w/ unknown down time. Reported to be 20minutes but can be longer given ROSC achieved right before arrival to the hospital   - CTH at [facility] w/o intracranial bleed/pathology. CTH maybe too soon to show evidence of anoxic injury  - Pt remains w/o purposeful movement.   - Neuro consulted for prognostication  - VEEG in place - no epileptiform activity; diffuse cerebral dysfunction   - Repeat CTH shows stability of bleed and emerging signs of anoxic injury  - f/u neuron specific enolase  - Family meeting w/ neurology attending 12/22 discussed no meaningful recovery. Pending family decision on comfort care.     #SAH  #SDH  - Pt w/ acute SAH and SDH 2/2 trauma  - Was not initially seen in outside scan at [facility] likely performed too early for radiologic evidence  - Neurology following,  - NSGY: no intervention  - Repeat CTH shows stability of bleeds   - Maintain BP control   - Started on HSQ for DVT ppx, will obtain repeat CT scan 12/23/23  - Would need full AC for DVTs. Will discuss w/ NSGY    #C-spine trauma  Pt w/ fall at home presented w/ c-collar.   -Per [facility] radiology report no evidence of cervical fracture  -Trauma surgery cleared pt of Cspine collar    #Myoclonic jerking  - vEEG: evidence of mycolonic seizures  - Started keppra load  - d/c VEEG    ==================== RESPIRATORY======================  #Intubated  Pt intubated s/p cardiac arrest  - On full vent support: RR 14 /  / PEEP 5 / FiO2 40  - Monitor ABGs    #Oropharynx bleed  - Pt w/ blood in oropharynx requiring frequent suctioning.   - ENT consulted s/p packing     ====================CARDIOVASCULAR==================  #VT arrest  - Pt s/p VT arrest w/ unknown downtime. Pt reportedly received shock x2. No prior cardiac hx per family  - Check TTE   - Check cardiac enzymes, BNP  - Monitor for arrhythmias   - Pt not asa/plavix loaded 2/2 substernal hematoma noted on CT chest at [facility]  - Unlikely to be a candidate for LHC at this time given mental status     #HTN  - Maintain BP control iso brain bleed  - Amlodipine; coreg  - PRN IVP labetalol for elevated SBP  - SBP goal<160    ===================HEMATOLOGIC/ONC ===================  #Substernal hematoma  - Pt w/ substernal hematoma noted on imaging at [facility] from trauma/cpr  - Repeat CT chest to monitor hematoma  - A/C: HSQ for dvt    #Thrombocytopenia  - Pt w/ downtrending PLT. Started downtrending prior to HSQ initiation.   - Likely iso critical illness   - Will send blue top PLT w/ next CBC    ===================== RENAL =========================  Pt presented w/ SCr 1.16, likely near baseline  - trend Cr   - montior I&Os    ==================== GASTROINTESTINAL===================  - Diet: enteral feeds   - Protonix    =======================    ENDOCRINE  =====================  #Hypothyroidism  - A1c=5.5.   - monitor glucose    ========================INFECTIOUS DISEASE================  #Sepsis  - Pt w/ leukocytosis w/ fevers possibly 2/2  infection  - Pt w/ persistent fevers likely multifactorial 2/2 DVTs, underlying infection, possible ?neurogenic component  - U/A grossly positive. F/u BCx, UCx  - Pt had crash lines placed at [facility]. Possible source of infection. MRSA negative 12/20  - Sputum Cx + for klebsiella  - 12/22 BCx 1 bottle w/ MRSE. Possible contaminant. Check repeat BCx  - Broaden to zosyn, vanc      Patient requires continuous monitoring with bedside rhythm monitoring, pulse ox monitoring, and intermittent blood gas analysis. Care plan discussed with ICU care team. Patient remained critical and at risk for life threatening decompensation.  Patient seen, examined and plan discussed with CCU team during rounds.     I have personally provided ____ minutes of critical care time excluding time spent on separate procedures, in addition to initial critical care time provided by the CICU Attending, Dr. Mejia.     By signing my name below, I, Apple Forman, attest that this documentation has been prepared under the direction and in the presence of JEANNIE Teixeira.  Electronically signed: Emilia Villar, 12-23-23 @ 19:35    I, JEANNIE Teixeira, personally performed the services described in this documentation. all medical record entries made by the scribe were at my direction and in my presence. I have reviewed the chart and agree that the record reflects my personal performance and is accurate and complete  Electronically signed: JEANNIE Teixeira.       ASHLEY PRITCHARD  MRN-[id number]  Patient is a 78y old  Male who presents with a chief complaint of Cardiac Arrest (23 Dec 2023 06:45)    HPI:  78M w/ PMH HTN HLD presents as transfer from [facility] s/p cardiac arrest. Per reports patient had a witness fall and arrest at home. EMS performed CPR en route to hospital was noted to be in VTach and shocked x2. ROSC was obtained just prior to arrival at [facility]. Pt was intubated placed on levo gtt. On arrival pt noted to have lacerated to R forehead. Pt had CT scans that were negative for intracranial hemorrhage, C-spine fracture, facial fractures. A R frontal hematoma was noted.     On arrival patient w/ dried blood on scalp. Also noted to have bleeding from oral mucosa w/ clots requiring suctioning. Pt otherwise off sedation and non-responsive. Per EMS report pt did receive some sedation and paralytics at [facility].     Per family patient has been feeling 'off' but managing his day to day and did not see a physician. Pt prescribed klonopin by outpatient provider and family believes pt may have been taking more than prescribed. On day of arrest, pt was initially asymptomatic carrying out his routine. He went to the bathroom, wife heard a loud thud and found patient in a pool of blood prompting call to EMS. Pt has not followed with any cardiologist. Has not had any syncope or other events preceding this.  (19 Dec 2023 14:54)    24 HOUR EVENTS:  - Repeat infx workup sent after 1 Blood culture positive for MRSE  - persistently febrile    REVIEW OF SYSTEMS: Unable to obtain 2/2 mental status    ICU Vital Signs Last 24 Hrs  T(C): 37.6 (23 Dec 2023 16:00), Max: 38.6 (23 Dec 2023 07:00)  T(F): 99.7 (23 Dec 2023 16:00), Max: 101.5 (23 Dec 2023 07:00)  HR: 65 (23 Dec 2023 18:15) (56 - 79)  BP: --  BP(mean): --  ABP: 182/55 (23 Dec 2023 18:15) (100/50 - 182/55)  ABP(mean): 85 (23 Dec 2023 18:15) (50 - 85)  RR: 25 (23 Dec 2023 18:15) (16 - 37)  SpO2: 100% (23 Dec 2023 18:15) (99% - 100%)    O2 Parameters below as of 23 Dec 2023 18:00  Patient On (Oxygen Delivery Method): ventilator    O2 Concentration (%): 40      Mode: AC/ CMV (Assist Control/ Continuous Mandatory Ventilation), RR (machine): 14, TV (machine): 500, FiO2: 40, PEEP: 5, ITime: 1  CVP(mm Hg): 9 (12-22-23 @ 09:30) (7 - 18)  CO: --  CI: --  PA: --  PA(mean): --  PA(direct): --  PCWP: --  LA: --  RA: --  SVR: --  SVRI: --  PVR: --  PVRI: --  I&O's Summary    22 Dec 2023 07:01  -  23 Dec 2023 07:00  --------------------------------------------------------  IN: 1955.2 mL / OUT: 1425 mL / NET: 530.2 mL    23 Dec 2023 07:01  -  23 Dec 2023 19:35  --------------------------------------------------------  IN: 934.6 mL / OUT: 190 mL / NET: 744.6 mL        CAPILLARY BLOOD GLUCOSE    CAPILLARY BLOOD GLUCOSE      POCT Blood Glucose.: 200 mg/dL (23 Dec 2023 17:36)      PHYSICAL EXAM:  GENERAL: Pt intubated, not responsive to sternal rub or noxious stimuli  . Notable trauma to head.   HEAD:  +traumatic head, Normocephalic  EYES: . Periorbital edema/erythma on R. conjunctiva and sclera clear. Pupils reactive to light   ENMT: ET tube in place. Dry mucous membranes. Notable blood in oral cavity   NECK: Supple  HEART: Regular rate and rhythm; No murmurs, rubs, or gallops  RESPIRATORY: Mechanical breath sounds b/l. No notable crackles, wheezes  ABDOMEN: Soft, Nontender, Nondistended  NEUROLOGY: A&Ox0. Pupils reactive. Non-responsive to sternal stimuli off sedation, + gag   EXTREMITIES:  1+ Peripheral Pulses, No clubbing, cyanosis, or edema  SKIN: warm, dry, normal color    ============================I/O===========================   I&O's Detail    22 Dec 2023 07:01  -  23 Dec 2023 07:00  --------------------------------------------------------  IN:    Dexmedetomidine: 75.2 mL    Enteral Tube Flush: 120 mL    IV PiggyBack: 600 mL    IV PiggyBack: 350 mL    Jevity 1.2: 810 mL  Total IN: 1955.2 mL    OUT:    Indwelling Catheter - Urethral (mL): 1425 mL  Total OUT: 1425 mL    Total NET: 530.2 mL      23 Dec 2023 07:01  -  23 Dec 2023 19:35  --------------------------------------------------------  IN:    Dexmedetomidine: 44.6 mL    IV PiggyBack: 450 mL    Jevity 1.2: 440 mL  Total IN: 934.6 mL    OUT:    Indwelling Catheter - Urethral (mL): 190 mL    Voided (mL): 0 mL  Total OUT: 190 mL    Total NET: 744.6 mL        ============================ LABS =========================                        7.3    5.98  )-----------( 100      ( 23 Dec 2023 01:23 )             21.7     12-23    145  |  111<H>  |  60<H>  ----------------------------<  176<H>  3.6   |  26  |  1.98<H>    Ca    7.8<L>      23 Dec 2023 01:23  Phos  3.3     12-23  Mg     2.5     12-23    TPro  5.2<L>  /  Alb  3.1<L>  /  TBili  0.8  /  DBili  x   /  AST  58<H>  /  ALT  80<H>  /  AlkPhos  41  12-23                LIVER FUNCTIONS - ( 23 Dec 2023 01:23 )  Alb: 3.1 g/dL / Pro: 5.2 g/dL / ALK PHOS: 41 U/L / ALT: 80 U/L / AST: 58 U/L / GGT: x           PT/INR - ( 23 Dec 2023 01:23 )   PT: 11.4 sec;   INR: 1.09 ratio         PTT - ( 23 Dec 2023 01:23 )  PTT:26.4 sec  ABG - ( 23 Dec 2023 00:50 )  pH, Arterial: 7.47  pH, Blood: x     /  pCO2: 36    /  pO2: 167   / HCO3: 26    / Base Excess: 2.4   /  SaO2: 98.3              Blood Gas Arterial, Lactate: 1.2 mmol/L (12-23-23 @ 00:50)  Blood Gas Arterial, Lactate: 1.4 mmol/L (12-22-23 @ 00:43)  Blood Gas Arterial, Lactate: 1.0 mmol/L (12-21-23 @ 00:53)    Urinalysis Basic - ( 23 Dec 2023 01:23 )    Color: x / Appearance: x / SG: x / pH: x  Gluc: 176 mg/dL / Ketone: x  / Bili: x / Urobili: x   Blood: x / Protein: x / Nitrite: x   Leuk Esterase: x / RBC: x / WBC x   Sq Epi: x / Non Sq Epi: x / Bacteria: x      ======================Micro/Rad/Cardio=================  Telemtry: Reviewed   EKG: Reviewed  CXR: Reviewed  Culture: Reviewed   Echo:   Cath:   ======================================================  PAST MEDICAL & SURGICAL HISTORY:  HTN (hypertension)      HLD (hyperlipidemia)        ====================ASSESSMENT ==============  78M w/ PMH HTN HLD presents as transfer from [facility] s/p VT arrest s/p shock x2 w/ unknown downtime. Pt found to have trauma to head w/ superficial scalp bleeding and oral mucosal bleeding.     Plan:  ====================== NEUROLOGY=====================  #Intubated  Encephalopathy post arrest   - Intubated w/o sedation s/p cardiac arrest w/ unknown down time. Reported to be 20minutes but can be longer given ROSC achieved right before arrival to the hospital   - CTH at [facility] w/o intracranial bleed/pathology. CTH maybe too soon to show evidence of anoxic injury  - Pt remains w/o purposeful movement.   - Neuro consulted for prognostication  - VEEG in place - no epileptiform activity; diffuse cerebral dysfunction   - Repeat CTH shows stability of bleed and emerging signs of anoxic injury  - f/u neuron specific enolase  - Family meeting w/ neurology attending 12/22 discussed no meaningful recovery. Pending family decision on comfort care.     #SAH  #SDH  - Pt w/ acute SAH and SDH 2/2 trauma  - Was not initially seen in outside scan at [facility] likely performed too early for radiologic evidence  - Neurology following,  - NSGY: no intervention  - Repeat CTH shows stability of bleeds   - Maintain BP control   - Started on HSQ for DVT ppx, will obtain repeat CT scan 12/23/23  - Would need full AC for DVTs. Will discuss w/ NSGY    #C-spine trauma  Pt w/ fall at home presented w/ c-collar.   -Per [facility] radiology report no evidence of cervical fracture  -Trauma surgery cleared pt of Cspine collar    #Myoclonic jerking  - vEEG: evidence of mycolonic seizures  - Started keppra load  - d/c VEEG    ==================== RESPIRATORY======================  #Intubated  Pt intubated s/p cardiac arrest  - On full vent support: RR 14 /  / PEEP 5 / FiO2 40  - Monitor ABGs    #Oropharynx bleed  - Pt w/ blood in oropharynx requiring frequent suctioning.   - ENT consulted s/p packing     ====================CARDIOVASCULAR==================  #VT arrest  - Pt s/p VT arrest w/ unknown downtime. Pt reportedly received shock x2. No prior cardiac hx per family  - Check TTE   - Check cardiac enzymes, BNP  - Monitor for arrhythmias   - Pt not asa/plavix loaded 2/2 substernal hematoma noted on CT chest at [facility]  - Unlikely to be a candidate for LHC at this time given mental status     #HTN  - Maintain BP control iso brain bleed  - Amlodipine; coreg  - PRN IVP labetalol for elevated SBP  - SBP goal<160    ===================HEMATOLOGIC/ONC ===================  #Substernal hematoma  - Pt w/ substernal hematoma noted on imaging at [facility] from trauma/cpr  - Repeat CT chest to monitor hematoma  - A/C: HSQ for dvt    #Thrombocytopenia  - Pt w/ downtrending PLT. Started downtrending prior to HSQ initiation.   - Likely iso critical illness   - Will send blue top PLT w/ next CBC    ===================== RENAL =========================  Pt presented w/ SCr 1.16, likely near baseline  - trend Cr   - montior I&Os    ==================== GASTROINTESTINAL===================  - Diet: enteral feeds   - Protonix    =======================    ENDOCRINE  =====================  #Hypothyroidism  - A1c=5.5.   - monitor glucose    ========================INFECTIOUS DISEASE================  #Sepsis  - Pt w/ leukocytosis w/ fevers possibly 2/2  infection  - Pt w/ persistent fevers likely multifactorial 2/2 DVTs, underlying infection, possible ?neurogenic component  - U/A grossly positive. F/u BCx, UCx  - Pt had crash lines placed at [facility]. Possible source of infection. MRSA negative 12/20  - Sputum Cx + for klebsiella  - 12/22 BCx 1 bottle w/ MRSE. Possible contaminant. Check repeat BCx  - Broaden to zosyn, vanc      Patient requires continuous monitoring with bedside rhythm monitoring, pulse ox monitoring, and intermittent blood gas analysis. Care plan discussed with ICU care team. Patient remained critical and at risk for life threatening decompensation.  Patient seen, examined and plan discussed with CCU team during rounds.     I have personally provided 35 minutes of critical care time excluding time spent on separate procedures, in addition to initial critical care time provided by the CICU Attending, Dr. Mejia.     By signing my name below, I, Apple Forman, attest that this documentation has been prepared under the direction and in the presence of JEANNIE Teixeira.  Electronically signed: Emilia Villar, 12-23-23 @ 19:35    I, JEANNIE Teixeira, personally performed the services described in this documentation. all medical record entries made by the scribe were at my direction and in my presence. I have reviewed the chart and agree that the record reflects my personal performance and is accurate and complete  Electronically signed: JEANNIE Teixeira.       ASHLEY PRITCHARD  MRN-[id number]  Patient is a 78y old  Male who presents with a chief complaint of Cardiac Arrest (23 Dec 2023 06:45)    HPI:  78M w/ PMH HTN HLD presents as transfer from [facility] s/p cardiac arrest. Per reports patient had a witness fall and arrest at home. EMS performed CPR en route to hospital was noted to be in VTach and shocked x2. ROSC was obtained just prior to arrival at [facility]. Pt was intubated placed on levo gtt. On arrival pt noted to have lacerated to R forehead. Pt had CT scans that were negative for intracranial hemorrhage, C-spine fracture, facial fractures. A R frontal hematoma was noted.     On arrival patient w/ dried blood on scalp. Also noted to have bleeding from oral mucosa w/ clots requiring suctioning. Pt otherwise off sedation and non-responsive. Per EMS report pt did receive some sedation and paralytics at [facility].     Per family patient has been feeling 'off' but managing his day to day and did not see a physician. Pt prescribed klonopin by outpatient provider and family believes pt may have been taking more than prescribed. On day of arrest, pt was initially asymptomatic carrying out his routine. He went to the bathroom, wife heard a loud thud and found patient in a pool of blood prompting call to EMS. Pt has not followed with any cardiologist. Has not had any syncope or other events preceding this.  (19 Dec 2023 14:54)    24 HOUR EVENTS:  - Repeat infx workup sent after 1 Blood culture positive for MRSE  - persistently febrile    REVIEW OF SYSTEMS: Unable to obtain 2/2 mental status    ICU Vital Signs Last 24 Hrs  T(C): 37.6 (23 Dec 2023 16:00), Max: 38.6 (23 Dec 2023 07:00)  T(F): 99.7 (23 Dec 2023 16:00), Max: 101.5 (23 Dec 2023 07:00)  HR: 65 (23 Dec 2023 18:15) (56 - 79)  BP: --  BP(mean): --  ABP: 182/55 (23 Dec 2023 18:15) (100/50 - 182/55)  ABP(mean): 85 (23 Dec 2023 18:15) (50 - 85)  RR: 25 (23 Dec 2023 18:15) (16 - 37)  SpO2: 100% (23 Dec 2023 18:15) (99% - 100%)    O2 Parameters below as of 23 Dec 2023 18:00  Patient On (Oxygen Delivery Method): ventilator    O2 Concentration (%): 40      Mode: AC/ CMV (Assist Control/ Continuous Mandatory Ventilation), RR (machine): 14, TV (machine): 500, FiO2: 40, PEEP: 5, ITime: 1  CVP(mm Hg): 9 (12-22-23 @ 09:30) (7 - 18)  CO: --  CI: --  PA: --  PA(mean): --  PA(direct): --  PCWP: --  LA: --  RA: --  SVR: --  SVRI: --  PVR: --  PVRI: --  I&O's Summary    22 Dec 2023 07:01  -  23 Dec 2023 07:00  --------------------------------------------------------  IN: 1955.2 mL / OUT: 1425 mL / NET: 530.2 mL    23 Dec 2023 07:01  -  23 Dec 2023 19:35  --------------------------------------------------------  IN: 934.6 mL / OUT: 190 mL / NET: 744.6 mL        CAPILLARY BLOOD GLUCOSE    CAPILLARY BLOOD GLUCOSE      POCT Blood Glucose.: 200 mg/dL (23 Dec 2023 17:36)      PHYSICAL EXAM:  GENERAL: Pt intubated, not responsive to sternal rub or noxious stimuli  . Notable trauma to head.   HEAD:  +traumatic head, Normocephalic  EYES: . Periorbital edema/erythma on R. conjunctiva and sclera clear. Pupils reactive to light   ENMT: ET tube in place. Dry mucous membranes. Notable blood in oral cavity   NECK: Supple  HEART: Regular rate and rhythm; No murmurs, rubs, or gallops  RESPIRATORY: Mechanical breath sounds b/l. No notable crackles, wheezes  ABDOMEN: Soft, Nontender, Nondistended  NEUROLOGY: A&Ox0. Pupils reactive. Non-responsive to sternal stimuli off sedation, + gag   EXTREMITIES:  1+ Peripheral Pulses, No clubbing, cyanosis, or edema  SKIN: warm, dry, normal color    ============================I/O===========================   I&O's Detail    22 Dec 2023 07:01  -  23 Dec 2023 07:00  --------------------------------------------------------  IN:    Dexmedetomidine: 75.2 mL    Enteral Tube Flush: 120 mL    IV PiggyBack: 600 mL    IV PiggyBack: 350 mL    Jevity 1.2: 810 mL  Total IN: 1955.2 mL    OUT:    Indwelling Catheter - Urethral (mL): 1425 mL  Total OUT: 1425 mL    Total NET: 530.2 mL      23 Dec 2023 07:01  -  23 Dec 2023 19:35  --------------------------------------------------------  IN:    Dexmedetomidine: 44.6 mL    IV PiggyBack: 450 mL    Jevity 1.2: 440 mL  Total IN: 934.6 mL    OUT:    Indwelling Catheter - Urethral (mL): 190 mL    Voided (mL): 0 mL  Total OUT: 190 mL    Total NET: 744.6 mL        ============================ LABS =========================                        7.3    5.98  )-----------( 100      ( 23 Dec 2023 01:23 )             21.7     12-23    145  |  111<H>  |  60<H>  ----------------------------<  176<H>  3.6   |  26  |  1.98<H>    Ca    7.8<L>      23 Dec 2023 01:23  Phos  3.3     12-23  Mg     2.5     12-23    TPro  5.2<L>  /  Alb  3.1<L>  /  TBili  0.8  /  DBili  x   /  AST  58<H>  /  ALT  80<H>  /  AlkPhos  41  12-23                LIVER FUNCTIONS - ( 23 Dec 2023 01:23 )  Alb: 3.1 g/dL / Pro: 5.2 g/dL / ALK PHOS: 41 U/L / ALT: 80 U/L / AST: 58 U/L / GGT: x           PT/INR - ( 23 Dec 2023 01:23 )   PT: 11.4 sec;   INR: 1.09 ratio         PTT - ( 23 Dec 2023 01:23 )  PTT:26.4 sec  ABG - ( 23 Dec 2023 00:50 )  pH, Arterial: 7.47  pH, Blood: x     /  pCO2: 36    /  pO2: 167   / HCO3: 26    / Base Excess: 2.4   /  SaO2: 98.3              Blood Gas Arterial, Lactate: 1.2 mmol/L (12-23-23 @ 00:50)  Blood Gas Arterial, Lactate: 1.4 mmol/L (12-22-23 @ 00:43)  Blood Gas Arterial, Lactate: 1.0 mmol/L (12-21-23 @ 00:53)    Urinalysis Basic - ( 23 Dec 2023 01:23 )    Color: x / Appearance: x / SG: x / pH: x  Gluc: 176 mg/dL / Ketone: x  / Bili: x / Urobili: x   Blood: x / Protein: x / Nitrite: x   Leuk Esterase: x / RBC: x / WBC x   Sq Epi: x / Non Sq Epi: x / Bacteria: x      ======================Micro/Rad/Cardio=================  Telemtry: Reviewed   EKG: Reviewed  CXR: Reviewed  Culture: Reviewed   Echo:   Cath:   ======================================================  PAST MEDICAL & SURGICAL HISTORY:  HTN (hypertension)      HLD (hyperlipidemia)        ====================ASSESSMENT ==============  78M w/ PMH HTN HLD presents as transfer from [facility] s/p VT arrest s/p shock x2 w/ unknown downtime. Pt found to have trauma to head w/ superficial scalp bleeding and oral mucosal bleeding.     Plan:  ====================== NEUROLOGY=====================  #Intubated  Encephalopathy post arrest   - Intubated w/o sedation s/p cardiac arrest w/ unknown down time. Reported to be 20minutes but can be longer given ROSC achieved right before arrival to the hospital   - CTH at [facility] w/o intracranial bleed/pathology. CTH maybe too soon to show evidence of anoxic injury  - Pt remains w/o purposeful movement.   - Neuro consulted for prognostication  - VEEG in place - no epileptiform activity; diffuse cerebral dysfunction   - Repeat CTH shows stability of bleed and emerging signs of anoxic injury  - f/u neuron specific enolase  - Family meeting w/ neurology attending 12/22 discussed no meaningful recovery. Pending family decision on comfort care.     #SAH  #SDH  - Pt w/ acute SAH and SDH 2/2 trauma  - Was not initially seen in outside scan at [facility] likely performed too early for radiologic evidence  - Neurology following,  - NSGY: no intervention  - Repeat CTH shows stability of bleeds   - Maintain BP control   - Started on HSQ for DVT ppx, will obtain repeat CT scan 12/23/23  - Would need full AC for DVTs. Will discuss w/ NSGY    #C-spine trauma  Pt w/ fall at home presented w/ c-collar.   -Per [facility] radiology report no evidence of cervical fracture  -Trauma surgery cleared pt of Cspine collar    #Myoclonic jerking  - vEEG: evidence of mycolonic seizures  - Started keppra load  - d/c VEEG    ==================== RESPIRATORY======================  #Intubated  Pt intubated s/p cardiac arrest  - On full vent support: RR 14 /  / PEEP 5 / FiO2 40  - Monitor ABGs    #Oropharynx bleed  - Pt w/ blood in oropharynx requiring frequent suctioning.   - ENT consulted s/p packing     ====================CARDIOVASCULAR==================  #VT arrest  - Pt s/p VT arrest w/ unknown downtime. Pt reportedly received shock x2. No prior cardiac hx per family  - Check TTE   - Check cardiac enzymes, BNP  - Monitor for arrhythmias   - Pt not asa/plavix loaded 2/2 substernal hematoma noted on CT chest at [facility]  - Unlikely to be a candidate for LHC at this time given mental status     #HTN  - Maintain BP control iso brain bleed  - Amlodipine; coreg  - PRN IVP labetalol for elevated SBP  - SBP goal<160    ===================HEMATOLOGIC/ONC ===================  #Substernal hematoma  - Pt w/ substernal hematoma noted on imaging at [facility] from trauma/cpr  - Repeat CT chest to monitor hematoma  - A/C: HSQ for dvt    #Thrombocytopenia  - Pt w/ downtrending PLT. Started downtrending prior to HSQ initiation.   - Likely iso critical illness   - Will send blue top PLT w/ next CBC    ===================== RENAL =========================  Pt presented w/ SCr 1.16, likely near baseline  - trend Cr   - montior I&Os    ==================== GASTROINTESTINAL===================  - Diet: enteral feeds   - Protonix    =======================    ENDOCRINE  =====================  #Hypothyroidism  - A1c=5.5.   - monitor glucose    ========================INFECTIOUS DISEASE================  #Sepsis  - Pt w/ leukocytosis w/ fevers possibly 2/2  infection  - Pt w/ persistent fevers likely multifactorial 2/2 DVTs, underlying infection, possible ?neurogenic component  - U/A grossly positive. F/u BCx, UCx  - Pt had crash lines placed at [facility]. Possible source of infection. MRSA negative 12/20  - Sputum Cx + for klebsiella  - 12/22 BCx 1 bottle w/ MRSE. Possible contaminant. Check repeat BCx  - Broaden to zosyn, vanc      Patient requires continuous monitoring with bedside rhythm monitoring, pulse ox monitoring, and intermittent blood gas analysis. Care plan discussed with ICU care team. Patient remained critical and at risk for life threatening decompensation.  Patient seen, examined and plan discussed with CCU team during rounds.     I have personally provided 35 minutes of critical care time excluding time spent on separate procedures, in addition to initial critical care time provided by the CICU Attending, Dr. Mejia.     By signing my name below, I, Apple Forman, attest that this documentation has been prepared under the direction and in the presence of JEANNIE Teixeira.  Electronically signed: Emilia Villar, 12-23-23 @ 19:35    I, JEANNIE Teixeira, personally performed the services described in this documentation. all medical record entries made by the scribe were at my direction and in my presence. I have reviewed the chart and agree that the record reflects my personal performance and is accurate and complete  Electronically signed: JEANNIE Teixeira.

## 2023-12-23 NOTE — PROGRESS NOTE ADULT - SUBJECTIVE AND OBJECTIVE BOX
ASHLEY PRITCHARD  MRN-[id number]  Patient is a 78y old  Male who presents with a chief complaint of Cardiac Arrest (22 Dec 2023 21:01)    HPI:  78M w/ PMH HTN HLD presents as transfer from [facility] s/p cardiac arrest. Per reports patient had a witness fall and arrest at home. EMS performed CPR en route to hospital was noted to be in VTach and shocked x2. ROSC was obtained just prior to arrival at [facility]. Pt was intubated placed on levo gtt. On arrival pt noted to have lacerated to R forehead. Pt had CT scans that were negative for intracranial hemorrhage, C-spine fracture, facial fractures. A R frontal hematoma was noted.     On arrival patient w/ dried blood on scalp. Also noted to have bleeding from oral mucosa w/ clots requiring suctioning. Pt otherwise off sedation and non-responsive. Per EMS report pt did receive some sedation and paralytics at [facility].     Per family patient has been feeling 'off' but managing his day to day and did not see a physician. Pt prescribed klonopin by outpatient provider and family believes pt may have been taking more than prescribed. On day of arrest, pt was initially asymptomatic carrying out his routine. He went to the bathroom, wife heard a loud thud and found patient in a pool of blood prompting call to EMS. Pt has not followed with any cardiologist. Has not had any syncope or other events preceding this.  (19 Dec 2023 14:54)      24 HOUR EVENTS:    REVIEW OF SYSTEMS:    CONSTITUTIONAL: No weakness, fevers or chills  EYES/ENT: No visual changes;  No vertigo or throat pain   NECK: No pain or stiffness  RESPIRATORY: No cough, wheezing, hemoptysis; No shortness of breath  CARDIOVASCULAR: No chest pain or palpitations  GASTROINTESTINAL: No abdominal or epigastric pain. No nausea, vomiting, or hematemesis; No diarrhea or constipation. No melena or hematochezia.  GENITOURINARY: No dysuria, frequency or hematuria  NEUROLOGICAL: No numbness or weakness  SKIN: No itching, rashes      ICU Vital Signs Last 24 Hrs  T(C): 37.3 (23 Dec 2023 03:00), Max: 39.4 (22 Dec 2023 13:00)  T(F): 99.2 (23 Dec 2023 03:00), Max: 103 (22 Dec 2023 13:00)  HR: 60 (23 Dec 2023 05:00) (59 - 91)  BP: --  BP(mean): --  ABP: 100/50 (23 Dec 2023 05:00) (100/50 - 183/57)  ABP(mean): 69 (23 Dec 2023 05:00) (63 - 92)  RR: 25 (23 Dec 2023 05:00) (13 - 37)  SpO2: 100% (23 Dec 2023 05:00) (98% - 100%)    O2 Parameters below as of 23 Dec 2023 05:00  Patient On (Oxygen Delivery Method): ventilator          Mode: AC/ CMV (Assist Control/ Continuous Mandatory Ventilation), RR (machine): 14, TV (machine): 500, FiO2: 40, PEEP: 5, ITime: 1  CVP(mm Hg): 9 (12-22-23 @ 09:30) (7 - 18)  CO: --  CI: --  PA: --  PA(mean): --  PA(direct): --  PCWP: --  LA: --  RA: --  SVR: --  SVRI: --  PVR: --  PVRI: --  I&O's Summary    21 Dec 2023 07:01  -  22 Dec 2023 07:00  --------------------------------------------------------  IN: 401.3 mL / OUT: 1190 mL / NET: -788.7 mL    22 Dec 2023 07:01  -  23 Dec 2023 06:45  --------------------------------------------------------  IN: 1465.8 mL / OUT: 1195 mL / NET: 270.8 mL        CAPILLARY BLOOD GLUCOSE    CAPILLARY BLOOD GLUCOSE      POCT Blood Glucose.: 196 mg/dL (23 Dec 2023 05:55)      PHYSICAL EXAM:  GENERAL: No acute distress, well-developed  HEAD:  Atraumatic, Normocephalic  EYES: EOMI, PERRLA, conjunctiva and sclera clear  NECK: Supple, no lymphadenopathy, no JVD  CHEST/LUNG: CTAB; No wheezes, rales, or rhonchi  HEART: Regular rate and rhythm. Normal S1/S2. No murmurs, rubs, or gallops  ABDOMEN: Soft, non-tender, non-distended; normal bowel sounds, no organomegaly  EXTREMITIES:  2+ peripheral pulses b/l, No clubbing, cyanosis, or edema  NEUROLOGY: A&O x 3, no focal deficits  SKIN: No rashes or lesions    ============================I/O===========================   I&O's Detail    21 Dec 2023 07:01  -  22 Dec 2023 07:00  --------------------------------------------------------  IN:    Dexmedetomidine: 101.3 mL    Enteral Tube Flush: 50 mL    IV PiggyBack: 250 mL  Total IN: 401.3 mL    OUT:    Dexmedetomidine: 0 mL    Indwelling Catheter - Urethral (mL): 1190 mL  Total OUT: 1190 mL    Total NET: -788.7 mL      22 Dec 2023 07:01  -  23 Dec 2023 06:45  --------------------------------------------------------  IN:    Dexmedetomidine: 65.8 mL    Enteral Tube Flush: 120 mL    IV PiggyBack: 550 mL    Jevity 1.2: 730 mL  Total IN: 1465.8 mL    OUT:    Indwelling Catheter - Urethral (mL): 1195 mL  Total OUT: 1195 mL    Total NET: 270.8 mL        ============================ LABS =========================                        7.3    5.98  )-----------( 100      ( 23 Dec 2023 01:23 )             21.7     12-23    145  |  111<H>  |  60<H>  ----------------------------<  176<H>  3.6   |  26  |  1.98<H>    Ca    7.8<L>      23 Dec 2023 01:23  Phos  3.3     12-23  Mg     2.5     12-23    TPro  5.2<L>  /  Alb  3.1<L>  /  TBili  0.8  /  DBili  x   /  AST  58<H>  /  ALT  80<H>  /  AlkPhos  41  12-23    Troponin T, High Sensitivity Result: 2406 ng/L (12-20-23 @ 18:01)    CKMB Units: 28.7 ng/mL (12-20-23 @ 18:01)    Creatine Kinase, Serum: 745 U/L (12-20-23 @ 18:01)    CPK Mass Assay %: 3.9 % (12-20-23 @ 18:01)        LIVER FUNCTIONS - ( 23 Dec 2023 01:23 )  Alb: 3.1 g/dL / Pro: 5.2 g/dL / ALK PHOS: 41 U/L / ALT: 80 U/L / AST: 58 U/L / GGT: x           PT/INR - ( 23 Dec 2023 01:23 )   PT: 11.4 sec;   INR: 1.09 ratio         PTT - ( 23 Dec 2023 01:23 )  PTT:26.4 sec  ABG - ( 23 Dec 2023 00:50 )  pH, Arterial: 7.47  pH, Blood: x     /  pCO2: 36    /  pO2: 167   / HCO3: 26    / Base Excess: 2.4   /  SaO2: 98.3              Blood Gas Arterial, Lactate: 1.2 mmol/L (12-23-23 @ 00:50)  Blood Gas Arterial, Lactate: 1.4 mmol/L (12-22-23 @ 00:43)  Blood Gas Arterial, Lactate: 1.0 mmol/L (12-21-23 @ 00:53)  Blood Gas Arterial, Lactate: 1.7 mmol/L (12-20-23 @ 17:50)    Urinalysis Basic - ( 23 Dec 2023 01:23 )    Color: x / Appearance: x / SG: x / pH: x  Gluc: 176 mg/dL / Ketone: x  / Bili: x / Urobili: x   Blood: x / Protein: x / Nitrite: x   Leuk Esterase: x / RBC: x / WBC x   Sq Epi: x / Non Sq Epi: x / Bacteria: x      ======================Micro/Rad/Cardio=================  Telemetry: Reviewed   EKG: Reviewed  CXR: Reviewed  Culture: Reviewed   Echo:   Cath: see sunrise for details  ======================================================  PAST MEDICAL & SURGICAL HISTORY:  HTN (hypertension)      HLD (hyperlipidemia)            ASHLEY PRITCHARD  MRN-[id number]  Patient is a 78y old  Male who presents with a chief complaint of Cardiac Arrest (22 Dec 2023 21:01)    HPI:  78M w/ PMH HTN HLD presents as transfer from [facility] s/p cardiac arrest. Per reports patient had a witness fall and arrest at home. EMS performed CPR en route to hospital was noted to be in VTach and shocked x2. ROSC was obtained just prior to arrival at [facility]. Pt was intubated placed on levo gtt. On arrival pt noted to have lacerated to R forehead. Pt had CT scans that were negative for intracranial hemorrhage, C-spine fracture, facial fractures. A R frontal hematoma was noted.     On arrival patient w/ dried blood on scalp. Also noted to have bleeding from oral mucosa w/ clots requiring suctioning. Pt otherwise off sedation and non-responsive. Per EMS report pt did receive some sedation and paralytics at [facility].     Per family patient has been feeling 'off' but managing his day to day and did not see a physician. Pt prescribed klonopin by outpatient provider and family believes pt may have been taking more than prescribed. On day of arrest, pt was initially asymptomatic carrying out his routine. He went to the bathroom, wife heard a loud thud and found patient in a pool of blood prompting call to EMS. Pt has not followed with any cardiologist. Has not had any syncope or other events preceding this.  (19 Dec 2023 14:54)      24 HOUR EVENTS:    REVIEW OF SYSTEMS:    CONSTITUTIONAL: No weakness, fevers or chills  EYES/ENT: No visual changes;  No vertigo or throat pain   NECK: No pain or stiffness  RESPIRATORY: No cough, wheezing, hemoptysis; No shortness of breath  CARDIOVASCULAR: No chest pain or palpitations  GASTROINTESTINAL: No abdominal or epigastric pain. No nausea, vomiting, or hematemesis; No diarrhea or constipation. No melena or hematochezia.  GENITOURINARY: No dysuria, frequency or hematuria  NEUROLOGICAL: No numbness or weakness  SKIN: No itching, rashes      ICU Vital Signs Last 24 Hrs  T(C): 37.3 (23 Dec 2023 03:00), Max: 39.4 (22 Dec 2023 13:00)  T(F): 99.2 (23 Dec 2023 03:00), Max: 103 (22 Dec 2023 13:00)  HR: 60 (23 Dec 2023 05:00) (59 - 91)  BP: --  BP(mean): --  ABP: 100/50 (23 Dec 2023 05:00) (100/50 - 183/57)  ABP(mean): 69 (23 Dec 2023 05:00) (63 - 92)  RR: 25 (23 Dec 2023 05:00) (13 - 37)  SpO2: 100% (23 Dec 2023 05:00) (98% - 100%)    O2 Parameters below as of 23 Dec 2023 05:00  Patient On (Oxygen Delivery Method): ventilator          Mode: AC/ CMV (Assist Control/ Continuous Mandatory Ventilation), RR (machine): 14, TV (machine): 500, FiO2: 40, PEEP: 5, ITime: 1  CVP(mm Hg): 9 (12-22-23 @ 09:30) (7 - 18)  CO: --  CI: --  PA: --  PA(mean): --  PA(direct): --  PCWP: --  LA: --  RA: --  SVR: --  SVRI: --  PVR: --  PVRI: --  I&O's Summary    21 Dec 2023 07:01  -  22 Dec 2023 07:00  --------------------------------------------------------  IN: 401.3 mL / OUT: 1190 mL / NET: -788.7 mL    22 Dec 2023 07:01  -  23 Dec 2023 06:45  --------------------------------------------------------  IN: 1465.8 mL / OUT: 1195 mL / NET: 270.8 mL        CAPILLARY BLOOD GLUCOSE    CAPILLARY BLOOD GLUCOSE      POCT Blood Glucose.: 196 mg/dL (23 Dec 2023 05:55)      PHYSICAL EXAM:  GENERAL: No acute distress, well-developed  HEAD:  Atraumatic, Normocephalic  EYES: EOMI, PERRLA, conjunctiva and sclera clear  NECK: Supple, no lymphadenopathy, no JVD  CHEST/LUNG: CTAB; No wheezes, rales, or rhonchi  HEART: Regular rate and rhythm. Normal S1/S2. No murmurs, rubs, or gallops  ABDOMEN: Soft, non-tender, non-distended; normal bowel sounds, no organomegaly  EXTREMITIES:  2+ peripheral pulses b/l, No clubbing, cyanosis, or edema  NEUROLOGY: A&O x 3, no focal deficits  SKIN: No rashes or lesions    ============================I/O===========================   I&O's Detail    21 Dec 2023 07:01  -  22 Dec 2023 07:00  --------------------------------------------------------  IN:    Dexmedetomidine: 101.3 mL    Enteral Tube Flush: 50 mL    IV PiggyBack: 250 mL  Total IN: 401.3 mL    OUT:    Dexmedetomidine: 0 mL    Indwelling Catheter - Urethral (mL): 1190 mL  Total OUT: 1190 mL    Total NET: -788.7 mL      22 Dec 2023 07:01  -  23 Dec 2023 06:45  --------------------------------------------------------  IN:    Dexmedetomidine: 65.8 mL    Enteral Tube Flush: 120 mL    IV PiggyBack: 550 mL    Jevity 1.2: 730 mL  Total IN: 1465.8 mL    OUT:    Indwelling Catheter - Urethral (mL): 1195 mL  Total OUT: 1195 mL    Total NET: 270.8 mL        ============================ LABS =========================                        7.3    5.98  )-----------( 100      ( 23 Dec 2023 01:23 )             21.7     12-23    145  |  111<H>  |  60<H>  ----------------------------<  176<H>  3.6   |  26  |  1.98<H>    Ca    7.8<L>      23 Dec 2023 01:23  Phos  3.3     12-23  Mg     2.5     12-23    TPro  5.2<L>  /  Alb  3.1<L>  /  TBili  0.8  /  DBili  x   /  AST  58<H>  /  ALT  80<H>  /  AlkPhos  41  12-23    Troponin T, High Sensitivity Result: 2406 ng/L (12-20-23 @ 18:01)    CKMB Units: 28.7 ng/mL (12-20-23 @ 18:01)    Creatine Kinase, Serum: 745 U/L (12-20-23 @ 18:01)    CPK Mass Assay %: 3.9 % (12-20-23 @ 18:01)        LIVER FUNCTIONS - ( 23 Dec 2023 01:23 )  Alb: 3.1 g/dL / Pro: 5.2 g/dL / ALK PHOS: 41 U/L / ALT: 80 U/L / AST: 58 U/L / GGT: x           PT/INR - ( 23 Dec 2023 01:23 )   PT: 11.4 sec;   INR: 1.09 ratio         PTT - ( 23 Dec 2023 01:23 )  PTT:26.4 sec  ABG - ( 23 Dec 2023 00:50 )  pH, Arterial: 7.47  pH, Blood: x     /  pCO2: 36    /  pO2: 167   / HCO3: 26    / Base Excess: 2.4   /  SaO2: 98.3              Blood Gas Arterial, Lactate: 1.2 mmol/L (12-23-23 @ 00:50)  Blood Gas Arterial, Lactate: 1.4 mmol/L (12-22-23 @ 00:43)  Blood Gas Arterial, Lactate: 1.0 mmol/L (12-21-23 @ 00:53)  Blood Gas Arterial, Lactate: 1.7 mmol/L (12-20-23 @ 17:50)    Urinalysis Basic - ( 23 Dec 2023 01:23 )    Color: x / Appearance: x / SG: x / pH: x  Gluc: 176 mg/dL / Ketone: x  / Bili: x / Urobili: x   Blood: x / Protein: x / Nitrite: x   Leuk Esterase: x / RBC: x / WBC x   Sq Epi: x / Non Sq Epi: x / Bacteria: x      ======================Micro/Rad/Cardio=================  Telemetry: Reviewed   EKG: Reviewed  CXR: Reviewed  Culture: Reviewed   Echo:   Cath: see sunrise for details  ======================================================  PAST MEDICAL & SURGICAL HISTORY:  HTN (hypertension)      HLD (hyperlipidemia)            ASHLEY PRITCHARD  MRN-[id number]  Patient is a 78y old  Male who presents with a chief complaint of Cardiac Arrest (22 Dec 2023 21:01)    HPI:  78M w/ PMH HTN HLD presents as transfer from [facility] s/p cardiac arrest. Per reports patient had a witness fall and arrest at home. EMS performed CPR en route to hospital was noted to be in VTach and shocked x2. ROSC was obtained just prior to arrival at [facility]. Pt was intubated placed on levo gtt. On arrival pt noted to have lacerated to R forehead. Pt had CT scans that were negative for intracranial hemorrhage, C-spine fracture, facial fractures. A R frontal hematoma was noted.     On arrival patient w/ dried blood on scalp. Also noted to have bleeding from oral mucosa w/ clots requiring suctioning. Pt otherwise off sedation and non-responsive. Per EMS report pt did receive some sedation and paralytics at [facility].     Per family patient has been feeling 'off' but managing his day to day and did not see a physician. Pt prescribed klonopin by outpatient provider and family believes pt may have been taking more than prescribed. On day of arrest, pt was initially asymptomatic carrying out his routine. He went to the bathroom, wife heard a loud thud and found patient in a pool of blood prompting call to EMS. Pt has not followed with any cardiologist. Has not had any syncope or other events preceding this.  (19 Dec 2023 14:54)      24 HOUR EVENTS:          ICU Vital Signs Last 24 Hrs  T(C): 37.3 (23 Dec 2023 03:00), Max: 39.4 (22 Dec 2023 13:00)  T(F): 99.2 (23 Dec 2023 03:00), Max: 103 (22 Dec 2023 13:00)  HR: 60 (23 Dec 2023 05:00) (59 - 91)  BP: --  BP(mean): --  ABP: 100/50 (23 Dec 2023 05:00) (100/50 - 183/57)  ABP(mean): 69 (23 Dec 2023 05:00) (63 - 92)  RR: 25 (23 Dec 2023 05:00) (13 - 37)  SpO2: 100% (23 Dec 2023 05:00) (98% - 100%)    O2 Parameters below as of 23 Dec 2023 05:00  Patient On (Oxygen Delivery Method): ventilator          Mode: AC/ CMV (Assist Control/ Continuous Mandatory Ventilation), RR (machine): 14, TV (machine): 500, FiO2: 40, PEEP: 5, ITime: 1  CVP(mm Hg): 9 (12-22-23 @ 09:30) (7 - 18)  CO: --  CI: --  PA: --  PA(mean): --  PA(direct): --  PCWP: --  LA: --  RA: --  SVR: --  SVRI: --  PVR: --  PVRI: --  I&O's Summary    21 Dec 2023 07:01  -  22 Dec 2023 07:00  --------------------------------------------------------  IN: 401.3 mL / OUT: 1190 mL / NET: -788.7 mL    22 Dec 2023 07:01  -  23 Dec 2023 06:45  --------------------------------------------------------  IN: 1465.8 mL / OUT: 1195 mL / NET: 270.8 mL        CAPILLARY BLOOD GLUCOSE    CAPILLARY BLOOD GLUCOSE      POCT Blood Glucose.: 196 mg/dL (23 Dec 2023 05:55)      PHYSICAL EXAM:  GENERAL: Pt intubated, not responsive to sternal rub or noxious stimuli  . Notable trauma to head.   HEAD:  +traumatic head, Normocephalic  EYES: . Periorbital edema/erythma on R. conjunctiva and sclera clear. Pupils reactive to light   ENMT: ET tube in place. Dry mucous membranes. Notable blood in oral cavity   NECK: Supple  HEART: Regular rate and rhythm; No murmurs, rubs, or gallops  RESPIRATORY: Mechanical breath sounds b/l. No notable crackles, wheezes  ABDOMEN: Soft, Nontender, Nondistended  NEUROLOGY: A&Ox0. Pupils reactive. Non-responsive to sternal stimuli off sedation.  EXTREMITIES:  1+ Peripheral Pulses, No clubbing, cyanosis, or edema  SKIN: warm, dry, normal color    ============================I/O===========================   I&O's Detail    21 Dec 2023 07:01  -  22 Dec 2023 07:00  --------------------------------------------------------  IN:    Dexmedetomidine: 101.3 mL    Enteral Tube Flush: 50 mL    IV PiggyBack: 250 mL  Total IN: 401.3 mL    OUT:    Dexmedetomidine: 0 mL    Indwelling Catheter - Urethral (mL): 1190 mL  Total OUT: 1190 mL    Total NET: -788.7 mL      22 Dec 2023 07:01  -  23 Dec 2023 06:45  --------------------------------------------------------  IN:    Dexmedetomidine: 65.8 mL    Enteral Tube Flush: 120 mL    IV PiggyBack: 550 mL    Jevity 1.2: 730 mL  Total IN: 1465.8 mL    OUT:    Indwelling Catheter - Urethral (mL): 1195 mL  Total OUT: 1195 mL    Total NET: 270.8 mL        ============================ LABS =========================                        7.3    5.98  )-----------( 100      ( 23 Dec 2023 01:23 )             21.7     12-23    145  |  111<H>  |  60<H>  ----------------------------<  176<H>  3.6   |  26  |  1.98<H>    Ca    7.8<L>      23 Dec 2023 01:23  Phos  3.3     12-23  Mg     2.5     12-23    TPro  5.2<L>  /  Alb  3.1<L>  /  TBili  0.8  /  DBili  x   /  AST  58<H>  /  ALT  80<H>  /  AlkPhos  41  12-23    Troponin T, High Sensitivity Result: 2406 ng/L (12-20-23 @ 18:01)    CKMB Units: 28.7 ng/mL (12-20-23 @ 18:01)    Creatine Kinase, Serum: 745 U/L (12-20-23 @ 18:01)    CPK Mass Assay %: 3.9 % (12-20-23 @ 18:01)        LIVER FUNCTIONS - ( 23 Dec 2023 01:23 )  Alb: 3.1 g/dL / Pro: 5.2 g/dL / ALK PHOS: 41 U/L / ALT: 80 U/L / AST: 58 U/L / GGT: x           PT/INR - ( 23 Dec 2023 01:23 )   PT: 11.4 sec;   INR: 1.09 ratio         PTT - ( 23 Dec 2023 01:23 )  PTT:26.4 sec  ABG - ( 23 Dec 2023 00:50 )  pH, Arterial: 7.47  pH, Blood: x     /  pCO2: 36    /  pO2: 167   / HCO3: 26    / Base Excess: 2.4   /  SaO2: 98.3              Blood Gas Arterial, Lactate: 1.2 mmol/L (12-23-23 @ 00:50)  Blood Gas Arterial, Lactate: 1.4 mmol/L (12-22-23 @ 00:43)  Blood Gas Arterial, Lactate: 1.0 mmol/L (12-21-23 @ 00:53)  Blood Gas Arterial, Lactate: 1.7 mmol/L (12-20-23 @ 17:50)    Urinalysis Basic - ( 23 Dec 2023 01:23 )    Color: x / Appearance: x / SG: x / pH: x  Gluc: 176 mg/dL / Ketone: x  / Bili: x / Urobili: x   Blood: x / Protein: x / Nitrite: x   Leuk Esterase: x / RBC: x / WBC x   Sq Epi: x / Non Sq Epi: x / Bacteria: x      ======================Micro/Rad/Cardio=================  Telemetry: Reviewed   EKG: Reviewed  CXR: Reviewed  Culture: Reviewed   Echo:   Cath: see sunrise for details  ======================================================  PAST MEDICAL & SURGICAL HISTORY:  HTN (hypertension)      HLD (hyperlipidemia)            ASHLEY PRITCHARD  MRN-[id number]  Patient is a 78y old  Male who presents with a chief complaint of Cardiac Arrest (22 Dec 2023 21:01)    HPI:  78M w/ PMH HTN HLD presents as transfer from [facility] s/p cardiac arrest. Per reports patient had a witness fall and arrest at home. EMS performed CPR en route to hospital was noted to be in VTach and shocked x2. ROSC was obtained just prior to arrival at [facility]. Pt was intubated placed on levo gtt. On arrival pt noted to have lacerated to R forehead. Pt had CT scans that were negative for intracranial hemorrhage, C-spine fracture, facial fractures. A R frontal hematoma was noted.     On arrival patient w/ dried blood on scalp. Also noted to have bleeding from oral mucosa w/ clots requiring suctioning. Pt otherwise off sedation and non-responsive. Per EMS report pt did receive some sedation and paralytics at [facility].     Per family patient has been feeling 'off' but managing his day to day and did not see a physician. Pt prescribed klonopin by outpatient provider and family believes pt may have been taking more than prescribed. On day of arrest, pt was initially asymptomatic carrying out his routine. He went to the bathroom, wife heard a loud thud and found patient in a pool of blood prompting call to EMS. Pt has not followed with any cardiologist. Has not had any syncope or other events preceding this.  (19 Dec 2023 14:54)      24 HOUR EVENTS:          ICU Vital Signs Last 24 Hrs  T(C): 37.3 (23 Dec 2023 03:00), Max: 39.4 (22 Dec 2023 13:00)  T(F): 99.2 (23 Dec 2023 03:00), Max: 103 (22 Dec 2023 13:00)  HR: 60 (23 Dec 2023 05:00) (59 - 91)  BP: --  BP(mean): --  ABP: 100/50 (23 Dec 2023 05:00) (100/50 - 183/57)  ABP(mean): 69 (23 Dec 2023 05:00) (63 - 92)  RR: 25 (23 Dec 2023 05:00) (13 - 37)  SpO2: 100% (23 Dec 2023 05:00) (98% - 100%)    O2 Parameters below as of 23 Dec 2023 05:00  Patient On (Oxygen Delivery Method): ventilator          Mode: AC/ CMV (Assist Control/ Continuous Mandatory Ventilation), RR (machine): 14, TV (machine): 500, FiO2: 40, PEEP: 5, ITime: 1  CVP(mm Hg): 9 (12-22-23 @ 09:30) (7 - 18)  CO: --  CI: --  PA: --  PA(mean): --  PA(direct): --  PCWP: --  LA: --  RA: --  SVR: --  SVRI: --  PVR: --  PVRI: --  I&O's Summary    21 Dec 2023 07:01  -  22 Dec 2023 07:00  --------------------------------------------------------  IN: 401.3 mL / OUT: 1190 mL / NET: -788.7 mL    22 Dec 2023 07:01  -  23 Dec 2023 06:45  --------------------------------------------------------  IN: 1465.8 mL / OUT: 1195 mL / NET: 270.8 mL        CAPILLARY BLOOD GLUCOSE    CAPILLARY BLOOD GLUCOSE      POCT Blood Glucose.: 196 mg/dL (23 Dec 2023 05:55)      PHYSICAL EXAM:  GENERAL: Pt intubated, not responsive to sternal rub or noxious stimuli  . Notable trauma to head.   HEAD:  +traumatic head, Normocephalic  EYES: . Periorbital edema/erythma on R. conjunctiva and sclera clear. Pupils reactive to light   ENMT: ET tube in place. Dry mucous membranes. Notable blood in oral cavity   NECK: Supple  HEART: Regular rate and rhythm; No murmurs, rubs, or gallops  RESPIRATORY: Mechanical breath sounds b/l. No notable crackles, wheezes  ABDOMEN: Soft, Nontender, Nondistended  NEUROLOGY: A&Ox0. Pupils reactive. Non-responsive to sternal stimuli off sedation.  EXTREMITIES:  1+ Peripheral Pulses, No clubbing, cyanosis, or edema  SKIN: warm, dry, normal color    ============================I/O===========================   I&O's Detail    21 Dec 2023 07:01  -  22 Dec 2023 07:00  --------------------------------------------------------  IN:    Dexmedetomidine: 101.3 mL    Enteral Tube Flush: 50 mL    IV PiggyBack: 250 mL  Total IN: 401.3 mL    OUT:    Dexmedetomidine: 0 mL    Indwelling Catheter - Urethral (mL): 1190 mL  Total OUT: 1190 mL    Total NET: -788.7 mL      22 Dec 2023 07:01  -  23 Dec 2023 06:45  --------------------------------------------------------  IN:    Dexmedetomidine: 65.8 mL    Enteral Tube Flush: 120 mL    IV PiggyBack: 550 mL    Jevity 1.2: 730 mL  Total IN: 1465.8 mL    OUT:    Indwelling Catheter - Urethral (mL): 1195 mL  Total OUT: 1195 mL    Total NET: 270.8 mL        ============================ LABS =========================                        7.3    5.98  )-----------( 100      ( 23 Dec 2023 01:23 )             21.7     12-23    145  |  111<H>  |  60<H>  ----------------------------<  176<H>  3.6   |  26  |  1.98<H>    Ca    7.8<L>      23 Dec 2023 01:23  Phos  3.3     12-23  Mg     2.5     12-23    TPro  5.2<L>  /  Alb  3.1<L>  /  TBili  0.8  /  DBili  x   /  AST  58<H>  /  ALT  80<H>  /  AlkPhos  41  12-23    Troponin T, High Sensitivity Result: 2406 ng/L (12-20-23 @ 18:01)    CKMB Units: 28.7 ng/mL (12-20-23 @ 18:01)    Creatine Kinase, Serum: 745 U/L (12-20-23 @ 18:01)    CPK Mass Assay %: 3.9 % (12-20-23 @ 18:01)        LIVER FUNCTIONS - ( 23 Dec 2023 01:23 )  Alb: 3.1 g/dL / Pro: 5.2 g/dL / ALK PHOS: 41 U/L / ALT: 80 U/L / AST: 58 U/L / GGT: x           PT/INR - ( 23 Dec 2023 01:23 )   PT: 11.4 sec;   INR: 1.09 ratio         PTT - ( 23 Dec 2023 01:23 )  PTT:26.4 sec  ABG - ( 23 Dec 2023 00:50 )  pH, Arterial: 7.47  pH, Blood: x     /  pCO2: 36    /  pO2: 167   / HCO3: 26    / Base Excess: 2.4   /  SaO2: 98.3              Blood Gas Arterial, Lactate: 1.2 mmol/L (12-23-23 @ 00:50)  Blood Gas Arterial, Lactate: 1.4 mmol/L (12-22-23 @ 00:43)  Blood Gas Arterial, Lactate: 1.0 mmol/L (12-21-23 @ 00:53)  Blood Gas Arterial, Lactate: 1.7 mmol/L (12-20-23 @ 17:50)    Urinalysis Basic - ( 23 Dec 2023 01:23 )    Color: x / Appearance: x / SG: x / pH: x  Gluc: 176 mg/dL / Ketone: x  / Bili: x / Urobili: x   Blood: x / Protein: x / Nitrite: x   Leuk Esterase: x / RBC: x / WBC x   Sq Epi: x / Non Sq Epi: x / Bacteria: x      ======================Micro/Rad/Cardio=================  Telemetry: Reviewed   EKG: Reviewed  CXR: Reviewed  Culture: Reviewed   Echo:   Cath: see sunrise for details  ======================================================  PAST MEDICAL & SURGICAL HISTORY:  HTN (hypertension)      HLD (hyperlipidemia)            ASHLEY PRITCHARD  MRN-[id number]  Patient is a 78y old  Male who presents with a chief complaint of Cardiac Arrest (22 Dec 2023 21:01)    HPI:  78M w/ PMH HTN HLD presents as transfer from [facility] s/p cardiac arrest. Per reports patient had a witness fall and arrest at home. EMS performed CPR en route to hospital was noted to be in VTach and shocked x2. ROSC was obtained just prior to arrival at [facility]. Pt was intubated placed on levo gtt. On arrival pt noted to have lacerated to R forehead. Pt had CT scans that were negative for intracranial hemorrhage, C-spine fracture, facial fractures. A R frontal hematoma was noted.     On arrival patient w/ dried blood on scalp. Also noted to have bleeding from oral mucosa w/ clots requiring suctioning. Pt otherwise off sedation and non-responsive. Per EMS report pt did receive some sedation and paralytics at [facility].     Per family patient has been feeling 'off' but managing his day to day and did not see a physician. Pt prescribed klonopin by outpatient provider and family believes pt may have been taking more than prescribed. On day of arrest, pt was initially asymptomatic carrying out his routine. He went to the bathroom, wife heard a loud thud and found patient in a pool of blood prompting call to EMS. Pt has not followed with any cardiologist. Has not had any syncope or other events preceding this.  (19 Dec 2023 14:54)      24 HOUR EVENTS: Pt found to have b/l upper and lower extremity DVTs          ICU Vital Signs Last 24 Hrs  T(C): 37.3 (23 Dec 2023 03:00), Max: 39.4 (22 Dec 2023 13:00)  T(F): 99.2 (23 Dec 2023 03:00), Max: 103 (22 Dec 2023 13:00)  HR: 60 (23 Dec 2023 05:00) (59 - 91)  BP: --  BP(mean): --  ABP: 100/50 (23 Dec 2023 05:00) (100/50 - 183/57)  ABP(mean): 69 (23 Dec 2023 05:00) (63 - 92)  RR: 25 (23 Dec 2023 05:00) (13 - 37)  SpO2: 100% (23 Dec 2023 05:00) (98% - 100%)    O2 Parameters below as of 23 Dec 2023 05:00  Patient On (Oxygen Delivery Method): ventilator          Mode: AC/ CMV (Assist Control/ Continuous Mandatory Ventilation), RR (machine): 14, TV (machine): 500, FiO2: 40, PEEP: 5, ITime: 1  CVP(mm Hg): 9 (12-22-23 @ 09:30) (7 - 18)  CO: --  CI: --  PA: --  PA(mean): --  PA(direct): --  PCWP: --  LA: --  RA: --  SVR: --  SVRI: --  PVR: --  PVRI: --  I&O's Summary    21 Dec 2023 07:01  -  22 Dec 2023 07:00  --------------------------------------------------------  IN: 401.3 mL / OUT: 1190 mL / NET: -788.7 mL    22 Dec 2023 07:01  -  23 Dec 2023 06:45  --------------------------------------------------------  IN: 1465.8 mL / OUT: 1195 mL / NET: 270.8 mL        CAPILLARY BLOOD GLUCOSE    CAPILLARY BLOOD GLUCOSE      POCT Blood Glucose.: 196 mg/dL (23 Dec 2023 05:55)      PHYSICAL EXAM:  GENERAL: Pt intubated, not responsive to sternal rub or noxious stimuli  . Notable trauma to head.   HEAD:  +traumatic head, Normocephalic  EYES: . Periorbital edema/erythma on R. conjunctiva and sclera clear. Pupils reactive to light   ENMT: ET tube in place. Dry mucous membranes. Notable blood in oral cavity   NECK: Supple  HEART: Regular rate and rhythm; No murmurs, rubs, or gallops  RESPIRATORY: Mechanical breath sounds b/l. No notable crackles, wheezes  ABDOMEN: Soft, Nontender, Nondistended  NEUROLOGY: A&Ox0. Pupils reactive. Non-responsive to sternal stimuli off sedation.  EXTREMITIES:  1+ Peripheral Pulses, No clubbing, cyanosis, or edema  SKIN: warm, dry, normal color    ============================I/O===========================   I&O's Detail    21 Dec 2023 07:01  -  22 Dec 2023 07:00  --------------------------------------------------------  IN:    Dexmedetomidine: 101.3 mL    Enteral Tube Flush: 50 mL    IV PiggyBack: 250 mL  Total IN: 401.3 mL    OUT:    Dexmedetomidine: 0 mL    Indwelling Catheter - Urethral (mL): 1190 mL  Total OUT: 1190 mL    Total NET: -788.7 mL      22 Dec 2023 07:01  -  23 Dec 2023 06:45  --------------------------------------------------------  IN:    Dexmedetomidine: 65.8 mL    Enteral Tube Flush: 120 mL    IV PiggyBack: 550 mL    Jevity 1.2: 730 mL  Total IN: 1465.8 mL    OUT:    Indwelling Catheter - Urethral (mL): 1195 mL  Total OUT: 1195 mL    Total NET: 270.8 mL        ============================ LABS =========================                        7.3    5.98  )-----------( 100      ( 23 Dec 2023 01:23 )             21.7     12-23    145  |  111<H>  |  60<H>  ----------------------------<  176<H>  3.6   |  26  |  1.98<H>    Ca    7.8<L>      23 Dec 2023 01:23  Phos  3.3     12-23  Mg     2.5     12-23    TPro  5.2<L>  /  Alb  3.1<L>  /  TBili  0.8  /  DBili  x   /  AST  58<H>  /  ALT  80<H>  /  AlkPhos  41  12-23    Troponin T, High Sensitivity Result: 2406 ng/L (12-20-23 @ 18:01)    CKMB Units: 28.7 ng/mL (12-20-23 @ 18:01)    Creatine Kinase, Serum: 745 U/L (12-20-23 @ 18:01)    CPK Mass Assay %: 3.9 % (12-20-23 @ 18:01)        LIVER FUNCTIONS - ( 23 Dec 2023 01:23 )  Alb: 3.1 g/dL / Pro: 5.2 g/dL / ALK PHOS: 41 U/L / ALT: 80 U/L / AST: 58 U/L / GGT: x           PT/INR - ( 23 Dec 2023 01:23 )   PT: 11.4 sec;   INR: 1.09 ratio         PTT - ( 23 Dec 2023 01:23 )  PTT:26.4 sec  ABG - ( 23 Dec 2023 00:50 )  pH, Arterial: 7.47  pH, Blood: x     /  pCO2: 36    /  pO2: 167   / HCO3: 26    / Base Excess: 2.4   /  SaO2: 98.3              Blood Gas Arterial, Lactate: 1.2 mmol/L (12-23-23 @ 00:50)  Blood Gas Arterial, Lactate: 1.4 mmol/L (12-22-23 @ 00:43)  Blood Gas Arterial, Lactate: 1.0 mmol/L (12-21-23 @ 00:53)  Blood Gas Arterial, Lactate: 1.7 mmol/L (12-20-23 @ 17:50)    Urinalysis Basic - ( 23 Dec 2023 01:23 )    Color: x / Appearance: x / SG: x / pH: x  Gluc: 176 mg/dL / Ketone: x  / Bili: x / Urobili: x   Blood: x / Protein: x / Nitrite: x   Leuk Esterase: x / RBC: x / WBC x   Sq Epi: x / Non Sq Epi: x / Bacteria: x      ======================Micro/Rad/Cardio=================  Telemetry: Reviewed   EKG: Reviewed  CXR: Reviewed  Culture: Reviewed   Echo:   Cath: see sunrise for details  ======================================================  PAST MEDICAL & SURGICAL HISTORY:  HTN (hypertension)      HLD (hyperlipidemia)            ASHLEY PRITCHARD  MRN-[id number]  Patient is a 78y old  Male who presents with a chief complaint of Cardiac Arrest (22 Dec 2023 21:01)    HPI:  78M w/ PMH HTN HLD presents as transfer from [facility] s/p cardiac arrest. Per reports patient had a witness fall and arrest at home. EMS performed CPR en route to hospital was noted to be in VTach and shocked x2. ROSC was obtained just prior to arrival at [facility]. Pt was intubated placed on levo gtt. On arrival pt noted to have lacerated to R forehead. Pt had CT scans that were negative for intracranial hemorrhage, C-spine fracture, facial fractures. A R frontal hematoma was noted.     On arrival patient w/ dried blood on scalp. Also noted to have bleeding from oral mucosa w/ clots requiring suctioning. Pt otherwise off sedation and non-responsive. Per EMS report pt did receive some sedation and paralytics at [facility].     Per family patient has been feeling 'off' but managing his day to day and did not see a physician. Pt prescribed klonopin by outpatient provider and family believes pt may have been taking more than prescribed. On day of arrest, pt was initially asymptomatic carrying out his routine. He went to the bathroom, wife heard a loud thud and found patient in a pool of blood prompting call to EMS. Pt has not followed with any cardiologist. Has not had any syncope or other events preceding this.  (19 Dec 2023 14:54)      24 HOUR EVENTS: Pt found to have b/l upper and lower extremity DVTs          ICU Vital Signs Last 24 Hrs  T(C): 37.3 (23 Dec 2023 03:00), Max: 39.4 (22 Dec 2023 13:00)  T(F): 99.2 (23 Dec 2023 03:00), Max: 103 (22 Dec 2023 13:00)  HR: 60 (23 Dec 2023 05:00) (59 - 91)  BP: --  BP(mean): --  ABP: 100/50 (23 Dec 2023 05:00) (100/50 - 183/57)  ABP(mean): 69 (23 Dec 2023 05:00) (63 - 92)  RR: 25 (23 Dec 2023 05:00) (13 - 37)  SpO2: 100% (23 Dec 2023 05:00) (98% - 100%)    O2 Parameters below as of 23 Dec 2023 05:00  Patient On (Oxygen Delivery Method): ventilator          Mode: AC/ CMV (Assist Control/ Continuous Mandatory Ventilation), RR (machine): 14, TV (machine): 500, FiO2: 40, PEEP: 5, ITime: 1  CVP(mm Hg): 9 (12-22-23 @ 09:30) (7 - 18)  CO: --  CI: --  PA: --  PA(mean): --  PA(direct): --  PCWP: --  LA: --  RA: --  SVR: --  SVRI: --  PVR: --  PVRI: --  I&O's Summary    21 Dec 2023 07:01  -  22 Dec 2023 07:00  --------------------------------------------------------  IN: 401.3 mL / OUT: 1190 mL / NET: -788.7 mL    22 Dec 2023 07:01  -  23 Dec 2023 06:45  --------------------------------------------------------  IN: 1465.8 mL / OUT: 1195 mL / NET: 270.8 mL        CAPILLARY BLOOD GLUCOSE    CAPILLARY BLOOD GLUCOSE      POCT Blood Glucose.: 196 mg/dL (23 Dec 2023 05:55)      PHYSICAL EXAM:  GENERAL: Pt intubated, not responsive to sternal rub or noxious stimuli  . Notable trauma to head.   HEAD:  +traumatic head, Normocephalic  EYES: . Periorbital edema/erythma on R. conjunctiva and sclera clear. Pupils reactive to light   ENMT: ET tube in place. Dry mucous membranes. Notable blood in oral cavity   NECK: Supple  HEART: Regular rate and rhythm; No murmurs, rubs, or gallops  RESPIRATORY: Mechanical breath sounds b/l. No notable crackles, wheezes  ABDOMEN: Soft, Nontender, Nondistended  NEUROLOGY: A&Ox0. Pupils reactive. Non-responsive to sternal stimuli off sedation.  EXTREMITIES:  1+ Peripheral Pulses, No clubbing, cyanosis, or edema  SKIN: warm, dry, normal color    ============================I/O===========================   I&O's Detail    21 Dec 2023 07:01  -  22 Dec 2023 07:00  --------------------------------------------------------  IN:    Dexmedetomidine: 101.3 mL    Enteral Tube Flush: 50 mL    IV PiggyBack: 250 mL  Total IN: 401.3 mL    OUT:    Dexmedetomidine: 0 mL    Indwelling Catheter - Urethral (mL): 1190 mL  Total OUT: 1190 mL    Total NET: -788.7 mL      22 Dec 2023 07:01  -  23 Dec 2023 06:45  --------------------------------------------------------  IN:    Dexmedetomidine: 65.8 mL    Enteral Tube Flush: 120 mL    IV PiggyBack: 550 mL    Jevity 1.2: 730 mL  Total IN: 1465.8 mL    OUT:    Indwelling Catheter - Urethral (mL): 1195 mL  Total OUT: 1195 mL    Total NET: 270.8 mL        ============================ LABS =========================                        7.3    5.98  )-----------( 100      ( 23 Dec 2023 01:23 )             21.7     12-23    145  |  111<H>  |  60<H>  ----------------------------<  176<H>  3.6   |  26  |  1.98<H>    Ca    7.8<L>      23 Dec 2023 01:23  Phos  3.3     12-23  Mg     2.5     12-23    TPro  5.2<L>  /  Alb  3.1<L>  /  TBili  0.8  /  DBili  x   /  AST  58<H>  /  ALT  80<H>  /  AlkPhos  41  12-23    Troponin T, High Sensitivity Result: 2406 ng/L (12-20-23 @ 18:01)    CKMB Units: 28.7 ng/mL (12-20-23 @ 18:01)    Creatine Kinase, Serum: 745 U/L (12-20-23 @ 18:01)    CPK Mass Assay %: 3.9 % (12-20-23 @ 18:01)        LIVER FUNCTIONS - ( 23 Dec 2023 01:23 )  Alb: 3.1 g/dL / Pro: 5.2 g/dL / ALK PHOS: 41 U/L / ALT: 80 U/L / AST: 58 U/L / GGT: x           PT/INR - ( 23 Dec 2023 01:23 )   PT: 11.4 sec;   INR: 1.09 ratio         PTT - ( 23 Dec 2023 01:23 )  PTT:26.4 sec  ABG - ( 23 Dec 2023 00:50 )  pH, Arterial: 7.47  pH, Blood: x     /  pCO2: 36    /  pO2: 167   / HCO3: 26    / Base Excess: 2.4   /  SaO2: 98.3              Blood Gas Arterial, Lactate: 1.2 mmol/L (12-23-23 @ 00:50)  Blood Gas Arterial, Lactate: 1.4 mmol/L (12-22-23 @ 00:43)  Blood Gas Arterial, Lactate: 1.0 mmol/L (12-21-23 @ 00:53)  Blood Gas Arterial, Lactate: 1.7 mmol/L (12-20-23 @ 17:50)    Urinalysis Basic - ( 23 Dec 2023 01:23 )    Color: x / Appearance: x / SG: x / pH: x  Gluc: 176 mg/dL / Ketone: x  / Bili: x / Urobili: x   Blood: x / Protein: x / Nitrite: x   Leuk Esterase: x / RBC: x / WBC x   Sq Epi: x / Non Sq Epi: x / Bacteria: x      ======================Micro/Rad/Cardio=================  Telemetry: Reviewed   EKG: Reviewed  CXR: Reviewed  Culture: Reviewed   Echo:   Cath: see sunrise for details  ======================================================  PAST MEDICAL & SURGICAL HISTORY:  HTN (hypertension)      HLD (hyperlipidemia)            ASHLEY PRITCHARD  MRN-[id number]  Patient is a 78y old  Male who presents with a chief complaint of Cardiac Arrest (22 Dec 2023 21:01)    HPI:  78M w/ PMH HTN HLD presents as transfer from [facility] s/p cardiac arrest. Per reports patient had a witness fall and arrest at home. EMS performed CPR en route to hospital was noted to be in VTach and shocked x2. ROSC was obtained just prior to arrival at [facility]. Pt was intubated placed on levo gtt. On arrival pt noted to have lacerated to R forehead. Pt had CT scans that were negative for intracranial hemorrhage, C-spine fracture, facial fractures. A R frontal hematoma was noted.     On arrival patient w/ dried blood on scalp. Also noted to have bleeding from oral mucosa w/ clots requiring suctioning. Pt otherwise off sedation and non-responsive. Per EMS report pt did receive some sedation and paralytics at [facility].     Per family patient has been feeling 'off' but managing his day to day and did not see a physician. Pt prescribed klonopin by outpatient provider and family believes pt may have been taking more than prescribed. On day of arrest, pt was initially asymptomatic carrying out his routine. He went to the bathroom, wife heard a loud thud and found patient in a pool of blood prompting call to EMS. Pt has not followed with any cardiologist. Has not had any syncope or other events preceding this.  (19 Dec 2023 14:54)      24 HOUR EVENTS: Pt found to have b/l upper and lower extremity DVTs          ICU Vital Signs Last 24 Hrs  T(C): 37.3 (23 Dec 2023 03:00), Max: 39.4 (22 Dec 2023 13:00)  T(F): 99.2 (23 Dec 2023 03:00), Max: 103 (22 Dec 2023 13:00)  HR: 60 (23 Dec 2023 05:00) (59 - 91)  BP: --  BP(mean): --  ABP: 100/50 (23 Dec 2023 05:00) (100/50 - 183/57)  ABP(mean): 69 (23 Dec 2023 05:00) (63 - 92)  RR: 25 (23 Dec 2023 05:00) (13 - 37)  SpO2: 100% (23 Dec 2023 05:00) (98% - 100%)    O2 Parameters below as of 23 Dec 2023 05:00  Patient On (Oxygen Delivery Method): ventilator          Mode: AC/ CMV (Assist Control/ Continuous Mandatory Ventilation), RR (machine): 14, TV (machine): 500, FiO2: 40, PEEP: 5, ITime: 1  CVP(mm Hg): 9 (12-22-23 @ 09:30) (7 - 18)  CO: --  CI: --  PA: --  PA(mean): --  PA(direct): --  PCWP: --  LA: --  RA: --  SVR: --  SVRI: --  PVR: --  PVRI: --  I&O's Summary    21 Dec 2023 07:01  -  22 Dec 2023 07:00  --------------------------------------------------------  IN: 401.3 mL / OUT: 1190 mL / NET: -788.7 mL    22 Dec 2023 07:01  -  23 Dec 2023 06:45  --------------------------------------------------------  IN: 1465.8 mL / OUT: 1195 mL / NET: 270.8 mL        CAPILLARY BLOOD GLUCOSE    CAPILLARY BLOOD GLUCOSE      POCT Blood Glucose.: 196 mg/dL (23 Dec 2023 05:55)      PHYSICAL EXAM:  GENERAL: Pt intubated, not responsive to sternal rub or noxious stimuli  . Notable trauma to head.   HEAD:  +traumatic head, Normocephalic  EYES: . Periorbital edema/erythma on R. conjunctiva and sclera clear. Pupils reactive to light   ENMT: ET tube in place. Dry mucous membranes. Notable blood in oral cavity   NECK: Supple  HEART: Regular rate and rhythm; No murmurs, rubs, or gallops  RESPIRATORY: Mechanical breath sounds b/l. No notable crackles, wheezes  ABDOMEN: Soft, Nontender, Nondistended  NEUROLOGY: A&Ox0. Pupils reactive. Non-responsive to sternal stimuli off sedation, + gag   EXTREMITIES:  1+ Peripheral Pulses, No clubbing, cyanosis, or edema  SKIN: warm, dry, normal color    ============================I/O===========================   I&O's Detail    21 Dec 2023 07:01  -  22 Dec 2023 07:00  --------------------------------------------------------  IN:    Dexmedetomidine: 101.3 mL    Enteral Tube Flush: 50 mL    IV PiggyBack: 250 mL  Total IN: 401.3 mL    OUT:    Dexmedetomidine: 0 mL    Indwelling Catheter - Urethral (mL): 1190 mL  Total OUT: 1190 mL    Total NET: -788.7 mL      22 Dec 2023 07:01  -  23 Dec 2023 06:45  --------------------------------------------------------  IN:    Dexmedetomidine: 65.8 mL    Enteral Tube Flush: 120 mL    IV PiggyBack: 550 mL    Jevity 1.2: 730 mL  Total IN: 1465.8 mL    OUT:    Indwelling Catheter - Urethral (mL): 1195 mL  Total OUT: 1195 mL    Total NET: 270.8 mL        ============================ LABS =========================                        7.3    5.98  )-----------( 100      ( 23 Dec 2023 01:23 )             21.7     12-23    145  |  111<H>  |  60<H>  ----------------------------<  176<H>  3.6   |  26  |  1.98<H>    Ca    7.8<L>      23 Dec 2023 01:23  Phos  3.3     12-23  Mg     2.5     12-23    TPro  5.2<L>  /  Alb  3.1<L>  /  TBili  0.8  /  DBili  x   /  AST  58<H>  /  ALT  80<H>  /  AlkPhos  41  12-23    Troponin T, High Sensitivity Result: 2406 ng/L (12-20-23 @ 18:01)    CKMB Units: 28.7 ng/mL (12-20-23 @ 18:01)    Creatine Kinase, Serum: 745 U/L (12-20-23 @ 18:01)    CPK Mass Assay %: 3.9 % (12-20-23 @ 18:01)        LIVER FUNCTIONS - ( 23 Dec 2023 01:23 )  Alb: 3.1 g/dL / Pro: 5.2 g/dL / ALK PHOS: 41 U/L / ALT: 80 U/L / AST: 58 U/L / GGT: x           PT/INR - ( 23 Dec 2023 01:23 )   PT: 11.4 sec;   INR: 1.09 ratio         PTT - ( 23 Dec 2023 01:23 )  PTT:26.4 sec  ABG - ( 23 Dec 2023 00:50 )  pH, Arterial: 7.47  pH, Blood: x     /  pCO2: 36    /  pO2: 167   / HCO3: 26    / Base Excess: 2.4   /  SaO2: 98.3              Blood Gas Arterial, Lactate: 1.2 mmol/L (12-23-23 @ 00:50)  Blood Gas Arterial, Lactate: 1.4 mmol/L (12-22-23 @ 00:43)  Blood Gas Arterial, Lactate: 1.0 mmol/L (12-21-23 @ 00:53)  Blood Gas Arterial, Lactate: 1.7 mmol/L (12-20-23 @ 17:50)    Urinalysis Basic - ( 23 Dec 2023 01:23 )    Color: x / Appearance: x / SG: x / pH: x  Gluc: 176 mg/dL / Ketone: x  / Bili: x / Urobili: x   Blood: x / Protein: x / Nitrite: x   Leuk Esterase: x / RBC: x / WBC x   Sq Epi: x / Non Sq Epi: x / Bacteria: x      ======================Micro/Rad/Cardio=================  Telemetry: Reviewed   EKG: Reviewed  CXR: Reviewed  Culture: Reviewed   Echo:   Cath: see sunrise for details  ======================================================  PAST MEDICAL & SURGICAL HISTORY:  HTN (hypertension)      HLD (hyperlipidemia)            ASHLEY PRITCHARD  MRN-[id number]  Patient is a 78y old  Male who presents with a chief complaint of Cardiac Arrest (22 Dec 2023 21:01)    HPI:  78M w/ PMH HTN HLD presents as transfer from [facility] s/p cardiac arrest. Per reports patient had a witness fall and arrest at home. EMS performed CPR en route to hospital was noted to be in VTach and shocked x2. ROSC was obtained just prior to arrival at [facility]. Pt was intubated placed on levo gtt. On arrival pt noted to have lacerated to R forehead. Pt had CT scans that were negative for intracranial hemorrhage, C-spine fracture, facial fractures. A R frontal hematoma was noted.     On arrival patient w/ dried blood on scalp. Also noted to have bleeding from oral mucosa w/ clots requiring suctioning. Pt otherwise off sedation and non-responsive. Per EMS report pt did receive some sedation and paralytics at [facility].     Per family patient has been feeling 'off' but managing his day to day and did not see a physician. Pt prescribed klonopin by outpatient provider and family believes pt may have been taking more than prescribed. On day of arrest, pt was initially asymptomatic carrying out his routine. He went to the bathroom, wife heard a loud thud and found patient in a pool of blood prompting call to EMS. Pt has not followed with any cardiologist. Has not had any syncope or other events preceding this.  (19 Dec 2023 14:54)      24 HOUR EVENTS: Pt found to have b/l upper and lower extremity DVTs          ICU Vital Signs Last 24 Hrs  T(C): 37.3 (23 Dec 2023 03:00), Max: 39.4 (22 Dec 2023 13:00)  T(F): 99.2 (23 Dec 2023 03:00), Max: 103 (22 Dec 2023 13:00)  HR: 60 (23 Dec 2023 05:00) (59 - 91)  BP: --  BP(mean): --  ABP: 100/50 (23 Dec 2023 05:00) (100/50 - 183/57)  ABP(mean): 69 (23 Dec 2023 05:00) (63 - 92)  RR: 25 (23 Dec 2023 05:00) (13 - 37)  SpO2: 100% (23 Dec 2023 05:00) (98% - 100%)    O2 Parameters below as of 23 Dec 2023 05:00  Patient On (Oxygen Delivery Method): ventilator          Mode: AC/ CMV (Assist Control/ Continuous Mandatory Ventilation), RR (machine): 14, TV (machine): 500, FiO2: 40, PEEP: 5, ITime: 1  CVP(mm Hg): 9 (12-22-23 @ 09:30) (7 - 18)  CO: --  CI: --  PA: --  PA(mean): --  PA(direct): --  PCWP: --  LA: --  RA: --  SVR: --  SVRI: --  PVR: --  PVRI: --  I&O's Summary    21 Dec 2023 07:01  -  22 Dec 2023 07:00  --------------------------------------------------------  IN: 401.3 mL / OUT: 1190 mL / NET: -788.7 mL    22 Dec 2023 07:01  -  23 Dec 2023 06:45  --------------------------------------------------------  IN: 1465.8 mL / OUT: 1195 mL / NET: 270.8 mL        CAPILLARY BLOOD GLUCOSE    CAPILLARY BLOOD GLUCOSE      POCT Blood Glucose.: 196 mg/dL (23 Dec 2023 05:55)      PHYSICAL EXAM:  GENERAL: Pt intubated, not responsive to sternal rub or noxious stimuli  . Notable trauma to head.   HEAD:  +traumatic head, Normocephalic  EYES: . Periorbital edema/erythma on R. conjunctiva and sclera clear. Pupils reactive to light   ENMT: ET tube in place. Dry mucous membranes. Notable blood in oral cavity   NECK: Supple  HEART: Regular rate and rhythm; No murmurs, rubs, or gallops  RESPIRATORY: Mechanical breath sounds b/l. No notable crackles, wheezes  ABDOMEN: Soft, Nontender, Nondistended  NEUROLOGY: A&Ox0. Pupils reactive. Non-responsive to sternal stimuli off sedation, + gag   EXTREMITIES:  1+ Peripheral Pulses, No clubbing, cyanosis, or edema  SKIN: warm, dry, normal color    ============================I/O===========================   I&O's Detail    21 Dec 2023 07:01  -  22 Dec 2023 07:00  --------------------------------------------------------  IN:    Dexmedetomidine: 101.3 mL    Enteral Tube Flush: 50 mL    IV PiggyBack: 250 mL  Total IN: 401.3 mL    OUT:    Dexmedetomidine: 0 mL    Indwelling Catheter - Urethral (mL): 1190 mL  Total OUT: 1190 mL    Total NET: -788.7 mL      22 Dec 2023 07:01  -  23 Dec 2023 06:45  --------------------------------------------------------  IN:    Dexmedetomidine: 65.8 mL    Enteral Tube Flush: 120 mL    IV PiggyBack: 550 mL    Jevity 1.2: 730 mL  Total IN: 1465.8 mL    OUT:    Indwelling Catheter - Urethral (mL): 1195 mL  Total OUT: 1195 mL    Total NET: 270.8 mL        ============================ LABS =========================                        7.3    5.98  )-----------( 100      ( 23 Dec 2023 01:23 )             21.7     12-23    145  |  111<H>  |  60<H>  ----------------------------<  176<H>  3.6   |  26  |  1.98<H>    Ca    7.8<L>      23 Dec 2023 01:23  Phos  3.3     12-23  Mg     2.5     12-23    TPro  5.2<L>  /  Alb  3.1<L>  /  TBili  0.8  /  DBili  x   /  AST  58<H>  /  ALT  80<H>  /  AlkPhos  41  12-23    Troponin T, High Sensitivity Result: 2406 ng/L (12-20-23 @ 18:01)    CKMB Units: 28.7 ng/mL (12-20-23 @ 18:01)    Creatine Kinase, Serum: 745 U/L (12-20-23 @ 18:01)    CPK Mass Assay %: 3.9 % (12-20-23 @ 18:01)        LIVER FUNCTIONS - ( 23 Dec 2023 01:23 )  Alb: 3.1 g/dL / Pro: 5.2 g/dL / ALK PHOS: 41 U/L / ALT: 80 U/L / AST: 58 U/L / GGT: x           PT/INR - ( 23 Dec 2023 01:23 )   PT: 11.4 sec;   INR: 1.09 ratio         PTT - ( 23 Dec 2023 01:23 )  PTT:26.4 sec  ABG - ( 23 Dec 2023 00:50 )  pH, Arterial: 7.47  pH, Blood: x     /  pCO2: 36    /  pO2: 167   / HCO3: 26    / Base Excess: 2.4   /  SaO2: 98.3              Blood Gas Arterial, Lactate: 1.2 mmol/L (12-23-23 @ 00:50)  Blood Gas Arterial, Lactate: 1.4 mmol/L (12-22-23 @ 00:43)  Blood Gas Arterial, Lactate: 1.0 mmol/L (12-21-23 @ 00:53)  Blood Gas Arterial, Lactate: 1.7 mmol/L (12-20-23 @ 17:50)    Urinalysis Basic - ( 23 Dec 2023 01:23 )    Color: x / Appearance: x / SG: x / pH: x  Gluc: 176 mg/dL / Ketone: x  / Bili: x / Urobili: x   Blood: x / Protein: x / Nitrite: x   Leuk Esterase: x / RBC: x / WBC x   Sq Epi: x / Non Sq Epi: x / Bacteria: x      ======================Micro/Rad/Cardio=================  Telemetry: Reviewed   EKG: Reviewed  CXR: Reviewed  Culture: Reviewed   Echo:   Cath: see sunrise for details  ======================================================  PAST MEDICAL & SURGICAL HISTORY:  HTN (hypertension)      HLD (hyperlipidemia)

## 2023-12-23 NOTE — PROGRESS NOTE ADULT - ATTENDING COMMENTS
77 yo man with HTN, HLD s/p out of hospital cardiac arrest     unresponsive, no response to pain, + gag and pupils respond to light, neuro is following, repeat CTH today, will need to d/w neuro surg given need for AC for DVT   For now will continue to hold DAPT and heparin given ICH   possible afib at OSH per EKG, also SVT overnight ?AT thus he may need AC eventually, but at this point given CHT findings certainly on hold     Acute respiratory failure requiring mechanical ventilation due to cardiac arrest, SBT as tolerated    cont enteral feeds   non oliguric LONA likely due to shock post arrest ?ATN, improving       H/H low but acceptable  SQH DVT ppx given stable bleed, CTH today.  Will need to dw neuro surgery full AC for DVTs      febrile, started on vanc and zosyn yesterday, + DVT studies and is possible cause of fevers   Sugars overall controlled ,cont coverage 79 yo man with HTN, HLD s/p out of hospital cardiac arrest     unresponsive, no response to pain, + gag and pupils respond to light, neuro is following, repeat CTH today, will need to d/w neuro surg given need for AC for DVT   For now will continue to hold DAPT and heparin given ICH   possible afib at OSH per EKG, also SVT overnight ?AT thus he may need AC eventually, but at this point given CHT findings certainly on hold     Acute respiratory failure requiring mechanical ventilation due to cardiac arrest, SBT as tolerated    cont enteral feeds   non oliguric LONA likely due to shock post arrest ?ATN, improving       H/H low but acceptable  SQH DVT ppx given stable bleed, CTH today.  Will need to dw neuro surgery full AC for DVTs      febrile, started on vanc and zosyn yesterday, + DVT studies and is possible cause of fevers   Sugars overall controlled ,cont coverage

## 2023-12-23 NOTE — EEG REPORT - NS EEG TEXT BOX
[facility]   COMPREHENSIVE EPILEPSY CENTER   REPORT OF CONTINUOUS VIDEO EEG     [facility]: [address], [place] [address], Ph#: [phone]  [facility]:  [address] [place] [address], Ph#: [phone]  [facility]: [address] [place] [address], Ph#: [phone]    Patient Name: ASHLEY PRITCHARD  Age and : 78y ([birth year])  MRN #: [id number]  Location: [place] [address]  Referring Physician: Kaiden Mejia    Start Time/Date: 0800 on 23  End Time/Date: 08:00 on 23  Duration: 24H    _____________________________________________________________  STUDY INFORMATION    EEG Recording Technique:  The patient underwent continuous Video-EEG monitoring, using Telemetry System hardware on the XLTek Digital System. EEG and video data were stored on a computer hard drive with important events saved in digital archive files. The material was reviewed by a physician (electroencephalographer / epileptologist) on a daily basis. Jovani and seizure detection algorithms were utilized and reviewed. An EEG Technician attended to the patient, and was available throughout daytime work hours.  The epilepsy center neurologist was available in person or on call 24-hours per day.    EEG Placement and Labeling of Electrodes:  The EEG was performed utilizing 20 channel referential EEG connections (coronal over temporal over parasagittal montage) using all standard 10-20 electrode placements with EKG, with additional electrodes placed in the inferior temporal region using the modified 10-10 montage electrode placements for elective admissions, or if deemed necessary. Recording was at a sampling rate of 256 samples per second per channel. Time synchronized digital video recording was done simultaneously with EEG recording. A low light infrared camera was used for low light recording.     _____________________________________________________________  HISTORY    Patient is a 78y old  Male who presents with a chief complaint of Cardiac Arrest (20 Dec 2023 08:29)    _____________________________________________________________  STUDY INTERPRETATION    Findings: The background was mostly suppressed with minimal reactivity.  No posterior dominant rhythm seen.    Background Slowing:  As above.    Focal Slowing:   None were present.    Sleep Background:  Drowsiness and stage II sleep transients were not recorded.    Other Non-Epileptiform Findings:  None were present.    Interictal Epileptiform Activity:   None were present.    Events:  Clinical events: No push button events or seizures    Activation Procedures:   Hyperventilation was not performed.    Photic stimulation was not performed.     Artifacts:  Intermittent myogenic and movement artifacts were noted.  _____________________________________________________________  EEG SUMMARY/CLASSIFICATION    Abnormal EEG   - Severe generalized slowing   _____________________________________________________________  EEG IMPRESSION/CLINICAL CORRELATE    Abnormal EEG study.  Severe nonspecific diffuse or multifocal cerebral dysfunction,   No epileptiform pattern or seizure seen.    Declan Angeles MD   of Neurology  Epilepsy/EEG Attending   [facility]   COMPREHENSIVE EPILEPSY CENTER   REPORT OF CONTINUOUS VIDEO EEG     [facility]: [address], [place] [address], Ph#: [phone]  [facility]:  [address] [place] [address], Ph#: [phone]  [facility]: [address] [place] [address], Ph#: [phone]    Patient Name: ASHLEY PRITCHARD  Age and : 78y ([birth year])  MRN #: [id number]  Location: [place] [address]  Referring Physician: Kaiden Mejia    Start Time/Date: 0800 on 23  End Time/Date: 08:00 on 23  Duration: 24H    _____________________________________________________________  STUDY INFORMATION    EEG Recording Technique:  The patient underwent continuous Video-EEG monitoring, using Telemetry System hardware on the XLTek Digital System. EEG and video data were stored on a computer hard drive with important events saved in digital archive files. The material was reviewed by a physician (electroencephalographer / epileptologist) on a daily basis. Jovani and seizure detection algorithms were utilized and reviewed. An EEG Technician attended to the patient, and was available throughout daytime work hours.  The epilepsy center neurologist was available in person or on call 24-hours per day.    EEG Placement and Labeling of Electrodes:  The EEG was performed utilizing 20 channel referential EEG connections (coronal over temporal over parasagittal montage) using all standard 10-20 electrode placements with EKG, with additional electrodes placed in the inferior temporal region using the modified 10-10 montage electrode placements for elective admissions, or if deemed necessary. Recording was at a sampling rate of 256 samples per second per channel. Time synchronized digital video recording was done simultaneously with EEG recording. A low light infrared camera was used for low light recording.     _____________________________________________________________  HISTORY    Patient is a 78y old  Male who presents with a chief complaint of Cardiac Arrest (20 Dec 2023 08:29)    _____________________________________________________________  STUDY INTERPRETATION    Findings: The background was mostly suppressed with minimal reactivity.  No posterior dominant rhythm seen.    Background Slowing:  As above.    Focal Slowing:   None were present.    Sleep Background:  Drowsiness and stage II sleep transients were not recorded.    Other Non-Epileptiform Findings:  None were present.    Interictal Epileptiform Activity:   None were present.    Events:  Clinical events: No push button events or seizures    Activation Procedures:   Hyperventilation was not performed.    Photic stimulation was not performed.     Artifacts:  Intermittent myogenic and movement artifacts were noted.  _____________________________________________________________  EEG SUMMARY/CLASSIFICATION    Abnormal EEG   - Severe generalized slowing   _____________________________________________________________  EEG IMPRESSION/CLINICAL CORRELATE    Abnormal EEG study.  Severe nonspecific diffuse or multifocal cerebral dysfunction,   No epileptiform pattern or seizure seen.    Declan Angeles MD   of Neurology  Epilepsy/EEG Attending   [facility]   COMPREHENSIVE EPILEPSY CENTER   REPORT OF CONTINUOUS VIDEO EEG     [facility]: [address], [place] [address], Ph#: [phone]  [facility]:  [address] [place] [address], Ph#: [phone]  [facility]: [address] [place] [address], Ph#: [phone]    Patient Name: ASHLEY PRITCHARD  Age and : 78y ([birth year])  MRN #: [id number]  Location: [place] [address]  Referring Physician: Kaiden Mejia    Start Time/Date: 0800 on 23  End Time/Date: 11:00 on 23  Duration: 27H    _____________________________________________________________  STUDY INFORMATION    EEG Recording Technique:  The patient underwent continuous Video-EEG monitoring, using Telemetry System hardware on the XLTek Digital System. EEG and video data were stored on a computer hard drive with important events saved in digital archive files. The material was reviewed by a physician (electroencephalographer / epileptologist) on a daily basis. Jovani and seizure detection algorithms were utilized and reviewed. An EEG Technician attended to the patient, and was available throughout daytime work hours.  The epilepsy center neurologist was available in person or on call 24-hours per day.    EEG Placement and Labeling of Electrodes:  The EEG was performed utilizing 20 channel referential EEG connections (coronal over temporal over parasagittal montage) using all standard 10-20 electrode placements with EKG, with additional electrodes placed in the inferior temporal region using the modified 10-10 montage electrode placements for elective admissions, or if deemed necessary. Recording was at a sampling rate of 256 samples per second per channel. Time synchronized digital video recording was done simultaneously with EEG recording. A low light infrared camera was used for low light recording.     _____________________________________________________________  HISTORY    Patient is a 78y old  Male who presents with a chief complaint of Cardiac Arrest (20 Dec 2023 08:29)    _____________________________________________________________  STUDY INTERPRETATION    Findings: The background was mostly suppressed with minimal reactivity.  No posterior dominant rhythm seen.    Background Slowing:  As above.    Focal Slowing:   None were present.    Sleep Background:  Drowsiness and stage II sleep transients were not recorded.    Other Non-Epileptiform Findings:  None were present.    Interictal Epileptiform Activity:   None were present.    Events:  Clinical events: No push button events or seizures    Activation Procedures:   Hyperventilation was not performed.    Photic stimulation was not performed.     Artifacts:  Intermittent myogenic and movement artifacts were noted.  _____________________________________________________________  EEG SUMMARY/CLASSIFICATION    Abnormal EEG   - Severe generalized slowing   _____________________________________________________________  EEG IMPRESSION/CLINICAL CORRELATE    Abnormal EEG study.  Severe nonspecific diffuse or multifocal cerebral dysfunction,   No epileptiform pattern or seizure seen.    Declan Angeles MD   of Neurology  Epilepsy/EEG Attending   [facility]   COMPREHENSIVE EPILEPSY CENTER   REPORT OF CONTINUOUS VIDEO EEG     [facility]: [address], [place] [address], Ph#: [phone]  [facility]:  [address] [place] [address], Ph#: [phone]  [facility]: [address] [place] [address], Ph#: [phone]    Patient Name: ASHLEY PRITCHARD  Age and : 78y ([birth year])  MRN #: [id number]  Location: [place] [address]  Referring Physician: Kaiden Mejia    Start Time/Date: 0800 on 23  End Time/Date: 11:00 on 23  Duration: 27H    _____________________________________________________________  STUDY INFORMATION    EEG Recording Technique:  The patient underwent continuous Video-EEG monitoring, using Telemetry System hardware on the XLTek Digital System. EEG and video data were stored on a computer hard drive with important events saved in digital archive files. The material was reviewed by a physician (electroencephalographer / epileptologist) on a daily basis. Jovani and seizure detection algorithms were utilized and reviewed. An EEG Technician attended to the patient, and was available throughout daytime work hours.  The epilepsy center neurologist was available in person or on call 24-hours per day.    EEG Placement and Labeling of Electrodes:  The EEG was performed utilizing 20 channel referential EEG connections (coronal over temporal over parasagittal montage) using all standard 10-20 electrode placements with EKG, with additional electrodes placed in the inferior temporal region using the modified 10-10 montage electrode placements for elective admissions, or if deemed necessary. Recording was at a sampling rate of 256 samples per second per channel. Time synchronized digital video recording was done simultaneously with EEG recording. A low light infrared camera was used for low light recording.     _____________________________________________________________  HISTORY    Patient is a 78y old  Male who presents with a chief complaint of Cardiac Arrest (20 Dec 2023 08:29)    _____________________________________________________________  STUDY INTERPRETATION    Findings: The background was mostly suppressed with minimal reactivity.  No posterior dominant rhythm seen.    Background Slowing:  As above.    Focal Slowing:   None were present.    Sleep Background:  Drowsiness and stage II sleep transients were not recorded.    Other Non-Epileptiform Findings:  None were present.    Interictal Epileptiform Activity:   None were present.    Events:  Clinical events: No push button events or seizures    Activation Procedures:   Hyperventilation was not performed.    Photic stimulation was not performed.     Artifacts:  Intermittent myogenic and movement artifacts were noted.  _____________________________________________________________  EEG SUMMARY/CLASSIFICATION    Abnormal EEG   - Severe generalized slowing   _____________________________________________________________  EEG IMPRESSION/CLINICAL CORRELATE    Abnormal EEG study.  Severe nonspecific diffuse or multifocal cerebral dysfunction,   No epileptiform pattern or seizure seen.    Declan nAgeles MD   of Neurology  Epilepsy/EEG Attending

## 2023-12-23 NOTE — PROGRESS NOTE ADULT - ASSESSMENT
78M w/ PMH HTN HLD presents as transfer from [facility] s/p VT arrest s/p shock x2 w/ unknown downtime. Pt found to have trauma to head w/ superficial scalp bleeding and oral mucosal bleeding.       NEURO  #Intubated  Encephalopathy post arrest   - Intubated w/o sedation s/p cardiac arrest w/ unknown down time. Reported to be 20minutes but can be longer given ROSC achieved right before arrival to the hospital   - CTH at [facility] w/o intracranial bleed/pathology. CTH maybe too soon to show evidence of anoxic injury  - Pt remains w/o purposeful movement.   - Neuro consult placed for prognostication  - VEEG in place - no epileptiform activity; diffuse cerebral dysfunction   - Repeat CTH shows stability of bleed and emerging signs of anoxic injury  - f/u neuron specific enolase  - Family meeting w/ neurology attending 12/22 discussed no meaningful recovery. Pending family decision on comfort care.     #SAH  #SDH  - Pt w/ acute SAH and SDH 2/2 trauma  - Was not initially seen in outside scan at [facility] likely performed too early for radiologic evidence  - Neurology following,  - NSGY: no intervention  - Repeat CTH shows stability of bleeds   - Maintain BP control   - Started on HSQ for DVT ppx, will obtain repeat CT scan 12/23/23    #C-spine trauma  Pt w/ fall at home presented w/ c-collar.   -Per [facility] radiology report no evidence of cervical fracture  -Trauma surgery cleared pt of Cspine collar    #Myoclonic jerking  - vEEG: evidence of mycolonic seizures  - Started keppra load  - d/c VEEG    CARDIAC  #VT arrest  - Pt s/p VT arrest w/ unknown downtime. Pt reportedly received shock x2. No prior cardiac hx per family  - Check TTE   - Check cardiac enzymes, BNP  - Monitor for arrhythmias   - Pt not asa/plavix loaded 2/2 substernal hematoma noted on CT chest at [facility]  - Unlikely to be a candidate for [facility] at this time given mental status     #HTN  - Maintain BP control iso brain bleed  - Amlodipine; coreg  - PRN IVP labetalol for elevated SBP  - SBP goal<160    RESPIRATORY   #Intubated  Pt intubated s/p cardiac arrest  - On full vent support: RR 16 /  / PEEP 5 / FiO2 40  - Monitor ABGs    #Oropharynx bleed  - Pt w/ blood in oropharynx requiring frequent suctioning.   - ENT consulted s/p packing     RENAL LONA  Pt presented w/ SCr 1.16, likely near baseline  - trend Cr   - montior I&Os    GI  - Diet: enteral feeds     ENDO:  #Hypothyroidism  - A1c=5.5.   - monitor glucose    HEME/ONC  #Substernal hematoma  - Pt w/ substernal hematoma noted on imaging at [facility] from trauma/cpr  - Repeat CT chest to monitor hematoma  - A/C: HSQ for dvt    ID   #Sepsis  - Pt w/ leukocytosis w/ fevers possibly 2/2  infection  - Pt w/ persistent fevers likely multifactorial 2/2 DVTs, underlying infection, possible ?neurogenic component  - U/A grossly positive. F/u BCx, UCx  - Pt had crash lines placed at [facility]. Possible source of infection. MRSA negative 12/20  - Sputum Cx + for klebsiella  - Broaden to zosyn, vanc      LINES/PPX  - peripherals in tact  - LIJ triple lumen: 12/20  - GOC: Full   78M w/ PMH HTN HLD presents as transfer from [facility] s/p VT arrest s/p shock x2 w/ unknown downtime. Pt found to have trauma to head w/ superficial scalp bleeding and oral mucosal bleeding.       NEURO  #Intubated  Encephalopathy post arrest   - Intubated w/o sedation s/p cardiac arrest w/ unknown down time. Reported to be 20minutes but can be longer given ROSC achieved right before arrival to the hospital   - CTH at [facility] w/o intracranial bleed/pathology. CTH maybe too soon to show evidence of anoxic injury  - Pt remains w/o purposeful movement.   - Neuro consult placed for prognostication  - VEEG in place - no epileptiform activity; diffuse cerebral dysfunction   - Repeat CTH shows stability of bleed and emerging signs of anoxic injury  - f/u neuron specific enolase  - Family meeting w/ neurology attending 12/22 discussed no meaningful recovery. Pending family decision on comfort care.     #SAH  #SDH  - Pt w/ acute SAH and SDH 2/2 trauma  - Was not initially seen in outside scan at [facility] likely performed too early for radiologic evidence  - Neurology following,  - NSGY: no intervention  - Repeat CTH shows stability of bleeds   - Maintain BP control   - Started on HSQ for DVT ppx, will obtain repeat CT scan 12/23/23    #C-spine trauma  Pt w/ fall at home presented w/ c-collar.   -Per [facility] radiology report no evidence of cervical fracture  -Trauma surgery cleared pt of Cspine collar    #Myoclonic jerking  - vEEG: evidence of mycolonic seizures  - Started keppra load  - d/c VEEG    CARDIAC  #VT arrest  - Pt s/p VT arrest w/ unknown downtime. Pt reportedly received shock x2. No prior cardiac hx per family  - Check TTE   - Check cardiac enzymes, BNP  - Monitor for arrhythmias   - Pt not asa/plavix loaded 2/2 substernal hematoma noted on CT chest at [facility]  - Unlikely to be a candidate for [facility] at this time given mental status     #HTN  - Maintain BP control iso brain bleed  - Amlodipine; coreg  - PRN IVP labetalol for elevated SBP  - SBP goal<160    RESPIRATORY   #Intubated  Pt intubated s/p cardiac arrest  - On full vent support: RR 16 /  / PEEP 5 / FiO2 40  - Monitor ABGs    #Oropharynx bleed  - Pt w/ blood in oropharynx requiring frequent suctioning.   - ENT consulted s/p packing     RENAL LONA  Pt presented w/ SCr 1.16, likely near baseline  - trend Cr   - montior I&Os    GI  - Diet: enteral feeds     ENDO:  #Hypothyroidism  - A1c=5.5.   - monitor glucose    HEME/ONC  #Substernal hematoma  - Pt w/ substernal hematoma noted on imaging at [facility] from trauma/cpr  - Repeat CT chest to monitor hematoma  - A/C: HSQ for dvt    ID   #Sepsis  - Pt w/ leukocytosis w/ fevers possibly 2/2  infection  - Pt w/ persistent fevers likely multifactorial 2/2 DVTs, underlying infection, possible ?neurogenic component  - U/A grossly positive. F/u BCx, UCx  - Pt had crash lines placed at [facility]. Possible source of infection. MRSA negative 12/20  - Sputum Cx + for klebsiella  - Broaden to zosyn, vanc      LINES/PPX  - peripherals in tact  - LIJ triple lumen: 12/20  - GOC: Full   78M w/ PMH HTN HLD presents as transfer from [facility] s/p VT arrest s/p shock x2 w/ unknown downtime. Pt found to have trauma to head w/ superficial scalp bleeding and oral mucosal bleeding.       NEURO  #Intubated  Encephalopathy post arrest   - Intubated w/o sedation s/p cardiac arrest w/ unknown down time. Reported to be 20minutes but can be longer given ROSC achieved right before arrival to the hospital   - CTH at [facility] w/o intracranial bleed/pathology. CTH maybe too soon to show evidence of anoxic injury  - Pt remains w/o purposeful movement.   - Neuro consult placed for prognostication  - VEEG in place - no epileptiform activity; diffuse cerebral dysfunction   - Repeat CTH shows stability of bleed and emerging signs of anoxic injury  - f/u neuron specific enolase  - Family meeting w/ neurology attending 12/22 discussed no meaningful recovery. Pending family decision on comfort care.     #SAH  #SDH  - Pt w/ acute SAH and SDH 2/2 trauma  - Was not initially seen in outside scan at [facility] likely performed too early for radiologic evidence  - Neurology following,  - NSGY: no intervention  - Repeat CTH shows stability of bleeds   - Maintain BP control   - Started on HSQ for DVT ppx, will obtain repeat CT scan 12/23/23  - Would need full AC for DVTs. Will discuss w/ NSGY    #C-spine trauma  Pt w/ fall at home presented w/ c-collar.   -Per [facility] radiology report no evidence of cervical fracture  -Trauma surgery cleared pt of Cspine collar    #Myoclonic jerking  - vEEG: evidence of mycolonic seizures  - Started keppra load  - d/c VEEG    CARDIAC  #VT arrest  - Pt s/p VT arrest w/ unknown downtime. Pt reportedly received shock x2. No prior cardiac hx per family  - Check TTE   - Check cardiac enzymes, BNP  - Monitor for arrhythmias   - Pt not asa/plavix loaded 2/2 substernal hematoma noted on CT chest at [facility]  - Unlikely to be a candidate for [facility] at this time given mental status     #HTN  - Maintain BP control iso brain bleed  - Amlodipine; coreg  - PRN IVP labetalol for elevated SBP  - SBP goal<160    RESPIRATORY   #Intubated  Pt intubated s/p cardiac arrest  - On full vent support: RR 16 /  / PEEP 5 / FiO2 40  - Monitor ABGs    #Oropharynx bleed  - Pt w/ blood in oropharynx requiring frequent suctioning.   - ENT consulted s/p packing     RENAL LONA  Pt presented w/ SCr 1.16, likely near baseline  - trend Cr   - montior I&Os    GI  - Diet: enteral feeds     ENDO:  #Hypothyroidism  - A1c=5.5.   - monitor glucose    HEME/ONC  #Substernal hematoma  - Pt w/ substernal hematoma noted on imaging at [facility] from trauma/cpr  - Repeat CT chest to monitor hematoma  - A/C: HSQ for dvt    #Thrombocytopenia  - Pt w/ downtrending PLT. Started downtrending prior to HSQ initiation.   - Likely iso critical illness   - Will send blue top PLT w/ next CBC    ID   #Sepsis  - Pt w/ leukocytosis w/ fevers possibly 2/2  infection  - Pt w/ persistent fevers likely multifactorial 2/2 DVTs, underlying infection, possible ?neurogenic component  - U/A grossly positive. F/u BCx, UCx  - Pt had crash lines placed at [facility]. Possible source of infection. MRSA negative 12/20  - Sputum Cx + for klebsiella  - 12/22 BCx 1 bottle w/ MRSE. Possible contaminant. Check repeat BCx  - Broaden to zosyn, vanc      LINES/PPX  - peripherals in tact  - LIJ triple lumen: 12/20  - GOC: Full   78M w/ PMH HTN HLD presents as transfer from [facility] s/p VT arrest s/p shock x2 w/ unknown downtime. Pt found to have trauma to head w/ superficial scalp bleeding and oral mucosal bleeding.       NEURO  #Intubated  Encephalopathy post arrest   - Intubated w/o sedation s/p cardiac arrest w/ unknown down time. Reported to be 20minutes but can be longer given ROSC achieved right before arrival to the hospital   - CTH at [facility] w/o intracranial bleed/pathology. CTH maybe too soon to show evidence of anoxic injury  - Pt remains w/o purposeful movement.   - Neuro consult placed for prognostication  - VEEG in place - no epileptiform activity; diffuse cerebral dysfunction   - Repeat CTH shows stability of bleed and emerging signs of anoxic injury  - f/u neuron specific enolase  - Family meeting w/ neurology attending 12/22 discussed no meaningful recovery. Pending family decision on comfort care.     #SAH  #SDH  - Pt w/ acute SAH and SDH 2/2 trauma  - Was not initially seen in outside scan at [facility] likely performed too early for radiologic evidence  - Neurology following,  - NSGY: no intervention  - Repeat CTH shows stability of bleeds   - Maintain BP control   - Started on HSQ for DVT ppx, will obtain repeat CT scan 12/23/23  - Would need full AC for DVTs. Will discuss w/ NSGY    #C-spine trauma  Pt w/ fall at home presented w/ c-collar.   -Per [facility] radiology report no evidence of cervical fracture  -Trauma surgery cleared pt of Cspine collar    #Myoclonic jerking  - vEEG: evidence of mycolonic seizures  - Started keppra load  - d/c VEEG    CARDIAC  #VT arrest  - Pt s/p VT arrest w/ unknown downtime. Pt reportedly received shock x2. No prior cardiac hx per family  - Check TTE   - Check cardiac enzymes, BNP  - Monitor for arrhythmias   - Pt not asa/plavix loaded 2/2 substernal hematoma noted on CT chest at [facility]  - Unlikely to be a candidate for [facility] at this time given mental status     #HTN  - Maintain BP control iso brain bleed  - Amlodipine; coreg  - PRN IVP labetalol for elevated SBP  - SBP goal<160    RESPIRATORY   #Intubated  Pt intubated s/p cardiac arrest  - On full vent support: RR 16 /  / PEEP 5 / FiO2 40  - Monitor ABGs    #Oropharynx bleed  - Pt w/ blood in oropharynx requiring frequent suctioning.   - ENT consulted s/p packing     RENAL LONA  Pt presented w/ SCr 1.16, likely near baseline  - trend Cr   - montior I&Os    GI  - Diet: enteral feeds     ENDO:  #Hypothyroidism  - A1c=5.5.   - monitor glucose    HEME/ONC  #Substernal hematoma  - Pt w/ substernal hematoma noted on imaging at [facility] from trauma/cpr  - Repeat CT chest to monitor hematoma  - A/C: HSQ for dvt    #Thrombocytopenia  - Pt w/ downtrending PLT. Started downtrending prior to HSQ initiation.   - Likely iso critical illness   - Will send blue top PLT w/ next CBC    ID   #Sepsis  - Pt w/ leukocytosis w/ fevers possibly 2/2  infection  - Pt w/ persistent fevers likely multifactorial 2/2 DVTs, underlying infection, possible ?neurogenic component  - U/A grossly positive. F/u BCx, UCx  - Pt had crash lines placed at [facility]. Possible source of infection. MRSA negative 12/20  - Sputum Cx + for klebsiella  - 12/22 BCx 1 bottle w/ MRSE. Possible contaminant. Check repeat BCx  - Broaden to zosyn, vanc      LINES/PPX  - peripherals in tact  - LIJ triple lumen: 12/20  - GOC: Full   78M w/ PMH HTN HLD presents as transfer from [facility] s/p VT arrest s/p shock x2 w/ unknown downtime. Pt found to have trauma to head w/ superficial scalp bleeding and oral mucosal bleeding.       NEURO  #Intubated  Encephalopathy post arrest   - Intubated w/o sedation s/p cardiac arrest w/ unknown down time. Reported to be 20minutes but can be longer given ROSC achieved right before arrival to the hospital   - CTH at [facility] w/o intracranial bleed/pathology. CTH maybe too soon to show evidence of anoxic injury  - Pt remains w/o purposeful movement.   - Neuro consulted for prognostication  - VEEG in place - no epileptiform activity; diffuse cerebral dysfunction   - Repeat CTH shows stability of bleed and emerging signs of anoxic injury  - f/u neuron specific enolase  - Family meeting w/ neurology attending 12/22 discussed no meaningful recovery. Pending family decision on comfort care.     #SAH  #SDH  - Pt w/ acute SAH and SDH 2/2 trauma  - Was not initially seen in outside scan at [facility] likely performed too early for radiologic evidence  - Neurology following,  - NSGY: no intervention  - Repeat CTH shows stability of bleeds   - Maintain BP control   - Started on HSQ for DVT ppx, will obtain repeat CT scan 12/23/23  - Would need full AC for DVTs. Will discuss w/ NSGY    #C-spine trauma  Pt w/ fall at home presented w/ c-collar.   -Per [facility] radiology report no evidence of cervical fracture  -Trauma surgery cleared pt of Cspine collar    #Myoclonic jerking  - vEEG: evidence of mycolonic seizures  - Started keppra load  - d/c VEEG    CARDIAC  #VT arrest  - Pt s/p VT arrest w/ unknown downtime. Pt reportedly received shock x2. No prior cardiac hx per family  - Check TTE   - Check cardiac enzymes, BNP  - Monitor for arrhythmias   - Pt not asa/plavix loaded 2/2 substernal hematoma noted on CT chest at [facility]  - Unlikely to be a candidate for [facility] at this time given mental status     #HTN  - Maintain BP control iso brain bleed  - Amlodipine; coreg  - PRN IVP labetalol for elevated SBP  - SBP goal<160    RESPIRATORY   #Intubated  Pt intubated s/p cardiac arrest  - On full vent support: RR 16 /  / PEEP 5 / FiO2 40  - Monitor ABGs    #Oropharynx bleed  - Pt w/ blood in oropharynx requiring frequent suctioning.   - ENT consulted s/p packing     RENAL LONA  Pt presented w/ SCr 1.16, likely near baseline  - trend Cr   - montior I&Os    GI  - Diet: enteral feeds     ENDO:  #Hypothyroidism  - A1c=5.5.   - monitor glucose    HEME/ONC  #Substernal hematoma  - Pt w/ substernal hematoma noted on imaging at [facility] from trauma/cpr  - Repeat CT chest to monitor hematoma  - A/C: HSQ for dvt    #Thrombocytopenia  - Pt w/ downtrending PLT. Started downtrending prior to HSQ initiation.   - Likely iso critical illness   - Will send blue top PLT w/ next CBC    ID   #Sepsis  - Pt w/ leukocytosis w/ fevers possibly 2/2  infection  - Pt w/ persistent fevers likely multifactorial 2/2 DVTs, underlying infection, possible ?neurogenic component  - U/A grossly positive. F/u BCx, UCx  - Pt had crash lines placed at [facility]. Possible source of infection. MRSA negative 12/20  - Sputum Cx + for klebsiella  - 12/22 BCx 1 bottle w/ MRSE. Possible contaminant. Check repeat BCx  - Broaden to zosyn, vanc      LINES/PPX  - peripherals in tact  - LIJ triple lumen: 12/20  - GOC: Full   78M w/ PMH HTN HLD presents as transfer from [facility] s/p VT arrest s/p shock x2 w/ unknown downtime. Pt found to have trauma to head w/ superficial scalp bleeding and oral mucosal bleeding.       NEURO  #Intubated  Encephalopathy post arrest   - Intubated w/o sedation s/p cardiac arrest w/ unknown down time. Reported to be 20minutes but can be longer given ROSC achieved right before arrival to the hospital   - CTH at [facility] w/o intracranial bleed/pathology. CTH maybe too soon to show evidence of anoxic injury  - Pt remains w/o purposeful movement.   - Neuro consulted for prognostication  - VEEG in place - no epileptiform activity; diffuse cerebral dysfunction   - Repeat CTH shows stability of bleed and emerging signs of anoxic injury  - f/u neuron specific enolase  - Family meeting w/ neurology attending 12/22 discussed no meaningful recovery. Pending family decision on comfort care.     #SAH  #SDH  - Pt w/ acute SAH and SDH 2/2 trauma  - Was not initially seen in outside scan at [facility] likely performed too early for radiologic evidence  - Neurology following,  - NSGY: no intervention  - Repeat CTH shows stability of bleeds   - Maintain BP control   - Started on HSQ for DVT ppx, will obtain repeat CT scan 12/23/23  - Would need full AC for DVTs. Will discuss w/ NSGY    #C-spine trauma  Pt w/ fall at home presented w/ c-collar.   -Per [facility] radiology report no evidence of cervical fracture  -Trauma surgery cleared pt of Cspine collar    #Myoclonic jerking  - vEEG: evidence of mycolonic seizures  - Started keppra load  - d/c VEEG    CARDIAC  #VT arrest  - Pt s/p VT arrest w/ unknown downtime. Pt reportedly received shock x2. No prior cardiac hx per family  - Check TTE   - Check cardiac enzymes, BNP  - Monitor for arrhythmias   - Pt not asa/plavix loaded 2/2 substernal hematoma noted on CT chest at [facility]  - Unlikely to be a candidate for [facility] at this time given mental status     #HTN  - Maintain BP control iso brain bleed  - Amlodipine; coreg  - PRN IVP labetalol for elevated SBP  - SBP goal<160    RESPIRATORY   #Intubated  Pt intubated s/p cardiac arrest  - On full vent support: RR 16 /  / PEEP 5 / FiO2 40  - Monitor ABGs    #Oropharynx bleed  - Pt w/ blood in oropharynx requiring frequent suctioning.   - ENT consulted s/p packing     RENAL LONA  Pt presented w/ SCr 1.16, likely near baseline  - trend Cr   - montior I&Os    GI  - Diet: enteral feeds     ENDO:  #Hypothyroidism  - A1c=5.5.   - monitor glucose    HEME/ONC  #Substernal hematoma  - Pt w/ substernal hematoma noted on imaging at [facility] from trauma/cpr  - Repeat CT chest to monitor hematoma  - A/C: HSQ for dvt    #Thrombocytopenia  - Pt w/ downtrending PLT. Started downtrending prior to HSQ initiation.   - Likely iso critical illness   - Will send blue top PLT w/ next CBC    ID   #Sepsis  - Pt w/ leukocytosis w/ fevers possibly 2/2  infection  - Pt w/ persistent fevers likely multifactorial 2/2 DVTs, underlying infection, possible ?neurogenic component  - U/A grossly positive. F/u BCx, UCx  - Pt had crash lines placed at [facility]. Possible source of infection. MRSA negative 12/20  - Sputum Cx + for klebsiella  - 12/22 BCx 1 bottle w/ MRSE. Possible contaminant. Check repeat BCx  - Broaden to zosyn, vanc      LINES/PPX  - peripherals in tact  - LIJ triple lumen: 12/20  - GOC: Full

## 2023-12-24 LAB
ALBUMIN SERPL ELPH-MCNC: 3.3 G/DL — SIGNIFICANT CHANGE UP (ref 3.3–5)
ALBUMIN SERPL ELPH-MCNC: 3.3 G/DL — SIGNIFICANT CHANGE UP (ref 3.3–5)
ALP SERPL-CCNC: 43 U/L — SIGNIFICANT CHANGE UP (ref 40–120)
ALP SERPL-CCNC: 43 U/L — SIGNIFICANT CHANGE UP (ref 40–120)
ALT FLD-CCNC: 65 U/L — HIGH (ref 10–45)
ALT FLD-CCNC: 65 U/L — HIGH (ref 10–45)
ANION GAP SERPL CALC-SCNC: 11 MMOL/L — SIGNIFICANT CHANGE UP (ref 5–17)
ANION GAP SERPL CALC-SCNC: 11 MMOL/L — SIGNIFICANT CHANGE UP (ref 5–17)
APTT BLD: 30.8 SEC — SIGNIFICANT CHANGE UP (ref 24.5–35.6)
APTT BLD: 30.8 SEC — SIGNIFICANT CHANGE UP (ref 24.5–35.6)
AST SERPL-CCNC: 55 U/L — HIGH (ref 10–40)
AST SERPL-CCNC: 55 U/L — HIGH (ref 10–40)
BASOPHILS # BLD AUTO: 0 K/UL — SIGNIFICANT CHANGE UP (ref 0–0.2)
BASOPHILS # BLD AUTO: 0 K/UL — SIGNIFICANT CHANGE UP (ref 0–0.2)
BASOPHILS NFR BLD AUTO: 0 % — SIGNIFICANT CHANGE UP (ref 0–2)
BASOPHILS NFR BLD AUTO: 0 % — SIGNIFICANT CHANGE UP (ref 0–2)
BILIRUB SERPL-MCNC: 1.1 MG/DL — SIGNIFICANT CHANGE UP (ref 0.2–1.2)
BILIRUB SERPL-MCNC: 1.1 MG/DL — SIGNIFICANT CHANGE UP (ref 0.2–1.2)
BUN SERPL-MCNC: 68 MG/DL — HIGH (ref 7–23)
BUN SERPL-MCNC: 68 MG/DL — HIGH (ref 7–23)
CALCIUM SERPL-MCNC: 7.9 MG/DL — LOW (ref 8.4–10.5)
CALCIUM SERPL-MCNC: 7.9 MG/DL — LOW (ref 8.4–10.5)
CHLORIDE SERPL-SCNC: 111 MMOL/L — HIGH (ref 96–108)
CHLORIDE SERPL-SCNC: 111 MMOL/L — HIGH (ref 96–108)
CO2 SERPL-SCNC: 23 MMOL/L — SIGNIFICANT CHANGE UP (ref 22–31)
CO2 SERPL-SCNC: 23 MMOL/L — SIGNIFICANT CHANGE UP (ref 22–31)
CREAT SERPL-MCNC: 2.03 MG/DL — HIGH (ref 0.5–1.3)
CREAT SERPL-MCNC: 2.03 MG/DL — HIGH (ref 0.5–1.3)
CULTURE RESULTS: ABNORMAL
CULTURE RESULTS: ABNORMAL
CULTURE RESULTS: SIGNIFICANT CHANGE UP
CULTURE RESULTS: SIGNIFICANT CHANGE UP
EGFR: 33 ML/MIN/1.73M2 — LOW
EGFR: 33 ML/MIN/1.73M2 — LOW
EOSINOPHIL # BLD AUTO: 0 K/UL — SIGNIFICANT CHANGE UP (ref 0–0.5)
EOSINOPHIL # BLD AUTO: 0 K/UL — SIGNIFICANT CHANGE UP (ref 0–0.5)
EOSINOPHIL NFR BLD AUTO: 0 % — SIGNIFICANT CHANGE UP (ref 0–6)
EOSINOPHIL NFR BLD AUTO: 0 % — SIGNIFICANT CHANGE UP (ref 0–6)
GAS PNL BLDA: SIGNIFICANT CHANGE UP
GAS PNL BLDA: SIGNIFICANT CHANGE UP
GLUCOSE SERPL-MCNC: 182 MG/DL — HIGH (ref 70–99)
GLUCOSE SERPL-MCNC: 182 MG/DL — HIGH (ref 70–99)
HCT VFR BLD CALC: 21.1 % — LOW (ref 39–50)
HCT VFR BLD CALC: 21.1 % — LOW (ref 39–50)
HGB BLD-MCNC: 7 G/DL — CRITICAL LOW (ref 13–17)
HGB BLD-MCNC: 7 G/DL — CRITICAL LOW (ref 13–17)
IMM GRANULOCYTES NFR BLD AUTO: 0.5 % — SIGNIFICANT CHANGE UP (ref 0–0.9)
IMM GRANULOCYTES NFR BLD AUTO: 0.5 % — SIGNIFICANT CHANGE UP (ref 0–0.9)
INR BLD: 1.09 RATIO — SIGNIFICANT CHANGE UP (ref 0.85–1.18)
INR BLD: 1.09 RATIO — SIGNIFICANT CHANGE UP (ref 0.85–1.18)
LYMPHOCYTES # BLD AUTO: 0.8 K/UL — LOW (ref 1–3.3)
LYMPHOCYTES # BLD AUTO: 0.8 K/UL — LOW (ref 1–3.3)
LYMPHOCYTES # BLD AUTO: 13.7 % — SIGNIFICANT CHANGE UP (ref 13–44)
LYMPHOCYTES # BLD AUTO: 13.7 % — SIGNIFICANT CHANGE UP (ref 13–44)
MAGNESIUM SERPL-MCNC: 2.7 MG/DL — HIGH (ref 1.6–2.6)
MAGNESIUM SERPL-MCNC: 2.7 MG/DL — HIGH (ref 1.6–2.6)
MCHC RBC-ENTMCNC: 31.8 PG — SIGNIFICANT CHANGE UP (ref 27–34)
MCHC RBC-ENTMCNC: 31.8 PG — SIGNIFICANT CHANGE UP (ref 27–34)
MCHC RBC-ENTMCNC: 33.2 GM/DL — SIGNIFICANT CHANGE UP (ref 32–36)
MCHC RBC-ENTMCNC: 33.2 GM/DL — SIGNIFICANT CHANGE UP (ref 32–36)
MCV RBC AUTO: 95.9 FL — SIGNIFICANT CHANGE UP (ref 80–100)
MCV RBC AUTO: 95.9 FL — SIGNIFICANT CHANGE UP (ref 80–100)
MONOCYTES # BLD AUTO: 0.58 K/UL — SIGNIFICANT CHANGE UP (ref 0–0.9)
MONOCYTES # BLD AUTO: 0.58 K/UL — SIGNIFICANT CHANGE UP (ref 0–0.9)
MONOCYTES NFR BLD AUTO: 10 % — SIGNIFICANT CHANGE UP (ref 2–14)
MONOCYTES NFR BLD AUTO: 10 % — SIGNIFICANT CHANGE UP (ref 2–14)
NEUTROPHILS # BLD AUTO: 4.41 K/UL — SIGNIFICANT CHANGE UP (ref 1.8–7.4)
NEUTROPHILS # BLD AUTO: 4.41 K/UL — SIGNIFICANT CHANGE UP (ref 1.8–7.4)
NEUTROPHILS NFR BLD AUTO: 75.8 % — SIGNIFICANT CHANGE UP (ref 43–77)
NEUTROPHILS NFR BLD AUTO: 75.8 % — SIGNIFICANT CHANGE UP (ref 43–77)
NRBC # BLD: 0 /100 WBCS — SIGNIFICANT CHANGE UP (ref 0–0)
NRBC # BLD: 0 /100 WBCS — SIGNIFICANT CHANGE UP (ref 0–0)
ORGANISM # SPEC MICROSCOPIC CNT: ABNORMAL
PHOSPHATE SERPL-MCNC: 3.7 MG/DL — SIGNIFICANT CHANGE UP (ref 2.5–4.5)
PHOSPHATE SERPL-MCNC: 3.7 MG/DL — SIGNIFICANT CHANGE UP (ref 2.5–4.5)
PLATELET # BLD AUTO: 99 K/UL — LOW (ref 150–400)
PLATELET # BLD AUTO: 99 K/UL — LOW (ref 150–400)
POTASSIUM SERPL-MCNC: 3.9 MMOL/L — SIGNIFICANT CHANGE UP (ref 3.5–5.3)
POTASSIUM SERPL-MCNC: 3.9 MMOL/L — SIGNIFICANT CHANGE UP (ref 3.5–5.3)
POTASSIUM SERPL-SCNC: 3.9 MMOL/L — SIGNIFICANT CHANGE UP (ref 3.5–5.3)
POTASSIUM SERPL-SCNC: 3.9 MMOL/L — SIGNIFICANT CHANGE UP (ref 3.5–5.3)
PROT SERPL-MCNC: 5.5 G/DL — LOW (ref 6–8.3)
PROT SERPL-MCNC: 5.5 G/DL — LOW (ref 6–8.3)
PROTHROM AB SERPL-ACNC: 12 SEC — SIGNIFICANT CHANGE UP (ref 9.5–13)
PROTHROM AB SERPL-ACNC: 12 SEC — SIGNIFICANT CHANGE UP (ref 9.5–13)
RBC # BLD: 2.2 M/UL — LOW (ref 4.2–5.8)
RBC # BLD: 2.2 M/UL — LOW (ref 4.2–5.8)
RBC # FLD: 13.2 % — SIGNIFICANT CHANGE UP (ref 10.3–14.5)
RBC # FLD: 13.2 % — SIGNIFICANT CHANGE UP (ref 10.3–14.5)
SODIUM SERPL-SCNC: 145 MMOL/L — SIGNIFICANT CHANGE UP (ref 135–145)
SODIUM SERPL-SCNC: 145 MMOL/L — SIGNIFICANT CHANGE UP (ref 135–145)
SPECIMEN SOURCE: SIGNIFICANT CHANGE UP
VANCOMYCIN TROUGH SERPL-MCNC: 15.2 UG/ML — SIGNIFICANT CHANGE UP (ref 10–20)
VANCOMYCIN TROUGH SERPL-MCNC: 15.2 UG/ML — SIGNIFICANT CHANGE UP (ref 10–20)
WBC # BLD: 5.82 K/UL — SIGNIFICANT CHANGE UP (ref 3.8–10.5)
WBC # BLD: 5.82 K/UL — SIGNIFICANT CHANGE UP (ref 3.8–10.5)
WBC # FLD AUTO: 5.82 K/UL — SIGNIFICANT CHANGE UP (ref 3.8–10.5)
WBC # FLD AUTO: 5.82 K/UL — SIGNIFICANT CHANGE UP (ref 3.8–10.5)

## 2023-12-24 PROCEDURE — 93010 ELECTROCARDIOGRAM REPORT: CPT

## 2023-12-24 PROCEDURE — 99292 CRITICAL CARE ADDL 30 MIN: CPT

## 2023-12-24 PROCEDURE — 99291 CRITICAL CARE FIRST HOUR: CPT

## 2023-12-24 PROCEDURE — 71045 X-RAY EXAM CHEST 1 VIEW: CPT | Mod: 26

## 2023-12-24 RX ORDER — HEPARIN SODIUM 5000 [USP'U]/ML
1600 INJECTION INTRAVENOUS; SUBCUTANEOUS
Qty: 25000 | Refills: 0 | Status: DISCONTINUED | OUTPATIENT
Start: 2023-12-24 | End: 2023-12-24

## 2023-12-24 RX ORDER — ACETAMINOPHEN 500 MG
1000 TABLET ORAL ONCE
Refills: 0 | Status: COMPLETED | OUTPATIENT
Start: 2023-12-24 | End: 2023-12-24

## 2023-12-24 RX ORDER — HEPARIN SODIUM 5000 [USP'U]/ML
1600 INJECTION INTRAVENOUS; SUBCUTANEOUS
Qty: 25000 | Refills: 0 | Status: DISCONTINUED | OUTPATIENT
Start: 2023-12-24 | End: 2023-12-25

## 2023-12-24 RX ORDER — FENTANYL CITRATE 50 UG/ML
50 INJECTION INTRAVENOUS ONCE
Refills: 0 | Status: DISCONTINUED | OUTPATIENT
Start: 2023-12-24 | End: 2023-12-24

## 2023-12-24 RX ORDER — MEROPENEM 1 G/30ML
1000 INJECTION INTRAVENOUS EVERY 12 HOURS
Refills: 0 | Status: DISCONTINUED | OUTPATIENT
Start: 2023-12-24 | End: 2023-12-26

## 2023-12-24 RX ADMIN — HEPARIN SODIUM 16 UNIT(S)/HR: 5000 INJECTION INTRAVENOUS; SUBCUTANEOUS at 18:30

## 2023-12-24 RX ADMIN — MEROPENEM 100 MILLIGRAM(S): 1 INJECTION INTRAVENOUS at 18:01

## 2023-12-24 RX ADMIN — PIPERACILLIN AND TAZOBACTAM 25 GRAM(S): 4; .5 INJECTION, POWDER, LYOPHILIZED, FOR SOLUTION INTRAVENOUS at 06:37

## 2023-12-24 RX ADMIN — LEVETIRACETAM 400 MILLIGRAM(S): 250 TABLET, FILM COATED ORAL at 18:01

## 2023-12-24 RX ADMIN — CHLORHEXIDINE GLUCONATE 1 APPLICATION(S): 213 SOLUTION TOPICAL at 22:37

## 2023-12-24 RX ADMIN — Medication 400 MILLIGRAM(S): at 12:38

## 2023-12-24 RX ADMIN — Medication 250 MILLIGRAM(S): at 07:27

## 2023-12-24 RX ADMIN — DEXMEDETOMIDINE HYDROCHLORIDE IN 0.9% SODIUM CHLORIDE 4.68 MICROGRAM(S)/KG/HR: 4 INJECTION INTRAVENOUS at 07:27

## 2023-12-24 RX ADMIN — PANTOPRAZOLE SODIUM 40 MILLIGRAM(S): 20 TABLET, DELAYED RELEASE ORAL at 11:51

## 2023-12-24 RX ADMIN — Medication 2: at 06:37

## 2023-12-24 RX ADMIN — DEXMEDETOMIDINE HYDROCHLORIDE IN 0.9% SODIUM CHLORIDE 4.68 MICROGRAM(S)/KG/HR: 4 INJECTION INTRAVENOUS at 20:36

## 2023-12-24 RX ADMIN — CHLORHEXIDINE GLUCONATE 15 MILLILITER(S): 213 SOLUTION TOPICAL at 18:01

## 2023-12-24 RX ADMIN — LEVETIRACETAM 400 MILLIGRAM(S): 250 TABLET, FILM COATED ORAL at 05:07

## 2023-12-24 RX ADMIN — Medication 1 DROP(S): at 05:07

## 2023-12-24 RX ADMIN — HEPARIN SODIUM 16 UNIT(S)/HR: 5000 INJECTION INTRAVENOUS; SUBCUTANEOUS at 20:36

## 2023-12-24 RX ADMIN — Medication 1 APPLICATION(S): at 13:59

## 2023-12-24 RX ADMIN — Medication 1: at 11:52

## 2023-12-24 RX ADMIN — Medication 1 DROP(S): at 18:02

## 2023-12-24 RX ADMIN — HEPARIN SODIUM 5000 UNIT(S): 5000 INJECTION INTRAVENOUS; SUBCUTANEOUS at 13:58

## 2023-12-24 RX ADMIN — CARVEDILOL PHOSPHATE 3.12 MILLIGRAM(S): 80 CAPSULE, EXTENDED RELEASE ORAL at 05:07

## 2023-12-24 RX ADMIN — AMLODIPINE BESYLATE 5 MILLIGRAM(S): 2.5 TABLET ORAL at 05:08

## 2023-12-24 RX ADMIN — Medication 1000 MILLIGRAM(S): at 13:04

## 2023-12-24 RX ADMIN — CARVEDILOL PHOSPHATE 3.12 MILLIGRAM(S): 80 CAPSULE, EXTENDED RELEASE ORAL at 18:03

## 2023-12-24 RX ADMIN — CHLORHEXIDINE GLUCONATE 15 MILLILITER(S): 213 SOLUTION TOPICAL at 05:06

## 2023-12-24 RX ADMIN — Medication 2: at 18:03

## 2023-12-24 RX ADMIN — Medication 1 APPLICATION(S): at 05:07

## 2023-12-24 RX ADMIN — HEPARIN SODIUM 5000 UNIT(S): 5000 INJECTION INTRAVENOUS; SUBCUTANEOUS at 05:08

## 2023-12-24 RX ADMIN — Medication 250 MILLIGRAM(S): at 20:33

## 2023-12-24 RX ADMIN — Medication 1: at 00:23

## 2023-12-24 NOTE — CHART NOTE - NSCHARTNOTEFT_GEN_A_CORE
Bam — found to have multiple acute DVTs in L soleal, R common fem, R axillary/brachial veins + superficial phlebitis in R cephalic/basilic veins. Primary inquiring re: therapeutic AC. CTH 12/23 stable from 12/21.    While there is no absolute neurosurgical contraindication to systemic anti coagulation, there does exist an increased risk of intracranial hemorrhage which can result in significant morbidity including but not limited to headache, seizure, stroke, paralysis, and in severe cases even death.    The risks and benefits of starting systemic anticoagulation must be considered carefully in the setting of the patients medical history and current clinical condition.    If AC is started...  - Recommend NO bolus with low ptt goal of ~60-65  - Recommend obtaining follow up CTH after patient is therapeutic and to notify neurosurgery immediately with any changes in the patients neurologic exam

## 2023-12-24 NOTE — PROGRESS NOTE ADULT - SUBJECTIVE AND OBJECTIVE BOX
78M w/ PMH HTN HLD presents as transfer from [facility] s/p VT arrest s/p shock x2 w/ likely downtime of 40 mins. Now with evidence of anoxic brain injury, SAH, SDH, and multiple DVTs.      INTERVAL HPI/OVERNIGHT EVENTS:    SUBJECTIVE: Patient seen and examined at bedside.     ROS: All negative except as listed above.    VITAL SIGNS:  ICU Vital Signs Last 24 Hrs  T(C): 38.3 (24 Dec 2023 03:00), Max: 38.6 (23 Dec 2023 11:00)  T(F): 100.9 (24 Dec 2023 03:00), Max: 101.5 (23 Dec 2023 11:00)  HR: 63 (24 Dec 2023 06:00) (55 - 66)  BP: --  BP(mean): --  ABP: 145/51 (24 Dec 2023 06:00) (102/31 - 182/55)  ABP(mean): 75 (24 Dec 2023 06:00) (50 - 85)  RR: 18 (24 Dec 2023 06:00) (15 - 34)  SpO2: 100% (24 Dec 2023 06:00) (99% - 100%)    O2 Parameters below as of 24 Dec 2023 06:00  Patient On (Oxygen Delivery Method): ventilator    O2 Concentration (%): 40      Mode: AC/ CMV (Assist Control/ Continuous Mandatory Ventilation), RR (machine): 14, TV (machine): 500, FiO2: 40, PEEP: 5, ITime: 1, MAP: 10, PIP: 27  Plateau pressure:   P/F ratio:     12-23 @ 07:01  -  12-24 @ 07:00  --------------------------------------------------------  IN: 1496.3 mL / OUT: 1275 mL / NET: 221.3 mL      CAPILLARY BLOOD GLUCOSE      POCT Blood Glucose.: 204 mg/dL (24 Dec 2023 06:35)      ECG: reviewed.    PHYSICAL EXAM:    GENERAL: NAD, lying in bed comfortably  HEAD:  Atraumatic, normocephalic  EYES: EOMI, PERRLA, conjunctiva and sclera clear  NECK: Supple, trachea midline, no JVD  HEART: Regular rate and rhythm, no murmurs, rubs, or gallops  LUNGS: Unlabored respirations.  Clear to auscultation bilaterally, no crackles, wheezing, or rhonchi  ABDOMEN: Soft, nontender, nondistended, +BS  EXTREMITIES: 2+ peripheral pulses bilaterally, cap refill<2 secs. No clubbing, cyanosis, or edema  NERVOUS SYSTEM:  A&Ox3, following commands, moving all extremities, no focal deficits   SKIN: No rashes or lesions    MEDICATIONS:  MEDICATIONS  (STANDING):  amLODIPine   Tablet 5 milliGRAM(s) Oral daily  artificial  tears Solution 1 Drop(s) Both EYES two times a day  carvedilol 3.125 milliGRAM(s) Oral every 12 hours  chlorhexidine 0.12% Liquid 15 milliLiter(s) Oral Mucosa every 12 hours  chlorhexidine 2% Cloths 1 Application(s) Topical daily  dexMEDEtomidine Infusion 0.2 MICROgram(s)/kG/Hr (4.68 mL/Hr) IV Continuous <Continuous>  heparin   Injectable 5000 Unit(s) SubCutaneous every 8 hours  insulin lispro (ADMELOG) corrective regimen sliding scale   SubCutaneous every 6 hours  levETIRAcetam  IVPB 1000 milliGRAM(s) IV Intermittent every 12 hours  pantoprazole  Injectable 40 milliGRAM(s) IV Push daily  petrolatum Ophthalmic Ointment 1 Application(s) Both EYES three times a day  piperacillin/tazobactam IVPB.. 3.375 Gram(s) IV Intermittent every 8 hours  vancomycin  IVPB 1000 milliGRAM(s) IV Intermittent every 12 hours    MEDICATIONS  (PRN):      ALLERGIES:  Allergies    No Known Allergies    Intolerances        LABS:                        7.0    5.82  )-----------( 99       ( 24 Dec 2023 01:04 )             21.1     12-24    145  |  111<H>  |  68<H>  ----------------------------<  182<H>  3.9   |  23  |  2.03<H>    Ca    7.9<L>      24 Dec 2023 01:04  Phos  3.7     12-24  Mg     2.7     12-24    TPro  5.5<L>  /  Alb  3.3  /  TBili  1.1  /  DBili  x   /  AST  55<H>  /  ALT  65<H>  /  AlkPhos  43  12-24    PT/INR - ( 24 Dec 2023 01:04 )   PT: 12.0 sec;   INR: 1.09 ratio         PTT - ( 24 Dec 2023 01:04 )  PTT:30.8 sec  Urinalysis Basic - ( 24 Dec 2023 01:04 )    Color: x / Appearance: x / SG: x / pH: x  Gluc: 182 mg/dL / Ketone: x  / Bili: x / Urobili: x   Blood: x / Protein: x / Nitrite: x   Leuk Esterase: x / RBC: x / WBC x   Sq Epi: x / Non Sq Epi: x / Bacteria: x      ABG:  pH, Arterial: 7.45 (12-24-23 @ 00:50)  pCO2, Arterial: 34 mmHg (12-24-23 @ 00:50)  pO2, Arterial: 183 mmHg (12-24-23 @ 00:50)      vBG:    Micro:    Culture - Blood (collected 12-22-23 @ 06:08)  Source: .Blood Blood  Preliminary Report (12-23-23 @ 11:01):    No growth at 24 hours    Culture - Blood (collected 12-22-23 @ 06:08)  Source: .Blood Blood  Gram Stain (12-23-23 @ 08:51):    Growth in aerobic bottle: Gram Positive Cocci in Clusters  Preliminary Report (12-23-23 @ 08:51):    Growth in aerobic bottle: Gram Positive Cocci in Clusters    Direct identification is available within approximately 3-5    hours either by Blood Panel Multiplexed PCR or Direct    MALDI-TOF. Details: [email or web address]  Organism: Blood Culture PCR (12-23-23 @ 10:54)  Organism: Blood Culture PCR (12-23-23 @ 10:54)      Method Type: PCR      -  Staphylococcus epidermidis, Methicillin resistant: Detec    Culture - Blood (collected 12-20-23 @ 05:38)  Source: .Blood Blood-Peripheral  Preliminary Report (12-23-23 @ 11:00):    No growth at 72 Hours    Culture - Blood (collected 12-20-23 @ 05:38)  Source: .Blood Blood-Peripheral  Preliminary Report (12-23-23 @ 11:00):    No growth at 72 Hours        Culture - Sputum (collected 12-20-23 @ 05:38)  Source: ET Tube ET Tube  Gram Stain (12-20-23 @ 17:34):    Few Squamous epithelial cells per low power field    Moderate polymorphonuclear leukocytes per low power field    Moderate Gram Negative Rods per oil power field    Few Gram positive cocci in pairs per oil power field  Final Report (12-22-23 @ 17:57):    Numerous Klebsiella pneumoniae    Normal Respiratory Shalini present  Organism: Klebsiella pneumoniae (12-22-23 @ 17:57)  Organism: Klebsiella pneumoniae (12-22-23 @ 17:57)      Method Type: TABITHA      -  Amoxicillin/Clavulanic Acid: R >16/8      -  Ampicillin: R >16 These ampicillin results predict results for amoxicillin      -  Ampicillin/Sulbactam: R >16/8      -  Aztreonam: S <=4      -  Cefazolin: R >16      -  Cefepime: S <=2      -  Cefoxitin: I 16      -  Ceftriaxone: S <=1      -  Ciprofloxacin: S <=0.25      -  Ertapenem: S <=0.5      -  Gentamicin: S <=2      -  Imipenem: S <=1      -  Levofloxacin: S <=0.5      -  Meropenem: S <=1      -  Piperacillin/Tazobactam: S <=8      -  Tobramycin: S <=2      -  Trimethoprim/Sulfamethoxazole: S <=0.5/9.5        RADIOLOGY & ADDITIONAL TESTS: Reviewed. 78M w/ PMH HTN HLD presents as transfer from [facility] s/p VT arrest s/p shock x2 w/ likely downtime of 40 mins. Now with evidence of anoxic brain injury, SAH, SDH, and multiple DVTs.      INTERVAL HPI/OVERNIGHT EVENTS:    SUBJECTIVE: Patient seen and examined at bedside.     ROS: All negative except as listed above.    VITAL SIGNS:  ICU Vital Signs Last 24 Hrs  T(C): 38.3 (24 Dec 2023 03:00), Max: 38.6 (23 Dec 2023 11:00)  T(F): 100.9 (24 Dec 2023 03:00), Max: 101.5 (23 Dec 2023 11:00)  HR: 63 (24 Dec 2023 06:00) (55 - 66)  BP: --  BP(mean): --  ABP: 145/51 (24 Dec 2023 06:00) (102/31 - 182/55)  ABP(mean): 75 (24 Dec 2023 06:00) (50 - 85)  RR: 18 (24 Dec 2023 06:00) (15 - 34)  SpO2: 100% (24 Dec 2023 06:00) (99% - 100%)    O2 Parameters below as of 24 Dec 2023 06:00  Patient On (Oxygen Delivery Method): ventilator    O2 Concentration (%): 40      Mode: AC/ CMV (Assist Control/ Continuous Mandatory Ventilation), RR (machine): 14, TV (machine): 500, FiO2: 40, PEEP: 5, ITime: 1, MAP: 10, PIP: 27  Plateau pressure:   P/F ratio:     12-23 @ 07:01  -  12-24 @ 07:00  --------------------------------------------------------  IN: 1496.3 mL / OUT: 1275 mL / NET: 221.3 mL      CAPILLARY BLOOD GLUCOSE      POCT Blood Glucose.: 204 mg/dL (24 Dec 2023 06:35)      ECG: reviewed.    PHYSICAL EXAM:    GENERAL: NAD, lying in bed comfortably  HEAD:  Atraumatic, normocephalic  EYES: EOMI, PERRLA, conjunctiva and sclera clear  NECK: Supple, trachea midline, no JVD  HEART: Regular rate and rhythm, no murmurs, rubs, or gallops  LUNGS: Unlabored respirations.  Clear to auscultation bilaterally, no crackles, wheezing, or rhonchi  ABDOMEN: Soft, nontender, nondistended, +BS  EXTREMITIES: 2+ peripheral pulses bilaterally, cap refill<2 secs. No clubbing, cyanosis, or edema  NERVOUS SYSTEM:  A&Ox3, following commands, moving all extremities, no focal deficits   SKIN: No rashes or lesions    MEDICATIONS:  MEDICATIONS  (STANDING):  amLODIPine   Tablet 5 milliGRAM(s) Oral daily  artificial  tears Solution 1 Drop(s) Both EYES two times a day  carvedilol 3.125 milliGRAM(s) Oral every 12 hours  chlorhexidine 0.12% Liquid 15 milliLiter(s) Oral Mucosa every 12 hours  chlorhexidine 2% Cloths 1 Application(s) Topical daily  dexMEDEtomidine Infusion 0.2 MICROgram(s)/kG/Hr (4.68 mL/Hr) IV Continuous <Continuous>  heparin   Injectable 5000 Unit(s) SubCutaneous every 8 hours  insulin lispro (ADMELOG) corrective regimen sliding scale   SubCutaneous every 6 hours  levETIRAcetam  IVPB 1000 milliGRAM(s) IV Intermittent every 12 hours  pantoprazole  Injectable 40 milliGRAM(s) IV Push daily  petrolatum Ophthalmic Ointment 1 Application(s) Both EYES three times a day  piperacillin/tazobactam IVPB.. 3.375 Gram(s) IV Intermittent every 8 hours  vancomycin  IVPB 1000 milliGRAM(s) IV Intermittent every 12 hours    MEDICATIONS  (PRN):      ALLERGIES:  Allergies    No Known Allergies    Intolerances        LABS:                        7.0    5.82  )-----------( 99       ( 24 Dec 2023 01:04 )             21.1     12-24    145  |  111<H>  |  68<H>  ----------------------------<  182<H>  3.9   |  23  |  2.03<H>    Ca    7.9<L>      24 Dec 2023 01:04  Phos  3.7     12-24  Mg     2.7     12-24    TPro  5.5<L>  /  Alb  3.3  /  TBili  1.1  /  DBili  x   /  AST  55<H>  /  ALT  65<H>  /  AlkPhos  43  12-24    PT/INR - ( 24 Dec 2023 01:04 )   PT: 12.0 sec;   INR: 1.09 ratio         PTT - ( 24 Dec 2023 01:04 )  PTT:30.8 sec  Urinalysis Basic - ( 24 Dec 2023 01:04 )    Color: x / Appearance: x / SG: x / pH: x  Gluc: 182 mg/dL / Ketone: x  / Bili: x / Urobili: x   Blood: x / Protein: x / Nitrite: x   Leuk Esterase: x / RBC: x / WBC x   Sq Epi: x / Non Sq Epi: x / Bacteria: x      ABG:  pH, Arterial: 7.45 (12-24-23 @ 00:50)  pCO2, Arterial: 34 mmHg (12-24-23 @ 00:50)  pO2, Arterial: 183 mmHg (12-24-23 @ 00:50)      vBG:    Micro:    Culture - Blood (collected 12-22-23 @ 06:08)  Source: .Blood Blood  Preliminary Report (12-23-23 @ 11:01):    No growth at 24 hours    Culture - Blood (collected 12-22-23 @ 06:08)  Source: .Blood Blood  Gram Stain (12-23-23 @ 08:51):    Growth in aerobic bottle: Gram Positive Cocci in Clusters  Preliminary Report (12-23-23 @ 08:51):    Growth in aerobic bottle: Gram Positive Cocci in Clusters    Direct identification is available within approximately 3-5    hours either by Blood Panel Multiplexed PCR or Direct    MALDI-TOF. Details: [email or web address]  Organism: Blood Culture PCR (12-23-23 @ 10:54)  Organism: Blood Culture PCR (12-23-23 @ 10:54)      Method Type: PCR      -  Staphylococcus epidermidis, Methicillin resistant: Detec    Culture - Blood (collected 12-20-23 @ 05:38)  Source: .Blood Blood-Peripheral  Preliminary Report (12-23-23 @ 11:00):    No growth at 72 Hours    Culture - Blood (collected 12-20-23 @ 05:38)  Source: .Blood Blood-Peripheral  Preliminary Report (12-23-23 @ 11:00):    No growth at 72 Hours        Culture - Sputum (collected 12-20-23 @ 05:38)  Source: ET Tube ET Tube  Gram Stain (12-20-23 @ 17:34):    Few Squamous epithelial cells per low power field    Moderate polymorphonuclear leukocytes per low power field    Moderate Gram Negative Rods per oil power field    Few Gram positive cocci in pairs per oil power field  Final Report (12-22-23 @ 17:57):    Numerous Klebsiella pneumoniae    Normal Respiratory Shalini present  Organism: Klebsiella pneumoniae (12-22-23 @ 17:57)  Organism: Klebsiella pneumoniae (12-22-23 @ 17:57)      Method Type: TABITHA      -  Amoxicillin/Clavulanic Acid: R >16/8      -  Ampicillin: R >16 These ampicillin results predict results for amoxicillin      -  Ampicillin/Sulbactam: R >16/8      -  Aztreonam: S <=4      -  Cefazolin: R >16      -  Cefepime: S <=2      -  Cefoxitin: I 16      -  Ceftriaxone: S <=1      -  Ciprofloxacin: S <=0.25      -  Ertapenem: S <=0.5      -  Gentamicin: S <=2      -  Imipenem: S <=1      -  Levofloxacin: S <=0.5      -  Meropenem: S <=1      -  Piperacillin/Tazobactam: S <=8      -  Tobramycin: S <=2      -  Trimethoprim/Sulfamethoxazole: S <=0.5/9.5        RADIOLOGY & ADDITIONAL TESTS: Reviewed. 78M w/ PMH HTN HLD presents as transfer from [facility] s/p VT arrest s/p shock x2 w/ likely downtime of 40 mins. Now with evidence of anoxic brain injury, SAH, SDH, and multiple DVTs.      INTERVAL HPI/OVERNIGHT EVENTS: No acute events overnight    SUBJECTIVE: Patient seen and examined at bedside. Today, he remains unresponsive to commands, touch, and pain.    ROS: All negative except as listed above.    VITAL SIGNS:  ICU Vital Signs Last 24 Hrs  T(C): 38.3 (24 Dec 2023 03:00), Max: 38.6 (23 Dec 2023 11:00)  T(F): 100.9 (24 Dec 2023 03:00), Max: 101.5 (23 Dec 2023 11:00)  HR: 63 (24 Dec 2023 06:00) (55 - 66)  BP: --  BP(mean): --  ABP: 145/51 (24 Dec 2023 06:00) (102/31 - 182/55)  ABP(mean): 75 (24 Dec 2023 06:00) (50 - 85)  RR: 18 (24 Dec 2023 06:00) (15 - 34)  SpO2: 100% (24 Dec 2023 06:00) (99% - 100%)    O2 Parameters below as of 24 Dec 2023 06:00  Patient On (Oxygen Delivery Method): ventilator    O2 Concentration (%): 40      Mode: AC/ CMV (Assist Control/ Continuous Mandatory Ventilation), RR (machine): 14, TV (machine): 500, FiO2: 40, PEEP: 5, ITime: 1, MAP: 10, PIP: 27  Plateau pressure:   P/F ratio:     12-23 @ 07:01  -  12-24 @ 07:00  --------------------------------------------------------  IN: 1496.3 mL / OUT: 1275 mL / NET: 221.3 mL      CAPILLARY BLOOD GLUCOSE      POCT Blood Glucose.: 204 mg/dL (24 Dec 2023 06:35)      ECG: reviewed.    PHYSICAL EXAM:    GENERAL: Sedated/intubated  HEAD:  Atraumatic, normocephalic  EYES: EOMI, conjunctiva and sclera clear  NECK: Supple, trachea midline, no JVD  HEART: Regular rate and rhythm, no murmurs, rubs, or gallops  LUNGS: Unlabored respirations.  Clear to auscultation on anterior lung fields.  ABDOMEN: Soft, nontender, nondistended  EXTREMITIES: No LE edema  NERVOUS SYSTEM:  Sedated/intubated, pupils reactive to light, unresponsive to touch, pain, or commands  SKIN: No rashes or lesions    MEDICATIONS:  MEDICATIONS  (STANDING):  amLODIPine   Tablet 5 milliGRAM(s) Oral daily  artificial  tears Solution 1 Drop(s) Both EYES two times a day  carvedilol 3.125 milliGRAM(s) Oral every 12 hours  chlorhexidine 0.12% Liquid 15 milliLiter(s) Oral Mucosa every 12 hours  chlorhexidine 2% Cloths 1 Application(s) Topical daily  dexMEDEtomidine Infusion 0.2 MICROgram(s)/kG/Hr (4.68 mL/Hr) IV Continuous <Continuous>  heparin   Injectable 5000 Unit(s) SubCutaneous every 8 hours  insulin lispro (ADMELOG) corrective regimen sliding scale   SubCutaneous every 6 hours  levETIRAcetam  IVPB 1000 milliGRAM(s) IV Intermittent every 12 hours  pantoprazole  Injectable 40 milliGRAM(s) IV Push daily  petrolatum Ophthalmic Ointment 1 Application(s) Both EYES three times a day  piperacillin/tazobactam IVPB.. 3.375 Gram(s) IV Intermittent every 8 hours  vancomycin  IVPB 1000 milliGRAM(s) IV Intermittent every 12 hours    MEDICATIONS  (PRN):      ALLERGIES:  Allergies    No Known Allergies    Intolerances        LABS:                        7.0    5.82  )-----------( 99       ( 24 Dec 2023 01:04 )             21.1     12-24    145  |  111<H>  |  68<H>  ----------------------------<  182<H>  3.9   |  23  |  2.03<H>    Ca    7.9<L>      24 Dec 2023 01:04  Phos  3.7     12-24  Mg     2.7     12-24    TPro  5.5<L>  /  Alb  3.3  /  TBili  1.1  /  DBili  x   /  AST  55<H>  /  ALT  65<H>  /  AlkPhos  43  12-24    PT/INR - ( 24 Dec 2023 01:04 )   PT: 12.0 sec;   INR: 1.09 ratio         PTT - ( 24 Dec 2023 01:04 )  PTT:30.8 sec  Urinalysis Basic - ( 24 Dec 2023 01:04 )    Color: x / Appearance: x / SG: x / pH: x  Gluc: 182 mg/dL / Ketone: x  / Bili: x / Urobili: x   Blood: x / Protein: x / Nitrite: x   Leuk Esterase: x / RBC: x / WBC x   Sq Epi: x / Non Sq Epi: x / Bacteria: x      ABG:  pH, Arterial: 7.45 (12-24-23 @ 00:50)  pCO2, Arterial: 34 mmHg (12-24-23 @ 00:50)  pO2, Arterial: 183 mmHg (12-24-23 @ 00:50)      vBG:    Micro:    Culture - Blood (collected 12-22-23 @ 06:08)  Source: .Blood Blood  Preliminary Report (12-23-23 @ 11:01):    No growth at 24 hours    Culture - Blood (collected 12-22-23 @ 06:08)  Source: .Blood Blood  Gram Stain (12-23-23 @ 08:51):    Growth in aerobic bottle: Gram Positive Cocci in Clusters  Preliminary Report (12-23-23 @ 08:51):    Growth in aerobic bottle: Gram Positive Cocci in Clusters    Direct identification is available within approximately 3-5    hours either by Blood Panel Multiplexed PCR or Direct    MALDI-TOF. Details: [email or web address]  Organism: Blood Culture PCR (12-23-23 @ 10:54)  Organism: Blood Culture PCR (12-23-23 @ 10:54)      Method Type: PCR      -  Staphylococcus epidermidis, Methicillin resistant: Detec    Culture - Blood (collected 12-20-23 @ 05:38)  Source: .Blood Blood-Peripheral  Preliminary Report (12-23-23 @ 11:00):    No growth at 72 Hours    Culture - Blood (collected 12-20-23 @ 05:38)  Source: .Blood Blood-Peripheral  Preliminary Report (12-23-23 @ 11:00):    No growth at 72 Hours        Culture - Sputum (collected 12-20-23 @ 05:38)  Source: ET Tube ET Tube  Gram Stain (12-20-23 @ 17:34):    Few Squamous epithelial cells per low power field    Moderate polymorphonuclear leukocytes per low power field    Moderate Gram Negative Rods per oil power field    Few Gram positive cocci in pairs per oil power field  Final Report (12-22-23 @ 17:57):    Numerous Klebsiella pneumoniae    Normal Respiratory Shalini present  Organism: Klebsiella pneumoniae (12-22-23 @ 17:57)  Organism: Klebsiella pneumoniae (12-22-23 @ 17:57)      Method Type: TABITHA      -  Amoxicillin/Clavulanic Acid: R >16/8      -  Ampicillin: R >16 These ampicillin results predict results for amoxicillin      -  Ampicillin/Sulbactam: R >16/8      -  Aztreonam: S <=4      -  Cefazolin: R >16      -  Cefepime: S <=2      -  Cefoxitin: I 16      -  Ceftriaxone: S <=1      -  Ciprofloxacin: S <=0.25      -  Ertapenem: S <=0.5      -  Gentamicin: S <=2      -  Imipenem: S <=1      -  Levofloxacin: S <=0.5      -  Meropenem: S <=1      -  Piperacillin/Tazobactam: S <=8      -  Tobramycin: S <=2      -  Trimethoprim/Sulfamethoxazole: S <=0.5/9.5        RADIOLOGY & ADDITIONAL TESTS: Reviewed. 78M w/ PMH HTN HLD presents as transfer from [facility] s/p VT arrest s/p shock x2 w/ likely downtime of 40 mins. Now with evidence of anoxic brain injury, SAH, SDH, and multiple DVTs.      INTERVAL HPI/OVERNIGHT EVENTS: No acute events overnight    SUBJECTIVE: Patient seen and examined at bedside. Today, he remains unresponsive to commands, touch, and pain.    ROS: All negative except as listed above.    VITAL SIGNS:  ICU Vital Signs Last 24 Hrs  T(C): 38.3 (24 Dec 2023 03:00), Max: 38.6 (23 Dec 2023 11:00)  T(F): 100.9 (24 Dec 2023 03:00), Max: 101.5 (23 Dec 2023 11:00)  HR: 63 (24 Dec 2023 06:00) (55 - 66)  BP: --  BP(mean): --  ABP: 145/51 (24 Dec 2023 06:00) (102/31 - 182/55)  ABP(mean): 75 (24 Dec 2023 06:00) (50 - 85)  RR: 18 (24 Dec 2023 06:00) (15 - 34)  SpO2: 100% (24 Dec 2023 06:00) (99% - 100%)    O2 Parameters below as of 24 Dec 2023 06:00  Patient On (Oxygen Delivery Method): ventilator    O2 Concentration (%): 40      Mode: AC/ CMV (Assist Control/ Continuous Mandatory Ventilation), RR (machine): 14, TV (machine): 500, FiO2: 40, PEEP: 5, ITime: 1, MAP: 10, PIP: 27  Plateau pressure:   P/F ratio:     12-23 @ 07:01  -  12-24 @ 07:00  --------------------------------------------------------  IN: 1496.3 mL / OUT: 1275 mL / NET: 221.3 mL      CAPILLARY BLOOD GLUCOSE      POCT Blood Glucose.: 204 mg/dL (24 Dec 2023 06:35)      ECG: reviewed.    PHYSICAL EXAM:    GENERAL: Sedated/intubated  HEAD:  Atraumatic, normocephalic  EYES: EOMI, conjunctiva and sclera clear  NECK: Supple, trachea midline, no JVD  HEART: Regular rate and rhythm, no murmurs, rubs, or gallops  LUNGS: Unlabored respirations.  Clear to auscultation on anterior lung fields.  ABDOMEN: Soft, nontender, nondistended  EXTREMITIES: No LE edema  NERVOUS SYSTEM:  Sedated/intubated, pupils reactive to light, unresponsive to touch, pain, or commands  SKIN: No rashes or lesions    MEDICATIONS:  MEDICATIONS  (STANDING):  amLODIPine   Tablet 5 milliGRAM(s) Oral daily  artificial  tears Solution 1 Drop(s) Both EYES two times a day  carvedilol 3.125 milliGRAM(s) Oral every 12 hours  chlorhexidine 0.12% Liquid 15 milliLiter(s) Oral Mucosa every 12 hours  chlorhexidine 2% Cloths 1 Application(s) Topical daily  dexMEDEtomidine Infusion 0.2 MICROgram(s)/kG/Hr (4.68 mL/Hr) IV Continuous <Continuous>  heparin   Injectable 5000 Unit(s) SubCutaneous every 8 hours  insulin lispro (ADMELOG) corrective regimen sliding scale   SubCutaneous every 6 hours  levETIRAcetam  IVPB 1000 milliGRAM(s) IV Intermittent every 12 hours  pantoprazole  Injectable 40 milliGRAM(s) IV Push daily  petrolatum Ophthalmic Ointment 1 Application(s) Both EYES three times a day  piperacillin/tazobactam IVPB.. 3.375 Gram(s) IV Intermittent every 8 hours  vancomycin  IVPB 1000 milliGRAM(s) IV Intermittent every 12 hours    MEDICATIONS  (PRN):      ALLERGIES:  Allergies    No Known Allergies    Intolerances        LABS:                        7.0    5.82  )-----------( 99       ( 24 Dec 2023 01:04 )             21.1     12-24    145  |  111<H>  |  68<H>  ----------------------------<  182<H>  3.9   |  23  |  2.03<H>    Ca    7.9<L>      24 Dec 2023 01:04  Phos  3.7     12-24  Mg     2.7     12-24    TPro  5.5<L>  /  Alb  3.3  /  TBili  1.1  /  DBili  x   /  AST  55<H>  /  ALT  65<H>  /  AlkPhos  43  12-24    PT/INR - ( 24 Dec 2023 01:04 )   PT: 12.0 sec;   INR: 1.09 ratio         PTT - ( 24 Dec 2023 01:04 )  PTT:30.8 sec  Urinalysis Basic - ( 24 Dec 2023 01:04 )    Color: x / Appearance: x / SG: x / pH: x  Gluc: 182 mg/dL / Ketone: x  / Bili: x / Urobili: x   Blood: x / Protein: x / Nitrite: x   Leuk Esterase: x / RBC: x / WBC x   Sq Epi: x / Non Sq Epi: x / Bacteria: x      ABG:  pH, Arterial: 7.45 (12-24-23 @ 00:50)  pCO2, Arterial: 34 mmHg (12-24-23 @ 00:50)  pO2, Arterial: 183 mmHg (12-24-23 @ 00:50)      vBG:    Micro:    Culture - Blood (collected 12-22-23 @ 06:08)  Source: .Blood Blood  Preliminary Report (12-23-23 @ 11:01):    No growth at 24 hours    Culture - Blood (collected 12-22-23 @ 06:08)  Source: .Blood Blood  Gram Stain (12-23-23 @ 08:51):    Growth in aerobic bottle: Gram Positive Cocci in Clusters  Preliminary Report (12-23-23 @ 08:51):    Growth in aerobic bottle: Gram Positive Cocci in Clusters    Direct identification is available within approximately 3-5    hours either by Blood Panel Multiplexed PCR or Direct    MALDI-TOF. Details: [email or web address]  Organism: Blood Culture PCR (12-23-23 @ 10:54)  Organism: Blood Culture PCR (12-23-23 @ 10:54)      Method Type: PCR      -  Staphylococcus epidermidis, Methicillin resistant: Detec    Culture - Blood (collected 12-20-23 @ 05:38)  Source: .Blood Blood-Peripheral  Preliminary Report (12-23-23 @ 11:00):    No growth at 72 Hours    Culture - Blood (collected 12-20-23 @ 05:38)  Source: .Blood Blood-Peripheral  Preliminary Report (12-23-23 @ 11:00):    No growth at 72 Hours        Culture - Sputum (collected 12-20-23 @ 05:38)  Source: ET Tube ET Tube  Gram Stain (12-20-23 @ 17:34):    Few Squamous epithelial cells per low power field    Moderate polymorphonuclear leukocytes per low power field    Moderate Gram Negative Rods per oil power field    Few Gram positive cocci in pairs per oil power field  Final Report (12-22-23 @ 17:57):    Numerous Klebsiella pneumoniae    Normal Respiratory Shalini present  Organism: Klebsiella pneumoniae (12-22-23 @ 17:57)  Organism: Klebsiella pneumoniae (12-22-23 @ 17:57)      Method Type: TABITHA      -  Amoxicillin/Clavulanic Acid: R >16/8      -  Ampicillin: R >16 These ampicillin results predict results for amoxicillin      -  Ampicillin/Sulbactam: R >16/8      -  Aztreonam: S <=4      -  Cefazolin: R >16      -  Cefepime: S <=2      -  Cefoxitin: I 16      -  Ceftriaxone: S <=1      -  Ciprofloxacin: S <=0.25      -  Ertapenem: S <=0.5      -  Gentamicin: S <=2      -  Imipenem: S <=1      -  Levofloxacin: S <=0.5      -  Meropenem: S <=1      -  Piperacillin/Tazobactam: S <=8      -  Tobramycin: S <=2      -  Trimethoprim/Sulfamethoxazole: S <=0.5/9.5        RADIOLOGY & ADDITIONAL TESTS: Reviewed. 78M w/ PMH HTN HLD presents as transfer from [facility] s/p VT arrest s/p shock x2 w/ likely downtime of 40 mins. Now with evidence of anoxic brain injury, SAH, SDH, and multiple DVTs.      INTERVAL HPI/OVERNIGHT EVENTS: No acute events overnight    SUBJECTIVE: Patient seen and examined at bedside. Today, he remains unresponsive to commands, touch, and pain.    ROS: unable to obtain    VITAL SIGNS:  ICU Vital Signs Last 24 Hrs  T(C): 38.3 (24 Dec 2023 03:00), Max: 38.6 (23 Dec 2023 11:00)  T(F): 100.9 (24 Dec 2023 03:00), Max: 101.5 (23 Dec 2023 11:00)  HR: 63 (24 Dec 2023 06:00) (55 - 66)  BP: --  BP(mean): --  ABP: 145/51 (24 Dec 2023 06:00) (102/31 - 182/55)  ABP(mean): 75 (24 Dec 2023 06:00) (50 - 85)  RR: 18 (24 Dec 2023 06:00) (15 - 34)  SpO2: 100% (24 Dec 2023 06:00) (99% - 100%)    O2 Parameters below as of 24 Dec 2023 06:00  Patient On (Oxygen Delivery Method): ventilator    O2 Concentration (%): 40      Mode: AC/ CMV (Assist Control/ Continuous Mandatory Ventilation), RR (machine): 14, TV (machine): 500, FiO2: 40, PEEP: 5, ITime: 1, MAP: 10, PIP: 27  Plateau pressure:   P/F ratio:     12-23 @ 07:01  -  12-24 @ 07:00  --------------------------------------------------------  IN: 1496.3 mL / OUT: 1275 mL / NET: 221.3 mL      CAPILLARY BLOOD GLUCOSE      POCT Blood Glucose.: 204 mg/dL (24 Dec 2023 06:35)      ECG: reviewed.    PHYSICAL EXAM:  GENERAL: Pt intubated, not responsive to sternal rub or noxious stimuli  . Notable trauma to head.   HEAD:  +traumatic head, Normocephalic  EYES: . Periorbital edema/erythma on R. conjunctiva and sclera clear. Pupils reactive to light   ENMT: ET tube in place. Dry mucous membranes. Notable blood in oral cavity   NECK: Supple  HEART: Regular rate and rhythm; No murmurs, rubs, or gallops  RESPIRATORY: Mechanical breath sounds b/l. No notable crackles, wheezes  ABDOMEN: Soft, Nontender, Nondistended  NEUROLOGY: A&Ox0. pin point but Pupils reactive. Non-responsive to sternal stimuli off sedation, + gag   EXTREMITIES:  1+ Peripheral Pulses, No clubbing, cyanosis, or edema  SKIN: warm, dry, normal color    MEDICATIONS:  MEDICATIONS  (STANDING):  amLODIPine   Tablet 5 milliGRAM(s) Oral daily  artificial  tears Solution 1 Drop(s) Both EYES two times a day  carvedilol 3.125 milliGRAM(s) Oral every 12 hours  chlorhexidine 0.12% Liquid 15 milliLiter(s) Oral Mucosa every 12 hours  chlorhexidine 2% Cloths 1 Application(s) Topical daily  dexMEDEtomidine Infusion 0.2 MICROgram(s)/kG/Hr (4.68 mL/Hr) IV Continuous <Continuous>  heparin   Injectable 5000 Unit(s) SubCutaneous every 8 hours  insulin lispro (ADMELOG) corrective regimen sliding scale   SubCutaneous every 6 hours  levETIRAcetam  IVPB 1000 milliGRAM(s) IV Intermittent every 12 hours  pantoprazole  Injectable 40 milliGRAM(s) IV Push daily  petrolatum Ophthalmic Ointment 1 Application(s) Both EYES three times a day  piperacillin/tazobactam IVPB.. 3.375 Gram(s) IV Intermittent every 8 hours  vancomycin  IVPB 1000 milliGRAM(s) IV Intermittent every 12 hours    MEDICATIONS  (PRN):      ALLERGIES:  Allergies    No Known Allergies    Intolerances        LABS:                        7.0    5.82  )-----------( 99       ( 24 Dec 2023 01:04 )             21.1     12-24    145  |  111<H>  |  68<H>  ----------------------------<  182<H>  3.9   |  23  |  2.03<H>    Ca    7.9<L>      24 Dec 2023 01:04  Phos  3.7     12-24  Mg     2.7     12-24    TPro  5.5<L>  /  Alb  3.3  /  TBili  1.1  /  DBili  x   /  AST  55<H>  /  ALT  65<H>  /  AlkPhos  43  12-24    PT/INR - ( 24 Dec 2023 01:04 )   PT: 12.0 sec;   INR: 1.09 ratio         PTT - ( 24 Dec 2023 01:04 )  PTT:30.8 sec  Urinalysis Basic - ( 24 Dec 2023 01:04 )    Color: x / Appearance: x / SG: x / pH: x  Gluc: 182 mg/dL / Ketone: x  / Bili: x / Urobili: x   Blood: x / Protein: x / Nitrite: x   Leuk Esterase: x / RBC: x / WBC x   Sq Epi: x / Non Sq Epi: x / Bacteria: x      ABG:  pH, Arterial: 7.45 (12-24-23 @ 00:50)  pCO2, Arterial: 34 mmHg (12-24-23 @ 00:50)  pO2, Arterial: 183 mmHg (12-24-23 @ 00:50)      vBG:    Micro:    Culture - Blood (collected 12-22-23 @ 06:08)  Source: .Blood Blood  Preliminary Report (12-23-23 @ 11:01):    No growth at 24 hours    Culture - Blood (collected 12-22-23 @ 06:08)  Source: .Blood Blood  Gram Stain (12-23-23 @ 08:51):    Growth in aerobic bottle: Gram Positive Cocci in Clusters  Preliminary Report (12-23-23 @ 08:51):    Growth in aerobic bottle: Gram Positive Cocci in Clusters    Direct identification is available within approximately 3-5    hours either by Blood Panel Multiplexed PCR or Direct    MALDI-TOF. Details: [email or web address]  Organism: Blood Culture PCR (12-23-23 @ 10:54)  Organism: Blood Culture PCR (12-23-23 @ 10:54)      Method Type: PCR      -  Staphylococcus epidermidis, Methicillin resistant: Detec    Culture - Blood (collected 12-20-23 @ 05:38)  Source: .Blood Blood-Peripheral  Preliminary Report (12-23-23 @ 11:00):    No growth at 72 Hours    Culture - Blood (collected 12-20-23 @ 05:38)  Source: .Blood Blood-Peripheral  Preliminary Report (12-23-23 @ 11:00):    No growth at 72 Hours        Culture - Sputum (collected 12-20-23 @ 05:38)  Source: ET Tube ET Tube  Gram Stain (12-20-23 @ 17:34):    Few Squamous epithelial cells per low power field    Moderate polymorphonuclear leukocytes per low power field    Moderate Gram Negative Rods per oil power field    Few Gram positive cocci in pairs per oil power field  Final Report (12-22-23 @ 17:57):    Numerous Klebsiella pneumoniae    Normal Respiratory Shalini present  Organism: Klebsiella pneumoniae (12-22-23 @ 17:57)  Organism: Klebsiella pneumoniae (12-22-23 @ 17:57)      Method Type: TABITHA      -  Amoxicillin/Clavulanic Acid: R >16/8      -  Ampicillin: R >16 These ampicillin results predict results for amoxicillin      -  Ampicillin/Sulbactam: R >16/8      -  Aztreonam: S <=4      -  Cefazolin: R >16      -  Cefepime: S <=2      -  Cefoxitin: I 16      -  Ceftriaxone: S <=1      -  Ciprofloxacin: S <=0.25      -  Ertapenem: S <=0.5      -  Gentamicin: S <=2      -  Imipenem: S <=1      -  Levofloxacin: S <=0.5      -  Meropenem: S <=1      -  Piperacillin/Tazobactam: S <=8      -  Tobramycin: S <=2      -  Trimethoprim/Sulfamethoxazole: S <=0.5/9.5        RADIOLOGY & ADDITIONAL TESTS: Reviewed. 78M w/ PMH HTN HLD presents as transfer from [facility] s/p VT arrest s/p shock x2 w/ likely downtime of 40 mins. Now with evidence of anoxic brain injury, SAH, SDH, and multiple DVTs.      INTERVAL HPI/OVERNIGHT EVENTS: No acute events overnight    SUBJECTIVE: Patient seen and examined at bedside. Today, he remains unresponsive to commands, touch, and pain.    ROS: unable to obtain    VITAL SIGNS:  ICU Vital Signs Last 24 Hrs  T(C): 38.3 (24 Dec 2023 03:00), Max: 38.6 (23 Dec 2023 11:00)  T(F): 100.9 (24 Dec 2023 03:00), Max: 101.5 (23 Dec 2023 11:00)  HR: 63 (24 Dec 2023 06:00) (55 - 66)  BP: --  BP(mean): --  ABP: 145/51 (24 Dec 2023 06:00) (102/31 - 182/55)  ABP(mean): 75 (24 Dec 2023 06:00) (50 - 85)  RR: 18 (24 Dec 2023 06:00) (15 - 34)  SpO2: 100% (24 Dec 2023 06:00) (99% - 100%)    O2 Parameters below as of 24 Dec 2023 06:00  Patient On (Oxygen Delivery Method): ventilator    O2 Concentration (%): 40      Mode: AC/ CMV (Assist Control/ Continuous Mandatory Ventilation), RR (machine): 14, TV (machine): 500, FiO2: 40, PEEP: 5, ITime: 1, MAP: 10, PIP: 27  Plateau pressure:   P/F ratio:     12-23 @ 07:01  -  12-24 @ 07:00  --------------------------------------------------------  IN: 1496.3 mL / OUT: 1275 mL / NET: 221.3 mL      CAPILLARY BLOOD GLUCOSE      POCT Blood Glucose.: 204 mg/dL (24 Dec 2023 06:35)      ECG: reviewed.    PHYSICAL EXAM:  GENERAL: Pt intubated, not responsive to sternal rub or noxious stimuli  . Notable trauma to head.   HEAD:  +traumatic head, Normocephalic  EYES: . Periorbital edema/erythma on R. conjunctiva and sclera clear. Pupils reactive to light   ENMT: ET tube in place. Dry mucous membranes. Notable blood in oral cavity   NECK: Supple  HEART: Regular rate and rhythm; No murmurs, rubs, or gallops  RESPIRATORY: Mechanical breath sounds b/l. No notable crackles, wheezes  ABDOMEN: Soft, Nontender, Nondistended  NEUROLOGY: A&Ox0. pin point but Pupils reactive. Non-responsive to sternal stimuli off sedation, + gag   EXTREMITIES:  1+ Peripheral Pulses, No clubbing, cyanosis, or edema  SKIN: warm, dry, normal color    MEDICATIONS:  MEDICATIONS  (STANDING):  amLODIPine   Tablet 5 milliGRAM(s) Oral daily  artificial  tears Solution 1 Drop(s) Both EYES two times a day  carvedilol 3.125 milliGRAM(s) Oral every 12 hours  chlorhexidine 0.12% Liquid 15 milliLiter(s) Oral Mucosa every 12 hours  chlorhexidine 2% Cloths 1 Application(s) Topical daily  dexMEDEtomidine Infusion 0.2 MICROgram(s)/kG/Hr (4.68 mL/Hr) IV Continuous <Continuous>  heparin   Injectable 5000 Unit(s) SubCutaneous every 8 hours  insulin lispro (ADMELOG) corrective regimen sliding scale   SubCutaneous every 6 hours  levETIRAcetam  IVPB 1000 milliGRAM(s) IV Intermittent every 12 hours  pantoprazole  Injectable 40 milliGRAM(s) IV Push daily  petrolatum Ophthalmic Ointment 1 Application(s) Both EYES three times a day  piperacillin/tazobactam IVPB.. 3.375 Gram(s) IV Intermittent every 8 hours  vancomycin  IVPB 1000 milliGRAM(s) IV Intermittent every 12 hours    MEDICATIONS  (PRN):      ALLERGIES:  Allergies    No Known Allergies    Intolerances        LABS:                        7.0    5.82  )-----------( 99       ( 24 Dec 2023 01:04 )             21.1     12-24    145  |  111<H>  |  68<H>  ----------------------------<  182<H>  3.9   |  23  |  2.03<H>    Ca    7.9<L>      24 Dec 2023 01:04  Phos  3.7     12-24  Mg     2.7     12-24    TPro  5.5<L>  /  Alb  3.3  /  TBili  1.1  /  DBili  x   /  AST  55<H>  /  ALT  65<H>  /  AlkPhos  43  12-24    PT/INR - ( 24 Dec 2023 01:04 )   PT: 12.0 sec;   INR: 1.09 ratio         PTT - ( 24 Dec 2023 01:04 )  PTT:30.8 sec  Urinalysis Basic - ( 24 Dec 2023 01:04 )    Color: x / Appearance: x / SG: x / pH: x  Gluc: 182 mg/dL / Ketone: x  / Bili: x / Urobili: x   Blood: x / Protein: x / Nitrite: x   Leuk Esterase: x / RBC: x / WBC x   Sq Epi: x / Non Sq Epi: x / Bacteria: x      ABG:  pH, Arterial: 7.45 (12-24-23 @ 00:50)  pCO2, Arterial: 34 mmHg (12-24-23 @ 00:50)  pO2, Arterial: 183 mmHg (12-24-23 @ 00:50)      vBG:    Micro:    Culture - Blood (collected 12-22-23 @ 06:08)  Source: .Blood Blood  Preliminary Report (12-23-23 @ 11:01):    No growth at 24 hours    Culture - Blood (collected 12-22-23 @ 06:08)  Source: .Blood Blood  Gram Stain (12-23-23 @ 08:51):    Growth in aerobic bottle: Gram Positive Cocci in Clusters  Preliminary Report (12-23-23 @ 08:51):    Growth in aerobic bottle: Gram Positive Cocci in Clusters    Direct identification is available within approximately 3-5    hours either by Blood Panel Multiplexed PCR or Direct    MALDI-TOF. Details: [email or web address]  Organism: Blood Culture PCR (12-23-23 @ 10:54)  Organism: Blood Culture PCR (12-23-23 @ 10:54)      Method Type: PCR      -  Staphylococcus epidermidis, Methicillin resistant: Detec    Culture - Blood (collected 12-20-23 @ 05:38)  Source: .Blood Blood-Peripheral  Preliminary Report (12-23-23 @ 11:00):    No growth at 72 Hours    Culture - Blood (collected 12-20-23 @ 05:38)  Source: .Blood Blood-Peripheral  Preliminary Report (12-23-23 @ 11:00):    No growth at 72 Hours        Culture - Sputum (collected 12-20-23 @ 05:38)  Source: ET Tube ET Tube  Gram Stain (12-20-23 @ 17:34):    Few Squamous epithelial cells per low power field    Moderate polymorphonuclear leukocytes per low power field    Moderate Gram Negative Rods per oil power field    Few Gram positive cocci in pairs per oil power field  Final Report (12-22-23 @ 17:57):    Numerous Klebsiella pneumoniae    Normal Respiratory Shalini present  Organism: Klebsiella pneumoniae (12-22-23 @ 17:57)  Organism: Klebsiella pneumoniae (12-22-23 @ 17:57)      Method Type: TABITHA      -  Amoxicillin/Clavulanic Acid: R >16/8      -  Ampicillin: R >16 These ampicillin results predict results for amoxicillin      -  Ampicillin/Sulbactam: R >16/8      -  Aztreonam: S <=4      -  Cefazolin: R >16      -  Cefepime: S <=2      -  Cefoxitin: I 16      -  Ceftriaxone: S <=1      -  Ciprofloxacin: S <=0.25      -  Ertapenem: S <=0.5      -  Gentamicin: S <=2      -  Imipenem: S <=1      -  Levofloxacin: S <=0.5      -  Meropenem: S <=1      -  Piperacillin/Tazobactam: S <=8      -  Tobramycin: S <=2      -  Trimethoprim/Sulfamethoxazole: S <=0.5/9.5        RADIOLOGY & ADDITIONAL TESTS: Reviewed.

## 2023-12-24 NOTE — PROGRESS NOTE ADULT - ASSESSMENT
NEURO  #Anoxic brain injury  Encephalopathy post arrest   - Intubated w/o sedation s/p cardiac arrest w/ unknown down time. Reported to be 20minutes but can be longer given ROSC achieved right before arrival to the hospital   - CTH at [facility] w/o intracranial bleed/pathology. CTH maybe too soon to show evidence of anoxic injury  - Pt remains w/o purposeful movement.   - Neuro consulted for prognostication  - VEEG in place - no epileptiform activity; diffuse cerebral dysfunction   Plan:  - Repeat CTH shows stability of bleed and emerging signs of anoxic injury  - Most recent CTH yesterday without change  - f/u neuron specific enolase  - Family meeting w/ neurology attending 12/22 discussed no meaningful recovery. Pending family decision on comfort care.     #SAH  #SDH  - Pt w/ acute SAH and SDH 2/2 trauma  - Was not initially seen in outside scan at [facility] likely performed too early for radiologic evidence  - Neurology following,  - NSGY: no intervention  - Repeat CTH shows stability of bleeds   - Maintain BP control   - Started on HSQ for DVT ppx, will obtain repeat CT scan 12/23/23  Plan:  -CT scan 12/23/23 stable and unchanged from prior  - Would need full AC for DVTs. Will discuss w/ NSGY    #C-spine trauma  Pt w/ fall at home presented w/ c-collar.   -Per [facility] radiology report no evidence of cervical fracture  -Trauma surgery cleared pt of Cspine collar    #Myoclonic jerking  - vEEG: evidence of mycolonic seizures  - Pt on keppra    CARDIAC  #VT arrest  - Pt s/p VT arrest w/ unknown downtime. Pt reportedly received shock x2. No prior cardiac hx per family  - Check TTE   - Check cardiac enzymes, BNP  Plan:  - Monitor for arrhythmias   - Pt not asa/plavix loaded 2/2 substernal hematoma noted on CT chest at [facility]  - Unlikely to be a candidate for [facility] at this time given mental status     #HTN  - Maintain BP control iso brain bleed  - Amlodipine; coreg  - PRN IVP labetalol for elevated SBP  - SBP goal<160    RESPIRATORY   #Intubated  Pt intubated s/p cardiac arrest  - On full vent support: RR 16 /  / PEEP 5 / FiO2 40  - Monitor ABGs    #Oropharynx bleed  - Pt w/ blood in oropharynx requiring frequent suctioning.   - ENT consulted s/p packing     RENAL   #LONA  Pt presented w/ SCr 1.16, likely near baseline  - trend Cr. Now stable around 2   - montior I&Os    GI  - Diet: enteral feeds     ENDO:  -No acute issues    HEME/ONC  #Substernal hematoma  - Pt w/ substernal hematoma noted on imaging at [facility] from trauma/cpr  - Repeat CT chest to monitor hematoma. Stable on last scan  - A/C: HSQ for dvt    #DVT  -DVTs in left soleal, right common femoral. UE: right axillary, right brachial  -Will reach out to [facility] for therapeutic AC    #Thrombocytopenia  - Pt w/ downtrending PLT. Started downtrending prior to HSQ initiation.   - Likely iso critical illness   - Will send blue top PLT w/ next CBC    ID   #Sepsis  - Pt w/ leukocytosis w/ fevers possibly 2/2  infection  - Pt w/ persistent fevers likely multifactorial 2/2 DVTs, underlying infection, possible ?neurogenic component  - U/A grossly positive. F/u BCx, UCx  - Pt had crash lines placed at [facility]. Possible source of infection. MRSA negative 12/20  - Sputum Cx + for klebsiella  Plan:  - 12/22 BCx 1 bottle w/ MRSE. Possible contaminant. Check repeat BCx  - Fevers recently potentially due to DVTs  - Continue zosyn, vanc      LINES/PPX  - peripherals in tact  - LIJ triple lumen: 12/20  - GOC: Full NEURO  #Anoxic brain injury  Encephalopathy post arrest   - Intubated w/o sedation s/p cardiac arrest w/ unknown down time. Reported to be 20minutes but can be longer given ROSC achieved right before arrival to the hospital   - CTH at [facility] w/o intracranial bleed/pathology. CTH maybe too soon to show evidence of anoxic injury  - Pt remains w/o purposeful movement.   - Neuro consulted for prognostication  - VEEG in place - no epileptiform activity; diffuse cerebral dysfunction   Plan:  - Repeat CTH shows stability of bleed and emerging signs of anoxic injury  - Most recent CTH yesterday without change  - f/u neuron specific enolase  - Family meeting w/ neurology attending 12/22 discussed no meaningful recovery. Pending family decision on comfort care.     #SAH  #SDH  - Pt w/ acute SAH and SDH 2/2 trauma  - Was not initially seen in outside scan at [facility] likely performed too early for radiologic evidence  - Neurology following,  - NSGY: no intervention  - Repeat CTH shows stability of bleeds   - Maintain BP control   - Started on HSQ for DVT ppx, will obtain repeat CT scan 12/23/23  Plan:  -CT scan 12/23/23 stable and unchanged from prior  - Would need full AC for DVTs. Will discuss w/ NSGY    #C-spine trauma  Pt w/ fall at home presented w/ c-collar.   -Per [facility] radiology report no evidence of cervical fracture  -Trauma surgery cleared pt of Cspine collar    #Myoclonic jerking  - vEEG: evidence of mycolonic seizures  - Pt on keppra    CARDIAC  #VT arrest  - Pt s/p VT arrest w/ unknown downtime. Pt reportedly received shock x2. No prior cardiac hx per family  - Check TTE   - Check cardiac enzymes, BNP  Plan:  - Monitor for arrhythmias   - Pt not asa/plavix loaded 2/2 substernal hematoma noted on CT chest at [facility]  - Unlikely to be a candidate for [facility] at this time given mental status     #HTN  - Maintain BP control iso brain bleed  - Amlodipine; coreg  - PRN IVP labetalol for elevated SBP  - SBP goal<160    RESPIRATORY   #Intubated  Pt intubated s/p cardiac arrest  - On full vent support: RR 16 /  / PEEP 5 / FiO2 40  - Monitor ABGs    #Oropharynx bleed  - Pt w/ blood in oropharynx requiring frequent suctioning.   - ENT consulted s/p packing     RENAL   #LONA  Pt presented w/ SCr 1.16, likely near baseline  - trend Cr. Now stable around 2   - montior I&Os    GI  - Diet: enteral feeds     ENDO:  -No acute issues    HEME/ONC  #Substernal hematoma  - Pt w/ substernal hematoma noted on imaging at [facility] from trauma/cpr  - Repeat CT chest to monitor hematoma. Stable on last scan  - A/C: HSQ for dvt    #DVT  -DVTs in left soleal, right common femoral. UE: right axillary, right brachial  -Will reach out to [facility] for therapeutic AC    #Thrombocytopenia  - Pt w/ downtrending PLT. Started downtrending prior to HSQ initiation.   - Likely iso critical illness   - Will send blue top PLT w/ next CBC    ID   #Sepsis  - Pt w/ leukocytosis w/ fevers possibly 2/2  infection  - Pt w/ persistent fevers likely multifactorial 2/2 DVTs, underlying infection, possible ?neurogenic component  - U/A grossly positive. F/u BCx, UCx  - Pt had crash lines placed at [facility]. Possible source of infection. MRSA negative 12/20  - Sputum Cx + for klebsiella  Plan:  - 12/22 BCx 1 bottle w/ MRSE. Possible contaminant. Check repeat BCx  - Fevers recently potentially due to DVTs  - Continue zosyn, vanc      LINES/PPX  - peripherals in tact  - LIJ triple lumen: 12/20  - GOC: Full NEURO  #Anoxic brain injury  Encephalopathy post arrest   - Intubated w/o sedation s/p cardiac arrest w/ unknown down time. Reported to be 20minutes but can be longer given ROSC achieved right before arrival to the hospital   - CTH at [facility] w/o intracranial bleed/pathology. CTH maybe too soon to show evidence of anoxic injury  - Pt remains w/o purposeful movement.   - Neuro consulted for prognostication  - VEEG in place - no epileptiform activity; diffuse cerebral dysfunction   Plan:  - Repeat CTH shows stability of bleed and emerging signs of anoxic injury  - Most recent CTH yesterday without change  - f/u neuron specific enolase  - Family meeting w/ neurology attending 12/22 discussed no meaningful recovery. Pending family decision on comfort care.   - Palliative consulted    #SAH  #SDH  - Pt w/ acute SAH and SDH 2/2 trauma  - Was not initially seen in outside scan at [facility] likely performed too early for radiologic evidence  - Neurology following,  - NSGY: no intervention  - Repeat CTH shows stability of bleeds   - Maintain BP control   - Started on HSQ for DVT ppx, will obtain repeat CT scan 12/23/23  Plan:  -CT scan 12/23/23 stable and unchanged from prior  - Would need full AC for DVTs. Will discuss w/ NSGY    #C-spine trauma  Pt w/ fall at home presented w/ c-collar.   -Per [facility] radiology report no evidence of cervical fracture  -Trauma surgery cleared pt of Cspine collar    #Myoclonic jerking  - vEEG: evidence of mycolonic seizures  - Pt on keppra    CARDIAC  #VT arrest  - Pt s/p VT arrest w/ unknown downtime. Pt reportedly received shock x2. No prior cardiac hx per family  - Check TTE   - Check cardiac enzymes, BNP  Plan:  - Monitor for arrhythmias   - Pt not asa/plavix loaded 2/2 substernal hematoma noted on CT chest at [facility]  - Unlikely to be a candidate for [facility] at this time given mental status     #HTN  - Maintain BP control iso brain bleed  - Amlodipine; coreg  - PRN IVP labetalol for elevated SBP  - SBP goal<160    RESPIRATORY   #Intubated  Pt intubated s/p cardiac arrest  - On full vent support: RR 16 /  / PEEP 5 / FiO2 40  - Monitor ABGs    #Oropharynx bleed  - Pt w/ blood in oropharynx requiring frequent suctioning.   - ENT consulted s/p packing     RENAL   #LONA  Pt presented w/ SCr 1.16, likely near baseline  - trend Cr. Now stable around 2   - montior I&Os    GI  - Diet: enteral feeds     ENDO:  -No acute issues    HEME/ONC  #Substernal hematoma  - Pt w/ substernal hematoma noted on imaging at [facility] from trauma/cpr  - Repeat CT chest to monitor hematoma. Stable on last scan  - A/C: HSQ for dvt    #DVT  -DVTs in left soleal, right common femoral. UE: right axillary, right brachial  -Will reach out to [facility] for therapeutic AC    #Thrombocytopenia  - Pt w/ downtrending PLT. Started downtrending prior to HSQ initiation.   - Likely iso critical illness       #Anemia  - 1u prbc transfused for Hgb of 7    ID   #Sepsis  - Pt w/ leukocytosis w/ fevers possibly 2/2  infection  - Pt w/ persistent fevers likely multifactorial 2/2 DVTs, underlying infection, possible ?neurogenic component  - U/A grossly positive. F/u BCx, UCx  - Pt had crash lines placed at [facility]. Possible source of infection. MRSA negative 12/20  - Sputum Cx + for klebsiella  Plan:  - 12/22 BCx 1 bottle w/ MRSE. Possible contaminant. Check repeat BCx  - Fevers recently potentially due to DVTs or infection  - Will broaden coverage to meropenem  - Will check fungitell, fungal cultures      LINES/PPX  - peripherals in tact  - LIJ triple lumen: 12/20  - GOC: Full NEURO  #Anoxic brain injury  Encephalopathy post arrest   - Intubated w/o sedation s/p cardiac arrest w/ unknown down time. Reported to be 20minutes but can be longer given ROSC achieved right before arrival to the hospital   - CTH at [facility] w/o intracranial bleed/pathology. CTH maybe too soon to show evidence of anoxic injury  - Pt remains w/o purposeful movement.   - Neuro consulted for prognostication  - VEEG in place - no epileptiform activity; diffuse cerebral dysfunction   Plan:  - Repeat CTH shows stability of bleed and emerging signs of anoxic injury  - Most recent CTH yesterday without change  - f/u neuron specific enolase  - Family meeting w/ neurology attending 12/22 discussed no meaningful recovery. Pending family decision on comfort care.   - Palliative consulted    #SAH  #SDH  - Pt w/ acute SAH and SDH 2/2 trauma  - Was not initially seen in outside scan at [facility] likely performed too early for radiologic evidence  - Neurology following,  - NSGY: no intervention  - Repeat CTH shows stability of bleeds   - Maintain BP control   - Started on HSQ for DVT ppx, will obtain repeat CT scan 12/23/23  Plan:  -CT scan 12/23/23 stable and unchanged from prior  - Would need full AC for DVTs. Will discuss w/ NSGY    #C-spine trauma  Pt w/ fall at home presented w/ c-collar.   -Per [facility] radiology report no evidence of cervical fracture  -Trauma surgery cleared pt of Cspine collar    #Myoclonic jerking  - vEEG: evidence of mycolonic seizures  - Pt on keppra    CARDIAC  #VT arrest  - Pt s/p VT arrest w/ unknown downtime. Pt reportedly received shock x2. No prior cardiac hx per family  - Check TTE   - Check cardiac enzymes, BNP  Plan:  - Monitor for arrhythmias   - Pt not asa/plavix loaded 2/2 substernal hematoma noted on CT chest at [facility]  - Unlikely to be a candidate for [facility] at this time given mental status     #HTN  - Maintain BP control iso brain bleed  - Amlodipine; coreg  - PRN IVP labetalol for elevated SBP  - SBP goal<160    RESPIRATORY   #Intubated  Pt intubated s/p cardiac arrest  - On full vent support: RR 16 /  / PEEP 5 / FiO2 40  - Monitor ABGs    #Oropharynx bleed  - Pt w/ blood in oropharynx requiring frequent suctioning.   - ENT consulted s/p packing     RENAL   #LONA  Pt presented w/ SCr 1.16, likely near baseline  - trend Cr. Now stable around 2   - montior I&Os    GI  - Diet: enteral feeds     ENDO:  -No acute issues    HEME/ONC  #Substernal hematoma  - Pt w/ substernal hematoma noted on imaging at [facility] from trauma/cpr  - Repeat CT chest to monitor hematoma. Stable on last scan  - A/C: HSQ for dvt    #DVT  -DVTs in left soleal, right common femoral. UE: right axillary, right brachial  -Will reach out to [facility] for therapeutic AC    #Thrombocytopenia  - Pt w/ downtrending PLT. Started downtrending prior to HSQ initiation.   - Likely iso critical illness       #Anemia  - 1u prbc transfused for Hgb of 7    ID   #Sepsis  - Pt w/ leukocytosis w/ fevers possibly 2/2  infection  - Pt w/ persistent fevers likely multifactorial 2/2 DVTs, underlying infection, possible ?neurogenic component  - U/A grossly positive. F/u BCx, UCx  - Pt had crash lines placed at [facility]. Possible source of infection. MRSA negative 12/20  - Sputum Cx + for klebsiella  Plan:  - 12/22 BCx 1 bottle w/ MRSE. Possible contaminant. Check repeat BCx  - Fevers recently potentially due to DVTs or infection  - Will broaden coverage to meropenem  - Will check fungitell, fungal cultures      LINES/PPX  - peripherals in tact  - LIJ triple lumen: 12/20  - GOC: Full

## 2023-12-25 LAB
ALBUMIN SERPL ELPH-MCNC: 3.1 G/DL — LOW (ref 3.3–5)
ALBUMIN SERPL ELPH-MCNC: 3.1 G/DL — LOW (ref 3.3–5)
ALP SERPL-CCNC: 48 U/L — SIGNIFICANT CHANGE UP (ref 40–120)
ALP SERPL-CCNC: 48 U/L — SIGNIFICANT CHANGE UP (ref 40–120)
ALT FLD-CCNC: 57 U/L — HIGH (ref 10–45)
ALT FLD-CCNC: 57 U/L — HIGH (ref 10–45)
ANION GAP SERPL CALC-SCNC: 12 MMOL/L — SIGNIFICANT CHANGE UP (ref 5–17)
ANION GAP SERPL CALC-SCNC: 12 MMOL/L — SIGNIFICANT CHANGE UP (ref 5–17)
APTT BLD: 47.2 SEC — HIGH (ref 24.5–35.6)
APTT BLD: 47.2 SEC — HIGH (ref 24.5–35.6)
APTT BLD: 62.9 SEC — HIGH (ref 24.5–35.6)
APTT BLD: 62.9 SEC — HIGH (ref 24.5–35.6)
APTT BLD: 69.6 SEC — HIGH (ref 24.5–35.6)
APTT BLD: 69.6 SEC — HIGH (ref 24.5–35.6)
APTT BLD: 83.6 SEC — HIGH (ref 24.5–35.6)
APTT BLD: 83.6 SEC — HIGH (ref 24.5–35.6)
AST SERPL-CCNC: 58 U/L — HIGH (ref 10–40)
AST SERPL-CCNC: 58 U/L — HIGH (ref 10–40)
BASOPHILS # BLD AUTO: 0 K/UL — SIGNIFICANT CHANGE UP (ref 0–0.2)
BASOPHILS # BLD AUTO: 0 K/UL — SIGNIFICANT CHANGE UP (ref 0–0.2)
BASOPHILS # BLD AUTO: 0.01 K/UL — SIGNIFICANT CHANGE UP (ref 0–0.2)
BASOPHILS # BLD AUTO: 0.01 K/UL — SIGNIFICANT CHANGE UP (ref 0–0.2)
BASOPHILS NFR BLD AUTO: 0 % — SIGNIFICANT CHANGE UP (ref 0–2)
BASOPHILS NFR BLD AUTO: 0 % — SIGNIFICANT CHANGE UP (ref 0–2)
BASOPHILS NFR BLD AUTO: 0.1 % — SIGNIFICANT CHANGE UP (ref 0–2)
BASOPHILS NFR BLD AUTO: 0.1 % — SIGNIFICANT CHANGE UP (ref 0–2)
BILIRUB SERPL-MCNC: 1.2 MG/DL — SIGNIFICANT CHANGE UP (ref 0.2–1.2)
BILIRUB SERPL-MCNC: 1.2 MG/DL — SIGNIFICANT CHANGE UP (ref 0.2–1.2)
BUN SERPL-MCNC: 68 MG/DL — HIGH (ref 7–23)
BUN SERPL-MCNC: 68 MG/DL — HIGH (ref 7–23)
CALCIUM SERPL-MCNC: 8.3 MG/DL — LOW (ref 8.4–10.5)
CALCIUM SERPL-MCNC: 8.3 MG/DL — LOW (ref 8.4–10.5)
CHLORIDE SERPL-SCNC: 112 MMOL/L — HIGH (ref 96–108)
CHLORIDE SERPL-SCNC: 112 MMOL/L — HIGH (ref 96–108)
CO2 SERPL-SCNC: 22 MMOL/L — SIGNIFICANT CHANGE UP (ref 22–31)
CO2 SERPL-SCNC: 22 MMOL/L — SIGNIFICANT CHANGE UP (ref 22–31)
CREAT SERPL-MCNC: 1.88 MG/DL — HIGH (ref 0.5–1.3)
CREAT SERPL-MCNC: 1.88 MG/DL — HIGH (ref 0.5–1.3)
CULTURE RESULTS: SIGNIFICANT CHANGE UP
EGFR: 37 ML/MIN/1.73M2 — LOW
EGFR: 37 ML/MIN/1.73M2 — LOW
EOSINOPHIL # BLD AUTO: 0.04 K/UL — SIGNIFICANT CHANGE UP (ref 0–0.5)
EOSINOPHIL # BLD AUTO: 0.04 K/UL — SIGNIFICANT CHANGE UP (ref 0–0.5)
EOSINOPHIL # BLD AUTO: 0.05 K/UL — SIGNIFICANT CHANGE UP (ref 0–0.5)
EOSINOPHIL # BLD AUTO: 0.05 K/UL — SIGNIFICANT CHANGE UP (ref 0–0.5)
EOSINOPHIL NFR BLD AUTO: 0.5 % — SIGNIFICANT CHANGE UP (ref 0–6)
EOSINOPHIL NFR BLD AUTO: 0.5 % — SIGNIFICANT CHANGE UP (ref 0–6)
EOSINOPHIL NFR BLD AUTO: 0.6 % — SIGNIFICANT CHANGE UP (ref 0–6)
EOSINOPHIL NFR BLD AUTO: 0.6 % — SIGNIFICANT CHANGE UP (ref 0–6)
GAS PNL BLDA: SIGNIFICANT CHANGE UP
GAS PNL BLDA: SIGNIFICANT CHANGE UP
GLUCOSE SERPL-MCNC: 182 MG/DL — HIGH (ref 70–99)
GLUCOSE SERPL-MCNC: 182 MG/DL — HIGH (ref 70–99)
GRAM STN FLD: ABNORMAL
GRAM STN FLD: ABNORMAL
HCT VFR BLD CALC: 24.9 % — LOW (ref 39–50)
HCT VFR BLD CALC: 24.9 % — LOW (ref 39–50)
HCT VFR BLD CALC: 25.4 % — LOW (ref 39–50)
HCT VFR BLD CALC: 25.4 % — LOW (ref 39–50)
HGB BLD-MCNC: 8.5 G/DL — LOW (ref 13–17)
IMM GRANULOCYTES NFR BLD AUTO: 0.8 % — SIGNIFICANT CHANGE UP (ref 0–0.9)
IMM GRANULOCYTES NFR BLD AUTO: 0.8 % — SIGNIFICANT CHANGE UP (ref 0–0.9)
IMM GRANULOCYTES NFR BLD AUTO: 0.9 % — SIGNIFICANT CHANGE UP (ref 0–0.9)
IMM GRANULOCYTES NFR BLD AUTO: 0.9 % — SIGNIFICANT CHANGE UP (ref 0–0.9)
INR BLD: 1.09 RATIO — SIGNIFICANT CHANGE UP (ref 0.85–1.18)
INR BLD: 1.18 RATIO — SIGNIFICANT CHANGE UP (ref 0.85–1.18)
INR BLD: 1.18 RATIO — SIGNIFICANT CHANGE UP (ref 0.85–1.18)
LYMPHOCYTES # BLD AUTO: 0.91 K/UL — LOW (ref 1–3.3)
LYMPHOCYTES # BLD AUTO: 0.91 K/UL — LOW (ref 1–3.3)
LYMPHOCYTES # BLD AUTO: 1.02 K/UL — SIGNIFICANT CHANGE UP (ref 1–3.3)
LYMPHOCYTES # BLD AUTO: 1.02 K/UL — SIGNIFICANT CHANGE UP (ref 1–3.3)
LYMPHOCYTES # BLD AUTO: 11.5 % — LOW (ref 13–44)
LYMPHOCYTES # BLD AUTO: 11.5 % — LOW (ref 13–44)
LYMPHOCYTES # BLD AUTO: 12.8 % — LOW (ref 13–44)
LYMPHOCYTES # BLD AUTO: 12.8 % — LOW (ref 13–44)
MAGNESIUM SERPL-MCNC: 2.9 MG/DL — HIGH (ref 1.6–2.6)
MAGNESIUM SERPL-MCNC: 2.9 MG/DL — HIGH (ref 1.6–2.6)
MCHC RBC-ENTMCNC: 31.8 PG — SIGNIFICANT CHANGE UP (ref 27–34)
MCHC RBC-ENTMCNC: 31.8 PG — SIGNIFICANT CHANGE UP (ref 27–34)
MCHC RBC-ENTMCNC: 32.1 PG — SIGNIFICANT CHANGE UP (ref 27–34)
MCHC RBC-ENTMCNC: 32.1 PG — SIGNIFICANT CHANGE UP (ref 27–34)
MCHC RBC-ENTMCNC: 33.5 GM/DL — SIGNIFICANT CHANGE UP (ref 32–36)
MCHC RBC-ENTMCNC: 33.5 GM/DL — SIGNIFICANT CHANGE UP (ref 32–36)
MCHC RBC-ENTMCNC: 34.1 GM/DL — SIGNIFICANT CHANGE UP (ref 32–36)
MCHC RBC-ENTMCNC: 34.1 GM/DL — SIGNIFICANT CHANGE UP (ref 32–36)
MCV RBC AUTO: 94 FL — SIGNIFICANT CHANGE UP (ref 80–100)
MCV RBC AUTO: 94 FL — SIGNIFICANT CHANGE UP (ref 80–100)
MCV RBC AUTO: 95.1 FL — SIGNIFICANT CHANGE UP (ref 80–100)
MCV RBC AUTO: 95.1 FL — SIGNIFICANT CHANGE UP (ref 80–100)
MONOCYTES # BLD AUTO: 0.81 K/UL — SIGNIFICANT CHANGE UP (ref 0–0.9)
MONOCYTES # BLD AUTO: 0.81 K/UL — SIGNIFICANT CHANGE UP (ref 0–0.9)
MONOCYTES # BLD AUTO: 0.98 K/UL — HIGH (ref 0–0.9)
MONOCYTES # BLD AUTO: 0.98 K/UL — HIGH (ref 0–0.9)
MONOCYTES NFR BLD AUTO: 10.2 % — SIGNIFICANT CHANGE UP (ref 2–14)
MONOCYTES NFR BLD AUTO: 10.2 % — SIGNIFICANT CHANGE UP (ref 2–14)
MONOCYTES NFR BLD AUTO: 12.3 % — SIGNIFICANT CHANGE UP (ref 2–14)
MONOCYTES NFR BLD AUTO: 12.3 % — SIGNIFICANT CHANGE UP (ref 2–14)
NEUTROPHILS # BLD AUTO: 5.89 K/UL — SIGNIFICANT CHANGE UP (ref 1.8–7.4)
NEUTROPHILS # BLD AUTO: 5.89 K/UL — SIGNIFICANT CHANGE UP (ref 1.8–7.4)
NEUTROPHILS # BLD AUTO: 6.06 K/UL — SIGNIFICANT CHANGE UP (ref 1.8–7.4)
NEUTROPHILS # BLD AUTO: 6.06 K/UL — SIGNIFICANT CHANGE UP (ref 1.8–7.4)
NEUTROPHILS NFR BLD AUTO: 73.6 % — SIGNIFICANT CHANGE UP (ref 43–77)
NEUTROPHILS NFR BLD AUTO: 73.6 % — SIGNIFICANT CHANGE UP (ref 43–77)
NEUTROPHILS NFR BLD AUTO: 76.7 % — SIGNIFICANT CHANGE UP (ref 43–77)
NEUTROPHILS NFR BLD AUTO: 76.7 % — SIGNIFICANT CHANGE UP (ref 43–77)
NRBC # BLD: 0 /100 WBCS — SIGNIFICANT CHANGE UP (ref 0–0)
PHOSPHATE SERPL-MCNC: 4.2 MG/DL — SIGNIFICANT CHANGE UP (ref 2.5–4.5)
PHOSPHATE SERPL-MCNC: 4.2 MG/DL — SIGNIFICANT CHANGE UP (ref 2.5–4.5)
PLATELET # BLD AUTO: 118 K/UL — LOW (ref 150–400)
PLATELET # BLD AUTO: 118 K/UL — LOW (ref 150–400)
PLATELET # BLD AUTO: 119 K/UL — LOW (ref 150–400)
PLATELET # BLD AUTO: 119 K/UL — LOW (ref 150–400)
POTASSIUM SERPL-MCNC: 4 MMOL/L — SIGNIFICANT CHANGE UP (ref 3.5–5.3)
POTASSIUM SERPL-MCNC: 4 MMOL/L — SIGNIFICANT CHANGE UP (ref 3.5–5.3)
POTASSIUM SERPL-SCNC: 4 MMOL/L — SIGNIFICANT CHANGE UP (ref 3.5–5.3)
POTASSIUM SERPL-SCNC: 4 MMOL/L — SIGNIFICANT CHANGE UP (ref 3.5–5.3)
PROT SERPL-MCNC: 5.9 G/DL — LOW (ref 6–8.3)
PROT SERPL-MCNC: 5.9 G/DL — LOW (ref 6–8.3)
PROTHROM AB SERPL-ACNC: 12 SEC — SIGNIFICANT CHANGE UP (ref 9.5–13)
PROTHROM AB SERPL-ACNC: 12.3 SEC — SIGNIFICANT CHANGE UP (ref 9.5–13)
PROTHROM AB SERPL-ACNC: 12.3 SEC — SIGNIFICANT CHANGE UP (ref 9.5–13)
RBC # BLD: 2.65 M/UL — LOW (ref 4.2–5.8)
RBC # BLD: 2.65 M/UL — LOW (ref 4.2–5.8)
RBC # BLD: 2.67 M/UL — LOW (ref 4.2–5.8)
RBC # BLD: 2.67 M/UL — LOW (ref 4.2–5.8)
RBC # FLD: 13.5 % — SIGNIFICANT CHANGE UP (ref 10.3–14.5)
RBC # FLD: 13.5 % — SIGNIFICANT CHANGE UP (ref 10.3–14.5)
RBC # FLD: 13.7 % — SIGNIFICANT CHANGE UP (ref 10.3–14.5)
RBC # FLD: 13.7 % — SIGNIFICANT CHANGE UP (ref 10.3–14.5)
SODIUM SERPL-SCNC: 146 MMOL/L — HIGH (ref 135–145)
SODIUM SERPL-SCNC: 146 MMOL/L — HIGH (ref 135–145)
SPECIMEN SOURCE: SIGNIFICANT CHANGE UP
VANCOMYCIN TROUGH SERPL-MCNC: 18.8 UG/ML — SIGNIFICANT CHANGE UP (ref 10–20)
VANCOMYCIN TROUGH SERPL-MCNC: 18.8 UG/ML — SIGNIFICANT CHANGE UP (ref 10–20)
WBC # BLD: 7.91 K/UL — SIGNIFICANT CHANGE UP (ref 3.8–10.5)
WBC # BLD: 7.91 K/UL — SIGNIFICANT CHANGE UP (ref 3.8–10.5)
WBC # BLD: 7.99 K/UL — SIGNIFICANT CHANGE UP (ref 3.8–10.5)
WBC # BLD: 7.99 K/UL — SIGNIFICANT CHANGE UP (ref 3.8–10.5)
WBC # FLD AUTO: 7.91 K/UL — SIGNIFICANT CHANGE UP (ref 3.8–10.5)
WBC # FLD AUTO: 7.91 K/UL — SIGNIFICANT CHANGE UP (ref 3.8–10.5)
WBC # FLD AUTO: 7.99 K/UL — SIGNIFICANT CHANGE UP (ref 3.8–10.5)
WBC # FLD AUTO: 7.99 K/UL — SIGNIFICANT CHANGE UP (ref 3.8–10.5)

## 2023-12-25 PROCEDURE — 99292 CRITICAL CARE ADDL 30 MIN: CPT

## 2023-12-25 PROCEDURE — 99291 CRITICAL CARE FIRST HOUR: CPT

## 2023-12-25 PROCEDURE — 72125 CT NECK SPINE W/O DYE: CPT | Mod: 26

## 2023-12-25 PROCEDURE — 70450 CT HEAD/BRAIN W/O DYE: CPT | Mod: 26

## 2023-12-25 PROCEDURE — 70450 CT HEAD/BRAIN W/O DYE: CPT | Mod: 26,77

## 2023-12-25 PROCEDURE — 71045 X-RAY EXAM CHEST 1 VIEW: CPT | Mod: 26

## 2023-12-25 RX ORDER — HYDRALAZINE HCL 50 MG
10 TABLET ORAL ONCE
Refills: 0 | Status: COMPLETED | OUTPATIENT
Start: 2023-12-25 | End: 2023-12-25

## 2023-12-25 RX ORDER — NOREPINEPHRINE BITARTRATE/D5W 8 MG/250ML
0.03 PLASTIC BAG, INJECTION (ML) INTRAVENOUS
Qty: 8 | Refills: 0 | Status: DISCONTINUED | OUTPATIENT
Start: 2023-12-25 | End: 2023-12-26

## 2023-12-25 RX ORDER — ACETAMINOPHEN 500 MG
1000 TABLET ORAL ONCE
Refills: 0 | Status: COMPLETED | OUTPATIENT
Start: 2023-12-25 | End: 2023-12-25

## 2023-12-25 RX ORDER — HYDRALAZINE HCL 50 MG
10 TABLET ORAL ONCE
Refills: 0 | Status: DISCONTINUED | OUTPATIENT
Start: 2023-12-25 | End: 2023-12-25

## 2023-12-25 RX ORDER — FENTANYL CITRATE 50 UG/ML
50 INJECTION INTRAVENOUS ONCE
Refills: 0 | Status: DISCONTINUED | OUTPATIENT
Start: 2023-12-25 | End: 2023-12-25

## 2023-12-25 RX ORDER — NOREPINEPHRINE BITARTRATE/D5W 8 MG/250ML
0.03 PLASTIC BAG, INJECTION (ML) INTRAVENOUS
Qty: 16 | Refills: 0 | Status: DISCONTINUED | OUTPATIENT
Start: 2023-12-25 | End: 2023-12-25

## 2023-12-25 RX ORDER — HEPARIN SODIUM 5000 [USP'U]/ML
1200 INJECTION INTRAVENOUS; SUBCUTANEOUS
Qty: 25000 | Refills: 0 | Status: DISCONTINUED | OUTPATIENT
Start: 2023-12-25 | End: 2023-12-27

## 2023-12-25 RX ADMIN — Medication 1: at 05:30

## 2023-12-25 RX ADMIN — CHLORHEXIDINE GLUCONATE 15 MILLILITER(S): 213 SOLUTION TOPICAL at 05:11

## 2023-12-25 RX ADMIN — Medication 1: at 17:13

## 2023-12-25 RX ADMIN — FENTANYL CITRATE 50 MICROGRAM(S): 50 INJECTION INTRAVENOUS at 00:37

## 2023-12-25 RX ADMIN — DEXMEDETOMIDINE HYDROCHLORIDE IN 0.9% SODIUM CHLORIDE 4.68 MICROGRAM(S)/KG/HR: 4 INJECTION INTRAVENOUS at 05:07

## 2023-12-25 RX ADMIN — HEPARIN SODIUM 16 UNIT(S)/HR: 5000 INJECTION INTRAVENOUS; SUBCUTANEOUS at 01:00

## 2023-12-25 RX ADMIN — HEPARIN SODIUM 12 UNIT(S)/HR: 5000 INJECTION INTRAVENOUS; SUBCUTANEOUS at 12:00

## 2023-12-25 RX ADMIN — FENTANYL CITRATE 50 MICROGRAM(S): 50 INJECTION INTRAVENOUS at 00:02

## 2023-12-25 RX ADMIN — Medication 10 MILLIGRAM(S): at 14:02

## 2023-12-25 RX ADMIN — DEXMEDETOMIDINE HYDROCHLORIDE IN 0.9% SODIUM CHLORIDE 4.68 MICROGRAM(S)/KG/HR: 4 INJECTION INTRAVENOUS at 04:17

## 2023-12-25 RX ADMIN — LEVETIRACETAM 400 MILLIGRAM(S): 250 TABLET, FILM COATED ORAL at 05:08

## 2023-12-25 RX ADMIN — HEPARIN SODIUM 13 UNIT(S)/HR: 5000 INJECTION INTRAVENOUS; SUBCUTANEOUS at 05:08

## 2023-12-25 RX ADMIN — HEPARIN SODIUM 12.5 UNIT(S)/HR: 5000 INJECTION INTRAVENOUS; SUBCUTANEOUS at 21:36

## 2023-12-25 RX ADMIN — Medication 1000 MILLIGRAM(S): at 17:45

## 2023-12-25 RX ADMIN — Medication 400 MILLIGRAM(S): at 17:13

## 2023-12-25 RX ADMIN — Medication 1 DROP(S): at 17:13

## 2023-12-25 RX ADMIN — CHLORHEXIDINE GLUCONATE 1 APPLICATION(S): 213 SOLUTION TOPICAL at 22:44

## 2023-12-25 RX ADMIN — Medication 1 APPLICATION(S): at 01:00

## 2023-12-25 RX ADMIN — LEVETIRACETAM 400 MILLIGRAM(S): 250 TABLET, FILM COATED ORAL at 17:13

## 2023-12-25 RX ADMIN — Medication 1 DROP(S): at 05:11

## 2023-12-25 RX ADMIN — FENTANYL CITRATE 50 MICROGRAM(S): 50 INJECTION INTRAVENOUS at 04:17

## 2023-12-25 RX ADMIN — Medication 1000 MILLIGRAM(S): at 05:50

## 2023-12-25 RX ADMIN — DEXMEDETOMIDINE HYDROCHLORIDE IN 0.9% SODIUM CHLORIDE 4.68 MICROGRAM(S)/KG/HR: 4 INJECTION INTRAVENOUS at 01:00

## 2023-12-25 RX ADMIN — MEROPENEM 100 MILLIGRAM(S): 1 INJECTION INTRAVENOUS at 05:10

## 2023-12-25 RX ADMIN — DEXMEDETOMIDINE HYDROCHLORIDE IN 0.9% SODIUM CHLORIDE 4.68 MICROGRAM(S)/KG/HR: 4 INJECTION INTRAVENOUS at 21:36

## 2023-12-25 RX ADMIN — Medication 1 APPLICATION(S): at 14:04

## 2023-12-25 RX ADMIN — FENTANYL CITRATE 50 MICROGRAM(S): 50 INJECTION INTRAVENOUS at 04:26

## 2023-12-25 RX ADMIN — MEROPENEM 100 MILLIGRAM(S): 1 INJECTION INTRAVENOUS at 17:12

## 2023-12-25 RX ADMIN — Medication 1 APPLICATION(S): at 05:11

## 2023-12-25 RX ADMIN — Medication 5.26 MICROGRAM(S)/KG/MIN: at 08:56

## 2023-12-25 RX ADMIN — CHLORHEXIDINE GLUCONATE 15 MILLILITER(S): 213 SOLUTION TOPICAL at 17:12

## 2023-12-25 RX ADMIN — DEXMEDETOMIDINE HYDROCHLORIDE IN 0.9% SODIUM CHLORIDE 4.68 MICROGRAM(S)/KG/HR: 4 INJECTION INTRAVENOUS at 17:11

## 2023-12-25 RX ADMIN — AMLODIPINE BESYLATE 5 MILLIGRAM(S): 2.5 TABLET ORAL at 05:10

## 2023-12-25 RX ADMIN — Medication 1: at 13:01

## 2023-12-25 RX ADMIN — CARVEDILOL PHOSPHATE 3.12 MILLIGRAM(S): 80 CAPSULE, EXTENDED RELEASE ORAL at 05:10

## 2023-12-25 RX ADMIN — PANTOPRAZOLE SODIUM 40 MILLIGRAM(S): 20 TABLET, DELAYED RELEASE ORAL at 13:00

## 2023-12-25 RX ADMIN — CARVEDILOL PHOSPHATE 3.12 MILLIGRAM(S): 80 CAPSULE, EXTENDED RELEASE ORAL at 17:11

## 2023-12-25 RX ADMIN — Medication 1 APPLICATION(S): at 21:36

## 2023-12-25 RX ADMIN — Medication 400 MILLIGRAM(S): at 05:33

## 2023-12-25 RX ADMIN — HEPARIN SODIUM 13 UNIT(S)/HR: 5000 INJECTION INTRAVENOUS; SUBCUTANEOUS at 04:18

## 2023-12-25 RX ADMIN — Medication 1: at 01:00

## 2023-12-25 RX ADMIN — DEXMEDETOMIDINE HYDROCHLORIDE IN 0.9% SODIUM CHLORIDE 4.68 MICROGRAM(S)/KG/HR: 4 INJECTION INTRAVENOUS at 13:00

## 2023-12-25 NOTE — PROGRESS NOTE ADULT - ASSESSMENT
====================ASSESSMENT ==============  78M w/ PMH HTN HLD presents as transfer from [facility] s/p VT arrest s/p shock x2 w/ unknown downtime. Pt found to have trauma to head w/ superficial scalp bleeding and oral mucosal bleeding.     Plan:  ====================== NEUROLOGY=====================  #Intubated  Encephalopathy post arrest   - Intubated w/o sedation s/p cardiac arrest w/ unknown down time. Reported to be 20-40minutes but can be longer given ROSC achieved right before arrival to the hospital   - CTH at [facility] w/o intracranial bleed/pathology. CTH maybe too soon to show evidence of anoxic injury  - Pt remains w/o purposeful movement.   - Neuro and neurosurgeryfollowing  - VEEG: no epileptiform activity; diffuse cerebral dysfunction   - Repeat CTH shows stability of subdural hematoma & subarachnoid hemorrhage and emerging signs of anoxic injury  - f/u neuron specific enolase  - Family meeting w/ neurology attending 12/22 discussed no meaningful recovery. Pending family decision on comfort care.     #SAH & SDH  - Pt w/ acute SAH and SDH 2/2 trauma  - Was not initially seen in outside scan at [facility] likely performed too early for radiologic evidence  - Neurology and neurosurgery following  - NSGY: no intervention  - Repeat CTH shows stability of bleeds   - Maintain BP control   - Initially started on HSQ for DVT ppx, now on heparin gtt (started 12/24 with narrow therapeutic ptt window) for full AC given findings of DVTs  - will obtain repeat CTH once ptt therapeutic    #C-spine trauma  Pt w/ fall at home presented w/ c-collar.   -Per [facility] radiology report no evidence of cervical fracture  -Trauma surgery cleared pt of Cspine collar    #Myoclonic jerking  - vEEG: evidence of mycolonic seizures  - Started keppra load  - d/c VEEG    ==================== RESPIRATORY======================  #Intubated  Pt intubated s/p cardiac arrest  - On full vent support: RR 14 /  / PEEP 5 / FiO2 40  - Monitor ABGs    #Oropharynx bleed  - Pt w/ blood in oropharynx requiring frequent suctioning.   - ENT consulted s/p packing     ====================CARDIOVASCULAR==================  #VT arrest  - Pt s/p VT arrest w/ unknown downtime. Pt reportedly received shock x2. No prior cardiac hx per family  - Monitor for arrhythmias   - Pt not asa/plavix loaded 2/2 substernal hematoma noted on CT chest at [facility]  - Unlikely to be a candidate for C at this time given mental status     #HTN  - Maintain BP control iso brain bleed  - Amlodipine; coreg  - PRN IVP labetalol for elevated SBP  - SBP goal<160    ===================HEMATOLOGIC/ONC ===================  #Substernal hematoma  - Pt w/ substernal hematoma noted on imaging at [facility] from trauma/cpr  - Repeat CT chest to monitor hematoma  - A/C: heparin gtt    #Thrombocytopenia  - Pt w/ downtrending PLT. Started downtrending prior to HSQ initiation.   - Likely iso critical illness   - Will send blue top PLT w/ next CBC    ===================== RENAL =========================  Pt presented w/ SCr 1.16, likely near baseline  - trend Cr   - montior I&Os    ==================== GASTROINTESTINAL===================  - Diet: enteral feeds   - Protonix    =======================    ENDOCRINE  =====================  #Hypothyroidism  - A1c=5.5  - monitor glucose    ========================INFECTIOUS DISEASE================  #Sepsis  - Pt w/ leukocytosis w/ fevers possibly 2/2  infection  - Pt w/ persistent fevers likely multifactorial 2/2 DVTs, underlying infection, possible ?neurogenic/central component  - U/A grossly positive. F/u BCx, UCx  - Pt had crash lines placed at [facility]. Possible source of infection. MRSA negative 12/20  - Sputum Cx + for klebsiella  - 12/22 BCx 1 bottle w/ MRSE. Possible contaminant. Check repeat BCx  - Broadened to Meropenem 12/24  - c/w Vanc (12/22-)   ====================ASSESSMENT ==============  78M w/ PMH HTN HLD presents as transfer from [facility] s/p VT arrest s/p shock x2 w/ unknown downtime. Pt found to have trauma to head w/ superficial scalp bleeding and oral mucosal bleeding.     Plan:  ====================== NEUROLOGY=====================  #Intubated  Encephalopathy post arrest   - Intubated w/o sedation s/p cardiac arrest w/ unknown down time. Reported to be 20-40minutes but can be longer given ROSC achieved right before arrival to the hospital   - CTH at [facility] w/o intracranial bleed/pathology. CTH maybe too soon to show evidence of anoxic injury  - Pt remains w/o purposeful movement.   - Neuro and neurosurgeryfollowing  - VEEG: no epileptiform activity; diffuse cerebral dysfunction   - Repeat CTH shows stability of subdural hematoma & subarachnoid hemorrhage and emerging signs of anoxic injury  - f/u neuron specific enolase  - Family meeting w/ neurology attending 12/22 discussed no meaningful recovery. Pending family decision on comfort care.     #SAH & SDH  - Pt w/ acute SAH and SDH 2/2 trauma  - Was not initially seen in outside scan at [facility] likely performed too early for radiologic evidence  - Neurology and neurosurgery following  - NSGY: no intervention  - Repeat CTH shows stability of bleeds   - Maintain BP control   - Initially started on HSQ for DVT ppx, now on heparin gtt (started 12/24 with narrow therapeutic ptt window) for full AC given findings of DVTs  - will obtain repeat CTH once ptt therapeutic    #C-spine trauma  Pt w/ fall at home presented w/ c-collar.   -Per [facility] radiology report no evidence of cervical fracture  -Trauma surgery cleared pt of Cspine collar    #Myoclonic jerking  - vEEG: evidence of mycolonic seizures  - Started keppra load  - d/c VEEG    ==================== RESPIRATORY======================  #Intubated  Pt intubated s/p cardiac arrest  - On full vent support: RR 14 /  / PEEP 5 / FiO2 40  - Monitor ABGs    #Oropharynx bleed  - Pt w/ blood in oropharynx requiring frequent suctioning.   - ENT consulted s/p packing     ====================CARDIOVASCULAR==================  #VT arrest  - Pt s/p VT arrest w/ unknown downtime. Pt reportedly received shock x2. No prior cardiac hx per family  - Monitor for arrhythmias   - Pt not asa/plavix loaded 2/2 substernal hematoma noted on CT chest at [facility]  - Unlikely to be a candidate for C at this time given mental status     #HTN  - Maintain BP control iso brain bleed  - Amlodipine; coreg  - PRN IVP labetalol for elevated SBP  - SBP goal<160    ===================HEMATOLOGIC/ONC ===================  #Substernal hematoma  - Pt w/ substernal hematoma noted on imaging at [facility] from trauma/cpr  - Repeat CT chest to monitor hematoma  - A/C: heparin gtt    #Thrombocytopenia  - Pt w/ downtrending PLT. Started downtrending prior to HSQ initiation.   - Likely iso critical illness   - Will send blue top PLT w/ next CBC    ===================== RENAL =========================  Pt presented w/ SCr 1.16, likely near baseline  - trend Cr   - montior I&Os    ==================== GASTROINTESTINAL===================  - Diet: enteral feeds   - Protonix    =======================    ENDOCRINE  =====================  #Hypothyroidism  - A1c=5.5  - monitor glucose    ========================INFECTIOUS DISEASE================  #Sepsis  - Pt w/ leukocytosis w/ fevers possibly 2/2  infection  - Pt w/ persistent fevers likely multifactorial 2/2 DVTs, underlying infection, possible ?neurogenic/central component  - U/A grossly positive. F/u BCx, UCx  - Pt had crash lines placed at [facility]. Possible source of infection. MRSA negative 12/20  - Sputum Cx + for klebsiella  - 12/22 BCx 1 bottle w/ MRSE. Possible contaminant. Check repeat BCx  - Broadened to Meropenem 12/24  - c/w Vanc (12/22-)   ====================ASSESSMENT ==============  78M w/ PMH HTN HLD presents as transfer from [facility] s/p VT arrest s/p shock x2 w/ unknown downtime. Pt found to have trauma to head w/ superficial scalp bleeding and oral mucosal bleeding - CTH significant for L occipital SDH and R temporal SAH, stable on subsequent imaging. Remains w/o significant improvement in mental status.     Plan:  ====================== NEUROLOGY=====================  #Intubated  Encephalopathy post arrest   - Intubated w/o sedation s/p cardiac arrest w/ unknown down time. Reported to be 20-40minutes but can be longer given ROSC achieved right before arrival to the hospital   - CTH at [facility] w/o intracranial bleed/pathology. CTH maybe too soon to show evidence of anoxic injury  - Pt remains w/o purposeful movement.   - Neuro and neurosurgeryfollowing  - VEEG: no epileptiform activity; diffuse cerebral dysfunction   - Repeat CTH shows stability of subdural hematoma & subarachnoid hemorrhage and emerging signs of anoxic injury  - f/u neuron specific enolase  - Family meeting w/ neurology attending 12/22 discussed no meaningful recovery. Pending family decision on comfort care.     #SAH & SDH  - Pt w/ acute SAH and SDH 2/2 trauma  - Was not initially seen in outside scan at [facility] likely performed too early for radiologic evidence  - Neurology and neurosurgery following  - NSGY: no intervention  - Repeat CTH shows stability of bleeds   - Maintain BP control   - Initially started on HSQ for DVT ppx, now on heparin gtt (started 12/24 with narrow therapeutic ptt window) for full AC given findings of DVTs  - will obtain repeat CTH once ptt therapeutic    #C-spine trauma  Pt w/ fall at home presented w/ c-collar.   -Per [facility] radiology report no evidence of cervical fracture  -Trauma surgery cleared pt of Cspine collar    #Myoclonic jerking  - vEEG: evidence of mycolonic seizures  - Started keppra load  - d/c VEEG    ==================== RESPIRATORY======================  #Intubated  Pt intubated s/p cardiac arrest  - On full vent support: RR 14 /  / PEEP 5 / FiO2 40  - Monitor ABGs    #Oropharynx bleed  - Pt w/ blood in oropharynx requiring frequent suctioning.   - ENT consulted s/p packing     ====================CARDIOVASCULAR==================  #VT arrest  - Pt s/p VT arrest w/ unknown downtime. Pt reportedly received shock x2. No prior cardiac hx per family  - Monitor for arrhythmias   - Pt not asa/plavix loaded 2/2 substernal hematoma noted on CT chest at [facility]  - Unlikely to be a candidate for C at this time given mental status     #HTN  - Maintain BP control iso brain bleed  - Amlodipine; coreg  - PRN IVP labetalol for elevated SBP  - SBP goal<160    ===================HEMATOLOGIC/ONC ===================  #Substernal hematoma  - Pt w/ substernal hematoma noted on imaging at [facility] from trauma/cpr  - Repeat CT chest to monitor hematoma  - A/C: heparin gtt    #Thrombocytopenia  - Pt w/ downtrending PLT. Started downtrending prior to HSQ initiation.   - Likely iso critical illness   - Will send blue top PLT w/ next CBC    ===================== RENAL =========================  Pt presented w/ SCr 1.16, likely near baseline  - trend Cr   - montior I&Os    ==================== GASTROINTESTINAL===================  - Diet: enteral feeds   - Protonix    =======================    ENDOCRINE  =====================  #Hypothyroidism  - A1c=5.5  - monitor glucose    ========================INFECTIOUS DISEASE================  #Sepsis  - Pt w/ leukocytosis w/ fevers possibly 2/2  infection  - Pt w/ persistent fevers likely multifactorial 2/2 DVTs, underlying infection, possible ?neurogenic/central component  - U/A grossly positive. F/u BCx, UCx  - Pt had crash lines placed at [facility]. Possible source of infection. MRSA negative 12/20  - Sputum Cx + for klebsiella  - 12/22 BCx 1 bottle w/ MRSE. Possible contaminant. Check repeat BCx  - Broadened to Meropenem 12/24  - c/w Vanc (12/22-)   ====================ASSESSMENT ==============  78M w/ PMH HTN HLD presents as transfer from [facility] s/p VT arrest s/p shock x2 w/ unknown downtime. Pt found to have trauma to head w/ superficial scalp bleeding and oral mucosal bleeding - CTH significant for L occipital SDH and R temporal SAH, stable on subsequent imaging. Remains w/o significant improvement in mental status.     Plan:  ====================== NEUROLOGY=====================  #Intubated  Encephalopathy post arrest   - Intubated w/o sedation s/p cardiac arrest w/ unknown down time. Reported to be 20-40minutes but can be longer given ROSC achieved right before arrival to the hospital   - CTH at [facility] w/o intracranial bleed/pathology. CTH maybe too soon to show evidence of anoxic injury  - Pt remains w/o purposeful movement.   - Neuro and neurosurgeryfollowing  - VEEG: no epileptiform activity; diffuse cerebral dysfunction   - Repeat CTH shows stability of subdural hematoma & subarachnoid hemorrhage and emerging signs of anoxic injury  - f/u neuron specific enolase  - Family meeting w/ neurology attending 12/22 discussed no meaningful recovery. Pending family decision on comfort care.     #SAH & SDH  - Pt w/ acute SAH and SDH 2/2 trauma  - Was not initially seen in outside scan at [facility] likely performed too early for radiologic evidence  - Neurology and neurosurgery following  - NSGY: no intervention  - Repeat CTH shows stability of bleeds   - Maintain BP control   - Initially started on HSQ for DVT ppx, now on heparin gtt (started 12/24 with narrow therapeutic ptt window) for full AC given findings of DVTs  - will obtain repeat CTH once ptt therapeutic    #C-spine trauma  Pt w/ fall at home presented w/ c-collar.   -Per [facility] radiology report no evidence of cervical fracture  -Trauma surgery cleared pt of Cspine collar    #Myoclonic jerking  - vEEG: evidence of mycolonic seizures  - Started keppra load  - d/c VEEG    ==================== RESPIRATORY======================  #Intubated  Pt intubated s/p cardiac arrest  - On full vent support: RR 14 /  / PEEP 5 / FiO2 40  - Monitor ABGs    #Oropharynx bleed  - Pt w/ blood in oropharynx requiring frequent suctioning.   - ENT consulted s/p packing     ====================CARDIOVASCULAR==================  #VT arrest  - Pt s/p VT arrest w/ unknown downtime. Pt reportedly received shock x2. No prior cardiac hx per family  - Monitor for arrhythmias   - Pt not asa/plavix loaded 2/2 substernal hematoma noted on CT chest at [facility]  - Unlikely to be a candidate for C at this time given mental status     #HTN  - Maintain BP control iso brain bleed  - Amlodipine; coreg  - PRN IVP labetalol for elevated SBP  - SBP goal<160    ===================HEMATOLOGIC/ONC ===================  #Substernal hematoma  - Pt w/ substernal hematoma noted on imaging at [facility] from trauma/cpr  - Repeat CT chest to monitor hematoma  - A/C: heparin gtt    #Thrombocytopenia  - Pt w/ downtrending PLT. Started downtrending prior to HSQ initiation.   - Likely iso critical illness   - Will send blue top PLT w/ next CBC    ===================== RENAL =========================  Pt presented w/ SCr 1.16, likely near baseline  - trend Cr   - montior I&Os    ==================== GASTROINTESTINAL===================  - Diet: enteral feeds   - Protonix    =======================    ENDOCRINE  =====================  #Hypothyroidism  - A1c=5.5  - monitor glucose    ========================INFECTIOUS DISEASE================  #Sepsis  - Pt w/ leukocytosis w/ fevers possibly 2/2  infection  - Pt w/ persistent fevers likely multifactorial 2/2 DVTs, underlying infection, possible ?neurogenic/central component  - U/A grossly positive. F/u BCx, UCx  - Pt had crash lines placed at [facility]. Possible source of infection. MRSA negative 12/20  - Sputum Cx + for klebsiella  - 12/22 BCx 1 bottle w/ MRSE. Possible contaminant. Check repeat BCx  - Broadened to Meropenem 12/24  - c/w Vanc (12/22-)   ====================ASSESSMENT ==============  78M w/ PMH HTN HLD presents as transfer from [facility] s/p VT arrest s/p shock x2 w/ unknown downtime. Pt found to have trauma to head w/ superficial scalp bleeding and oral mucosal bleeding - CTH significant for L occipital SDH and R temporal SAH, stable on subsequent imaging. Remains w/o significant improvement in mental status.     Plan:  ====================== NEUROLOGY=====================  #Intubated  Encephalopathy post arrest   - Intubated w/o sedation s/p cardiac arrest w/ unknown down time. Reported to be 20-40minutes but can be longer given ROSC achieved right before arrival to the hospital   - CTH at [facility] w/o intracranial bleed/pathology. CTH maybe too soon to show evidence of anoxic injury  - Pt remains w/o purposeful movement.   - Neuro and neurosurgeryfollowing  - VEEG: no epileptiform activity; diffuse cerebral dysfunction   - Repeat CTH shows stability of subdural hematoma & subarachnoid hemorrhage and emerging signs of anoxic injury  - f/u neuron specific enolase  - Family meeting w/ neurology attending 12/22 discussed no meaningful recovery. Pending family decision on comfort care.     #SAH & SDH  - Pt w/ acute SAH and SDH 2/2 trauma  - Was not initially seen in outside scan at [facility] likely performed too early for radiologic evidence  - Neurology and neurosurgery following  - NSGY: no intervention  - Repeat CTH shows stability of bleeds   - Maintain BP control   - Initially started on HSQ for DVT ppx, now on heparin gtt (started 12/24 with narrow therapeutic ptt window) for full AC given findings of DVTs  - 12/25 repeat CTH without expansion of hematoma with therapeutic PTT    #C-spine trauma  Pt w/ fall at home presented w/ c-collar.   -Per [facility] radiology report no evidence of cervical fracture  -Trauma surgery cleared pt of Cspine collar    #Myoclonic jerking  - vEEG: evidence of mycolonic seizures  - keppra 1000 BID  - d/c VEEG    ==================== RESPIRATORY======================  #Intubated  Pt intubated s/p cardiac arrest  - On full vent support: RR 14 /  / PEEP 5 / FiO2 40  - Monitor ABGs    #Oropharynx bleed  - Pt w/ blood in oropharynx requiring frequent suctioning.   - ENT consulted s/p packing     ====================CARDIOVASCULAR==================  #VT arrest  - Pt s/p VT arrest w/ unknown downtime. Pt reportedly received shock x2. No prior cardiac hx per family  - Monitor for arrhythmias   - Pt not asa/plavix loaded 2/2 substernal hematoma noted on CT chest at [facility]  - Unlikely to be a candidate for C at this time given mental status     #HTN  - Maintain BP control iso brain bleed  - Amlodipine; coreg  - PRN IVP labetalol for elevated SBP  - SBP goal<160    ===================HEMATOLOGIC/ONC ===================  #Substernal hematoma  - Pt w/ substernal hematoma noted on imaging at [facility] from trauma/cpr  - Repeat CT chest to monitor hematoma  - A/C: heparin gtt    #Thrombocytopenia  - Pt w/ downtrending PLT. Started downtrending prior to HSQ initiation.   - Likely iso critical illness   - Will send blue top PLT w/ next CBC    ===================== RENAL =========================  Pt presented w/ SCr 1.16, likely near baseline  - trend Cr   - montior I&Os    ==================== GASTROINTESTINAL===================  - Diet: enteral feeds   - Protonix    =======================    ENDOCRINE  =====================  #Hypothyroidism  - A1c=5.5  - monitor glucose    ========================INFECTIOUS DISEASE================  #Sepsis  - Pt w/ leukocytosis w/ fevers possibly 2/2  infection  - Pt w/ persistent fevers likely multifactorial 2/2 DVTs, underlying infection, possible ?neurogenic/central component  - U/A grossly positive. F/u BCx, UCx  - Pt had crash lines placed at [facility]. Possible source of infection. MRSA negative 12/20  - Sputum Cx + for klebsiella  - 12/22 BCx 1 bottle w/ MRSE. Possible contaminant. Check repeat BCx  - Broadened to Meropenem 12/24  - c/w Vanc (12/22-)   ====================ASSESSMENT ==============  78M w/ PMH HTN HLD presents as transfer from [facility] s/p VT arrest s/p shock x2 w/ unknown downtime. Pt found to have trauma to head w/ superficial scalp bleeding and oral mucosal bleeding - CTH significant for L occipital SDH and R temporal SAH, stable on subsequent imaging. Remains w/o significant improvement in mental status.     Plan:  ====================== NEUROLOGY=====================  #Intubated  Encephalopathy post arrest   - Intubated w/o sedation s/p cardiac arrest w/ unknown down time. Reported to be 20-40minutes but can be longer given ROSC achieved right before arrival to the hospital   - CTH at [facility] w/o intracranial bleed/pathology. CTH maybe too soon to show evidence of anoxic injury  - Pt remains w/o purposeful movement.   - Neuro and neurosurgeryfollowing  - VEEG: no epileptiform activity; diffuse cerebral dysfunction   - Repeat CTH shows stability of subdural hematoma & subarachnoid hemorrhage and emerging signs of anoxic injury  - f/u neuron specific enolase  - Family meeting w/ neurology attending 12/22 discussed no meaningful recovery. Pending family decision on comfort care.     #SAH & SDH  - Pt w/ acute SAH and SDH 2/2 trauma  - Was not initially seen in outside scan at [facility] likely performed too early for radiologic evidence  - Neurology and neurosurgery following  - NSGY: no intervention  - Repeat CTH shows stability of bleeds   - Maintain BP control   - Initially started on HSQ for DVT ppx, now on heparin gtt (started 12/24 with narrow therapeutic ptt window) for full AC given findings of DVTs  - 12/25 repeat CTH without expansion of hematoma with therapeutic PTT    #C-spine trauma  Pt w/ fall at home presented w/ c-collar.   -Per [facility] radiology report no evidence of cervical fracture  -Trauma surgery cleared pt of Cspine collar    #Myoclonic jerking  - vEEG: evidence of mycolonic seizures  - keppra 1000 BID  - d/c VEEG    ==================== RESPIRATORY======================  #Intubated  Pt intubated s/p cardiac arrest  - On full vent support: RR 14 /  / PEEP 5 / FiO2 40  - Monitor ABGs    #Oropharynx bleed  - Pt w/ blood in oropharynx requiring frequent suctioning.   - ENT consulted s/p packing     ====================CARDIOVASCULAR==================  #VT arrest  - Pt s/p VT arrest w/ unknown downtime. Pt reportedly received shock x2. No prior cardiac hx per family  - Monitor for arrhythmias   - Pt not asa/plavix loaded 2/2 substernal hematoma noted on CT chest at [facility]  - Unlikely to be a candidate for C at this time given mental status     #HTN  - Maintain BP control iso brain bleed  - Amlodipine; coreg  - PRN IVP labetalol for elevated SBP  - SBP goal<160    ===================HEMATOLOGIC/ONC ===================  #Substernal hematoma  - Pt w/ substernal hematoma noted on imaging at [facility] from trauma/cpr  - Repeat CT chest to monitor hematoma  - A/C: heparin gtt    #Thrombocytopenia  - Pt w/ downtrending PLT. Started downtrending prior to HSQ initiation.   - Likely iso critical illness   - Will send blue top PLT w/ next CBC    ===================== RENAL =========================  Pt presented w/ SCr 1.16, likely near baseline  - trend Cr   - montior I&Os    ==================== GASTROINTESTINAL===================  - Diet: enteral feeds   - Protonix    =======================    ENDOCRINE  =====================  #Hypothyroidism  - A1c=5.5  - monitor glucose    ========================INFECTIOUS DISEASE================  #Sepsis  - Pt w/ leukocytosis w/ fevers possibly 2/2  infection  - Pt w/ persistent fevers likely multifactorial 2/2 DVTs, underlying infection, possible ?neurogenic/central component  - U/A grossly positive. F/u BCx, UCx  - Pt had crash lines placed at [facility]. Possible source of infection. MRSA negative 12/20  - Sputum Cx + for klebsiella  - 12/22 BCx 1 bottle w/ MRSE. Possible contaminant. Check repeat BCx  - Broadened to Meropenem 12/24  - c/w Vanc (12/22-)   ====================ASSESSMENT ==============  78M w/ PMH HTN HLD presents as transfer from [facility] s/p VT arrest s/p shock x2 w/ unknown downtime. Pt found to have trauma to head w/ superficial scalp bleeding and oral mucosal bleeding - CTH significant for L occipital SDH and R temporal SAH, stable on subsequent imaging. Remains w/o significant improvement in mental status.     Plan:  ====================== NEUROLOGY=====================  #Intubated  Encephalopathy post arrest   - Intubated w/o sedation s/p cardiac arrest w/ unknown down time. Reported to be 20-40minutes but can be longer given ROSC achieved right before arrival to the hospital   - CTH at [facility] w/o intracranial bleed/pathology. CTH maybe too soon to show evidence of anoxic injury  - Pt remains w/o purposeful movement.   - Neuro and neurosurgeryfollowing  - VEEG: no epileptiform activity; diffuse cerebral dysfunction   - Repeat CTH shows stability of subdural hematoma & subarachnoid hemorrhage and emerging signs of anoxic injury  - f/u neuron specific enolase  - Family meeting w/ neurology attending 12/22 discussed no meaningful recovery. Pending family decision on comfort care.     #SAH & SDH  - Pt w/ acute SAH and SDH 2/2 trauma  - Was not initially seen in outside scan at [facility] likely performed too early for radiologic evidence  - Neurology and neurosurgery following  - NSGY: no intervention  - Repeat CTH shows stability of bleeds   - Maintain BP control   - Initially started on HSQ for DVT ppx, now on heparin gtt (started 12/24 with narrow therapeutic ptt window) for full AC given findings of DVTs  - 12/25 repeat CTH without expansion of hematoma with therapeutic PTT, though does have loss of grey-white matter junction congruent with anoxic brain injury    #C-spine trauma  Pt w/ fall at home presented w/ c-collar.   -Per [facility] radiology report no evidence of cervical fracture  -Trauma surgery cleared pt of Cspine collar    #Myoclonic jerking  - vEEG: evidence of mycolonic seizures  - keppra 1000 BID  - d/c VEEG    ==================== RESPIRATORY======================  #Intubated  Pt intubated s/p cardiac arrest  - On full vent support: RR 14 /  / PEEP 5 / FiO2 40  - Monitor ABGs    #Oropharynx bleed  - Pt w/ blood in oropharynx requiring frequent suctioning.   - ENT consulted s/p packing     ====================CARDIOVASCULAR==================  #VT arrest  - Pt s/p VT arrest w/ unknown downtime. Pt reportedly received shock x2. No prior cardiac hx per family  - Monitor for arrhythmias   - Pt not asa/plavix loaded 2/2 substernal hematoma noted on CT chest at [facility]  - Unlikely to be a candidate for LHC at this time given mental status     #HTN  - Maintain BP control iso brain bleed  - Amlodipine; coreg  - PRN IVP labetalol for elevated SBP  - SBP goal<160    ===================HEMATOLOGIC/ONC ===================  #Substernal hematoma  - Pt w/ substernal hematoma noted on imaging at [facility] from trauma/cpr  - Repeat CT chest to monitor hematoma  - A/C: heparin gtt    #Thrombocytopenia  - Pt w/ downtrending PLT. Started downtrending prior to HSQ initiation.   - Likely iso critical illness   - Will send blue top PLT w/ next CBC    ===================== RENAL =========================  Pt presented w/ SCr 1.16, likely near baseline  - trend Cr   - montior I&Os    ==================== GASTROINTESTINAL===================  - Diet: enteral feeds   - Protonix    =======================    ENDOCRINE  =====================  #Hypothyroidism  - A1c=5.5  - monitor glucose    ========================INFECTIOUS DISEASE================  #Sepsis  - Pt w/ leukocytosis w/ fevers possibly 2/2  infection  - Pt w/ persistent fevers likely multifactorial 2/2 DVTs, underlying infection, possible ?neurogenic/central component  - U/A grossly positive. F/u BCx, UCx  - Pt had crash lines placed at [facility]. Possible source of infection. MRSA negative 12/20  - 12/20 Sputum Cx + for klebsiella  - 12/22 BCx 1 bottle w/ MRSE. Possible contaminant. Check repeat BCx  - Broadened to Meropenem 12/24  - c/w Vanc (12/22-)   ====================ASSESSMENT ==============  78M w/ PMH HTN HLD presents as transfer from [facility] s/p VT arrest s/p shock x2 w/ unknown downtime. Pt found to have trauma to head w/ superficial scalp bleeding and oral mucosal bleeding - CTH significant for L occipital SDH and R temporal SAH, stable on subsequent imaging. Remains w/o significant improvement in mental status.     Plan:  ====================== NEUROLOGY=====================  #Intubated  Encephalopathy post arrest   - Intubated w/o sedation s/p cardiac arrest w/ unknown down time. Reported to be 20-40minutes but can be longer given ROSC achieved right before arrival to the hospital   - CTH at [facility] w/o intracranial bleed/pathology. CTH maybe too soon to show evidence of anoxic injury  - Pt remains w/o purposeful movement.   - Neuro and neurosurgeryfollowing  - VEEG: no epileptiform activity; diffuse cerebral dysfunction   - Repeat CTH shows stability of subdural hematoma & subarachnoid hemorrhage and emerging signs of anoxic injury  - f/u neuron specific enolase  - Family meeting w/ neurology attending 12/22 discussed no meaningful recovery. Pending family decision on comfort care.     #SAH & SDH  - Pt w/ acute SAH and SDH 2/2 trauma  - Was not initially seen in outside scan at [facility] likely performed too early for radiologic evidence  - Neurology and neurosurgery following  - NSGY: no intervention  - Repeat CTH shows stability of bleeds   - Maintain BP control   - Initially started on HSQ for DVT ppx, now on heparin gtt (started 12/24 with narrow therapeutic ptt window) for full AC given findings of DVTs  - 12/25 repeat CTH without expansion of hematoma with therapeutic PTT, though does have loss of grey-white matter junction congruent with anoxic brain injury    #C-spine trauma  Pt w/ fall at home presented w/ c-collar.   -Per [facility] radiology report no evidence of cervical fracture  -Trauma surgery cleared pt of Cspine collar    #Myoclonic jerking  - vEEG: evidence of mycolonic seizures  - keppra 1000 BID  - d/c VEEG    ==================== RESPIRATORY======================  #Intubated  Pt intubated s/p cardiac arrest  - On full vent support: RR 14 /  / PEEP 5 / FiO2 40  - Monitor ABGs    #Oropharynx bleed  - Pt w/ blood in oropharynx requiring frequent suctioning.   - ENT consulted s/p packing     ====================CARDIOVASCULAR==================  #VT arrest  - Pt s/p VT arrest w/ unknown downtime. Pt reportedly received shock x2. No prior cardiac hx per family  - Monitor for arrhythmias   - Pt not asa/plavix loaded 2/2 substernal hematoma noted on CT chest at [facility]  - Unlikely to be a candidate for LHC at this time given mental status     #HTN  - Maintain BP control iso brain bleed  - Amlodipine; coreg  - PRN IVP labetalol for elevated SBP  - SBP goal<160    ===================HEMATOLOGIC/ONC ===================  #Substernal hematoma  - Pt w/ substernal hematoma noted on imaging at [facility] from trauma/cpr  - Repeat CT chest to monitor hematoma  - A/C: heparin gtt    #Thrombocytopenia  - Pt w/ downtrending PLT. Started downtrending prior to HSQ initiation.   - Likely iso critical illness   - Will send blue top PLT w/ next CBC    ===================== RENAL =========================  Pt presented w/ SCr 1.16, likely near baseline  - trend Cr   - montior I&Os    ==================== GASTROINTESTINAL===================  - Diet: enteral feeds   - Protonix    =======================    ENDOCRINE  =====================  #Hypothyroidism  - A1c=5.5  - monitor glucose    ========================INFECTIOUS DISEASE================  #Sepsis  - Pt w/ leukocytosis w/ fevers possibly 2/2  infection  - Pt w/ persistent fevers likely multifactorial 2/2 DVTs, underlying infection, possible ?neurogenic/central component  - U/A grossly positive. F/u BCx, UCx  - Pt had crash lines placed at [facility]. Possible source of infection. MRSA negative 12/20  - 12/20 Sputum Cx + for klebsiella  - 12/22 BCx 1 bottle w/ MRSE. Possible contaminant. Check repeat BCx  - Broadened to Meropenem 12/24  - c/w Vanc (12/22-)   ====================ASSESSMENT ==============  78M w/ PMH HTN HLD presents as transfer from [facility] s/p VT arrest s/p shock x2 w/ unknown downtime. Pt found to have trauma to head w/ superficial scalp bleeding and oral mucosal bleeding - CTH significant for L occipital SDH and R temporal SAH, stable on subsequent imaging. Remains w/o significant improvement in mental status.     Plan:  ====================== NEUROLOGY=====================  #Intubated  Encephalopathy post arrest   - Intubated w/o sedation s/p cardiac arrest w/ unknown down time. Reported to be 20-40minutes but can be longer given ROSC achieved right before arrival to the hospital   - CTH at [facility] w/o intracranial bleed/pathology. CTH maybe too soon to show evidence of anoxic injury  - Pt remains w/o purposeful movement.   - Neuro and neurosurgery following  - VEEG: no epileptiform activity; diffuse cerebral dysfunction   - Repeat CTH shows stability of subdural hematoma & subarachnoid hemorrhage and emerging signs of anoxic injury  - f/u neuron specific enolase  - Family meeting w/ neurology attending 12/22 discussed no meaningful recovery. Pending family decision on comfort care.     #SAH & SDH  - Pt w/ acute SAH and SDH 2/2 trauma  - Was not initially seen in outside scan at [facility] likely performed too early for radiologic evidence  - Neurology and neurosurgery following  - NSGY: no intervention  - Repeat CTH shows stability of bleeds   - Maintain BP control   - Initially started on HSQ for DVT ppx, now on heparin gtt (started 12/24 with narrow therapeutic ptt window) for full AC given findings of DVTs  - 12/25 repeat CTH without expansion of hematoma with therapeutic PTT, though does have loss of grey-white matter junction congruent with anoxic brain injury    #C-spine trauma  Pt w/ fall at home presented w/ c-collar.   -Per [facility] radiology report no evidence of cervical fracture  -Trauma surgery cleared pt of Cspine collar    #Myoclonic jerking  - vEEG: evidence of mycolonic seizures  - keppra 1000 BID  - d/c VEEG    ==================== RESPIRATORY======================  #Intubated  Pt intubated s/p cardiac arrest  - On full vent support: RR 14 /  / PEEP 5 / FiO2 40  - Monitor ABGs    #Oropharynx bleed  - Pt w/ blood in oropharynx requiring frequent suctioning.   - ENT consulted s/p packing     ====================CARDIOVASCULAR==================  #VT arrest  - Pt s/p VT arrest w/ unknown downtime. Pt reportedly received shock x2. No prior cardiac hx per family  - Monitor for arrhythmias   - Pt not asa/plavix loaded 2/2 substernal hematoma noted on CT chest at [facility]  - Unlikely to be a candidate for LHC at this time given mental status     #HTN  - Maintain BP control iso brain bleed  - Amlodipine; coreg  - PRN IVP labetalol for elevated SBP  - SBP goal<160    ===================HEMATOLOGIC/ONC ===================  #Substernal hematoma  - Pt w/ substernal hematoma noted on imaging at [facility] from trauma/cpr  - Repeat CT chest to monitor hematoma  - A/C: heparin gtt    #Thrombocytopenia  - Pt w/ downtrending PLT. Started downtrending prior to HSQ initiation.   - Likely iso critical illness   - Will send blue top PLT w/ next CBC    ===================== RENAL =========================  Pt presented w/ SCr 1.16, likely near baseline  - trend Cr   - montior I&Os    ==================== GASTROINTESTINAL===================  - Diet: enteral feeds   - Protonix    =======================    ENDOCRINE  =====================  #Hypothyroidism  - A1c=5.5  - monitor glucose    ========================INFECTIOUS DISEASE================  #Sepsis  - Pt w/ leukocytosis w/ fevers possibly 2/2  infection  - Pt w/ persistent fevers likely multifactorial 2/2 DVTs, underlying infection, possible ?neurogenic/central component  - U/A grossly positive. F/u BCx, UCx  - Pt had crash lines placed at [facility]. Possible source of infection. MRSA negative 12/20  - 12/20 Sputum Cx + for klebsiella  - 12/22 BCx 1 bottle w/ MRSE. Possible contaminant. Check repeat BCx  - Broadened to Meropenem 12/24  - c/w Vanc (12/22-)   ====================ASSESSMENT ==============  78M w/ PMH HTN HLD presents as transfer from [facility] s/p VT arrest s/p shock x2 w/ unknown downtime. Pt found to have trauma to head w/ superficial scalp bleeding and oral mucosal bleeding - CTH significant for L occipital SDH and R temporal SAH, stable on subsequent imaging. Remains w/o significant improvement in mental status.     Plan:  ====================== NEUROLOGY=====================  #Intubated  Encephalopathy post arrest   - Intubated w/o sedation s/p cardiac arrest w/ unknown down time. Reported to be 20-40minutes but can be longer given ROSC achieved right before arrival to the hospital   - CTH at [facility] w/o intracranial bleed/pathology. CTH maybe too soon to show evidence of anoxic injury  - Pt remains w/o purposeful movement.   - Neuro and neurosurgery following  - VEEG: no epileptiform activity; diffuse cerebral dysfunction   - Repeat CTH shows stability of subdural hematoma & subarachnoid hemorrhage and emerging signs of anoxic injury  - f/u neuron specific enolase  - Family meeting w/ neurology attending 12/22 discussed no meaningful recovery. Pending family decision on comfort care.     #SAH & SDH  - Pt w/ acute SAH and SDH 2/2 trauma  - Was not initially seen in outside scan at [facility] likely performed too early for radiologic evidence  - Neurology and neurosurgery following  - NSGY: no intervention  - Repeat CTH shows stability of bleeds   - Maintain BP control   - Initially started on HSQ for DVT ppx, now on heparin gtt (started 12/24 with narrow therapeutic ptt window) for full AC given findings of DVTs  - 12/25 repeat CTH without expansion of hematoma with therapeutic PTT, though does have loss of grey-white matter junction congruent with anoxic brain injury    #C-spine trauma  Pt w/ fall at home presented w/ c-collar.   -Per [facility] radiology report no evidence of cervical fracture  -Trauma surgery cleared pt of Cspine collar    #Myoclonic jerking  - vEEG: evidence of mycolonic seizures  - keppra 1000 BID  - d/c VEEG    ==================== RESPIRATORY======================  #Intubated  Pt intubated s/p cardiac arrest  - On full vent support: RR 14 /  / PEEP 5 / FiO2 40  - Monitor ABGs    #Oropharynx bleed  - Pt w/ blood in oropharynx requiring frequent suctioning.   - ENT consulted s/p packing     ====================CARDIOVASCULAR==================  #VT arrest  - Pt s/p VT arrest w/ unknown downtime. Pt reportedly received shock x2. No prior cardiac hx per family  - Monitor for arrhythmias   - Pt not asa/plavix loaded 2/2 substernal hematoma noted on CT chest at [facility]  - Unlikely to be a candidate for LHC at this time given mental status     #HTN  - Maintain BP control iso brain bleed  - Amlodipine; coreg  - PRN IVP labetalol for elevated SBP  - SBP goal<160    ===================HEMATOLOGIC/ONC ===================  #Substernal hematoma  - Pt w/ substernal hematoma noted on imaging at [facility] from trauma/cpr  - Repeat CT chest to monitor hematoma  - A/C: heparin gtt    #Thrombocytopenia  - Pt w/ downtrending PLT. Started downtrending prior to HSQ initiation.   - Likely iso critical illness   - Will send blue top PLT w/ next CBC    ===================== RENAL =========================  Pt presented w/ SCr 1.16, likely near baseline  - trend Cr   - montior I&Os    ==================== GASTROINTESTINAL===================  - Diet: enteral feeds   - Protonix    =======================    ENDOCRINE  =====================  #Hypothyroidism  - A1c=5.5  - monitor glucose    ========================INFECTIOUS DISEASE================  #Sepsis  - Pt w/ leukocytosis w/ fevers possibly 2/2  infection  - Pt w/ persistent fevers likely multifactorial 2/2 DVTs, underlying infection, possible ?neurogenic/central component  - U/A grossly positive. F/u BCx, UCx  - Pt had crash lines placed at [facility]. Possible source of infection. MRSA negative 12/20  - 12/20 Sputum Cx + for klebsiella  - 12/22 BCx 1 bottle w/ MRSE. Possible contaminant. Check repeat BCx  - Broadened to Meropenem 12/24  - c/w Vanc (12/22-)   ====================ASSESSMENT ==============  78M w/ PMH HTN HLD presents as transfer from [facility] s/p VT arrest s/p shock x2 w/ unknown downtime. Pt found to have trauma to head w/ superficial scalp bleeding and oral mucosal bleeding - CTH significant for L occipital SDH and R temporal SAH, stable on subsequent imaging. Remains w/o significant improvement in mental status.     Plan:  ====================== NEUROLOGY=====================  #Intubated  Encephalopathy post arrest   - Intubated w/o sedation s/p cardiac arrest w/ unknown down time. Reported to be 20-40minutes but can be longer given ROSC achieved right before arrival to the hospital   - CTH at [facility] w/o intracranial bleed/pathology. CTH maybe too soon to show evidence of anoxic injury  - Pt remains w/o purposeful movement.   - Neuro and neurosurgery following  - VEEG: no epileptiform activity; diffuse cerebral dysfunction   - Repeat CTH shows stability of subdural hematoma & subarachnoid hemorrhage and emerging signs of anoxic injury  - f/u neuron specific enolase  - Family meeting w/ neurology attending 12/22 discussed no meaningful recovery. Pending family decision on comfort care.     #SAH & SDH  - Pt w/ acute SAH and SDH 2/2 trauma  - Was not initially seen in outside scan at [facility] likely performed too early for radiologic evidence  - Neurology and neurosurgery following  - NSGY: no intervention  - Repeat CTH shows stability of bleeds   - Maintain BP control   - Initially started on HSQ for DVT ppx, now on heparin gtt (started 12/24 with narrow therapeutic ptt window) for full AC given findings of DVTs  - 12/25 repeat CTH without expansion of hematoma with therapeutic PTT, though does have loss of grey-white matter junction congruent with anoxic brain injury  - 12/25 had systolic hypertension >200 w/ c/f worsening ICH/expansion so repeat CTH ordered    #C-spine trauma  Pt w/ fall at home presented w/ c-collar.   -Per [facility] radiology report no evidence of cervical fracture  -Trauma surgery cleared pt of Cspine collar    #Myoclonic jerking  - vEEG: evidence of mycolonic seizures  - keppra 1000 BID  - d/c VEEG    ==================== RESPIRATORY======================  #Intubated  Pt intubated s/p cardiac arrest  - On full vent support: RR 14 /  / PEEP 5 / FiO2 40  - Monitor ABGs    #Oropharynx bleed  - Pt w/ blood in oropharynx requiring frequent suctioning.   - ENT consulted s/p packing     ====================CARDIOVASCULAR==================  #VT arrest  - Pt s/p VT arrest w/ unknown downtime. Pt reportedly received shock x2. No prior cardiac hx per family  - Monitor for arrhythmias   - Pt not asa/plavix loaded 2/2 substernal hematoma noted on CT chest at [facility]  - Unlikely to be a candidate for C at this time given mental status     #HTN  - Maintain BP control iso brain bleed  - Amlodipine; coreg  - PRN IVP labetalol for elevated SBP  - SBP goal<160    ===================HEMATOLOGIC/ONC ===================  #Substernal hematoma  - Pt w/ substernal hematoma noted on imaging at [facility] from trauma/cpr  - Repeat CT chest to monitor hematoma  - A/C: heparin gtt - on hold while r/o worsening ICH    #Thrombocytopenia  - Pt w/ downtrending PLT - currently stable in low 100s. Started downtrending prior to HSQ initiation.   - Likely iso critical illness       ===================== RENAL =========================  Pt presented w/ SCr 1.16, likely near baseline  - trend Cr   - montior I&Os    ==================== GASTROINTESTINAL===================  - Diet: enteral feeds   - Protonix    =======================    ENDOCRINE  =====================  #Hypothyroidism  - A1c=5.5  - monitor glucose    ========================INFECTIOUS DISEASE================  #Sepsis  - Pt w/ leukocytosis w/ fevers possibly 2/2  infection  - Pt w/ persistent fevers likely multifactorial 2/2 DVTs, underlying infection, possible ?neurogenic/central component  - U/A grossly positive. F/u 12/23 BCx, UCx - NGTD  - Pt had crash lines placed at [facility]. Possible source of infection. MRSA negative 12/20  - 12/20 Sputum Cx + for klebsiella  - 12/22 BCx 1 bottle w/ MRSE. Possible contaminant. Check repeat BCx  - Broadened to Meropenem 12/24  - c/w Vanc (12/22-12/25)  - plan for 5 day empiric course ending 12/25   ====================ASSESSMENT ==============  78M w/ PMH HTN HLD presents as transfer from [facility] s/p VT arrest s/p shock x2 w/ unknown downtime. Pt found to have trauma to head w/ superficial scalp bleeding and oral mucosal bleeding - CTH significant for L occipital SDH and R temporal SAH, stable on subsequent imaging. Remains w/o significant improvement in mental status.     Plan:  ====================== NEUROLOGY=====================  #Intubated  Encephalopathy post arrest   - Intubated w/o sedation s/p cardiac arrest w/ unknown down time. Reported to be 20-40minutes but can be longer given ROSC achieved right before arrival to the hospital   - CTH at [facility] w/o intracranial bleed/pathology. CTH maybe too soon to show evidence of anoxic injury  - Pt remains w/o purposeful movement.   - Neuro and neurosurgery following  - VEEG: no epileptiform activity; diffuse cerebral dysfunction   - Repeat CTH shows stability of subdural hematoma & subarachnoid hemorrhage and emerging signs of anoxic injury  - f/u neuron specific enolase  - Family meeting w/ neurology attending 12/22 discussed no meaningful recovery. Pending family decision on comfort care.     #SAH & SDH  - Pt w/ acute SAH and SDH 2/2 trauma  - Was not initially seen in outside scan at [facility] likely performed too early for radiologic evidence  - Neurology and neurosurgery following  - NSGY: no intervention  - Repeat CTH shows stability of bleeds   - Maintain BP control   - Initially started on HSQ for DVT ppx, now on heparin gtt (started 12/24 with narrow therapeutic ptt window) for full AC given findings of DVTs  - 12/25 repeat CTH without expansion of hematoma with therapeutic PTT, though does have loss of grey-white matter junction congruent with anoxic brain injury  - 12/25 had systolic hypertension >200 w/ c/f worsening ICH/expansion so repeat CTH ordered    #C-spine trauma  Pt w/ fall at home presented w/ c-collar.   -Per [facility] radiology report no evidence of cervical fracture  -Trauma surgery cleared pt of Cspine collar    #Myoclonic jerking  - vEEG: evidence of mycolonic seizures  - keppra 1000 BID  - d/c VEEG    ==================== RESPIRATORY======================  #Intubated  Pt intubated s/p cardiac arrest  - On full vent support: RR 14 /  / PEEP 5 / FiO2 40  - Monitor ABGs    #Oropharynx bleed  - Pt w/ blood in oropharynx requiring frequent suctioning.   - ENT consulted s/p packing     ====================CARDIOVASCULAR==================  #VT arrest  - Pt s/p VT arrest w/ unknown downtime. Pt reportedly received shock x2. No prior cardiac hx per family  - Monitor for arrhythmias   - Pt not asa/plavix loaded 2/2 substernal hematoma noted on CT chest at [facility]  - Unlikely to be a candidate for C at this time given mental status     #HTN  - Maintain BP control iso brain bleed  - Amlodipine; coreg  - PRN IVP labetalol for elevated SBP  - SBP goal<160    ===================HEMATOLOGIC/ONC ===================  #Substernal hematoma  - Pt w/ substernal hematoma noted on imaging at [facility] from trauma/cpr  - Repeat CT chest to monitor hematoma  - A/C: heparin gtt - on hold while r/o worsening ICH    #Thrombocytopenia  - Pt w/ downtrending PLT - currently stable in low 100s. Started downtrending prior to HSQ initiation.   - Likely iso critical illness       ===================== RENAL =========================  Pt presented w/ SCr 1.16, likely near baseline  - trend Cr   - montior I&Os    ==================== GASTROINTESTINAL===================  - Diet: enteral feeds   - Protonix    =======================    ENDOCRINE  =====================  #Hypothyroidism  - A1c=5.5  - monitor glucose    ========================INFECTIOUS DISEASE================  #Sepsis  - Pt w/ leukocytosis w/ fevers possibly 2/2  infection  - Pt w/ persistent fevers likely multifactorial 2/2 DVTs, underlying infection, possible ?neurogenic/central component  - U/A grossly positive. F/u 12/23 BCx, UCx - NGTD  - Pt had crash lines placed at [facility]. Possible source of infection. MRSA negative 12/20  - 12/20 Sputum Cx + for klebsiella  - 12/22 BCx 1 bottle w/ MRSE. Possible contaminant. Check repeat BCx  - Broadened to Meropenem 12/24  - c/w Vanc (12/22-12/25)  - plan for 5 day empiric course ending 12/25

## 2023-12-25 NOTE — PATIENT PROFILE ADULT - FUNCTIONAL ASSESSMENT - BASIC MOBILITY 6.
1-calculated by average/Not able to assess (calculate score using [facility] averaging method)  1-calculated by average/Not able to assess (calculate score using [facility] averaging method)

## 2023-12-25 NOTE — PATIENT PROFILE ADULT - FALL HARM RISK - HARM RISK INTERVENTIONS
Assistance with ambulation/Assistance OOB with selected safe patient handling equipment/Communicate Risk of Fall with Harm to all staff/Discuss with provider need for PT consult/Monitor gait and stability/Provide patient with walking aids - walker, cane, crutches/Reinforce activity limits and safety measures with patient and family/Tailored Fall Risk Interventions/Visual Cue: Yellow wristband and red socks/Bed in lowest position, wheels locked, appropriate side rails in place/Call bell, personal items and telephone in reach/Instruct patient to call for assistance before getting out of bed or chair/Non-slip footwear when patient is out of bed/[place] to call system/Physically safe environment - no spills, clutter or unnecessary equipment/Purposeful Proactive Rounding/Room/bathroom lighting operational, light cord in reach Assistance with ambulation/Assistance OOB with selected safe patient handling equipment/Communicate Risk of Fall with Harm to all staff/Discuss with provider need for PT consult/Monitor gait and stability/Provide patient with walking aids - walker, cane, crutches/Reinforce activity limits and safety measures with patient and family/Tailored Fall Risk Interventions/Visual Cue: Yellow wristband and red socks/Bed in lowest position, wheels locked, appropriate side rails in place/Call bell, personal items and telephone in reach/Instruct patient to call for assistance before getting out of bed or chair/Non-slip footwear when patient is out of bed/[place] to call system/Physically safe environment - no spills, clutter or unnecessary equipment/Purposeful Proactive Rounding/Room/bathroom lighting operational, light cord in reach

## 2023-12-25 NOTE — PROGRESS NOTE ADULT - SUBJECTIVE AND OBJECTIVE BOX
ASHLEY PRITCHARD  MRN-[id number]  Patient is a 78y old  Male who presents with a chief complaint of Cardiac Arrest (25 Dec 2023 07:24)    HPI:  78M w/ PMH HTN HLD presents as transfer from [facility] s/p cardiac arrest. Per reports patient had a witness fall and arrest at home. EMS performed CPR en route to hospital was noted to be in VTach and shocked x2. ROSC was obtained just prior to arrival at [facility]. Pt was intubated placed on levo gtt. On arrival pt noted to have lacerated to R forehead. Pt had CT scans that were negative for intracranial hemorrhage, C-spine fracture, facial fractures. A R frontal hematoma was noted.     On arrival patient w/ dried blood on scalp. Also noted to have bleeding from oral mucosa w/ clots requiring suctioning. Pt otherwise off sedation and non-responsive. Per EMS report pt did receive some sedation and paralytics at [facility].     Per family patient has been feeling 'off' but managing his day to day and did not see a physician. Pt prescribed klonopin by outpatient provider and family believes pt may have been taking more than prescribed. On day of arrest, pt was initially asymptomatic carrying out his routine. He went to the bathroom, wife heard a loud thud and found patient in a pool of blood prompting call to EMS. Pt has not followed with any cardiologist. Has not had any syncope or other events preceding this.  (19 Dec 2023 14:54)      Hospital Course:    24 HOUR EVENTS:    REVIEW OF SYSTEMS:    CONSTITUTIONAL: No weakness, fevers or chills  EYES/ENT: No visual changes;  No vertigo or throat pain   NECK: No pain or stiffness  RESPIRATORY: No cough, wheezing, hemoptysis; No shortness of breath  CARDIOVASCULAR: No chest pain or palpitations  GASTROINTESTINAL: No abdominal or epigastric pain. No nausea, vomiting, or hematemesis; No diarrhea or constipation. No melena or hematochezia.  GENITOURINARY: No dysuria, frequency or hematuria  NEUROLOGICAL: No numbness or weakness  SKIN: No itching, rashes      ICU Vital Signs Last 24 Hrs  T(C): 38.5 (25 Dec 2023 17:00), Max: 38.5 (25 Dec 2023 17:00)  T(F): 101.3 (25 Dec 2023 17:00), Max: 101.3 (25 Dec 2023 17:00)  HR: 62 (25 Dec 2023 18:00) (42 - 73)  BP: --  BP(mean): --  ABP: 145/50 (25 Dec 2023 18:00) (91/31 - 199/48)  ABP(mean): 72 (25 Dec 2023 18:00) (47 - 111)  RR: 21 (25 Dec 2023 18:00) (14 - 28)  SpO2: 100% (25 Dec 2023 18:00) (93% - 100%)    O2 Parameters below as of 25 Dec 2023 18:00  Patient On (Oxygen Delivery Method): ventilator    O2 Concentration (%): 40      Mode: AC/ CMV (Assist Control/ Continuous Mandatory Ventilation), RR (machine): 14, TV (machine): 500, FiO2: 40, PEEP: 5, ITime: 1  CVP(mm Hg): --  CO: --  CI: --  PA: --  PA(mean): --  PA(direct): --  PCWP: --  LA: --  RA: --  SVR: --  SVRI: --  PVR: --  PVRI: --  I&O's Summary    24 Dec 2023 07:01  -  25 Dec 2023 07:00  --------------------------------------------------------  IN: 1553.5 mL / OUT: 1460 mL / NET: 93.5 mL    25 Dec 2023 07:01  -  25 Dec 2023 19:18  --------------------------------------------------------  IN: 785.3 mL / OUT: 730 mL / NET: 55.3 mL        CAPILLARY BLOOD GLUCOSE    CAPILLARY BLOOD GLUCOSE      POCT Blood Glucose.: 194 mg/dL (25 Dec 2023 16:59)      PHYSICAL EXAM:  GENERAL: No acute distress, well-developed  HEAD:  Atraumatic, Normocephalic  EYES: EOMI, PERRLA, conjunctiva and sclera clear  NECK: Supple, no lymphadenopathy, no JVD  CHEST/LUNG: CTAB; No wheezes, rales, or rhonchi  HEART: Regular rate and rhythm. Normal S1/S2. No murmurs, rubs, or gallops  ABDOMEN: Soft, non-tender, non-distended; normal bowel sounds, no organomegaly  EXTREMITIES:  2+ peripheral pulses b/l, No clubbing, cyanosis, or edema  NEUROLOGY: A&O x 3, no focal deficits  SKIN: No rashes or lesions    ============================I/O===========================   I&O's Detail    24 Dec 2023 07:01  -  25 Dec 2023 07:00  --------------------------------------------------------  IN:    Dexmedetomidine: 150.5 mL    Heparin: 193 mL    IV PiggyBack: 250 mL    Jevity 1.2: 960 mL  Total IN: 1553.5 mL    OUT:    Indwelling Catheter - Urethral (mL): 1460 mL  Total OUT: 1460 mL    Total NET: 93.5 mL      25 Dec 2023 07:01  -  25 Dec 2023 19:18  --------------------------------------------------------  IN:    Dexmedetomidine: 59.7 mL    Enteral Tube Flush: 100 mL    Heparin: 39 mL    Heparin: 96 mL    Jevity 1.2: 480 mL    Norepinephrine: 10.6 mL  Total IN: 785.3 mL    OUT:    Indwelling Catheter - Urethral (mL): 730 mL  Total OUT: 730 mL    Total NET: 55.3 mL        ============================ LABS =========================                        8.5    7.91  )-----------( 118      ( 25 Dec 2023 09:47 )             24.9     12-25    146<H>  |  112<H>  |  68<H>  ----------------------------<  182<H>  4.0   |  22  |  1.88<H>    Ca    8.3<L>      25 Dec 2023 01:11  Phos  4.2     12-25  Mg     2.9     12-25    TPro  5.9<L>  /  Alb  3.1<L>  /  TBili  1.2  /  DBili  x   /  AST  58<H>  /  ALT  57<H>  /  AlkPhos  48  12-25                LIVER FUNCTIONS - ( 25 Dec 2023 01:11 )  Alb: 3.1 g/dL / Pro: 5.9 g/dL / ALK PHOS: 48 U/L / ALT: 57 U/L / AST: 58 U/L / GGT: x           PT/INR - ( 25 Dec 2023 18:04 )   PT: 12.0 sec;   INR: 1.09 ratio         PTT - ( 25 Dec 2023 18:04 )  PTT:47.2 sec  ABG - ( 25 Dec 2023 01:16 )  pH, Arterial: 7.46  pH, Blood: x     /  pCO2: 32    /  pO2: 179   / HCO3: 23    / Base Excess: -0.7  /  SaO2: 99.1              Blood Gas Arterial, Lactate: 0.8 mmol/L (12-25-23 @ 01:16)  Blood Gas Arterial, Lactate: 0.9 mmol/L (12-24-23 @ 00:50)  Blood Gas Arterial, Lactate: 1.2 mmol/L (12-23-23 @ 00:50)    Urinalysis Basic - ( 25 Dec 2023 01:11 )    Color: x / Appearance: x / SG: x / pH: x  Gluc: 182 mg/dL / Ketone: x  / Bili: x / Urobili: x   Blood: x / Protein: x / Nitrite: x   Leuk Esterase: x / RBC: x / WBC x   Sq Epi: x / Non Sq Epi: x / Bacteria: x      ======================Micro/Rad/Cardio=================  Telemtry: Reviewed   EKG: Reviewed  CXR: Reviewed  Culture: Reviewed   Echo:   Cath:   ======================================================  PAST MEDICAL & SURGICAL HISTORY:  HTN (hypertension)      HLD (hyperlipidemia)  ====================ASSESSMENT ==============  78M w/ PMH HTN HLD presents as transfer from [facility] s/p VT arrest s/p shock x2 w/ unknown downtime. Pt found to have trauma to head w/ superficial scalp bleeding and oral mucosal bleeding - CTH significant for L occipital SDH and R temporal SAH, stable on subsequent imaging. Remains w/o significant improvement in mental status.     Plan:  ====================== NEUROLOGY=====================  #Intubated  Encephalopathy post arrest   - Intubated w/o sedation s/p cardiac arrest w/ unknown down time. Reported to be 20-40minutes but can be longer given ROSC achieved right before arrival to the hospital   - CTH at [facility] w/o intracranial bleed/pathology. CTH maybe too soon to show evidence of anoxic injury  - Pt remains w/o purposeful movement.   - Neuro and neurosurgery following  - VEEG: no epileptiform activity; diffuse cerebral dysfunction   - Repeat CTH shows stability of subdural hematoma & subarachnoid hemorrhage and emerging signs of anoxic injury  - f/u neuron specific enolase  - Family meeting w/ neurology attending 12/22 discussed no meaningful recovery. Pending family decision on comfort care.     #SAH & SDH  - Pt w/ acute SAH and SDH 2/2 trauma  - Was not initially seen in outside scan at [facility] likely performed too early for radiologic evidence  - Neurology and neurosurgery following  - NSGY: no intervention  - Repeat CTH shows stability of bleeds   - Maintain BP control   - Initially started on HSQ for DVT ppx, now on heparin gtt (started 12/24 with narrow therapeutic ptt window) for full AC given findings of DVTs  - 12/25 repeat CTH without expansion of hematoma with therapeutic PTT, though does have loss of grey-white matter junction congruent with anoxic brain injury  - 12/25 had systolic hypertension >200 w/ c/f worsening ICH/expansion so repeat CTH ordered    #C-spine trauma  Pt w/ fall at home presented w/ c-collar.   -Per [facility] radiology report no evidence of cervical fracture  -Trauma surgery cleared pt of Cspine collar    #Myoclonic jerking  - vEEG: evidence of mycolonic seizures  - keppra 1000 BID  - d/c VEEG    ==================== RESPIRATORY======================  #Intubated  Pt intubated s/p cardiac arrest  - On full vent support: RR 14 /  / PEEP 5 / FiO2 40  - Monitor ABGs    #Oropharynx bleed  - Pt w/ blood in oropharynx requiring frequent suctioning.   - ENT consulted s/p packing     ====================CARDIOVASCULAR==================  #VT arrest  - Pt s/p VT arrest w/ unknown downtime. Pt reportedly received shock x2. No prior cardiac hx per family  - Monitor for arrhythmias   - Pt not asa/plavix loaded 2/2 substernal hematoma noted on CT chest at [facility]  - Unlikely to be a candidate for C at this time given mental status     #HTN  - Maintain BP control iso brain bleed  - Amlodipine; coreg  - PRN IVP labetalol for elevated SBP  - SBP goal<160    ===================HEMATOLOGIC/ONC ===================  #Substernal hematoma  - Pt w/ substernal hematoma noted on imaging at [facility] from trauma/cpr  - Repeat CT chest to monitor hematoma  - A/C: heparin gtt - on hold while r/o worsening ICH    #Thrombocytopenia  - Pt w/ downtrending PLT - currently stable in low 100s. Started downtrending prior to HSQ initiation.   - Likely iso critical illness       ===================== RENAL =========================  Pt presented w/ SCr 1.16, likely near baseline  - trend Cr   - montior I&Os    ==================== GASTROINTESTINAL===================  - Diet: enteral feeds   - Protonix    =======================    ENDOCRINE  =====================  #Hypothyroidism  - A1c=5.5  - monitor glucose    ========================INFECTIOUS DISEASE================  #Sepsis  - Pt w/ leukocytosis w/ fevers possibly 2/2  infection  - Pt w/ persistent fevers likely multifactorial 2/2 DVTs, underlying infection, possible ?neurogenic/central component  - U/A grossly positive. F/u 12/23 BCx, UCx - NGTD  - Pt had crash lines placed at [facility]. Possible source of infection. MRSA negative 12/20  - 12/20 Sputum Cx + for klebsiella  - 12/22 BCx 1 bottle w/ MRSE. Possible contaminant. Check repeat BCx  - Broadened to Meropenem 12/24  - c/w Vanc (12/22-12/25)  - plan for 5 day empiric course ending 12/25    Patient requires continuous monitoring with bedside rhythm monitoring, pulse ox monitoring, and intermittent blood gas analysis. Care plan discussed with ICU care team. Patient remained critical and at risk for life threatening decompensation.  Patient seen, examined and plan discussed with CCU team during rounds.     I have personally provided ____ minutes of critical care time excluding time spent on separate procedures, in addition to initial critical care time provided by the CICU Attending, Dr. Mejia.    By signing my name below, I, Kristy Ding, attest that this documentation has been prepared under the direction and in the presence of JEANNIE Cohen.  Electronically signed: Emilia Franklin, 12-25-23 @ 19:18    I, Aziza Ross , personally performed the services described in this documentation. all medical record entries made by the scribe were at my direction and in my presence. I have reviewed the chart and agree that the record reflects my personal performance and is accurate and complete  Electronically signed: JEANNIE Cohen.        ASHLEY PRITCHARD  MRN-[id number]  Patient is a 78y old  Male who presents with a chief complaint of Cardiac Arrest (25 Dec 2023 07:24)    HPI:  78M w/ PMH HTN HLD presents as transfer from [facility] s/p cardiac arrest. Per reports patient had a witness fall and arrest at home. EMS performed CPR en route to hospital was noted to be in VTach and shocked x2. ROSC was obtained just prior to arrival at [facility]. Pt was intubated placed on levo gtt. On arrival pt noted to have lacerated to R forehead. Pt had CT scans that were negative for intracranial hemorrhage, C-spine fracture, facial fractures. A R frontal hematoma was noted.     On arrival patient w/ dried blood on scalp. Also noted to have bleeding from oral mucosa w/ clots requiring suctioning. Pt otherwise off sedation and non-responsive. Per EMS report pt did receive some sedation and paralytics at [facility].     Per family patient has been feeling 'off' but managing his day to day and did not see a physician. Pt prescribed klonopin by outpatient provider and family believes pt may have been taking more than prescribed. On day of arrest, pt was initially asymptomatic carrying out his routine. He went to the bathroom, wife heard a loud thud and found patient in a pool of blood prompting call to EMS. Pt has not followed with any cardiologist. Has not had any syncope or other events preceding this.  (19 Dec 2023 14:54)      Hospital Course:    24 HOUR EVENTS:    REVIEW OF SYSTEMS:    CONSTITUTIONAL: No weakness, fevers or chills  EYES/ENT: No visual changes;  No vertigo or throat pain   NECK: No pain or stiffness  RESPIRATORY: No cough, wheezing, hemoptysis; No shortness of breath  CARDIOVASCULAR: No chest pain or palpitations  GASTROINTESTINAL: No abdominal or epigastric pain. No nausea, vomiting, or hematemesis; No diarrhea or constipation. No melena or hematochezia.  GENITOURINARY: No dysuria, frequency or hematuria  NEUROLOGICAL: No numbness or weakness  SKIN: No itching, rashes      ICU Vital Signs Last 24 Hrs  T(C): 38.5 (25 Dec 2023 17:00), Max: 38.5 (25 Dec 2023 17:00)  T(F): 101.3 (25 Dec 2023 17:00), Max: 101.3 (25 Dec 2023 17:00)  HR: 62 (25 Dec 2023 18:00) (42 - 73)  BP: --  BP(mean): --  ABP: 145/50 (25 Dec 2023 18:00) (91/31 - 199/48)  ABP(mean): 72 (25 Dec 2023 18:00) (47 - 111)  RR: 21 (25 Dec 2023 18:00) (14 - 28)  SpO2: 100% (25 Dec 2023 18:00) (93% - 100%)    O2 Parameters below as of 25 Dec 2023 18:00  Patient On (Oxygen Delivery Method): ventilator    O2 Concentration (%): 40      Mode: AC/ CMV (Assist Control/ Continuous Mandatory Ventilation), RR (machine): 14, TV (machine): 500, FiO2: 40, PEEP: 5, ITime: 1  CVP(mm Hg): --  CO: --  CI: --  PA: --  PA(mean): --  PA(direct): --  PCWP: --  LA: --  RA: --  SVR: --  SVRI: --  PVR: --  PVRI: --  I&O's Summary    24 Dec 2023 07:01  -  25 Dec 2023 07:00  --------------------------------------------------------  IN: 1553.5 mL / OUT: 1460 mL / NET: 93.5 mL    25 Dec 2023 07:01  -  25 Dec 2023 19:18  --------------------------------------------------------  IN: 785.3 mL / OUT: 730 mL / NET: 55.3 mL        CAPILLARY BLOOD GLUCOSE    CAPILLARY BLOOD GLUCOSE      POCT Blood Glucose.: 194 mg/dL (25 Dec 2023 16:59)      PHYSICAL EXAM:  GENERAL: No acute distress, well-developed  HEAD:  Atraumatic, Normocephalic  EYES: EOMI, PERRLA, conjunctiva and sclera clear  NECK: Supple, no lymphadenopathy, no JVD  CHEST/LUNG: CTAB; No wheezes, rales, or rhonchi  HEART: Regular rate and rhythm. Normal S1/S2. No murmurs, rubs, or gallops  ABDOMEN: Soft, non-tender, non-distended; normal bowel sounds, no organomegaly  EXTREMITIES:  2+ peripheral pulses b/l, No clubbing, cyanosis, or edema  NEUROLOGY: A&O x 3, no focal deficits  SKIN: No rashes or lesions    ============================I/O===========================   I&O's Detail    24 Dec 2023 07:01  -  25 Dec 2023 07:00  --------------------------------------------------------  IN:    Dexmedetomidine: 150.5 mL    Heparin: 193 mL    IV PiggyBack: 250 mL    Jevity 1.2: 960 mL  Total IN: 1553.5 mL    OUT:    Indwelling Catheter - Urethral (mL): 1460 mL  Total OUT: 1460 mL    Total NET: 93.5 mL      25 Dec 2023 07:01  -  25 Dec 2023 19:18  --------------------------------------------------------  IN:    Dexmedetomidine: 59.7 mL    Enteral Tube Flush: 100 mL    Heparin: 39 mL    Heparin: 96 mL    Jevity 1.2: 480 mL    Norepinephrine: 10.6 mL  Total IN: 785.3 mL    OUT:    Indwelling Catheter - Urethral (mL): 730 mL  Total OUT: 730 mL    Total NET: 55.3 mL        ============================ LABS =========================                        8.5    7.91  )-----------( 118      ( 25 Dec 2023 09:47 )             24.9     12-25    146<H>  |  112<H>  |  68<H>  ----------------------------<  182<H>  4.0   |  22  |  1.88<H>    Ca    8.3<L>      25 Dec 2023 01:11  Phos  4.2     12-25  Mg     2.9     12-25    TPro  5.9<L>  /  Alb  3.1<L>  /  TBili  1.2  /  DBili  x   /  AST  58<H>  /  ALT  57<H>  /  AlkPhos  48  12-25                LIVER FUNCTIONS - ( 25 Dec 2023 01:11 )  Alb: 3.1 g/dL / Pro: 5.9 g/dL / ALK PHOS: 48 U/L / ALT: 57 U/L / AST: 58 U/L / GGT: x           PT/INR - ( 25 Dec 2023 18:04 )   PT: 12.0 sec;   INR: 1.09 ratio         PTT - ( 25 Dec 2023 18:04 )  PTT:47.2 sec  ABG - ( 25 Dec 2023 01:16 )  pH, Arterial: 7.46  pH, Blood: x     /  pCO2: 32    /  pO2: 179   / HCO3: 23    / Base Excess: -0.7  /  SaO2: 99.1              Blood Gas Arterial, Lactate: 0.8 mmol/L (12-25-23 @ 01:16)  Blood Gas Arterial, Lactate: 0.9 mmol/L (12-24-23 @ 00:50)  Blood Gas Arterial, Lactate: 1.2 mmol/L (12-23-23 @ 00:50)    Urinalysis Basic - ( 25 Dec 2023 01:11 )    Color: x / Appearance: x / SG: x / pH: x  Gluc: 182 mg/dL / Ketone: x  / Bili: x / Urobili: x   Blood: x / Protein: x / Nitrite: x   Leuk Esterase: x / RBC: x / WBC x   Sq Epi: x / Non Sq Epi: x / Bacteria: x      ======================Micro/Rad/Cardio=================  Telemtry: Reviewed   EKG: Reviewed  CXR: Reviewed  Culture: Reviewed   Echo:   Cath:   ======================================================  PAST MEDICAL & SURGICAL HISTORY:  HTN (hypertension)      HLD (hyperlipidemia)  ====================ASSESSMENT ==============  78M w/ PMH HTN HLD presents as transfer from [facility] s/p VT arrest s/p shock x2 w/ unknown downtime. Pt found to have trauma to head w/ superficial scalp bleeding and oral mucosal bleeding - CTH significant for L occipital SDH and R temporal SAH, stable on subsequent imaging. Remains w/o significant improvement in mental status.     Plan:  ====================== NEUROLOGY=====================  #Intubated  Encephalopathy post arrest   - Intubated w/o sedation s/p cardiac arrest w/ unknown down time. Reported to be 20-40minutes but can be longer given ROSC achieved right before arrival to the hospital   - CTH at [facility] w/o intracranial bleed/pathology. CTH maybe too soon to show evidence of anoxic injury  - Pt remains w/o purposeful movement.   - Neuro and neurosurgery following  - VEEG: no epileptiform activity; diffuse cerebral dysfunction   - Repeat CTH shows stability of subdural hematoma & subarachnoid hemorrhage and emerging signs of anoxic injury  - f/u neuron specific enolase  - Family meeting w/ neurology attending 12/22 discussed no meaningful recovery. Pending family decision on comfort care.     #SAH & SDH  - Pt w/ acute SAH and SDH 2/2 trauma  - Was not initially seen in outside scan at [facility] likely performed too early for radiologic evidence  - Neurology and neurosurgery following  - NSGY: no intervention  - Repeat CTH shows stability of bleeds   - Maintain BP control   - Initially started on HSQ for DVT ppx, now on heparin gtt (started 12/24 with narrow therapeutic ptt window) for full AC given findings of DVTs  - 12/25 repeat CTH without expansion of hematoma with therapeutic PTT, though does have loss of grey-white matter junction congruent with anoxic brain injury  - 12/25 had systolic hypertension >200 w/ c/f worsening ICH/expansion so repeat CTH ordered    #C-spine trauma  Pt w/ fall at home presented w/ c-collar.   -Per [facility] radiology report no evidence of cervical fracture  -Trauma surgery cleared pt of Cspine collar    #Myoclonic jerking  - vEEG: evidence of mycolonic seizures  - keppra 1000 BID  - d/c VEEG    ==================== RESPIRATORY======================  #Intubated  Pt intubated s/p cardiac arrest  - On full vent support: RR 14 /  / PEEP 5 / FiO2 40  - Monitor ABGs    #Oropharynx bleed  - Pt w/ blood in oropharynx requiring frequent suctioning.   - ENT consulted s/p packing     ====================CARDIOVASCULAR==================  #VT arrest  - Pt s/p VT arrest w/ unknown downtime. Pt reportedly received shock x2. No prior cardiac hx per family  - Monitor for arrhythmias   - Pt not asa/plavix loaded 2/2 substernal hematoma noted on CT chest at [facility]  - Unlikely to be a candidate for C at this time given mental status     #HTN  - Maintain BP control iso brain bleed  - Amlodipine; coreg  - PRN IVP labetalol for elevated SBP  - SBP goal<160    ===================HEMATOLOGIC/ONC ===================  #Substernal hematoma  - Pt w/ substernal hematoma noted on imaging at [facility] from trauma/cpr  - Repeat CT chest to monitor hematoma  - A/C: heparin gtt - on hold while r/o worsening ICH    #Thrombocytopenia  - Pt w/ downtrending PLT - currently stable in low 100s. Started downtrending prior to HSQ initiation.   - Likely iso critical illness       ===================== RENAL =========================  Pt presented w/ SCr 1.16, likely near baseline  - trend Cr   - montior I&Os    ==================== GASTROINTESTINAL===================  - Diet: enteral feeds   - Protonix    =======================    ENDOCRINE  =====================  #Hypothyroidism  - A1c=5.5  - monitor glucose    ========================INFECTIOUS DISEASE================  #Sepsis  - Pt w/ leukocytosis w/ fevers possibly 2/2  infection  - Pt w/ persistent fevers likely multifactorial 2/2 DVTs, underlying infection, possible ?neurogenic/central component  - U/A grossly positive. F/u 12/23 BCx, UCx - NGTD  - Pt had crash lines placed at [facility]. Possible source of infection. MRSA negative 12/20  - 12/20 Sputum Cx + for klebsiella  - 12/22 BCx 1 bottle w/ MRSE. Possible contaminant. Check repeat BCx  - Broadened to Meropenem 12/24  - c/w Vanc (12/22-12/25)  - plan for 5 day empiric course ending 12/25    Patient requires continuous monitoring with bedside rhythm monitoring, pulse ox monitoring, and intermittent blood gas analysis. Care plan discussed with ICU care team. Patient remained critical and at risk for life threatening decompensation.  Patient seen, examined and plan discussed with CCU team during rounds.     I have personally provided ____ minutes of critical care time excluding time spent on separate procedures, in addition to initial critical care time provided by the CICU Attending, Dr. Mejia.    By signing my name below, I, Kristy Ding, attest that this documentation has been prepared under the direction and in the presence of JEANNIE Cohen.  Electronically signed: Emilia Franklin, 12-25-23 @ 19:18    I, Aziza Ross , personally performed the services described in this documentation. all medical record entries made by the scribe were at my direction and in my presence. I have reviewed the chart and agree that the record reflects my personal performance and is accurate and complete  Electronically signed: JEANNIE Cohen.        ASHLEY PRITCHARD  MRN-[id number]  Patient is a 78y old  Male who presents with a chief complaint of Cardiac Arrest (25 Dec 2023 07:24)    HPI:  78M w/ PMH HTN HLD presents as transfer from [facility] s/p cardiac arrest. Per reports patient had a witness fall and arrest at home. EMS performed CPR en route to hospital was noted to be in VTach and shocked x2. ROSC was obtained just prior to arrival at [facility]. Pt was intubated placed on levo gtt. On arrival pt noted to have lacerated to R forehead. Pt had CT scans that were negative for intracranial hemorrhage, C-spine fracture, facial fractures. A R frontal hematoma was noted.     On arrival patient w/ dried blood on scalp. Also noted to have bleeding from oral mucosa w/ clots requiring suctioning. Pt otherwise off sedation and non-responsive. Per EMS report pt did receive some sedation and paralytics at [facility].     Per family patient has been feeling 'off' but managing his day to day and did not see a physician. Pt prescribed klonopin by outpatient provider and family believes pt may have been taking more than prescribed. On day of arrest, pt was initially asymptomatic carrying out his routine. He went to the bathroom, wife heard a loud thud and found patient in a pool of blood prompting call to EMS. Pt has not followed with any cardiologist. Has not had any syncope or other events preceding this.  (19 Dec 2023 14:54)    24 HOUR EVENTS:  - +vertical nystagmus  - rpt CTH stable    ICU Vital Signs Last 24 Hrs  T(C): 38.5 (25 Dec 2023 17:00), Max: 38.5 (25 Dec 2023 17:00)  T(F): 101.3 (25 Dec 2023 17:00), Max: 101.3 (25 Dec 2023 17:00)  HR: 62 (25 Dec 2023 18:00) (42 - 73)  ABP: 145/50 (25 Dec 2023 18:00) (91/31 - 199/48)  ABP(mean): 72 (25 Dec 2023 18:00) (47 - 111)  RR: 21 (25 Dec 2023 18:00) (14 - 28)  SpO2: 100% (25 Dec 2023 18:00) (93% - 100%)    O2 Parameters below as of 25 Dec 2023 18:00  Patient On (Oxygen Delivery Method): ventilator    O2 Concentration (%): 40      Mode: AC/ CMV (Assist Control/ Continuous Mandatory Ventilation), RR (machine): 14, TV (machine): 500, FiO2: 40, PEEP: 5, ITime: 1    I&O's Summary    24 Dec 2023 07:01  -  25 Dec 2023 07:00  --------------------------------------------------------  IN: 1553.5 mL / OUT: 1460 mL / NET: 93.5 mL    25 Dec 2023 07:01  -  25 Dec 2023 19:18  --------------------------------------------------------  IN: 785.3 mL / OUT: 730 mL / NET: 55.3 mL    CAPILLARY BLOOD GLUCOSE  POCT Blood Glucose.: 194 mg/dL (25 Dec 2023 16:59)    PHYSICAL EXAM:  GENERAL: NAD  CHEST/LUNG: Coarse breath sounds b/l  HEART: Regular rate and rhythm. Normal S1/S2. No murmurs, rubs, or gallops  ABDOMEN: Soft, non-tender, non-distended; normal bowel sounds  EXTREMITIES: No clubbing, cyanosis, or edema  NEUROLOGY: unresponsive to painful stimuli, voice    ============================I/O===========================   I&O's Detail    24 Dec 2023 07:01  -  25 Dec 2023 07:00  --------------------------------------------------------  IN:    Dexmedetomidine: 150.5 mL    Heparin: 193 mL    IV PiggyBack: 250 mL    Jevity 1.2: 960 mL  Total IN: 1553.5 mL    OUT:    Indwelling Catheter - Urethral (mL): 1460 mL  Total OUT: 1460 mL    Total NET: 93.5 mL    25 Dec 2023 07:01  -  25 Dec 2023 19:18  --------------------------------------------------------  IN:    Dexmedetomidine: 59.7 mL    Enteral Tube Flush: 100 mL    Heparin: 39 mL    Heparin: 96 mL    Jevity 1.2: 480 mL    Norepinephrine: 10.6 mL  Total IN: 785.3 mL    OUT:    Indwelling Catheter - Urethral (mL): 730 mL  Total OUT: 730 mL    Total NET: 55.3 mL    ============================ LABS =========================                     8.5    7.91  )-----------( 118      ( 25 Dec 2023 09:47 )             24.9     12-25    146<H>  |  112<H>  |  68<H>  ----------------------------<  182<H>  4.0   |  22  |  1.88<H>    Ca    8.3<L>      25 Dec 2023 01:11  Phos  4.2     12-25  Mg     2.9     12-25    TPro  5.9<L>  /  Alb  3.1<L>  /  TBili  1.2  /  DBili  x   /  AST  58<H>  /  ALT  57<H>  /  AlkPhos  48  12-25    LIVER FUNCTIONS - ( 25 Dec 2023 01:11 )  Alb: 3.1 g/dL / Pro: 5.9 g/dL / ALK PHOS: 48 U/L / ALT: 57 U/L / AST: 58 U/L / GGT: x         PT/INR - ( 25 Dec 2023 18:04 )   PT: 12.0 sec;   INR: 1.09 ratio    PTT - ( 25 Dec 2023 18:04 )  PTT:47.2 sec  ABG - ( 25 Dec 2023 01:16 )  pH, Arterial: 7.46  pH, Blood: x     /  pCO2: 32    /  pO2: 179   / HCO3: 23    / Base Excess: -0.7  /  SaO2: 99.1      Blood Gas Arterial, Lactate: 0.8 mmol/L (12-25-23 @ 01:16)  Blood Gas Arterial, Lactate: 0.9 mmol/L (12-24-23 @ 00:50)  Blood Gas Arterial, Lactate: 1.2 mmol/L (12-23-23 @ 00:50)    Urinalysis Basic - ( 25 Dec 2023 01:11 )    Color: x / Appearance: x / SG: x / pH: x  Gluc: 182 mg/dL / Ketone: x  / Bili: x / Urobili: x   Blood: x / Protein: x / Nitrite: x   Leuk Esterase: x / RBC: x / WBC x   Sq Epi: x / Non Sq Epi: x / Bacteria: x    ======================Micro/Rad/Cardio=================  Telemtry: Reviewed   EKG: Reviewed  CXR: Reviewed  Culture: Reviewed   Echo:   Cath:   ======================================================  PAST MEDICAL & SURGICAL HISTORY:  HTN (hypertension)      HLD (hyperlipidemia)    ====================ASSESSMENT ==============  78M w/ PMH HTN HLD presents as transfer from [facility] s/p VT arrest s/p shock x2 w/ unknown downtime. Pt found to have trauma to head w/ superficial scalp bleeding and oral mucosal bleeding - CTH significant for L occipital SDH and R temporal SAH, stable on subsequent imaging. Remains w/o significant improvement in mental status.     Plan:  ====================== NEUROLOGY=====================  #Intubated  Encephalopathy post arrest   - Intubated w/o sedation s/p cardiac arrest w/ unknown down time. Reported to be 20-40minutes but can be longer given ROSC achieved right before arrival to the hospital   - CTH at [facility] w/o intracranial bleed/pathology. CTH maybe too soon to show evidence of anoxic injury  - Pt remains w/o purposeful movement.   - Neuro and neurosurgery following  - VEEG: no epileptiform activity; diffuse cerebral dysfunction   - Repeat CTH shows stability of subdural hematoma & subarachnoid hemorrhage and emerging signs of anoxic injury  - f/u neuron specific enolase  - Family meeting w/ neurology attending 12/22 discussed no meaningful recovery. Pending family decision on comfort care.     #SAH & SDH  - Pt w/ acute SAH and SDH 2/2 trauma  - Was not initially seen in outside scan at [facility] likely performed too early for radiologic evidence  - Neurology and neurosurgery following  - NSGY: no intervention  - Repeat CTH shows stability of bleeds   - Maintain BP control   - Initially started on HSQ for DVT ppx, now on heparin gtt (started 12/24 with narrow therapeutic ptt window) for full AC given findings of DVTs  - 12/25 repeat CTH without expansion of hematoma with therapeutic PTT, though does have loss of grey-white matter junction congruent with anoxic brain injury  - 12/25 had systolic hypertension >200 w/ c/f worsening ICH/expansion so repeat CTH ordered    #C-spine trauma  Pt w/ fall at home presented w/ c-collar.   -Per [facility] radiology report no evidence of cervical fracture  -Trauma surgery cleared pt of Cspine collar    #Myoclonic jerking  - vEEG: evidence of mycolonic seizures  - keppra 1000 BID  - d/c VEEG    ==================== RESPIRATORY======================  #Intubated  Pt intubated s/p cardiac arrest  - On full vent support: RR 14 /  / PEEP 5 / FiO2 40  - Monitor ABGs    #Oropharynx bleed  - Pt w/ blood in oropharynx requiring frequent suctioning.   - ENT consulted s/p packing     ====================CARDIOVASCULAR==================  #VT arrest  - Pt s/p VT arrest w/ unknown downtime. Pt reportedly received shock x2. No prior cardiac hx per family  - Monitor for arrhythmias   - Pt not asa/plavix loaded 2/2 substernal hematoma noted on CT chest at [facility]  - Unlikely to be a candidate for [facility] at this time given mental status     #HTN  - Maintain BP control iso brain bleed  - Amlodipine; coreg  - PRN IVP labetalol for elevated SBP  - SBP goal<160    ===================HEMATOLOGIC/ONC ===================  #Substernal hematoma  - Pt w/ substernal hematoma noted on imaging at [facility] from trauma/cpr  - Repeat CT chest to monitor hematoma  - A/C: heparin gtt - on hold while r/o worsening ICH    #Thrombocytopenia  - Pt w/ downtrending PLT - currently stable in low 100s. Started downtrending prior to HSQ initiation.   - Likely iso critical illness     ===================== RENAL =========================  Pt presented w/ SCr 1.16, likely near baseline  - trend Cr   - montior I&Os    ==================== GASTROINTESTINAL===================  - Diet: enteral feeds   - Protonix    =======================    ENDOCRINE  =====================  #Hypothyroidism  - A1c=5.5  - monitor glucose    ========================INFECTIOUS DISEASE================  #Sepsis  - Pt w/ leukocytosis w/ fevers possibly 2/2  infection  - Pt w/ persistent fevers likely multifactorial 2/2 DVTs, underlying infection, possible ?neurogenic/central component  - U/A grossly positive. F/u 12/23 BCx, UCx - NGTD  - Pt had crash lines placed at [facility]. Possible source of infection. MRSA negative 12/20  - 12/20 Sputum Cx + for klebsiella  - 12/22 BCx 1 bottle w/ MRSE. Possible contaminant. Check repeat BCx  - Broadened to Meropenem 12/24  - c/w Vanc (12/22-12/25)  - plan for 5 day empiric course ending 12/25    Patient requires continuous monitoring with bedside rhythm monitoring, pulse ox monitoring, and intermittent blood gas analysis. Care plan discussed with ICU care team. Patient remained critical and at risk for life threatening decompensation.  Patient seen, examined and plan discussed with CCU team during rounds.     I have personally provided 35 minutes of critical care time excluding time spent on separate procedures, in addition to initial critical care time provided by the CICU Attending, Dr. Mejia.    By signing my name below, I, Kristy Ding, attest that this documentation has been prepared under the direction and in the presence of JEANNIE Cohen.  Electronically signed: Magdi Franklin, 12-25-23 @ 19:18    I, Aziza Ross , personally performed the services described in this documentation. all medical record entries made by the magdi were at my direction and in my presence. I have reviewed the chart and agree that the record reflects my personal performance and is accurate and complete  Electronically signed: JEANNIE Cohen.        ASHLEY PRITCHARD  MRN-[id number]  Patient is a 78y old  Male who presents with a chief complaint of Cardiac Arrest (25 Dec 2023 07:24)    HPI:  78M w/ PMH HTN HLD presents as transfer from [facility] s/p cardiac arrest. Per reports patient had a witness fall and arrest at home. EMS performed CPR en route to hospital was noted to be in VTach and shocked x2. ROSC was obtained just prior to arrival at [facility]. Pt was intubated placed on levo gtt. On arrival pt noted to have lacerated to R forehead. Pt had CT scans that were negative for intracranial hemorrhage, C-spine fracture, facial fractures. A R frontal hematoma was noted.     On arrival patient w/ dried blood on scalp. Also noted to have bleeding from oral mucosa w/ clots requiring suctioning. Pt otherwise off sedation and non-responsive. Per EMS report pt did receive some sedation and paralytics at [facility].     Per family patient has been feeling 'off' but managing his day to day and did not see a physician. Pt prescribed klonopin by outpatient provider and family believes pt may have been taking more than prescribed. On day of arrest, pt was initially asymptomatic carrying out his routine. He went to the bathroom, wife heard a loud thud and found patient in a pool of blood prompting call to EMS. Pt has not followed with any cardiologist. Has not had any syncope or other events preceding this.  (19 Dec 2023 14:54)    24 HOUR EVENTS:  - +vertical nystagmus  - rpt CTH stable    ICU Vital Signs Last 24 Hrs  T(C): 38.5 (25 Dec 2023 17:00), Max: 38.5 (25 Dec 2023 17:00)  T(F): 101.3 (25 Dec 2023 17:00), Max: 101.3 (25 Dec 2023 17:00)  HR: 62 (25 Dec 2023 18:00) (42 - 73)  ABP: 145/50 (25 Dec 2023 18:00) (91/31 - 199/48)  ABP(mean): 72 (25 Dec 2023 18:00) (47 - 111)  RR: 21 (25 Dec 2023 18:00) (14 - 28)  SpO2: 100% (25 Dec 2023 18:00) (93% - 100%)    O2 Parameters below as of 25 Dec 2023 18:00  Patient On (Oxygen Delivery Method): ventilator    O2 Concentration (%): 40      Mode: AC/ CMV (Assist Control/ Continuous Mandatory Ventilation), RR (machine): 14, TV (machine): 500, FiO2: 40, PEEP: 5, ITime: 1    I&O's Summary    24 Dec 2023 07:01  -  25 Dec 2023 07:00  --------------------------------------------------------  IN: 1553.5 mL / OUT: 1460 mL / NET: 93.5 mL    25 Dec 2023 07:01  -  25 Dec 2023 19:18  --------------------------------------------------------  IN: 785.3 mL / OUT: 730 mL / NET: 55.3 mL    CAPILLARY BLOOD GLUCOSE  POCT Blood Glucose.: 194 mg/dL (25 Dec 2023 16:59)    PHYSICAL EXAM:  GENERAL: NAD  CHEST/LUNG: Coarse breath sounds b/l  HEART: Regular rate and rhythm. Normal S1/S2. No murmurs, rubs, or gallops  ABDOMEN: Soft, non-tender, non-distended; normal bowel sounds  EXTREMITIES: No clubbing, cyanosis, or edema  NEUROLOGY: unresponsive to painful stimuli, voice    ============================I/O===========================   I&O's Detail    24 Dec 2023 07:01  -  25 Dec 2023 07:00  --------------------------------------------------------  IN:    Dexmedetomidine: 150.5 mL    Heparin: 193 mL    IV PiggyBack: 250 mL    Jevity 1.2: 960 mL  Total IN: 1553.5 mL    OUT:    Indwelling Catheter - Urethral (mL): 1460 mL  Total OUT: 1460 mL    Total NET: 93.5 mL    25 Dec 2023 07:01  -  25 Dec 2023 19:18  --------------------------------------------------------  IN:    Dexmedetomidine: 59.7 mL    Enteral Tube Flush: 100 mL    Heparin: 39 mL    Heparin: 96 mL    Jevity 1.2: 480 mL    Norepinephrine: 10.6 mL  Total IN: 785.3 mL    OUT:    Indwelling Catheter - Urethral (mL): 730 mL  Total OUT: 730 mL    Total NET: 55.3 mL    ============================ LABS =========================                     8.5    7.91  )-----------( 118      ( 25 Dec 2023 09:47 )             24.9     12-25    146<H>  |  112<H>  |  68<H>  ----------------------------<  182<H>  4.0   |  22  |  1.88<H>    Ca    8.3<L>      25 Dec 2023 01:11  Phos  4.2     12-25  Mg     2.9     12-25    TPro  5.9<L>  /  Alb  3.1<L>  /  TBili  1.2  /  DBili  x   /  AST  58<H>  /  ALT  57<H>  /  AlkPhos  48  12-25    LIVER FUNCTIONS - ( 25 Dec 2023 01:11 )  Alb: 3.1 g/dL / Pro: 5.9 g/dL / ALK PHOS: 48 U/L / ALT: 57 U/L / AST: 58 U/L / GGT: x         PT/INR - ( 25 Dec 2023 18:04 )   PT: 12.0 sec;   INR: 1.09 ratio    PTT - ( 25 Dec 2023 18:04 )  PTT:47.2 sec  ABG - ( 25 Dec 2023 01:16 )  pH, Arterial: 7.46  pH, Blood: x     /  pCO2: 32    /  pO2: 179   / HCO3: 23    / Base Excess: -0.7  /  SaO2: 99.1      Blood Gas Arterial, Lactate: 0.8 mmol/L (12-25-23 @ 01:16)  Blood Gas Arterial, Lactate: 0.9 mmol/L (12-24-23 @ 00:50)  Blood Gas Arterial, Lactate: 1.2 mmol/L (12-23-23 @ 00:50)    Urinalysis Basic - ( 25 Dec 2023 01:11 )    Color: x / Appearance: x / SG: x / pH: x  Gluc: 182 mg/dL / Ketone: x  / Bili: x / Urobili: x   Blood: x / Protein: x / Nitrite: x   Leuk Esterase: x / RBC: x / WBC x   Sq Epi: x / Non Sq Epi: x / Bacteria: x    ======================Micro/Rad/Cardio=================  Telemtry: Reviewed   EKG: Reviewed  CXR: Reviewed  Culture: Reviewed   Echo:   Cath:   ======================================================  PAST MEDICAL & SURGICAL HISTORY:  HTN (hypertension)      HLD (hyperlipidemia)    ====================ASSESSMENT ==============  78M w/ PMH HTN HLD presents as transfer from [facility] s/p VT arrest s/p shock x2 w/ unknown downtime. Pt found to have trauma to head w/ superficial scalp bleeding and oral mucosal bleeding - CTH significant for L occipital SDH and R temporal SAH, stable on subsequent imaging. Remains w/o significant improvement in mental status.     Plan:  ====================== NEUROLOGY=====================  #Intubated  Encephalopathy post arrest   - Intubated w/o sedation s/p cardiac arrest w/ unknown down time. Reported to be 20-40minutes but can be longer given ROSC achieved right before arrival to the hospital   - CTH at [facility] w/o intracranial bleed/pathology. CTH maybe too soon to show evidence of anoxic injury  - Pt remains w/o purposeful movement.   - Neuro and neurosurgery following  - VEEG: no epileptiform activity; diffuse cerebral dysfunction   - Repeat CTH shows stability of subdural hematoma & subarachnoid hemorrhage and emerging signs of anoxic injury  - f/u neuron specific enolase  - Family meeting w/ neurology attending 12/22 discussed no meaningful recovery. Pending family decision on comfort care.     #SAH & SDH  - Pt w/ acute SAH and SDH 2/2 trauma  - Was not initially seen in outside scan at [facility] likely performed too early for radiologic evidence  - Neurology and neurosurgery following  - NSGY: no intervention  - Repeat CTH shows stability of bleeds   - Maintain BP control   - Initially started on HSQ for DVT ppx, now on heparin gtt (started 12/24 with narrow therapeutic ptt window) for full AC given findings of DVTs  - 12/25 repeat CTH without expansion of hematoma with therapeutic PTT, though does have loss of grey-white matter junction congruent with anoxic brain injury  - 12/25 had systolic hypertension >200 w/ c/f worsening ICH/expansion so repeat CTH ordered    #C-spine trauma  Pt w/ fall at home presented w/ c-collar.   -Per [facility] radiology report no evidence of cervical fracture  -Trauma surgery cleared pt of Cspine collar    #Myoclonic jerking  - vEEG: evidence of mycolonic seizures  - keppra 1000 BID  - d/c VEEG    ==================== RESPIRATORY======================  #Intubated  Pt intubated s/p cardiac arrest  - On full vent support: RR 14 /  / PEEP 5 / FiO2 40  - Monitor ABGs    #Oropharynx bleed  - Pt w/ blood in oropharynx requiring frequent suctioning.   - ENT consulted s/p packing     ====================CARDIOVASCULAR==================  #VT arrest  - Pt s/p VT arrest w/ unknown downtime. Pt reportedly received shock x2. No prior cardiac hx per family  - Monitor for arrhythmias   - Pt not asa/plavix loaded 2/2 substernal hematoma noted on CT chest at [facility]  - Unlikely to be a candidate for [facility] at this time given mental status     #HTN  - Maintain BP control iso brain bleed  - Amlodipine; coreg  - PRN IVP labetalol for elevated SBP  - SBP goal<160    ===================HEMATOLOGIC/ONC ===================  #Substernal hematoma  - Pt w/ substernal hematoma noted on imaging at [facility] from trauma/cpr  - Repeat CT chest to monitor hematoma  - A/C: heparin gtt - on hold while r/o worsening ICH    #Thrombocytopenia  - Pt w/ downtrending PLT - currently stable in low 100s. Started downtrending prior to HSQ initiation.   - Likely iso critical illness     ===================== RENAL =========================  Pt presented w/ SCr 1.16, likely near baseline  - trend Cr   - montior I&Os    ==================== GASTROINTESTINAL===================  - Diet: enteral feeds   - Protonix    =======================    ENDOCRINE  =====================  #Hypothyroidism  - A1c=5.5  - monitor glucose    ========================INFECTIOUS DISEASE================  #Sepsis  - Pt w/ leukocytosis w/ fevers possibly 2/2  infection  - Pt w/ persistent fevers likely multifactorial 2/2 DVTs, underlying infection, possible ?neurogenic/central component  - U/A grossly positive. F/u 12/23 BCx, UCx - NGTD  - Pt had crash lines placed at [facility]. Possible source of infection. MRSA negative 12/20  - 12/20 Sputum Cx + for klebsiella  - 12/22 BCx 1 bottle w/ MRSE. Possible contaminant. Check repeat BCx  - Broadened to Meropenem 12/24  - c/w Vanc (12/22-12/25)  - plan for 5 day empiric course ending 12/25    Patient requires continuous monitoring with bedside rhythm monitoring, pulse ox monitoring, and intermittent blood gas analysis. Care plan discussed with ICU care team. Patient remained critical and at risk for life threatening decompensation.  Patient seen, examined and plan discussed with CCU team during rounds.     I have personally provided 35 minutes of critical care time excluding time spent on separate procedures, in addition to initial critical care time provided by the CICU Attending, Dr. Mejia.    By signing my name below, I, Kristy Ding, attest that this documentation has been prepared under the direction and in the presence of JEANNIE Cohen.  Electronically signed: Magdi Franklin, 12-25-23 @ 19:18    I, Aziza Ross , personally performed the services described in this documentation. all medical record entries made by the magdi were at my direction and in my presence. I have reviewed the chart and agree that the record reflects my personal performance and is accurate and complete  Electronically signed: JEANNIE Cohen.

## 2023-12-25 NOTE — PROGRESS NOTE ADULT - NS MD NEURO CONDITIONS_ENCEPHAL
Neurological
Anoxic/Metabolic/Neurological/Other
Neurological

## 2023-12-25 NOTE — PROGRESS NOTE ADULT - ATTENDING COMMENTS
77 yo man with HTN, HLD s/p out of hospital cardiac arrest     unresponsive, no response to pain, + gag and pupils respond to light, noted to have very labile BPs this am and jerilyn, CTH stat ordered   For now will continue to hold DAPT given neuro status and ICH  possible afib at OSH per EKG,   Acute respiratory failure requiring mechanical ventilation due to cardiac arrest, SBT as tolerated    cont enteral feeds   non oliguric LONA likely due to shock post arrest ?ATN, stable      H/H low, s/p one unit PRBC yesterday   started on hep for DVT yesterday, stat CTH this am given very labile BPs and jerilyn ?herniated, hep on hold in the interim     febrile, stop vanc, total abx course 5days as + DVT studies and likely cause of fevers including neuro as another cause.  f/u cx  Sugars elevated, adjust coverage

## 2023-12-25 NOTE — PROGRESS NOTE ADULT - SUBJECTIVE AND OBJECTIVE BOX
Patient is a 78y old  Male who presents with a chief complaint of Cardiac Arrest (24 Dec 2023 20:48)    HPI:  78M w/ PMH HTN HLD presents as transfer from [facility] s/p cardiac arrest. Per reports patient had a witness fall and arrest at home. EMS performed CPR en route to hospital was noted to be in VTach and shocked x2. ROSC was obtained just prior to arrival at [facility]. Pt was intubated placed on levo gtt. On arrival pt noted to have lacerated to R forehead. Pt had CT scans that were negative for intracranial hemorrhage, C-spine fracture, facial fractures. A R frontal hematoma was noted.     On arrival patient w/ dried blood on scalp. Also noted to have bleeding from oral mucosa w/ clots requiring suctioning. Pt otherwise off sedation and non-responsive. Per EMS report pt did receive some sedation and paralytics at [facility].     Per family patient has been feeling 'off' but managing his day to day and did not see a physician. Pt prescribed klonopin by outpatient provider and family believes pt may have been taking more than prescribed. On day of arrest, pt was initially asymptomatic carrying out his routine. He went to the bathroom, wife heard a loud thud and found patient in a pool of blood prompting call to EMS. Pt has not followed with any cardiologist. Has not had any syncope or other events preceding this.  (19 Dec 2023 14:54)       INTERVAL HPI/OVERNIGHT EVENTS:   No overnight events   Afebrile, hemodynamically stable     Subjective:    ICU Vital Signs Last 24 Hrs  T(C): 37.1 (25 Dec 2023 06:00), Max: 38.6 (24 Dec 2023 12:00)  T(F): 98.8 (25 Dec 2023 06:00), Max: 101.5 (24 Dec 2023 12:00)  HR: 73 (25 Dec 2023 06:00) (46 - 73)  BP: --  BP(mean): --  ABP: 170/71 (25 Dec 2023 06:00) (90/34 - 190/63)  ABP(mean): 111 (25 Dec 2023 06:00) (49 - 111)  RR: 21 (25 Dec 2023 06:00) (14 - 28)  SpO2: 99% (25 Dec 2023 06:00) (93% - 100%)    O2 Parameters below as of 25 Dec 2023 03:00  Patient On (Oxygen Delivery Method): ventilator    O2 Concentration (%): 40      I&O's Summary    24 Dec 2023 07:01  -  25 Dec 2023 07:00  --------------------------------------------------------  IN: 1500.5 mL / OUT: 1435 mL / NET: 65.5 mL      Mode: AC/ CMV (Assist Control/ Continuous Mandatory Ventilation)  RR (machine): 14  TV (machine): 500  FiO2: 40  PEEP: 5  ITime: 1  MAP: 10  PIP: 28      Daily     Daily     Adult Advanced Hemodynamics Last 24 Hrs  CVP(mm Hg): --  CVP(cm H2O): --  CO: --  CI: --  PA: --  PA(mean): --  PCWP: --  SVR: --  SVRI: --  PVR: --  PVRI: --    EKG/Telemetry Events:    MEDICATIONS  (STANDING):  amLODIPine   Tablet 5 milliGRAM(s) Oral daily  artificial  tears Solution 1 Drop(s) Both EYES two times a day  carvedilol 3.125 milliGRAM(s) Oral every 12 hours  chlorhexidine 0.12% Liquid 15 milliLiter(s) Oral Mucosa every 12 hours  chlorhexidine 2% Cloths 1 Application(s) Topical daily  dexMEDEtomidine Infusion 0.2 MICROgram(s)/kG/Hr (4.68 mL/Hr) IV Continuous <Continuous>  heparin  Infusion 1600 Unit(s)/Hr (13 mL/Hr) IV Continuous <Continuous>  insulin lispro (ADMELOG) corrective regimen sliding scale   SubCutaneous every 6 hours  levETIRAcetam  IVPB 1000 milliGRAM(s) IV Intermittent every 12 hours  meropenem  IVPB 1000 milliGRAM(s) IV Intermittent every 12 hours  pantoprazole  Injectable 40 milliGRAM(s) IV Push daily  petrolatum Ophthalmic Ointment 1 Application(s) Both EYES three times a day  vancomycin  IVPB 1000 milliGRAM(s) IV Intermittent every 12 hours    MEDICATIONS  (PRN):      PHYSICAL EXAM:  GENERAL:   HEAD:  Atraumatic, Normocephalic  EYES: EOMI, PERRLA, conjunctiva and sclera clear  NECK: Supple, No JVD, Normal thyroid, no enlarged nodes  NERVOUS SYSTEM:  Alert & Awake.   CHEST/LUNG: B/L good air entry; No rales, rhonchi, or wheezing  HEART: S1S2 normal, no S3, Regular rate and rhythm; No murmurs  ABDOMEN: Soft, Nontender, Nondistended; Bowel sounds present  EXTREMITIES:  2+ Peripheral Pulses, No clubbing, cyanosis, or edema  LYMPH: No lymphadenopathy noted  SKIN: No rashes or lesions    LABS:                        8.5    7.99  )-----------( 119      ( 25 Dec 2023 01:11 )             25.4     12-25    146<H>  |  112<H>  |  68<H>  ----------------------------<  182<H>  4.0   |  22  |  1.88<H>    Ca    8.3<L>      25 Dec 2023 01:11  Phos  4.2     12-25  Mg     2.9     12-25    TPro  5.9<L>  /  Alb  3.1<L>  /  TBili  1.2  /  DBili  x   /  AST  58<H>  /  ALT  57<H>  /  AlkPhos  48  12-25    LIVER FUNCTIONS - ( 25 Dec 2023 01:11 )  Alb: 3.1 g/dL / Pro: 5.9 g/dL / ALK PHOS: 48 U/L / ALT: 57 U/L / AST: 58 U/L / GGT: x           PT/INR - ( 25 Dec 2023 01:11 )   PT: 12.3 sec;   INR: 1.18 ratio         PTT - ( 25 Dec 2023 03:45 )  PTT:62.9 sec  CAPILLARY BLOOD GLUCOSE      POCT Blood Glucose.: 187 mg/dL (25 Dec 2023 05:30)  POCT Blood Glucose.: 508 mg/dL (25 Dec 2023 05:28)  POCT Blood Glucose.: 181 mg/dL (25 Dec 2023 00:25)  POCT Blood Glucose.: 204 mg/dL (24 Dec 2023 18:00)  POCT Blood Glucose.: 200 mg/dL (24 Dec 2023 11:49)    ABG - ( 25 Dec 2023 01:16 )  pH, Arterial: 7.46  pH, Blood: x     /  pCO2: 32    /  pO2: 179   / HCO3: 23    / Base Excess: -0.7  /  SaO2: 99.1                    Urinalysis Basic - ( 25 Dec 2023 01:11 )    Color: x / Appearance: x / SG: x / pH: x  Gluc: 182 mg/dL / Ketone: x  / Bili: x / Urobili: x   Blood: x / Protein: x / Nitrite: x   Leuk Esterase: x / RBC: x / WBC x   Sq Epi: x / Non Sq Epi: x / Bacteria: x          RADIOLOGY & ADDITIONAL TESTS:  CXR:        Care Discussed with Consultants/Other Providers [ x] YES  [ ] NO           Patient is a 78y old  Male who presents with a chief complaint of Cardiac Arrest (24 Dec 2023 20:48)    HPI:  78M w/ PMH HTN HLD presents as transfer from [facility] s/p cardiac arrest. Per reports patient had a witness fall and arrest at home. EMS performed CPR en route to hospital was noted to be in VTach and shocked x2. ROSC was obtained just prior to arrival at [facility]. Pt was intubated placed on levo gtt. On arrival pt noted to have lacerated to R forehead. Pt had CT scans that were negative for intracranial hemorrhage, C-spine fracture, facial fractures. A R frontal hematoma was noted.     On arrival patient w/ dried blood on scalp. Also noted to have bleeding from oral mucosa w/ clots requiring suctioning. Pt otherwise off sedation and non-responsive. Per EMS report pt did receive some sedation and paralytics at [facility].     Per family patient has been feeling 'off' but managing his day to day and did not see a physician. Pt prescribed klonopin by outpatient provider and family believes pt may have been taking more than prescribed. On day of arrest, pt was initially asymptomatic carrying out his routine. He went to the bathroom, wife heard a loud thud and found patient in a pool of blood prompting call to EMS. Pt has not followed with any cardiologist. Has not had any syncope or other events preceding this.  (19 Dec 2023 14:54)       INTERVAL HPI/OVERNIGHT EVENTS:   No overnight events   Afebrile, hemodynamically stable     Subjective:    ICU Vital Signs Last 24 Hrs  T(C): 37.1 (25 Dec 2023 06:00), Max: 38.6 (24 Dec 2023 12:00)  T(F): 98.8 (25 Dec 2023 06:00), Max: 101.5 (24 Dec 2023 12:00)  HR: 73 (25 Dec 2023 06:00) (46 - 73)  BP: --  BP(mean): --  ABP: 170/71 (25 Dec 2023 06:00) (90/34 - 190/63)  ABP(mean): 111 (25 Dec 2023 06:00) (49 - 111)  RR: 21 (25 Dec 2023 06:00) (14 - 28)  SpO2: 99% (25 Dec 2023 06:00) (93% - 100%)    O2 Parameters below as of 25 Dec 2023 03:00  Patient On (Oxygen Delivery Method): ventilator    O2 Concentration (%): 40      I&O's Summary    24 Dec 2023 07:01  -  25 Dec 2023 07:00  --------------------------------------------------------  IN: 1500.5 mL / OUT: 1435 mL / NET: 65.5 mL      Mode: AC/ CMV (Assist Control/ Continuous Mandatory Ventilation)  RR (machine): 14  TV (machine): 500  FiO2: 40  PEEP: 5  ITime: 1  MAP: 10  PIP: 28      Daily     Daily     Adult Advanced Hemodynamics Last 24 Hrs  CVP(mm Hg): --  CVP(cm H2O): --  CO: --  CI: --  PA: --  PA(mean): --  PCWP: --  SVR: --  SVRI: --  PVR: --  PVRI: --    EKG/Telemetry Events:    MEDICATIONS  (STANDING):  amLODIPine   Tablet 5 milliGRAM(s) Oral daily  artificial  tears Solution 1 Drop(s) Both EYES two times a day  carvedilol 3.125 milliGRAM(s) Oral every 12 hours  chlorhexidine 0.12% Liquid 15 milliLiter(s) Oral Mucosa every 12 hours  chlorhexidine 2% Cloths 1 Application(s) Topical daily  dexMEDEtomidine Infusion 0.2 MICROgram(s)/kG/Hr (4.68 mL/Hr) IV Continuous <Continuous>  heparin  Infusion 1600 Unit(s)/Hr (13 mL/Hr) IV Continuous <Continuous>  insulin lispro (ADMELOG) corrective regimen sliding scale   SubCutaneous every 6 hours  levETIRAcetam  IVPB 1000 milliGRAM(s) IV Intermittent every 12 hours  meropenem  IVPB 1000 milliGRAM(s) IV Intermittent every 12 hours  pantoprazole  Injectable 40 milliGRAM(s) IV Push daily  petrolatum Ophthalmic Ointment 1 Application(s) Both EYES three times a day  vancomycin  IVPB 1000 milliGRAM(s) IV Intermittent every 12 hours    MEDICATIONS  (PRN):      PHYSICAL EXAM:  GENERAL:   HEAD:  Atraumatic, Normocephalic  EYES: EOMI, PERRLA, conjunctiva and sclera clear  NECK: Supple, No JVD, Normal thyroid, no enlarged nodes  NERVOUS SYSTEM:  Alert & Awake.   CHEST/LUNG: B/L good air entry; No rales, rhonchi, or wheezing  HEART: S1S2 normal, no S3, Regular rate and rhythm; No murmurs  ABDOMEN: Soft, Nontender, Nondistended; Bowel sounds present  EXTREMITIES:  2+ Peripheral Pulses, No clubbing, cyanosis, or edema  LYMPH: No lymphadenopathy noted  SKIN: No rashes or lesions    LABS:                        8.5    7.99  )-----------( 119      ( 25 Dec 2023 01:11 )             25.4     12-25    146<H>  |  112<H>  |  68<H>  ----------------------------<  182<H>  4.0   |  22  |  1.88<H>    Ca    8.3<L>      25 Dec 2023 01:11  Phos  4.2     12-25  Mg     2.9     12-25    TPro  5.9<L>  /  Alb  3.1<L>  /  TBili  1.2  /  DBili  x   /  AST  58<H>  /  ALT  57<H>  /  AlkPhos  48  12-25    LIVER FUNCTIONS - ( 25 Dec 2023 01:11 )  Alb: 3.1 g/dL / Pro: 5.9 g/dL / ALK PHOS: 48 U/L / ALT: 57 U/L / AST: 58 U/L / GGT: x           PT/INR - ( 25 Dec 2023 01:11 )   PT: 12.3 sec;   INR: 1.18 ratio         PTT - ( 25 Dec 2023 03:45 )  PTT:62.9 sec  CAPILLARY BLOOD GLUCOSE      POCT Blood Glucose.: 187 mg/dL (25 Dec 2023 05:30)  POCT Blood Glucose.: 508 mg/dL (25 Dec 2023 05:28)  POCT Blood Glucose.: 181 mg/dL (25 Dec 2023 00:25)  POCT Blood Glucose.: 204 mg/dL (24 Dec 2023 18:00)  POCT Blood Glucose.: 200 mg/dL (24 Dec 2023 11:49)    ABG - ( 25 Dec 2023 01:16 )  pH, Arterial: 7.46  pH, Blood: x     /  pCO2: 32    /  pO2: 179   / HCO3: 23    / Base Excess: -0.7  /  SaO2: 99.1                    Urinalysis Basic - ( 25 Dec 2023 01:11 )    Color: x / Appearance: x / SG: x / pH: x  Gluc: 182 mg/dL / Ketone: x  / Bili: x / Urobili: x   Blood: x / Protein: x / Nitrite: x   Leuk Esterase: x / RBC: x / WBC x   Sq Epi: x / Non Sq Epi: x / Bacteria: x          RADIOLOGY & ADDITIONAL TESTS:  CXR:        Care Discussed with Consultants/Other Providers [ x] YES  [ ] NO           Patient is a 78y old  Male who presents with a chief complaint of Cardiac Arrest (24 Dec 2023 20:48)    HPI:  78M w/ PMH HTN HLD presents as transfer from [facility] s/p cardiac arrest. Per reports patient had a witness fall and arrest at home. EMS performed CPR en route to hospital was noted to be in VTach and shocked x2. ROSC was obtained just prior to arrival at [facility]. Pt was intubated placed on levo gtt. On arrival pt noted to have lacerated to R forehead. Pt had CT scans that were negative for intracranial hemorrhage, C-spine fracture, facial fractures. A R frontal hematoma was noted.     On arrival patient w/ dried blood on scalp. Also noted to have bleeding from oral mucosa w/ clots requiring suctioning. Pt otherwise off sedation and non-responsive. Per EMS report pt did receive some sedation and paralytics at [facility].     Per family patient has been feeling 'off' but managing his day to day and did not see a physician. Pt prescribed klonopin by outpatient provider and family believes pt may have been taking more than prescribed. On day of arrest, pt was initially asymptomatic carrying out his routine. He went to the bathroom, wife heard a loud thud and found patient in a pool of blood prompting call to EMS. Pt has not followed with any cardiologist. Has not had any syncope or other events preceding this.  (19 Dec 2023 14:54)       INTERVAL HPI/OVERNIGHT EVENTS:   No overnight events   Afebrile, hemodynamically stable     Subjective:    ICU Vital Signs Last 24 Hrs  T(C): 37.1 (25 Dec 2023 06:00), Max: 38.6 (24 Dec 2023 12:00)  T(F): 98.8 (25 Dec 2023 06:00), Max: 101.5 (24 Dec 2023 12:00)  HR: 73 (25 Dec 2023 06:00) (46 - 73)  BP: --  BP(mean): --  ABP: 170/71 (25 Dec 2023 06:00) (90/34 - 190/63)  ABP(mean): 111 (25 Dec 2023 06:00) (49 - 111)  RR: 21 (25 Dec 2023 06:00) (14 - 28)  SpO2: 99% (25 Dec 2023 06:00) (93% - 100%)    O2 Parameters below as of 25 Dec 2023 03:00  Patient On (Oxygen Delivery Method): ventilator    O2 Concentration (%): 40      I&O's Summary    24 Dec 2023 07:01  -  25 Dec 2023 07:00  --------------------------------------------------------  IN: 1500.5 mL / OUT: 1435 mL / NET: 65.5 mL      Mode: AC/ CMV (Assist Control/ Continuous Mandatory Ventilation)  RR (machine): 14  TV (machine): 500  FiO2: 40  PEEP: 5  ITime: 1  MAP: 10  PIP: 28      Daily     Daily     Adult Advanced Hemodynamics Last 24 Hrs  CVP(mm Hg): --  CVP(cm H2O): --  CO: --  CI: --  PA: --  PA(mean): --  PCWP: --  SVR: --  SVRI: --  PVR: --  PVRI: --    EKG/Telemetry Events:    MEDICATIONS  (STANDING):  amLODIPine   Tablet 5 milliGRAM(s) Oral daily  artificial  tears Solution 1 Drop(s) Both EYES two times a day  carvedilol 3.125 milliGRAM(s) Oral every 12 hours  chlorhexidine 0.12% Liquid 15 milliLiter(s) Oral Mucosa every 12 hours  chlorhexidine 2% Cloths 1 Application(s) Topical daily  dexMEDEtomidine Infusion 0.2 MICROgram(s)/kG/Hr (4.68 mL/Hr) IV Continuous <Continuous>  heparin  Infusion 1600 Unit(s)/Hr (13 mL/Hr) IV Continuous <Continuous>  insulin lispro (ADMELOG) corrective regimen sliding scale   SubCutaneous every 6 hours  levETIRAcetam  IVPB 1000 milliGRAM(s) IV Intermittent every 12 hours  meropenem  IVPB 1000 milliGRAM(s) IV Intermittent every 12 hours  pantoprazole  Injectable 40 milliGRAM(s) IV Push daily  petrolatum Ophthalmic Ointment 1 Application(s) Both EYES three times a day  vancomycin  IVPB 1000 milliGRAM(s) IV Intermittent every 12 hours    MEDICATIONS  (PRN):      PHYSICAL EXAM:  GENERAL:   HEAD:  Atraumatic, Normocephalic  EYES: EOMI, PERRLA, conjunctiva and sclera clear  NECK: Supple, No JVD, Normal thyroid, no enlarged nodes  NERVOUS SYSTEM:  Alert & Awake.   CHEST/LUNG: B/L good air entry; No rales, rhonchi, or wheezing  HEART: S1S2 normal, no S3, Regular rate and rhythm; No murmurs  ABDOMEN: Soft, Nontender, Nondistended; Bowel sounds present  EXTREMITIES:  2+ Peripheral Pulses, No clubbing, cyanosis, or edema  LYMPH: No lymphadenopathy noted  SKIN: R periorbital ecchymosis,    LABS:                        8.5    7.99  )-----------( 119      ( 25 Dec 2023 01:11 )             25.4     12-25    146<H>  |  112<H>  |  68<H>  ----------------------------<  182<H>  4.0   |  22  |  1.88<H>    Ca    8.3<L>      25 Dec 2023 01:11  Phos  4.2     12-25  Mg     2.9     12-25    TPro  5.9<L>  /  Alb  3.1<L>  /  TBili  1.2  /  DBili  x   /  AST  58<H>  /  ALT  57<H>  /  AlkPhos  48  12-25    LIVER FUNCTIONS - ( 25 Dec 2023 01:11 )  Alb: 3.1 g/dL / Pro: 5.9 g/dL / ALK PHOS: 48 U/L / ALT: 57 U/L / AST: 58 U/L / GGT: x           PT/INR - ( 25 Dec 2023 01:11 )   PT: 12.3 sec;   INR: 1.18 ratio         PTT - ( 25 Dec 2023 03:45 )  PTT:62.9 sec  CAPILLARY BLOOD GLUCOSE      POCT Blood Glucose.: 187 mg/dL (25 Dec 2023 05:30)  POCT Blood Glucose.: 508 mg/dL (25 Dec 2023 05:28)  POCT Blood Glucose.: 181 mg/dL (25 Dec 2023 00:25)  POCT Blood Glucose.: 204 mg/dL (24 Dec 2023 18:00)  POCT Blood Glucose.: 200 mg/dL (24 Dec 2023 11:49)    ABG - ( 25 Dec 2023 01:16 )  pH, Arterial: 7.46  pH, Blood: x     /  pCO2: 32    /  pO2: 179   / HCO3: 23    / Base Excess: -0.7  /  SaO2: 99.1                    Urinalysis Basic - ( 25 Dec 2023 01:11 )    Color: x / Appearance: x / SG: x / pH: x  Gluc: 182 mg/dL / Ketone: x  / Bili: x / Urobili: x   Blood: x / Protein: x / Nitrite: x   Leuk Esterase: x / RBC: x / WBC x   Sq Epi: x / Non Sq Epi: x / Bacteria: x          RADIOLOGY & ADDITIONAL TESTS:  CXR:        Care Discussed with Consultants/Other Providers [ x] YES  [ ] NO           Patient is a 78y old  Male who presents with a chief complaint of Cardiac Arrest (24 Dec 2023 20:48)    HPI:  78M w/ PMH HTN HLD presents as transfer from [facility] s/p cardiac arrest. Per reports patient had a witness fall and arrest at home. EMS performed CPR en route to hospital was noted to be in VTach and shocked x2. ROSC was obtained just prior to arrival at [facility]. Pt was intubated placed on levo gtt. On arrival pt noted to have lacerated to R forehead. Pt had CT scans that were negative for intracranial hemorrhage, C-spine fracture, facial fractures. A R frontal hematoma was noted.     On arrival patient w/ dried blood on scalp. Also noted to have bleeding from oral mucosa w/ clots requiring suctioning. Pt otherwise off sedation and non-responsive. Per EMS report pt did receive some sedation and paralytics at [facility].     Per family patient has been feeling 'off' but managing his day to day and did not see a physician. Pt prescribed klonopin by outpatient provider and family believes pt may have been taking more than prescribed. On day of arrest, pt was initially asymptomatic carrying out his routine. He went to the bathroom, wife heard a loud thud and found patient in a pool of blood prompting call to EMS. Pt has not followed with any cardiologist. Has not had any syncope or other events preceding this.  (19 Dec 2023 14:54)       INTERVAL HPI/OVERNIGHT EVENTS:   No overnight events   Afebrile, hemodynamically stable     Subjective:    ICU Vital Signs Last 24 Hrs  T(C): 37.1 (25 Dec 2023 06:00), Max: 38.6 (24 Dec 2023 12:00)  T(F): 98.8 (25 Dec 2023 06:00), Max: 101.5 (24 Dec 2023 12:00)  HR: 73 (25 Dec 2023 06:00) (46 - 73)  BP: --  BP(mean): --  ABP: 170/71 (25 Dec 2023 06:00) (90/34 - 190/63)  ABP(mean): 111 (25 Dec 2023 06:00) (49 - 111)  RR: 21 (25 Dec 2023 06:00) (14 - 28)  SpO2: 99% (25 Dec 2023 06:00) (93% - 100%)    O2 Parameters below as of 25 Dec 2023 03:00  Patient On (Oxygen Delivery Method): ventilator    O2 Concentration (%): 40      I&O's Summary    24 Dec 2023 07:01  -  25 Dec 2023 07:00  --------------------------------------------------------  IN: 1500.5 mL / OUT: 1435 mL / NET: 65.5 mL      Mode: AC/ CMV (Assist Control/ Continuous Mandatory Ventilation)  RR (machine): 14  TV (machine): 500  FiO2: 40  PEEP: 5  ITime: 1  MAP: 10  PIP: 28      Daily     Daily     Adult Advanced Hemodynamics Last 24 Hrs  CVP(mm Hg): --  CVP(cm H2O): --  CO: --  CI: --  PA: --  PA(mean): --  PCWP: --  SVR: --  SVRI: --  PVR: --  PVRI: --    EKG/Telemetry Events:    MEDICATIONS  (STANDING):  amLODIPine   Tablet 5 milliGRAM(s) Oral daily  artificial  tears Solution 1 Drop(s) Both EYES two times a day  carvedilol 3.125 milliGRAM(s) Oral every 12 hours  chlorhexidine 0.12% Liquid 15 milliLiter(s) Oral Mucosa every 12 hours  chlorhexidine 2% Cloths 1 Application(s) Topical daily  dexMEDEtomidine Infusion 0.2 MICROgram(s)/kG/Hr (4.68 mL/Hr) IV Continuous <Continuous>  heparin  Infusion 1600 Unit(s)/Hr (13 mL/Hr) IV Continuous <Continuous>  insulin lispro (ADMELOG) corrective regimen sliding scale   SubCutaneous every 6 hours  levETIRAcetam  IVPB 1000 milliGRAM(s) IV Intermittent every 12 hours  meropenem  IVPB 1000 milliGRAM(s) IV Intermittent every 12 hours  pantoprazole  Injectable 40 milliGRAM(s) IV Push daily  petrolatum Ophthalmic Ointment 1 Application(s) Both EYES three times a day  vancomycin  IVPB 1000 milliGRAM(s) IV Intermittent every 12 hours    MEDICATIONS  (PRN):      PHYSICAL EXAM:  GENERAL:   HEAD:  Atraumatic, Normocephalic  EYES: EOMI, PERRLA, conjunctiva and sclera clear  NECK: Supple, No JVD, Normal thyroid, no enlarged nodes  NERVOUS SYSTEM:  Alert & Awake.   CHEST/LUNG: B/L good air entry; No rales, rhonchi, or wheezing  HEART: S1S2 normal, no S3, Regular rate and rhythm; No murmurs  ABDOMEN: Soft, Nontender, Nondistended; Bowel sounds present  EXTREMITIES:  2+ Peripheral Pulses, No clubbing, cyanosis, or edema  LYMPH: No lymphadenopathy noted  SKIN: R periorbital ecchymosis,    LABS:                        8.5    7.99  )-----------( 119      ( 25 Dec 2023 01:11 )             25.4     12-25    146<H>  |  112<H>  |  68<H>  ----------------------------<  182<H>  4.0   |  22  |  1.88<H>    Ca    8.3<L>      25 Dec 2023 01:11  Phos  4.2     12-25  Mg     2.9     12-25    TPro  5.9<L>  /  Alb  3.1<L>  /  TBili  1.2  /  DBili  x   /  AST  58<H>  /  ALT  57<H>  /  AlkPhos  48  12-25    LIVER FUNCTIONS - ( 25 Dec 2023 01:11 )  Alb: 3.1 g/dL / Pro: 5.9 g/dL / ALK PHOS: 48 U/L / ALT: 57 U/L / AST: 58 U/L / GGT: x           PT/INR - ( 25 Dec 2023 01:11 )   PT: 12.3 sec;   INR: 1.18 ratio         PTT - ( 25 Dec 2023 03:45 )  PTT:62.9 sec  CAPILLARY BLOOD GLUCOSE      POCT Blood Glucose.: 187 mg/dL (25 Dec 2023 05:30)  POCT Blood Glucose.: 508 mg/dL (25 Dec 2023 05:28)  POCT Blood Glucose.: 181 mg/dL (25 Dec 2023 00:25)  POCT Blood Glucose.: 204 mg/dL (24 Dec 2023 18:00)  POCT Blood Glucose.: 200 mg/dL (24 Dec 2023 11:49)    ABG - ( 25 Dec 2023 01:16 )  pH, Arterial: 7.46  pH, Blood: x     /  pCO2: 32    /  pO2: 179   / HCO3: 23    / Base Excess: -0.7  /  SaO2: 99.1                    Urinalysis Basic - ( 25 Dec 2023 01:11 )    Color: x / Appearance: x / SG: x / pH: x  Gluc: 182 mg/dL / Ketone: x  / Bili: x / Urobili: x   Blood: x / Protein: x / Nitrite: x   Leuk Esterase: x / RBC: x / WBC x   Sq Epi: x / Non Sq Epi: x / Bacteria: x          RADIOLOGY & ADDITIONAL TESTS:  CXR:        Care Discussed with Consultants/Other Providers [ x] YES  [ ] NO           Patient is a 78y old  Male who presents with a chief complaint of Cardiac Arrest (24 Dec 2023 20:48)    HPI:  78M w/ PMH HTN HLD presents as transfer from [facility] s/p cardiac arrest. Per reports patient had a witness fall and arrest at home. EMS performed CPR en route to hospital was noted to be in VTach and shocked x2. ROSC was obtained just prior to arrival at [facility]. Pt was intubated placed on levo gtt. On arrival pt noted to have lacerated to R forehead. Pt had CT scans that were negative for intracranial hemorrhage, C-spine fracture, facial fractures. A R frontal hematoma was noted.     On arrival patient w/ dried blood on scalp. Also noted to have bleeding from oral mucosa w/ clots requiring suctioning. Pt otherwise off sedation and non-responsive. Per EMS report pt did receive some sedation and paralytics at [facility].     Per family patient has been feeling 'off' but managing his day to day and did not see a physician. Pt prescribed klonopin by outpatient provider and family believes pt may have been taking more than prescribed. On day of arrest, pt was initially asymptomatic carrying out his routine. He went to the bathroom, wife heard a loud thud and found patient in a pool of blood prompting call to EMS. Pt has not followed with any cardiologist. Has not had any syncope or other events preceding this.  (19 Dec 2023 14:54)       INTERVAL HPI/OVERNIGHT EVENTS:   Precedex discontinued overnight  Afebrile, hemodynamically stable     Subjective:    ICU Vital Signs Last 24 Hrs  T(C): 37.1 (25 Dec 2023 06:00), Max: 38.6 (24 Dec 2023 12:00)  T(F): 98.8 (25 Dec 2023 06:00), Max: 101.5 (24 Dec 2023 12:00)  HR: 73 (25 Dec 2023 06:00) (46 - 73)  BP: --  BP(mean): --  ABP: 170/71 (25 Dec 2023 06:00) (90/34 - 190/63)  ABP(mean): 111 (25 Dec 2023 06:00) (49 - 111)  RR: 21 (25 Dec 2023 06:00) (14 - 28)  SpO2: 99% (25 Dec 2023 06:00) (93% - 100%)    O2 Parameters below as of 25 Dec 2023 03:00  Patient On (Oxygen Delivery Method): ventilator    O2 Concentration (%): 40      I&O's Summary    24 Dec 2023 07:01  -  25 Dec 2023 07:00  --------------------------------------------------------  IN: 1500.5 mL / OUT: 1435 mL / NET: 65.5 mL      Mode: AC/ CMV (Assist Control/ Continuous Mandatory Ventilation)  RR (machine): 14  TV (machine): 500  FiO2: 40  PEEP: 5  ITime: 1  MAP: 10  PIP: 28      Daily     Daily     Adult Advanced Hemodynamics Last 24 Hrs  CVP(mm Hg): --  CVP(cm H2O): --  CO: --  CI: --  PA: --  PA(mean): --  PCWP: --  SVR: --  SVRI: --  PVR: --  PVRI: --    EKG/Telemetry Events:    MEDICATIONS  (STANDING):  amLODIPine   Tablet 5 milliGRAM(s) Oral daily  artificial  tears Solution 1 Drop(s) Both EYES two times a day  carvedilol 3.125 milliGRAM(s) Oral every 12 hours  chlorhexidine 0.12% Liquid 15 milliLiter(s) Oral Mucosa every 12 hours  chlorhexidine 2% Cloths 1 Application(s) Topical daily  dexMEDEtomidine Infusion 0.2 MICROgram(s)/kG/Hr (4.68 mL/Hr) IV Continuous <Continuous>  heparin  Infusion 1600 Unit(s)/Hr (13 mL/Hr) IV Continuous <Continuous>  insulin lispro (ADMELOG) corrective regimen sliding scale   SubCutaneous every 6 hours  levETIRAcetam  IVPB 1000 milliGRAM(s) IV Intermittent every 12 hours  meropenem  IVPB 1000 milliGRAM(s) IV Intermittent every 12 hours  pantoprazole  Injectable 40 milliGRAM(s) IV Push daily  petrolatum Ophthalmic Ointment 1 Application(s) Both EYES three times a day  vancomycin  IVPB 1000 milliGRAM(s) IV Intermittent every 12 hours    MEDICATIONS  (PRN):      PHYSICAL EXAM:  GENERAL: Intubated, off sedation. No independent/purposeful movements.  HEAD:  Atraumatic, Normocephalic  EYES: EOMI, PERRLA, conjunctiva and sclera clear  NECK: Supple, No JVD, Normal thyroid, no enlarged nodes  NERVOUS SYSTEM: Pupils ~2mm and minimally reactive, retracts to pain in upper extremities bilaterally  CHEST/LUNG: Intubated. B/L good air entry; No rales, rhonchi, or wheezing  HEART: S1S2 normal, no S3, Regular rate and rhythm; No murmurs  ABDOMEN: Soft, Nontender, Nondistended; Bowel sounds present  EXTREMITIES:  2+ Peripheral Pulses, No clubbing, cyanosis. Edematous upper extremities.   LYMPH: No lymphadenopathy noted  SKIN: R periorbital ecchymosis, ecchymoses on upper extremities bilaterally.    LABS:                        8.5    7.99  )-----------( 119      ( 25 Dec 2023 01:11 )             25.4     12-25    146<H>  |  112<H>  |  68<H>  ----------------------------<  182<H>  4.0   |  22  |  1.88<H>    Ca    8.3<L>      25 Dec 2023 01:11  Phos  4.2     12-25  Mg     2.9     12-25    TPro  5.9<L>  /  Alb  3.1<L>  /  TBili  1.2  /  DBili  x   /  AST  58<H>  /  ALT  57<H>  /  AlkPhos  48  12-25    LIVER FUNCTIONS - ( 25 Dec 2023 01:11 )  Alb: 3.1 g/dL / Pro: 5.9 g/dL / ALK PHOS: 48 U/L / ALT: 57 U/L / AST: 58 U/L / GGT: x           PT/INR - ( 25 Dec 2023 01:11 )   PT: 12.3 sec;   INR: 1.18 ratio         PTT - ( 25 Dec 2023 03:45 )  PTT:62.9 sec  CAPILLARY BLOOD GLUCOSE      POCT Blood Glucose.: 187 mg/dL (25 Dec 2023 05:30)  POCT Blood Glucose.: 508 mg/dL (25 Dec 2023 05:28)  POCT Blood Glucose.: 181 mg/dL (25 Dec 2023 00:25)  POCT Blood Glucose.: 204 mg/dL (24 Dec 2023 18:00)  POCT Blood Glucose.: 200 mg/dL (24 Dec 2023 11:49)    ABG - ( 25 Dec 2023 01:16 )  pH, Arterial: 7.46  pH, Blood: x     /  pCO2: 32    /  pO2: 179   / HCO3: 23    / Base Excess: -0.7  /  SaO2: 99.1                    Urinalysis Basic - ( 25 Dec 2023 01:11 )    Color: x / Appearance: x / SG: x / pH: x  Gluc: 182 mg/dL / Ketone: x  / Bili: x / Urobili: x   Blood: x / Protein: x / Nitrite: x   Leuk Esterase: x / RBC: x / WBC x   Sq Epi: x / Non Sq Epi: x / Bacteria: x          RADIOLOGY & ADDITIONAL TESTS:  CXR:        Care Discussed with Consultants/Other Providers [ x] YES  [ ] NO           Patient is a 78y old  Male who presents with a chief complaint of Cardiac Arrest (24 Dec 2023 20:48)    HPI:  78M w/ PMH HTN HLD presents as transfer from [facility] s/p cardiac arrest. Per reports patient had a witness fall and arrest at home. EMS performed CPR en route to hospital was noted to be in VTach and shocked x2. ROSC was obtained just prior to arrival at [facility]. Pt was intubated placed on levo gtt. On arrival pt noted to have lacerated to R forehead. Pt had CT scans that were negative for intracranial hemorrhage, C-spine fracture, facial fractures. A R frontal hematoma was noted.     On arrival patient w/ dried blood on scalp. Also noted to have bleeding from oral mucosa w/ clots requiring suctioning. Pt otherwise off sedation and non-responsive. Per EMS report pt did receive some sedation and paralytics at [facility].     Per family patient has been feeling 'off' but managing his day to day and did not see a physician. Pt prescribed klonopin by outpatient provider and family believes pt may have been taking more than prescribed. On day of arrest, pt was initially asymptomatic carrying out his routine. He went to the bathroom, wife heard a loud thud and found patient in a pool of blood prompting call to EMS. Pt has not followed with any cardiologist. Has not had any syncope or other events preceding this.  (19 Dec 2023 14:54)       INTERVAL HPI/OVERNIGHT EVENTS:   Precedex discontinued overnight  Afebrile, hemodynamically stable     Subjective:    ICU Vital Signs Last 24 Hrs  T(C): 37.1 (25 Dec 2023 06:00), Max: 38.6 (24 Dec 2023 12:00)  T(F): 98.8 (25 Dec 2023 06:00), Max: 101.5 (24 Dec 2023 12:00)  HR: 73 (25 Dec 2023 06:00) (46 - 73)  BP: --  BP(mean): --  ABP: 170/71 (25 Dec 2023 06:00) (90/34 - 190/63)  ABP(mean): 111 (25 Dec 2023 06:00) (49 - 111)  RR: 21 (25 Dec 2023 06:00) (14 - 28)  SpO2: 99% (25 Dec 2023 06:00) (93% - 100%)    O2 Parameters below as of 25 Dec 2023 03:00  Patient On (Oxygen Delivery Method): ventilator    O2 Concentration (%): 40      I&O's Summary    24 Dec 2023 07:01  -  25 Dec 2023 07:00  --------------------------------------------------------  IN: 1500.5 mL / OUT: 1435 mL / NET: 65.5 mL      Mode: AC/ CMV (Assist Control/ Continuous Mandatory Ventilation)  RR (machine): 14  TV (machine): 500  FiO2: 40  PEEP: 5  ITime: 1  MAP: 10  PIP: 28      Daily     Daily     Adult Advanced Hemodynamics Last 24 Hrs  CVP(mm Hg): --  CVP(cm H2O): --  CO: --  CI: --  PA: --  PA(mean): --  PCWP: --  SVR: --  SVRI: --  PVR: --  PVRI: --    EKG/Telemetry Events:    MEDICATIONS  (STANDING):  amLODIPine   Tablet 5 milliGRAM(s) Oral daily  artificial  tears Solution 1 Drop(s) Both EYES two times a day  carvedilol 3.125 milliGRAM(s) Oral every 12 hours  chlorhexidine 0.12% Liquid 15 milliLiter(s) Oral Mucosa every 12 hours  chlorhexidine 2% Cloths 1 Application(s) Topical daily  dexMEDEtomidine Infusion 0.2 MICROgram(s)/kG/Hr (4.68 mL/Hr) IV Continuous <Continuous>  heparin  Infusion 1600 Unit(s)/Hr (13 mL/Hr) IV Continuous <Continuous>  insulin lispro (ADMELOG) corrective regimen sliding scale   SubCutaneous every 6 hours  levETIRAcetam  IVPB 1000 milliGRAM(s) IV Intermittent every 12 hours  meropenem  IVPB 1000 milliGRAM(s) IV Intermittent every 12 hours  pantoprazole  Injectable 40 milliGRAM(s) IV Push daily  petrolatum Ophthalmic Ointment 1 Application(s) Both EYES three times a day  vancomycin  IVPB 1000 milliGRAM(s) IV Intermittent every 12 hours    MEDICATIONS  (PRN):      PHYSICAL EXAM:  GENERAL: Intubated, off sedation. No independent/purposeful movements.  HEAD:  Atraumatic, Normocephalic  EYES: EOMI, PERRLA, conjunctiva and sclera clear  NECK: Supple, No JVD, Normal thyroid, no enlarged nodes  NERVOUS SYSTEM: Pupils ~2mm and minimally reactive, retracts to pain in upper extremities bilaterally  CHEST/LUNG: Intubated. B/L good air entry; No rales, rhonchi, or wheezing  HEART: S1S2 normal, no S3, Regular rate and rhythm; No murmurs  ABDOMEN: Soft, Nontender, Nondistended; Bowel sounds present  EXTREMITIES:  2+ Peripheral Pulses, No clubbing, cyanosis. Edematous upper extremities.   LYMPH: No lymphadenopathy noted  SKIN: R periorbital ecchymosis, ecchymoses on upper extremities bilaterally.    LABS:                        8.5    7.99  )-----------( 119      ( 25 Dec 2023 01:11 )             25.4     12-25    146<H>  |  112<H>  |  68<H>  ----------------------------<  182<H>  4.0   |  22  |  1.88<H>    Ca    8.3<L>      25 Dec 2023 01:11  Phos  4.2     12-25  Mg     2.9     12-25    TPro  5.9<L>  /  Alb  3.1<L>  /  TBili  1.2  /  DBili  x   /  AST  58<H>  /  ALT  57<H>  /  AlkPhos  48  12-25    LIVER FUNCTIONS - ( 25 Dec 2023 01:11 )  Alb: 3.1 g/dL / Pro: 5.9 g/dL / ALK PHOS: 48 U/L / ALT: 57 U/L / AST: 58 U/L / GGT: x           PT/INR - ( 25 Dec 2023 01:11 )   PT: 12.3 sec;   INR: 1.18 ratio         PTT - ( 25 Dec 2023 03:45 )  PTT:62.9 sec  CAPILLARY BLOOD GLUCOSE      POCT Blood Glucose.: 187 mg/dL (25 Dec 2023 05:30)  POCT Blood Glucose.: 508 mg/dL (25 Dec 2023 05:28)  POCT Blood Glucose.: 181 mg/dL (25 Dec 2023 00:25)  POCT Blood Glucose.: 204 mg/dL (24 Dec 2023 18:00)  POCT Blood Glucose.: 200 mg/dL (24 Dec 2023 11:49)    ABG - ( 25 Dec 2023 01:16 )  pH, Arterial: 7.46  pH, Blood: x     /  pCO2: 32    /  pO2: 179   / HCO3: 23    / Base Excess: -0.7  /  SaO2: 99.1                    Urinalysis Basic - ( 25 Dec 2023 01:11 )    Color: x / Appearance: x / SG: x / pH: x  Gluc: 182 mg/dL / Ketone: x  / Bili: x / Urobili: x   Blood: x / Protein: x / Nitrite: x   Leuk Esterase: x / RBC: x / WBC x   Sq Epi: x / Non Sq Epi: x / Bacteria: x          RADIOLOGY & ADDITIONAL TESTS:  CXR:        Care Discussed with Consultants/Other Providers [ x] YES  [ ] NO           Patient is a 78y old  Male who presents with a chief complaint of Cardiac Arrest (24 Dec 2023 20:48)    HPI:  78M w/ PMH HTN HLD presents as transfer from [facility] s/p cardiac arrest. Per reports patient had a witness fall and arrest at home. EMS performed CPR en route to hospital was noted to be in VTach and shocked x2. ROSC was obtained just prior to arrival at [facility]. Pt was intubated placed on levo gtt. On arrival pt noted to have lacerated to R forehead. Pt had CT scans that were negative for intracranial hemorrhage, C-spine fracture, facial fractures. A R frontal hematoma was noted.     On arrival patient w/ dried blood on scalp. Also noted to have bleeding from oral mucosa w/ clots requiring suctioning. Pt otherwise off sedation and non-responsive. Per EMS report pt did receive some sedation and paralytics at [facility].     Per family patient has been feeling 'off' but managing his day to day and did not see a physician. Pt prescribed klonopin by outpatient provider and family believes pt may have been taking more than prescribed. On day of arrest, pt was initially asymptomatic carrying out his routine. He went to the bathroom, wife heard a loud thud and found patient in a pool of blood prompting call to EMS. Pt has not followed with any cardiologist. Has not had any syncope or other events preceding this.  (19 Dec 2023 14:54)       INTERVAL HPI/OVERNIGHT EVENTS:   Precedex discontinued overnight  Afebrile, hemodynamically stable     Subjective: Interview limited by pt mental status.    ICU Vital Signs Last 24 Hrs  T(C): 37.1 (25 Dec 2023 06:00), Max: 38.6 (24 Dec 2023 12:00)  T(F): 98.8 (25 Dec 2023 06:00), Max: 101.5 (24 Dec 2023 12:00)  HR: 73 (25 Dec 2023 06:00) (46 - 73)  BP: --  BP(mean): --  ABP: 170/71 (25 Dec 2023 06:00) (90/34 - 190/63)  ABP(mean): 111 (25 Dec 2023 06:00) (49 - 111)  RR: 21 (25 Dec 2023 06:00) (14 - 28)  SpO2: 99% (25 Dec 2023 06:00) (93% - 100%)    O2 Parameters below as of 25 Dec 2023 03:00  Patient On (Oxygen Delivery Method): ventilator    O2 Concentration (%): 40      I&O's Summary    24 Dec 2023 07:01  -  25 Dec 2023 07:00  --------------------------------------------------------  IN: 1500.5 mL / OUT: 1435 mL / NET: 65.5 mL      Mode: AC/ CMV (Assist Control/ Continuous Mandatory Ventilation)  RR (machine): 14  TV (machine): 500  FiO2: 40  PEEP: 5  ITime: 1  MAP: 10  PIP: 28      Daily     Daily     Adult Advanced Hemodynamics Last 24 Hrs  CVP(mm Hg): --  CVP(cm H2O): --  CO: --  CI: --  PA: --  PA(mean): --  PCWP: --  SVR: --  SVRI: --  PVR: --  PVRI: --    EKG/Telemetry Events:    MEDICATIONS  (STANDING):  amLODIPine   Tablet 5 milliGRAM(s) Oral daily  artificial  tears Solution 1 Drop(s) Both EYES two times a day  carvedilol 3.125 milliGRAM(s) Oral every 12 hours  chlorhexidine 0.12% Liquid 15 milliLiter(s) Oral Mucosa every 12 hours  chlorhexidine 2% Cloths 1 Application(s) Topical daily  dexMEDEtomidine Infusion 0.2 MICROgram(s)/kG/Hr (4.68 mL/Hr) IV Continuous <Continuous>  heparin  Infusion 1600 Unit(s)/Hr (13 mL/Hr) IV Continuous <Continuous>  insulin lispro (ADMELOG) corrective regimen sliding scale   SubCutaneous every 6 hours  levETIRAcetam  IVPB 1000 milliGRAM(s) IV Intermittent every 12 hours  meropenem  IVPB 1000 milliGRAM(s) IV Intermittent every 12 hours  pantoprazole  Injectable 40 milliGRAM(s) IV Push daily  petrolatum Ophthalmic Ointment 1 Application(s) Both EYES three times a day  vancomycin  IVPB 1000 milliGRAM(s) IV Intermittent every 12 hours    MEDICATIONS  (PRN):      PHYSICAL EXAM:  GENERAL: Intubated, off sedation. No independent/purposeful movements.  HEAD:  Atraumatic, Normocephalic  EYES: EOMI, PERRLA, conjunctiva and sclera clear  NECK: Supple, No JVD, Normal thyroid, no enlarged nodes  NERVOUS SYSTEM: Pupils ~2mm and minimally reactive, retracts to pain in upper extremities bilaterally  CHEST/LUNG: Intubated. B/L good air entry; No rales, rhonchi, or wheezing  HEART: S1S2 normal, no S3, Regular rate and rhythm; No murmurs  ABDOMEN: Soft, Nontender, Nondistended; Bowel sounds present  EXTREMITIES:  2+ Peripheral Pulses, No clubbing, cyanosis. Edematous upper extremities.   LYMPH: No lymphadenopathy noted  SKIN: R periorbital ecchymosis, ecchymoses on upper extremities bilaterally.    LABS:                        8.5    7.99  )-----------( 119      ( 25 Dec 2023 01:11 )             25.4     12-25    146<H>  |  112<H>  |  68<H>  ----------------------------<  182<H>  4.0   |  22  |  1.88<H>    Ca    8.3<L>      25 Dec 2023 01:11  Phos  4.2     12-25  Mg     2.9     12-25    TPro  5.9<L>  /  Alb  3.1<L>  /  TBili  1.2  /  DBili  x   /  AST  58<H>  /  ALT  57<H>  /  AlkPhos  48  12-25    LIVER FUNCTIONS - ( 25 Dec 2023 01:11 )  Alb: 3.1 g/dL / Pro: 5.9 g/dL / ALK PHOS: 48 U/L / ALT: 57 U/L / AST: 58 U/L / GGT: x           PT/INR - ( 25 Dec 2023 01:11 )   PT: 12.3 sec;   INR: 1.18 ratio         PTT - ( 25 Dec 2023 03:45 )  PTT:62.9 sec  CAPILLARY BLOOD GLUCOSE      POCT Blood Glucose.: 187 mg/dL (25 Dec 2023 05:30)  POCT Blood Glucose.: 508 mg/dL (25 Dec 2023 05:28)  POCT Blood Glucose.: 181 mg/dL (25 Dec 2023 00:25)  POCT Blood Glucose.: 204 mg/dL (24 Dec 2023 18:00)  POCT Blood Glucose.: 200 mg/dL (24 Dec 2023 11:49)    ABG - ( 25 Dec 2023 01:16 )  pH, Arterial: 7.46  pH, Blood: x     /  pCO2: 32    /  pO2: 179   / HCO3: 23    / Base Excess: -0.7  /  SaO2: 99.1                    Urinalysis Basic - ( 25 Dec 2023 01:11 )    Color: x / Appearance: x / SG: x / pH: x  Gluc: 182 mg/dL / Ketone: x  / Bili: x / Urobili: x   Blood: x / Protein: x / Nitrite: x   Leuk Esterase: x / RBC: x / WBC x   Sq Epi: x / Non Sq Epi: x / Bacteria: x          RADIOLOGY & ADDITIONAL TESTS:  CXR:        Care Discussed with Consultants/Other Providers [ x] YES  [ ] NO           Patient is a 78y old  Male who presents with a chief complaint of Cardiac Arrest (24 Dec 2023 20:48)    HPI:  78M w/ PMH HTN HLD presents as transfer from [facility] s/p cardiac arrest. Per reports patient had a witness fall and arrest at home. EMS performed CPR en route to hospital was noted to be in VTach and shocked x2. ROSC was obtained just prior to arrival at [facility]. Pt was intubated placed on levo gtt. On arrival pt noted to have lacerated to R forehead. Pt had CT scans that were negative for intracranial hemorrhage, C-spine fracture, facial fractures. A R frontal hematoma was noted.     On arrival patient w/ dried blood on scalp. Also noted to have bleeding from oral mucosa w/ clots requiring suctioning. Pt otherwise off sedation and non-responsive. Per EMS report pt did receive some sedation and paralytics at [facility].     Per family patient has been feeling 'off' but managing his day to day and did not see a physician. Pt prescribed klonopin by outpatient provider and family believes pt may have been taking more than prescribed. On day of arrest, pt was initially asymptomatic carrying out his routine. He went to the bathroom, wife heard a loud thud and found patient in a pool of blood prompting call to EMS. Pt has not followed with any cardiologist. Has not had any syncope or other events preceding this.  (19 Dec 2023 14:54)       INTERVAL HPI/OVERNIGHT EVENTS:   Precedex discontinued overnight  Afebrile, hemodynamically stable     Subjective: Interview limited by pt mental status.    ICU Vital Signs Last 24 Hrs  T(C): 37.1 (25 Dec 2023 06:00), Max: 38.6 (24 Dec 2023 12:00)  T(F): 98.8 (25 Dec 2023 06:00), Max: 101.5 (24 Dec 2023 12:00)  HR: 73 (25 Dec 2023 06:00) (46 - 73)  BP: --  BP(mean): --  ABP: 170/71 (25 Dec 2023 06:00) (90/34 - 190/63)  ABP(mean): 111 (25 Dec 2023 06:00) (49 - 111)  RR: 21 (25 Dec 2023 06:00) (14 - 28)  SpO2: 99% (25 Dec 2023 06:00) (93% - 100%)    O2 Parameters below as of 25 Dec 2023 03:00  Patient On (Oxygen Delivery Method): ventilator    O2 Concentration (%): 40      I&O's Summary    24 Dec 2023 07:01  -  25 Dec 2023 07:00  --------------------------------------------------------  IN: 1500.5 mL / OUT: 1435 mL / NET: 65.5 mL      Mode: AC/ CMV (Assist Control/ Continuous Mandatory Ventilation)  RR (machine): 14  TV (machine): 500  FiO2: 40  PEEP: 5  ITime: 1  MAP: 10  PIP: 28      Daily     Daily     Adult Advanced Hemodynamics Last 24 Hrs  CVP(mm Hg): --  CVP(cm H2O): --  CO: --  CI: --  PA: --  PA(mean): --  PCWP: --  SVR: --  SVRI: --  PVR: --  PVRI: --    EKG/Telemetry Events:    MEDICATIONS  (STANDING):  amLODIPine   Tablet 5 milliGRAM(s) Oral daily  artificial  tears Solution 1 Drop(s) Both EYES two times a day  carvedilol 3.125 milliGRAM(s) Oral every 12 hours  chlorhexidine 0.12% Liquid 15 milliLiter(s) Oral Mucosa every 12 hours  chlorhexidine 2% Cloths 1 Application(s) Topical daily  dexMEDEtomidine Infusion 0.2 MICROgram(s)/kG/Hr (4.68 mL/Hr) IV Continuous <Continuous>  heparin  Infusion 1600 Unit(s)/Hr (13 mL/Hr) IV Continuous <Continuous>  insulin lispro (ADMELOG) corrective regimen sliding scale   SubCutaneous every 6 hours  levETIRAcetam  IVPB 1000 milliGRAM(s) IV Intermittent every 12 hours  meropenem  IVPB 1000 milliGRAM(s) IV Intermittent every 12 hours  pantoprazole  Injectable 40 milliGRAM(s) IV Push daily  petrolatum Ophthalmic Ointment 1 Application(s) Both EYES three times a day  vancomycin  IVPB 1000 milliGRAM(s) IV Intermittent every 12 hours    MEDICATIONS  (PRN):      PHYSICAL EXAM:  GENERAL: Intubated, off sedation. No independent/purposeful movements.  HEAD:  Atraumatic, Normocephalic  EYES: EOMI, PERRLA, conjunctiva and sclera clear  NECK: Supple, No JVD, Normal thyroid, no enlarged nodes  NERVOUS SYSTEM: Pupils ~2mm and minimally reactive, retracts to pain in upper extremities bilaterally  CHEST/LUNG: Intubated. B/L good air entry; No rales, rhonchi, or wheezing  HEART: S1S2 normal, no S3, Regular rate and rhythm; No murmurs  ABDOMEN: Soft, Nontender, Nondistended; Bowel sounds present  EXTREMITIES:  2+ Peripheral Pulses, No clubbing, cyanosis. Edematous upper extremities.   LYMPH: No lymphadenopathy noted  SKIN: R periorbital ecchymosis, ecchymoses on upper extremities bilaterally.    LABS:                        8.5    7.99  )-----------( 119      ( 25 Dec 2023 01:11 )             25.4     12-25    146<H>  |  112<H>  |  68<H>  ----------------------------<  182<H>  4.0   |  22  |  1.88<H>    Ca    8.3<L>      25 Dec 2023 01:11  Phos  4.2     12-25  Mg     2.9     12-25    TPro  5.9<L>  /  Alb  3.1<L>  /  TBili  1.2  /  DBili  x   /  AST  58<H>  /  ALT  57<H>  /  AlkPhos  48  12-25    LIVER FUNCTIONS - ( 25 Dec 2023 01:11 )  Alb: 3.1 g/dL / Pro: 5.9 g/dL / ALK PHOS: 48 U/L / ALT: 57 U/L / AST: 58 U/L / GGT: x           PT/INR - ( 25 Dec 2023 01:11 )   PT: 12.3 sec;   INR: 1.18 ratio         PTT - ( 25 Dec 2023 03:45 )  PTT:62.9 sec  CAPILLARY BLOOD GLUCOSE      POCT Blood Glucose.: 187 mg/dL (25 Dec 2023 05:30)  POCT Blood Glucose.: 508 mg/dL (25 Dec 2023 05:28)  POCT Blood Glucose.: 181 mg/dL (25 Dec 2023 00:25)  POCT Blood Glucose.: 204 mg/dL (24 Dec 2023 18:00)  POCT Blood Glucose.: 200 mg/dL (24 Dec 2023 11:49)    ABG - ( 25 Dec 2023 01:16 )  pH, Arterial: 7.46  pH, Blood: x     /  pCO2: 32    /  pO2: 179   / HCO3: 23    / Base Excess: -0.7  /  SaO2: 99.1                    Urinalysis Basic - ( 25 Dec 2023 01:11 )    Color: x / Appearance: x / SG: x / pH: x  Gluc: 182 mg/dL / Ketone: x  / Bili: x / Urobili: x   Blood: x / Protein: x / Nitrite: x   Leuk Esterase: x / RBC: x / WBC x   Sq Epi: x / Non Sq Epi: x / Bacteria: x          RADIOLOGY & ADDITIONAL TESTS:  CXR:        Care Discussed with Consultants/Other Providers [ x] YES  [ ] NO           Patient is a 78y old  Male who presents with a chief complaint of Cardiac Arrest (24 Dec 2023 20:48)    HPI:  78M w/ PMH HTN HLD presents as transfer from [facility] s/p cardiac arrest. Per reports patient had a witness fall and arrest at home. EMS performed CPR en route to hospital was noted to be in VTach and shocked x2. ROSC was obtained just prior to arrival at [facility]. Pt was intubated placed on levo gtt. On arrival pt noted to have lacerated to R forehead. Pt had CT scans that were negative for intracranial hemorrhage, C-spine fracture, facial fractures. A R frontal hematoma was noted.     On arrival patient w/ dried blood on scalp. Also noted to have bleeding from oral mucosa w/ clots requiring suctioning. Pt otherwise off sedation and non-responsive. Per EMS report pt did receive some sedation and paralytics at [facility].     Per family patient has been feeling 'off' but managing his day to day and did not see a physician. Pt prescribed klonopin by outpatient provider and family believes pt may have been taking more than prescribed. On day of arrest, pt was initially asymptomatic carrying out his routine. He went to the bathroom, wife heard a loud thud and found patient in a pool of blood prompting call to EMS. Pt has not followed with any cardiologist. Has not had any syncope or other events preceding this.  (19 Dec 2023 14:54)       INTERVAL HPI/OVERNIGHT EVENTS:   Precedex discontinued overnight, had labile BPs - intermittently w/ low dose levo (~0.03) requirement  Febrile to 100.8 ~4AM, hemodynamically stable     Subjective: Interview limited by pt mental status.    ICU Vital Signs Last 24 Hrs  T(C): 37.1 (25 Dec 2023 06:00), Max: 38.6 (24 Dec 2023 12:00)  T(F): 98.8 (25 Dec 2023 06:00), Max: 101.5 (24 Dec 2023 12:00)  HR: 73 (25 Dec 2023 06:00) (46 - 73)  BP: --  BP(mean): --  ABP: 170/71 (25 Dec 2023 06:00) (90/34 - 190/63)  ABP(mean): 111 (25 Dec 2023 06:00) (49 - 111)  RR: 21 (25 Dec 2023 06:00) (14 - 28)  SpO2: 99% (25 Dec 2023 06:00) (93% - 100%)    O2 Parameters below as of 25 Dec 2023 03:00  Patient On (Oxygen Delivery Method): ventilator    O2 Concentration (%): 40      I&O's Summary    24 Dec 2023 07:01  -  25 Dec 2023 07:00  --------------------------------------------------------  IN: 1500.5 mL / OUT: 1435 mL / NET: 65.5 mL      Mode: AC/ CMV (Assist Control/ Continuous Mandatory Ventilation)  RR (machine): 14  TV (machine): 500  FiO2: 40  PEEP: 5  ITime: 1  MAP: 10  PIP: 28      Daily     Daily     Adult Advanced Hemodynamics Last 24 Hrs  CVP(mm Hg): --  CVP(cm H2O): --  CO: --  CI: --  PA: --  PA(mean): --  PCWP: --  SVR: --  SVRI: --  PVR: --  PVRI: --    EKG/Telemetry Events:    MEDICATIONS  (STANDING):  amLODIPine   Tablet 5 milliGRAM(s) Oral daily  artificial  tears Solution 1 Drop(s) Both EYES two times a day  carvedilol 3.125 milliGRAM(s) Oral every 12 hours  chlorhexidine 0.12% Liquid 15 milliLiter(s) Oral Mucosa every 12 hours  chlorhexidine 2% Cloths 1 Application(s) Topical daily  dexMEDEtomidine Infusion 0.2 MICROgram(s)/kG/Hr (4.68 mL/Hr) IV Continuous <Continuous>  heparin  Infusion 1600 Unit(s)/Hr (13 mL/Hr) IV Continuous <Continuous>  insulin lispro (ADMELOG) corrective regimen sliding scale   SubCutaneous every 6 hours  levETIRAcetam  IVPB 1000 milliGRAM(s) IV Intermittent every 12 hours  meropenem  IVPB 1000 milliGRAM(s) IV Intermittent every 12 hours  pantoprazole  Injectable 40 milliGRAM(s) IV Push daily  petrolatum Ophthalmic Ointment 1 Application(s) Both EYES three times a day  vancomycin  IVPB 1000 milliGRAM(s) IV Intermittent every 12 hours    MEDICATIONS  (PRN):      PHYSICAL EXAM:  GENERAL: Intubated, off sedation. No independent/purposeful movements.  HEAD:  Atraumatic, Normocephalic  EYES: EOMI, PERRLA, conjunctiva and sclera clear  NECK: Supple, No JVD, Normal thyroid, no enlarged nodes  NERVOUS SYSTEM: Pupils ~2mm and minimally reactive, decorticate posturing to pain in upper extremities bilaterally  CHEST/LUNG: Intubated. B/L good air entry; No rales, rhonchi, or wheezing  HEART: S1S2 normal, no S3, Regular rate and rhythm; No murmurs  ABDOMEN: Soft, Nontender, Nondistended; Bowel sounds present  EXTREMITIES:  2+ Peripheral Pulses, No clubbing, cyanosis. Edematous upper extremities.   LYMPH: No lymphadenopathy noted  SKIN: R periorbital ecchymosis, ecchymoses on upper extremities bilaterally.    LABS:                        8.5    7.99  )-----------( 119      ( 25 Dec 2023 01:11 )             25.4     12-25    146<H>  |  112<H>  |  68<H>  ----------------------------<  182<H>  4.0   |  22  |  1.88<H>    Ca    8.3<L>      25 Dec 2023 01:11  Phos  4.2     12-25  Mg     2.9     12-25    TPro  5.9<L>  /  Alb  3.1<L>  /  TBili  1.2  /  DBili  x   /  AST  58<H>  /  ALT  57<H>  /  AlkPhos  48  12-25    LIVER FUNCTIONS - ( 25 Dec 2023 01:11 )  Alb: 3.1 g/dL / Pro: 5.9 g/dL / ALK PHOS: 48 U/L / ALT: 57 U/L / AST: 58 U/L / GGT: x           PT/INR - ( 25 Dec 2023 01:11 )   PT: 12.3 sec;   INR: 1.18 ratio         PTT - ( 25 Dec 2023 03:45 )  PTT:62.9 sec  CAPILLARY BLOOD GLUCOSE      POCT Blood Glucose.: 187 mg/dL (25 Dec 2023 05:30)  POCT Blood Glucose.: 508 mg/dL (25 Dec 2023 05:28)  POCT Blood Glucose.: 181 mg/dL (25 Dec 2023 00:25)  POCT Blood Glucose.: 204 mg/dL (24 Dec 2023 18:00)  POCT Blood Glucose.: 200 mg/dL (24 Dec 2023 11:49)    ABG - ( 25 Dec 2023 01:16 )  pH, Arterial: 7.46  pH, Blood: x     /  pCO2: 32    /  pO2: 179   / HCO3: 23    / Base Excess: -0.7  /  SaO2: 99.1                    Urinalysis Basic - ( 25 Dec 2023 01:11 )    Color: x / Appearance: x / SG: x / pH: x  Gluc: 182 mg/dL / Ketone: x  / Bili: x / Urobili: x   Blood: x / Protein: x / Nitrite: x   Leuk Esterase: x / RBC: x / WBC x   Sq Epi: x / Non Sq Epi: x / Bacteria: x          RADIOLOGY & ADDITIONAL TESTS:  CXR:        Care Discussed with Consultants/Other Providers [ x] YES  [ ] NO           Patient is a 78y old  Male who presents with a chief complaint of Cardiac Arrest (24 Dec 2023 20:48)    HPI:  78M w/ PMH HTN HLD presents as transfer from [facility] s/p cardiac arrest. Per reports patient had a witness fall and arrest at home. EMS performed CPR en route to hospital was noted to be in VTach and shocked x2. ROSC was obtained just prior to arrival at [facility]. Pt was intubated placed on levo gtt. On arrival pt noted to have lacerated to R forehead. Pt had CT scans that were negative for intracranial hemorrhage, C-spine fracture, facial fractures. A R frontal hematoma was noted.     On arrival patient w/ dried blood on scalp. Also noted to have bleeding from oral mucosa w/ clots requiring suctioning. Pt otherwise off sedation and non-responsive. Per EMS report pt did receive some sedation and paralytics at [facility].     Per family patient has been feeling 'off' but managing his day to day and did not see a physician. Pt prescribed klonopin by outpatient provider and family believes pt may have been taking more than prescribed. On day of arrest, pt was initially asymptomatic carrying out his routine. He went to the bathroom, wife heard a loud thud and found patient in a pool of blood prompting call to EMS. Pt has not followed with any cardiologist. Has not had any syncope or other events preceding this.  (19 Dec 2023 14:54)       INTERVAL HPI/OVERNIGHT EVENTS:   Precedex discontinued overnight, had labile BPs - intermittently w/ low dose levo (~0.03) requirement  Febrile to 100.8 ~4AM, hemodynamically stable     Subjective: Interview limited by pt mental status.    ICU Vital Signs Last 24 Hrs  T(C): 37.1 (25 Dec 2023 06:00), Max: 38.6 (24 Dec 2023 12:00)  T(F): 98.8 (25 Dec 2023 06:00), Max: 101.5 (24 Dec 2023 12:00)  HR: 73 (25 Dec 2023 06:00) (46 - 73)  BP: --  BP(mean): --  ABP: 170/71 (25 Dec 2023 06:00) (90/34 - 190/63)  ABP(mean): 111 (25 Dec 2023 06:00) (49 - 111)  RR: 21 (25 Dec 2023 06:00) (14 - 28)  SpO2: 99% (25 Dec 2023 06:00) (93% - 100%)    O2 Parameters below as of 25 Dec 2023 03:00  Patient On (Oxygen Delivery Method): ventilator    O2 Concentration (%): 40      I&O's Summary    24 Dec 2023 07:01  -  25 Dec 2023 07:00  --------------------------------------------------------  IN: 1500.5 mL / OUT: 1435 mL / NET: 65.5 mL      Mode: AC/ CMV (Assist Control/ Continuous Mandatory Ventilation)  RR (machine): 14  TV (machine): 500  FiO2: 40  PEEP: 5  ITime: 1  MAP: 10  PIP: 28      Daily     Daily     Adult Advanced Hemodynamics Last 24 Hrs  CVP(mm Hg): --  CVP(cm H2O): --  CO: --  CI: --  PA: --  PA(mean): --  PCWP: --  SVR: --  SVRI: --  PVR: --  PVRI: --    EKG/Telemetry Events:    MEDICATIONS  (STANDING):  amLODIPine   Tablet 5 milliGRAM(s) Oral daily  artificial  tears Solution 1 Drop(s) Both EYES two times a day  carvedilol 3.125 milliGRAM(s) Oral every 12 hours  chlorhexidine 0.12% Liquid 15 milliLiter(s) Oral Mucosa every 12 hours  chlorhexidine 2% Cloths 1 Application(s) Topical daily  dexMEDEtomidine Infusion 0.2 MICROgram(s)/kG/Hr (4.68 mL/Hr) IV Continuous <Continuous>  heparin  Infusion 1600 Unit(s)/Hr (13 mL/Hr) IV Continuous <Continuous>  insulin lispro (ADMELOG) corrective regimen sliding scale   SubCutaneous every 6 hours  levETIRAcetam  IVPB 1000 milliGRAM(s) IV Intermittent every 12 hours  meropenem  IVPB 1000 milliGRAM(s) IV Intermittent every 12 hours  pantoprazole  Injectable 40 milliGRAM(s) IV Push daily  petrolatum Ophthalmic Ointment 1 Application(s) Both EYES three times a day  vancomycin  IVPB 1000 milliGRAM(s) IV Intermittent every 12 hours    MEDICATIONS  (PRN):      PHYSICAL EXAM:  GENERAL: Intubated, off sedation. No independent/purposeful movements.  HEAD:  Atraumatic, Normocephalic  EYES: EOMI, PERRLA, conjunctiva and sclera clear  NECK: Supple, No JVD, Normal thyroid, no enlarged nodes  NERVOUS SYSTEM: Pupils ~2mm and minimally reactive, decorticate posturing to pain in upper extremities bilaterally  CHEST/LUNG: Intubated. B/L good air entry; No rales, rhonchi, or wheezing  HEART: S1S2 normal, no S3, Regular rate and rhythm; No murmurs  ABDOMEN: Soft, Nontender, Nondistended; Bowel sounds present  EXTREMITIES:  2+ Peripheral Pulses, No clubbing, cyanosis. Edematous upper extremities.   LYMPH: No lymphadenopathy noted  SKIN: R periorbital ecchymosis, ecchymoses on upper extremities bilaterally.    LABS:                        8.5    7.99  )-----------( 119      ( 25 Dec 2023 01:11 )             25.4     12-25    146<H>  |  112<H>  |  68<H>  ----------------------------<  182<H>  4.0   |  22  |  1.88<H>    Ca    8.3<L>      25 Dec 2023 01:11  Phos  4.2     12-25  Mg     2.9     12-25    TPro  5.9<L>  /  Alb  3.1<L>  /  TBili  1.2  /  DBili  x   /  AST  58<H>  /  ALT  57<H>  /  AlkPhos  48  12-25    LIVER FUNCTIONS - ( 25 Dec 2023 01:11 )  Alb: 3.1 g/dL / Pro: 5.9 g/dL / ALK PHOS: 48 U/L / ALT: 57 U/L / AST: 58 U/L / GGT: x           PT/INR - ( 25 Dec 2023 01:11 )   PT: 12.3 sec;   INR: 1.18 ratio         PTT - ( 25 Dec 2023 03:45 )  PTT:62.9 sec  CAPILLARY BLOOD GLUCOSE      POCT Blood Glucose.: 187 mg/dL (25 Dec 2023 05:30)  POCT Blood Glucose.: 508 mg/dL (25 Dec 2023 05:28)  POCT Blood Glucose.: 181 mg/dL (25 Dec 2023 00:25)  POCT Blood Glucose.: 204 mg/dL (24 Dec 2023 18:00)  POCT Blood Glucose.: 200 mg/dL (24 Dec 2023 11:49)    ABG - ( 25 Dec 2023 01:16 )  pH, Arterial: 7.46  pH, Blood: x     /  pCO2: 32    /  pO2: 179   / HCO3: 23    / Base Excess: -0.7  /  SaO2: 99.1                    Urinalysis Basic - ( 25 Dec 2023 01:11 )    Color: x / Appearance: x / SG: x / pH: x  Gluc: 182 mg/dL / Ketone: x  / Bili: x / Urobili: x   Blood: x / Protein: x / Nitrite: x   Leuk Esterase: x / RBC: x / WBC x   Sq Epi: x / Non Sq Epi: x / Bacteria: x          RADIOLOGY & ADDITIONAL TESTS:  CXR:        Care Discussed with Consultants/Other Providers [ x] YES  [ ] NO           Patient is a 78y old  Male who presents with a chief complaint of Cardiac Arrest (24 Dec 2023 20:48)    HPI:  78M w/ PMH HTN HLD presents as transfer from [facility] s/p cardiac arrest. Per reports patient had a witness fall and arrest at home. EMS performed CPR en route to hospital was noted to be in VTach and shocked x2. ROSC was obtained just prior to arrival at [facility]. Pt was intubated placed on levo gtt. On arrival pt noted to have lacerated to R forehead. Pt had CT scans that were negative for intracranial hemorrhage, C-spine fracture, facial fractures. A R frontal hematoma was noted.     On arrival patient w/ dried blood on scalp. Also noted to have bleeding from oral mucosa w/ clots requiring suctioning. Pt otherwise off sedation and non-responsive. Per EMS report pt did receive some sedation and paralytics at [facility].     Per family patient has been feeling 'off' but managing his day to day and did not see a physician. Pt prescribed klonopin by outpatient provider and family believes pt may have been taking more than prescribed. On day of arrest, pt was initially asymptomatic carrying out his routine. He went to the bathroom, wife heard a loud thud and found patient in a pool of blood prompting call to EMS. Pt has not followed with any cardiologist. Has not had any syncope or other events preceding this.  (19 Dec 2023 14:54)       INTERVAL HPI/OVERNIGHT EVENTS:   Precedex discontinued overnight, had labile BPs - intermittently w/ low dose levo (~0.03) requirement  Febrile to 100.8 ~4AM, hemodynamically stable     Subjective: Interview limited by pt mental status.    ICU Vital Signs Last 24 Hrs  T(C): 37.1 (25 Dec 2023 06:00), Max: 38.6 (24 Dec 2023 12:00)  T(F): 98.8 (25 Dec 2023 06:00), Max: 101.5 (24 Dec 2023 12:00)  HR: 73 (25 Dec 2023 06:00) (46 - 73)  BP: --  BP(mean): --  ABP: 170/71 (25 Dec 2023 06:00) (90/34 - 190/63)  ABP(mean): 111 (25 Dec 2023 06:00) (49 - 111)  RR: 21 (25 Dec 2023 06:00) (14 - 28)  SpO2: 99% (25 Dec 2023 06:00) (93% - 100%)    O2 Parameters below as of 25 Dec 2023 03:00  Patient On (Oxygen Delivery Method): ventilator    O2 Concentration (%): 40      I&O's Summary    24 Dec 2023 07:01  -  25 Dec 2023 07:00  --------------------------------------------------------  IN: 1500.5 mL / OUT: 1435 mL / NET: 65.5 mL      Mode: AC/ CMV (Assist Control/ Continuous Mandatory Ventilation)  RR (machine): 14  TV (machine): 500  FiO2: 40  PEEP: 5  ITime: 1  MAP: 10  PIP: 28      Daily     Daily     Adult Advanced Hemodynamics Last 24 Hrs  CVP(mm Hg): --  CVP(cm H2O): --  CO: --  CI: --  PA: --  PA(mean): --  PCWP: --  SVR: --  SVRI: --  PVR: --  PVRI: --    EKG/Telemetry Events:    MEDICATIONS  (STANDING):  amLODIPine   Tablet 5 milliGRAM(s) Oral daily  artificial  tears Solution 1 Drop(s) Both EYES two times a day  carvedilol 3.125 milliGRAM(s) Oral every 12 hours  chlorhexidine 0.12% Liquid 15 milliLiter(s) Oral Mucosa every 12 hours  chlorhexidine 2% Cloths 1 Application(s) Topical daily  dexMEDEtomidine Infusion 0.2 MICROgram(s)/kG/Hr (4.68 mL/Hr) IV Continuous <Continuous>  heparin  Infusion 1600 Unit(s)/Hr (13 mL/Hr) IV Continuous <Continuous>  insulin lispro (ADMELOG) corrective regimen sliding scale   SubCutaneous every 6 hours  levETIRAcetam  IVPB 1000 milliGRAM(s) IV Intermittent every 12 hours  meropenem  IVPB 1000 milliGRAM(s) IV Intermittent every 12 hours  pantoprazole  Injectable 40 milliGRAM(s) IV Push daily  petrolatum Ophthalmic Ointment 1 Application(s) Both EYES three times a day  vancomycin  IVPB 1000 milliGRAM(s) IV Intermittent every 12 hours    MEDICATIONS  (PRN):      PHYSICAL EXAM:  GENERAL: Intubated, off sedation. No independent/purposeful movements.  HEAD:  Atraumatic, Normocephalic  EYES: PERRLA, + vertical nystagmus noted this am   NECK: Supple, No JVD  NERVOUS SYSTEM: Pupils ~2mm and minimally reactive, decorticate posturing to pain in upper extremities bilaterally  CHEST/LUNG: Intubated. B/L good air entry; No rales, rhonchi, or wheezing  HEART: S1S2 normal, no S3, Regular rate and rhythm; No murmurs  ABDOMEN: Soft, Nontender, Nondistended; Bowel sounds present  EXTREMITIES:  2+ Peripheral Pulses, No clubbing, cyanosis. Edematous upper extremities.   SKIN: R periorbital ecchymosis, ecchymoses on upper extremities bilaterally.    LABS:                        8.5    7.99  )-----------( 119      ( 25 Dec 2023 01:11 )             25.4     12-25    146<H>  |  112<H>  |  68<H>  ----------------------------<  182<H>  4.0   |  22  |  1.88<H>    Ca    8.3<L>      25 Dec 2023 01:11  Phos  4.2     12-25  Mg     2.9     12-25    TPro  5.9<L>  /  Alb  3.1<L>  /  TBili  1.2  /  DBili  x   /  AST  58<H>  /  ALT  57<H>  /  AlkPhos  48  12-25    LIVER FUNCTIONS - ( 25 Dec 2023 01:11 )  Alb: 3.1 g/dL / Pro: 5.9 g/dL / ALK PHOS: 48 U/L / ALT: 57 U/L / AST: 58 U/L / GGT: x           PT/INR - ( 25 Dec 2023 01:11 )   PT: 12.3 sec;   INR: 1.18 ratio         PTT - ( 25 Dec 2023 03:45 )  PTT:62.9 sec  CAPILLARY BLOOD GLUCOSE      POCT Blood Glucose.: 187 mg/dL (25 Dec 2023 05:30)  POCT Blood Glucose.: 508 mg/dL (25 Dec 2023 05:28)  POCT Blood Glucose.: 181 mg/dL (25 Dec 2023 00:25)  POCT Blood Glucose.: 204 mg/dL (24 Dec 2023 18:00)  POCT Blood Glucose.: 200 mg/dL (24 Dec 2023 11:49)    ABG - ( 25 Dec 2023 01:16 )  pH, Arterial: 7.46  pH, Blood: x     /  pCO2: 32    /  pO2: 179   / HCO3: 23    / Base Excess: -0.7  /  SaO2: 99.1                    Urinalysis Basic - ( 25 Dec 2023 01:11 )    Color: x / Appearance: x / SG: x / pH: x  Gluc: 182 mg/dL / Ketone: x  / Bili: x / Urobili: x   Blood: x / Protein: x / Nitrite: x   Leuk Esterase: x / RBC: x / WBC x   Sq Epi: x / Non Sq Epi: x / Bacteria: x          RADIOLOGY & ADDITIONAL TESTS:  CXR:        Care Discussed with Consultants/Other Providers [ x] YES  [ ] NO           Patient is a 78y old  Male who presents with a chief complaint of Cardiac Arrest (24 Dec 2023 20:48)    HPI:  78M w/ PMH HTN HLD presents as transfer from [facility] s/p cardiac arrest. Per reports patient had a witness fall and arrest at home. EMS performed CPR en route to hospital was noted to be in VTach and shocked x2. ROSC was obtained just prior to arrival at [facility]. Pt was intubated placed on levo gtt. On arrival pt noted to have lacerated to R forehead. Pt had CT scans that were negative for intracranial hemorrhage, C-spine fracture, facial fractures. A R frontal hematoma was noted.     On arrival patient w/ dried blood on scalp. Also noted to have bleeding from oral mucosa w/ clots requiring suctioning. Pt otherwise off sedation and non-responsive. Per EMS report pt did receive some sedation and paralytics at [facility].     Per family patient has been feeling 'off' but managing his day to day and did not see a physician. Pt prescribed klonopin by outpatient provider and family believes pt may have been taking more than prescribed. On day of arrest, pt was initially asymptomatic carrying out his routine. He went to the bathroom, wife heard a loud thud and found patient in a pool of blood prompting call to EMS. Pt has not followed with any cardiologist. Has not had any syncope or other events preceding this.  (19 Dec 2023 14:54)       INTERVAL HPI/OVERNIGHT EVENTS:   Precedex discontinued overnight, had labile BPs - intermittently w/ low dose levo (~0.03) requirement  Febrile to 100.8 ~4AM, hemodynamically stable     Subjective: Interview limited by pt mental status.    ICU Vital Signs Last 24 Hrs  T(C): 37.1 (25 Dec 2023 06:00), Max: 38.6 (24 Dec 2023 12:00)  T(F): 98.8 (25 Dec 2023 06:00), Max: 101.5 (24 Dec 2023 12:00)  HR: 73 (25 Dec 2023 06:00) (46 - 73)  BP: --  BP(mean): --  ABP: 170/71 (25 Dec 2023 06:00) (90/34 - 190/63)  ABP(mean): 111 (25 Dec 2023 06:00) (49 - 111)  RR: 21 (25 Dec 2023 06:00) (14 - 28)  SpO2: 99% (25 Dec 2023 06:00) (93% - 100%)    O2 Parameters below as of 25 Dec 2023 03:00  Patient On (Oxygen Delivery Method): ventilator    O2 Concentration (%): 40      I&O's Summary    24 Dec 2023 07:01  -  25 Dec 2023 07:00  --------------------------------------------------------  IN: 1500.5 mL / OUT: 1435 mL / NET: 65.5 mL      Mode: AC/ CMV (Assist Control/ Continuous Mandatory Ventilation)  RR (machine): 14  TV (machine): 500  FiO2: 40  PEEP: 5  ITime: 1  MAP: 10  PIP: 28      Daily     Daily     Adult Advanced Hemodynamics Last 24 Hrs  CVP(mm Hg): --  CVP(cm H2O): --  CO: --  CI: --  PA: --  PA(mean): --  PCWP: --  SVR: --  SVRI: --  PVR: --  PVRI: --    EKG/Telemetry Events:    MEDICATIONS  (STANDING):  amLODIPine   Tablet 5 milliGRAM(s) Oral daily  artificial  tears Solution 1 Drop(s) Both EYES two times a day  carvedilol 3.125 milliGRAM(s) Oral every 12 hours  chlorhexidine 0.12% Liquid 15 milliLiter(s) Oral Mucosa every 12 hours  chlorhexidine 2% Cloths 1 Application(s) Topical daily  dexMEDEtomidine Infusion 0.2 MICROgram(s)/kG/Hr (4.68 mL/Hr) IV Continuous <Continuous>  heparin  Infusion 1600 Unit(s)/Hr (13 mL/Hr) IV Continuous <Continuous>  insulin lispro (ADMELOG) corrective regimen sliding scale   SubCutaneous every 6 hours  levETIRAcetam  IVPB 1000 milliGRAM(s) IV Intermittent every 12 hours  meropenem  IVPB 1000 milliGRAM(s) IV Intermittent every 12 hours  pantoprazole  Injectable 40 milliGRAM(s) IV Push daily  petrolatum Ophthalmic Ointment 1 Application(s) Both EYES three times a day  vancomycin  IVPB 1000 milliGRAM(s) IV Intermittent every 12 hours    MEDICATIONS  (PRN):      PHYSICAL EXAM:  GENERAL: Intubated, off sedation. No independent/purposeful movements.  HEAD:  Atraumatic, Normocephalic  EYES: PERRLA, + vertical nystagmus noted this am   NECK: Supple, No JVD  NERVOUS SYSTEM: Pupils ~2mm and minimally reactive, decorticate posturing to pain in upper extremities bilaterally  CHEST/LUNG: Intubated. B/L good air entry; No rales, rhonchi, or wheezing  HEART: S1S2 normal, no S3, Regular rate and rhythm; No murmurs  ABDOMEN: Soft, Nontender, Nondistended; Bowel sounds present  EXTREMITIES:  2+ Peripheral Pulses, No clubbing, cyanosis. Edematous upper extremities.   SKIN: R periorbital ecchymosis, ecchymoses on upper extremities bilaterally.    LABS:                        8.5    7.99  )-----------( 119      ( 25 Dec 2023 01:11 )             25.4     12-25    146<H>  |  112<H>  |  68<H>  ----------------------------<  182<H>  4.0   |  22  |  1.88<H>    Ca    8.3<L>      25 Dec 2023 01:11  Phos  4.2     12-25  Mg     2.9     12-25    TPro  5.9<L>  /  Alb  3.1<L>  /  TBili  1.2  /  DBili  x   /  AST  58<H>  /  ALT  57<H>  /  AlkPhos  48  12-25    LIVER FUNCTIONS - ( 25 Dec 2023 01:11 )  Alb: 3.1 g/dL / Pro: 5.9 g/dL / ALK PHOS: 48 U/L / ALT: 57 U/L / AST: 58 U/L / GGT: x           PT/INR - ( 25 Dec 2023 01:11 )   PT: 12.3 sec;   INR: 1.18 ratio         PTT - ( 25 Dec 2023 03:45 )  PTT:62.9 sec  CAPILLARY BLOOD GLUCOSE      POCT Blood Glucose.: 187 mg/dL (25 Dec 2023 05:30)  POCT Blood Glucose.: 508 mg/dL (25 Dec 2023 05:28)  POCT Blood Glucose.: 181 mg/dL (25 Dec 2023 00:25)  POCT Blood Glucose.: 204 mg/dL (24 Dec 2023 18:00)  POCT Blood Glucose.: 200 mg/dL (24 Dec 2023 11:49)    ABG - ( 25 Dec 2023 01:16 )  pH, Arterial: 7.46  pH, Blood: x     /  pCO2: 32    /  pO2: 179   / HCO3: 23    / Base Excess: -0.7  /  SaO2: 99.1                    Urinalysis Basic - ( 25 Dec 2023 01:11 )    Color: x / Appearance: x / SG: x / pH: x  Gluc: 182 mg/dL / Ketone: x  / Bili: x / Urobili: x   Blood: x / Protein: x / Nitrite: x   Leuk Esterase: x / RBC: x / WBC x   Sq Epi: x / Non Sq Epi: x / Bacteria: x          RADIOLOGY & ADDITIONAL TESTS:  CXR:        Care Discussed with Consultants/Other Providers [ x] YES  [ ] NO

## 2023-12-26 DIAGNOSIS — Z71.89 OTHER SPECIFIED COUNSELING: ICD-10-CM

## 2023-12-26 DIAGNOSIS — I46.9 CARDIAC ARREST, CAUSE UNSPECIFIED: ICD-10-CM

## 2023-12-26 DIAGNOSIS — Z51.5 ENCOUNTER FOR PALLIATIVE CARE: ICD-10-CM

## 2023-12-26 DIAGNOSIS — J96.01 ACUTE RESPIRATORY FAILURE WITH HYPOXIA: ICD-10-CM

## 2023-12-26 DIAGNOSIS — R53.81 OTHER MALAISE: ICD-10-CM

## 2023-12-26 LAB
ALBUMIN SERPL ELPH-MCNC: 2.9 G/DL — LOW (ref 3.3–5)
ALBUMIN SERPL ELPH-MCNC: 2.9 G/DL — LOW (ref 3.3–5)
ALP SERPL-CCNC: 53 U/L — SIGNIFICANT CHANGE UP (ref 40–120)
ALP SERPL-CCNC: 53 U/L — SIGNIFICANT CHANGE UP (ref 40–120)
ALT FLD-CCNC: 55 U/L — HIGH (ref 10–45)
ALT FLD-CCNC: 55 U/L — HIGH (ref 10–45)
ANION GAP SERPL CALC-SCNC: 13 MMOL/L — SIGNIFICANT CHANGE UP (ref 5–17)
ANION GAP SERPL CALC-SCNC: 13 MMOL/L — SIGNIFICANT CHANGE UP (ref 5–17)
APTT BLD: 51.1 SEC — HIGH (ref 24.5–35.6)
APTT BLD: 51.1 SEC — HIGH (ref 24.5–35.6)
APTT BLD: 54.5 SEC — HIGH (ref 24.5–35.6)
APTT BLD: 54.5 SEC — HIGH (ref 24.5–35.6)
AST SERPL-CCNC: 56 U/L — HIGH (ref 10–40)
AST SERPL-CCNC: 56 U/L — HIGH (ref 10–40)
BASOPHILS # BLD AUTO: 0.01 K/UL — SIGNIFICANT CHANGE UP (ref 0–0.2)
BASOPHILS # BLD AUTO: 0.01 K/UL — SIGNIFICANT CHANGE UP (ref 0–0.2)
BASOPHILS NFR BLD AUTO: 0.1 % — SIGNIFICANT CHANGE UP (ref 0–2)
BASOPHILS NFR BLD AUTO: 0.1 % — SIGNIFICANT CHANGE UP (ref 0–2)
BILIRUB SERPL-MCNC: 1 MG/DL — SIGNIFICANT CHANGE UP (ref 0.2–1.2)
BILIRUB SERPL-MCNC: 1 MG/DL — SIGNIFICANT CHANGE UP (ref 0.2–1.2)
BUN SERPL-MCNC: 71 MG/DL — HIGH (ref 7–23)
BUN SERPL-MCNC: 71 MG/DL — HIGH (ref 7–23)
CALCIUM SERPL-MCNC: 8.7 MG/DL — SIGNIFICANT CHANGE UP (ref 8.4–10.5)
CALCIUM SERPL-MCNC: 8.7 MG/DL — SIGNIFICANT CHANGE UP (ref 8.4–10.5)
CHLORIDE SERPL-SCNC: 113 MMOL/L — HIGH (ref 96–108)
CHLORIDE SERPL-SCNC: 113 MMOL/L — HIGH (ref 96–108)
CO2 SERPL-SCNC: 21 MMOL/L — LOW (ref 22–31)
CO2 SERPL-SCNC: 21 MMOL/L — LOW (ref 22–31)
CREAT SERPL-MCNC: 1.89 MG/DL — HIGH (ref 0.5–1.3)
CREAT SERPL-MCNC: 1.89 MG/DL — HIGH (ref 0.5–1.3)
EGFR: 36 ML/MIN/1.73M2 — LOW
EGFR: 36 ML/MIN/1.73M2 — LOW
EOSINOPHIL # BLD AUTO: 0.06 K/UL — SIGNIFICANT CHANGE UP (ref 0–0.5)
EOSINOPHIL # BLD AUTO: 0.06 K/UL — SIGNIFICANT CHANGE UP (ref 0–0.5)
EOSINOPHIL NFR BLD AUTO: 0.8 % — SIGNIFICANT CHANGE UP (ref 0–6)
EOSINOPHIL NFR BLD AUTO: 0.8 % — SIGNIFICANT CHANGE UP (ref 0–6)
GAS PNL BLDA: SIGNIFICANT CHANGE UP
GAS PNL BLDA: SIGNIFICANT CHANGE UP
GLUCOSE BLDC GLUCOMTR-MCNC: 166 MG/DL — HIGH (ref 70–99)
GLUCOSE BLDC GLUCOMTR-MCNC: 166 MG/DL — HIGH (ref 70–99)
GLUCOSE BLDC GLUCOMTR-MCNC: 178 MG/DL — HIGH (ref 70–99)
GLUCOSE BLDC GLUCOMTR-MCNC: 178 MG/DL — HIGH (ref 70–99)
GLUCOSE BLDC GLUCOMTR-MCNC: 187 MG/DL — HIGH (ref 70–99)
GLUCOSE BLDC GLUCOMTR-MCNC: 187 MG/DL — HIGH (ref 70–99)
GLUCOSE SERPL-MCNC: 207 MG/DL — HIGH (ref 70–99)
GLUCOSE SERPL-MCNC: 207 MG/DL — HIGH (ref 70–99)
HCT VFR BLD CALC: 25.5 % — LOW (ref 39–50)
HCT VFR BLD CALC: 25.5 % — LOW (ref 39–50)
HGB BLD-MCNC: 8.2 G/DL — LOW (ref 13–17)
HGB BLD-MCNC: 8.2 G/DL — LOW (ref 13–17)
IMM GRANULOCYTES NFR BLD AUTO: 1.1 % — HIGH (ref 0–0.9)
IMM GRANULOCYTES NFR BLD AUTO: 1.1 % — HIGH (ref 0–0.9)
INR BLD: 1.08 RATIO — SIGNIFICANT CHANGE UP (ref 0.85–1.18)
INR BLD: 1.08 RATIO — SIGNIFICANT CHANGE UP (ref 0.85–1.18)
INR BLD: 1.16 RATIO — SIGNIFICANT CHANGE UP (ref 0.85–1.18)
INR BLD: 1.16 RATIO — SIGNIFICANT CHANGE UP (ref 0.85–1.18)
LYMPHOCYTES # BLD AUTO: 0.81 K/UL — LOW (ref 1–3.3)
LYMPHOCYTES # BLD AUTO: 0.81 K/UL — LOW (ref 1–3.3)
LYMPHOCYTES # BLD AUTO: 10.7 % — LOW (ref 13–44)
LYMPHOCYTES # BLD AUTO: 10.7 % — LOW (ref 13–44)
MAGNESIUM SERPL-MCNC: 3.1 MG/DL — HIGH (ref 1.6–2.6)
MAGNESIUM SERPL-MCNC: 3.1 MG/DL — HIGH (ref 1.6–2.6)
MCHC RBC-ENTMCNC: 30.8 PG — SIGNIFICANT CHANGE UP (ref 27–34)
MCHC RBC-ENTMCNC: 30.8 PG — SIGNIFICANT CHANGE UP (ref 27–34)
MCHC RBC-ENTMCNC: 32.2 GM/DL — SIGNIFICANT CHANGE UP (ref 32–36)
MCHC RBC-ENTMCNC: 32.2 GM/DL — SIGNIFICANT CHANGE UP (ref 32–36)
MCV RBC AUTO: 95.9 FL — SIGNIFICANT CHANGE UP (ref 80–100)
MCV RBC AUTO: 95.9 FL — SIGNIFICANT CHANGE UP (ref 80–100)
MONOCYTES # BLD AUTO: 0.81 K/UL — SIGNIFICANT CHANGE UP (ref 0–0.9)
MONOCYTES # BLD AUTO: 0.81 K/UL — SIGNIFICANT CHANGE UP (ref 0–0.9)
MONOCYTES NFR BLD AUTO: 10.7 % — SIGNIFICANT CHANGE UP (ref 2–14)
MONOCYTES NFR BLD AUTO: 10.7 % — SIGNIFICANT CHANGE UP (ref 2–14)
NEUTROPHILS # BLD AUTO: 5.81 K/UL — SIGNIFICANT CHANGE UP (ref 1.8–7.4)
NEUTROPHILS # BLD AUTO: 5.81 K/UL — SIGNIFICANT CHANGE UP (ref 1.8–7.4)
NEUTROPHILS NFR BLD AUTO: 76.6 % — SIGNIFICANT CHANGE UP (ref 43–77)
NEUTROPHILS NFR BLD AUTO: 76.6 % — SIGNIFICANT CHANGE UP (ref 43–77)
NRBC # BLD: 0 /100 WBCS — SIGNIFICANT CHANGE UP (ref 0–0)
NRBC # BLD: 0 /100 WBCS — SIGNIFICANT CHANGE UP (ref 0–0)
PHOSPHATE SERPL-MCNC: 4.6 MG/DL — HIGH (ref 2.5–4.5)
PHOSPHATE SERPL-MCNC: 4.6 MG/DL — HIGH (ref 2.5–4.5)
PLATELET # BLD AUTO: 136 K/UL — LOW (ref 150–400)
PLATELET # BLD AUTO: 136 K/UL — LOW (ref 150–400)
POTASSIUM SERPL-MCNC: 4.4 MMOL/L — SIGNIFICANT CHANGE UP (ref 3.5–5.3)
POTASSIUM SERPL-MCNC: 4.4 MMOL/L — SIGNIFICANT CHANGE UP (ref 3.5–5.3)
POTASSIUM SERPL-SCNC: 4.4 MMOL/L — SIGNIFICANT CHANGE UP (ref 3.5–5.3)
POTASSIUM SERPL-SCNC: 4.4 MMOL/L — SIGNIFICANT CHANGE UP (ref 3.5–5.3)
PROT SERPL-MCNC: 5.8 G/DL — LOW (ref 6–8.3)
PROT SERPL-MCNC: 5.8 G/DL — LOW (ref 6–8.3)
PROTHROM AB SERPL-ACNC: 11.9 SEC — SIGNIFICANT CHANGE UP (ref 9.5–13)
PROTHROM AB SERPL-ACNC: 11.9 SEC — SIGNIFICANT CHANGE UP (ref 9.5–13)
PROTHROM AB SERPL-ACNC: 12.1 SEC — SIGNIFICANT CHANGE UP (ref 9.5–13)
PROTHROM AB SERPL-ACNC: 12.1 SEC — SIGNIFICANT CHANGE UP (ref 9.5–13)
RBC # BLD: 2.66 M/UL — LOW (ref 4.2–5.8)
RBC # BLD: 2.66 M/UL — LOW (ref 4.2–5.8)
RBC # FLD: 13.5 % — SIGNIFICANT CHANGE UP (ref 10.3–14.5)
RBC # FLD: 13.5 % — SIGNIFICANT CHANGE UP (ref 10.3–14.5)
SODIUM SERPL-SCNC: 147 MMOL/L — HIGH (ref 135–145)
SODIUM SERPL-SCNC: 147 MMOL/L — HIGH (ref 135–145)
WBC # BLD: 7.58 K/UL — SIGNIFICANT CHANGE UP (ref 3.8–10.5)
WBC # BLD: 7.58 K/UL — SIGNIFICANT CHANGE UP (ref 3.8–10.5)
WBC # FLD AUTO: 7.58 K/UL — SIGNIFICANT CHANGE UP (ref 3.8–10.5)
WBC # FLD AUTO: 7.58 K/UL — SIGNIFICANT CHANGE UP (ref 3.8–10.5)

## 2023-12-26 PROCEDURE — 70450 CT HEAD/BRAIN W/O DYE: CPT | Mod: 26

## 2023-12-26 PROCEDURE — 99292 CRITICAL CARE ADDL 30 MIN: CPT

## 2023-12-26 PROCEDURE — 93010 ELECTROCARDIOGRAM REPORT: CPT

## 2023-12-26 PROCEDURE — 99223 1ST HOSP IP/OBS HIGH 75: CPT

## 2023-12-26 PROCEDURE — 71045 X-RAY EXAM CHEST 1 VIEW: CPT | Mod: 26

## 2023-12-26 PROCEDURE — 99291 CRITICAL CARE FIRST HOUR: CPT | Mod: GC,25

## 2023-12-26 RX ORDER — LABETALOL HCL 100 MG
10 TABLET ORAL ONCE
Refills: 0 | Status: DISCONTINUED | OUTPATIENT
Start: 2023-12-26 | End: 2023-12-26

## 2023-12-26 RX ORDER — CEFEPIME 1 G/1
2000 INJECTION, POWDER, FOR SOLUTION INTRAMUSCULAR; INTRAVENOUS EVERY 12 HOURS
Refills: 0 | Status: DISCONTINUED | OUTPATIENT
Start: 2023-12-26 | End: 2023-12-26

## 2023-12-26 RX ORDER — HUMAN INSULIN 100 [IU]/ML
3 INJECTION, SUSPENSION SUBCUTANEOUS EVERY 6 HOURS
Refills: 0 | Status: DISCONTINUED | OUTPATIENT
Start: 2023-12-26 | End: 2023-12-27

## 2023-12-26 RX ORDER — ACETAMINOPHEN 500 MG
650 TABLET ORAL EVERY 6 HOURS
Refills: 0 | Status: DISCONTINUED | OUTPATIENT
Start: 2023-12-26 | End: 2023-12-27

## 2023-12-26 RX ADMIN — HUMAN INSULIN 3 UNIT(S): 100 INJECTION, SUSPENSION SUBCUTANEOUS at 23:10

## 2023-12-26 RX ADMIN — CARVEDILOL PHOSPHATE 3.12 MILLIGRAM(S): 80 CAPSULE, EXTENDED RELEASE ORAL at 06:00

## 2023-12-26 RX ADMIN — Medication 1: at 17:04

## 2023-12-26 RX ADMIN — Medication 1: at 02:05

## 2023-12-26 RX ADMIN — Medication 650 MILLIGRAM(S): at 13:24

## 2023-12-26 RX ADMIN — Medication 1 APPLICATION(S): at 13:11

## 2023-12-26 RX ADMIN — LEVETIRACETAM 400 MILLIGRAM(S): 250 TABLET, FILM COATED ORAL at 05:10

## 2023-12-26 RX ADMIN — HUMAN INSULIN 3 UNIT(S): 100 INJECTION, SUSPENSION SUBCUTANEOUS at 13:02

## 2023-12-26 RX ADMIN — HUMAN INSULIN 3 UNIT(S): 100 INJECTION, SUSPENSION SUBCUTANEOUS at 17:01

## 2023-12-26 RX ADMIN — HEPARIN SODIUM 12.5 UNIT(S)/HR: 5000 INJECTION INTRAVENOUS; SUBCUTANEOUS at 22:53

## 2023-12-26 RX ADMIN — CHLORHEXIDINE GLUCONATE 15 MILLILITER(S): 213 SOLUTION TOPICAL at 05:10

## 2023-12-26 RX ADMIN — HEPARIN SODIUM 12.5 UNIT(S)/HR: 5000 INJECTION INTRAVENOUS; SUBCUTANEOUS at 02:06

## 2023-12-26 RX ADMIN — HEPARIN SODIUM 12.5 UNIT(S)/HR: 5000 INJECTION INTRAVENOUS; SUBCUTANEOUS at 12:59

## 2023-12-26 RX ADMIN — Medication 650 MILLIGRAM(S): at 13:01

## 2023-12-26 RX ADMIN — DEXMEDETOMIDINE HYDROCHLORIDE IN 0.9% SODIUM CHLORIDE 4.68 MICROGRAM(S)/KG/HR: 4 INJECTION INTRAVENOUS at 02:06

## 2023-12-26 RX ADMIN — AMLODIPINE BESYLATE 5 MILLIGRAM(S): 2.5 TABLET ORAL at 05:11

## 2023-12-26 RX ADMIN — Medication 1 DROP(S): at 17:05

## 2023-12-26 RX ADMIN — Medication 1 APPLICATION(S): at 05:11

## 2023-12-26 RX ADMIN — CHLORHEXIDINE GLUCONATE 1 APPLICATION(S): 213 SOLUTION TOPICAL at 22:54

## 2023-12-26 RX ADMIN — Medication 1 DROP(S): at 05:11

## 2023-12-26 RX ADMIN — LEVETIRACETAM 400 MILLIGRAM(S): 250 TABLET, FILM COATED ORAL at 17:05

## 2023-12-26 RX ADMIN — CHLORHEXIDINE GLUCONATE 15 MILLILITER(S): 213 SOLUTION TOPICAL at 17:01

## 2023-12-26 RX ADMIN — Medication 1: at 23:10

## 2023-12-26 RX ADMIN — Medication 300 MILLIGRAM(S): at 22:53

## 2023-12-26 RX ADMIN — Medication 1 APPLICATION(S): at 22:53

## 2023-12-26 RX ADMIN — PANTOPRAZOLE SODIUM 40 MILLIGRAM(S): 20 TABLET, DELAYED RELEASE ORAL at 13:02

## 2023-12-26 RX ADMIN — MEROPENEM 100 MILLIGRAM(S): 1 INJECTION INTRAVENOUS at 05:10

## 2023-12-26 RX ADMIN — Medication 1: at 13:02

## 2023-12-26 RX ADMIN — Medication 1: at 05:16

## 2023-12-26 RX ADMIN — CARVEDILOL PHOSPHATE 3.12 MILLIGRAM(S): 80 CAPSULE, EXTENDED RELEASE ORAL at 17:04

## 2023-12-26 RX ADMIN — HEPARIN SODIUM 12.5 UNIT(S)/HR: 5000 INJECTION INTRAVENOUS; SUBCUTANEOUS at 05:10

## 2023-12-26 RX ADMIN — Medication 300 MILLIGRAM(S): at 16:55

## 2023-12-26 NOTE — PROGRESS NOTE ADULT - SUBJECTIVE AND OBJECTIVE BOX
Gemini Connelly MD  PGY 2 Department of Internal Medicine        Patient is a 78y old  Male who presents with a chief complaint of s/p Cardiac Arrest (25 Dec 2023 19:17)      SUBJECTIVE / OVERNIGHT EVENTS: Pt seen and examined. No acute overnight events. Denies fevers, chills, CP, SOB, Abdominal pain, N/V, Constipation, Diarrhea        MEDICATIONS  (STANDING):  amLODIPine   Tablet 5 milliGRAM(s) Oral daily  artificial  tears Solution 1 Drop(s) Both EYES two times a day  carvedilol 3.125 milliGRAM(s) Oral every 12 hours  chlorhexidine 0.12% Liquid 15 milliLiter(s) Oral Mucosa every 12 hours  chlorhexidine 2% Cloths 1 Application(s) Topical daily  dexMEDEtomidine Infusion 0.2 MICROgram(s)/kG/Hr (4.68 mL/Hr) IV Continuous <Continuous>  heparin  Infusion 1200 Unit(s)/Hr (12.5 mL/Hr) IV Continuous <Continuous>  insulin lispro (ADMELOG) corrective regimen sliding scale   SubCutaneous every 6 hours  levETIRAcetam  IVPB 1000 milliGRAM(s) IV Intermittent every 12 hours  meropenem  IVPB 1000 milliGRAM(s) IV Intermittent every 12 hours  pantoprazole  Injectable 40 milliGRAM(s) IV Push daily  petrolatum Ophthalmic Ointment 1 Application(s) Both EYES three times a day    MEDICATIONS  (PRN):      I&O's Summary    25 Dec 2023 07:01  -  26 Dec 2023 07:00  --------------------------------------------------------  IN: 1694.4 mL / OUT: 1450 mL / NET: 244.4 mL        Vital Signs Last 24 Hrs  T(C): 37.7 (26 Dec 2023 06:00), Max: 38.5 (25 Dec 2023 17:00)  T(F): 99.9 (26 Dec 2023 06:00), Max: 101.3 (25 Dec 2023 17:00)  HR: 61 (26 Dec 2023 06:00) (42 - 66)  BP: --  BP(mean): --  RR: 22 (26 Dec 2023 06:00) (14 - 28)  SpO2: 100% (26 Dec 2023 06:00) (97% - 100%)    Parameters below as of 25 Dec 2023 19:00  Patient On (Oxygen Delivery Method): ventilator    O2 Concentration (%): 40    CAPILLARY BLOOD GLUCOSE      POCT Blood Glucose.: 196 mg/dL (26 Dec 2023 05:14)  POCT Blood Glucose.: 195 mg/dL (26 Dec 2023 02:04)  POCT Blood Glucose.: 199 mg/dL (25 Dec 2023 23:58)  POCT Blood Glucose.: 194 mg/dL (25 Dec 2023 16:59)  POCT Blood Glucose.: 166 mg/dL (25 Dec 2023 12:49)      PHYSICAL EXAM:  GENERAL: NAD, lying in bed comfortably  HEAD:  Atraumatic, normocephalic  EYES: EOMI, PERRLA, conjunctiva clear, no conjunctival pallor, anicteric sclera  NECK: Supple, trachea midline, no JVD  HEART: Regular rate and rhythm, Normal S1 S2, no murmurs, rubs, or gallops  LUNGS: Unlabored respirations. Clear to ascultation bilaterally, no crackles, wheezing, or rhonchi  ABDOMEN: Soft, nondistended, nontender, no rebound or guarding, bowel sounds presents  EXTREMITIES: Warm extremities, no clubbing, cyanosis, or edema, peripheral pulses 2+ bilaterally  MUSCULOSKELETAL: No joint swelling or tenderness to palpation  NEURO: CN 2-12 grossly intact, moves all limbs spontaneously  SKIN: No rashes or lesions         LABS:                        8.2    7.58  )-----------( 136      ( 26 Dec 2023 00:47 )             25.5     Auto Eosinophil # 0.06  / Auto Eosinophil % 0.8   / Auto Neutrophil # 5.81  / Auto Neutrophil % 76.6  / BANDS % x                            8.5    7.91  )-----------( 118      ( 25 Dec 2023 09:47 )             24.9     Auto Eosinophil # 0.05  / Auto Eosinophil % 0.6   / Auto Neutrophil # 6.06  / Auto Neutrophil % 76.7  / BANDS % x                            8.5    7.99  )-----------( 119      ( 25 Dec 2023 01:11 )             25.4     Auto Eosinophil # 0.04  / Auto Eosinophil % 0.5   / Auto Neutrophil # 5.89  / Auto Neutrophil % 73.6  / BANDS % x        12-26    147<H>  |  113<H>  |  71<H>  ----------------------------<  207<H>  4.4   |  21<L>  |  1.89<H>  12-25    146<H>  |  112<H>  |  68<H>  ----------------------------<  182<H>  4.0   |  22  |  1.88<H>    Ca    8.7      26 Dec 2023 00:46  Mg     3.1     12-26  Phos  4.6     12-26  TPro  5.8<L>  /  Alb  2.9<L>  /  TBili  1.0  /  DBili  x   /  AST  56<H>  /  ALT  55<H>  /  AlkPhos  53  12-26  TPro  5.9<L>  /  Alb  3.1<L>  /  TBili  1.2  /  DBili  x   /  AST  58<H>  /  ALT  57<H>  /  AlkPhos  48  12-25    PT/INR - ( 26 Dec 2023 00:47 )   PT: 11.9 sec;   INR: 1.08 ratio         PTT - ( 26 Dec 2023 00:47 )  PTT:51.1 sec      Urinalysis Basic - ( 26 Dec 2023 00:46 )    Color: x / Appearance: x / SG: x / pH: x  Gluc: 207 mg/dL / Ketone: x  / Bili: x / Urobili: x   Blood: x / Protein: x / Nitrite: x   Leuk Esterase: x / RBC: x / WBC x   Sq Epi: x / Non Sq Epi: x / Bacteria: x      Lactate, Blood: 2.0 mmol/L (12-20 @ 05:38)        RADIOLOGY & ADDITIONAL TESTS:    Imaging Personally Reviewed:    Consultant(s) Notes Reviewed:      Care Discussed with Consultants/Other Providers:

## 2023-12-26 NOTE — PROGRESS NOTE ADULT - ATTENDING COMMENTS
79 yo man with HTN, HLD s/p out of hospital cardiac arrest     Neuro: CTH with evidence of anoxic brain injury. Unresponsive, no response to pain, + gag and pupils respond to light. Appreciate neuro eval.   For now will continue to hold DAPT given neuro status and ICH  possible afib at OSH per EKG,   Acute respiratory failure requiring mechanical ventilation due to cardiac arrest, SBT as tolerated    cont enteral feeds   non oliguric LONA likely due to shock post arrest ?ATN, stable      H/H low, s/p one unit PRBC   hep gtt for DVT, repeat CTH to ensure stability   febrile, sputum cx with GNR, restart cefepime. Other causes of fever include DVT and central fevers. f/u blood cx  Sugars elevated, adjust coverage  No central access. Porter for retention

## 2023-12-26 NOTE — CONSULT NOTE ADULT - PROBLEM SELECTOR RECOMMENDATION 4
spouse is surrogate  daughter and son also involved  plan for meeting tomorrow Wednesday at 2pm to discuss options for care

## 2023-12-26 NOTE — PROGRESS NOTE ADULT - SUBJECTIVE AND OBJECTIVE BOX
PATIENT:  ASHLEY PRITCHARD  [id number]    CHIEF COMPLAINT:  Patient is a 78y old  Male who presents with a chief complaint of Cardiac Arrest (26 Dec 2023 14:17)      INTERVAL HISTORYOVERNIGHT EVENTS:      REVIEW OF SYSTEMS:    Constitutional:     [ ] negative [ ] fevers [ ] chills [ ] weight loss [ ] weight gain  HEENT:                  [ ] negative [ ] dry eyes [ ] eye irritation [ ] postnasal drip [ ] nasal congestion  CV:                         [ ] negative  [ ] chest pain [ ] orthopnea [ ] palpitations [ ] murmur  Resp:                     [ ] negative [ ] cough [ ] shortness of breath [ ] dyspnea [ ] wheezing [ ] sputum [ ] hemoptysis  GI:                          [ ] negative [ ] nausea [ ] vomiting [ ] diarrhea [ ] constipation [ ] abd pain [ ] dysphagia   :                        [ ] negative [ ] dysuria [ ] nocturia [ ] hematuria [ ] increased urinary frequency  Musculoskeletal: [ ] negative [ ] back pain [ ] myalgias [ ] arthralgias [ ] fracture  Skin:                       [ ] negative [ ] rash [ ] itch  Neurological:        [ ] negative [ ] headache [ ] dizziness [ ] syncope [ ] weakness [ ] numbness  Psychiatric:           [ ] negative [ ] anxiety [ ] depression  Endocrine:            [ ] negative [ ] diabetes [ ] thyroid problem  Heme/Lymph:      [ ] negative [ ] anemia [ ] bleeding problem  Allergic/Immune: [ ] negative [ ] itchy eyes [ ] nasal discharge [ ] hives [ ] angioedema    [ ] All other systems negative  [ ] Unable to assess ROS because ________.    MEDICATIONS:  MEDICATIONS  (STANDING):  amLODIPine   Tablet 5 milliGRAM(s) Oral daily  artificial  tears Solution 1 Drop(s) Both EYES two times a day  carvedilol 3.125 milliGRAM(s) Oral every 12 hours  chlorhexidine 0.12% Liquid 15 milliLiter(s) Oral Mucosa every 12 hours  chlorhexidine 2% Cloths 1 Application(s) Topical daily  dexMEDEtomidine Infusion 0.2 MICROgram(s)/kG/Hr (4.68 mL/Hr) IV Continuous <Continuous>  heparin  Infusion 1200 Unit(s)/Hr (12.5 mL/Hr) IV Continuous <Continuous>  insulin lispro (ADMELOG) corrective regimen sliding scale   SubCutaneous every 6 hours  insulin NPH human recombinant 3 Unit(s) SubCutaneous every 6 hours  levETIRAcetam  IVPB 1000 milliGRAM(s) IV Intermittent every 12 hours  pantoprazole  Injectable 40 milliGRAM(s) IV Push daily  petrolatum Ophthalmic Ointment 1 Application(s) Both EYES three times a day  trimethoprim  40 mG/sulfamethoxazole 200 mG Suspension 300 milliGRAM(s) Enteral Tube every 8 hours    MEDICATIONS  (PRN):  acetaminophen   Oral Liquid .. 650 milliGRAM(s) Oral every 6 hours PRN Temp greater or equal to 38C (100.4F)      ALLERGIES:  Allergies    No Known Allergies    Intolerances        OBJECTIVE:  ICU Vital Signs Last 24 Hrs  T(C): 37.4 (26 Dec 2023 15:00), Max: 38.7 (26 Dec 2023 13:00)  T(F): 99.3 (26 Dec 2023 15:00), Max: 101.7 (26 Dec 2023 13:00)  HR: 65 (26 Dec 2023 18:00) (47 - 73)  BP: --  BP(mean): --  ABP: 131/37 (26 Dec 2023 18:00) (105/43 - 202/59)  ABP(mean): 63 (26 Dec 2023 18:00) (54 - 94)  RR: 17 (26 Dec 2023 18:00) (14 - 22)  SpO2: 100% (26 Dec 2023 18:00) (100% - 100%)    O2 Parameters below as of 26 Dec 2023 18:00  Patient On (Oxygen Delivery Method): ventilator    O2 Concentration (%): 30      Mode: CPAP with PS  FiO2: 30  PEEP: 5  PS: 10  MAP: 9  PIP: 16    Adult Advanced Hemodynamics Last 24 Hrs  CVP(mm Hg): --  CVP(cm H2O): --  CO: --  CI: --  PA: --  PA(mean): --  PCWP: --  SVR: --  SVRI: --  PVR: --  PVRI: --  CAPILLARY BLOOD GLUCOSE      POCT Blood Glucose.: 166 mg/dL (26 Dec 2023 16:43)  POCT Blood Glucose.: 187 mg/dL (26 Dec 2023 13:01)  POCT Blood Glucose.: 196 mg/dL (26 Dec 2023 05:14)  POCT Blood Glucose.: 195 mg/dL (26 Dec 2023 02:04)  POCT Blood Glucose.: 199 mg/dL (25 Dec 2023 23:58)    CAPILLARY BLOOD GLUCOSE      POCT Blood Glucose.: 166 mg/dL (26 Dec 2023 16:43)    I&O's Summary    25 Dec 2023 07:01  -  26 Dec 2023 07:00  --------------------------------------------------------  IN: 1694.4 mL / OUT: 1510 mL / NET: 184.4 mL    26 Dec 2023 07:01  -  26 Dec 2023 19:11  --------------------------------------------------------  IN: 730 mL / OUT: 975 mL / NET: -245 mL      Daily     Daily     PHYSICAL EXAMINATION:  General: WN/WD NAD  HEENT: PERRLA, EOMI, moist mucous membranes  Neurology: A&Ox3, nonfocal, NOONAN x 4  Respiratory: CTA B/L, normal respiratory effort, no wheezes, crackles, rales  CV: RRR, S1S2, no murmurs, rubs or gallops  Abdominal: Soft, NT, ND +BS, Last BM  Extremities: No edema, + peripheral pulses  Incisions:   Tubes:    LABS:  ABG - ( 26 Dec 2023 00:30 )  pH, Arterial: 7.44  pH, Blood: x     /  pCO2: 33    /  pO2: 169   / HCO3: 22    / Base Excess: -1.4  /  SaO2: 99.7                                    8.2    7.58  )-----------( 136      ( 26 Dec 2023 00:47 )             25.5     12-26    147<H>  |  113<H>  |  71<H>  ----------------------------<  207<H>  4.4   |  21<L>  |  1.89<H>    Ca    8.7      26 Dec 2023 00:46  Phos  4.6     12-26  Mg     3.1     12-26    TPro  5.8<L>  /  Alb  2.9<L>  /  TBili  1.0  /  DBili  x   /  AST  56<H>  /  ALT  55<H>  /  AlkPhos  53  12-26    LIVER FUNCTIONS - ( 26 Dec 2023 00:46 )  Alb: 2.9 g/dL / Pro: 5.8 g/dL / ALK PHOS: 53 U/L / ALT: 55 U/L / AST: 56 U/L / GGT: x           PT/INR - ( 26 Dec 2023 08:38 )   PT: 12.1 sec;   INR: 1.16 ratio         PTT - ( 26 Dec 2023 08:38 )  PTT:54.5 sec        Urinalysis Basic - ( 26 Dec 2023 00:46 )    Color: x / Appearance: x / SG: x / pH: x  Gluc: 207 mg/dL / Ketone: x  / Bili: x / Urobili: x   Blood: x / Protein: x / Nitrite: x   Leuk Esterase: x / RBC: x / WBC x   Sq Epi: x / Non Sq Epi: x / Bacteria: x        TELEMETRY:     EKG:     IMAGING:       PATIENT:  ASHLEY PRITCHARD  [id number]    CHIEF COMPLAINT:  Patient is a 78y old  Male who presents with a chief complaint of Cardiac Arrest (26 Dec 2023 14:17)      INTERVAL HISTORYOVERNIGHT EVENTS:      REVIEW OF SYSTEMS:    Constitutional:     [ ] negative [ ] fevers [ ] chills [ ] weight loss [ ] weight gain  HEENT:                  [ ] negative [ ] dry eyes [ ] eye irritation [ ] postnasal drip [ ] nasal congestion  CV:                         [ ] negative  [ ] chest pain [ ] orthopnea [ ] palpitations [ ] murmur  Resp:                     [ ] negative [ ] cough [ ] shortness of breath [ ] dyspnea [ ] wheezing [ ] sputum [ ] hemoptysis  GI:                          [ ] negative [ ] nausea [ ] vomiting [ ] diarrhea [ ] constipation [ ] abd pain [ ] dysphagia   :                        [ ] negative [ ] dysuria [ ] nocturia [ ] hematuria [ ] increased urinary frequency  Musculoskeletal: [ ] negative [ ] back pain [ ] myalgias [ ] arthralgias [ ] fracture  Skin:                       [ ] negative [ ] rash [ ] itch  Neurological:        [ ] negative [ ] headache [ ] dizziness [ ] syncope [ ] weakness [ ] numbness  Psychiatric:           [ ] negative [ ] anxiety [ ] depression  Endocrine:            [ ] negative [ ] diabetes [ ] thyroid problem  Heme/Lymph:      [ ] negative [ ] anemia [ ] bleeding problem  Allergic/Immune: [ ] negative [ ] itchy eyes [ ] nasal discharge [ ] hives [ ] angioedema    [ ] All other systems negative  [ ] Unable to assess ROS because ________.    MEDICATIONS:  MEDICATIONS  (STANDING):  amLODIPine   Tablet 5 milliGRAM(s) Oral daily  artificial  tears Solution 1 Drop(s) Both EYES two times a day  carvedilol 3.125 milliGRAM(s) Oral every 12 hours  chlorhexidine 0.12% Liquid 15 milliLiter(s) Oral Mucosa every 12 hours  chlorhexidine 2% Cloths 1 Application(s) Topical daily  dexMEDEtomidine Infusion 0.2 MICROgram(s)/kG/Hr (4.68 mL/Hr) IV Continuous <Continuous>  heparin  Infusion 1200 Unit(s)/Hr (12.5 mL/Hr) IV Continuous <Continuous>  insulin lispro (ADMELOG) corrective regimen sliding scale   SubCutaneous every 6 hours  insulin NPH human recombinant 3 Unit(s) SubCutaneous every 6 hours  levETIRAcetam  IVPB 1000 milliGRAM(s) IV Intermittent every 12 hours  pantoprazole  Injectable 40 milliGRAM(s) IV Push daily  petrolatum Ophthalmic Ointment 1 Application(s) Both EYES three times a day  trimethoprim  40 mG/sulfamethoxazole 200 mG Suspension 300 milliGRAM(s) Enteral Tube every 8 hours    MEDICATIONS  (PRN):  acetaminophen   Oral Liquid .. 650 milliGRAM(s) Oral every 6 hours PRN Temp greater or equal to 38C (100.4F)      ALLERGIES:  Allergies    No Known Allergies    Intolerances        OBJECTIVE:  ICU Vital Signs Last 24 Hrs  T(C): 37.4 (26 Dec 2023 15:00), Max: 38.7 (26 Dec 2023 13:00)  T(F): 99.3 (26 Dec 2023 15:00), Max: 101.7 (26 Dec 2023 13:00)  HR: 65 (26 Dec 2023 18:00) (47 - 73)  BP: --  BP(mean): --  ABP: 131/37 (26 Dec 2023 18:00) (105/43 - 202/59)  ABP(mean): 63 (26 Dec 2023 18:00) (54 - 94)  RR: 17 (26 Dec 2023 18:00) (14 - 22)  SpO2: 100% (26 Dec 2023 18:00) (100% - 100%)    O2 Parameters below as of 26 Dec 2023 18:00  Patient On (Oxygen Delivery Method): ventilator    O2 Concentration (%): 30      Mode: CPAP with PS  FiO2: 30  PEEP: 5  PS: 10  MAP: 9  PIP: 16    Adult Advanced Hemodynamics Last 24 Hrs  CVP(mm Hg): --  CVP(cm H2O): --  CO: --  CI: --  PA: --  PA(mean): --  PCWP: --  SVR: --  SVRI: --  PVR: --  PVRI: --  CAPILLARY BLOOD GLUCOSE      POCT Blood Glucose.: 166 mg/dL (26 Dec 2023 16:43)  POCT Blood Glucose.: 187 mg/dL (26 Dec 2023 13:01)  POCT Blood Glucose.: 196 mg/dL (26 Dec 2023 05:14)  POCT Blood Glucose.: 195 mg/dL (26 Dec 2023 02:04)  POCT Blood Glucose.: 199 mg/dL (25 Dec 2023 23:58)    CAPILLARY BLOOD GLUCOSE      POCT Blood Glucose.: 166 mg/dL (26 Dec 2023 16:43)    I&O's Summary    25 Dec 2023 07:01  -  26 Dec 2023 07:00  --------------------------------------------------------  IN: 1694.4 mL / OUT: 1510 mL / NET: 184.4 mL    26 Dec 2023 07:01  -  26 Dec 2023 19:11  --------------------------------------------------------  IN: 730 mL / OUT: 975 mL / NET: -245 mL      Daily     Daily     PHYSICAL EXAMINATION:  General: WN/WD NAD  HEENT: PERRLA, EOMI, moist mucous membranes  Neurology: A&Ox3, nonfocal, NOONAN x 4  Respiratory: CTA B/L, normal respiratory effort, no wheezes, crackles, rales  CV: RRR, S1S2, no murmurs, rubs or gallops  Abdominal: Soft, NT, ND +BS, Last BM  Extremities: No edema, + peripheral pulses  Incisions:   Tubes:    LABS:  ABG - ( 26 Dec 2023 00:30 )  pH, Arterial: 7.44  pH, Blood: x     /  pCO2: 33    /  pO2: 169   / HCO3: 22    / Base Excess: -1.4  /  SaO2: 99.7                                    8.2    7.58  )-----------( 136      ( 26 Dec 2023 00:47 )             25.5     12-26    147<H>  |  113<H>  |  71<H>  ----------------------------<  207<H>  4.4   |  21<L>  |  1.89<H>    Ca    8.7      26 Dec 2023 00:46  Phos  4.6     12-26  Mg     3.1     12-26    TPro  5.8<L>  /  Alb  2.9<L>  /  TBili  1.0  /  DBili  x   /  AST  56<H>  /  ALT  55<H>  /  AlkPhos  53  12-26    LIVER FUNCTIONS - ( 26 Dec 2023 00:46 )  Alb: 2.9 g/dL / Pro: 5.8 g/dL / ALK PHOS: 53 U/L / ALT: 55 U/L / AST: 56 U/L / GGT: x           PT/INR - ( 26 Dec 2023 08:38 )   PT: 12.1 sec;   INR: 1.16 ratio         PTT - ( 26 Dec 2023 08:38 )  PTT:54.5 sec        Urinalysis Basic - ( 26 Dec 2023 00:46 )    Color: x / Appearance: x / SG: x / pH: x  Gluc: 207 mg/dL / Ketone: x  / Bili: x / Urobili: x   Blood: x / Protein: x / Nitrite: x   Leuk Esterase: x / RBC: x / WBC x   Sq Epi: x / Non Sq Epi: x / Bacteria: x        TELEMETRY:     EKG:     IMAGING:       PATIENT:  ASHLEY PRITCHARD  [id number]    CHIEF COMPLAINT:  Patient is a 78y old  Male who presents with a chief complaint of Cardiac Arrest (26 Dec 2023 14:17)      INTERVAL HISTORYOVERNIGHT EVENTS:      REVIEW OF SYSTEMS: Unable to assess ROS because patient intubated.    MEDICATIONS:  MEDICATIONS  (STANDING):  amLODIPine   Tablet 5 milliGRAM(s) Oral daily  artificial  tears Solution 1 Drop(s) Both EYES two times a day  carvedilol 3.125 milliGRAM(s) Oral every 12 hours  chlorhexidine 0.12% Liquid 15 milliLiter(s) Oral Mucosa every 12 hours  chlorhexidine 2% Cloths 1 Application(s) Topical daily  dexMEDEtomidine Infusion 0.2 MICROgram(s)/kG/Hr (4.68 mL/Hr) IV Continuous <Continuous>  heparin  Infusion 1200 Unit(s)/Hr (12.5 mL/Hr) IV Continuous <Continuous>  insulin lispro (ADMELOG) corrective regimen sliding scale   SubCutaneous every 6 hours  insulin NPH human recombinant 3 Unit(s) SubCutaneous every 6 hours  levETIRAcetam  IVPB 1000 milliGRAM(s) IV Intermittent every 12 hours  pantoprazole  Injectable 40 milliGRAM(s) IV Push daily  petrolatum Ophthalmic Ointment 1 Application(s) Both EYES three times a day  trimethoprim  40 mG/sulfamethoxazole 200 mG Suspension 300 milliGRAM(s) Enteral Tube every 8 hours    MEDICATIONS  (PRN):  acetaminophen   Oral Liquid .. 650 milliGRAM(s) Oral every 6 hours PRN Temp greater or equal to 38C (100.4F)      ALLERGIES:  Allergies    No Known Allergies    Intolerances    OBJECTIVE:  ICU Vital Signs Last 24 Hrs  T(C): 37.4 (26 Dec 2023 15:00), Max: 38.7 (26 Dec 2023 13:00)  T(F): 99.3 (26 Dec 2023 15:00), Max: 101.7 (26 Dec 2023 13:00)  HR: 65 (26 Dec 2023 18:00) (47 - 73)  BP: --  BP(mean): --  ABP: 131/37 (26 Dec 2023 18:00) (105/43 - 202/59)  ABP(mean): 63 (26 Dec 2023 18:00) (54 - 94)  RR: 17 (26 Dec 2023 18:00) (14 - 22)  SpO2: 100% (26 Dec 2023 18:00) (100% - 100%)    O2 Parameters below as of 26 Dec 2023 18:00  Patient On (Oxygen Delivery Method): ventilator    O2 Concentration (%): 30      Mode: CPAP with PS  FiO2: 30  PEEP: 5  PS: 10  MAP: 9  PIP: 16    CAPILLARY BLOOD GLUCOSE      POCT Blood Glucose.: 166 mg/dL (26 Dec 2023 16:43)  POCT Blood Glucose.: 187 mg/dL (26 Dec 2023 13:01)  POCT Blood Glucose.: 196 mg/dL (26 Dec 2023 05:14)  POCT Blood Glucose.: 195 mg/dL (26 Dec 2023 02:04)  POCT Blood Glucose.: 199 mg/dL (25 Dec 2023 23:58)    CAPILLARY BLOOD GLUCOSE      POCT Blood Glucose.: 166 mg/dL (26 Dec 2023 16:43)    I&O's Summary    25 Dec 2023 07:01  -  26 Dec 2023 07:00  --------------------------------------------------------  IN: 1694.4 mL / OUT: 1510 mL / NET: 184.4 mL    26 Dec 2023 07:01  -  26 Dec 2023 19:11  --------------------------------------------------------  IN: 730 mL / OUT: 975 mL / NET: -245 mL      Daily     Daily     PHYSICAL EXAMINATION:  General: WN/WD NAD  HEENT: PERRLA, EOMI, moist mucous membranes  Neurology:   Respiratory: CTA B/L, normal respiratory effort, no wheezes, crackles, rales  CV: RRR, S1S2, no murmurs, rubs or gallops  Abdominal: Soft, NT, ND +BS, Last BM  Extremities: No edema, + peripheral pulses  Skin:    LABS:  ABG - ( 26 Dec 2023 00:30 )  pH, Arterial: 7.44  pH, Blood: x     /  pCO2: 33    /  pO2: 169   / HCO3: 22    / Base Excess: -1.4  /  SaO2: 99.7                                    8.2    7.58  )-----------( 136      ( 26 Dec 2023 00:47 )             25.5     12-26    147<H>  |  113<H>  |  71<H>  ----------------------------<  207<H>  4.4   |  21<L>  |  1.89<H>    Ca    8.7      26 Dec 2023 00:46  Phos  4.6     12-26  Mg     3.1     12-26    TPro  5.8<L>  /  Alb  2.9<L>  /  TBili  1.0  /  DBili  x   /  AST  56<H>  /  ALT  55<H>  /  AlkPhos  53  12-26    LIVER FUNCTIONS - ( 26 Dec 2023 00:46 )  Alb: 2.9 g/dL / Pro: 5.8 g/dL / ALK PHOS: 53 U/L / ALT: 55 U/L / AST: 56 U/L / GGT: x           PT/INR - ( 26 Dec 2023 08:38 )   PT: 12.1 sec;   INR: 1.16 ratio         PTT - ( 26 Dec 2023 08:38 )  PTT:54.5 sec        Urinalysis Basic - ( 26 Dec 2023 00:46 )    Color: x / Appearance: x / SG: x / pH: x  Gluc: 207 mg/dL / Ketone: x  / Bili: x / Urobili: x   Blood: x / Protein: x / Nitrite: x   Leuk Esterase: x / RBC: x / WBC x   Sq Epi: x / Non Sq Epi: x / Bacteria: x        TELEMETRY:     EKG:     IMAGING:       PATIENT:  ASHLEY PRITCHARD  [id number]    CHIEF COMPLAINT:  Patient is a 78y old  Male who presents with a chief complaint of Cardiac Arrest (26 Dec 2023 14:17)      INTERVAL HISTORYOVERNIGHT EVENTS:      REVIEW OF SYSTEMS: Unable to assess ROS because patient intubated.    MEDICATIONS:  MEDICATIONS  (STANDING):  amLODIPine   Tablet 5 milliGRAM(s) Oral daily  artificial  tears Solution 1 Drop(s) Both EYES two times a day  carvedilol 3.125 milliGRAM(s) Oral every 12 hours  chlorhexidine 0.12% Liquid 15 milliLiter(s) Oral Mucosa every 12 hours  chlorhexidine 2% Cloths 1 Application(s) Topical daily  dexMEDEtomidine Infusion 0.2 MICROgram(s)/kG/Hr (4.68 mL/Hr) IV Continuous <Continuous>  heparin  Infusion 1200 Unit(s)/Hr (12.5 mL/Hr) IV Continuous <Continuous>  insulin lispro (ADMELOG) corrective regimen sliding scale   SubCutaneous every 6 hours  insulin NPH human recombinant 3 Unit(s) SubCutaneous every 6 hours  levETIRAcetam  IVPB 1000 milliGRAM(s) IV Intermittent every 12 hours  pantoprazole  Injectable 40 milliGRAM(s) IV Push daily  petrolatum Ophthalmic Ointment 1 Application(s) Both EYES three times a day  trimethoprim  40 mG/sulfamethoxazole 200 mG Suspension 300 milliGRAM(s) Enteral Tube every 8 hours    MEDICATIONS  (PRN):  acetaminophen   Oral Liquid .. 650 milliGRAM(s) Oral every 6 hours PRN Temp greater or equal to 38C (100.4F)      ALLERGIES:  Allergies    No Known Allergies    Intolerances    OBJECTIVE:  ICU Vital Signs Last 24 Hrs  T(C): 37.4 (26 Dec 2023 15:00), Max: 38.7 (26 Dec 2023 13:00)  T(F): 99.3 (26 Dec 2023 15:00), Max: 101.7 (26 Dec 2023 13:00)  HR: 65 (26 Dec 2023 18:00) (47 - 73)  BP: --  BP(mean): --  ABP: 131/37 (26 Dec 2023 18:00) (105/43 - 202/59)  ABP(mean): 63 (26 Dec 2023 18:00) (54 - 94)  RR: 17 (26 Dec 2023 18:00) (14 - 22)  SpO2: 100% (26 Dec 2023 18:00) (100% - 100%)    O2 Parameters below as of 26 Dec 2023 18:00  Patient On (Oxygen Delivery Method): ventilator    O2 Concentration (%): 30      Mode: CPAP with PS  FiO2: 30  PEEP: 5  PS: 10  MAP: 9  PIP: 16    CAPILLARY BLOOD GLUCOSE      POCT Blood Glucose.: 166 mg/dL (26 Dec 2023 16:43)  POCT Blood Glucose.: 187 mg/dL (26 Dec 2023 13:01)  POCT Blood Glucose.: 196 mg/dL (26 Dec 2023 05:14)  POCT Blood Glucose.: 195 mg/dL (26 Dec 2023 02:04)  POCT Blood Glucose.: 199 mg/dL (25 Dec 2023 23:58)    CAPILLARY BLOOD GLUCOSE      POCT Blood Glucose.: 166 mg/dL (26 Dec 2023 16:43)    I&O's Summary    25 Dec 2023 07:01  -  26 Dec 2023 07:00  --------------------------------------------------------  IN: 1694.4 mL / OUT: 1510 mL / NET: 184.4 mL    26 Dec 2023 07:01  -  26 Dec 2023 19:11  --------------------------------------------------------  IN: 730 mL / OUT: 975 mL / NET: -245 mL      Daily     Daily     PHYSICAL EXAMINATION:  General: WN/WD NAD  HEENT: PERRLA, EOMI, moist mucous membranes  Neurology:   Respiratory: CTA B/L, normal respiratory effort, no wheezes, crackles, rales  CV: RRR, S1S2, no murmurs, rubs or gallops  Abdominal: Soft, NT, ND +BS, Last BM  Extremities: No edema, + peripheral pulses  Skin:    LABS:  ABG - ( 26 Dec 2023 00:30 )  pH, Arterial: 7.44  pH, Blood: x     /  pCO2: 33    /  pO2: 169   / HCO3: 22    / Base Excess: -1.4  /  SaO2: 99.7                                    8.2    7.58  )-----------( 136      ( 26 Dec 2023 00:47 )             25.5     12-26    147<H>  |  113<H>  |  71<H>  ----------------------------<  207<H>  4.4   |  21<L>  |  1.89<H>    Ca    8.7      26 Dec 2023 00:46  Phos  4.6     12-26  Mg     3.1     12-26    TPro  5.8<L>  /  Alb  2.9<L>  /  TBili  1.0  /  DBili  x   /  AST  56<H>  /  ALT  55<H>  /  AlkPhos  53  12-26    LIVER FUNCTIONS - ( 26 Dec 2023 00:46 )  Alb: 2.9 g/dL / Pro: 5.8 g/dL / ALK PHOS: 53 U/L / ALT: 55 U/L / AST: 56 U/L / GGT: x           PT/INR - ( 26 Dec 2023 08:38 )   PT: 12.1 sec;   INR: 1.16 ratio         PTT - ( 26 Dec 2023 08:38 )  PTT:54.5 sec        Urinalysis Basic - ( 26 Dec 2023 00:46 )    Color: x / Appearance: x / SG: x / pH: x  Gluc: 207 mg/dL / Ketone: x  / Bili: x / Urobili: x   Blood: x / Protein: x / Nitrite: x   Leuk Esterase: x / RBC: x / WBC x   Sq Epi: x / Non Sq Epi: x / Bacteria: x        TELEMETRY:     EKG:     IMAGING:       PATIENT:  ASHLEY PRITCHARD  [id number]    CHIEF COMPLAINT:  Patient is a 78y old  Male who presents with a chief complaint of Cardiac Arrest (26 Dec 2023 14:17)      INTERVAL HISTORY: febrile during the day. no acute events.       REVIEW OF SYSTEMS: Unable to assess ROS because patient intubated.    MEDICATIONS:  MEDICATIONS  (STANDING):  amLODIPine   Tablet 5 milliGRAM(s) Oral daily  artificial  tears Solution 1 Drop(s) Both EYES two times a day  carvedilol 3.125 milliGRAM(s) Oral every 12 hours  chlorhexidine 0.12% Liquid 15 milliLiter(s) Oral Mucosa every 12 hours  chlorhexidine 2% Cloths 1 Application(s) Topical daily  dexMEDEtomidine Infusion 0.2 MICROgram(s)/kG/Hr (4.68 mL/Hr) IV Continuous <Continuous>  heparin  Infusion 1200 Unit(s)/Hr (12.5 mL/Hr) IV Continuous <Continuous>  insulin lispro (ADMELOG) corrective regimen sliding scale   SubCutaneous every 6 hours  insulin NPH human recombinant 3 Unit(s) SubCutaneous every 6 hours  levETIRAcetam  IVPB 1000 milliGRAM(s) IV Intermittent every 12 hours  pantoprazole  Injectable 40 milliGRAM(s) IV Push daily  petrolatum Ophthalmic Ointment 1 Application(s) Both EYES three times a day  trimethoprim  40 mG/sulfamethoxazole 200 mG Suspension 300 milliGRAM(s) Enteral Tube every 8 hours    MEDICATIONS  (PRN):  acetaminophen   Oral Liquid .. 650 milliGRAM(s) Oral every 6 hours PRN Temp greater or equal to 38C (100.4F)      ALLERGIES:  Allergies    No Known Allergies    Intolerances    OBJECTIVE:  ICU Vital Signs Last 24 Hrs  T(C): 37.4 (26 Dec 2023 15:00), Max: 38.7 (26 Dec 2023 13:00)  T(F): 99.3 (26 Dec 2023 15:00), Max: 101.7 (26 Dec 2023 13:00)  HR: 65 (26 Dec 2023 18:00) (47 - 73)  BP: --  BP(mean): --  ABP: 131/37 (26 Dec 2023 18:00) (105/43 - 202/59)  ABP(mean): 63 (26 Dec 2023 18:00) (54 - 94)  RR: 17 (26 Dec 2023 18:00) (14 - 22)  SpO2: 100% (26 Dec 2023 18:00) (100% - 100%)    O2 Parameters below as of 26 Dec 2023 18:00  Patient On (Oxygen Delivery Method): ventilator    O2 Concentration (%): 30      Mode: CPAP with PS  FiO2: 30  PEEP: 5  PS: 10  MAP: 9  PIP: 16    CAPILLARY BLOOD GLUCOSE      POCT Blood Glucose.: 166 mg/dL (26 Dec 2023 16:43)  POCT Blood Glucose.: 187 mg/dL (26 Dec 2023 13:01)  POCT Blood Glucose.: 196 mg/dL (26 Dec 2023 05:14)  POCT Blood Glucose.: 195 mg/dL (26 Dec 2023 02:04)  POCT Blood Glucose.: 199 mg/dL (25 Dec 2023 23:58)    CAPILLARY BLOOD GLUCOSE      POCT Blood Glucose.: 166 mg/dL (26 Dec 2023 16:43)    I&O's Summary    25 Dec 2023 07:01  -  26 Dec 2023 07:00  --------------------------------------------------------  IN: 1694.4 mL / OUT: 1510 mL / NET: 184.4 mL    26 Dec 2023 07:01  -  26 Dec 2023 19:11  --------------------------------------------------------  IN: 730 mL / OUT: 975 mL / NET: -245 mL      Daily     Daily     PHYSICAL EXAMINATION:  General: intubated and off sedation. no purposeful movements/responses to pain.  HEENT: , moist mucous membranes  Neurology: off sedation  Respiratory: CTA B/L, normal respiratory effort, no wheezes, crackles, rales  CV: RRR, S1S2, no murmurs, rubs or gallops  Abdominal: Soft, NT, ND +BS  Extremities: No edema, + peripheral pulses  Skin: ecchymosis to upper extremities bilaterally    LABS:  ABG - ( 26 Dec 2023 00:30 )  pH, Arterial: 7.44  pH, Blood: x     /  pCO2: 33    /  pO2: 169   / HCO3: 22    / Base Excess: -1.4  /  SaO2: 99.7                 8.2    7.58  )-----------( 136      ( 26 Dec 2023 00:47 )             25.5     12-26    147<H>  |  113<H>  |  71<H>  ----------------------------<  207<H>  4.4   |  21<L>  |  1.89<H>    Ca    8.7      26 Dec 2023 00:46  Phos  4.6     12-26  Mg     3.1     12-26    TPro  5.8<L>  /  Alb  2.9<L>  /  TBili  1.0  /  DBili  x   /  AST  56<H>  /  ALT  55<H>  /  AlkPhos  53  12-26    LIVER FUNCTIONS - ( 26 Dec 2023 00:46 )  Alb: 2.9 g/dL / Pro: 5.8 g/dL / ALK PHOS: 53 U/L / ALT: 55 U/L / AST: 56 U/L / GGT: x           PT/INR - ( 26 Dec 2023 08:38 )   PT: 12.1 sec;   INR: 1.16 ratio         PTT - ( 26 Dec 2023 08:38 )  PTT:54.5 sec        Urinalysis Basic - ( 26 Dec 2023 00:46 )    Color: x / Appearance: x / SG: x / pH: x  Gluc: 207 mg/dL / Ketone: x  / Bili: x / Urobili: x   Blood: x / Protein: x / Nitrite: x   Leuk Esterase: x / RBC: x / WBC x   Sq Epi: x / Non Sq Epi: x / Bacteria: x        TELEMETRY:     EKG:     IMAGING:       PATIENT:  ASHLEY PRITCHARD  [id number]    CHIEF COMPLAINT:  Patient is a 78y old  Male who presents with a chief complaint of Cardiac Arrest (26 Dec 2023 14:17)      INTERVAL HISTORY: febrile during the day. no acute events.       REVIEW OF SYSTEMS: Unable to assess ROS because patient intubated.    MEDICATIONS:  MEDICATIONS  (STANDING):  amLODIPine   Tablet 5 milliGRAM(s) Oral daily  artificial  tears Solution 1 Drop(s) Both EYES two times a day  carvedilol 3.125 milliGRAM(s) Oral every 12 hours  chlorhexidine 0.12% Liquid 15 milliLiter(s) Oral Mucosa every 12 hours  chlorhexidine 2% Cloths 1 Application(s) Topical daily  dexMEDEtomidine Infusion 0.2 MICROgram(s)/kG/Hr (4.68 mL/Hr) IV Continuous <Continuous>  heparin  Infusion 1200 Unit(s)/Hr (12.5 mL/Hr) IV Continuous <Continuous>  insulin lispro (ADMELOG) corrective regimen sliding scale   SubCutaneous every 6 hours  insulin NPH human recombinant 3 Unit(s) SubCutaneous every 6 hours  levETIRAcetam  IVPB 1000 milliGRAM(s) IV Intermittent every 12 hours  pantoprazole  Injectable 40 milliGRAM(s) IV Push daily  petrolatum Ophthalmic Ointment 1 Application(s) Both EYES three times a day  trimethoprim  40 mG/sulfamethoxazole 200 mG Suspension 300 milliGRAM(s) Enteral Tube every 8 hours    MEDICATIONS  (PRN):  acetaminophen   Oral Liquid .. 650 milliGRAM(s) Oral every 6 hours PRN Temp greater or equal to 38C (100.4F)      ALLERGIES:  Allergies    No Known Allergies    Intolerances    OBJECTIVE:  ICU Vital Signs Last 24 Hrs  T(C): 37.4 (26 Dec 2023 15:00), Max: 38.7 (26 Dec 2023 13:00)  T(F): 99.3 (26 Dec 2023 15:00), Max: 101.7 (26 Dec 2023 13:00)  HR: 65 (26 Dec 2023 18:00) (47 - 73)  BP: --  BP(mean): --  ABP: 131/37 (26 Dec 2023 18:00) (105/43 - 202/59)  ABP(mean): 63 (26 Dec 2023 18:00) (54 - 94)  RR: 17 (26 Dec 2023 18:00) (14 - 22)  SpO2: 100% (26 Dec 2023 18:00) (100% - 100%)    O2 Parameters below as of 26 Dec 2023 18:00  Patient On (Oxygen Delivery Method): ventilator    O2 Concentration (%): 30      Mode: CPAP with PS  FiO2: 30  PEEP: 5  PS: 10  MAP: 9  PIP: 16    CAPILLARY BLOOD GLUCOSE      POCT Blood Glucose.: 166 mg/dL (26 Dec 2023 16:43)  POCT Blood Glucose.: 187 mg/dL (26 Dec 2023 13:01)  POCT Blood Glucose.: 196 mg/dL (26 Dec 2023 05:14)  POCT Blood Glucose.: 195 mg/dL (26 Dec 2023 02:04)  POCT Blood Glucose.: 199 mg/dL (25 Dec 2023 23:58)    CAPILLARY BLOOD GLUCOSE      POCT Blood Glucose.: 166 mg/dL (26 Dec 2023 16:43)    I&O's Summary    25 Dec 2023 07:01  -  26 Dec 2023 07:00  --------------------------------------------------------  IN: 1694.4 mL / OUT: 1510 mL / NET: 184.4 mL    26 Dec 2023 07:01  -  26 Dec 2023 19:11  --------------------------------------------------------  IN: 730 mL / OUT: 975 mL / NET: -245 mL      Daily     Daily     PHYSICAL EXAMINATION:  General: intubated and off sedation. no purposeful movements/responses to pain.  HEENT: , moist mucous membranes  Neurology: off sedation  Respiratory: CTA B/L, normal respiratory effort, no wheezes, crackles, rales  CV: RRR, S1S2, no murmurs, rubs or gallops  Abdominal: Soft, NT, ND +BS  Extremities: No edema, + peripheral pulses  Skin: ecchymosis to upper extremities bilaterally    LABS:  ABG - ( 26 Dec 2023 00:30 )  pH, Arterial: 7.44  pH, Blood: x     /  pCO2: 33    /  pO2: 169   / HCO3: 22    / Base Excess: -1.4  /  SaO2: 99.7                 8.2    7.58  )-----------( 136      ( 26 Dec 2023 00:47 )             25.5     12-26    147<H>  |  113<H>  |  71<H>  ----------------------------<  207<H>  4.4   |  21<L>  |  1.89<H>    Ca    8.7      26 Dec 2023 00:46  Phos  4.6     12-26  Mg     3.1     12-26    TPro  5.8<L>  /  Alb  2.9<L>  /  TBili  1.0  /  DBili  x   /  AST  56<H>  /  ALT  55<H>  /  AlkPhos  53  12-26    LIVER FUNCTIONS - ( 26 Dec 2023 00:46 )  Alb: 2.9 g/dL / Pro: 5.8 g/dL / ALK PHOS: 53 U/L / ALT: 55 U/L / AST: 56 U/L / GGT: x           PT/INR - ( 26 Dec 2023 08:38 )   PT: 12.1 sec;   INR: 1.16 ratio         PTT - ( 26 Dec 2023 08:38 )  PTT:54.5 sec        Urinalysis Basic - ( 26 Dec 2023 00:46 )    Color: x / Appearance: x / SG: x / pH: x  Gluc: 207 mg/dL / Ketone: x  / Bili: x / Urobili: x   Blood: x / Protein: x / Nitrite: x   Leuk Esterase: x / RBC: x / WBC x   Sq Epi: x / Non Sq Epi: x / Bacteria: x        TELEMETRY:     EKG:     IMAGING:       PATIENT:  ASHLEY PRITCHARD  [id number]    CHIEF COMPLAINT:  Patient is a 78y old  Male who presents with a chief complaint of Cardiac Arrest (26 Dec 2023 14:17)      INTERVAL HISTORY: febrile during the day. no acute events.       REVIEW OF SYSTEMS: Unable to assess ROS because patient intubated.    MEDICATIONS:  MEDICATIONS  (STANDING):  amLODIPine   Tablet 5 milliGRAM(s) Oral daily  artificial  tears Solution 1 Drop(s) Both EYES two times a day  carvedilol 3.125 milliGRAM(s) Oral every 12 hours  chlorhexidine 0.12% Liquid 15 milliLiter(s) Oral Mucosa every 12 hours  chlorhexidine 2% Cloths 1 Application(s) Topical daily  dexMEDEtomidine Infusion 0.2 MICROgram(s)/kG/Hr (4.68 mL/Hr) IV Continuous <Continuous>  heparin  Infusion 1200 Unit(s)/Hr (12.5 mL/Hr) IV Continuous <Continuous>  insulin lispro (ADMELOG) corrective regimen sliding scale   SubCutaneous every 6 hours  insulin NPH human recombinant 3 Unit(s) SubCutaneous every 6 hours  levETIRAcetam  IVPB 1000 milliGRAM(s) IV Intermittent every 12 hours  pantoprazole  Injectable 40 milliGRAM(s) IV Push daily  petrolatum Ophthalmic Ointment 1 Application(s) Both EYES three times a day  trimethoprim  40 mG/sulfamethoxazole 200 mG Suspension 300 milliGRAM(s) Enteral Tube every 8 hours    MEDICATIONS  (PRN):  acetaminophen   Oral Liquid .. 650 milliGRAM(s) Oral every 6 hours PRN Temp greater or equal to 38C (100.4F)      ALLERGIES:  Allergies    No Known Allergies    Intolerances    OBJECTIVE:  ICU Vital Signs Last 24 Hrs  T(C): 37.4 (26 Dec 2023 15:00), Max: 38.7 (26 Dec 2023 13:00)  T(F): 99.3 (26 Dec 2023 15:00), Max: 101.7 (26 Dec 2023 13:00)  HR: 65 (26 Dec 2023 18:00) (47 - 73)  BP: --  BP(mean): --  ABP: 131/37 (26 Dec 2023 18:00) (105/43 - 202/59)  ABP(mean): 63 (26 Dec 2023 18:00) (54 - 94)  RR: 17 (26 Dec 2023 18:00) (14 - 22)  SpO2: 100% (26 Dec 2023 18:00) (100% - 100%)    O2 Parameters below as of 26 Dec 2023 18:00  Patient On (Oxygen Delivery Method): ventilator    O2 Concentration (%): 30      Mode: CPAP with PS  FiO2: 30  PEEP: 5  PS: 10  MAP: 9  PIP: 16    CAPILLARY BLOOD GLUCOSE      POCT Blood Glucose.: 166 mg/dL (26 Dec 2023 16:43)  POCT Blood Glucose.: 187 mg/dL (26 Dec 2023 13:01)  POCT Blood Glucose.: 196 mg/dL (26 Dec 2023 05:14)  POCT Blood Glucose.: 195 mg/dL (26 Dec 2023 02:04)  POCT Blood Glucose.: 199 mg/dL (25 Dec 2023 23:58)    CAPILLARY BLOOD GLUCOSE      POCT Blood Glucose.: 166 mg/dL (26 Dec 2023 16:43)    I&O's Summary    25 Dec 2023 07:01  -  26 Dec 2023 07:00  --------------------------------------------------------  IN: 1694.4 mL / OUT: 1510 mL / NET: 184.4 mL    26 Dec 2023 07:01  -  26 Dec 2023 19:11  --------------------------------------------------------  IN: 730 mL / OUT: 975 mL / NET: -245 mL      Daily     Daily     PHYSICAL EXAMINATION:  General: intubated and off sedation. no purposeful movements/responses to pain.  HEENT: , moist mucous membranes  Neurology: off sedation  Respiratory: CTA B/L, normal respiratory effort, no wheezes, crackles, rales  CV: RRR, S1S2, no murmurs, rubs or gallops  Abdominal: Soft, NT, ND +BS  Extremities: No edema, + peripheral pulses  Skin: ecchymosis to upper extremities bilaterally and R eye    LABS:  ABG - ( 26 Dec 2023 00:30 )  pH, Arterial: 7.44  pH, Blood: x     /  pCO2: 33    /  pO2: 169   / HCO3: 22    / Base Excess: -1.4  /  SaO2: 99.7                 8.2    7.58  )-----------( 136      ( 26 Dec 2023 00:47 )             25.5     12-26    147<H>  |  113<H>  |  71<H>  ----------------------------<  207<H>  4.4   |  21<L>  |  1.89<H>    Ca    8.7      26 Dec 2023 00:46  Phos  4.6     12-26  Mg     3.1     12-26    TPro  5.8<L>  /  Alb  2.9<L>  /  TBili  1.0  /  DBili  x   /  AST  56<H>  /  ALT  55<H>  /  AlkPhos  53  12-26    LIVER FUNCTIONS - ( 26 Dec 2023 00:46 )  Alb: 2.9 g/dL / Pro: 5.8 g/dL / ALK PHOS: 53 U/L / ALT: 55 U/L / AST: 56 U/L / GGT: x           PT/INR - ( 26 Dec 2023 08:38 )   PT: 12.1 sec;   INR: 1.16 ratio         PTT - ( 26 Dec 2023 08:38 )  PTT:54.5 sec        Urinalysis Basic - ( 26 Dec 2023 00:46 )    Color: x / Appearance: x / SG: x / pH: x  Gluc: 207 mg/dL / Ketone: x  / Bili: x / Urobili: x   Blood: x / Protein: x / Nitrite: x   Leuk Esterase: x / RBC: x / WBC x   Sq Epi: x / Non Sq Epi: x / Bacteria: x        TELEMETRY: reviewed    EKG:     IMAGING: reviewed      < from: [facility] Duplex Upper Ext Vein Scan, Bilat (12.22.23 @ 20:49) >  IMPRESSION:    Acute DVT right axillary vein and right brachial vein. Superficial   thrombophlebitis of the right cephalic and basilic veins in addition to   left basilic vein noted.    < end of copied text >  < from: [facility] Duplex Lower Ext Vein Scan, Bilat (12.22.23 @ 20:48) >  IMPRESSION:    Acute DVT left soleal vein. Acute DVT right common femoral vein      < end of copied text >       PATIENT:  ASHLEY PRITCHARD  [id number]    CHIEF COMPLAINT:  Patient is a 78y old  Male who presents with a chief complaint of Cardiac Arrest (26 Dec 2023 14:17)      INTERVAL HISTORY: febrile during the day. no acute events.       REVIEW OF SYSTEMS: Unable to assess ROS because patient intubated.    MEDICATIONS:  MEDICATIONS  (STANDING):  amLODIPine   Tablet 5 milliGRAM(s) Oral daily  artificial  tears Solution 1 Drop(s) Both EYES two times a day  carvedilol 3.125 milliGRAM(s) Oral every 12 hours  chlorhexidine 0.12% Liquid 15 milliLiter(s) Oral Mucosa every 12 hours  chlorhexidine 2% Cloths 1 Application(s) Topical daily  dexMEDEtomidine Infusion 0.2 MICROgram(s)/kG/Hr (4.68 mL/Hr) IV Continuous <Continuous>  heparin  Infusion 1200 Unit(s)/Hr (12.5 mL/Hr) IV Continuous <Continuous>  insulin lispro (ADMELOG) corrective regimen sliding scale   SubCutaneous every 6 hours  insulin NPH human recombinant 3 Unit(s) SubCutaneous every 6 hours  levETIRAcetam  IVPB 1000 milliGRAM(s) IV Intermittent every 12 hours  pantoprazole  Injectable 40 milliGRAM(s) IV Push daily  petrolatum Ophthalmic Ointment 1 Application(s) Both EYES three times a day  trimethoprim  40 mG/sulfamethoxazole 200 mG Suspension 300 milliGRAM(s) Enteral Tube every 8 hours    MEDICATIONS  (PRN):  acetaminophen   Oral Liquid .. 650 milliGRAM(s) Oral every 6 hours PRN Temp greater or equal to 38C (100.4F)      ALLERGIES:  Allergies    No Known Allergies    Intolerances    OBJECTIVE:  ICU Vital Signs Last 24 Hrs  T(C): 37.4 (26 Dec 2023 15:00), Max: 38.7 (26 Dec 2023 13:00)  T(F): 99.3 (26 Dec 2023 15:00), Max: 101.7 (26 Dec 2023 13:00)  HR: 65 (26 Dec 2023 18:00) (47 - 73)  BP: --  BP(mean): --  ABP: 131/37 (26 Dec 2023 18:00) (105/43 - 202/59)  ABP(mean): 63 (26 Dec 2023 18:00) (54 - 94)  RR: 17 (26 Dec 2023 18:00) (14 - 22)  SpO2: 100% (26 Dec 2023 18:00) (100% - 100%)    O2 Parameters below as of 26 Dec 2023 18:00  Patient On (Oxygen Delivery Method): ventilator    O2 Concentration (%): 30      Mode: CPAP with PS  FiO2: 30  PEEP: 5  PS: 10  MAP: 9  PIP: 16    CAPILLARY BLOOD GLUCOSE      POCT Blood Glucose.: 166 mg/dL (26 Dec 2023 16:43)  POCT Blood Glucose.: 187 mg/dL (26 Dec 2023 13:01)  POCT Blood Glucose.: 196 mg/dL (26 Dec 2023 05:14)  POCT Blood Glucose.: 195 mg/dL (26 Dec 2023 02:04)  POCT Blood Glucose.: 199 mg/dL (25 Dec 2023 23:58)    CAPILLARY BLOOD GLUCOSE      POCT Blood Glucose.: 166 mg/dL (26 Dec 2023 16:43)    I&O's Summary    25 Dec 2023 07:01  -  26 Dec 2023 07:00  --------------------------------------------------------  IN: 1694.4 mL / OUT: 1510 mL / NET: 184.4 mL    26 Dec 2023 07:01  -  26 Dec 2023 19:11  --------------------------------------------------------  IN: 730 mL / OUT: 975 mL / NET: -245 mL      Daily     Daily     PHYSICAL EXAMINATION:  General: intubated and off sedation. no purposeful movements/responses to pain.  HEENT: , moist mucous membranes  Neurology: off sedation  Respiratory: CTA B/L, normal respiratory effort, no wheezes, crackles, rales  CV: RRR, S1S2, no murmurs, rubs or gallops  Abdominal: Soft, NT, ND +BS  Extremities: No edema, + peripheral pulses  Skin: ecchymosis to upper extremities bilaterally and R eye    LABS:  ABG - ( 26 Dec 2023 00:30 )  pH, Arterial: 7.44  pH, Blood: x     /  pCO2: 33    /  pO2: 169   / HCO3: 22    / Base Excess: -1.4  /  SaO2: 99.7                 8.2    7.58  )-----------( 136      ( 26 Dec 2023 00:47 )             25.5     12-26    147<H>  |  113<H>  |  71<H>  ----------------------------<  207<H>  4.4   |  21<L>  |  1.89<H>    Ca    8.7      26 Dec 2023 00:46  Phos  4.6     12-26  Mg     3.1     12-26    TPro  5.8<L>  /  Alb  2.9<L>  /  TBili  1.0  /  DBili  x   /  AST  56<H>  /  ALT  55<H>  /  AlkPhos  53  12-26    LIVER FUNCTIONS - ( 26 Dec 2023 00:46 )  Alb: 2.9 g/dL / Pro: 5.8 g/dL / ALK PHOS: 53 U/L / ALT: 55 U/L / AST: 56 U/L / GGT: x           PT/INR - ( 26 Dec 2023 08:38 )   PT: 12.1 sec;   INR: 1.16 ratio         PTT - ( 26 Dec 2023 08:38 )  PTT:54.5 sec        Urinalysis Basic - ( 26 Dec 2023 00:46 )    Color: x / Appearance: x / SG: x / pH: x  Gluc: 207 mg/dL / Ketone: x  / Bili: x / Urobili: x   Blood: x / Protein: x / Nitrite: x   Leuk Esterase: x / RBC: x / WBC x   Sq Epi: x / Non Sq Epi: x / Bacteria: x        TELEMETRY: reviewed    EKG:     IMAGING: reviewed      < from: [facility] Duplex Upper Ext Vein Scan, Bilat (12.22.23 @ 20:49) >  IMPRESSION:    Acute DVT right axillary vein and right brachial vein. Superficial   thrombophlebitis of the right cephalic and basilic veins in addition to   left basilic vein noted.    < end of copied text >  < from: [facility] Duplex Lower Ext Vein Scan, Bilat (12.22.23 @ 20:48) >  IMPRESSION:    Acute DVT left soleal vein. Acute DVT right common femoral vein      < end of copied text >

## 2023-12-26 NOTE — CONSULT NOTE ADULT - PROBLEM SELECTOR RECOMMENDATION 9
- Keep packing in place, will reevaluate tomorrow  - ENT will continue to follow  - Call with questions or concerns
PPS 10%  requires total care  intubated, poor mental status

## 2023-12-26 NOTE — CONSULT NOTE ADULT - SUBJECTIVE AND OBJECTIVE BOX
HPI:  78M w/ PMH HTN HLD presents as transfer from [facility] s/p cardiac arrest. Per reports patient had a witness fall and arrest at home. EMS performed CPR en route to hospital was noted to be in VTach and shocked x2. ROSC was obtained just prior to arrival at [facility]. Pt was intubated placed on levo gtt. On arrival pt noted to have lacerated to R forehead. Pt had CT scans that were negative for intracranial hemorrhage, C-spine fracture, facial fractures. A R frontal hematoma was noted.     On arrival patient w/ dried blood on scalp. Also noted to have bleeding from oral mucosa w/ clots requiring suctioning. Pt otherwise off sedation and non-responsive. Per EMS report pt did receive some sedation and paralytics at [facility].     Per family patient has been feeling 'off' but managing his day to day and did not see a physician. Pt prescribed klonopin by outpatient provider and family believes pt may have been taking more than prescribed. On day of arrest, pt was initially asymptomatic carrying out his routine. He went to the bathroom, wife heard a loud thud and found patient in a pool of blood prompting call to EMS. Pt has not followed with any cardiologist. Has not had any syncope or other events preceding this.  (19 Dec 2023 14:54)    PERTINENT PM/SXH:   HTN, age 0-18    HTN (hypertension)    HLD (hyperlipidemia)        FAMILY HISTORY: unable to obtain 2/2 mental status    Family Hx substance abuse [ ]yes [x ]no  ITEMS NOT CHECKED ARE NOT PRESENT    SOCIAL HISTORY:   Significant other/partner[x ]  Children[ x]  Holiness/Spirituality:  Substance hx:  [ ]   Tobacco hx:  [ ]   Alcohol hx: [ ]   Home Opioid hx:  [ ] I-Stop Reference No:  Living Situation: [ x]Home  [ ]Long term care  [ ]Rehab [ ]Other    ADVANCE DIRECTIVES:    DNR/MOLST  [ ]  Living Will  [ ]   DECISION MAKER(s):  [ ] Health Care Proxy(s)  [ ] Surrogate(s)  [ ] Guardian           Name(s): Phone Number(s):    BASELINE (I)ADL(s) (prior to admission):  [place]: [ ]Total  [ ] Moderate [ ]Dependent    Allergies    No Known Allergies    Intolerances    MEDICATIONS  (STANDING):  amLODIPine   Tablet 5 milliGRAM(s) Oral daily  artificial  tears Solution 1 Drop(s) Both EYES two times a day  carvedilol 3.125 milliGRAM(s) Oral every 12 hours  cefepime   IVPB 2000 milliGRAM(s) IV Intermittent every 12 hours  chlorhexidine 0.12% Liquid 15 milliLiter(s) Oral Mucosa every 12 hours  chlorhexidine 2% Cloths 1 Application(s) Topical daily  dexMEDEtomidine Infusion 0.2 MICROgram(s)/kG/Hr (4.68 mL/Hr) IV Continuous <Continuous>  heparin  Infusion 1200 Unit(s)/Hr (12.5 mL/Hr) IV Continuous <Continuous>  insulin lispro (ADMELOG) corrective regimen sliding scale   SubCutaneous every 6 hours  insulin NPH human recombinant 3 Unit(s) SubCutaneous every 6 hours  levETIRAcetam  IVPB 1000 milliGRAM(s) IV Intermittent every 12 hours  pantoprazole  Injectable 40 milliGRAM(s) IV Push daily  petrolatum Ophthalmic Ointment 1 Application(s) Both EYES three times a day    MEDICATIONS  (PRN):  acetaminophen   Oral Liquid .. 650 milliGRAM(s) Oral every 6 hours PRN Temp greater or equal to 38C (100.4F)    PRESENT SYMPTOMS: [ x]Unable to self-report  [ ] CPOT [ x] PAINADs [x ] RDOS  Source if other than patient:  [ ]Family   [ ]Team     Pain: [ ]yes [ ]no  QOL impact -   Location -                    Aggravating factors -  Quality -  Radiation -  Timing-  Severity (0-10 scale):  Minimal acceptable level (0-10 scale):     CPOT:    https://www.[facility].org/getattachment/[email or web address]/Critical-Care-Pain-Observation-Tool-(CPOT)    PAIN AD Score:   http://geriatrictoolkit.[place].[facility]/cog/painad.pdf (press ctrl +  left click to view)    Dyspnea:                           [ ]Mild [ ]Moderate [ ]Severe      RDOS:  0 to 2  minimal or no respiratory distress   3  mild distress  4 to 6 moderate distress  >7 severe distress  https://homecareinformation.net/handouts/hen/Respiratory_Distress_Observation_Scale.pdf (Ctrl +  left click to view)     Anxiety:                             [ ]Mild [ ]Moderate [ ]Severe  Fatigue:                             [ ]Mild [ ]Moderate [ ]Severe  Nausea:                             [ ]Mild [ ]Moderate [ ]Severe  Loss of appetite:              [ ]Mild [ ]Moderate [ ]Severe  Constipation:                    [ ]Mild [ ]Moderate [ ]Severe    PCSSQ[Palliative Care Spiritual Screening Question]   Severity (0-10):  Score of 4 or > indicate consideration of Chaplaincy referral.  Chaplaincy Referral: [ ] yes [ ] refused [ ] following [ ] Deferred     Caregiver [place]? : [ ] yes [ ] no [ ] Deferred [ ] Declined             Social work referral [ ] Patient & Family Centered Care Referral [ ]     Anticipatory Grief present?:  [ ] yes [ ] no  [ ] Deferred                  Social work referral [ ] Chaplaincy Referral[ ]      Other Symptoms:  [ ]All other review of systems negative     Palliative Performance Status Version 2:    10     %    http://npcrc.org/files/news/palliative_performance_scale_ppsv2.pdf  PHYSICAL EXAM:  Vital Signs Last 24 Hrs  T(C): 38.7 (26 Dec 2023 13:00), Max: 38.7 (26 Dec 2023 13:00)  T(F): 101.7 (26 Dec 2023 13:00), Max: 101.7 (26 Dec 2023 13:00)  HR: 63 (26 Dec 2023 13:00) (47 - 73)  BP: --  BP(mean): --  RR: 19 (26 Dec 2023 13:00) (14 - 28)  SpO2: 100% (26 Dec 2023 13:00) (99% - 100%)    Parameters below as of 26 Dec 2023 13:00  Patient On (Oxygen Delivery Method): CPAP 10/5     I&O's Summary    25 Dec 2023 07:01  -  26 Dec 2023 07:00  --------------------------------------------------------  IN: 1694.4 mL / OUT: 1510 mL / NET: 184.4 mL    26 Dec 2023 07:01  -  26 Dec 2023 14:18  --------------------------------------------------------  IN: 417.5 mL / OUT: 630 mL / NET: -212.5 mL      GENERAL: [ ]Cachexia    [ ]Alert  [ ]Oriented x   [ ]Lethargic  [ x]Unarousable  [ ]Verbal  [ ]Non-Verbal  Behavioral:   [ ] Anxiety  [ ] Delirium [ ] Agitation [ ] Other  HEENT:  [ ]Normal   [ ]Dry mouth   [x ]ET Tube/Trach  [ ]Oral lesions  PULMONARY: vent  [x ]Clear [ ]Tachypnea  [ ]Audible excessive secretions   [ ]Rhonchi        [ ]Right [ ]Left [ ]Bilateral  [ ]Crackles        [ ]Right [ ]Left [ ]Bilateral  [ ]Wheezing     [ ]Right [ ]Left [ ]Bilateral  [ ]Diminished breath sounds [ ]right [ ]left [ ]bilateral  CARDIOVASCULAR:    [x ]Regular [ ]Irregular [ ]Tachy  [ ]Miguel Angel [ ]Murmur [ ]Other  GASTROINTESTINAL:  [x ]Soft  [ ]Distended   [ ]+BS  [x ]Non tender [ ]Tender  [ ]Other [ ]PEG [ x]OGT/ NGT  Last BM:  GENITOURINARY:  [ ]Normal [ x] Incontinent   [ ]Oliguria/Anuria   [x ]Porter  MUSCULOSKELETAL:   [ ]Normal   [ ]Weakness  [ x]Bed/Wheelchair bound [ ]Edema  NEUROLOGIC: +initiating breaths, not following commands  [ ]No focal deficits  [ ]Cognitive impairment  [ ]Dysphagia [ ]Dysarthria [ ]Paresis [x ]Other   SKIN:   [ ]Normal  [ ]Rash  [ ]Other  [ ]Pressure ulcer(s)       Present on admission [ ]y [ ]n    CRITICAL CARE:  [ ] Shock Present  [ ]Septic [ ]Cardiogenic [ ]Neurologic [ ]Hypovolemic  [ ]  Vasopressors [ ]  Inotropes   [x ]Respiratory failure present [x ]Mechanical ventilation [ ]Non-invasive ventilatory support [ ]High flow  Mode: AC/ CMV (Assist Control/ Continuous Mandatory Ventilation), RR (machine): 14, TV (machine): 500, FiO2: 40, PEEP: 5, ITime: 1, MAP: 9, PIP: 21  [x ]Acute  [ ]Chronic [ x]Hypoxic  [ ]Hypercarbic [ ]Other  [ ]Other organ failure     LABS:                        8.2    7.58  )-----------( 136      ( 26 Dec 2023 00:47 )             25.5   12-26    147<H>  |  113<H>  |  71<H>  ----------------------------<  207<H>  4.4   |  21<L>  |  1.89<H>    Ca    8.7      26 Dec 2023 00:46  Phos  4.6     12-26  Mg     3.1     12-26    TPro  5.8<L>  /  Alb  2.9<L>  /  TBili  1.0  /  DBili  x   /  AST  56<H>  /  ALT  55<H>  /  AlkPhos  53  12-26  PT/INR - ( 26 Dec 2023 08:38 )   PT: 12.1 sec;   INR: 1.16 ratio         PTT - ( 26 Dec 2023 08:38 )  PTT:54.5 sec    Urinalysis Basic - ( 26 Dec 2023 00:46 )    Color: x / Appearance: x / SG: x / pH: x  Gluc: 207 mg/dL / Ketone: x  / Bili: x / Urobili: x   Blood: x / Protein: x / Nitrite: x   Leuk Esterase: x / RBC: x / WBC x   Sq Epi: x / Non Sq Epi: x / Bacteria: x      RADIOLOGY & ADDITIONAL STUDIES:  < from: CT Head No Cont (12.25.23 @ 11:36) >    ACC: [id number] EXAM:  CT CERVICAL SPINE   ORDERED BY:  KRISTOFER GEORGE     ACC: [id number] EXAM:  CT BRAIN   ORDERED BY:  KRISTOFER GEORGE     PROCEDURE DATE:  12/25/2023          INTERPRETATION:  CT head without IV contrast    CLINICAL INFORMATION:  Follow-up   Intracranial hemorrhage. No with   hypertension    TECHNIQUE: Contiguous axial 5 mm sections were obtained through the head.   Sagittal and coronal 2-D reformatted images were also obtained.   This   scan was performed using automatic exposure control (radiation dose   reduction software) to obtain a diagnostic image quality scan with   patient dose as low as reasonably achievable.    FINDINGS:   CT dated 12/25/2023 available for review.    The brain again demonstrates scattered subarachnoid hemorrhage in the   BILATERAL parietal and posterior frontal lobes.   No acute cerebral   cortical infarct is seen. Increasing density in the region of the RIGHT   transverse sinus and RIGHT sigmoid sinus which may reflect thrombus. No   mass effectis found in the brain.    The ventricles, sulci and basal cisterns appear unremarkable.    The orbits are unremarkable.  The paranasal sinuses are significant for   mild mucosal thickening in the RIGHT maxillary and sphenoid sinuses.   Hemorrhage seen in the RIGHT maxillary sinus. The nasal cavity appears   intact.  The nasopharynx is symmetric.  The central skull base, petrous   temporal bones and calvarium remain intact. Moderate RIGHT frontal scalp   hematoma is noted, increased in size.        CT cervical spine without IV contrast    CLINICAL INFORMATION: Fracture, trauma, neck pain.  Neck pain, spinal   stenosis, spondylosis. MCTOccipital SDH also SAH now w/ HTN    r/o   worsening bleed    TECHNIQUE:  Contiguous axial 2.0 mm sections were obtained through the   cervical spine using a single helical acquisition.  Additional 2 mm   sagittal and coronal reformatted reconstructions of the spine were   obtained.  These additional reformatted images were used to evaluate the   spine for alignment, vertebral fractures and the integrity of the the   posterior elements.   This scan was performed using automatic exposure   control (radiation dose reduction software) to obtain a diagnostic image   quality scan with patient dose as low as reasonably achievable.    FINDINGS:   No prior similar studies are available for review    Cervical vertebral body heights are maintained. No vertebral fracture is   seen. No destructive bone lesion is found.  Alignment is preserved.    Facet joints appear intact and aligned.    Cervical intervertebral disc spaces show moderate degenerative disc   disease and spondylosis at C5-6 and C6-7 with loss of disc height and   associated degenerative endplate changes. There is narrowing of the RIGHT   C3-4, RIGHT C4-5, BILATERAL C5-6 and BILATERAL C6-7 neural foramina due   to uncovertebral spurring and facet osteophytic hypertrophy. Minimal   thecal sac impingement at C5-6 and C6-7 due to posterior osteophytic   ridge/disc complexes.    The skull base appears intact.  No neck mass is recognized.  Paraspinal   soft tissues appear intact. Visualized lymph nodes appear to be within   physiologic size limits.  Small RIGHT pleural effusion. ET tube and NG   tube noted.            IMPRESSION:    CT HEAD: Scattered subarachnoid hemorrhage in the BILATERAL parietal and   posterior frontal lobes. Increasing density in the region of the RIGHT   transverse sinus and RIGHT sigmoid sinus which may reflect thrombus.      Recommend MRI and MRV brain with and without gadolinium for complete   evaluation.  Moderate RIGHT frontal scalp hematoma is noted, increased in   size.    CT cervical spine:   No vertebral fracture is recognized.  Moderate   degenerative disc disease and spondylosis at C5-6 and C6-7 withloss of   disc height and associated degenerative endplate changes. There is   narrowing of the RIGHT C3-4, RIGHT C4-5, BILATERAL C5-6 and BILATERAL   C6-7 neural foramina due to uncovertebral spurring and facet osteophytic   hypertrophy. Minimal thecal sac impingement at C5-6 and C6-7 due to   posterior osteophytic ridge/disc complexes.        --- End of Report ---            MARIANELA NEFF MD; Attending Radiologist  This document has been electronically signed. Dec 25 2023 11:58AM    < end of copied text >      PROTEIN CALORIE MALNUTRITION PRESENT: [ ]mild [ ]moderate [ ]severe [ ]underweight [ ]morbid obesity  https://www.andeal.org/vault/2440/web/files/ONC/Table_Clinical%20Characteristics%20to%20Document%20Malnutrition-White%20JV%20et%20al%202012.pdf    Height (cm): 180.3 (12-19-23 @ 14:45)  Weight (kg): 93.5 (12-19-23 @ 14:45)  BMI (kg/m2): 28.8 (12-19-23 @ 14:45)    [x ]PPSV2 < or = to 30% [ ]significant weight loss  [x ]poor nutritional intake  [ ]anasarca[x ]Artificial Nutrition      Other REFERRALS:  [ ]Hospice  [ ]Child Life  [x ]Social Work  [ ]Case management [ ]Holistic Therapy     Goals of Care Document:  HPI:  78M w/ PMH HTN HLD presents as transfer from [facility] s/p cardiac arrest. Per reports patient had a witness fall and arrest at home. EMS performed CPR en route to hospital was noted to be in VTach and shocked x2. ROSC was obtained just prior to arrival at [facility]. Pt was intubated placed on levo gtt. On arrival pt noted to have lacerated to R forehead. Pt had CT scans that were negative for intracranial hemorrhage, C-spine fracture, facial fractures. A R frontal hematoma was noted.     On arrival patient w/ dried blood on scalp. Also noted to have bleeding from oral mucosa w/ clots requiring suctioning. Pt otherwise off sedation and non-responsive. Per EMS report pt did receive some sedation and paralytics at [facility].     Per family patient has been feeling 'off' but managing his day to day and did not see a physician. Pt prescribed klonopin by outpatient provider and family believes pt may have been taking more than prescribed. On day of arrest, pt was initially asymptomatic carrying out his routine. He went to the bathroom, wife heard a loud thud and found patient in a pool of blood prompting call to EMS. Pt has not followed with any cardiologist. Has not had any syncope or other events preceding this.  (19 Dec 2023 14:54)    PERTINENT PM/SXH:   HTN, age 0-18    HTN (hypertension)    HLD (hyperlipidemia)        FAMILY HISTORY: unable to obtain 2/2 mental status    Family Hx substance abuse [ ]yes [x ]no  ITEMS NOT CHECKED ARE NOT PRESENT    SOCIAL HISTORY:   Significant other/partner[x ]  Children[ x]  Denominational/Spirituality:  Substance hx:  [ ]   Tobacco hx:  [ ]   Alcohol hx: [ ]   Home Opioid hx:  [ ] I-Stop Reference No:  Living Situation: [ x]Home  [ ]Long term care  [ ]Rehab [ ]Other    ADVANCE DIRECTIVES:    DNR/MOLST  [ ]  Living Will  [ ]   DECISION MAKER(s):  [ ] Health Care Proxy(s)  [ ] Surrogate(s)  [ ] Guardian           Name(s): Phone Number(s):    BASELINE (I)ADL(s) (prior to admission):  [place]: [ ]Total  [ ] Moderate [ ]Dependent    Allergies    No Known Allergies    Intolerances    MEDICATIONS  (STANDING):  amLODIPine   Tablet 5 milliGRAM(s) Oral daily  artificial  tears Solution 1 Drop(s) Both EYES two times a day  carvedilol 3.125 milliGRAM(s) Oral every 12 hours  cefepime   IVPB 2000 milliGRAM(s) IV Intermittent every 12 hours  chlorhexidine 0.12% Liquid 15 milliLiter(s) Oral Mucosa every 12 hours  chlorhexidine 2% Cloths 1 Application(s) Topical daily  dexMEDEtomidine Infusion 0.2 MICROgram(s)/kG/Hr (4.68 mL/Hr) IV Continuous <Continuous>  heparin  Infusion 1200 Unit(s)/Hr (12.5 mL/Hr) IV Continuous <Continuous>  insulin lispro (ADMELOG) corrective regimen sliding scale   SubCutaneous every 6 hours  insulin NPH human recombinant 3 Unit(s) SubCutaneous every 6 hours  levETIRAcetam  IVPB 1000 milliGRAM(s) IV Intermittent every 12 hours  pantoprazole  Injectable 40 milliGRAM(s) IV Push daily  petrolatum Ophthalmic Ointment 1 Application(s) Both EYES three times a day    MEDICATIONS  (PRN):  acetaminophen   Oral Liquid .. 650 milliGRAM(s) Oral every 6 hours PRN Temp greater or equal to 38C (100.4F)    PRESENT SYMPTOMS: [ x]Unable to self-report  [ ] CPOT [ x] PAINADs [x ] RDOS  Source if other than patient:  [ ]Family   [ ]Team     Pain: [ ]yes [ ]no  QOL impact -   Location -                    Aggravating factors -  Quality -  Radiation -  Timing-  Severity (0-10 scale):  Minimal acceptable level (0-10 scale):     CPOT:    https://www.[facility].org/getattachment/[email or web address]/Critical-Care-Pain-Observation-Tool-(CPOT)    PAIN AD Score:   http://geriatrictoolkit.[place].[facility]/cog/painad.pdf (press ctrl +  left click to view)    Dyspnea:                           [ ]Mild [ ]Moderate [ ]Severe      RDOS:  0 to 2  minimal or no respiratory distress   3  mild distress  4 to 6 moderate distress  >7 severe distress  https://homecareinformation.net/handouts/hen/Respiratory_Distress_Observation_Scale.pdf (Ctrl +  left click to view)     Anxiety:                             [ ]Mild [ ]Moderate [ ]Severe  Fatigue:                             [ ]Mild [ ]Moderate [ ]Severe  Nausea:                             [ ]Mild [ ]Moderate [ ]Severe  Loss of appetite:              [ ]Mild [ ]Moderate [ ]Severe  Constipation:                    [ ]Mild [ ]Moderate [ ]Severe    PCSSQ[Palliative Care Spiritual Screening Question]   Severity (0-10):  Score of 4 or > indicate consideration of Chaplaincy referral.  Chaplaincy Referral: [ ] yes [ ] refused [ ] following [ ] Deferred     Caregiver [place]? : [ ] yes [ ] no [ ] Deferred [ ] Declined             Social work referral [ ] Patient & Family Centered Care Referral [ ]     Anticipatory Grief present?:  [ ] yes [ ] no  [ ] Deferred                  Social work referral [ ] Chaplaincy Referral[ ]      Other Symptoms:  [ ]All other review of systems negative     Palliative Performance Status Version 2:    10     %    http://npcrc.org/files/news/palliative_performance_scale_ppsv2.pdf  PHYSICAL EXAM:  Vital Signs Last 24 Hrs  T(C): 38.7 (26 Dec 2023 13:00), Max: 38.7 (26 Dec 2023 13:00)  T(F): 101.7 (26 Dec 2023 13:00), Max: 101.7 (26 Dec 2023 13:00)  HR: 63 (26 Dec 2023 13:00) (47 - 73)  BP: --  BP(mean): --  RR: 19 (26 Dec 2023 13:00) (14 - 28)  SpO2: 100% (26 Dec 2023 13:00) (99% - 100%)    Parameters below as of 26 Dec 2023 13:00  Patient On (Oxygen Delivery Method): CPAP 10/5     I&O's Summary    25 Dec 2023 07:01  -  26 Dec 2023 07:00  --------------------------------------------------------  IN: 1694.4 mL / OUT: 1510 mL / NET: 184.4 mL    26 Dec 2023 07:01  -  26 Dec 2023 14:18  --------------------------------------------------------  IN: 417.5 mL / OUT: 630 mL / NET: -212.5 mL      GENERAL: [ ]Cachexia    [ ]Alert  [ ]Oriented x   [ ]Lethargic  [ x]Unarousable  [ ]Verbal  [ ]Non-Verbal  Behavioral:   [ ] Anxiety  [ ] Delirium [ ] Agitation [ ] Other  HEENT:  [ ]Normal   [ ]Dry mouth   [x ]ET Tube/Trach  [ ]Oral lesions  PULMONARY: vent  [x ]Clear [ ]Tachypnea  [ ]Audible excessive secretions   [ ]Rhonchi        [ ]Right [ ]Left [ ]Bilateral  [ ]Crackles        [ ]Right [ ]Left [ ]Bilateral  [ ]Wheezing     [ ]Right [ ]Left [ ]Bilateral  [ ]Diminished breath sounds [ ]right [ ]left [ ]bilateral  CARDIOVASCULAR:    [x ]Regular [ ]Irregular [ ]Tachy  [ ]Miguel Angel [ ]Murmur [ ]Other  GASTROINTESTINAL:  [x ]Soft  [ ]Distended   [ ]+BS  [x ]Non tender [ ]Tender  [ ]Other [ ]PEG [ x]OGT/ NGT  Last BM:  GENITOURINARY:  [ ]Normal [ x] Incontinent   [ ]Oliguria/Anuria   [x ]Porter  MUSCULOSKELETAL:   [ ]Normal   [ ]Weakness  [ x]Bed/Wheelchair bound [ ]Edema  NEUROLOGIC: +initiating breaths, not following commands  [ ]No focal deficits  [ ]Cognitive impairment  [ ]Dysphagia [ ]Dysarthria [ ]Paresis [x ]Other   SKIN:   [ ]Normal  [ ]Rash  [ ]Other  [ ]Pressure ulcer(s)       Present on admission [ ]y [ ]n    CRITICAL CARE:  [ ] Shock Present  [ ]Septic [ ]Cardiogenic [ ]Neurologic [ ]Hypovolemic  [ ]  Vasopressors [ ]  Inotropes   [x ]Respiratory failure present [x ]Mechanical ventilation [ ]Non-invasive ventilatory support [ ]High flow  Mode: AC/ CMV (Assist Control/ Continuous Mandatory Ventilation), RR (machine): 14, TV (machine): 500, FiO2: 40, PEEP: 5, ITime: 1, MAP: 9, PIP: 21  [x ]Acute  [ ]Chronic [ x]Hypoxic  [ ]Hypercarbic [ ]Other  [ ]Other organ failure     LABS:                        8.2    7.58  )-----------( 136      ( 26 Dec 2023 00:47 )             25.5   12-26    147<H>  |  113<H>  |  71<H>  ----------------------------<  207<H>  4.4   |  21<L>  |  1.89<H>    Ca    8.7      26 Dec 2023 00:46  Phos  4.6     12-26  Mg     3.1     12-26    TPro  5.8<L>  /  Alb  2.9<L>  /  TBili  1.0  /  DBili  x   /  AST  56<H>  /  ALT  55<H>  /  AlkPhos  53  12-26  PT/INR - ( 26 Dec 2023 08:38 )   PT: 12.1 sec;   INR: 1.16 ratio         PTT - ( 26 Dec 2023 08:38 )  PTT:54.5 sec    Urinalysis Basic - ( 26 Dec 2023 00:46 )    Color: x / Appearance: x / SG: x / pH: x  Gluc: 207 mg/dL / Ketone: x  / Bili: x / Urobili: x   Blood: x / Protein: x / Nitrite: x   Leuk Esterase: x / RBC: x / WBC x   Sq Epi: x / Non Sq Epi: x / Bacteria: x      RADIOLOGY & ADDITIONAL STUDIES:  < from: CT Head No Cont (12.25.23 @ 11:36) >    ACC: [id number] EXAM:  CT CERVICAL SPINE   ORDERED BY:  KRISTOFER GEORGE     ACC: [id number] EXAM:  CT BRAIN   ORDERED BY:  KRISTOFER GEORGE     PROCEDURE DATE:  12/25/2023          INTERPRETATION:  CT head without IV contrast    CLINICAL INFORMATION:  Follow-up   Intracranial hemorrhage. No with   hypertension    TECHNIQUE: Contiguous axial 5 mm sections were obtained through the head.   Sagittal and coronal 2-D reformatted images were also obtained.   This   scan was performed using automatic exposure control (radiation dose   reduction software) to obtain a diagnostic image quality scan with   patient dose as low as reasonably achievable.    FINDINGS:   CT dated 12/25/2023 available for review.    The brain again demonstrates scattered subarachnoid hemorrhage in the   BILATERAL parietal and posterior frontal lobes.   No acute cerebral   cortical infarct is seen. Increasing density in the region of the RIGHT   transverse sinus and RIGHT sigmoid sinus which may reflect thrombus. No   mass effectis found in the brain.    The ventricles, sulci and basal cisterns appear unremarkable.    The orbits are unremarkable.  The paranasal sinuses are significant for   mild mucosal thickening in the RIGHT maxillary and sphenoid sinuses.   Hemorrhage seen in the RIGHT maxillary sinus. The nasal cavity appears   intact.  The nasopharynx is symmetric.  The central skull base, petrous   temporal bones and calvarium remain intact. Moderate RIGHT frontal scalp   hematoma is noted, increased in size.        CT cervical spine without IV contrast    CLINICAL INFORMATION: Fracture, trauma, neck pain.  Neck pain, spinal   stenosis, spondylosis. MCTOccipital SDH also SAH now w/ HTN    r/o   worsening bleed    TECHNIQUE:  Contiguous axial 2.0 mm sections were obtained through the   cervical spine using a single helical acquisition.  Additional 2 mm   sagittal and coronal reformatted reconstructions of the spine were   obtained.  These additional reformatted images were used to evaluate the   spine for alignment, vertebral fractures and the integrity of the the   posterior elements.   This scan was performed using automatic exposure   control (radiation dose reduction software) to obtain a diagnostic image   quality scan with patient dose as low as reasonably achievable.    FINDINGS:   No prior similar studies are available for review    Cervical vertebral body heights are maintained. No vertebral fracture is   seen. No destructive bone lesion is found.  Alignment is preserved.    Facet joints appear intact and aligned.    Cervical intervertebral disc spaces show moderate degenerative disc   disease and spondylosis at C5-6 and C6-7 with loss of disc height and   associated degenerative endplate changes. There is narrowing of the RIGHT   C3-4, RIGHT C4-5, BILATERAL C5-6 and BILATERAL C6-7 neural foramina due   to uncovertebral spurring and facet osteophytic hypertrophy. Minimal   thecal sac impingement at C5-6 and C6-7 due to posterior osteophytic   ridge/disc complexes.    The skull base appears intact.  No neck mass is recognized.  Paraspinal   soft tissues appear intact. Visualized lymph nodes appear to be within   physiologic size limits.  Small RIGHT pleural effusion. ET tube and NG   tube noted.            IMPRESSION:    CT HEAD: Scattered subarachnoid hemorrhage in the BILATERAL parietal and   posterior frontal lobes. Increasing density in the region of the RIGHT   transverse sinus and RIGHT sigmoid sinus which may reflect thrombus.      Recommend MRI and MRV brain with and without gadolinium for complete   evaluation.  Moderate RIGHT frontal scalp hematoma is noted, increased in   size.    CT cervical spine:   No vertebral fracture is recognized.  Moderate   degenerative disc disease and spondylosis at C5-6 and C6-7 withloss of   disc height and associated degenerative endplate changes. There is   narrowing of the RIGHT C3-4, RIGHT C4-5, BILATERAL C5-6 and BILATERAL   C6-7 neural foramina due to uncovertebral spurring and facet osteophytic   hypertrophy. Minimal thecal sac impingement at C5-6 and C6-7 due to   posterior osteophytic ridge/disc complexes.        --- End of Report ---            MARIANELA NEFF MD; Attending Radiologist  This document has been electronically signed. Dec 25 2023 11:58AM    < end of copied text >      PROTEIN CALORIE MALNUTRITION PRESENT: [ ]mild [ ]moderate [ ]severe [ ]underweight [ ]morbid obesity  https://www.andeal.org/vault/2440/web/files/ONC/Table_Clinical%20Characteristics%20to%20Document%20Malnutrition-White%20JV%20et%20al%202012.pdf    Height (cm): 180.3 (12-19-23 @ 14:45)  Weight (kg): 93.5 (12-19-23 @ 14:45)  BMI (kg/m2): 28.8 (12-19-23 @ 14:45)    [x ]PPSV2 < or = to 30% [ ]significant weight loss  [x ]poor nutritional intake  [ ]anasarca[x ]Artificial Nutrition      Other REFERRALS:  [ ]Hospice  [ ]Child Life  [x ]Social Work  [ ]Case management [ ]Holistic Therapy     Goals of Care Document:  HPI:  78M w/ PMH HTN HLD presents as transfer from [facility] s/p cardiac arrest. Per reports patient had a witness fall and arrest at home. EMS performed CPR en route to hospital was noted to be in VTach and shocked x2. ROSC was obtained just prior to arrival at [facility]. Pt was intubated placed on levo gtt. On arrival pt noted to have lacerated to R forehead. Pt had CT scans that were negative for intracranial hemorrhage, C-spine fracture, facial fractures. A R frontal hematoma was noted.     On arrival patient w/ dried blood on scalp. Also noted to have bleeding from oral mucosa w/ clots requiring suctioning. Pt otherwise off sedation and non-responsive. Per EMS report pt did receive some sedation and paralytics at [facility].     Per family patient has been feeling 'off' but managing his day to day and did not see a physician. Pt prescribed klonopin by outpatient provider and family believes pt may have been taking more than prescribed. On day of arrest, pt was initially asymptomatic carrying out his routine. He went to the bathroom, wife heard a loud thud and found patient in a pool of blood prompting call to EMS. Pt has not followed with any cardiologist. Has not had any syncope or other events preceding this.  (19 Dec 2023 14:54)    PERTINENT PM/SXH:   HTN, age 0-18    HTN (hypertension)    HLD (hyperlipidemia)    FAMILY HISTORY: unable to obtain 2/2 mental status    Family Hx substance abuse [ ]yes [x ]no  ITEMS NOT CHECKED ARE NOT PRESENT    SOCIAL HISTORY:   Significant other/partner[x ]  Children[ x]  Anabaptist/Spirituality:  Substance hx:  [ ]   Tobacco hx:  [ ]   Alcohol hx: [ ]   Home Opioid hx:  [ ] I-Stop Reference No:  Living Situation: [ x]Home  [ ]Long term care  [ ]Rehab [ ]Other    ADVANCE DIRECTIVES:    DNR/MOLST  [ ]  Living Will  [ ]   DECISION MAKER(s):  [ ] Health Care Proxy(s)  [x ] Surrogate(s)  [ ] Guardian           Name(s): Phone Number(s): spouse, Rosina number per EMR  Luke (son) [phone]  Ny (daughter) [phone]    BASELINE (I)ADL(s) (prior to admission):  [place]: [ ]Total  [x ] Moderate [ ]Dependent    Allergies    No Known Allergies    Intolerances    MEDICATIONS  (STANDING):  amLODIPine   Tablet 5 milliGRAM(s) Oral daily  artificial  tears Solution 1 Drop(s) Both EYES two times a day  carvedilol 3.125 milliGRAM(s) Oral every 12 hours  cefepime   IVPB 2000 milliGRAM(s) IV Intermittent every 12 hours  chlorhexidine 0.12% Liquid 15 milliLiter(s) Oral Mucosa every 12 hours  chlorhexidine 2% Cloths 1 Application(s) Topical daily  dexMEDEtomidine Infusion 0.2 MICROgram(s)/kG/Hr (4.68 mL/Hr) IV Continuous <Continuous>  heparin  Infusion 1200 Unit(s)/Hr (12.5 mL/Hr) IV Continuous <Continuous>  insulin lispro (ADMELOG) corrective regimen sliding scale   SubCutaneous every 6 hours  insulin NPH human recombinant 3 Unit(s) SubCutaneous every 6 hours  levETIRAcetam  IVPB 1000 milliGRAM(s) IV Intermittent every 12 hours  pantoprazole  Injectable 40 milliGRAM(s) IV Push daily  petrolatum Ophthalmic Ointment 1 Application(s) Both EYES three times a day    MEDICATIONS  (PRN):  acetaminophen   Oral Liquid .. 650 milliGRAM(s) Oral every 6 hours PRN Temp greater or equal to 38C (100.4F)    PRESENT SYMPTOMS: [ x]Unable to self-report  [ ] CPOT [ x] PAINADs [x ] RDOS  Source if other than patient:  [ ]Family   [ ]Team     Pain: [ ]yes [ ]no  QOL impact -   Location -                    Aggravating factors -  Quality -  Radiation -  Timing-  Severity (0-10 scale):  Minimal acceptable level (0-10 scale):     CPOT:    https://www.sccm.org/getattachment/aeu12r10-4y5l-2h2z-9b4n-8739l6480c6r/Critical-Care-Pain-Observation-Tool-(CPOT)    PAIN AD Score:   http://[email or web address].[facility]/cog/painad.pdf (press ctrl +  left click to view)    Dyspnea:                           [ ]Mild [ ]Moderate [ ]Severe      RDOS:  0 to 2  minimal or no respiratory distress   3  mild distress  4 to 6 moderate distress  >7 severe distress  https://[email or web address]/handouts/hen/Respiratory_Distress_Observation_Scale.pdf (Ctrl +  left click to view)     Anxiety:                             [ ]Mild [ ]Moderate [ ]Severe  Fatigue:                             [ ]Mild [ ]Moderate [ ]Severe  Nausea:                             [ ]Mild [ ]Moderate [ ]Severe  Loss of appetite:              [ ]Mild [ ]Moderate [ ]Severe  Constipation:                    [ ]Mild [ ]Moderate [ ]Severe    PCSSQ[Palliative Care Spiritual Screening Question]   Severity (0-10):  Score of 4 or > indicate consideration of Chaplaincy referral.  Chaplaincy Referral: [ ] yes [ ] refused [ ] following [ ] Deferred     Caregiver [place]? : [ ] yes [ ] no [ ] Deferred [ ] Declined             Social work referral [ ] Patient & Family Centered Care Referral [ ]     Anticipatory Grief present?:  [ ] yes [ ] no  [ ] Deferred                  Social work referral [ ] Chaplaincy Referral[ ]      Other Symptoms:  [ ]All other review of systems negative     Palliative Performance Status Version 2:    10     %    [email or web address]_[email or web address]  PHYSICAL EXAM:  Vital Signs Last 24 Hrs  T(C): 38.7 (26 Dec 2023 13:00), Max: 38.7 (26 Dec 2023 13:00)  T(F): 101.7 (26 Dec 2023 13:00), Max: 101.7 (26 Dec 2023 13:00)  HR: 63 (26 Dec 2023 13:00) (47 - 73)  BP: --  BP(mean): --  RR: 19 (26 Dec 2023 13:00) (14 - 28)  SpO2: 100% (26 Dec 2023 13:00) (99% - 100%)    Parameters below as of 26 Dec 2023 13:00  Patient On (Oxygen Delivery Method): CPAP 10/5     I&O's Summary    25 Dec 2023 07:01  -  26 Dec 2023 07:00  --------------------------------------------------------  IN: 1694.4 mL / OUT: 1510 mL / NET: 184.4 mL    26 Dec 2023 07:01  -  26 Dec 2023 14:18  --------------------------------------------------------  IN: 417.5 mL / OUT: 630 mL / NET: -212.5 mL      GENERAL: [ ]Cachexia    [ ]Alert  [ ]Oriented x   [ ]Lethargic  [ x]Unarousable  [ ]Verbal  [ ]Non-Verbal  Behavioral:   [ ] Anxiety  [ ] Delirium [ ] Agitation [ ] Other  HEENT:  [ ]Normal   [ ]Dry mouth   [x ]ET Tube/Trach  [ ]Oral lesions  PULMONARY: vent  [x ]Clear [ ]Tachypnea  [ ]Audible excessive secretions   [ ]Rhonchi        [ ]Right [ ]Left [ ]Bilateral  [ ]Crackles        [ ]Right [ ]Left [ ]Bilateral  [ ]Wheezing     [ ]Right [ ]Left [ ]Bilateral  [ ]Diminished breath sounds [ ]right [ ]left [ ]bilateral  CARDIOVASCULAR:    [x ]Regular [ ]Irregular [ ]Tachy  [ ]Miguel Angel [ ]Murmur [ ]Other  GASTROINTESTINAL:  [x ]Soft  [ ]Distended   [ ]+BS  [x ]Non tender [ ]Tender  [ ]Other [ ]PEG [ x]OGT/ NGT  Last BM:  GENITOURINARY:  [ ]Normal [ x] Incontinent   [ ]Oliguria/Anuria   [x ]Porter  MUSCULOSKELETAL:   [ ]Normal   [ ]Weakness  [ x]Bed/Wheelchair bound [ ]Edema  NEUROLOGIC: +initiating breaths, not following commands  [ ]No focal deficits  [ ]Cognitive impairment  [ ]Dysphagia [ ]Dysarthria [ ]Paresis [x ]Other   SKIN:   [ ]Normal  [ ]Rash  [ ]Other  [ ]Pressure ulcer(s)       Present on admission [ ]y [ ]n    CRITICAL CARE:  [ ] Shock Present  [ ]Septic [ ]Cardiogenic [ ]Neurologic [ ]Hypovolemic  [ ]  Vasopressors [ ]  Inotropes   [x ]Respiratory failure present [x ]Mechanical ventilation [ ]Non-invasive ventilatory support [ ]High flow  Mode: AC/ CMV (Assist Control/ Continuous Mandatory Ventilation), RR (machine): 14, TV (machine): 500, FiO2: 40, PEEP: 5, ITime: 1, MAP: 9, PIP: 21  [x ]Acute  [ ]Chronic [ x]Hypoxic  [ ]Hypercarbic [ ]Other  [ ]Other organ failure     LABS:                        8.2    7.58  )-----------( 136      ( 26 Dec 2023 00:47 )             25.5   12-26    147<H>  |  113<H>  |  71<H>  ----------------------------<  207<H>  4.4   |  21<L>  |  1.89<H>    Ca    8.7      26 Dec 2023 00:46  Phos  4.6     12-26  Mg     3.1     12-26    TPro  5.8<L>  /  Alb  2.9<L>  /  TBili  1.0  /  DBili  x   /  AST  56<H>  /  ALT  55<H>  /  AlkPhos  53  12-26  PT/INR - ( 26 Dec 2023 08:38 )   PT: 12.1 sec;   INR: 1.16 ratio         PTT - ( 26 Dec 2023 08:38 )  PTT:54.5 sec    Urinalysis Basic - ( 26 Dec 2023 00:46 )    Color: x / Appearance: x / SG: x / pH: x  Gluc: 207 mg/dL / Ketone: x  / Bili: x / Urobili: x   Blood: x / Protein: x / Nitrite: x   Leuk Esterase: x / RBC: x / WBC x   Sq Epi: x / Non Sq Epi: x / Bacteria: x      RADIOLOGY & ADDITIONAL STUDIES:  < from: CT Head No Cont (12.25.23 @ 11:36) >    ACC: [id number] EXAM:  CT CERVICAL SPINE   ORDERED BY:  KRISTOFER GEORGE     ACC: [id number] EXAM:  CT BRAIN   ORDERED BY:  KRISTOFER GEORGE     PROCEDURE DATE:  12/25/2023          INTERPRETATION:  CT head without IV contrast    CLINICAL INFORMATION:  Follow-up   Intracranial hemorrhage. No with   hypertension    TECHNIQUE: Contiguous axial 5 mm sections were obtained through the head.   Sagittal and coronal 2-D reformatted images were also obtained.   This   scan was performed using automatic exposure control (radiation dose   reduction software) to obtain a diagnostic image quality scan with   patient dose as low as reasonably achievable.    FINDINGS:   CT dated 12/25/2023 available for review.    The brain again demonstrates scattered subarachnoid hemorrhage in the   BILATERAL parietal and posterior frontal lobes.   No acute cerebral   cortical infarct is seen. Increasing density in the region of the RIGHT   transverse sinus and RIGHT sigmoid sinus which may reflect thrombus. No   mass effectis found in the brain.    The ventricles, sulci and basal cisterns appear unremarkable.    The orbits are unremarkable.  The paranasal sinuses are significant for   mild mucosal thickening in the RIGHT maxillary and sphenoid sinuses.   Hemorrhage seen in the RIGHT maxillary sinus. The nasal cavity appears   intact.  The nasopharynx is symmetric.  The central skull base, petrous   temporal bones and calvarium remain intact. Moderate RIGHT frontal scalp   hematoma is noted, increased in size.        CT cervical spine without IV contrast    CLINICAL INFORMATION: Fracture, trauma, neck pain.  Neck pain, spinal   stenosis, spondylosis. MCTOccipital SDH also SAH now w/ HTN    r/o   worsening bleed    TECHNIQUE:  Contiguous axial 2.0 mm sections were obtained through the   cervical spine using a single helical acquisition.  Additional 2 mm   sagittal and coronal reformatted reconstructions of the spine were   obtained.  These additional reformatted images were used to evaluate the   spine for alignment, vertebral fractures and the integrity of the the   posterior elements.   This scan was performed using automatic exposure   control (radiation dose reduction software) to obtain a diagnostic image   quality scan with patient dose as low as reasonably achievable.    FINDINGS:   No prior similar studies are available for review    Cervical vertebral body heights are maintained. No vertebral fracture is   seen. No destructive bone lesion is found.  Alignment is preserved.    Facet joints appear intact and aligned.    Cervical intervertebral disc spaces show moderate degenerative disc   disease and spondylosis at C5-6 and C6-7 with loss of disc height and   associated degenerative endplate changes. There is narrowing of the RIGHT   C3-4, RIGHT C4-5, BILATERAL C5-6 and BILATERAL C6-7 neural foramina due   to uncovertebral spurring and facet osteophytic hypertrophy. Minimal   thecal sac impingement at C5-6 and C6-7 due to posterior osteophytic   ridge/disc complexes.    The skull base appears intact.  No neck mass is recognized.  Paraspinal   soft tissues appear intact. Visualized lymph nodes appear to be within   physiologic size limits.  Small RIGHT pleural effusion. ET tube and NG   tube noted.            IMPRESSION:    CT HEAD: Scattered subarachnoid hemorrhage in the BILATERAL parietal and   posterior frontal lobes. Increasing density in the region of the RIGHT   transverse sinus and RIGHT sigmoid sinus which may reflect thrombus.      Recommend MRI and MRV brain with and without gadolinium for complete   evaluation.  Moderate RIGHT frontal scalp hematoma is noted, increased in   size.    CT cervical spine:   No vertebral fracture is recognized.  Moderate   degenerative disc disease and spondylosis at C5-6 and C6-7 withloss of   disc height and associated degenerative endplate changes. There is   narrowing of the RIGHT C3-4, RIGHT C4-5, BILATERAL C5-6 and BILATERAL   C6-7 neural foramina due to uncovertebral spurring and facet osteophytic   hypertrophy. Minimal thecal sac impingement at C5-6 and C6-7 due to   posterior osteophytic ridge/disc complexes.        --- End of Report ---            MARIANELA NEFF MD; Attending Radiologist  This document has been electronically signed. Dec 25 2023 11:58AM    < end of copied text >      PROTEIN CALORIE MALNUTRITION PRESENT: [ ]mild [ ]moderate [ ]severe [ ]underweight [ ]morbid obesity  https://www.andeal.org/vault/2440/web/files/ONC/Table_Clinical%20Characteristics%20to%20Document%20Malnutrition-White%20JV%20et%20al%202012.pdf    Height (cm): 180.3 (12-19-23 @ 14:45)  Weight (kg): 93.5 (12-19-23 @ 14:45)  BMI (kg/m2): 28.8 (12-19-23 @ 14:45)    [x ]PPSV2 < or = to 30% [ ]significant weight loss  [x ]poor nutritional intake  [ ]anasarca[x ]Artificial Nutrition      Other REFERRALS:  [ ]Hospice  [ ]Child Life  [x ]Social Work  [ ]Case management [ ]Holistic Therapy     Goals of Care Document:  HPI:  78M w/ PMH HTN HLD presents as transfer from [facility] s/p cardiac arrest. Per reports patient had a witness fall and arrest at home. EMS performed CPR en route to hospital was noted to be in VTach and shocked x2. ROSC was obtained just prior to arrival at [facility]. Pt was intubated placed on levo gtt. On arrival pt noted to have lacerated to R forehead. Pt had CT scans that were negative for intracranial hemorrhage, C-spine fracture, facial fractures. A R frontal hematoma was noted.     On arrival patient w/ dried blood on scalp. Also noted to have bleeding from oral mucosa w/ clots requiring suctioning. Pt otherwise off sedation and non-responsive. Per EMS report pt did receive some sedation and paralytics at [facility].     Per family patient has been feeling 'off' but managing his day to day and did not see a physician. Pt prescribed klonopin by outpatient provider and family believes pt may have been taking more than prescribed. On day of arrest, pt was initially asymptomatic carrying out his routine. He went to the bathroom, wife heard a loud thud and found patient in a pool of blood prompting call to EMS. Pt has not followed with any cardiologist. Has not had any syncope or other events preceding this.  (19 Dec 2023 14:54)    PERTINENT PM/SXH:   HTN, age 0-18    HTN (hypertension)    HLD (hyperlipidemia)    FAMILY HISTORY: unable to obtain 2/2 mental status    Family Hx substance abuse [ ]yes [x ]no  ITEMS NOT CHECKED ARE NOT PRESENT    SOCIAL HISTORY:   Significant other/partner[x ]  Children[ x]  Lutheran/Spirituality:  Substance hx:  [ ]   Tobacco hx:  [ ]   Alcohol hx: [ ]   Home Opioid hx:  [ ] I-Stop Reference No:  Living Situation: [ x]Home  [ ]Long term care  [ ]Rehab [ ]Other    ADVANCE DIRECTIVES:    DNR/MOLST  [ ]  Living Will  [ ]   DECISION MAKER(s):  [ ] Health Care Proxy(s)  [x ] Surrogate(s)  [ ] Guardian           Name(s): Phone Number(s): spouse, Rosina number per EMR  Luke (son) [phone]  Ny (daughter) [phone]    BASELINE (I)ADL(s) (prior to admission):  [place]: [ ]Total  [x ] Moderate [ ]Dependent    Allergies    No Known Allergies    Intolerances    MEDICATIONS  (STANDING):  amLODIPine   Tablet 5 milliGRAM(s) Oral daily  artificial  tears Solution 1 Drop(s) Both EYES two times a day  carvedilol 3.125 milliGRAM(s) Oral every 12 hours  cefepime   IVPB 2000 milliGRAM(s) IV Intermittent every 12 hours  chlorhexidine 0.12% Liquid 15 milliLiter(s) Oral Mucosa every 12 hours  chlorhexidine 2% Cloths 1 Application(s) Topical daily  dexMEDEtomidine Infusion 0.2 MICROgram(s)/kG/Hr (4.68 mL/Hr) IV Continuous <Continuous>  heparin  Infusion 1200 Unit(s)/Hr (12.5 mL/Hr) IV Continuous <Continuous>  insulin lispro (ADMELOG) corrective regimen sliding scale   SubCutaneous every 6 hours  insulin NPH human recombinant 3 Unit(s) SubCutaneous every 6 hours  levETIRAcetam  IVPB 1000 milliGRAM(s) IV Intermittent every 12 hours  pantoprazole  Injectable 40 milliGRAM(s) IV Push daily  petrolatum Ophthalmic Ointment 1 Application(s) Both EYES three times a day    MEDICATIONS  (PRN):  acetaminophen   Oral Liquid .. 650 milliGRAM(s) Oral every 6 hours PRN Temp greater or equal to 38C (100.4F)    PRESENT SYMPTOMS: [ x]Unable to self-report  [ ] CPOT [ x] PAINADs [x ] RDOS  Source if other than patient:  [ ]Family   [ ]Team     Pain: [ ]yes [ ]no  QOL impact -   Location -                    Aggravating factors -  Quality -  Radiation -  Timing-  Severity (0-10 scale):  Minimal acceptable level (0-10 scale):     CPOT:    https://www.sccm.org/getattachment/utq54u03-7c8o-4p6q-9i8l-1021p2185h8o/Critical-Care-Pain-Observation-Tool-(CPOT)    PAIN AD Score:   http://geriatrictoolkit.[place].[facility]/cog/painad.pdf (press ctrl +  left click to view)    Dyspnea:                           [ ]Mild [ ]Moderate [ ]Severe      RDOS:  0 to 2  minimal or no respiratory distress   3  mild distress  4 to 6 moderate distress  >7 severe distress  [email or web address]://[email or web address]/handouts/hen/Respiratory_Distress_Observation_Scale.pdf (Ctrl +  left click to view)     Anxiety:                             [ ]Mild [ ]Moderate [ ]Severe  Fatigue:                             [ ]Mild [ ]Moderate [ ]Severe  Nausea:                             [ ]Mild [ ]Moderate [ ]Severe  Loss of appetite:              [ ]Mild [ ]Moderate [ ]Severe  Constipation:                    [ ]Mild [ ]Moderate [ ]Severe    PCSSQ[Palliative Care Spiritual Screening Question]   Severity (0-10):  Score of 4 or > indicate consideration of Chaplaincy referral.  Chaplaincy Referral: [ ] yes [ ] refused [ ] following [ ] Deferred     Caregiver [place]? : [ ] yes [ ] no [ ] Deferred [ ] Declined             Social work referral [ ] Patient & Family Centered Care Referral [ ]     Anticipatory Grief present?:  [ ] yes [ ] no  [ ] Deferred                  Social work referral [ ] Chaplaincy Referral[ ]      Other Symptoms:  [ ]All other review of systems negative     Palliative Performance Status Version 2:    10     %    [email or web address]  PHYSICAL EXAM:  Vital Signs Last 24 Hrs  T(C): 38.7 (26 Dec 2023 13:00), Max: 38.7 (26 Dec 2023 13:00)  T(F): 101.7 (26 Dec 2023 13:00), Max: 101.7 (26 Dec 2023 13:00)  HR: 63 (26 Dec 2023 13:00) (47 - 73)  BP: --  BP(mean): --  RR: 19 (26 Dec 2023 13:00) (14 - 28)  SpO2: 100% (26 Dec 2023 13:00) (99% - 100%)    Parameters below as of 26 Dec 2023 13:00  Patient On (Oxygen Delivery Method): CPAP 10/5     I&O's Summary    25 Dec 2023 07:01  -  26 Dec 2023 07:00  --------------------------------------------------------  IN: 1694.4 mL / OUT: 1510 mL / NET: 184.4 mL    26 Dec 2023 07:01  -  26 Dec 2023 14:18  --------------------------------------------------------  IN: 417.5 mL / OUT: 630 mL / NET: -212.5 mL      GENERAL: [ ]Cachexia    [ ]Alert  [ ]Oriented x   [ ]Lethargic  [ x]Unarousable  [ ]Verbal  [ ]Non-Verbal  Behavioral:   [ ] Anxiety  [ ] Delirium [ ] Agitation [ ] Other  HEENT:  [ ]Normal   [ ]Dry mouth   [x ]ET Tube/Trach  [ ]Oral lesions  PULMONARY: vent  [x ]Clear [ ]Tachypnea  [ ]Audible excessive secretions   [ ]Rhonchi        [ ]Right [ ]Left [ ]Bilateral  [ ]Crackles        [ ]Right [ ]Left [ ]Bilateral  [ ]Wheezing     [ ]Right [ ]Left [ ]Bilateral  [ ]Diminished breath sounds [ ]right [ ]left [ ]bilateral  CARDIOVASCULAR:    [x ]Regular [ ]Irregular [ ]Tachy  [ ]Miguel Angel [ ]Murmur [ ]Other  GASTROINTESTINAL:  [x ]Soft  [ ]Distended   [ ]+BS  [x ]Non tender [ ]Tender  [ ]Other [ ]PEG [ x]OGT/ NGT  Last BM:  GENITOURINARY:  [ ]Normal [ x] Incontinent   [ ]Oliguria/Anuria   [x ]Porter  MUSCULOSKELETAL:   [ ]Normal   [ ]Weakness  [ x]Bed/Wheelchair bound [ ]Edema  NEUROLOGIC: +initiating breaths, not following commands  [ ]No focal deficits  [ ]Cognitive impairment  [ ]Dysphagia [ ]Dysarthria [ ]Paresis [x ]Other   SKIN:   [ ]Normal  [ ]Rash  [ ]Other  [ ]Pressure ulcer(s)       Present on admission [ ]y [ ]n    CRITICAL CARE:  [ ] Shock Present  [ ]Septic [ ]Cardiogenic [ ]Neurologic [ ]Hypovolemic  [ ]  Vasopressors [ ]  Inotropes   [x ]Respiratory failure present [x ]Mechanical ventilation [ ]Non-invasive ventilatory support [ ]High flow  Mode: AC/ CMV (Assist Control/ Continuous Mandatory Ventilation), RR (machine): 14, TV (machine): 500, FiO2: 40, PEEP: 5, ITime: 1, MAP: 9, PIP: 21  [x ]Acute  [ ]Chronic [ x]Hypoxic  [ ]Hypercarbic [ ]Other  [ ]Other organ failure     LABS:                        8.2    7.58  )-----------( 136      ( 26 Dec 2023 00:47 )             25.5   12-26    147<H>  |  113<H>  |  71<H>  ----------------------------<  207<H>  4.4   |  21<L>  |  1.89<H>    Ca    8.7      26 Dec 2023 00:46  Phos  4.6     12-26  Mg     3.1     12-26    TPro  5.8<L>  /  Alb  2.9<L>  /  TBili  1.0  /  DBili  x   /  AST  56<H>  /  ALT  55<H>  /  AlkPhos  53  12-26  PT/INR - ( 26 Dec 2023 08:38 )   PT: 12.1 sec;   INR: 1.16 ratio         PTT - ( 26 Dec 2023 08:38 )  PTT:54.5 sec    Urinalysis Basic - ( 26 Dec 2023 00:46 )    Color: x / Appearance: x / SG: x / pH: x  Gluc: 207 mg/dL / Ketone: x  / Bili: x / Urobili: x   Blood: x / Protein: x / Nitrite: x   Leuk Esterase: x / RBC: x / WBC x   Sq Epi: x / Non Sq Epi: x / Bacteria: x      RADIOLOGY & ADDITIONAL STUDIES:  < from: CT Head No Cont (12.25.23 @ 11:36) >    ACC: [id number] EXAM:  CT CERVICAL SPINE   ORDERED BY:  KRISTOFER GEORGE     ACC: [id number] EXAM:  CT BRAIN   ORDERED BY:  KRISTOFER GEORGE     PROCEDURE DATE:  12/25/2023          INTERPRETATION:  CT head without IV contrast    CLINICAL INFORMATION:  Follow-up   Intracranial hemorrhage. No with   hypertension    TECHNIQUE: Contiguous axial 5 mm sections were obtained through the head.   Sagittal and coronal 2-D reformatted images were also obtained.   This   scan was performed using automatic exposure control (radiation dose   reduction software) to obtain a diagnostic image quality scan with   patient dose as low as reasonably achievable.    FINDINGS:   CT dated 12/25/2023 available for review.    The brain again demonstrates scattered subarachnoid hemorrhage in the   BILATERAL parietal and posterior frontal lobes.   No acute cerebral   cortical infarct is seen. Increasing density in the region of the RIGHT   transverse sinus and RIGHT sigmoid sinus which may reflect thrombus. No   mass effectis found in the brain.    The ventricles, sulci and basal cisterns appear unremarkable.    The orbits are unremarkable.  The paranasal sinuses are significant for   mild mucosal thickening in the RIGHT maxillary and sphenoid sinuses.   Hemorrhage seen in the RIGHT maxillary sinus. The nasal cavity appears   intact.  The nasopharynx is symmetric.  The central skull base, petrous   temporal bones and calvarium remain intact. Moderate RIGHT frontal scalp   hematoma is noted, increased in size.        CT cervical spine without IV contrast    CLINICAL INFORMATION: Fracture, trauma, neck pain.  Neck pain, spinal   stenosis, spondylosis. MCTOccipital SDH also SAH now w/ HTN    r/o   worsening bleed    TECHNIQUE:  Contiguous axial 2.0 mm sections were obtained through the   cervical spine using a single helical acquisition.  Additional 2 mm   sagittal and coronal reformatted reconstructions of the spine were   obtained.  These additional reformatted images were used to evaluate the   spine for alignment, vertebral fractures and the integrity of the the   posterior elements.   This scan was performed using automatic exposure   control (radiation dose reduction software) to obtain a diagnostic image   quality scan with patient dose as low as reasonably achievable.    FINDINGS:   No prior similar studies are available for review    Cervical vertebral body heights are maintained. No vertebral fracture is   seen. No destructive bone lesion is found.  Alignment is preserved.    Facet joints appear intact and aligned.    Cervical intervertebral disc spaces show moderate degenerative disc   disease and spondylosis at C5-6 and C6-7 with loss of disc height and   associated degenerative endplate changes. There is narrowing of the RIGHT   C3-4, RIGHT C4-5, BILATERAL C5-6 and BILATERAL C6-7 neural foramina due   to uncovertebral spurring and facet osteophytic hypertrophy. Minimal   thecal sac impingement at C5-6 and C6-7 due to posterior osteophytic   ridge/disc complexes.    The skull base appears intact.  No neck mass is recognized.  Paraspinal   soft tissues appear intact. Visualized lymph nodes appear to be within   physiologic size limits.  Small RIGHT pleural effusion. ET tube and NG   tube noted.            IMPRESSION:    CT HEAD: Scattered subarachnoid hemorrhage in the BILATERAL parietal and   posterior frontal lobes. Increasing density in the region of the RIGHT   transverse sinus and RIGHT sigmoid sinus which may reflect thrombus.      Recommend MRI and MRV brain with and without gadolinium for complete   evaluation.  Moderate RIGHT frontal scalp hematoma is noted, increased in   size.    CT cervical spine:   No vertebral fracture is recognized.  Moderate   degenerative disc disease and spondylosis at C5-6 and C6-7 withloss of   disc height and associated degenerative endplate changes. There is   narrowing of the RIGHT C3-4, RIGHT C4-5, BILATERAL C5-6 and BILATERAL   C6-7 neural foramina due to uncovertebral spurring and facet osteophytic   hypertrophy. Minimal thecal sac impingement at C5-6 and C6-7 due to   posterior osteophytic ridge/disc complexes.        --- End of Report ---            MARIANELA NEFF MD; Attending Radiologist  This document has been electronically signed. Dec 25 2023 11:58AM    < end of copied text >      PROTEIN CALORIE MALNUTRITION PRESENT: [ ]mild [ ]moderate [ ]severe [ ]underweight [ ]morbid obesity  [email or web address]    Height (cm): 180.3 (12-19-23 @ 14:45)  Weight (kg): 93.5 (12-19-23 @ 14:45)  BMI (kg/m2): 28.8 (12-19-23 @ 14:45)    [x ]PPSV2 < or = to 30% [ ]significant weight loss  [x ]poor nutritional intake  [ ]anasarca[x ]Artificial Nutrition      Other REFERRALS:  [ ]Hospice  [ ]Child Life  [x ]Social Work  [ ]Case management [ ]Holistic Therapy     Goals of Care Document:

## 2023-12-26 NOTE — PROGRESS NOTE ADULT - ASSESSMENT
· Assessment	  78M w/ PMH HTN HLD presents as transfer from [facility] s/p VT arrest s/p shock x2 w/ unknown downtime. Pt found to have trauma to head w/ superficial scalp bleeding and oral mucosal bleeding - CTH significant for L occipital SDH and R temporal SAH, stable on subsequent imaging. Remains w/o significant improvement in mental status.     Plan:  ====================== NEUROLOGY=====================  #Intubated  Encephalopathy post arrest   - Intubated w/o sedation s/p cardiac arrest w/ unknown down time. Reported to be 20-40minutes but can be longer given ROSC achieved right before arrival to the hospital   - CTH at [facility] w/o intracranial bleed/pathology. CTH maybe too soon to show evidence of anoxic injury  - Pt remains w/o purposeful movement.   - Neuro and neurosurgery following  - VEEG: no epileptiform activity; diffuse cerebral dysfunction   - Repeat CTH shows stability of subdural hematoma & subarachnoid hemorrhage and emerging signs of anoxic injury  - f/u neuron specific enolase  - Family meeting w/ neurology attending 12/22 discussed no meaningful recovery. Pending family decision on comfort care.     #SAH & SDH  - Pt w/ acute SAH and SDH 2/2 trauma  - Was not initially seen in outside scan at [facility] likely performed too early for radiologic evidence  - Neurology and neurosurgery following  - NSGY: no intervention  - Repeat CTH shows stability of bleeds   - Maintain BP control   - Initially started on HSQ for DVT ppx, now on heparin gtt (started 12/24 with narrow therapeutic ptt window) for full AC given findings of DVTs  - 12/25 repeat CTH without expansion of hematoma with therapeutic PTT, though does have loss of grey-white matter junction congruent with anoxic brain injury  - 12/25 had systolic hypertension >200 w/ c/f worsening ICH/expansion so repeat CTH ordered -> stable ICH    #C-spine trauma  Pt w/ fall at home presented w/ c-collar.   -Per [facility] radiology report no evidence of cervical fracture  -Trauma surgery cleared pt of Cspine collar    #Myoclonic jerking  - vEEG: evidence of mycolonic seizures  - keppra 1000 BID  - d/c VEEG    ==================== RESPIRATORY======================  #Intubated  Pt intubated s/p cardiac arrest  - On full vent support: RR 14 /  / PEEP 5 / FiO2 40  - Monitor ABGs  - will trial pt on CPAP    #Oropharynx bleed  - Pt w/ blood in oropharynx requiring frequent suctioning.   - ENT consulted s/p packing     ====================CARDIOVASCULAR==================  #VT arrest  - Pt s/p VT arrest w/ unknown downtime. Pt reportedly received shock x2. No prior cardiac hx per family  - Monitor for arrhythmias   - Pt not asa/plavix loaded 2/2 substernal hematoma noted on CT chest at [facility]  - Unlikely to be a candidate for LHC at this time given mental status     #HTN  - Maintain BP control iso brain bleed  - Amlodipine; coreg  - PRN IVP labetalol for elevated SBP  - SBP goal<160    ===================HEMATOLOGIC/ONC ===================  #Substernal hematoma  - Pt w/ substernal hematoma noted on imaging at [facility] from trauma/cpr  - Repeat CT chest to monitor hematoma  - A/C: heparin gtt - on hold while r/o worsening ICH    #Thrombocytopenia  - Pt w/ downtrending PLT - currently stable in low 100s. Started downtrending prior to HSQ initiation.   - Likely iso critical illness     ===================== RENAL =========================  Pt presented w/ SCr 1.16, likely near baseline  - trend Cr   - montior I&Os    ==================== GASTROINTESTINAL===================  - Diet: enteral feeds   - Protonix    =======================    ENDOCRINE  =====================  #Hypothyroidism  - A1c=5.5  - monitor glucose    ========================INFECTIOUS DISEASE================  #Sepsis  - Pt w/ leukocytosis w/ fevers possibly 2/2  infection  - Pt w/ persistent fevers likely multifactorial 2/2 DVTs, underlying infection, more likely ?neurogenic/central component given lack of response to empiric broad spectrum coverage given labile pressures and hyperglycemia  - U/A grossly positive. F/u 12/23 BCx, UCx - NGTD  - Pt had crash lines placed at [facility]. Possible source of infection. MRSA negative 12/20  - 12/20 Sputum Cx + for klebsiella  - 12/22 BCx 1 bottle w/ MRSE. Possible contaminant. Check repeat BCx  - 12/25 sputum cx growing moderate gram negative rods, will f/u speciation and sensitivities  - Was on CTX 12/20 - 12/22, Zosyn 12/22 - 12/24, meropenem 12/24 - 12/26, cefepime 12/26 - (transitions to coverage made given persistent fever)  - received Vanc (12/22-12/25), discontinued given absence of evidence of further MRSE/MRSA      ASHLEY PRITCHARD  MRN-[id number]  Patient is a 78y old  Male who presents with a chief complaint of Cardiac Arrest (26 Dec 2023 14:17)    HPI:  78M w/ PMH HTN HLD presents as transfer from [facility] s/p cardiac arrest. Per reports patient had a witness fall and arrest at home. EMS performed CPR en route to hospital was noted to be in VTach and shocked x2. ROSC was obtained just prior to arrival at [facility]. Pt was intubated placed on levo gtt. On arrival pt noted to have lacerated to R forehead. Pt had CT scans that were negative for intracranial hemorrhage, C-spine fracture, facial fractures. A R frontal hematoma was noted.     On arrival patient w/ dried blood on scalp. Also noted to have bleeding from oral mucosa w/ clots requiring suctioning. Pt otherwise off sedation and non-responsive. Per EMS report pt did receive some sedation and paralytics at [facility].     Per family patient has been feeling 'off' but managing his day to day and did not see a physician. Pt prescribed klonopin by outpatient provider and family believes pt may have been taking more than prescribed. On day of arrest, pt was initially asymptomatic carrying out his routine. He went to the bathroom, wife heard a loud thud and found patient in a pool of blood prompting call to EMS. Pt has not followed with any cardiologist. Has not had any syncope or other events preceding this.  (19 Dec 2023 14:54)      24 HOUR EVENTS:    REVIEW OF SYSTEMS:    CONSTITUTIONAL: No weakness, fevers or chills  EYES/ENT: No visual changes;  No vertigo or throat pain   NECK: No pain or stiffness  RESPIRATORY: No cough, wheezing, hemoptysis; No shortness of breath  CARDIOVASCULAR: No chest pain or palpitations  GASTROINTESTINAL: No abdominal or epigastric pain. No nausea, vomiting, or hematemesis; No diarrhea or constipation. No melena or hematochezia.  GENITOURINARY: No dysuria, frequency or hematuria  NEUROLOGICAL: No numbness or weakness  SKIN: No itching, rashes      ICU Vital Signs Last 24 Hrs  T(C): 37.4 (26 Dec 2023 15:00), Max: 38.7 (26 Dec 2023 13:00)  T(F): 99.3 (26 Dec 2023 15:00), Max: 101.7 (26 Dec 2023 13:00)  HR: 65 (26 Dec 2023 18:00) (47 - 73)  BP: --  BP(mean): --  ABP: 131/37 (26 Dec 2023 18:00) (105/43 - 202/59)  ABP(mean): 63 (26 Dec 2023 18:00) (54 - 94)  RR: 17 (26 Dec 2023 18:00) (14 - 22)  SpO2: 100% (26 Dec 2023 18:00) (100% - 100%)    O2 Parameters below as of 26 Dec 2023 18:00  Patient On (Oxygen Delivery Method): ventilator    O2 Concentration (%): 30      Mode: CPAP with PS, FiO2: 30, PEEP: 5  CVP(mm Hg): --  CO: --  CI: --  PA: --  PA(mean): --  PA(direct): --  PCWP: --  LA: --  RA: --  SVR: --  SVRI: --  PVR: --  PVRI: --  I&O's Summary    25 Dec 2023 07:01  -  26 Dec 2023 07:00  --------------------------------------------------------  IN: 1694.4 mL / OUT: 1510 mL / NET: 184.4 mL    26 Dec 2023 07:01  -  26 Dec 2023 19:19  --------------------------------------------------------  IN: 730 mL / OUT: 975 mL / NET: -245 mL        CAPILLARY BLOOD GLUCOSE    CAPILLARY BLOOD GLUCOSE      POCT Blood Glucose.: 166 mg/dL (26 Dec 2023 16:43)      PHYSICAL EXAM:  GENERAL: No acute distress, well-developed  HEAD:  Atraumatic, Normocephalic  EYES: EOMI, PERRLA, conjunctiva and sclera clear  NECK: Supple, no lymphadenopathy, no JVD  CHEST/LUNG: CTAB; No wheezes, rales, or rhonchi  HEART: Regular rate and rhythm. Normal S1/S2. No murmurs, rubs, or gallops  ABDOMEN: Soft, non-tender, non-distended; normal bowel sounds, no organomegaly  EXTREMITIES:  2+ peripheral pulses b/l, No clubbing, cyanosis, or edema  NEUROLOGY: A&O x 3, no focal deficits  SKIN: No rashes or lesions    ============================I/O===========================   I&O's Detail    25 Dec 2023 07:01  -  26 Dec 2023 07:00  --------------------------------------------------------  IN:    Dexmedetomidine: 88.8 mL    Enteral Tube Flush: 200 mL    Heparin: 39 mL    Heparin: 246 mL    IV PiggyBack: 150 mL    Jevity 1.2: 960 mL    Norepinephrine: 10.6 mL  Total IN: 1694.4 mL    OUT:    Indwelling Catheter - Urethral (mL): 1510 mL  Total OUT: 1510 mL    Total NET: 184.4 mL      26 Dec 2023 07:01  -  26 Dec 2023 19:19  --------------------------------------------------------  IN:    Enteral Tube Flush: 100 mL    Heparin: 150 mL    Jevity 1.2: 480 mL  Total IN: 730 mL    OUT:    Dexmedetomidine: 0 mL    Indwelling Catheter - Urethral (mL): 975 mL  Total OUT: 975 mL    Total NET: -245 mL        ============================ LABS =========================                        8.2    7.58  )-----------( 136      ( 26 Dec 2023 00:47 )             25.5     12-26    147<H>  |  113<H>  |  71<H>  ----------------------------<  207<H>  4.4   |  21<L>  |  1.89<H>    Ca    8.7      26 Dec 2023 00:46  Phos  4.6     12-26  Mg     3.1     12-26    TPro  5.8<L>  /  Alb  2.9<L>  /  TBili  1.0  /  DBili  x   /  AST  56<H>  /  ALT  55<H>  /  AlkPhos  53  12-26      LIVER FUNCTIONS - ( 26 Dec 2023 00:46 )  Alb: 2.9 g/dL / Pro: 5.8 g/dL / ALK PHOS: 53 U/L / ALT: 55 U/L / AST: 56 U/L / GGT: x           PT/INR - ( 26 Dec 2023 08:38 )   PT: 12.1 sec;   INR: 1.16 ratio         PTT - ( 26 Dec 2023 08:38 )  PTT:54.5 sec  ABG - ( 26 Dec 2023 00:30 )  pH, Arterial: 7.44  pH, Blood: x     /  pCO2: 33    /  pO2: 169   / HCO3: 22    / Base Excess: -1.4  /  SaO2: 99.7              Blood Gas Arterial, Lactate: 1.1 mmol/L (12-26-23 @ 00:30)  Blood Gas Arterial, Lactate: 0.8 mmol/L (12-25-23 @ 01:16)  Blood Gas Arterial, Lactate: 0.9 mmol/L (12-24-23 @ 00:50)    Urinalysis Basic - ( 26 Dec 2023 00:46 )    Color: x / Appearance: x / SG: x / pH: x  Gluc: 207 mg/dL / Ketone: x  / Bili: x / Urobili: x   Blood: x / Protein: x / Nitrite: x   Leuk Esterase: x / RBC: x / WBC x   Sq Epi: x / Non Sq Epi: x / Bacteria: x      ======================Micro/Rad/Cardio=================  Telemetry: Reviewed   EKG: Reviewed  CXR: Reviewed  Culture: Reviewed   Echo:   Cath:   ======================================================  PAST MEDICAL & SURGICAL HISTORY:  HTN (hypertension)      HLD (hyperlipidemia)      ======================= LINES/TUBES  =====================  [place]: (Date placed)  Central Line: (Date placed)  HD Catheter: (Date placed)  Arterial Line: (Date placed)  Endotracheal Tube: (Date placed)  [place]: (Date placed)  ======================= DISPOSITION  =====================  [X] Critical   [ ] Guarded    [ ] Stable    [X] Maintain in CICU  [ ] Downgrade to Telemtry  [ ] Discharge Home    Patient requires continuous monitoring with bedside rhythm monitoring, pulse ox monitoring, and intermittent blood gas analysis. Care plan discussed with ICU care team. Patient remained critical and at risk for life threatening decompensation.  Patient seen, examined and plan discussed with CCU team during rounds.     I have personally provided 30 minutes of critical care time excluding time spent on separate procedures, in addition to initial critical care time provided by the CICU Attending, Dr. Gasca/ Jackie/ Toña/ William/ Jaspreet/ Daryl .       Rosy Jackson, [facility] [phone]            · Assessment	  78M w/ PMH HTN HLD presents as transfer from [facility] s/p VT arrest s/p shock x2 w/ unknown downtime. Pt found to have trauma to head w/ superficial scalp bleeding and oral mucosal bleeding - CTH significant for L occipital SDH and R temporal SAH, stable on subsequent imaging. Remains w/o significant improvement in mental status.     Plan:  ====================== NEUROLOGY=====================  #Intubated  Encephalopathy post arrest   - Intubated w/o sedation s/p cardiac arrest w/ unknown down time. Reported to be 20-40minutes but can be longer given ROSC achieved right before arrival to the hospital   - CTH at [facility] w/o intracranial bleed/pathology. CTH maybe too soon to show evidence of anoxic injury  - Pt remains w/o purposeful movement.   - Neuro and neurosurgery following  - VEEG: no epileptiform activity; diffuse cerebral dysfunction   - Repeat CTH shows stability of subdural hematoma & subarachnoid hemorrhage and emerging signs of anoxic injury  - f/u neuron specific enolase  - Family meeting w/ neurology attending 12/22 discussed no meaningful recovery. Pending family decision on comfort care.     #SAH & SDH  - Pt w/ acute SAH and SDH 2/2 trauma  - Was not initially seen in outside scan at [facility] likely performed too early for radiologic evidence  - Neurology and neurosurgery following  - NSGY: no intervention  - Repeat CTH shows stability of bleeds   - Maintain BP control   - Initially started on HSQ for DVT ppx, now on heparin gtt (started 12/24 with narrow therapeutic ptt window) for full AC given findings of DVTs  - 12/25 repeat CTH without expansion of hematoma with therapeutic PTT, though does have loss of grey-white matter junction congruent with anoxic brain injury  - 12/25 had systolic hypertension >200 w/ c/f worsening ICH/expansion so repeat CTH ordered -> stable ICH    #C-spine trauma  Pt w/ fall at home presented w/ c-collar.   -Per [facility] radiology report no evidence of cervical fracture  -Trauma surgery cleared pt of Cspine collar    #Myoclonic jerking  - vEEG: evidence of mycolonic seizures  - keppra 1000 BID  - d/c VEEG    ==================== RESPIRATORY======================  #Intubated  Pt intubated s/p cardiac arrest  - On full vent support: RR 14 /  / PEEP 5 / FiO2 40  - Monitor ABGs  - will trial pt on CPAP    #Oropharynx bleed  - Pt w/ blood in oropharynx requiring frequent suctioning.   - ENT consulted s/p packing     ====================CARDIOVASCULAR==================  #VT arrest  - Pt s/p VT arrest w/ unknown downtime. Pt reportedly received shock x2. No prior cardiac hx per family  - Monitor for arrhythmias   - Pt not asa/plavix loaded 2/2 substernal hematoma noted on CT chest at [facility]  - Unlikely to be a candidate for LHC at this time given mental status     #HTN  - Maintain BP control iso brain bleed  - Amlodipine; coreg  - PRN IVP labetalol for elevated SBP  - SBP goal<160    ===================HEMATOLOGIC/ONC ===================  #Substernal hematoma  - Pt w/ substernal hematoma noted on imaging at [facility] from trauma/cpr  - Repeat CT chest to monitor hematoma  - A/C: heparin gtt - on hold while r/o worsening ICH    #Thrombocytopenia  - Pt w/ downtrending PLT - currently stable in low 100s. Started downtrending prior to HSQ initiation.   - Likely iso critical illness     ===================== RENAL =========================  Pt presented w/ SCr 1.16, likely near baseline  - trend Cr   - montior I&Os    ==================== GASTROINTESTINAL===================  - Diet: enteral feeds   - Protonix    =======================    ENDOCRINE  =====================  #Hypothyroidism  - A1c=5.5  - monitor glucose    ========================INFECTIOUS DISEASE================  #Sepsis  - Pt w/ leukocytosis w/ fevers possibly 2/2  infection  - Pt w/ persistent fevers likely multifactorial 2/2 DVTs, underlying infection, more likely ?neurogenic/central component given lack of response to empiric broad spectrum coverage given labile pressures and hyperglycemia  - U/A grossly positive. F/u 12/23 BCx, UCx - NGTD  - Pt had crash lines placed at [facility]. Possible source of infection. MRSA negative 12/20  - 12/20 Sputum Cx + for klebsiella  - 12/22 BCx 1 bottle w/ MRSE. Possible contaminant. Check repeat BCx  - 12/25 sputum cx growing moderate gram negative rods, will f/u speciation and sensitivities  - Was on CTX 12/20 - 12/22, Zosyn 12/22 - 12/24, meropenem 12/24 - 12/26, cefepime 12/26 - (transitions to coverage made given persistent fever)  - received Vanc (12/22-12/25), discontinued given absence of evidence of further MRSE/MRSA      ASHLEY PRITCHARD  MRN-[id number]  Patient is a 78y old  Male who presents with a chief complaint of Cardiac Arrest (26 Dec 2023 14:17)    HPI:  78M w/ PMH HTN HLD presents as transfer from [facility] s/p cardiac arrest. Per reports patient had a witness fall and arrest at home. EMS performed CPR en route to hospital was noted to be in VTach and shocked x2. ROSC was obtained just prior to arrival at [facility]. Pt was intubated placed on levo gtt. On arrival pt noted to have lacerated to R forehead. Pt had CT scans that were negative for intracranial hemorrhage, C-spine fracture, facial fractures. A R frontal hematoma was noted.     On arrival patient w/ dried blood on scalp. Also noted to have bleeding from oral mucosa w/ clots requiring suctioning. Pt otherwise off sedation and non-responsive. Per EMS report pt did receive some sedation and paralytics at [facility].     Per family patient has been feeling 'off' but managing his day to day and did not see a physician. Pt prescribed klonopin by outpatient provider and family believes pt may have been taking more than prescribed. On day of arrest, pt was initially asymptomatic carrying out his routine. He went to the bathroom, wife heard a loud thud and found patient in a pool of blood prompting call to EMS. Pt has not followed with any cardiologist. Has not had any syncope or other events preceding this.  (19 Dec 2023 14:54)      24 HOUR EVENTS:    REVIEW OF SYSTEMS:    CONSTITUTIONAL: No weakness, fevers or chills  EYES/ENT: No visual changes;  No vertigo or throat pain   NECK: No pain or stiffness  RESPIRATORY: No cough, wheezing, hemoptysis; No shortness of breath  CARDIOVASCULAR: No chest pain or palpitations  GASTROINTESTINAL: No abdominal or epigastric pain. No nausea, vomiting, or hematemesis; No diarrhea or constipation. No melena or hematochezia.  GENITOURINARY: No dysuria, frequency or hematuria  NEUROLOGICAL: No numbness or weakness  SKIN: No itching, rashes      ICU Vital Signs Last 24 Hrs  T(C): 37.4 (26 Dec 2023 15:00), Max: 38.7 (26 Dec 2023 13:00)  T(F): 99.3 (26 Dec 2023 15:00), Max: 101.7 (26 Dec 2023 13:00)  HR: 65 (26 Dec 2023 18:00) (47 - 73)  BP: --  BP(mean): --  ABP: 131/37 (26 Dec 2023 18:00) (105/43 - 202/59)  ABP(mean): 63 (26 Dec 2023 18:00) (54 - 94)  RR: 17 (26 Dec 2023 18:00) (14 - 22)  SpO2: 100% (26 Dec 2023 18:00) (100% - 100%)    O2 Parameters below as of 26 Dec 2023 18:00  Patient On (Oxygen Delivery Method): ventilator    O2 Concentration (%): 30      Mode: CPAP with PS, FiO2: 30, PEEP: 5  CVP(mm Hg): --  CO: --  CI: --  PA: --  PA(mean): --  PA(direct): --  PCWP: --  LA: --  RA: --  SVR: --  SVRI: --  PVR: --  PVRI: --  I&O's Summary    25 Dec 2023 07:01  -  26 Dec 2023 07:00  --------------------------------------------------------  IN: 1694.4 mL / OUT: 1510 mL / NET: 184.4 mL    26 Dec 2023 07:01  -  26 Dec 2023 19:19  --------------------------------------------------------  IN: 730 mL / OUT: 975 mL / NET: -245 mL        CAPILLARY BLOOD GLUCOSE    CAPILLARY BLOOD GLUCOSE      POCT Blood Glucose.: 166 mg/dL (26 Dec 2023 16:43)      PHYSICAL EXAM:  GENERAL: No acute distress, well-developed  HEAD:  Atraumatic, Normocephalic  EYES: EOMI, PERRLA, conjunctiva and sclera clear  NECK: Supple, no lymphadenopathy, no JVD  CHEST/LUNG: CTAB; No wheezes, rales, or rhonchi  HEART: Regular rate and rhythm. Normal S1/S2. No murmurs, rubs, or gallops  ABDOMEN: Soft, non-tender, non-distended; normal bowel sounds, no organomegaly  EXTREMITIES:  2+ peripheral pulses b/l, No clubbing, cyanosis, or edema  NEUROLOGY: A&O x 3, no focal deficits  SKIN: No rashes or lesions    ============================I/O===========================   I&O's Detail    25 Dec 2023 07:01  -  26 Dec 2023 07:00  --------------------------------------------------------  IN:    Dexmedetomidine: 88.8 mL    Enteral Tube Flush: 200 mL    Heparin: 39 mL    Heparin: 246 mL    IV PiggyBack: 150 mL    Jevity 1.2: 960 mL    Norepinephrine: 10.6 mL  Total IN: 1694.4 mL    OUT:    Indwelling Catheter - Urethral (mL): 1510 mL  Total OUT: 1510 mL    Total NET: 184.4 mL      26 Dec 2023 07:01  -  26 Dec 2023 19:19  --------------------------------------------------------  IN:    Enteral Tube Flush: 100 mL    Heparin: 150 mL    Jevity 1.2: 480 mL  Total IN: 730 mL    OUT:    Dexmedetomidine: 0 mL    Indwelling Catheter - Urethral (mL): 975 mL  Total OUT: 975 mL    Total NET: -245 mL        ============================ LABS =========================                        8.2    7.58  )-----------( 136      ( 26 Dec 2023 00:47 )             25.5     12-26    147<H>  |  113<H>  |  71<H>  ----------------------------<  207<H>  4.4   |  21<L>  |  1.89<H>    Ca    8.7      26 Dec 2023 00:46  Phos  4.6     12-26  Mg     3.1     12-26    TPro  5.8<L>  /  Alb  2.9<L>  /  TBili  1.0  /  DBili  x   /  AST  56<H>  /  ALT  55<H>  /  AlkPhos  53  12-26      LIVER FUNCTIONS - ( 26 Dec 2023 00:46 )  Alb: 2.9 g/dL / Pro: 5.8 g/dL / ALK PHOS: 53 U/L / ALT: 55 U/L / AST: 56 U/L / GGT: x           PT/INR - ( 26 Dec 2023 08:38 )   PT: 12.1 sec;   INR: 1.16 ratio         PTT - ( 26 Dec 2023 08:38 )  PTT:54.5 sec  ABG - ( 26 Dec 2023 00:30 )  pH, Arterial: 7.44  pH, Blood: x     /  pCO2: 33    /  pO2: 169   / HCO3: 22    / Base Excess: -1.4  /  SaO2: 99.7              Blood Gas Arterial, Lactate: 1.1 mmol/L (12-26-23 @ 00:30)  Blood Gas Arterial, Lactate: 0.8 mmol/L (12-25-23 @ 01:16)  Blood Gas Arterial, Lactate: 0.9 mmol/L (12-24-23 @ 00:50)    Urinalysis Basic - ( 26 Dec 2023 00:46 )    Color: x / Appearance: x / SG: x / pH: x  Gluc: 207 mg/dL / Ketone: x  / Bili: x / Urobili: x   Blood: x / Protein: x / Nitrite: x   Leuk Esterase: x / RBC: x / WBC x   Sq Epi: x / Non Sq Epi: x / Bacteria: x      ======================Micro/Rad/Cardio=================  Telemetry: Reviewed   EKG: Reviewed  CXR: Reviewed  Culture: Reviewed   Echo:   Cath:   ======================================================  PAST MEDICAL & SURGICAL HISTORY:  HTN (hypertension)      HLD (hyperlipidemia)      ======================= LINES/TUBES  =====================  [place]: (Date placed)  Central Line: (Date placed)  HD Catheter: (Date placed)  Arterial Line: (Date placed)  Endotracheal Tube: (Date placed)  [place]: (Date placed)  ======================= DISPOSITION  =====================  [X] Critical   [ ] Guarded    [ ] Stable    [X] Maintain in CICU  [ ] Downgrade to Telemtry  [ ] Discharge Home    Patient requires continuous monitoring with bedside rhythm monitoring, pulse ox monitoring, and intermittent blood gas analysis. Care plan discussed with ICU care team. Patient remained critical and at risk for life threatening decompensation.  Patient seen, examined and plan discussed with CCU team during rounds.     I have personally provided 30 minutes of critical care time excluding time spent on separate procedures, in addition to initial critical care time provided by the CICU Attending, Dr. Gasca/ Jackie/ Toña/ William/ Jaspreet/ Daryl .       Rosy Jackson, [facility] [phone]            Assessment	  78M w/ PMH HTN HLD presents as transfer from [facility] s/p VT arrest s/p shock x2 w/ unknown downtime. Pt found to have trauma to head w/ superficial scalp bleeding and oral mucosal bleeding - CTH significant for L occipital SDH and R temporal SAH, remains w/o significant improvement in mental status. Hospital course complicated by seizures, klebsiella PNA, MRSE bacteremia, DVTs and now strenotrophomas pna.     Plan:  ====================== NEUROLOGY=====================  # Encephalopathy post arrest   - Intubated w/o sedation s/p cardiac arrest w/ unknown down time. Reported to be 20-40minutes but can be longer given ROSC achieved right before arrival to the hospital   - CTH at [facility] w/o intracranial bleed/pathology. CTH maybe too soon to show evidence of anoxic injury  - Pt remains w/o purposeful movement.   - Neuro and neurosurgery following  - VEEG: no epileptiform activity; diffuse cerebral dysfunction   - Repeat CTH shows stability of subdural hematoma & subarachnoid hemorrhage and emerging signs of anoxic injury  - f/u neuron specific enolase  - f/u head CT for stability on heparin gtt  - Family meeting w/ neurology attending 12/22 discussed no meaningful recovery. Pending family decision on comfort care.     #SAH & SDH  - Pt w/ acute SAH and SDH 2/2 trauma, not initially seen in outside scan at [facility] likely performed too early for radiologic evidence  - Neurology and neurosurgery following  - NSGY: no intervention  - Repeat CTH shows stability of bleeds  - Maintain BP control   - Initially started on HSQ for DVT ppx, now on heparin gtt (started 12/24 with narrow therapeutic ptt window) for full AC given findings of DVTs  - 12/25 repeat CTH without expansion of hematoma with therapeutic PTT, though does have loss of grey-white matter junction congruent with anoxic brain injury  - 12/25 had systolic hypertension >200 w/ c/f worsening ICH/expansion so repeat CTH ordered -> stable ICH  - f/u head CT on systemic heparin    #C-spine trauma  Pt w/ fall at home presented w/ c-collar.   -Per [facility] radiology report no evidence of cervical fracture  -Trauma surgery cleared pt of Cspine collar    #Myoclonic jerking  - vEEG: evidence of mycolonic seizures  - continue keppra 1000 BID    ==================== RESPIRATORY======================  #acute hypoxic respiratory failure  - s/p cardiac arrest, support continued i/s/o poor mental status  - On full vent support: RR 14 /  / PEEP 5 / FiO2 40  - SBT as tolerated  - f.u am ABG    #Oropharynx bleed  - Pt w/ blood in oropharynx requiring frequent suctioning.   - ENT consulted s/p packing - removed 12/21  - no evidence of bleeding    ====================CARDIOVASCULAR==================  #VT arrest  - Pt s/p VT arrest w/ unknown downtime. Pt reportedly received shock x2. No prior cardiac hx per family  - Pt not asa/plavix loaded 2/2 substernal hematoma noted on CT chest at [facility], continue holding i/s/o SAH  - Unlikely to be a candidate for [facility] at this time given mental status   - continue to monitor on tele    #HTN  - Maintain BP control iso brain bleed  - continue Amlodipine and coreg  - continue to monitor BP  - SBP goal<160    ===================== RENAL =========================  Pt presented w/ SCr 1.16, likely near baseline  - trend Cr   - montior I&Os    ==================== GASTROINTESTINAL===================  - Diet: enteral feeds   - Protonix    ===================HEMATOLOGIC/ONC ===================  #Substernal hematoma  - Pt w/ substernal hematoma noted on imaging at [facility] from trauma/cpr  - Repeat CT chest to monitor hematoma  - A/C: heparin gtt - on hold while r/o worsening ICH    #Thrombocytopenia  - Pt w/ downtrending PLT - currently stable in low 100s. Started downtrending prior to HSQ initiation.   - Likely iso critical illness     =======================    ENDOCRINE  =====================  #Hypothyroidism  - A1c=5.5  - monitor glucose    ========================INFECTIOUS DISEASE================  #Sepsis  - Pt w/ leukocytosis w/ fevers possibly 2/2  infection  - Pt w/ persistent fevers likely multifactorial 2/2 DVTs, underlying infection, more likely ?neurogenic/central component given lack of response to empiric broad spectrum coverage given labile pressures and hyperglycemia  - U/A grossly positive. F/u 12/23 BCx, UCx - NGTD  - Pt had crash lines placed at [facility]. Possible source of infection. MRSA negative 12/20  - 12/20 Sputum Cx + for klebsiella  - 12/22 BCx 1 bottle w/ MRSE. Possible contaminant. Check repeat BCx  - 12/25 sputum cx growing moderate gram negative rods, will f/u speciation and sensitivities  - Was on CTX 12/20 - 12/22, Zosyn 12/22 - 12/24, meropenem 12/24 - 12/26, cefepime 12/26 - (transitions to coverage made given persistent fever)  - received Vanc (12/22-12/25), discontinued given absence of evidence of further MRSE/MRSA      FRANCHESKA ASHLEY  MRN-[id number]  Patient is a 78y old  Male who presents with a chief complaint of Cardiac Arrest (26 Dec 2023 14:17)    HPI:  78M w/ PMH HTN HLD presents as transfer from [facility] s/p cardiac arrest. Per reports patient had a witness fall and arrest at home. EMS performed CPR en route to hospital was noted to be in VTach and shocked x2. ROSC was obtained just prior to arrival at [facility]. Pt was intubated placed on levo gtt. On arrival pt noted to have lacerated to R forehead. Pt had CT scans that were negative for intracranial hemorrhage, C-spine fracture, facial fractures. A R frontal hematoma was noted.     On arrival patient w/ dried blood on scalp. Also noted to have bleeding from oral mucosa w/ clots requiring suctioning. Pt otherwise off sedation and non-responsive. Per EMS report pt did receive some sedation and paralytics at [facility].     Per family patient has been feeling 'off' but managing his day to day and did not see a physician. Pt prescribed klonopin by outpatient provider and family believes pt may have been taking more than prescribed. On day of arrest, pt was initially asymptomatic carrying out his routine. He went to the bathroom, wife heard a loud thud and found patient in a pool of blood prompting call to EMS. Pt has not followed with any cardiologist. Has not had any syncope or other events preceding this.  (19 Dec 2023 14:54)      24 HOUR EVENTS:    REVIEW OF SYSTEMS:    CONSTITUTIONAL: No weakness, fevers or chills  EYES/ENT: No visual changes;  No vertigo or throat pain   NECK: No pain or stiffness  RESPIRATORY: No cough, wheezing, hemoptysis; No shortness of breath  CARDIOVASCULAR: No chest pain or palpitations  GASTROINTESTINAL: No abdominal or epigastric pain. No nausea, vomiting, or hematemesis; No diarrhea or constipation. No melena or hematochezia.  GENITOURINARY: No dysuria, frequency or hematuria  NEUROLOGICAL: No numbness or weakness  SKIN: No itching, rashes      ICU Vital Signs Last 24 Hrs  T(C): 37.4 (26 Dec 2023 15:00), Max: 38.7 (26 Dec 2023 13:00)  T(F): 99.3 (26 Dec 2023 15:00), Max: 101.7 (26 Dec 2023 13:00)  HR: 65 (26 Dec 2023 18:00) (47 - 73)  BP: --  BP(mean): --  ABP: 131/37 (26 Dec 2023 18:00) (105/43 - 202/59)  ABP(mean): 63 (26 Dec 2023 18:00) (54 - 94)  RR: 17 (26 Dec 2023 18:00) (14 - 22)  SpO2: 100% (26 Dec 2023 18:00) (100% - 100%)    O2 Parameters below as of 26 Dec 2023 18:00  Patient On (Oxygen Delivery Method): ventilator    O2 Concentration (%): 30      Mode: CPAP with PS, FiO2: 30, PEEP: 5  CVP(mm Hg): --  CO: --  CI: --  PA: --  PA(mean): --  PA(direct): --  PCWP: --  LA: --  RA: --  SVR: --  SVRI: --  PVR: --  PVRI: --  I&O's Summary    25 Dec 2023 07:01  -  26 Dec 2023 07:00  --------------------------------------------------------  IN: 1694.4 mL / OUT: 1510 mL / NET: 184.4 mL    26 Dec 2023 07:01  -  26 Dec 2023 19:19  --------------------------------------------------------  IN: 730 mL / OUT: 975 mL / NET: -245 mL        CAPILLARY BLOOD GLUCOSE    CAPILLARY BLOOD GLUCOSE      POCT Blood Glucose.: 166 mg/dL (26 Dec 2023 16:43)      PHYSICAL EXAM:  GENERAL: No acute distress, well-developed  HEAD:  Atraumatic, Normocephalic  EYES: EOMI, PERRLA, conjunctiva and sclera clear  NECK: Supple, no lymphadenopathy, no JVD  CHEST/LUNG: CTAB; No wheezes, rales, or rhonchi  HEART: Regular rate and rhythm. Normal S1/S2. No murmurs, rubs, or gallops  ABDOMEN: Soft, non-tender, non-distended; normal bowel sounds, no organomegaly  EXTREMITIES:  2+ peripheral pulses b/l, No clubbing, cyanosis, or edema  NEUROLOGY: A&O x 3, no focal deficits  SKIN: No rashes or lesions    ============================I/O===========================   I&O's Detail    25 Dec 2023 07:01  -  26 Dec 2023 07:00  --------------------------------------------------------  IN:    Dexmedetomidine: 88.8 mL    Enteral Tube Flush: 200 mL    Heparin: 39 mL    Heparin: 246 mL    IV PiggyBack: 150 mL    Jevity 1.2: 960 mL    Norepinephrine: 10.6 mL  Total IN: 1694.4 mL    OUT:    Indwelling Catheter - Urethral (mL): 1510 mL  Total OUT: 1510 mL    Total NET: 184.4 mL      26 Dec 2023 07:01  -  26 Dec 2023 19:19  --------------------------------------------------------  IN:    Enteral Tube Flush: 100 mL    Heparin: 150 mL    Jevity 1.2: 480 mL  Total IN: 730 mL    OUT:    Dexmedetomidine: 0 mL    Indwelling Catheter - Urethral (mL): 975 mL  Total OUT: 975 mL    Total NET: -245 mL        ============================ LABS =========================                        8.2    7.58  )-----------( 136      ( 26 Dec 2023 00:47 )             25.5     12-26    147<H>  |  113<H>  |  71<H>  ----------------------------<  207<H>  4.4   |  21<L>  |  1.89<H>    Ca    8.7      26 Dec 2023 00:46  Phos  4.6     12-26  Mg     3.1     12-26    TPro  5.8<L>  /  Alb  2.9<L>  /  TBili  1.0  /  DBili  x   /  AST  56<H>  /  ALT  55<H>  /  AlkPhos  53  12-26      LIVER FUNCTIONS - ( 26 Dec 2023 00:46 )  Alb: 2.9 g/dL / Pro: 5.8 g/dL / ALK PHOS: 53 U/L / ALT: 55 U/L / AST: 56 U/L / GGT: x           PT/INR - ( 26 Dec 2023 08:38 )   PT: 12.1 sec;   INR: 1.16 ratio         PTT - ( 26 Dec 2023 08:38 )  PTT:54.5 sec  ABG - ( 26 Dec 2023 00:30 )  pH, Arterial: 7.44  pH, Blood: x     /  pCO2: 33    /  pO2: 169   / HCO3: 22    / Base Excess: -1.4  /  SaO2: 99.7              Blood Gas Arterial, Lactate: 1.1 mmol/L (12-26-23 @ 00:30)  Blood Gas Arterial, Lactate: 0.8 mmol/L (12-25-23 @ 01:16)  Blood Gas Arterial, Lactate: 0.9 mmol/L (12-24-23 @ 00:50)    Urinalysis Basic - ( 26 Dec 2023 00:46 )    Color: x / Appearance: x / SG: x / pH: x  Gluc: 207 mg/dL / Ketone: x  / Bili: x / Urobili: x   Blood: x / Protein: x / Nitrite: x   Leuk Esterase: x / RBC: x / WBC x   Sq Epi: x / Non Sq Epi: x / Bacteria: x      ======================Micro/Rad/Cardio=================  Telemetry: Reviewed   EKG: Reviewed  CXR: Reviewed  Culture: Reviewed   Echo:   Cath:   ======================================================  PAST MEDICAL & SURGICAL HISTORY:  HTN (hypertension)      HLD (hyperlipidemia)      ======================= LINES/TUBES  =====================  [place]: (Date placed)  Central Line: (Date placed)  HD Catheter: (Date placed)  Arterial Line: (Date placed)  Endotracheal Tube: (Date placed)  Porter: (Date placed)  ======================= DISPOSITION  =====================  [X] Critical   [ ] Guarded    [ ] Stable    [X] Maintain in CICU  [ ] Downgrade to Telemtry  [ ] Discharge Home    Patient requires continuous monitoring with bedside rhythm monitoring, pulse ox monitoring, and intermittent blood gas analysis. Care plan discussed with ICU care team. Patient remained critical and at risk for life threatening decompensation.  Patient seen, examined and plan discussed with CCU team during rounds.     I have personally provided 30 minutes of critical care time excluding time spent on separate procedures, in addition to initial critical care time provided by the CICU Attending, Dr. Gasca/ Jackie/ Toña/ William/ Jaspreet/ Daryl .       Rosy Jackson, [facility] [phone]            Assessment	  78M w/ PMH HTN HLD presents as transfer from [facility] s/p VT arrest s/p shock x2 w/ unknown downtime. Pt found to have trauma to head w/ superficial scalp bleeding and oral mucosal bleeding - CTH significant for L occipital SDH and R temporal SAH, remains w/o significant improvement in mental status. Hospital course complicated by seizures, klebsiella PNA, MRSE bacteremia, DVTs and now strenotrophomas pna.     Plan:  ====================== NEUROLOGY=====================  # Encephalopathy post arrest   - Intubated w/o sedation s/p cardiac arrest w/ unknown down time. Reported to be 20-40minutes but can be longer given ROSC achieved right before arrival to the hospital   - CTH at [facility] w/o intracranial bleed/pathology. CTH maybe too soon to show evidence of anoxic injury  - Pt remains w/o purposeful movement.   - Neuro and neurosurgery following  - VEEG: no epileptiform activity; diffuse cerebral dysfunction   - Repeat CTH shows stability of subdural hematoma & subarachnoid hemorrhage and emerging signs of anoxic injury  - f/u neuron specific enolase  - f/u head CT for stability on heparin gtt  - Family meeting w/ neurology attending 12/22 discussed no meaningful recovery. Pending family decision on comfort care.     #SAH & SDH  - Pt w/ acute SAH and SDH 2/2 trauma, not initially seen in outside scan at [facility] likely performed too early for radiologic evidence  - Neurology and neurosurgery following  - NSGY: no intervention  - Repeat CTH shows stability of bleeds  - Maintain BP control   - Initially started on HSQ for DVT ppx, now on heparin gtt (started 12/24 with narrow therapeutic ptt window) for full AC given findings of DVTs  - 12/25 repeat CTH without expansion of hematoma with therapeutic PTT, though does have loss of grey-white matter junction congruent with anoxic brain injury  - 12/25 had systolic hypertension >200 w/ c/f worsening ICH/expansion so repeat CTH ordered -> stable ICH  - f/u head CT on systemic heparin    #C-spine trauma  Pt w/ fall at home presented w/ c-collar.   -Per [facility] radiology report no evidence of cervical fracture  -Trauma surgery cleared pt of Cspine collar    #Myoclonic jerking  - vEEG: evidence of mycolonic seizures  - continue keppra 1000 BID    ==================== RESPIRATORY======================  #acute hypoxic respiratory failure  - s/p cardiac arrest, support continued i/s/o poor mental status  - On full vent support: RR 14 /  / PEEP 5 / FiO2 40  - SBT as tolerated  - f.u am ABG    #Oropharynx bleed  - Pt w/ blood in oropharynx requiring frequent suctioning.   - ENT consulted s/p packing - removed 12/21  - no evidence of bleeding    ====================CARDIOVASCULAR==================  #VT arrest  - Pt s/p VT arrest w/ unknown downtime. Pt reportedly received shock x2. No prior cardiac hx per family  - Pt not asa/plavix loaded 2/2 substernal hematoma noted on CT chest at [facility], continue holding i/s/o SAH  - Unlikely to be a candidate for [facility] at this time given mental status   - continue to monitor on tele    #HTN  - Maintain BP control iso brain bleed  - continue Amlodipine and coreg  - continue to monitor BP  - SBP goal<160    ===================== RENAL =========================  Pt presented w/ SCr 1.16, likely near baseline  - trend Cr   - montior I&Os    ==================== GASTROINTESTINAL===================  - Diet: enteral feeds   - Protonix    ===================HEMATOLOGIC/ONC ===================  #Substernal hematoma  - Pt w/ substernal hematoma noted on imaging at [facility] from trauma/cpr  - Repeat CT chest to monitor hematoma  - A/C: heparin gtt - on hold while r/o worsening ICH    #Thrombocytopenia  - Pt w/ downtrending PLT - currently stable in low 100s. Started downtrending prior to HSQ initiation.   - Likely iso critical illness     =======================    ENDOCRINE  =====================  #Hypothyroidism  - A1c=5.5  - monitor glucose    ========================INFECTIOUS DISEASE================  #Sepsis  - Pt w/ leukocytosis w/ fevers possibly 2/2  infection  - Pt w/ persistent fevers likely multifactorial 2/2 DVTs, underlying infection, more likely ?neurogenic/central component given lack of response to empiric broad spectrum coverage given labile pressures and hyperglycemia  - U/A grossly positive. F/u 12/23 BCx, UCx - NGTD  - Pt had crash lines placed at [facility]. Possible source of infection. MRSA negative 12/20  - 12/20 Sputum Cx + for klebsiella  - 12/22 BCx 1 bottle w/ MRSE. Possible contaminant. Check repeat BCx  - 12/25 sputum cx growing moderate gram negative rods, will f/u speciation and sensitivities  - Was on CTX 12/20 - 12/22, Zosyn 12/22 - 12/24, meropenem 12/24 - 12/26, cefepime 12/26 - (transitions to coverage made given persistent fever)  - received Vanc (12/22-12/25), discontinued given absence of evidence of further MRSE/MRSA      FRANCHESKA ASHLEY  MRN-[id number]  Patient is a 78y old  Male who presents with a chief complaint of Cardiac Arrest (26 Dec 2023 14:17)    HPI:  78M w/ PMH HTN HLD presents as transfer from [facility] s/p cardiac arrest. Per reports patient had a witness fall and arrest at home. EMS performed CPR en route to hospital was noted to be in VTach and shocked x2. ROSC was obtained just prior to arrival at [facility]. Pt was intubated placed on levo gtt. On arrival pt noted to have lacerated to R forehead. Pt had CT scans that were negative for intracranial hemorrhage, C-spine fracture, facial fractures. A R frontal hematoma was noted.     On arrival patient w/ dried blood on scalp. Also noted to have bleeding from oral mucosa w/ clots requiring suctioning. Pt otherwise off sedation and non-responsive. Per EMS report pt did receive some sedation and paralytics at [facility].     Per family patient has been feeling 'off' but managing his day to day and did not see a physician. Pt prescribed klonopin by outpatient provider and family believes pt may have been taking more than prescribed. On day of arrest, pt was initially asymptomatic carrying out his routine. He went to the bathroom, wife heard a loud thud and found patient in a pool of blood prompting call to EMS. Pt has not followed with any cardiologist. Has not had any syncope or other events preceding this.  (19 Dec 2023 14:54)      24 HOUR EVENTS:    REVIEW OF SYSTEMS:    CONSTITUTIONAL: No weakness, fevers or chills  EYES/ENT: No visual changes;  No vertigo or throat pain   NECK: No pain or stiffness  RESPIRATORY: No cough, wheezing, hemoptysis; No shortness of breath  CARDIOVASCULAR: No chest pain or palpitations  GASTROINTESTINAL: No abdominal or epigastric pain. No nausea, vomiting, or hematemesis; No diarrhea or constipation. No melena or hematochezia.  GENITOURINARY: No dysuria, frequency or hematuria  NEUROLOGICAL: No numbness or weakness  SKIN: No itching, rashes      ICU Vital Signs Last 24 Hrs  T(C): 37.4 (26 Dec 2023 15:00), Max: 38.7 (26 Dec 2023 13:00)  T(F): 99.3 (26 Dec 2023 15:00), Max: 101.7 (26 Dec 2023 13:00)  HR: 65 (26 Dec 2023 18:00) (47 - 73)  BP: --  BP(mean): --  ABP: 131/37 (26 Dec 2023 18:00) (105/43 - 202/59)  ABP(mean): 63 (26 Dec 2023 18:00) (54 - 94)  RR: 17 (26 Dec 2023 18:00) (14 - 22)  SpO2: 100% (26 Dec 2023 18:00) (100% - 100%)    O2 Parameters below as of 26 Dec 2023 18:00  Patient On (Oxygen Delivery Method): ventilator    O2 Concentration (%): 30      Mode: CPAP with PS, FiO2: 30, PEEP: 5  CVP(mm Hg): --  CO: --  CI: --  PA: --  PA(mean): --  PA(direct): --  PCWP: --  LA: --  RA: --  SVR: --  SVRI: --  PVR: --  PVRI: --  I&O's Summary    25 Dec 2023 07:01  -  26 Dec 2023 07:00  --------------------------------------------------------  IN: 1694.4 mL / OUT: 1510 mL / NET: 184.4 mL    26 Dec 2023 07:01  -  26 Dec 2023 19:19  --------------------------------------------------------  IN: 730 mL / OUT: 975 mL / NET: -245 mL        CAPILLARY BLOOD GLUCOSE    CAPILLARY BLOOD GLUCOSE      POCT Blood Glucose.: 166 mg/dL (26 Dec 2023 16:43)      PHYSICAL EXAM:  GENERAL: No acute distress, well-developed  HEAD:  Atraumatic, Normocephalic  EYES: EOMI, PERRLA, conjunctiva and sclera clear  NECK: Supple, no lymphadenopathy, no JVD  CHEST/LUNG: CTAB; No wheezes, rales, or rhonchi  HEART: Regular rate and rhythm. Normal S1/S2. No murmurs, rubs, or gallops  ABDOMEN: Soft, non-tender, non-distended; normal bowel sounds, no organomegaly  EXTREMITIES:  2+ peripheral pulses b/l, No clubbing, cyanosis, or edema  NEUROLOGY: A&O x 3, no focal deficits  SKIN: No rashes or lesions    ============================I/O===========================   I&O's Detail    25 Dec 2023 07:01  -  26 Dec 2023 07:00  --------------------------------------------------------  IN:    Dexmedetomidine: 88.8 mL    Enteral Tube Flush: 200 mL    Heparin: 39 mL    Heparin: 246 mL    IV PiggyBack: 150 mL    Jevity 1.2: 960 mL    Norepinephrine: 10.6 mL  Total IN: 1694.4 mL    OUT:    Indwelling Catheter - Urethral (mL): 1510 mL  Total OUT: 1510 mL    Total NET: 184.4 mL      26 Dec 2023 07:01  -  26 Dec 2023 19:19  --------------------------------------------------------  IN:    Enteral Tube Flush: 100 mL    Heparin: 150 mL    Jevity 1.2: 480 mL  Total IN: 730 mL    OUT:    Dexmedetomidine: 0 mL    Indwelling Catheter - Urethral (mL): 975 mL  Total OUT: 975 mL    Total NET: -245 mL        ============================ LABS =========================                        8.2    7.58  )-----------( 136      ( 26 Dec 2023 00:47 )             25.5     12-26    147<H>  |  113<H>  |  71<H>  ----------------------------<  207<H>  4.4   |  21<L>  |  1.89<H>    Ca    8.7      26 Dec 2023 00:46  Phos  4.6     12-26  Mg     3.1     12-26    TPro  5.8<L>  /  Alb  2.9<L>  /  TBili  1.0  /  DBili  x   /  AST  56<H>  /  ALT  55<H>  /  AlkPhos  53  12-26      LIVER FUNCTIONS - ( 26 Dec 2023 00:46 )  Alb: 2.9 g/dL / Pro: 5.8 g/dL / ALK PHOS: 53 U/L / ALT: 55 U/L / AST: 56 U/L / GGT: x           PT/INR - ( 26 Dec 2023 08:38 )   PT: 12.1 sec;   INR: 1.16 ratio         PTT - ( 26 Dec 2023 08:38 )  PTT:54.5 sec  ABG - ( 26 Dec 2023 00:30 )  pH, Arterial: 7.44  pH, Blood: x     /  pCO2: 33    /  pO2: 169   / HCO3: 22    / Base Excess: -1.4  /  SaO2: 99.7              Blood Gas Arterial, Lactate: 1.1 mmol/L (12-26-23 @ 00:30)  Blood Gas Arterial, Lactate: 0.8 mmol/L (12-25-23 @ 01:16)  Blood Gas Arterial, Lactate: 0.9 mmol/L (12-24-23 @ 00:50)    Urinalysis Basic - ( 26 Dec 2023 00:46 )    Color: x / Appearance: x / SG: x / pH: x  Gluc: 207 mg/dL / Ketone: x  / Bili: x / Urobili: x   Blood: x / Protein: x / Nitrite: x   Leuk Esterase: x / RBC: x / WBC x   Sq Epi: x / Non Sq Epi: x / Bacteria: x      ======================Micro/Rad/Cardio=================  Telemetry: Reviewed   EKG: Reviewed  CXR: Reviewed  Culture: Reviewed   Echo:   Cath:   ======================================================  PAST MEDICAL & SURGICAL HISTORY:  HTN (hypertension)      HLD (hyperlipidemia)      ======================= LINES/TUBES  =====================  [place]: (Date placed)  Central Line: (Date placed)  HD Catheter: (Date placed)  Arterial Line: (Date placed)  Endotracheal Tube: (Date placed)  Porter: (Date placed)  ======================= DISPOSITION  =====================  [X] Critical   [ ] Guarded    [ ] Stable    [X] Maintain in CICU  [ ] Downgrade to Telemtry  [ ] Discharge Home    Patient requires continuous monitoring with bedside rhythm monitoring, pulse ox monitoring, and intermittent blood gas analysis. Care plan discussed with ICU care team. Patient remained critical and at risk for life threatening decompensation.  Patient seen, examined and plan discussed with CCU team during rounds.     I have personally provided 30 minutes of critical care time excluding time spent on separate procedures, in addition to initial critical care time provided by the CICU Attending, Dr. Gasca/ Jackie/ Toña/ William/ Jaspreet/ Daryl .       Rosy Jackson, [facility] [phone]            Assessment	  78M w/ PMH HTN HLD presents as transfer from [facility] s/p VT arrest s/p shock x2 w/ unknown downtime. Pt found to have trauma to head w/ superficial scalp bleeding and oral mucosal bleeding - CTH significant for L occipital SDH and R temporal SAH, remains w/o significant improvement in mental status. Hospital course complicated by seizures, klebsiella PNA, MRSE bacteremia, DVTs and now strenotrophomas pna.     Plan:  ====================== NEUROLOGY=====================  # Encephalopathy post arrest   - Intubated w/o sedation s/p cardiac arrest w/ unknown down time. Reported to be 20-40minutes but can be longer given ROSC achieved right before arrival to the hospital   - CTH at [facility] w/o intracranial bleed/pathology. CTH maybe too soon to show evidence of anoxic injury  - Pt remains w/o purposeful movement.   - Neuro and neurosurgery following  - VEEG: no epileptiform activity; diffuse cerebral dysfunction   - Repeat CTH shows stability of subdural hematoma & subarachnoid hemorrhage and emerging signs of anoxic injury  - f/u neuron specific enolase  - f/u head CT for stability on heparin gtt  - Family meeting w/ neurology attending 12/22 discussed no meaningful recovery. Pending family decision on comfort care.     #SAH & SDH  - Pt w/ acute SAH and SDH 2/2 trauma, not initially seen in outside scan at [facility] likely performed too early for radiologic evidence  - Neurology and neurosurgery following  - NSGY: no intervention  - Repeat CTH shows stability of bleeds  - Maintain BP control   - Initially started on HSQ for DVT ppx, now on heparin gtt (started 12/24 with narrow therapeutic ptt window) for full AC given findings of DVTs  - 12/25 repeat CTH without expansion of hematoma with therapeutic PTT, though does have loss of grey-white matter junction congruent with anoxic brain injury  - 12/25 had systolic hypertension >200 w/ c/f worsening ICH/expansion so repeat CTH ordered -> stable ICH  - f/u head CT on systemic heparin    #C-spine trauma  Pt w/ fall at home presented w/ c-collar.   -Per [facility] radiology report no evidence of cervical fracture  -Trauma surgery cleared pt of Cspine collar    #Myoclonic jerking  - vEEG: evidence of mycolonic seizures  - continue keppra 1000 BID    ==================== RESPIRATORY======================  #acute hypoxic respiratory failure  - s/p cardiac arrest, support continued i/s/o poor mental status  - On full vent support: RR 14 /  / PEEP 5 / FiO2 40  - SBT as tolerated  - f/u am ABG    #Oropharynx bleed  - Pt w/ blood in oropharynx requiring frequent suctioning.   - ENT consulted s/p packing - removed 12/21  - no evidence of bleeding    ====================CARDIOVASCULAR==================  #VT arrest  - Pt s/p VT arrest w/ unknown downtime. Pt reportedly received shock x2. No prior cardiac hx per family  - Pt not asa/plavix loaded 2/2 substernal hematoma noted on CT chest at [facility], continue holding i/s/o SAH  - Unlikely to be a candidate for [facility] at this time given mental status   - continue to monitor on tele    #HTN  - Maintain BP control iso brain bleed  - continue Amlodipine and coreg  - continue to monitor BP    ===================== RENAL =========================  #LONA  - likely i/s/o cardiac arrest  - Pt presented w/ SCr 1.16, likely near baseline  - trend Cr daiy  - continue strict I&Os    ==================== GASTROINTESTINAL===================  #transaminitis  - improving, likely i/s/o shock  - continue to trend, avoid hepatoxins    - Diet: enteral feeds   - continue Protonix  - continue to monitor BM    ===================HEMATOLOGIC/ONC ===================  #acute DVTS  - acute DVTs R axillary vein, R brachial vein, and L soleal vein  - continue heparin gtt with low PTT goal    #Substernal hematoma  - Pt w/ substernal hematoma noted on imaging at [facility] from trauma/cpr  - Repeat CT chest to monitor hematoma    #Thrombocytopenia  - Pt w/ downtrending PLT - currently stable. downtrending prior to HSQ initiation.   - Likely iso critical illness     =======================    ENDOCRINE  =====================  - tsh within normal limits, A1c=5.5  #stress induced hyperglycemia  - continue ISS and NPH   - continue to monitor glucose    ========================INFECTIOUS DISEASE================  #Sepsis  - Pt w/ leukocytosis w/ fevers possibly 2/2  infection  - Pt w/ persistent fevers likely multifactorial 2/2 DVTs, underlying infection, more likely ?neurogenic/central component given lack of response to empiric broad spectrum coverage given labile pressures and hyperglycemia  - U/A grossly positive 12/20.   - Pt had crash lines placed at [facility]. Possible source of infection. MRSA negative 12/20  - 12/20 Sputum Cx + for klebsiella  - 12/22 BCx 1 bottle w/ MRSE. Possible contaminant. Check repeat BCx  - 12/25 sputum cx growing moderate gram negative rods, will f/u speciation and sensitivities  - Was on CTX 12/20 - 12/22, Zosyn 12/22 - 12/24, meropenem 12/24 - 12/26, cefepime 12/26 - (transitions to coverage made given persistent fever)  - received Vanc (12/22-12/25), discontinued given absence of evidence of further MRSE/MRSA  - 12/25 sputum + strenotrophomonas -- > continue bactrim (12/26    ======================= DISPOSITION  =====================  [X] Critical   [ ] Guarded    [ ] Stable    [X] Maintain in CICU  [ ] Downgrade to Telemtry  [ ] Discharge Home    Patient requires continuous monitoring with bedside rhythm monitoring, pulse ox monitoring, and intermittent blood gas analysis. Care plan discussed with ICU care team. Patient remained critical and at risk for life threatening decompensation.  Patient seen, examined and plan discussed with CCU team during rounds.     I have personally provided 30 minutes of critical care time excluding time spent on separate procedures, in addition to initial critical care time provided by the CICU Attending, Dr. Gasca/ Jackie/ Toña/ William/ Jaspreet/ Daryl .       Roys Jackson, [facility] [phone]            Assessment	  78M w/ PMH HTN HLD presents as transfer from [facility] s/p VT arrest s/p shock x2 w/ unknown downtime. Pt found to have trauma to head w/ superficial scalp bleeding and oral mucosal bleeding - CTH significant for L occipital SDH and R temporal SAH, remains w/o significant improvement in mental status. Hospital course complicated by seizures, klebsiella PNA, MRSE bacteremia, DVTs and now strenotrophomas pna.     Plan:  ====================== NEUROLOGY=====================  # Encephalopathy post arrest   - Intubated w/o sedation s/p cardiac arrest w/ unknown down time. Reported to be 20-40minutes but can be longer given ROSC achieved right before arrival to the hospital   - CTH at [facility] w/o intracranial bleed/pathology. CTH maybe too soon to show evidence of anoxic injury  - Pt remains w/o purposeful movement.   - Neuro and neurosurgery following  - VEEG: no epileptiform activity; diffuse cerebral dysfunction   - Repeat CTH shows stability of subdural hematoma & subarachnoid hemorrhage and emerging signs of anoxic injury  - f/u neuron specific enolase  - f/u head CT for stability on heparin gtt  - Family meeting w/ neurology attending 12/22 discussed no meaningful recovery. Pending family decision on comfort care.     #SAH & SDH  - Pt w/ acute SAH and SDH 2/2 trauma, not initially seen in outside scan at [facility] likely performed too early for radiologic evidence  - Neurology and neurosurgery following  - NSGY: no intervention  - Repeat CTH shows stability of bleeds  - Maintain BP control   - Initially started on HSQ for DVT ppx, now on heparin gtt (started 12/24 with narrow therapeutic ptt window) for full AC given findings of DVTs  - 12/25 repeat CTH without expansion of hematoma with therapeutic PTT, though does have loss of grey-white matter junction congruent with anoxic brain injury  - 12/25 had systolic hypertension >200 w/ c/f worsening ICH/expansion so repeat CTH ordered -> stable ICH  - f/u head CT on systemic heparin    #C-spine trauma  Pt w/ fall at home presented w/ c-collar.   -Per [facility] radiology report no evidence of cervical fracture  -Trauma surgery cleared pt of Cspine collar    #Myoclonic jerking  - vEEG: evidence of mycolonic seizures  - continue keppra 1000 BID    ==================== RESPIRATORY======================  #acute hypoxic respiratory failure  - s/p cardiac arrest, support continued i/s/o poor mental status  - On full vent support: RR 14 /  / PEEP 5 / FiO2 40  - SBT as tolerated  - f/u am ABG    #Oropharynx bleed  - Pt w/ blood in oropharynx requiring frequent suctioning.   - ENT consulted s/p packing - removed 12/21  - no evidence of bleeding    ====================CARDIOVASCULAR==================  #VT arrest  - Pt s/p VT arrest w/ unknown downtime. Pt reportedly received shock x2. No prior cardiac hx per family  - Pt not asa/plavix loaded 2/2 substernal hematoma noted on CT chest at [facility], continue holding i/s/o SAH  - Unlikely to be a candidate for [facility] at this time given mental status   - continue to monitor on tele    #HTN  - Maintain BP control iso brain bleed  - continue Amlodipine and coreg  - continue to monitor BP    ===================== RENAL =========================  #LONA  - likely i/s/o cardiac arrest  - Pt presented w/ SCr 1.16, likely near baseline  - trend Cr daiy  - continue strict I&Os    ==================== GASTROINTESTINAL===================  #transaminitis  - improving, likely i/s/o shock  - continue to trend, avoid hepatoxins    - Diet: enteral feeds   - continue Protonix  - continue to monitor BM    ===================HEMATOLOGIC/ONC ===================  #acute DVTS  - acute DVTs R axillary vein, R brachial vein, and L soleal vein  - continue heparin gtt with low PTT goal    #Substernal hematoma  - Pt w/ substernal hematoma noted on imaging at [facility] from trauma/cpr  - Repeat CT chest to monitor hematoma    #Thrombocytopenia  - Pt w/ downtrending PLT - currently stable. downtrending prior to HSQ initiation.   - Likely iso critical illness     =======================    ENDOCRINE  =====================  - tsh within normal limits, A1c=5.5  #stress induced hyperglycemia  - continue ISS and NPH   - continue to monitor glucose    ========================INFECTIOUS DISEASE================  #Sepsis  - Pt w/ leukocytosis w/ fevers possibly 2/2  infection  - Pt w/ persistent fevers likely multifactorial 2/2 DVTs, underlying infection, more likely ?neurogenic/central component given lack of response to empiric broad spectrum coverage given labile pressures and hyperglycemia  - U/A grossly positive 12/20.   - Pt had crash lines placed at [facility]. Possible source of infection. MRSA negative 12/20  - 12/20 Sputum Cx + for klebsiella  - 12/22 BCx 1 bottle w/ MRSE. Possible contaminant. Check repeat BCx  - 12/25 sputum cx growing moderate gram negative rods, will f/u speciation and sensitivities  - Was on CTX 12/20 - 12/22, Zosyn 12/22 - 12/24, meropenem 12/24 - 12/26, cefepime 12/26 - (transitions to coverage made given persistent fever)  - received Vanc (12/22-12/25), discontinued given absence of evidence of further MRSE/MRSA  - 12/25 sputum + strenotrophomonas -- > continue bactrim (12/26    ======================= DISPOSITION  =====================  [X] Critical   [ ] Guarded    [ ] Stable    [X] Maintain in CICU  [ ] Downgrade to Telemtry  [ ] Discharge Home    Patient requires continuous monitoring with bedside rhythm monitoring, pulse ox monitoring, and intermittent blood gas analysis. Care plan discussed with ICU care team. Patient remained critical and at risk for life threatening decompensation.  Patient seen, examined and plan discussed with CCU team during rounds.     I have personally provided 30 minutes of critical care time excluding time spent on separate procedures, in addition to initial critical care time provided by the CICU Attending, Dr. aGsca/ Jackie/ Toña/ William/ Jaspreet/ Daryl .       Rosy Jackson, [facility] [phone]

## 2023-12-26 NOTE — CHART NOTE - NSCHARTNOTESELECT_GEN_ALL_CORE
Tertiary Survey - Trauma surgery
Preliminary EEG report
Neurosurgery/Event Note
neurosurgery/Event Note

## 2023-12-26 NOTE — CONSULT NOTE ADULT - CONVERSATION DETAILS
Discussed with son Luke today regarding palliative care consultation  Introduced self and role to family today.  Son aware of current clinical situation and states patients spouse having difficulty processing and requesting more time before making decisions. Emotional support provided.    Explained option for family meeting just to discuss options for next steps, specifically so family has all information needed to make informed decision. Briefly reviewed option for tracheostomy vs. transition in focus of care to symptom directed with elective removal of ventilator.     Son amenable to meet with this provider along with other family members tomorrow 12/27 at 2PM to discuss these options further. contact information provided. CICU team updated.

## 2023-12-26 NOTE — PROGRESS NOTE ADULT - ASSESSMENT
78M w/ PMH HTN HLD presents as transfer from [facility] s/p VT arrest s/p shock x2 w/ unknown downtime. Pt found to have trauma to head w/ superficial scalp bleeding and oral mucosal bleeding - CTH significant for L occipital SDH and R temporal SAH, stable on subsequent imaging. Remains w/o significant improvement in mental status.     Plan:  ====================== NEUROLOGY=====================  #Intubated  Encephalopathy post arrest   - Intubated w/o sedation s/p cardiac arrest w/ unknown down time. Reported to be 20-40minutes but can be longer given ROSC achieved right before arrival to the hospital   - CTH at [facility] w/o intracranial bleed/pathology. CTH maybe too soon to show evidence of anoxic injury  - Pt remains w/o purposeful movement.   - Neuro and neurosurgery following  - VEEG: no epileptiform activity; diffuse cerebral dysfunction   - Repeat CTH shows stability of subdural hematoma & subarachnoid hemorrhage and emerging signs of anoxic injury  - f/u neuron specific enolase  - Family meeting w/ neurology attending 12/22 discussed no meaningful recovery. Pending family decision on comfort care.     #SAH & SDH  - Pt w/ acute SAH and SDH 2/2 trauma  - Was not initially seen in outside scan at [facility] likely performed too early for radiologic evidence  - Neurology and neurosurgery following  - NSGY: no intervention  - Repeat CTH shows stability of bleeds   - Maintain BP control   - Initially started on HSQ for DVT ppx, now on heparin gtt (started 12/24 with narrow therapeutic ptt window) for full AC given findings of DVTs  - 12/25 repeat CTH without expansion of hematoma with therapeutic PTT, though does have loss of grey-white matter junction congruent with anoxic brain injury  - 12/25 had systolic hypertension >200 w/ c/f worsening ICH/expansion so repeat CTH ordered    #C-spine trauma  Pt w/ fall at home presented w/ c-collar.   -Per [facility] radiology report no evidence of cervical fracture  -Trauma surgery cleared pt of Cspine collar    #Myoclonic jerking  - vEEG: evidence of mycolonic seizures  - keppra 1000 BID  - d/c VEEG    ==================== RESPIRATORY======================  #Intubated  Pt intubated s/p cardiac arrest  - On full vent support: RR 14 /  / PEEP 5 / FiO2 40  - Monitor ABGs    #Oropharynx bleed  - Pt w/ blood in oropharynx requiring frequent suctioning.   - ENT consulted s/p packing     ====================CARDIOVASCULAR==================  #VT arrest  - Pt s/p VT arrest w/ unknown downtime. Pt reportedly received shock x2. No prior cardiac hx per family  - Monitor for arrhythmias   - Pt not asa/plavix loaded 2/2 substernal hematoma noted on CT chest at [facility]  - Unlikely to be a candidate for C at this time given mental status     #HTN  - Maintain BP control iso brain bleed  - Amlodipine; coreg  - PRN IVP labetalol for elevated SBP  - SBP goal<160    ===================HEMATOLOGIC/ONC ===================  #Substernal hematoma  - Pt w/ substernal hematoma noted on imaging at [facility] from trauma/cpr  - Repeat CT chest to monitor hematoma  - A/C: heparin gtt - on hold while r/o worsening ICH    #Thrombocytopenia  - Pt w/ downtrending PLT - currently stable in low 100s. Started downtrending prior to HSQ initiation.   - Likely iso critical illness     ===================== RENAL =========================  Pt presented w/ SCr 1.16, likely near baseline  - trend Cr   - montior I&Os    ==================== GASTROINTESTINAL===================  - Diet: enteral feeds   - Protonix    =======================    ENDOCRINE  =====================  #Hypothyroidism  - A1c=5.5  - monitor glucose    ========================INFECTIOUS DISEASE================  #Sepsis  - Pt w/ leukocytosis w/ fevers possibly 2/2  infection  - Pt w/ persistent fevers likely multifactorial 2/2 DVTs, underlying infection, possible ?neurogenic/central component  - U/A grossly positive. F/u 12/23 BCx, UCx - NGTD  - Pt had crash lines placed at [facility]. Possible source of infection. MRSA negative 12/20  - 12/20 Sputum Cx + for klebsiella  - 12/22 BCx 1 bottle w/ MRSE. Possible contaminant. Check repeat BCx  - Broadened to Meropenem 12/24  - c/w Vanc (12/22-12/25)  - plan for 5 day empiric course ending 12/25   78M w/ PMH HTN HLD presents as transfer from [facility] s/p VT arrest s/p shock x2 w/ unknown downtime. Pt found to have trauma to head w/ superficial scalp bleeding and oral mucosal bleeding - CTH significant for L occipital SDH and R temporal SAH, stable on subsequent imaging. Remains w/o significant improvement in mental status.     Plan:  ====================== NEUROLOGY=====================  #Intubated  Encephalopathy post arrest   - Intubated w/o sedation s/p cardiac arrest w/ unknown down time. Reported to be 20-40minutes but can be longer given ROSC achieved right before arrival to the hospital   - CTH at [facility] w/o intracranial bleed/pathology. CTH maybe too soon to show evidence of anoxic injury  - Pt remains w/o purposeful movement.   - Neuro and neurosurgery following  - VEEG: no epileptiform activity; diffuse cerebral dysfunction   - Repeat CTH shows stability of subdural hematoma & subarachnoid hemorrhage and emerging signs of anoxic injury  - f/u neuron specific enolase  - Family meeting w/ neurology attending 12/22 discussed no meaningful recovery. Pending family decision on comfort care.     #SAH & SDH  - Pt w/ acute SAH and SDH 2/2 trauma  - Was not initially seen in outside scan at [facility] likely performed too early for radiologic evidence  - Neurology and neurosurgery following  - NSGY: no intervention  - Repeat CTH shows stability of bleeds   - Maintain BP control   - Initially started on HSQ for DVT ppx, now on heparin gtt (started 12/24 with narrow therapeutic ptt window) for full AC given findings of DVTs  - 12/25 repeat CTH without expansion of hematoma with therapeutic PTT, though does have loss of grey-white matter junction congruent with anoxic brain injury  - 12/25 had systolic hypertension >200 w/ c/f worsening ICH/expansion so repeat CTH ordered    #C-spine trauma  Pt w/ fall at home presented w/ c-collar.   -Per [facility] radiology report no evidence of cervical fracture  -Trauma surgery cleared pt of Cspine collar    #Myoclonic jerking  - vEEG: evidence of mycolonic seizures  - keppra 1000 BID  - d/c VEEG    ==================== RESPIRATORY======================  #Intubated  Pt intubated s/p cardiac arrest  - On full vent support: RR 14 /  / PEEP 5 / FiO2 40  - Monitor ABGs    #Oropharynx bleed  - Pt w/ blood in oropharynx requiring frequent suctioning.   - ENT consulted s/p packing     ====================CARDIOVASCULAR==================  #VT arrest  - Pt s/p VT arrest w/ unknown downtime. Pt reportedly received shock x2. No prior cardiac hx per family  - Monitor for arrhythmias   - Pt not asa/plavix loaded 2/2 substernal hematoma noted on CT chest at [facility]  - Unlikely to be a candidate for C at this time given mental status     #HTN  - Maintain BP control iso brain bleed  - Amlodipine; coreg  - PRN IVP labetalol for elevated SBP  - SBP goal<160    ===================HEMATOLOGIC/ONC ===================  #Substernal hematoma  - Pt w/ substernal hematoma noted on imaging at [facility] from trauma/cpr  - Repeat CT chest to monitor hematoma  - A/C: heparin gtt - on hold while r/o worsening ICH    #Thrombocytopenia  - Pt w/ downtrending PLT - currently stable in low 100s. Started downtrending prior to HSQ initiation.   - Likely iso critical illness     ===================== RENAL =========================  Pt presented w/ SCr 1.16, likely near baseline  - trend Cr   - montior I&Os    ==================== GASTROINTESTINAL===================  - Diet: enteral feeds   - Protonix    =======================    ENDOCRINE  =====================  #Hypothyroidism  - A1c=5.5  - monitor glucose    ========================INFECTIOUS DISEASE================  #Sepsis  - Pt w/ leukocytosis w/ fevers possibly 2/2  infection  - Pt w/ persistent fevers likely multifactorial 2/2 DVTs, underlying infection, possible ?neurogenic/central component  - U/A grossly positive. F/u 12/23 BCx, UCx - NGTD  - Pt had crash lines placed at [facility]. Possible source of infection. MRSA negative 12/20  - 12/20 Sputum Cx + for klebsiella  - 12/22 BCx 1 bottle w/ MRSE. Possible contaminant. Check repeat BCx  - Broadened to Meropenem 12/24  - c/w Vanc (12/22-12/25)  - plan for 5 day empiric course ending 12/25

## 2023-12-26 NOTE — PROGRESS NOTE ADULT - THIS PATIENT HAS THE FOLLOWING CONDITION(S)/DIAGNOSES ON THIS ADMISSION:
12/23/2019         RE: Julián Hutchison  [address] [facility] [address]        Dear Colleague,    Thank you for referring your patient, Julián Hutchison, to the [facility] [place]. Please see a copy of my visit note below.    [facility] - PRIMARY CARE SKIN    CC: lesions on face and chest  SUBJECTIVE:   Julián Hutchison is a(n) 67 year old male who presents to clinic today for evaluation of lesions on the face and chest.     He notes dry, scaly skin on the chest beginning a month ago that does not bleed; he used a bandage on it yesterday, but he noticed redness and irritation around the spot this morning. He does not use a moisturizer on the face. He has had cryotherapy for actinic keratosis on the face in the past: four lesions on 7/23/19 and 3 lesions on 10/15/19.    Personal Medical History  Skin Cancer: NO - but history of actinic keratoses  Eczema Psoriasis Autoimmune   NO NO NO     Family Medical History  Skin Cancer: YES - keratinocyte carcinoma(s) in mother  Eczema Psoriasis Autoimmune   NO NO NO     Sun Exposure History  Previous history of significant sun exposure:  Blistering sunburns: YES - when younger.  Tanning beds: NO.  Sunscreen usage: YES.    Occupation: indoor    Refer to electronic medical record (EMR) for past medical history and medications.    ROS: 14 point review of systems was negative except the symptoms listed above in the HPI.    This document serves as a record of the services and decisions personally performed and made by Yumiko Cho MD and was created by Kin Rodarte, a trained medical scribe, based on personal observations and provider statements to the medical scribe.  December 23, 2019 7:12 AM   Kin Rodarte    OBJECTIVE:   GENERAL: healthy, alert and no distress.  SKIN: Jasmine Skin Type - III.  Face, Chest examined. The dermatoscope was used to help evaluate pigmented lesions.  Skin Pertinent Findings:  Face: Multiple [id number],  Shock coarse-textured, brown, raised lesion(s) most consistent with seborrheic keratosis. Brown, macule(s) most consistent with benign solar lentigo. Dry skin.    Left upper chest, 1 cm from midline at T2: 7 mm in size indurated erythematous hyperkeratotic lesion. ? Actinic keratosis ? Basal cell carcinoma ? Other      Erythematous, scaly, non-indurated lesion(s) most consistent with actinic keratosis involving the left upper forehead x1.  Name: Liquid nitrogen Cry-Ac cryotherapy  Indication: Pre-malignant actinic keratosis  Completed by: Gypsy Cho MD.  Note : Discussed natural history of lesion and treatment options. Prior to treatment, we discussed inflammation, tenderness post-procedure, the healing process, and the risks of pain, infection, scarring, blistering, and hypo-/hyperpigmentation after healing. Explained that these lesions may grow back and may need additional treatment or re-evaluation. The patient understood and verbally agreed to proceed with cryotherapy.    Each actinic keratosis was treated using liquid nitrogen Cry-Ac with a two five second bursts with a pause to allow for the area to thaw.    The patient tolerated the procedure well and left in good condition. If this lesion should persist or recur, then it needs to be re-evaluated.  Total number of lesions treated: 1.     ASSESSMENT:     Encounter Diagnoses   Name Primary?     Neoplasm of uncertain behavior of skin Yes     AK (actinic keratosis)      History of actinic keratoses      Xerosis cutis      Seborrheic keratoses      Solar lentigo      MDM:   Shave excision to rule out actinic keratosis vs basal cell carcinoma vs squamous cell carcinoma on the left upper chest.  Cryotherapy for actinic keratosis on the left upper forehead.  Seborrheic keratosis, solar lentigo benign nature discussed  Moisturizer for dry skin on the face discussed.    PLAN:   Patient Instructions   FUTURE APPOINTMENTS  Follow up per pathology report. You will be  "notified, generally via letter or MyChart, in approximately 10 days. If there is anything we need to discuss or further treatment needed, I will call you to discuss it.    WOUND CARE INSTRUCTIONS  1. Wash hands before every dressing change.  2. Change the dressing after 24 hours and once daily, or earlier if it becomes saturated.  3. Wash the wound area with a mild soap, then rinse.  4. Gently pat dry with a sterile gauze or Q-tip.  5. Using a Q-tip, apply Vaseline or Aquaphor only over entire wound. DO NOT use Neosporin - as many people react to neomycin.  6. Finally, cover with a bandage or sterile non-stick gauze with micropore paper tape.  7. Repeat once daily until wound has healed.      Soap, water and shampoo will not hurt this area.    Do not go swimming or take baths, but showering is encouraged.    Limit use of the area where the procedure was done for a few days to allow for optimal healing.    Signs of Infection:  Infection can occur in any area where skin has been disrupted. If you notice persistent redness, swelling, colored drainage, increasing pain, fever or other signs of infection, please call us at: [phone] and ask to have me or my colleague paged. We will call you back to discuss.    If you experience bleeding:  Wash hands and hold firm pressure on the area for 10 minutes without checking to see if the bleeding has stopped. \"Checking\" pulls off the protective wound clot and restarts the bleeding all over again. Re-apply pressure for 10 minutes if necessary to stop bleeding.  Use additional sterile gauze and tape to maintain pressure once bleeding has stopped.  If bleeding continues, then call back to clinic at [phone].    PATIENT INFORMATION : WOUNDS  During the healing process you will notice a number of changes.     All wounds normally drain.  The larger the wound the more drainage there will be.  After 7-10 days, you will notice the wound beginning to shrink and new skin will " begin to grow.  The wound is healed when you can see that skin has formed over the entire area.  A healed wound has a healthy, shiny look to the surface and is red to dark pink in color to normalize.  Wounds may take approximately 4-6 weeks to heal.  Larger wounds may take 6-8 weeks. After the wound is healed you may discontinue dressing changes.    All wounds develop a small halo of redness surrounding the wound.  This means healing is occurring. Severe itching with extensive redness usually indicates sensitivity to the ointment or bandage tape used to dress the wound.  You should call our office if this develops.      Swelling  and/or discoloration around your surgical site is common, particularly when performed around the eye.  You may experience a sensation of tightness as your wound heals. This is normal and will gradually subside.  Your healed wound may be sensitive to temperature changes. This sensitivity improves with time, but if you re having a lot of discomfort, try to avoid temperature extremes.  Patients frequently experience itching after their wound appears to have healed because of the continue healing under the skin.  Plain Vaseline will help relieve the itching.    DRY SKIN MANAGEMENT INSTRUCTIONS  Routine use of moisturizer is important for healthy, resilient skin not just for soft skin.     Sealing in moisture    Twice daily use of a moisturizer such as over-the-counter (OTC) CeraVe moisturizer cream (in the jar) on the body and an OTC facial moisturizer such as a CeraVe or Cetaphil facial moisturizer. CeraVe products contain ceramides and filaggrin proteins that can help to maintain the body's moisture layer.    After cleansing or washing, always apply moisturizer immediately after drying off (pat dry only) for best effect.    Protection while hydrating    Do not overuse soap. Unless you have been sweating extensively, just apply soap to groin and armpits.    Recommended products for body  "include: OTC unscented Dove for sensitive skin or OTC Vanicream cleansing bar.    Recommended facial cleansers include: OTC CeraVe hydrating facial cleanser or OTC Cetaphil daily facial cleanser.    Avoid use of    Scented/perfumed products    Irritating clothing (wool, new jeans, new/unwashed clothing, scratchy synthetics)    Neosporin or triple antibiotic topical products    Products containing aloe, herbs, Vitamin E, or other \"natural ingredients\".    Dryer sheets or fabric softeners (while symptoms are present)    If a topical medication is prescribed, apply topical prescription first, followed by use of moisturizing product.    ACTINIC KERATOSES POST-TREATMENT CARE INSTRUCTIONS  Actinic keratoses are benign, scaly or gritty lesions that appear in sun-exposed areas and may progress to skin cancers. For this reason, it is important to treat them before they become cancerous. Liquid nitrogen is the most commonly used and most effective treatment for actinic keratoses; it is mildly uncomfortable when applied to the skin, but the discomfort rapidly subsides.    Post-Treatment:  You may experience burning and/or stinging immediately following the procedure. The discomfort from the procedure may persist over the next 12-24 hours. The area treated will look pinker and slightly swollen before the healing process begins. You may also notice redness, swelling, tenderness, weeping and crusts or scabs. Healing time is approximately 10-14 days.    Blister - You may or may notice blistering from the freezing. If you develop an uncomfortable blister from today's treatment, you may gently puncture this with a needle that has been cleaned with alcohol. However, do not remove the protective skin layer of the blister.    Scab - After a few days, you may notice scaliness or scab formation. Do not pick at the scabs because this may cause slower healing and a permanent scar.    The skin may appear temporarily darker at the treatment " site, but this usually fades over a period of months, provided that the area is protected from the sun.    Care of the areas treated:    Wash the area with a mild cleanser.    Gently pat dry.    Do not rub.     Keep protected from the sun during the healing process and for a full year following treatment as the skin continues to remodel during this time.    Apply Vaseline or Aquaphor ointment sparingly to the site for the first 7 days after treatment.    Do not use Neosporin, as many people eventually develop a medication allergy, that can easily be confused with an infection, to Neomycin.    Return if:  There should not be any residual scaling. If there is any concern that the lesion has persisted after 4-6 weeks, make an appointment for a re-check. Healing time does vary depending on your individual healing process and the area of the body treated. Most patients will be healed in one month.    Signs of Infection:  Thankfully this is rare. However if you notice persistent colored drainage, increasing pain, fever or other signs of infection, please call us at: [phone]      TT: 20 minutes.  CT: 15 minutes.    The information in this document, created by the medical scribe for me, accurately reflects the services I personally performed and the decisions made by me. I have reviewed and approved this document for accuracy prior to leaving the patient care area.  December 23, 2019 7:11 AM  Yumiko Cho MD  [facility]    Again, thank you for allowing me to participate in the care of your patient.        Sincerely,        Yumiko Cho MD

## 2023-12-26 NOTE — PROGRESS NOTE ADULT - SUBJECTIVE AND OBJECTIVE BOX
Patient is a 78y old  Male who presents with a chief complaint of s/p Cardiac Arrest (25 Dec 2023 19:17)    HPI:  78M w/ PMH HTN HLD presents as transfer from [facility] s/p cardiac arrest. Per reports patient had a witness fall and arrest at home. EMS performed CPR en route to hospital was noted to be in VTach and shocked x2. ROSC was obtained just prior to arrival at [facility]. Pt was intubated placed on levo gtt. On arrival pt noted to have lacerated to R forehead. Pt had CT scans that were negative for intracranial hemorrhage, C-spine fracture, facial fractures. A R frontal hematoma was noted.     On arrival patient w/ dried blood on scalp. Also noted to have bleeding from oral mucosa w/ clots requiring suctioning. Pt otherwise off sedation and non-responsive. Per EMS report pt did receive some sedation and paralytics at [facility].     Per family patient has been feeling 'off' but managing his day to day and did not see a physician. Pt prescribed klonopin by outpatient provider and family believes pt may have been taking more than prescribed. On day of arrest, pt was initially asymptomatic carrying out his routine. He went to the bathroom, wife heard a loud thud and found patient in a pool of blood prompting call to EMS. Pt has not followed with any cardiologist. Has not had any syncope or other events preceding this.  (19 Dec 2023 14:54)       INTERVAL HPI/OVERNIGHT EVENTS:   No overnight events   Afebrile, hemodynamically stable     Subjective:    ICU Vital Signs Last 24 Hrs  T(C): 37.7 (26 Dec 2023 06:00), Max: 38.5 (25 Dec 2023 17:00)  T(F): 99.9 (26 Dec 2023 06:00), Max: 101.3 (25 Dec 2023 17:00)  HR: 59 (26 Dec 2023 07:00) (42 - 66)  BP: --  BP(mean): --  ABP: 157/42 (26 Dec 2023 07:00) (91/31 - 202/59)  ABP(mean): 71 (26 Dec 2023 07:00) (47 - 96)  RR: 18 (26 Dec 2023 07:00) (14 - 28)  SpO2: 100% (26 Dec 2023 07:00) (97% - 100%)    O2 Parameters below as of 26 Dec 2023 07:00  Patient On (Oxygen Delivery Method): ventilator    O2 Concentration (%): 40      I&O's Summary    25 Dec 2023 07:01  -  26 Dec 2023 07:00  --------------------------------------------------------  IN: 1694.4 mL / OUT: 1450 mL / NET: 244.4 mL      Mode: AC/ CMV (Assist Control/ Continuous Mandatory Ventilation)  RR (machine): 14  TV (machine): 500  FiO2: 40  PEEP: 5  ITime: 1  MAP: 10  PIP: 23      Daily     Daily     Adult Advanced Hemodynamics Last 24 Hrs  CVP(mm Hg): --  CVP(cm H2O): --  CO: --  CI: --  PA: --  PA(mean): --  PCWP: --  SVR: --  SVRI: --  PVR: --  PVRI: --    EKG/Telemetry Events:    MEDICATIONS  (STANDING):  amLODIPine   Tablet 5 milliGRAM(s) Oral daily  artificial  tears Solution 1 Drop(s) Both EYES two times a day  carvedilol 3.125 milliGRAM(s) Oral every 12 hours  chlorhexidine 0.12% Liquid 15 milliLiter(s) Oral Mucosa every 12 hours  chlorhexidine 2% Cloths 1 Application(s) Topical daily  dexMEDEtomidine Infusion 0.2 MICROgram(s)/kG/Hr (4.68 mL/Hr) IV Continuous <Continuous>  heparin  Infusion 1200 Unit(s)/Hr (12.5 mL/Hr) IV Continuous <Continuous>  insulin lispro (ADMELOG) corrective regimen sliding scale   SubCutaneous every 6 hours  levETIRAcetam  IVPB 1000 milliGRAM(s) IV Intermittent every 12 hours  meropenem  IVPB 1000 milliGRAM(s) IV Intermittent every 12 hours  pantoprazole  Injectable 40 milliGRAM(s) IV Push daily  petrolatum Ophthalmic Ointment 1 Application(s) Both EYES three times a day    MEDICATIONS  (PRN):      PHYSICAL EXAM:  GENERAL:   HEAD:  Atraumatic, Normocephalic  EYES: EOMI, PERRLA, conjunctiva and sclera clear  NECK: Supple, No JVD, Normal thyroid, no enlarged nodes  NERVOUS SYSTEM:  Alert & Awake.   CHEST/LUNG: B/L good air entry; No rales, rhonchi, or wheezing  HEART: S1S2 normal, no S3, Regular rate and rhythm; No murmurs  ABDOMEN: Soft, Nontender, Nondistended; Bowel sounds present  EXTREMITIES:  2+ Peripheral Pulses, No clubbing, cyanosis, or edema  LYMPH: No lymphadenopathy noted  SKIN: No rashes or lesions    LABS:                        8.2    7.58  )-----------( 136      ( 26 Dec 2023 00:47 )             25.5     12-26    147<H>  |  113<H>  |  71<H>  ----------------------------<  207<H>  4.4   |  21<L>  |  1.89<H>    Ca    8.7      26 Dec 2023 00:46  Phos  4.6     12-26  Mg     3.1     12-26    TPro  5.8<L>  /  Alb  2.9<L>  /  TBili  1.0  /  DBili  x   /  AST  56<H>  /  ALT  55<H>  /  AlkPhos  53  12-26    LIVER FUNCTIONS - ( 26 Dec 2023 00:46 )  Alb: 2.9 g/dL / Pro: 5.8 g/dL / ALK PHOS: 53 U/L / ALT: 55 U/L / AST: 56 U/L / GGT: x           PT/INR - ( 26 Dec 2023 00:47 )   PT: 11.9 sec;   INR: 1.08 ratio         PTT - ( 26 Dec 2023 00:47 )  PTT:51.1 sec  CAPILLARY BLOOD GLUCOSE      POCT Blood Glucose.: 196 mg/dL (26 Dec 2023 05:14)  POCT Blood Glucose.: 195 mg/dL (26 Dec 2023 02:04)  POCT Blood Glucose.: 199 mg/dL (25 Dec 2023 23:58)  POCT Blood Glucose.: 194 mg/dL (25 Dec 2023 16:59)  POCT Blood Glucose.: 166 mg/dL (25 Dec 2023 12:49)    ABG - ( 26 Dec 2023 00:30 )  pH, Arterial: 7.44  pH, Blood: x     /  pCO2: 33    /  pO2: 169   / HCO3: 22    / Base Excess: -1.4  /  SaO2: 99.7                    Urinalysis Basic - ( 26 Dec 2023 00:46 )    Color: x / Appearance: x / SG: x / pH: x  Gluc: 207 mg/dL / Ketone: x  / Bili: x / Urobili: x   Blood: x / Protein: x / Nitrite: x   Leuk Esterase: x / RBC: x / WBC x   Sq Epi: x / Non Sq Epi: x / Bacteria: x          RADIOLOGY & ADDITIONAL TESTS:  CXR:        Care Discussed with Consultants/Other Providers [ x] YES  [ ] NO           Patient is a 78y old  Male who presents with a chief complaint of s/p Cardiac Arrest (25 Dec 2023 19:17)    HPI:  78M w/ PMH HTN HLD presents as transfer from [facility] s/p cardiac arrest. Per reports patient had a witness fall and arrest at home. EMS performed CPR en route to hospital was noted to be in VTach and shocked x2. ROSC was obtained just prior to arrival at [facility]. Pt was intubated placed on levo gtt. On arrival pt noted to have lacerated to R forehead. Pt had CT scans that were negative for intracranial hemorrhage, C-spine fracture, facial fractures. A R frontal hematoma was noted.     On arrival patient w/ dried blood on scalp. Also noted to have bleeding from oral mucosa w/ clots requiring suctioning. Pt otherwise off sedation and non-responsive. Per EMS report pt did receive some sedation and paralytics at [facility].     Per family patient has been feeling 'off' but managing his day to day and did not see a physician. Pt prescribed klonopin by outpatient provider and family believes pt may have been taking more than prescribed. On day of arrest, pt was initially asymptomatic carrying out his routine. He went to the bathroom, wife heard a loud thud and found patient in a pool of blood prompting call to EMS. Pt has not followed with any cardiologist. Has not had any syncope or other events preceding this.  (19 Dec 2023 14:54)       INTERVAL HPI/OVERNIGHT EVENTS:   No overnight events   Afebrile, hemodynamically stable     Subjective:    ICU Vital Signs Last 24 Hrs  T(C): 37.7 (26 Dec 2023 06:00), Max: 38.5 (25 Dec 2023 17:00)  T(F): 99.9 (26 Dec 2023 06:00), Max: 101.3 (25 Dec 2023 17:00)  HR: 59 (26 Dec 2023 07:00) (42 - 66)  BP: --  BP(mean): --  ABP: 157/42 (26 Dec 2023 07:00) (91/31 - 202/59)  ABP(mean): 71 (26 Dec 2023 07:00) (47 - 96)  RR: 18 (26 Dec 2023 07:00) (14 - 28)  SpO2: 100% (26 Dec 2023 07:00) (97% - 100%)    O2 Parameters below as of 26 Dec 2023 07:00  Patient On (Oxygen Delivery Method): ventilator    O2 Concentration (%): 40      I&O's Summary    25 Dec 2023 07:01  -  26 Dec 2023 07:00  --------------------------------------------------------  IN: 1694.4 mL / OUT: 1450 mL / NET: 244.4 mL      Mode: AC/ CMV (Assist Control/ Continuous Mandatory Ventilation)  RR (machine): 14  TV (machine): 500  FiO2: 40  PEEP: 5  ITime: 1  MAP: 10  PIP: 23      Daily     Daily     Adult Advanced Hemodynamics Last 24 Hrs  CVP(mm Hg): --  CVP(cm H2O): --  CO: --  CI: --  PA: --  PA(mean): --  PCWP: --  SVR: --  SVRI: --  PVR: --  PVRI: --    EKG/Telemetry Events:    MEDICATIONS  (STANDING):  amLODIPine   Tablet 5 milliGRAM(s) Oral daily  artificial  tears Solution 1 Drop(s) Both EYES two times a day  carvedilol 3.125 milliGRAM(s) Oral every 12 hours  chlorhexidine 0.12% Liquid 15 milliLiter(s) Oral Mucosa every 12 hours  chlorhexidine 2% Cloths 1 Application(s) Topical daily  dexMEDEtomidine Infusion 0.2 MICROgram(s)/kG/Hr (4.68 mL/Hr) IV Continuous <Continuous>  heparin  Infusion 1200 Unit(s)/Hr (12.5 mL/Hr) IV Continuous <Continuous>  insulin lispro (ADMELOG) corrective regimen sliding scale   SubCutaneous every 6 hours  levETIRAcetam  IVPB 1000 milliGRAM(s) IV Intermittent every 12 hours  meropenem  IVPB 1000 milliGRAM(s) IV Intermittent every 12 hours  pantoprazole  Injectable 40 milliGRAM(s) IV Push daily  petrolatum Ophthalmic Ointment 1 Application(s) Both EYES three times a day    MEDICATIONS  (PRN):      PHYSICAL EXAM:  GENERAL:   HEAD:  Atraumatic, Normocephalic  EYES: EOMI, PERRLA, conjunctiva and sclera clear  NECK: Supple, No JVD, Normal thyroid, no enlarged nodes  NERVOUS SYSTEM:  Alert & Awake.   CHEST/LUNG: B/L good air entry; No rales, rhonchi, or wheezing  HEART: S1S2 normal, no S3, Regular rate and rhythm; No murmurs  ABDOMEN: Soft, Nontender, Nondistended; Bowel sounds present  EXTREMITIES:  2+ Peripheral Pulses, No clubbing, cyanosis, or edema  LYMPH: No lymphadenopathy noted  SKIN: No rashes or lesions    LABS:                        8.2    7.58  )-----------( 136      ( 26 Dec 2023 00:47 )             25.5     12-26    147<H>  |  113<H>  |  71<H>  ----------------------------<  207<H>  4.4   |  21<L>  |  1.89<H>    Ca    8.7      26 Dec 2023 00:46  Phos  4.6     12-26  Mg     3.1     12-26    TPro  5.8<L>  /  Alb  2.9<L>  /  TBili  1.0  /  DBili  x   /  AST  56<H>  /  ALT  55<H>  /  AlkPhos  53  12-26    LIVER FUNCTIONS - ( 26 Dec 2023 00:46 )  Alb: 2.9 g/dL / Pro: 5.8 g/dL / ALK PHOS: 53 U/L / ALT: 55 U/L / AST: 56 U/L / GGT: x           PT/INR - ( 26 Dec 2023 00:47 )   PT: 11.9 sec;   INR: 1.08 ratio         PTT - ( 26 Dec 2023 00:47 )  PTT:51.1 sec  CAPILLARY BLOOD GLUCOSE      POCT Blood Glucose.: 196 mg/dL (26 Dec 2023 05:14)  POCT Blood Glucose.: 195 mg/dL (26 Dec 2023 02:04)  POCT Blood Glucose.: 199 mg/dL (25 Dec 2023 23:58)  POCT Blood Glucose.: 194 mg/dL (25 Dec 2023 16:59)  POCT Blood Glucose.: 166 mg/dL (25 Dec 2023 12:49)    ABG - ( 26 Dec 2023 00:30 )  pH, Arterial: 7.44  pH, Blood: x     /  pCO2: 33    /  pO2: 169   / HCO3: 22    / Base Excess: -1.4  /  SaO2: 99.7                    Urinalysis Basic - ( 26 Dec 2023 00:46 )    Color: x / Appearance: x / SG: x / pH: x  Gluc: 207 mg/dL / Ketone: x  / Bili: x / Urobili: x   Blood: x / Protein: x / Nitrite: x   Leuk Esterase: x / RBC: x / WBC x   Sq Epi: x / Non Sq Epi: x / Bacteria: x          RADIOLOGY & ADDITIONAL TESTS:  CXR:        Care Discussed with Consultants/Other Providers [ x] YES  [ ] NO           Patient is a 78y old  Male who presents with a chief complaint of s/p Cardiac Arrest (25 Dec 2023 19:17)    HPI:  78M w/ PMH HTN HLD presents as transfer from [facility] s/p cardiac arrest. Per reports patient had a witness fall and arrest at home. EMS performed CPR en route to hospital was noted to be in VTach and shocked x2. ROSC was obtained just prior to arrival at [facility]. Pt was intubated placed on levo gtt. On arrival pt noted to have lacerated to R forehead. Pt had CT scans that were negative for intracranial hemorrhage, C-spine fracture, facial fractures. A R frontal hematoma was noted.     On arrival patient w/ dried blood on scalp. Also noted to have bleeding from oral mucosa w/ clots requiring suctioning. Pt otherwise off sedation and non-responsive. Per EMS report pt did receive some sedation and paralytics at [facility].     Per family patient has been feeling 'off' but managing his day to day and did not see a physician. Pt prescribed klonopin by outpatient provider and family believes pt may have been taking more than prescribed. On day of arrest, pt was initially asymptomatic carrying out his routine. He went to the bathroom, wife heard a loud thud and found patient in a pool of blood prompting call to EMS. Pt has not followed with any cardiologist. Has not had any syncope or other events preceding this.  (19 Dec 2023 14:54)       INTERVAL HPI/OVERNIGHT EVENTS:   No overnight events   Afebrile, hemodynamically stable     Subjective:    ICU Vital Signs Last 24 Hrs  T(C): 37.7 (26 Dec 2023 06:00), Max: 38.5 (25 Dec 2023 17:00)  T(F): 99.9 (26 Dec 2023 06:00), Max: 101.3 (25 Dec 2023 17:00)  HR: 59 (26 Dec 2023 07:00) (42 - 66)  BP: --  BP(mean): --  ABP: 157/42 (26 Dec 2023 07:00) (91/31 - 202/59)  ABP(mean): 71 (26 Dec 2023 07:00) (47 - 96)  RR: 18 (26 Dec 2023 07:00) (14 - 28)  SpO2: 100% (26 Dec 2023 07:00) (97% - 100%)    O2 Parameters below as of 26 Dec 2023 07:00  Patient On (Oxygen Delivery Method): ventilator    O2 Concentration (%): 40      I&O's Summary    25 Dec 2023 07:01  -  26 Dec 2023 07:00  --------------------------------------------------------  IN: 1694.4 mL / OUT: 1450 mL / NET: 244.4 mL      Mode: AC/ CMV (Assist Control/ Continuous Mandatory Ventilation)  RR (machine): 14  TV (machine): 500  FiO2: 40  PEEP: 5  ITime: 1  MAP: 10  PIP: 23      Daily     Daily     Adult Advanced Hemodynamics Last 24 Hrs  CVP(mm Hg): --  CVP(cm H2O): --  CO: --  CI: --  PA: --  PA(mean): --  PCWP: --  SVR: --  SVRI: --  PVR: --  PVRI: --    EKG/Telemetry Events:    MEDICATIONS  (STANDING):  amLODIPine   Tablet 5 milliGRAM(s) Oral daily  artificial  tears Solution 1 Drop(s) Both EYES two times a day  carvedilol 3.125 milliGRAM(s) Oral every 12 hours  chlorhexidine 0.12% Liquid 15 milliLiter(s) Oral Mucosa every 12 hours  chlorhexidine 2% Cloths 1 Application(s) Topical daily  dexMEDEtomidine Infusion 0.2 MICROgram(s)/kG/Hr (4.68 mL/Hr) IV Continuous <Continuous>  heparin  Infusion 1200 Unit(s)/Hr (12.5 mL/Hr) IV Continuous <Continuous>  insulin lispro (ADMELOG) corrective regimen sliding scale   SubCutaneous every 6 hours  levETIRAcetam  IVPB 1000 milliGRAM(s) IV Intermittent every 12 hours  meropenem  IVPB 1000 milliGRAM(s) IV Intermittent every 12 hours  pantoprazole  Injectable 40 milliGRAM(s) IV Push daily  petrolatum Ophthalmic Ointment 1 Application(s) Both EYES three times a day    MEDICATIONS  (PRN):      PHYSICAL EXAM:  GENERAL: Intubated, off sedation. No independent/purposeful movements.  HEAD:  Atraumatic, Normocephalic  EYES: PERRLA, + vertical nystagmus noted this am   NECK: Supple, No JVD  NERVOUS SYSTEM: Pupils ~2mm and minimally reactive, decorticate posturing to pain in upper extremities bilaterally  CHEST/LUNG: Intubated. B/L good air entry; No rales, rhonchi, or wheezing  HEART: S1S2 normal, no S3, Regular rate and rhythm; No murmurs  ABDOMEN: Soft, Nontender, Nondistended; Bowel sounds present  EXTREMITIES:  2+ Peripheral Pulses, No clubbing, cyanosis. Edematous upper extremities.   SKIN: R periorbital ecchymosis, ecchymoses on upper extremities bilaterally.      LABS:                        8.2    7.58  )-----------( 136      ( 26 Dec 2023 00:47 )             25.5     12-26    147<H>  |  113<H>  |  71<H>  ----------------------------<  207<H>  4.4   |  21<L>  |  1.89<H>    Ca    8.7      26 Dec 2023 00:46  Phos  4.6     12-26  Mg     3.1     12-26    TPro  5.8<L>  /  Alb  2.9<L>  /  TBili  1.0  /  DBili  x   /  AST  56<H>  /  ALT  55<H>  /  AlkPhos  53  12-26    LIVER FUNCTIONS - ( 26 Dec 2023 00:46 )  Alb: 2.9 g/dL / Pro: 5.8 g/dL / ALK PHOS: 53 U/L / ALT: 55 U/L / AST: 56 U/L / GGT: x           PT/INR - ( 26 Dec 2023 00:47 )   PT: 11.9 sec;   INR: 1.08 ratio         PTT - ( 26 Dec 2023 00:47 )  PTT:51.1 sec  CAPILLARY BLOOD GLUCOSE      POCT Blood Glucose.: 196 mg/dL (26 Dec 2023 05:14)  POCT Blood Glucose.: 195 mg/dL (26 Dec 2023 02:04)  POCT Blood Glucose.: 199 mg/dL (25 Dec 2023 23:58)  POCT Blood Glucose.: 194 mg/dL (25 Dec 2023 16:59)  POCT Blood Glucose.: 166 mg/dL (25 Dec 2023 12:49)    ABG - ( 26 Dec 2023 00:30 )  pH, Arterial: 7.44  pH, Blood: x     /  pCO2: 33    /  pO2: 169   / HCO3: 22    / Base Excess: -1.4  /  SaO2: 99.7                    Urinalysis Basic - ( 26 Dec 2023 00:46 )    Color: x / Appearance: x / SG: x / pH: x  Gluc: 207 mg/dL / Ketone: x  / Bili: x / Urobili: x   Blood: x / Protein: x / Nitrite: x   Leuk Esterase: x / RBC: x / WBC x   Sq Epi: x / Non Sq Epi: x / Bacteria: x          RADIOLOGY & ADDITIONAL TESTS:  CT Head No Cont (12.25.23 @ 11:36)    IMPRESSION:    CT HEAD: Scattered subarachnoid hemorrhage in the BILATERAL parietal and   posterior frontal lobes. Increasing density in the region of the RIGHT   transverse sinus and RIGHT sigmoid sinus which may reflect thrombus.      Recommend MRI and MRV brain with and without gadolinium for complete   evaluation.  Moderate RIGHT frontal scalp hematoma is noted, increased in   size.    CT cervical spine:   No vertebral fracture is recognized.  Moderate   degenerative disc disease and spondylosis at C5-6 and C6-7 withloss of   disc height and associated degenerative endplate changes. There is   narrowing of the RIGHT C3-4, RIGHT C4-5, BILATERAL C5-6 and BILATERAL   C6-7 neural foramina due to uncovertebral spurring and facet osteophytic   hypertrophy. Minimal thecal sac impingement at C5-6 and C6-7 due to   posterior osteophytic ridge/disc complexes.          CXR:        Care Discussed with Consultants/Other Providers [x] YES  [ ] NO           Patient is a 78y old  Male who presents with a chief complaint of s/p Cardiac Arrest (25 Dec 2023 19:17)    HPI:  78M w/ PMH HTN HLD presents as transfer from [facility] s/p cardiac arrest. Per reports patient had a witness fall and arrest at home. EMS performed CPR en route to hospital was noted to be in VTach and shocked x2. ROSC was obtained just prior to arrival at [facility]. Pt was intubated placed on levo gtt. On arrival pt noted to have lacerated to R forehead. Pt had CT scans that were negative for intracranial hemorrhage, C-spine fracture, facial fractures. A R frontal hematoma was noted.     On arrival patient w/ dried blood on scalp. Also noted to have bleeding from oral mucosa w/ clots requiring suctioning. Pt otherwise off sedation and non-responsive. Per EMS report pt did receive some sedation and paralytics at [facility].     Per family patient has been feeling 'off' but managing his day to day and did not see a physician. Pt prescribed klonopin by outpatient provider and family believes pt may have been taking more than prescribed. On day of arrest, pt was initially asymptomatic carrying out his routine. He went to the bathroom, wife heard a loud thud and found patient in a pool of blood prompting call to EMS. Pt has not followed with any cardiologist. Has not had any syncope or other events preceding this.  (19 Dec 2023 14:54)       INTERVAL HPI/OVERNIGHT EVENTS:   No overnight events   Afebrile, hemodynamically stable     Subjective:    ICU Vital Signs Last 24 Hrs  T(C): 37.7 (26 Dec 2023 06:00), Max: 38.5 (25 Dec 2023 17:00)  T(F): 99.9 (26 Dec 2023 06:00), Max: 101.3 (25 Dec 2023 17:00)  HR: 59 (26 Dec 2023 07:00) (42 - 66)  BP: --  BP(mean): --  ABP: 157/42 (26 Dec 2023 07:00) (91/31 - 202/59)  ABP(mean): 71 (26 Dec 2023 07:00) (47 - 96)  RR: 18 (26 Dec 2023 07:00) (14 - 28)  SpO2: 100% (26 Dec 2023 07:00) (97% - 100%)    O2 Parameters below as of 26 Dec 2023 07:00  Patient On (Oxygen Delivery Method): ventilator    O2 Concentration (%): 40      I&O's Summary    25 Dec 2023 07:01  -  26 Dec 2023 07:00  --------------------------------------------------------  IN: 1694.4 mL / OUT: 1450 mL / NET: 244.4 mL      Mode: AC/ CMV (Assist Control/ Continuous Mandatory Ventilation)  RR (machine): 14  TV (machine): 500  FiO2: 40  PEEP: 5  ITime: 1  MAP: 10  PIP: 23      Daily     Daily     Adult Advanced Hemodynamics Last 24 Hrs  CVP(mm Hg): --  CVP(cm H2O): --  CO: --  CI: --  PA: --  PA(mean): --  PCWP: --  SVR: --  SVRI: --  PVR: --  PVRI: --    EKG/Telemetry Events:    MEDICATIONS  (STANDING):  amLODIPine   Tablet 5 milliGRAM(s) Oral daily  artificial  tears Solution 1 Drop(s) Both EYES two times a day  carvedilol 3.125 milliGRAM(s) Oral every 12 hours  chlorhexidine 0.12% Liquid 15 milliLiter(s) Oral Mucosa every 12 hours  chlorhexidine 2% Cloths 1 Application(s) Topical daily  dexMEDEtomidine Infusion 0.2 MICROgram(s)/kG/Hr (4.68 mL/Hr) IV Continuous <Continuous>  heparin  Infusion 1200 Unit(s)/Hr (12.5 mL/Hr) IV Continuous <Continuous>  insulin lispro (ADMELOG) corrective regimen sliding scale   SubCutaneous every 6 hours  levETIRAcetam  IVPB 1000 milliGRAM(s) IV Intermittent every 12 hours  meropenem  IVPB 1000 milliGRAM(s) IV Intermittent every 12 hours  pantoprazole  Injectable 40 milliGRAM(s) IV Push daily  petrolatum Ophthalmic Ointment 1 Application(s) Both EYES three times a day    MEDICATIONS  (PRN):      PHYSICAL EXAM:  GENERAL: Intubated, off sedation. No independent/purposeful movements.  HEAD:  Atraumatic, Normocephalic  EYES: PERRLA, + vertical nystagmus noted this am   NECK: Supple, No JVD  NERVOUS SYSTEM: Pupils ~2mm and minimally reactive, decorticate posturing to pain in upper extremities bilaterally  CHEST/LUNG: Intubated. B/L good air entry; No rales, rhonchi, or wheezing  HEART: S1S2 normal, no S3, Regular rate and rhythm; No murmurs  ABDOMEN: Soft, Nontender, Nondistended; Bowel sounds present  EXTREMITIES:  2+ Peripheral Pulses, No clubbing, cyanosis. Edematous upper extremities.   SKIN: R periorbital ecchymosis, ecchymoses on upper extremities bilaterally.      LABS:                        8.2    7.58  )-----------( 136      ( 26 Dec 2023 00:47 )             25.5     12-26    147<H>  |  113<H>  |  71<H>  ----------------------------<  207<H>  4.4   |  21<L>  |  1.89<H>    Ca    8.7      26 Dec 2023 00:46  Phos  4.6     12-26  Mg     3.1     12-26    TPro  5.8<L>  /  Alb  2.9<L>  /  TBili  1.0  /  DBili  x   /  AST  56<H>  /  ALT  55<H>  /  AlkPhos  53  12-26    LIVER FUNCTIONS - ( 26 Dec 2023 00:46 )  Alb: 2.9 g/dL / Pro: 5.8 g/dL / ALK PHOS: 53 U/L / ALT: 55 U/L / AST: 56 U/L / GGT: x           PT/INR - ( 26 Dec 2023 00:47 )   PT: 11.9 sec;   INR: 1.08 ratio         PTT - ( 26 Dec 2023 00:47 )  PTT:51.1 sec  CAPILLARY BLOOD GLUCOSE      POCT Blood Glucose.: 196 mg/dL (26 Dec 2023 05:14)  POCT Blood Glucose.: 195 mg/dL (26 Dec 2023 02:04)  POCT Blood Glucose.: 199 mg/dL (25 Dec 2023 23:58)  POCT Blood Glucose.: 194 mg/dL (25 Dec 2023 16:59)  POCT Blood Glucose.: 166 mg/dL (25 Dec 2023 12:49)    ABG - ( 26 Dec 2023 00:30 )  pH, Arterial: 7.44  pH, Blood: x     /  pCO2: 33    /  pO2: 169   / HCO3: 22    / Base Excess: -1.4  /  SaO2: 99.7                    Urinalysis Basic - ( 26 Dec 2023 00:46 )    Color: x / Appearance: x / SG: x / pH: x  Gluc: 207 mg/dL / Ketone: x  / Bili: x / Urobili: x   Blood: x / Protein: x / Nitrite: x   Leuk Esterase: x / RBC: x / WBC x   Sq Epi: x / Non Sq Epi: x / Bacteria: x          RADIOLOGY & ADDITIONAL TESTS:  CT Head No Cont (12.25.23 @ 11:36)    IMPRESSION:    CT HEAD: Scattered subarachnoid hemorrhage in the BILATERAL parietal and   posterior frontal lobes. Increasing density in the region of the RIGHT   transverse sinus and RIGHT sigmoid sinus which may reflect thrombus.      Recommend MRI and MRV brain with and without gadolinium for complete   evaluation.  Moderate RIGHT frontal scalp hematoma is noted, increased in   size.    CT cervical spine:   No vertebral fracture is recognized.  Moderate   degenerative disc disease and spondylosis at C5-6 and C6-7 withloss of   disc height and associated degenerative endplate changes. There is   narrowing of the RIGHT C3-4, RIGHT C4-5, BILATERAL C5-6 and BILATERAL   C6-7 neural foramina due to uncovertebral spurring and facet osteophytic   hypertrophy. Minimal thecal sac impingement at C5-6 and C6-7 due to   posterior osteophytic ridge/disc complexes.          CXR:        Care Discussed with Consultants/Other Providers [x] YES  [ ] NO           Patient is a 78y old  Male who presents with a chief complaint of s/p Cardiac Arrest (25 Dec 2023 19:17)    HPI:  78M w/ PMH HTN HLD presents as transfer from [facility] s/p cardiac arrest. Per reports patient had a witness fall and arrest at home. EMS performed CPR en route to hospital was noted to be in VTach and shocked x2. ROSC was obtained just prior to arrival at [facility]. Pt was intubated placed on levo gtt. On arrival pt noted to have lacerated to R forehead. Pt had CT scans that were negative for intracranial hemorrhage, C-spine fracture, facial fractures. A R frontal hematoma was noted.     On arrival patient w/ dried blood on scalp. Also noted to have bleeding from oral mucosa w/ clots requiring suctioning. Pt otherwise off sedation and non-responsive. Per EMS report pt did receive some sedation and paralytics at [facility].     Per family patient has been feeling 'off' but managing his day to day and did not see a physician. Pt prescribed klonopin by outpatient provider and family believes pt may have been taking more than prescribed. On day of arrest, pt was initially asymptomatic carrying out his routine. He went to the bathroom, wife heard a loud thud and found patient in a pool of blood prompting call to EMS. Pt has not followed with any cardiologist. Has not had any syncope or other events preceding this.  (19 Dec 2023 14:54)       INTERVAL HPI/OVERNIGHT EVENTS:   Intermittently required precedex overnight for biting vent - off since 5AM this morning. Febrile to 100.6 overnight, remains w/ labile BPs ranging from 120s-180s systolic.      Subjective: Interview limited by mental status.     ICU Vital Signs Last 24 Hrs  T(C): 37.7 (26 Dec 2023 06:00), Max: 38.5 (25 Dec 2023 17:00)  T(F): 99.9 (26 Dec 2023 06:00), Max: 101.3 (25 Dec 2023 17:00)  HR: 59 (26 Dec 2023 07:00) (42 - 66)  BP: --  BP(mean): --  ABP: 157/42 (26 Dec 2023 07:00) (91/31 - 202/59)  ABP(mean): 71 (26 Dec 2023 07:00) (47 - 96)  RR: 18 (26 Dec 2023 07:00) (14 - 28)  SpO2: 100% (26 Dec 2023 07:00) (97% - 100%)    O2 Parameters below as of 26 Dec 2023 07:00  Patient On (Oxygen Delivery Method): ventilator    O2 Concentration (%): 40      I&O's Summary    25 Dec 2023 07:01  -  26 Dec 2023 07:00  --------------------------------------------------------  IN: 1694.4 mL / OUT: 1450 mL / NET: 244.4 mL      Mode: AC/ CMV (Assist Control/ Continuous Mandatory Ventilation)  RR (machine): 14  TV (machine): 500  FiO2: 40  PEEP: 5  ITime: 1  MAP: 10  PIP: 23      Daily     Daily     Adult Advanced Hemodynamics Last 24 Hrs  CVP(mm Hg): --  CVP(cm H2O): --  CO: --  CI: --  PA: --  PA(mean): --  PCWP: --  SVR: --  SVRI: --  PVR: --  PVRI: --    EKG/Telemetry Events:    MEDICATIONS  (STANDING):  amLODIPine   Tablet 5 milliGRAM(s) Oral daily  artificial  tears Solution 1 Drop(s) Both EYES two times a day  carvedilol 3.125 milliGRAM(s) Oral every 12 hours  chlorhexidine 0.12% Liquid 15 milliLiter(s) Oral Mucosa every 12 hours  chlorhexidine 2% Cloths 1 Application(s) Topical daily  dexMEDEtomidine Infusion 0.2 MICROgram(s)/kG/Hr (4.68 mL/Hr) IV Continuous <Continuous>  heparin  Infusion 1200 Unit(s)/Hr (12.5 mL/Hr) IV Continuous <Continuous>  insulin lispro (ADMELOG) corrective regimen sliding scale   SubCutaneous every 6 hours  levETIRAcetam  IVPB 1000 milliGRAM(s) IV Intermittent every 12 hours  meropenem  IVPB 1000 milliGRAM(s) IV Intermittent every 12 hours  pantoprazole  Injectable 40 milliGRAM(s) IV Push daily  petrolatum Ophthalmic Ointment 1 Application(s) Both EYES three times a day    MEDICATIONS  (PRN):      PHYSICAL EXAM:  GENERAL: Intubated, off sedation. No independent/purposeful movements.  HEAD:  Atraumatic, Normocephalic  EYES: PERRLA, + vertical nystagmus noted this am   NECK: Supple, No JVD  NERVOUS SYSTEM: Pupils ~2mm and minimally reactive, decorticate posturing to pain in upper extremities bilaterally  CHEST/LUNG: Intubated. B/L good air entry; No rales, rhonchi, or wheezing  HEART: S1S2 normal, no S3, Regular rate and rhythm; No murmurs  ABDOMEN: Soft, Nontender, Nondistended; Bowel sounds present  EXTREMITIES:  2+ Peripheral Pulses, No clubbing, cyanosis. Edematous upper extremities.   SKIN: R periorbital ecchymosis, ecchymoses on upper extremities bilaterally.      LABS:                        8.2    7.58  )-----------( 136      ( 26 Dec 2023 00:47 )             25.5     12-26    147<H>  |  113<H>  |  71<H>  ----------------------------<  207<H>  4.4   |  21<L>  |  1.89<H>    Ca    8.7      26 Dec 2023 00:46  Phos  4.6     12-26  Mg     3.1     12-26    TPro  5.8<L>  /  Alb  2.9<L>  /  TBili  1.0  /  DBili  x   /  AST  56<H>  /  ALT  55<H>  /  AlkPhos  53  12-26    LIVER FUNCTIONS - ( 26 Dec 2023 00:46 )  Alb: 2.9 g/dL / Pro: 5.8 g/dL / ALK PHOS: 53 U/L / ALT: 55 U/L / AST: 56 U/L / GGT: x           PT/INR - ( 26 Dec 2023 00:47 )   PT: 11.9 sec;   INR: 1.08 ratio         PTT - ( 26 Dec 2023 00:47 )  PTT:51.1 sec  CAPILLARY BLOOD GLUCOSE      POCT Blood Glucose.: 196 mg/dL (26 Dec 2023 05:14)  POCT Blood Glucose.: 195 mg/dL (26 Dec 2023 02:04)  POCT Blood Glucose.: 199 mg/dL (25 Dec 2023 23:58)  POCT Blood Glucose.: 194 mg/dL (25 Dec 2023 16:59)  POCT Blood Glucose.: 166 mg/dL (25 Dec 2023 12:49)    ABG - ( 26 Dec 2023 00:30 )  pH, Arterial: 7.44  pH, Blood: x     /  pCO2: 33    /  pO2: 169   / HCO3: 22    / Base Excess: -1.4  /  SaO2: 99.7                    Urinalysis Basic - ( 26 Dec 2023 00:46 )    Color: x / Appearance: x / SG: x / pH: x  Gluc: 207 mg/dL / Ketone: x  / Bili: x / Urobili: x   Blood: x / Protein: x / Nitrite: x   Leuk Esterase: x / RBC: x / WBC x   Sq Epi: x / Non Sq Epi: x / Bacteria: x          RADIOLOGY & ADDITIONAL TESTS:  CT Head No Cont (12.25.23 @ 11:36)    IMPRESSION:    CT HEAD: Scattered subarachnoid hemorrhage in the BILATERAL parietal and   posterior frontal lobes. Increasing density in the region of the RIGHT   transverse sinus and RIGHT sigmoid sinus which may reflect thrombus.      Recommend MRI and MRV brain with and without gadolinium for complete   evaluation.  Moderate RIGHT frontal scalp hematoma is noted, increased in   size.    CT cervical spine:   No vertebral fracture is recognized.  Moderate   degenerative disc disease and spondylosis at C5-6 and C6-7 withloss of   disc height and associated degenerative endplate changes. There is   narrowing of the RIGHT C3-4, RIGHT C4-5, BILATERAL C5-6 and BILATERAL   C6-7 neural foramina due to uncovertebral spurring and facet osteophytic   hypertrophy. Minimal thecal sac impingement at C5-6 and C6-7 due to   posterior osteophytic ridge/disc complexes.          CXR:        Care Discussed with Consultants/Other Providers [x] YES  [ ] NO           Patient is a 78y old  Male who presents with a chief complaint of s/p Cardiac Arrest (25 Dec 2023 19:17)    HPI:  78M w/ PMH HTN HLD presents as transfer from [facility] s/p cardiac arrest. Per reports patient had a witness fall and arrest at home. EMS performed CPR en route to hospital was noted to be in VTach and shocked x2. ROSC was obtained just prior to arrival at [facility]. Pt was intubated placed on levo gtt. On arrival pt noted to have lacerated to R forehead. Pt had CT scans that were negative for intracranial hemorrhage, C-spine fracture, facial fractures. A R frontal hematoma was noted.     On arrival patient w/ dried blood on scalp. Also noted to have bleeding from oral mucosa w/ clots requiring suctioning. Pt otherwise off sedation and non-responsive. Per EMS report pt did receive some sedation and paralytics at [facility].     Per family patient has been feeling 'off' but managing his day to day and did not see a physician. Pt prescribed klonopin by outpatient provider and family believes pt may have been taking more than prescribed. On day of arrest, pt was initially asymptomatic carrying out his routine. He went to the bathroom, wife heard a loud thud and found patient in a pool of blood prompting call to EMS. Pt has not followed with any cardiologist. Has not had any syncope or other events preceding this.  (19 Dec 2023 14:54)       INTERVAL HPI/OVERNIGHT EVENTS:   Intermittently required precedex overnight for biting vent - off since 5AM this morning. Febrile to 100.6 overnight, remains w/ labile BPs ranging from 120s-180s systolic.      Subjective: Interview limited by mental status.     ICU Vital Signs Last 24 Hrs  T(C): 37.7 (26 Dec 2023 06:00), Max: 38.5 (25 Dec 2023 17:00)  T(F): 99.9 (26 Dec 2023 06:00), Max: 101.3 (25 Dec 2023 17:00)  HR: 59 (26 Dec 2023 07:00) (42 - 66)  BP: --  BP(mean): --  ABP: 157/42 (26 Dec 2023 07:00) (91/31 - 202/59)  ABP(mean): 71 (26 Dec 2023 07:00) (47 - 96)  RR: 18 (26 Dec 2023 07:00) (14 - 28)  SpO2: 100% (26 Dec 2023 07:00) (97% - 100%)    O2 Parameters below as of 26 Dec 2023 07:00  Patient On (Oxygen Delivery Method): ventilator    O2 Concentration (%): 40      I&O's Summary    25 Dec 2023 07:01  -  26 Dec 2023 07:00  --------------------------------------------------------  IN: 1694.4 mL / OUT: 1450 mL / NET: 244.4 mL      Mode: AC/ CMV (Assist Control/ Continuous Mandatory Ventilation)  RR (machine): 14  TV (machine): 500  FiO2: 40  PEEP: 5  ITime: 1  MAP: 10  PIP: 23      Daily     Daily     Adult Advanced Hemodynamics Last 24 Hrs  CVP(mm Hg): --  CVP(cm H2O): --  CO: --  CI: --  PA: --  PA(mean): --  PCWP: --  SVR: --  SVRI: --  PVR: --  PVRI: --    EKG/Telemetry Events:    MEDICATIONS  (STANDING):  amLODIPine   Tablet 5 milliGRAM(s) Oral daily  artificial  tears Solution 1 Drop(s) Both EYES two times a day  carvedilol 3.125 milliGRAM(s) Oral every 12 hours  chlorhexidine 0.12% Liquid 15 milliLiter(s) Oral Mucosa every 12 hours  chlorhexidine 2% Cloths 1 Application(s) Topical daily  dexMEDEtomidine Infusion 0.2 MICROgram(s)/kG/Hr (4.68 mL/Hr) IV Continuous <Continuous>  heparin  Infusion 1200 Unit(s)/Hr (12.5 mL/Hr) IV Continuous <Continuous>  insulin lispro (ADMELOG) corrective regimen sliding scale   SubCutaneous every 6 hours  levETIRAcetam  IVPB 1000 milliGRAM(s) IV Intermittent every 12 hours  meropenem  IVPB 1000 milliGRAM(s) IV Intermittent every 12 hours  pantoprazole  Injectable 40 milliGRAM(s) IV Push daily  petrolatum Ophthalmic Ointment 1 Application(s) Both EYES three times a day    MEDICATIONS  (PRN):      PHYSICAL EXAM:  GENERAL: Intubated, off sedation. No independent/purposeful movements.  HEAD:  Atraumatic, Normocephalic  EYES: PERRLA, + vertical nystagmus noted this am   NECK: Supple, No JVD  NERVOUS SYSTEM: Pupils ~2mm and minimally reactive, decorticate posturing to pain in upper extremities bilaterally  CHEST/LUNG: Intubated. B/L good air entry; No rales, rhonchi, or wheezing  HEART: S1S2 normal, no S3, Regular rate and rhythm; No murmurs  ABDOMEN: Soft, Nontender, Nondistended; Bowel sounds present  EXTREMITIES:  2+ Peripheral Pulses, No clubbing, cyanosis. Edematous upper extremities.   SKIN: R periorbital ecchymosis, ecchymoses on upper extremities bilaterally.      LABS:                        8.2    7.58  )-----------( 136      ( 26 Dec 2023 00:47 )             25.5     12-26    147<H>  |  113<H>  |  71<H>  ----------------------------<  207<H>  4.4   |  21<L>  |  1.89<H>    Ca    8.7      26 Dec 2023 00:46  Phos  4.6     12-26  Mg     3.1     12-26    TPro  5.8<L>  /  Alb  2.9<L>  /  TBili  1.0  /  DBili  x   /  AST  56<H>  /  ALT  55<H>  /  AlkPhos  53  12-26    LIVER FUNCTIONS - ( 26 Dec 2023 00:46 )  Alb: 2.9 g/dL / Pro: 5.8 g/dL / ALK PHOS: 53 U/L / ALT: 55 U/L / AST: 56 U/L / GGT: x           PT/INR - ( 26 Dec 2023 00:47 )   PT: 11.9 sec;   INR: 1.08 ratio         PTT - ( 26 Dec 2023 00:47 )  PTT:51.1 sec  CAPILLARY BLOOD GLUCOSE      POCT Blood Glucose.: 196 mg/dL (26 Dec 2023 05:14)  POCT Blood Glucose.: 195 mg/dL (26 Dec 2023 02:04)  POCT Blood Glucose.: 199 mg/dL (25 Dec 2023 23:58)  POCT Blood Glucose.: 194 mg/dL (25 Dec 2023 16:59)  POCT Blood Glucose.: 166 mg/dL (25 Dec 2023 12:49)    ABG - ( 26 Dec 2023 00:30 )  pH, Arterial: 7.44  pH, Blood: x     /  pCO2: 33    /  pO2: 169   / HCO3: 22    / Base Excess: -1.4  /  SaO2: 99.7                    Urinalysis Basic - ( 26 Dec 2023 00:46 )    Color: x / Appearance: x / SG: x / pH: x  Gluc: 207 mg/dL / Ketone: x  / Bili: x / Urobili: x   Blood: x / Protein: x / Nitrite: x   Leuk Esterase: x / RBC: x / WBC x   Sq Epi: x / Non Sq Epi: x / Bacteria: x          RADIOLOGY & ADDITIONAL TESTS:  CT Head No Cont (12.25.23 @ 11:36)    IMPRESSION:    CT HEAD: Scattered subarachnoid hemorrhage in the BILATERAL parietal and   posterior frontal lobes. Increasing density in the region of the RIGHT   transverse sinus and RIGHT sigmoid sinus which may reflect thrombus.      Recommend MRI and MRV brain with and without gadolinium for complete   evaluation.  Moderate RIGHT frontal scalp hematoma is noted, increased in   size.    CT cervical spine:   No vertebral fracture is recognized.  Moderate   degenerative disc disease and spondylosis at C5-6 and C6-7 withloss of   disc height and associated degenerative endplate changes. There is   narrowing of the RIGHT C3-4, RIGHT C4-5, BILATERAL C5-6 and BILATERAL   C6-7 neural foramina due to uncovertebral spurring and facet osteophytic   hypertrophy. Minimal thecal sac impingement at C5-6 and C6-7 due to   posterior osteophytic ridge/disc complexes.          CXR:        Care Discussed with Consultants/Other Providers [x] YES  [ ] NO

## 2023-12-26 NOTE — PROGRESS NOTE ADULT - ASSESSMENT
78M w/ PMH HTN HLD presents as transfer from [facility] s/p VT arrest s/p shock x2 w/ unknown downtime. Pt found to have trauma to head w/ superficial scalp bleeding and oral mucosal bleeding - CTH significant for L occipital SDH and R temporal SAH, stable on subsequent imaging. Remains w/o significant improvement in mental status.     Plan:  ====================== NEUROLOGY=====================  #Intubated  Encephalopathy post arrest   - Intubated w/o sedation s/p cardiac arrest w/ unknown down time. Reported to be 20-40minutes but can be longer given ROSC achieved right before arrival to the hospital   - CTH at [facility] w/o intracranial bleed/pathology. CTH maybe too soon to show evidence of anoxic injury  - Pt remains w/o purposeful movement.   - Neuro and neurosurgery following  - VEEG: no epileptiform activity; diffuse cerebral dysfunction   - Repeat CTH shows stability of subdural hematoma & subarachnoid hemorrhage and emerging signs of anoxic injury  - f/u neuron specific enolase  - Family meeting w/ neurology attending 12/22 discussed no meaningful recovery. Pending family decision on comfort care.     #SAH & SDH  - Pt w/ acute SAH and SDH 2/2 trauma  - Was not initially seen in outside scan at [facility] likely performed too early for radiologic evidence  - Neurology and neurosurgery following  - NSGY: no intervention  - Repeat CTH shows stability of bleeds   - Maintain BP control   - Initially started on HSQ for DVT ppx, now on heparin gtt (started 12/24 with narrow therapeutic ptt window) for full AC given findings of DVTs  - 12/25 repeat CTH without expansion of hematoma with therapeutic PTT, though does have loss of grey-white matter junction congruent with anoxic brain injury  - 12/25 had systolic hypertension >200 w/ c/f worsening ICH/expansion so repeat CTH ordered    #C-spine trauma  Pt w/ fall at home presented w/ c-collar.   -Per [facility] radiology report no evidence of cervical fracture  -Trauma surgery cleared pt of Cspine collar    #Myoclonic jerking  - vEEG: evidence of mycolonic seizures  - keppra 1000 BID  - d/c VEEG    ==================== RESPIRATORY======================  #Intubated  Pt intubated s/p cardiac arrest  - On full vent support: RR 14 /  / PEEP 5 / FiO2 40  - Monitor ABGs    #Oropharynx bleed  - Pt w/ blood in oropharynx requiring frequent suctioning.   - ENT consulted s/p packing     ====================CARDIOVASCULAR==================  #VT arrest  - Pt s/p VT arrest w/ unknown downtime. Pt reportedly received shock x2. No prior cardiac hx per family  - Monitor for arrhythmias   - Pt not asa/plavix loaded 2/2 substernal hematoma noted on CT chest at [facility]  - Unlikely to be a candidate for C at this time given mental status     #HTN  - Maintain BP control iso brain bleed  - Amlodipine; coreg  - PRN IVP labetalol for elevated SBP  - SBP goal<160    ===================HEMATOLOGIC/ONC ===================  #Substernal hematoma  - Pt w/ substernal hematoma noted on imaging at [facility] from trauma/cpr  - Repeat CT chest to monitor hematoma  - A/C: heparin gtt - on hold while r/o worsening ICH    #Thrombocytopenia  - Pt w/ downtrending PLT - currently stable in low 100s. Started downtrending prior to HSQ initiation.   - Likely iso critical illness     ===================== RENAL =========================  Pt presented w/ SCr 1.16, likely near baseline  - trend Cr   - montior I&Os    ==================== GASTROINTESTINAL===================  - Diet: enteral feeds   - Protonix    =======================    ENDOCRINE  =====================  #Hypothyroidism  - A1c=5.5  - monitor glucose    ========================INFECTIOUS DISEASE================  #Sepsis  - Pt w/ leukocytosis w/ fevers possibly 2/2  infection  - Pt w/ persistent fevers likely multifactorial 2/2 DVTs, underlying infection, possible ?neurogenic/central component  - U/A grossly positive. F/u 12/23 BCx, UCx - NGTD  - Pt had crash lines placed at [facility]. Possible source of infection. MRSA negative 12/20  - 12/20 Sputum Cx + for klebsiella  - 12/22 BCx 1 bottle w/ MRSE. Possible contaminant. Check repeat BCx  - Broadened to Meropenem 12/24  - c/w Vanc (12/22-12/25)  - plan for 5 day empiric course ending 12/25   78M w/ PMH HTN HLD presents as transfer from [facility] s/p VT arrest s/p shock x2 w/ unknown downtime. Pt found to have trauma to head w/ superficial scalp bleeding and oral mucosal bleeding - CTH significant for L occipital SDH and R temporal SAH, stable on subsequent imaging. Remains w/o significant improvement in mental status.     Plan:  ====================== NEUROLOGY=====================  #Intubated  Encephalopathy post arrest   - Intubated w/o sedation s/p cardiac arrest w/ unknown down time. Reported to be 20-40minutes but can be longer given ROSC achieved right before arrival to the hospital   - CTH at [facility] w/o intracranial bleed/pathology. CTH maybe too soon to show evidence of anoxic injury  - Pt remains w/o purposeful movement.   - Neuro and neurosurgery following  - VEEG: no epileptiform activity; diffuse cerebral dysfunction   - Repeat CTH shows stability of subdural hematoma & subarachnoid hemorrhage and emerging signs of anoxic injury  - f/u neuron specific enolase  - Family meeting w/ neurology attending 12/22 discussed no meaningful recovery. Pending family decision on comfort care.     #SAH & SDH  - Pt w/ acute SAH and SDH 2/2 trauma  - Was not initially seen in outside scan at [facility] likely performed too early for radiologic evidence  - Neurology and neurosurgery following  - NSGY: no intervention  - Repeat CTH shows stability of bleeds   - Maintain BP control   - Initially started on HSQ for DVT ppx, now on heparin gtt (started 12/24 with narrow therapeutic ptt window) for full AC given findings of DVTs  - 12/25 repeat CTH without expansion of hematoma with therapeutic PTT, though does have loss of grey-white matter junction congruent with anoxic brain injury  - 12/25 had systolic hypertension >200 w/ c/f worsening ICH/expansion so repeat CTH ordered    #C-spine trauma  Pt w/ fall at home presented w/ c-collar.   -Per [facility] radiology report no evidence of cervical fracture  -Trauma surgery cleared pt of Cspine collar    #Myoclonic jerking  - vEEG: evidence of mycolonic seizures  - keppra 1000 BID  - d/c VEEG    ==================== RESPIRATORY======================  #Intubated  Pt intubated s/p cardiac arrest  - On full vent support: RR 14 /  / PEEP 5 / FiO2 40  - Monitor ABGs    #Oropharynx bleed  - Pt w/ blood in oropharynx requiring frequent suctioning.   - ENT consulted s/p packing     ====================CARDIOVASCULAR==================  #VT arrest  - Pt s/p VT arrest w/ unknown downtime. Pt reportedly received shock x2. No prior cardiac hx per family  - Monitor for arrhythmias   - Pt not asa/plavix loaded 2/2 substernal hematoma noted on CT chest at [facility]  - Unlikely to be a candidate for C at this time given mental status     #HTN  - Maintain BP control iso brain bleed  - Amlodipine; coreg  - PRN IVP labetalol for elevated SBP  - SBP goal<160    ===================HEMATOLOGIC/ONC ===================  #Substernal hematoma  - Pt w/ substernal hematoma noted on imaging at [facility] from trauma/cpr  - Repeat CT chest to monitor hematoma  - A/C: heparin gtt - on hold while r/o worsening ICH    #Thrombocytopenia  - Pt w/ downtrending PLT - currently stable in low 100s. Started downtrending prior to HSQ initiation.   - Likely iso critical illness     ===================== RENAL =========================  Pt presented w/ SCr 1.16, likely near baseline  - trend Cr   - montior I&Os    ==================== GASTROINTESTINAL===================  - Diet: enteral feeds   - Protonix    =======================    ENDOCRINE  =====================  #Hypothyroidism  - A1c=5.5  - monitor glucose    ========================INFECTIOUS DISEASE================  #Sepsis  - Pt w/ leukocytosis w/ fevers possibly 2/2  infection  - Pt w/ persistent fevers likely multifactorial 2/2 DVTs, underlying infection, possible ?neurogenic/central component  - U/A grossly positive. F/u 12/23 BCx, UCx - NGTD  - Pt had crash lines placed at [facility]. Possible source of infection. MRSA negative 12/20  - 12/20 Sputum Cx + for klebsiella  - 12/22 BCx 1 bottle w/ MRSE. Possible contaminant. Check repeat BCx  - Broadened to Meropenem 12/24  - c/w Vanc (12/22-12/25)  - plan for 5 day empiric course ending 12/25   78M w/ PMH HTN HLD presents as transfer from [facility] s/p VT arrest s/p shock x2 w/ unknown downtime. Pt found to have trauma to head w/ superficial scalp bleeding and oral mucosal bleeding - CTH significant for L occipital SDH and R temporal SAH, stable on subsequent imaging. Remains w/o significant improvement in mental status.     Plan:  ====================== NEUROLOGY=====================  #Intubated  Encephalopathy post arrest   - Intubated w/o sedation s/p cardiac arrest w/ unknown down time. Reported to be 20-40minutes but can be longer given ROSC achieved right before arrival to the hospital   - CTH at [facility] w/o intracranial bleed/pathology. CTH maybe too soon to show evidence of anoxic injury  - Pt remains w/o purposeful movement.   - Neuro and neurosurgery following  - VEEG: no epileptiform activity; diffuse cerebral dysfunction   - Repeat CTH shows stability of subdural hematoma & subarachnoid hemorrhage and emerging signs of anoxic injury  - f/u neuron specific enolase  - Family meeting w/ neurology attending 12/22 discussed no meaningful recovery. Pending family decision on comfort care.     #SAH & SDH  - Pt w/ acute SAH and SDH 2/2 trauma  - Was not initially seen in outside scan at [facility] likely performed too early for radiologic evidence  - Neurology and neurosurgery following  - NSGY: no intervention  - Repeat CTH shows stability of bleeds   - Maintain BP control   - Initially started on HSQ for DVT ppx, now on heparin gtt (started 12/24 with narrow therapeutic ptt window) for full AC given findings of DVTs  - 12/25 repeat CTH without expansion of hematoma with therapeutic PTT, though does have loss of grey-white matter junction congruent with anoxic brain injury  - 12/25 had systolic hypertension >200 w/ c/f worsening ICH/expansion so repeat CTH ordered -> stable ICH    #C-spine trauma  Pt w/ fall at home presented w/ c-collar.   -Per [facility] radiology report no evidence of cervical fracture  -Trauma surgery cleared pt of Cspine collar    #Myoclonic jerking  - vEEG: evidence of mycolonic seizures  - keppra 1000 BID  - d/c VEEG    ==================== RESPIRATORY======================  #Intubated  Pt intubated s/p cardiac arrest  - On full vent support: RR 14 /  / PEEP 5 / FiO2 40  - Monitor ABGs  - will trial pt on CPAP    #Oropharynx bleed  - Pt w/ blood in oropharynx requiring frequent suctioning.   - ENT consulted s/p packing     ====================CARDIOVASCULAR==================  #VT arrest  - Pt s/p VT arrest w/ unknown downtime. Pt reportedly received shock x2. No prior cardiac hx per family  - Monitor for arrhythmias   - Pt not asa/plavix loaded 2/2 substernal hematoma noted on CT chest at [facility]  - Unlikely to be a candidate for C at this time given mental status     #HTN  - Maintain BP control iso brain bleed  - Amlodipine; coreg  - PRN IVP labetalol for elevated SBP  - SBP goal<160    ===================HEMATOLOGIC/ONC ===================  #Substernal hematoma  - Pt w/ substernal hematoma noted on imaging at [facility] from trauma/cpr  - Repeat CT chest to monitor hematoma  - A/C: heparin gtt - on hold while r/o worsening ICH    #Thrombocytopenia  - Pt w/ downtrending PLT - currently stable in low 100s. Started downtrending prior to HSQ initiation.   - Likely iso critical illness     ===================== RENAL =========================  Pt presented w/ SCr 1.16, likely near baseline  - trend Cr   - montior I&Os    ==================== GASTROINTESTINAL===================  - Diet: enteral feeds   - Protonix    =======================    ENDOCRINE  =====================  #Hypothyroidism  - A1c=5.5  - monitor glucose    ========================INFECTIOUS DISEASE================  #Sepsis  - Pt w/ leukocytosis w/ fevers possibly 2/2  infection  - Pt w/ persistent fevers likely multifactorial 2/2 DVTs, underlying infection, more likely ?neurogenic/central component given lack of response to empiric broad spectrum coverage given labile pressures and hyperglycemia  - U/A grossly positive. F/u 12/23 BCx, UCx - NGTD  - Pt had crash lines placed at [facility]. Possible source of infection. MRSA negative 12/20  - 12/20 Sputum Cx + for klebsiella  - 12/22 BCx 1 bottle w/ MRSE. Possible contaminant. Check repeat BCx  - 12/25 sputum cx growing moderate gram negative rods, will f/u speciation and sensitivities  - Was on CTX 12/20 - 12/22, Zosyn 12/22 - 12/24, meropenem 12/24 - 12/26, cefepime 12/26 - (transitions to coverage made given persistent fever)  - received Vanc (12/22-12/25), discontinued given absence of evidence of further MRSE/MRSA     78M w/ PMH HTN HLD presents as transfer from [facility] s/p VT arrest s/p shock x2 w/ unknown downtime. Pt found to have trauma to head w/ superficial scalp bleeding and oral mucosal bleeding - CTH significant for L occipital SDH and R temporal SAH, stable on subsequent imaging. Remains w/o significant improvement in mental status.     Plan:  ====================== NEUROLOGY=====================  #Intubated  Encephalopathy post arrest   - Intubated w/o sedation s/p cardiac arrest w/ unknown down time. Reported to be 20-40minutes but can be longer given ROSC achieved right before arrival to the hospital   - CTH at [facility] w/o intracranial bleed/pathology. CTH maybe too soon to show evidence of anoxic injury  - Pt remains w/o purposeful movement.   - Neuro and neurosurgery following  - VEEG: no epileptiform activity; diffuse cerebral dysfunction   - Repeat CTH shows stability of subdural hematoma & subarachnoid hemorrhage and emerging signs of anoxic injury  - f/u neuron specific enolase  - Family meeting w/ neurology attending 12/22 discussed no meaningful recovery. Pending family decision on comfort care.     #SAH & SDH  - Pt w/ acute SAH and SDH 2/2 trauma  - Was not initially seen in outside scan at [facility] likely performed too early for radiologic evidence  - Neurology and neurosurgery following  - NSGY: no intervention  - Repeat CTH shows stability of bleeds   - Maintain BP control   - Initially started on HSQ for DVT ppx, now on heparin gtt (started 12/24 with narrow therapeutic ptt window) for full AC given findings of DVTs  - 12/25 repeat CTH without expansion of hematoma with therapeutic PTT, though does have loss of grey-white matter junction congruent with anoxic brain injury  - 12/25 had systolic hypertension >200 w/ c/f worsening ICH/expansion so repeat CTH ordered -> stable ICH    #C-spine trauma  Pt w/ fall at home presented w/ c-collar.   -Per [facility] radiology report no evidence of cervical fracture  -Trauma surgery cleared pt of Cspine collar    #Myoclonic jerking  - vEEG: evidence of mycolonic seizures  - keppra 1000 BID  - d/c VEEG    ==================== RESPIRATORY======================  #Intubated  Pt intubated s/p cardiac arrest  - On full vent support: RR 14 /  / PEEP 5 / FiO2 40  - Monitor ABGs  - will trial pt on CPAP    #Oropharynx bleed  - Pt w/ blood in oropharynx requiring frequent suctioning.   - ENT consulted s/p packing     ====================CARDIOVASCULAR==================  #VT arrest  - Pt s/p VT arrest w/ unknown downtime. Pt reportedly received shock x2. No prior cardiac hx per family  - Monitor for arrhythmias   - Pt not asa/plavix loaded 2/2 substernal hematoma noted on CT chest at [facility]  - Unlikely to be a candidate for C at this time given mental status     #HTN  - Maintain BP control iso brain bleed  - Amlodipine; coreg  - PRN IVP labetalol for elevated SBP  - SBP goal<160    ===================HEMATOLOGIC/ONC ===================  #Substernal hematoma  - Pt w/ substernal hematoma noted on imaging at [facility] from trauma/cpr  - Repeat CT chest to monitor hematoma  - A/C: heparin gtt - on hold while r/o worsening ICH    #Thrombocytopenia  - Pt w/ downtrending PLT - currently stable in low 100s. Started downtrending prior to HSQ initiation.   - Likely iso critical illness     ===================== RENAL =========================  Pt presented w/ SCr 1.16, likely near baseline  - trend Cr   - montior I&Os    ==================== GASTROINTESTINAL===================  - Diet: enteral feeds   - Protonix    =======================    ENDOCRINE  =====================  #Hypothyroidism  - A1c=5.5  - monitor glucose    ========================INFECTIOUS DISEASE================  #Sepsis  - Pt w/ leukocytosis w/ fevers possibly 2/2  infection  - Pt w/ persistent fevers likely multifactorial 2/2 DVTs, underlying infection, more likely ?neurogenic/central component given lack of response to empiric broad spectrum coverage given labile pressures and hyperglycemia  - U/A grossly positive. F/u 12/23 BCx, UCx - NGTD  - Pt had crash lines placed at [facility]. Possible source of infection. MRSA negative 12/20  - 12/20 Sputum Cx + for klebsiella  - 12/22 BCx 1 bottle w/ MRSE. Possible contaminant. Check repeat BCx  - 12/25 sputum cx growing moderate gram negative rods, will f/u speciation and sensitivities  - Was on CTX 12/20 - 12/22, Zosyn 12/22 - 12/24, meropenem 12/24 - 12/26, cefepime 12/26 - (transitions to coverage made given persistent fever)  - received Vanc (12/22-12/25), discontinued given absence of evidence of further MRSE/MRSA

## 2023-12-26 NOTE — CONSULT NOTE ADULT - ASSESSMENT
78M w/ PMH HTN HLD presents as transfer from [facility] s/p cardiac arrest. Per reports patient had a witness fall and arrest at home. Patient now with likely RACHEL and poor prognosis for meaningful recovery. palliative consulted for GOC discussion.  78M w/ PMH HTN HLD presents as transfer from [facility] s/p cardiac arrest. Per reports patient had a witness fall and arrest at home. Patient now with likely RACHEL and poor prognosis for meaningful recovery. palliative consulted for GOC discussion.

## 2023-12-26 NOTE — CHART NOTE - NSCHARTNOTEFT_GEN_A_CORE
CTH reveiwed, expected evolution/partial resolution of SAH despite starting AC  Can followup with Dr. Barger in 1-2 weeks for surveillance CTH

## 2023-12-27 VITALS — RESPIRATION RATE: 4 BRPM | TEMPERATURE: 101 F

## 2023-12-27 LAB
-  LEVOFLOXACIN: SIGNIFICANT CHANGE UP
-  LEVOFLOXACIN: SIGNIFICANT CHANGE UP
-  TRIMETHOPRIM/SULFAMETHOXAZOLE: SIGNIFICANT CHANGE UP
-  TRIMETHOPRIM/SULFAMETHOXAZOLE: SIGNIFICANT CHANGE UP
1,3 BETA GLUCAN SER QL: NEGATIVE — SIGNIFICANT CHANGE UP
1,3 BETA GLUCAN SER QL: NEGATIVE — SIGNIFICANT CHANGE UP
ALBUMIN SERPL ELPH-MCNC: 2.8 G/DL — LOW (ref 3.3–5)
ALBUMIN SERPL ELPH-MCNC: 2.8 G/DL — LOW (ref 3.3–5)
ALP SERPL-CCNC: 55 U/L — SIGNIFICANT CHANGE UP (ref 40–120)
ALP SERPL-CCNC: 55 U/L — SIGNIFICANT CHANGE UP (ref 40–120)
ALT FLD-CCNC: 88 U/L — HIGH (ref 10–45)
ALT FLD-CCNC: 88 U/L — HIGH (ref 10–45)
ANION GAP SERPL CALC-SCNC: 10 MMOL/L — SIGNIFICANT CHANGE UP (ref 5–17)
ANION GAP SERPL CALC-SCNC: 10 MMOL/L — SIGNIFICANT CHANGE UP (ref 5–17)
APTT BLD: 45.7 SEC — HIGH (ref 24.5–35.6)
APTT BLD: 45.7 SEC — HIGH (ref 24.5–35.6)
APTT BLD: 45.8 SEC — HIGH (ref 24.5–35.6)
APTT BLD: 45.8 SEC — HIGH (ref 24.5–35.6)
AST SERPL-CCNC: 79 U/L — HIGH (ref 10–40)
AST SERPL-CCNC: 79 U/L — HIGH (ref 10–40)
BASOPHILS # BLD AUTO: 0.01 K/UL — SIGNIFICANT CHANGE UP (ref 0–0.2)
BASOPHILS # BLD AUTO: 0.01 K/UL — SIGNIFICANT CHANGE UP (ref 0–0.2)
BASOPHILS NFR BLD AUTO: 0.2 % — SIGNIFICANT CHANGE UP (ref 0–2)
BASOPHILS NFR BLD AUTO: 0.2 % — SIGNIFICANT CHANGE UP (ref 0–2)
BILIRUB SERPL-MCNC: 0.8 MG/DL — SIGNIFICANT CHANGE UP (ref 0.2–1.2)
BILIRUB SERPL-MCNC: 0.8 MG/DL — SIGNIFICANT CHANGE UP (ref 0.2–1.2)
BUN SERPL-MCNC: 62 MG/DL — HIGH (ref 7–23)
BUN SERPL-MCNC: 62 MG/DL — HIGH (ref 7–23)
CALCIUM SERPL-MCNC: 8.3 MG/DL — LOW (ref 8.4–10.5)
CALCIUM SERPL-MCNC: 8.3 MG/DL — LOW (ref 8.4–10.5)
CHLORIDE SERPL-SCNC: 117 MMOL/L — HIGH (ref 96–108)
CHLORIDE SERPL-SCNC: 117 MMOL/L — HIGH (ref 96–108)
CO2 SERPL-SCNC: 23 MMOL/L — SIGNIFICANT CHANGE UP (ref 22–31)
CO2 SERPL-SCNC: 23 MMOL/L — SIGNIFICANT CHANGE UP (ref 22–31)
CREAT SERPL-MCNC: 1.58 MG/DL — HIGH (ref 0.5–1.3)
CREAT SERPL-MCNC: 1.58 MG/DL — HIGH (ref 0.5–1.3)
CULTURE RESULTS: SIGNIFICANT CHANGE UP
CULTURE RESULTS: SIGNIFICANT CHANGE UP
EGFR: 45 ML/MIN/1.73M2 — LOW
EGFR: 45 ML/MIN/1.73M2 — LOW
EOSINOPHIL # BLD AUTO: 0.1 K/UL — SIGNIFICANT CHANGE UP (ref 0–0.5)
EOSINOPHIL # BLD AUTO: 0.1 K/UL — SIGNIFICANT CHANGE UP (ref 0–0.5)
EOSINOPHIL NFR BLD AUTO: 1.5 % — SIGNIFICANT CHANGE UP (ref 0–6)
EOSINOPHIL NFR BLD AUTO: 1.5 % — SIGNIFICANT CHANGE UP (ref 0–6)
FUNGITELL: 39 PG/ML — SIGNIFICANT CHANGE UP
FUNGITELL: 39 PG/ML — SIGNIFICANT CHANGE UP
GAS PNL BLDA: SIGNIFICANT CHANGE UP
GAS PNL BLDA: SIGNIFICANT CHANGE UP
GLUCOSE BLDC GLUCOMTR-MCNC: 171 MG/DL — HIGH (ref 70–99)
GLUCOSE BLDC GLUCOMTR-MCNC: 171 MG/DL — HIGH (ref 70–99)
GLUCOSE BLDC GLUCOMTR-MCNC: 197 MG/DL — HIGH (ref 70–99)
GLUCOSE BLDC GLUCOMTR-MCNC: 197 MG/DL — HIGH (ref 70–99)
GLUCOSE SERPL-MCNC: 192 MG/DL — HIGH (ref 70–99)
GLUCOSE SERPL-MCNC: 192 MG/DL — HIGH (ref 70–99)
HCT VFR BLD CALC: 25.2 % — LOW (ref 39–50)
HCT VFR BLD CALC: 25.2 % — LOW (ref 39–50)
HGB BLD-MCNC: 8.2 G/DL — LOW (ref 13–17)
HGB BLD-MCNC: 8.2 G/DL — LOW (ref 13–17)
IMM GRANULOCYTES NFR BLD AUTO: 1.2 % — HIGH (ref 0–0.9)
IMM GRANULOCYTES NFR BLD AUTO: 1.2 % — HIGH (ref 0–0.9)
INR BLD: 1.21 RATIO — HIGH (ref 0.85–1.18)
INR BLD: 1.21 RATIO — HIGH (ref 0.85–1.18)
LYMPHOCYTES # BLD AUTO: 0.97 K/UL — LOW (ref 1–3.3)
LYMPHOCYTES # BLD AUTO: 0.97 K/UL — LOW (ref 1–3.3)
LYMPHOCYTES # BLD AUTO: 14.8 % — SIGNIFICANT CHANGE UP (ref 13–44)
LYMPHOCYTES # BLD AUTO: 14.8 % — SIGNIFICANT CHANGE UP (ref 13–44)
MAGNESIUM SERPL-MCNC: 2.7 MG/DL — HIGH (ref 1.6–2.6)
MAGNESIUM SERPL-MCNC: 2.7 MG/DL — HIGH (ref 1.6–2.6)
MCHC RBC-ENTMCNC: 31.3 PG — SIGNIFICANT CHANGE UP (ref 27–34)
MCHC RBC-ENTMCNC: 31.3 PG — SIGNIFICANT CHANGE UP (ref 27–34)
MCHC RBC-ENTMCNC: 32.5 GM/DL — SIGNIFICANT CHANGE UP (ref 32–36)
MCHC RBC-ENTMCNC: 32.5 GM/DL — SIGNIFICANT CHANGE UP (ref 32–36)
MCV RBC AUTO: 96.2 FL — SIGNIFICANT CHANGE UP (ref 80–100)
MCV RBC AUTO: 96.2 FL — SIGNIFICANT CHANGE UP (ref 80–100)
METHOD TYPE: SIGNIFICANT CHANGE UP
METHOD TYPE: SIGNIFICANT CHANGE UP
MONOCYTES # BLD AUTO: 0.64 K/UL — SIGNIFICANT CHANGE UP (ref 0–0.9)
MONOCYTES # BLD AUTO: 0.64 K/UL — SIGNIFICANT CHANGE UP (ref 0–0.9)
MONOCYTES NFR BLD AUTO: 9.7 % — SIGNIFICANT CHANGE UP (ref 2–14)
MONOCYTES NFR BLD AUTO: 9.7 % — SIGNIFICANT CHANGE UP (ref 2–14)
NEUTROPHILS # BLD AUTO: 4.77 K/UL — SIGNIFICANT CHANGE UP (ref 1.8–7.4)
NEUTROPHILS # BLD AUTO: 4.77 K/UL — SIGNIFICANT CHANGE UP (ref 1.8–7.4)
NEUTROPHILS NFR BLD AUTO: 72.6 % — SIGNIFICANT CHANGE UP (ref 43–77)
NEUTROPHILS NFR BLD AUTO: 72.6 % — SIGNIFICANT CHANGE UP (ref 43–77)
NRBC # BLD: 0 /100 WBCS — SIGNIFICANT CHANGE UP (ref 0–0)
NRBC # BLD: 0 /100 WBCS — SIGNIFICANT CHANGE UP (ref 0–0)
NSE FLD-MCNC: 30.3 NG/ML — HIGH (ref 0–17.6)
NSE FLD-MCNC: 30.3 NG/ML — HIGH (ref 0–17.6)
NSE FLD-MCNC: 40.9 NG/ML — HIGH (ref 0–17.6)
NSE FLD-MCNC: 40.9 NG/ML — HIGH (ref 0–17.6)
PHOSPHATE SERPL-MCNC: 3.1 MG/DL — SIGNIFICANT CHANGE UP (ref 2.5–4.5)
PHOSPHATE SERPL-MCNC: 3.1 MG/DL — SIGNIFICANT CHANGE UP (ref 2.5–4.5)
PLATELET # BLD AUTO: 182 K/UL — SIGNIFICANT CHANGE UP (ref 150–400)
PLATELET # BLD AUTO: 182 K/UL — SIGNIFICANT CHANGE UP (ref 150–400)
POTASSIUM SERPL-MCNC: 4.2 MMOL/L — SIGNIFICANT CHANGE UP (ref 3.5–5.3)
POTASSIUM SERPL-MCNC: 4.2 MMOL/L — SIGNIFICANT CHANGE UP (ref 3.5–5.3)
POTASSIUM SERPL-SCNC: 4.2 MMOL/L — SIGNIFICANT CHANGE UP (ref 3.5–5.3)
POTASSIUM SERPL-SCNC: 4.2 MMOL/L — SIGNIFICANT CHANGE UP (ref 3.5–5.3)
PROT SERPL-MCNC: 5.8 G/DL — LOW (ref 6–8.3)
PROT SERPL-MCNC: 5.8 G/DL — LOW (ref 6–8.3)
PROTHROM AB SERPL-ACNC: 12.6 SEC — SIGNIFICANT CHANGE UP (ref 9.5–13)
PROTHROM AB SERPL-ACNC: 12.6 SEC — SIGNIFICANT CHANGE UP (ref 9.5–13)
RBC # BLD: 2.62 M/UL — LOW (ref 4.2–5.8)
RBC # BLD: 2.62 M/UL — LOW (ref 4.2–5.8)
RBC # FLD: 13.3 % — SIGNIFICANT CHANGE UP (ref 10.3–14.5)
RBC # FLD: 13.3 % — SIGNIFICANT CHANGE UP (ref 10.3–14.5)
SODIUM SERPL-SCNC: 150 MMOL/L — HIGH (ref 135–145)
SODIUM SERPL-SCNC: 150 MMOL/L — HIGH (ref 135–145)
SPECIMEN SOURCE: SIGNIFICANT CHANGE UP
SPECIMEN SOURCE: SIGNIFICANT CHANGE UP
WBC # BLD: 6.57 K/UL — SIGNIFICANT CHANGE UP (ref 3.8–10.5)
WBC # BLD: 6.57 K/UL — SIGNIFICANT CHANGE UP (ref 3.8–10.5)
WBC # FLD AUTO: 6.57 K/UL — SIGNIFICANT CHANGE UP (ref 3.8–10.5)
WBC # FLD AUTO: 6.57 K/UL — SIGNIFICANT CHANGE UP (ref 3.8–10.5)

## 2023-12-27 PROCEDURE — 99233 SBSQ HOSP IP/OBS HIGH 50: CPT

## 2023-12-27 PROCEDURE — 99497 ADVNCD CARE PLAN 30 MIN: CPT | Mod: 25

## 2023-12-27 PROCEDURE — 93010 ELECTROCARDIOGRAM REPORT: CPT

## 2023-12-27 PROCEDURE — 99291 CRITICAL CARE FIRST HOUR: CPT | Mod: GC,25

## 2023-12-27 PROCEDURE — 71045 X-RAY EXAM CHEST 1 VIEW: CPT | Mod: 26

## 2023-12-27 PROCEDURE — 99498 ADVNCD CARE PLAN ADDL 30 MIN: CPT | Mod: 25

## 2023-12-27 RX ORDER — HYDROMORPHONE HYDROCHLORIDE 2 MG/ML
1 INJECTION INTRAMUSCULAR; INTRAVENOUS; SUBCUTANEOUS
Refills: 0 | Status: DISCONTINUED | OUTPATIENT
Start: 2023-12-27 | End: 2023-12-27

## 2023-12-27 RX ORDER — HYDROMORPHONE HYDROCHLORIDE 2 MG/ML
0.5 INJECTION INTRAMUSCULAR; INTRAVENOUS; SUBCUTANEOUS
Refills: 0 | Status: DISCONTINUED | OUTPATIENT
Start: 2023-12-27 | End: 2023-12-27

## 2023-12-27 RX ORDER — CARVEDILOL PHOSPHATE 80 MG/1
6.25 CAPSULE, EXTENDED RELEASE ORAL EVERY 12 HOURS
Refills: 0 | Status: DISCONTINUED | OUTPATIENT
Start: 2023-12-27 | End: 2023-12-27

## 2023-12-27 RX ORDER — ROBINUL 0.2 MG/ML
0.4 INJECTION INTRAMUSCULAR; INTRAVENOUS
Refills: 0 | Status: DISCONTINUED | OUTPATIENT
Start: 2023-12-27 | End: 2023-12-27

## 2023-12-27 RX ORDER — HYDROMORPHONE HYDROCHLORIDE 2 MG/ML
1 INJECTION INTRAMUSCULAR; INTRAVENOUS; SUBCUTANEOUS ONCE
Refills: 0 | Status: DISCONTINUED | OUTPATIENT
Start: 2023-12-27 | End: 2023-12-27

## 2023-12-27 RX ORDER — ROBINUL 0.2 MG/ML
0.4 INJECTION INTRAMUSCULAR; INTRAVENOUS ONCE
Refills: 0 | Status: COMPLETED | OUTPATIENT
Start: 2023-12-27 | End: 2023-12-27

## 2023-12-27 RX ORDER — ROBINUL 0.2 MG/ML
0.4 INJECTION INTRAMUSCULAR; INTRAVENOUS EVERY 6 HOURS
Refills: 0 | Status: DISCONTINUED | OUTPATIENT
Start: 2023-12-27 | End: 2023-12-27

## 2023-12-27 RX ORDER — HYDROMORPHONE HYDROCHLORIDE 2 MG/ML
1 INJECTION INTRAMUSCULAR; INTRAVENOUS; SUBCUTANEOUS
Qty: 100 | Refills: 0 | Status: DISCONTINUED | OUTPATIENT
Start: 2023-12-27 | End: 2023-12-27

## 2023-12-27 RX ADMIN — HUMAN INSULIN 3 UNIT(S): 100 INJECTION, SUSPENSION SUBCUTANEOUS at 05:29

## 2023-12-27 RX ADMIN — CHLORHEXIDINE GLUCONATE 15 MILLILITER(S): 213 SOLUTION TOPICAL at 05:12

## 2023-12-27 RX ADMIN — HEPARIN SODIUM 14 UNIT(S)/HR: 5000 INJECTION INTRAVENOUS; SUBCUTANEOUS at 11:58

## 2023-12-27 RX ADMIN — HYDROMORPHONE HYDROCHLORIDE 1 MG/HR: 2 INJECTION INTRAMUSCULAR; INTRAVENOUS; SUBCUTANEOUS at 17:46

## 2023-12-27 RX ADMIN — PANTOPRAZOLE SODIUM 40 MILLIGRAM(S): 20 TABLET, DELAYED RELEASE ORAL at 11:55

## 2023-12-27 RX ADMIN — HUMAN INSULIN 3 UNIT(S): 100 INJECTION, SUSPENSION SUBCUTANEOUS at 12:09

## 2023-12-27 RX ADMIN — Medication 300 MILLIGRAM(S): at 05:11

## 2023-12-27 RX ADMIN — LEVETIRACETAM 400 MILLIGRAM(S): 250 TABLET, FILM COATED ORAL at 05:10

## 2023-12-27 RX ADMIN — Medication 650 MILLIGRAM(S): at 08:29

## 2023-12-27 RX ADMIN — Medication 1 APPLICATION(S): at 05:10

## 2023-12-27 RX ADMIN — CARVEDILOL PHOSPHATE 3.12 MILLIGRAM(S): 80 CAPSULE, EXTENDED RELEASE ORAL at 05:12

## 2023-12-27 RX ADMIN — Medication 1 MILLIGRAM(S): at 17:45

## 2023-12-27 RX ADMIN — HYDROMORPHONE HYDROCHLORIDE 1 MILLIGRAM(S): 2 INJECTION INTRAMUSCULAR; INTRAVENOUS; SUBCUTANEOUS at 18:01

## 2023-12-27 RX ADMIN — ROBINUL 0.4 MILLIGRAM(S): 0.2 INJECTION INTRAMUSCULAR; INTRAVENOUS at 17:45

## 2023-12-27 RX ADMIN — Medication 1 APPLICATION(S): at 14:46

## 2023-12-27 RX ADMIN — Medication 1: at 12:09

## 2023-12-27 RX ADMIN — AMLODIPINE BESYLATE 5 MILLIGRAM(S): 2.5 TABLET ORAL at 05:12

## 2023-12-27 RX ADMIN — Medication 1 DROP(S): at 05:10

## 2023-12-27 RX ADMIN — Medication 650 MILLIGRAM(S): at 08:59

## 2023-12-27 RX ADMIN — Medication 1: at 05:29

## 2023-12-27 RX ADMIN — HYDROMORPHONE HYDROCHLORIDE 1 MILLIGRAM(S): 2 INJECTION INTRAMUSCULAR; INTRAVENOUS; SUBCUTANEOUS at 17:46

## 2023-12-27 RX ADMIN — Medication 0.5 MILLIGRAM(S): at 18:01

## 2023-12-27 RX ADMIN — HEPARIN SODIUM 13 UNIT(S)/HR: 5000 INJECTION INTRAVENOUS; SUBCUTANEOUS at 05:10

## 2023-12-27 RX ADMIN — Medication 300 MILLIGRAM(S): at 14:46

## 2023-12-27 NOTE — DISCHARGE NOTE FOR THE EXPIRED PATIENT - NS DECEASED NEXTOFKIN_RELATIONSHIP
wife
I have personally performed a face to face diagnostic evaluation on this patient. I have reviewed the ACP note and agree with the history, exam and plan of care, except as noted.

## 2023-12-27 NOTE — PROGRESS NOTE ADULT - PROBLEM SELECTOR PLAN 5
plan for compassionate extubation later today  if hemodynamically stable for transfer after extubation, can transfer to PCU if bed available, however, unit currently full.    discussed with ICU team.  [phone] plan for compassionate extubation later today  if hemodynamically stable for transfer after extubation, can transfer to PCU if bed available, however, unit currently full.    discussed with ICU team.  [phone]

## 2023-12-27 NOTE — PROGRESS NOTE ADULT - NS ATTEST RISK PROBLEM GEN_ALL_CORE FT
1. Number and complexity of problems addressed for this patient:    1.1 Moderate (At least 1)  [ ] 1 or more chronic illnesses with exacerbation, progression, or side effects of treatment  [ ] 2 or more stable chronic illnesses  [ ] 1 undiagnosed new problem with uncertain prognosis  [ ] 1 acute illness with systemic symptoms  [ ] 1 acute complicated injury  1.2 High (At least 1)   [ ] 1 or more chronic illnesses with severe exacerbation, progression, or side effects of treatment  [x ] 1 acute or chronic illnesses or injuries that may pose a threat to life or bodily function    2. Amount and/or Complexity of Data that was Reviewed and Analyzed for this case:       Moderate (1 out of 3)       High (2 out of 3)  2.1. (Any combination of 3 of the following)   [ ] Prior External notes were reviewed  [ ] Each test result was reviewed (see "LABS" and "RADIOLOGY & ADDITIONAL STUDIES" above)  [x ] The following tests were ordered and/or reviewed (Only count 1 point for ordering or reviewing a unique test):  	[ x]CBC  	[x ] Chemistry   	[ ] Imaging   	[ ] Other:   [ ] Assessment requiring an independent historian   		Name of historian and relationship:   2.2  [x ] Personally review and interpretation of  image or testing   2.3  [ ] Discussion of management or test interpretation with external physician/other qualified health care professional\appropriate source (not separately reported)    3. Risk of Complications and/or Morbidity or Mortality of for this Patient’s Management:  3.1 Moderate risk of morbidity from additional diagnostic testing or treatment (At least 1):   [x ] Prescription drug management   [ ] Decision regarding minor surgery, treatment, or procedure with identified patient or procedure risk factors  [ ] Decision regarding elective major surgery, treatment, or procedure without identified patient or procedure risk factors   [ ] Diagnosis or treatment significantly limited by social determinants of health   [ ] Other:   3.2 High risk of morbidity from additional diagnostic testing or treatment (At least 1):   [ ] Drug therapy requiring intensive monitoring for toxicity   [ ] Decision regarding elective major surgery, treatment, or procedure with identified patient or procedure risk factors   [ ] Decision regarding emergency major surgery, treatment, or procedure   [ ] Decision regarding hospitalization or escalation of hospital-level of care  [ ] Decision not to resuscitate, not to intubate, or to de-escalate care because of poor prognosis   [ ] Decision to proceed or not with artificial nutrition   [x ] Parenteral controlled substance  [ ] Other:

## 2023-12-27 NOTE — PROGRESS NOTE ADULT - PROVIDER SPECIALTY LIST ADULT
JENNIFER
Trauma Surgery
JENNIFER
ENT
JENNIFER
Internal Medicine
JENNIFER
Palliative Care
JENNIFER
ENT

## 2023-12-27 NOTE — PROGRESS NOTE ADULT - ATTENDING COMMENTS
79 yo man with HTN, HLD s/p out of hospital cardiac arrest    Neuro: CTH with evidence of anoxic brain injury. Unresponsive, no response to pain, + gag and pupils respond to light. Appreciate neuro eval. GOC meeting today with palliative   For now will continue to hold DAPT given neuro status and ICH  possible afib at OSH per EKG,  Acute respiratory failure requiring mechanical ventilation due to cardiac arrest, SBT as tolerated    cont enteral feeds  non oliguric LONA likely due to shock post arrest ?ATN, stable, hypernatremic, increase FWF  H/H low, s/p one unit PRBC  hep gtt for DVT, repeat CTH shows stable ICH   febrile, sputum cx with stenotrophomonas, started on bactrim. Other causes of fever include DVT and central fevers. f/u blood cx  Sugars elevated, adjust coverage  No central access. Porter for retention. 77 yo man with HTN, HLD s/p out of hospital cardiac arrest    Neuro: CTH with evidence of anoxic brain injury. Unresponsive, no response to pain, + gag and pupils respond to light. Appreciate neuro eval. GOC meeting today with palliative   For now will continue to hold DAPT given neuro status and ICH  possible afib at OSH per EKG,  Acute respiratory failure requiring mechanical ventilation due to cardiac arrest, SBT as tolerated    cont enteral feeds  non oliguric LONA likely due to shock post arrest ?ATN, stable, hypernatremic, increase FWF  H/H low, s/p one unit PRBC  hep gtt for DVT, repeat CTH shows stable ICH   febrile, sputum cx with stenotrophomonas, started on bactrim. Other causes of fever include DVT and central fevers. f/u blood cx  Sugars elevated, adjust coverage  No central access. Porter for retention. 79 yo man with HTN, HLD s/p out of hospital cardiac arrest    Neuro: CTH with evidence of anoxic brain injury and SAH on admission. Unresponsive, no response to pain, + gag and pupils respond to light. Appreciate neuro eval. GOC meeting today with palliative   For now will continue to hold DAPT given neuro status and ICH  likely STEMI/ACS that lead to initial cardiac arrest.  Acute respiratory failure requiring mechanical ventilation due to cardiac arrest, SBT as tolerated    cont enteral feeds  non oliguric LONA likely due to shock post arrest ?ATN, stable, hypernatremic, increase FWF  H/H low, s/p one unit PRBC  hep gtt for DVT, repeat CTH shows stable ICH   febrile, sputum cx with stenotrophomonas, started on bactrim. Other causes of fever include DVT and central fevers. f/u blood cx. Does not appear to be septic  Sugars elevated, adjust coverage  No central access. Porter for retention.

## 2023-12-27 NOTE — PROGRESS NOTE ADULT - SUBJECTIVE AND OBJECTIVE BOX
Patient is a 78y old  Male who presents with a chief complaint of Cardiac Arrest (26 Dec 2023 19:11)    HPI:  78M w/ PMH HTN HLD presents as transfer from [facility] s/p cardiac arrest. Per reports patient had a witness fall and arrest at home. EMS performed CPR en route to hospital was noted to be in VTach and shocked x2. ROSC was obtained just prior to arrival at [facility]. Pt was intubated placed on levo gtt. On arrival pt noted to have lacerated to R forehead. Pt had CT scans that were negative for intracranial hemorrhage, C-spine fracture, facial fractures. A R frontal hematoma was noted.     On arrival patient w/ dried blood on scalp. Also noted to have bleeding from oral mucosa w/ clots requiring suctioning. Pt otherwise off sedation and non-responsive. Per EMS report pt did receive some sedation and paralytics at [facility].     Per family patient has been feeling 'off' but managing his day to day and did not see a physician. Pt prescribed klonopin by outpatient provider and family believes pt may have been taking more than prescribed. On day of arrest, pt was initially asymptomatic carrying out his routine. He went to the bathroom, wife heard a loud thud and found patient in a pool of blood prompting call to EMS. Pt has not followed with any cardiologist. Has not had any syncope or other events preceding this.  (19 Dec 2023 14:54)       INTERVAL HPI/OVERNIGHT EVENTS:   No overnight events   Afebrile, hemodynamically stable     Subjective:    ICU Vital Signs Last 24 Hrs  T(C): 38.8 (27 Dec 2023 06:00), Max: 38.8 (27 Dec 2023 06:00)  T(F): 101.8 (27 Dec 2023 06:00), Max: 101.8 (27 Dec 2023 06:00)  HR: 73 (27 Dec 2023 06:00) (55 - 77)  BP: --  BP(mean): --  ABP: 146/53 (27 Dec 2023 06:00) (105/43 - 192/54)  ABP(mean): 80 (27 Dec 2023 06:00) (54 - 94)  RR: 22 (27 Dec 2023 06:00) (14 - 22)  SpO2: 100% (27 Dec 2023 06:00) (100% - 100%)    O2 Parameters below as of 26 Dec 2023 18:00  Patient On (Oxygen Delivery Method): ventilator    O2 Concentration (%): 30      I&O's Summary    26 Dec 2023 07:01  -  27 Dec 2023 07:00  --------------------------------------------------------  IN: 1709 mL / OUT: 2275 mL / NET: -566 mL      Mode: AC/ CMV (Assist Control/ Continuous Mandatory Ventilation)  RR (machine): 14  TV (machine): 500  FiO2: 30  PEEP: 5  ITime: 1  MAP: 9  PIP: 18      Daily     Daily     Adult Advanced Hemodynamics Last 24 Hrs  CVP(mm Hg): --  CVP(cm H2O): --  CO: --  CI: --  PA: --  PA(mean): --  PCWP: --  SVR: --  SVRI: --  PVR: --  PVRI: --    EKG/Telemetry Events:    MEDICATIONS  (STANDING):  amLODIPine   Tablet 5 milliGRAM(s) Oral daily  artificial  tears Solution 1 Drop(s) Both EYES two times a day  carvedilol 3.125 milliGRAM(s) Oral every 12 hours  chlorhexidine 0.12% Liquid 15 milliLiter(s) Oral Mucosa every 12 hours  chlorhexidine 2% Cloths 1 Application(s) Topical daily  dexMEDEtomidine Infusion 0.2 MICROgram(s)/kG/Hr (4.68 mL/Hr) IV Continuous <Continuous>  heparin  Infusion 1200 Unit(s)/Hr (13 mL/Hr) IV Continuous <Continuous>  insulin lispro (ADMELOG) corrective regimen sliding scale   SubCutaneous every 6 hours  insulin NPH human recombinant 3 Unit(s) SubCutaneous every 6 hours  levETIRAcetam  IVPB 1000 milliGRAM(s) IV Intermittent every 12 hours  pantoprazole  Injectable 40 milliGRAM(s) IV Push daily  petrolatum Ophthalmic Ointment 1 Application(s) Both EYES three times a day  trimethoprim  40 mG/sulfamethoxazole 200 mG Suspension 300 milliGRAM(s) Enteral Tube every 8 hours    MEDICATIONS  (PRN):  acetaminophen   Oral Liquid .. 650 milliGRAM(s) Oral every 6 hours PRN Temp greater or equal to 38C (100.4F)      PHYSICAL EXAM:  GENERAL:   HEAD:  Atraumatic, Normocephalic  EYES: EOMI, PERRLA, conjunctiva and sclera clear  NECK: Supple, No JVD, Normal thyroid, no enlarged nodes  NERVOUS SYSTEM:  Alert & Awake.   CHEST/LUNG: B/L good air entry; No rales, rhonchi, or wheezing  HEART: S1S2 normal, no S3, Regular rate and rhythm; No murmurs  ABDOMEN: Soft, Nontender, Nondistended; Bowel sounds present  EXTREMITIES:  2+ Peripheral Pulses, No clubbing, cyanosis, or edema  LYMPH: No lymphadenopathy noted  SKIN: No rashes or lesions    LABS:                        8.2    6.57  )-----------( 182      ( 27 Dec 2023 02:23 )             25.2     12-27    150<H>  |  117<H>  |  62<H>  ----------------------------<  192<H>  4.2   |  23  |  1.58<H>    Ca    8.3<L>      27 Dec 2023 02:23  Phos  3.1     12-27  Mg     2.7     12-27    TPro  5.8<L>  /  Alb  2.8<L>  /  TBili  0.8  /  DBili  x   /  AST  79<H>  /  ALT  88<H>  /  AlkPhos  55  12-27    LIVER FUNCTIONS - ( 27 Dec 2023 02:23 )  Alb: 2.8 g/dL / Pro: 5.8 g/dL / ALK PHOS: 55 U/L / ALT: 88 U/L / AST: 79 U/L / GGT: x           PT/INR - ( 27 Dec 2023 02:23 )   PT: 12.6 sec;   INR: 1.21 ratio         PTT - ( 27 Dec 2023 02:23 )  PTT:45.7 sec  CAPILLARY BLOOD GLUCOSE      POCT Blood Glucose.: 171 mg/dL (27 Dec 2023 05:20)  POCT Blood Glucose.: 178 mg/dL (26 Dec 2023 23:08)  POCT Blood Glucose.: 166 mg/dL (26 Dec 2023 16:43)  POCT Blood Glucose.: 187 mg/dL (26 Dec 2023 13:01)    ABG - ( 27 Dec 2023 01:57 )  pH, Arterial: 7.48  pH, Blood: x     /  pCO2: 33    /  pO2: 160   / HCO3: 25    / Base Excess: 1.2   /  SaO2: 100.0                   Urinalysis Basic - ( 27 Dec 2023 02:23 )    Color: x / Appearance: x / SG: x / pH: x  Gluc: 192 mg/dL / Ketone: x  / Bili: x / Urobili: x   Blood: x / Protein: x / Nitrite: x   Leuk Esterase: x / RBC: x / WBC x   Sq Epi: x / Non Sq Epi: x / Bacteria: x          RADIOLOGY & ADDITIONAL TESTS:  CXR:        Care Discussed with Consultants/Other Providers [ x] YES  [ ] NO           Patient is a 78y old  Male who presents with a chief complaint of Cardiac Arrest (26 Dec 2023 19:11)    HPI:  78M w/ PMH HTN HLD presents as transfer from [facility] s/p cardiac arrest. Per reports patient had a witness fall and arrest at home. EMS performed CPR en route to hospital was noted to be in VTach and shocked x2. ROSC was obtained just prior to arrival at [facility]. Pt was intubated placed on levo gtt. On arrival pt noted to have lacerated to R forehead. Pt had CT scans that were negative for intracranial hemorrhage, C-spine fracture, facial fractures. A R frontal hematoma was noted.     On arrival patient w/ dried blood on scalp. Also noted to have bleeding from oral mucosa w/ clots requiring suctioning. Pt otherwise off sedation and non-responsive. Per EMS report pt did receive some sedation and paralytics at [facility].     Per family patient has been feeling 'off' but managing his day to day and did not see a physician. Pt prescribed klonopin by outpatient provider and family believes pt may have been taking more than prescribed. On day of arrest, pt was initially asymptomatic carrying out his routine. He went to the bathroom, wife heard a loud thud and found patient in a pool of blood prompting call to EMS. Pt has not followed with any cardiologist. Has not had any syncope or other events preceding this.  (19 Dec 2023 14:54)       INTERVAL HPI/OVERNIGHT EVENTS:   No overnight events   Afebrile, hemodynamically stable     Subjective:    ICU Vital Signs Last 24 Hrs  T(C): 38.8 (27 Dec 2023 06:00), Max: 38.8 (27 Dec 2023 06:00)  T(F): 101.8 (27 Dec 2023 06:00), Max: 101.8 (27 Dec 2023 06:00)  HR: 73 (27 Dec 2023 06:00) (55 - 77)  BP: --  BP(mean): --  ABP: 146/53 (27 Dec 2023 06:00) (105/43 - 192/54)  ABP(mean): 80 (27 Dec 2023 06:00) (54 - 94)  RR: 22 (27 Dec 2023 06:00) (14 - 22)  SpO2: 100% (27 Dec 2023 06:00) (100% - 100%)    O2 Parameters below as of 26 Dec 2023 18:00  Patient On (Oxygen Delivery Method): ventilator    O2 Concentration (%): 30      I&O's Summary    26 Dec 2023 07:01  -  27 Dec 2023 07:00  --------------------------------------------------------  IN: 1709 mL / OUT: 2275 mL / NET: -566 mL      Mode: AC/ CMV (Assist Control/ Continuous Mandatory Ventilation)  RR (machine): 14  TV (machine): 500  FiO2: 30  PEEP: 5  ITime: 1  MAP: 9  PIP: 18      Daily     Daily     Adult Advanced Hemodynamics Last 24 Hrs  CVP(mm Hg): --  CVP(cm H2O): --  CO: --  CI: --  PA: --  PA(mean): --  PCWP: --  SVR: --  SVRI: --  PVR: --  PVRI: --    EKG/Telemetry Events:    MEDICATIONS  (STANDING):  amLODIPine   Tablet 5 milliGRAM(s) Oral daily  artificial  tears Solution 1 Drop(s) Both EYES two times a day  carvedilol 3.125 milliGRAM(s) Oral every 12 hours  chlorhexidine 0.12% Liquid 15 milliLiter(s) Oral Mucosa every 12 hours  chlorhexidine 2% Cloths 1 Application(s) Topical daily  dexMEDEtomidine Infusion 0.2 MICROgram(s)/kG/Hr (4.68 mL/Hr) IV Continuous <Continuous>  heparin  Infusion 1200 Unit(s)/Hr (13 mL/Hr) IV Continuous <Continuous>  insulin lispro (ADMELOG) corrective regimen sliding scale   SubCutaneous every 6 hours  insulin NPH human recombinant 3 Unit(s) SubCutaneous every 6 hours  levETIRAcetam  IVPB 1000 milliGRAM(s) IV Intermittent every 12 hours  pantoprazole  Injectable 40 milliGRAM(s) IV Push daily  petrolatum Ophthalmic Ointment 1 Application(s) Both EYES three times a day  trimethoprim  40 mG/sulfamethoxazole 200 mG Suspension 300 milliGRAM(s) Enteral Tube every 8 hours    MEDICATIONS  (PRN):  acetaminophen   Oral Liquid .. 650 milliGRAM(s) Oral every 6 hours PRN Temp greater or equal to 38C (100.4F)      PHYSICAL EXAM:  GENERAL:   HEAD:  Atraumatic, Normocephalic  EYES: EOMI, PERRLA, conjunctiva and sclera clear  NECK: Supple, No JVD, Normal thyroid, no enlarged nodes  NERVOUS SYSTEM:  Alert & Awake.   CHEST/LUNG: B/L good air entry; No rales, rhonchi, or wheezing  HEART: S1S2 normal, no S3, Regular rate and rhythm; No murmurs  ABDOMEN: Soft, Nontender, Nondistended; Bowel sounds present  EXTREMITIES:  2+ Peripheral Pulses, No clubbing, cyanosis, or edema  LYMPH: No lymphadenopathy noted  SKIN: No rashes or lesions    LABS:                        8.2    6.57  )-----------( 182      ( 27 Dec 2023 02:23 )             25.2     12-27    150<H>  |  117<H>  |  62<H>  ----------------------------<  192<H>  4.2   |  23  |  1.58<H>    Ca    8.3<L>      27 Dec 2023 02:23  Phos  3.1     12-27  Mg     2.7     12-27    TPro  5.8<L>  /  Alb  2.8<L>  /  TBili  0.8  /  DBili  x   /  AST  79<H>  /  ALT  88<H>  /  AlkPhos  55  12-27    LIVER FUNCTIONS - ( 27 Dec 2023 02:23 )  Alb: 2.8 g/dL / Pro: 5.8 g/dL / ALK PHOS: 55 U/L / ALT: 88 U/L / AST: 79 U/L / GGT: x           PT/INR - ( 27 Dec 2023 02:23 )   PT: 12.6 sec;   INR: 1.21 ratio         PTT - ( 27 Dec 2023 02:23 )  PTT:45.7 sec  CAPILLARY BLOOD GLUCOSE      POCT Blood Glucose.: 171 mg/dL (27 Dec 2023 05:20)  POCT Blood Glucose.: 178 mg/dL (26 Dec 2023 23:08)  POCT Blood Glucose.: 166 mg/dL (26 Dec 2023 16:43)  POCT Blood Glucose.: 187 mg/dL (26 Dec 2023 13:01)    ABG - ( 27 Dec 2023 01:57 )  pH, Arterial: 7.48  pH, Blood: x     /  pCO2: 33    /  pO2: 160   / HCO3: 25    / Base Excess: 1.2   /  SaO2: 100.0                   Urinalysis Basic - ( 27 Dec 2023 02:23 )    Color: x / Appearance: x / SG: x / pH: x  Gluc: 192 mg/dL / Ketone: x  / Bili: x / Urobili: x   Blood: x / Protein: x / Nitrite: x   Leuk Esterase: x / RBC: x / WBC x   Sq Epi: x / Non Sq Epi: x / Bacteria: x          RADIOLOGY & ADDITIONAL TESTS:  CXR:        Care Discussed with Consultants/Other Providers [ x] YES  [ ] NO           Patient is a 78y old  Male who presents with a chief complaint of Cardiac Arrest (26 Dec 2023 19:11)    HPI:  78M w/ PMH HTN HLD presents as transfer from [facility] s/p cardiac arrest. Per reports patient had a witness fall and arrest at home. EMS performed CPR en route to hospital was noted to be in VTach and shocked x2. ROSC was obtained just prior to arrival at [facility]. Pt was intubated placed on levo gtt. On arrival pt noted to have lacerated to R forehead. Pt had CT scans that were negative for intracranial hemorrhage, C-spine fracture, facial fractures. A R frontal hematoma was noted.     On arrival patient w/ dried blood on scalp. Also noted to have bleeding from oral mucosa w/ clots requiring suctioning. Pt otherwise off sedation and non-responsive. Per EMS report pt did receive some sedation and paralytics at [facility].     Per family patient has been feeling 'off' but managing his day to day and did not see a physician. Pt prescribed klonopin by outpatient provider and family believes pt may have been taking more than prescribed. On day of arrest, pt was initially asymptomatic carrying out his routine. He went to the bathroom, wife heard a loud thud and found patient in a pool of blood prompting call to EMS. Pt has not followed with any cardiologist. Has not had any syncope or other events preceding this.  (19 Dec 2023 14:54)       INTERVAL HPI/OVERNIGHT EVENTS:   No overnight events   Afebrile, hemodynamically stable     Subjective:    ICU Vital Signs Last 24 Hrs  T(C): 38.8 (27 Dec 2023 06:00), Max: 38.8 (27 Dec 2023 06:00)  T(F): 101.8 (27 Dec 2023 06:00), Max: 101.8 (27 Dec 2023 06:00)  HR: 73 (27 Dec 2023 06:00) (55 - 77)  BP: --  BP(mean): --  ABP: 146/53 (27 Dec 2023 06:00) (105/43 - 192/54)  ABP(mean): 80 (27 Dec 2023 06:00) (54 - 94)  RR: 22 (27 Dec 2023 06:00) (14 - 22)  SpO2: 100% (27 Dec 2023 06:00) (100% - 100%)    O2 Parameters below as of 26 Dec 2023 18:00  Patient On (Oxygen Delivery Method): ventilator    O2 Concentration (%): 30      I&O's Summary    26 Dec 2023 07:01  -  27 Dec 2023 07:00  --------------------------------------------------------  IN: 1709 mL / OUT: 2275 mL / NET: -566 mL      Mode: AC/ CMV (Assist Control/ Continuous Mandatory Ventilation)  RR (machine): 14  TV (machine): 500  FiO2: 30  PEEP: 5  ITime: 1  MAP: 9  PIP: 18      Daily     Daily     Adult Advanced Hemodynamics Last 24 Hrs  CVP(mm Hg): --  CVP(cm H2O): --  CO: --  CI: --  PA: --  PA(mean): --  PCWP: --  SVR: --  SVRI: --  PVR: --  PVRI: --    EKG/Telemetry Events:    MEDICATIONS  (STANDING):  amLODIPine   Tablet 5 milliGRAM(s) Oral daily  artificial  tears Solution 1 Drop(s) Both EYES two times a day  carvedilol 3.125 milliGRAM(s) Oral every 12 hours  chlorhexidine 0.12% Liquid 15 milliLiter(s) Oral Mucosa every 12 hours  chlorhexidine 2% Cloths 1 Application(s) Topical daily  dexMEDEtomidine Infusion 0.2 MICROgram(s)/kG/Hr (4.68 mL/Hr) IV Continuous <Continuous>  heparin  Infusion 1200 Unit(s)/Hr (13 mL/Hr) IV Continuous <Continuous>  insulin lispro (ADMELOG) corrective regimen sliding scale   SubCutaneous every 6 hours  insulin NPH human recombinant 3 Unit(s) SubCutaneous every 6 hours  levETIRAcetam  IVPB 1000 milliGRAM(s) IV Intermittent every 12 hours  pantoprazole  Injectable 40 milliGRAM(s) IV Push daily  petrolatum Ophthalmic Ointment 1 Application(s) Both EYES three times a day  trimethoprim  40 mG/sulfamethoxazole 200 mG Suspension 300 milliGRAM(s) Enteral Tube every 8 hours    MEDICATIONS  (PRN):  acetaminophen   Oral Liquid .. 650 milliGRAM(s) Oral every 6 hours PRN Temp greater or equal to 38C (100.4F)      PHYSICAL EXAM:  GENERAL: Intubated, off sedation. No independent/purposeful movements.  HEAD:  Atraumatic, Normocephalic  EYES: PERRLA, + vertical nystagmus noted this am   NECK: Supple, No JVD  NERVOUS SYSTEM: Pupils ~3mm and minimally reactive, decorticate posturing to pain in upper extremities bilaterally  CHEST/LUNG: Intubated. B/L good air entry; No rales, rhonchi, or wheezing  HEART: S1S2 normal, no S3, Regular rate and rhythm; No murmurs  ABDOMEN: Soft, Nontender, Nondistended; Bowel sounds present  EXTREMITIES:  2+ Peripheral Pulses, No clubbing, cyanosis. Edematous upper extremities.   SKIN: R periorbital ecchymosis, ecchymoses on upper extremities bilaterally.      LABS:                        8.2    6.57  )-----------( 182      ( 27 Dec 2023 02:23 )             25.2     12-27    150<H>  |  117<H>  |  62<H>  ----------------------------<  192<H>  4.2   |  23  |  1.58<H>    Ca    8.3<L>      27 Dec 2023 02:23  Phos  3.1     12-27  Mg     2.7     12-27    TPro  5.8<L>  /  Alb  2.8<L>  /  TBili  0.8  /  DBili  x   /  AST  79<H>  /  ALT  88<H>  /  AlkPhos  55  12-27    LIVER FUNCTIONS - ( 27 Dec 2023 02:23 )  Alb: 2.8 g/dL / Pro: 5.8 g/dL / ALK PHOS: 55 U/L / ALT: 88 U/L / AST: 79 U/L / GGT: x           PT/INR - ( 27 Dec 2023 02:23 )   PT: 12.6 sec;   INR: 1.21 ratio         PTT - ( 27 Dec 2023 02:23 )  PTT:45.7 sec  CAPILLARY BLOOD GLUCOSE      POCT Blood Glucose.: 171 mg/dL (27 Dec 2023 05:20)  POCT Blood Glucose.: 178 mg/dL (26 Dec 2023 23:08)  POCT Blood Glucose.: 166 mg/dL (26 Dec 2023 16:43)  POCT Blood Glucose.: 187 mg/dL (26 Dec 2023 13:01)    ABG - ( 27 Dec 2023 01:57 )  pH, Arterial: 7.48  pH, Blood: x     /  pCO2: 33    /  pO2: 160   / HCO3: 25    / Base Excess: 1.2   /  SaO2: 100.0                   Urinalysis Basic - ( 27 Dec 2023 02:23 )    Color: x / Appearance: x / SG: x / pH: x  Gluc: 192 mg/dL / Ketone: x  / Bili: x / Urobili: x   Blood: x / Protein: x / Nitrite: x   Leuk Esterase: x / RBC: x / WBC x   Sq Epi: x / Non Sq Epi: x / Bacteria: x          RADIOLOGY & ADDITIONAL TESTS:  CT Head No Cont (12.26.23 @ 21:27)  FINDINGS:    Stable scattered subarachnoid hemorrhage in the bilateral parietal and   posterior frontal lobes.    Stable tiny subdural hematoma along the falx.    Stable mixed density acute on chronic subdural hematomas along the high   frontal lobes.    No acute territorial infarct. No hydrocephalus. No midline shift or mass   effect.    Orbits are unremarkable. Mucosal thickening in the paranasal sinuses with  air-fluid level in the right maxillary sinus. Stable bony structures.   Similar right frontal scalp hematoma.    IMPRESSION:    No interval change in multicompartmental intracranial hemorrhage.        CXR:        Care Discussed with Consultants/Other Providers [ x] YES  [ ] NO           Patient is a 78y old  Male who presents with a chief complaint of Cardiac Arrest (26 Dec 2023 19:11)    HPI:  78M w/ PMH HTN HLD presents as transfer from [facility] s/p cardiac arrest. Per reports patient had a witness fall and arrest at home. EMS performed CPR en route to hospital was noted to be in VTach and shocked x2. ROSC was obtained just prior to arrival at [facility]. Pt was intubated placed on levo gtt. On arrival pt noted to have lacerated to R forehead. Pt had CT scans that were negative for intracranial hemorrhage, C-spine fracture, facial fractures. A R frontal hematoma was noted.     On arrival patient w/ dried blood on scalp. Also noted to have bleeding from oral mucosa w/ clots requiring suctioning. Pt otherwise off sedation and non-responsive. Per EMS report pt did receive some sedation and paralytics at [facility].     Per family patient has been feeling 'off' but managing his day to day and did not see a physician. Pt prescribed klonopin by outpatient provider and family believes pt may have been taking more than prescribed. On day of arrest, pt was initially asymptomatic carrying out his routine. He went to the bathroom, wife heard a loud thud and found patient in a pool of blood prompting call to EMS. Pt has not followed with any cardiologist. Has not had any syncope or other events preceding this.  (19 Dec 2023 14:54)       INTERVAL HPI/OVERNIGHT EVENTS:   No overnight events   Afebrile, hemodynamically stable     Subjective:    ICU Vital Signs Last 24 Hrs  T(C): 38.8 (27 Dec 2023 06:00), Max: 38.8 (27 Dec 2023 06:00)  T(F): 101.8 (27 Dec 2023 06:00), Max: 101.8 (27 Dec 2023 06:00)  HR: 73 (27 Dec 2023 06:00) (55 - 77)  BP: --  BP(mean): --  ABP: 146/53 (27 Dec 2023 06:00) (105/43 - 192/54)  ABP(mean): 80 (27 Dec 2023 06:00) (54 - 94)  RR: 22 (27 Dec 2023 06:00) (14 - 22)  SpO2: 100% (27 Dec 2023 06:00) (100% - 100%)    O2 Parameters below as of 26 Dec 2023 18:00  Patient On (Oxygen Delivery Method): ventilator    O2 Concentration (%): 30      I&O's Summary    26 Dec 2023 07:01  -  27 Dec 2023 07:00  --------------------------------------------------------  IN: 1709 mL / OUT: 2275 mL / NET: -566 mL      Mode: AC/ CMV (Assist Control/ Continuous Mandatory Ventilation)  RR (machine): 14  TV (machine): 500  FiO2: 30  PEEP: 5  ITime: 1  MAP: 9  PIP: 18      Daily     Daily     Adult Advanced Hemodynamics Last 24 Hrs  CVP(mm Hg): --  CVP(cm H2O): --  CO: --  CI: --  PA: --  PA(mean): --  PCWP: --  SVR: --  SVRI: --  PVR: --  PVRI: --    EKG/Telemetry Events:    MEDICATIONS  (STANDING):  amLODIPine   Tablet 5 milliGRAM(s) Oral daily  artificial  tears Solution 1 Drop(s) Both EYES two times a day  carvedilol 3.125 milliGRAM(s) Oral every 12 hours  chlorhexidine 0.12% Liquid 15 milliLiter(s) Oral Mucosa every 12 hours  chlorhexidine 2% Cloths 1 Application(s) Topical daily  dexMEDEtomidine Infusion 0.2 MICROgram(s)/kG/Hr (4.68 mL/Hr) IV Continuous <Continuous>  heparin  Infusion 1200 Unit(s)/Hr (13 mL/Hr) IV Continuous <Continuous>  insulin lispro (ADMELOG) corrective regimen sliding scale   SubCutaneous every 6 hours  insulin NPH human recombinant 3 Unit(s) SubCutaneous every 6 hours  levETIRAcetam  IVPB 1000 milliGRAM(s) IV Intermittent every 12 hours  pantoprazole  Injectable 40 milliGRAM(s) IV Push daily  petrolatum Ophthalmic Ointment 1 Application(s) Both EYES three times a day  trimethoprim  40 mG/sulfamethoxazole 200 mG Suspension 300 milliGRAM(s) Enteral Tube every 8 hours    MEDICATIONS  (PRN):  acetaminophen   Oral Liquid .. 650 milliGRAM(s) Oral every 6 hours PRN Temp greater or equal to 38C (100.4F)      PHYSICAL EXAM:  GENERAL: Intubated, off sedation. No independent/purposeful movements.  HEAD:  Atraumatic, Normocephalic  EYES: PERRLA, + vertical nystagmus noted this am   NECK: Supple, No JVD  NERVOUS SYSTEM: Pupils ~3mm and minimally reactive, decorticate posturing to pain in upper extremities bilaterally  CHEST/LUNG: Intubated. B/L good air entry; No rales, rhonchi, or wheezing  HEART: S1S2 normal, no S3, Regular rate and rhythm; No murmurs  ABDOMEN: Soft, Nontender, Nondistended; Bowel sounds present  EXTREMITIES:  2+ Peripheral Pulses, No clubbing, cyanosis. Edematous upper extremities.   SKIN: R periorbital ecchymosis, ecchymoses on upper extremities bilaterally.      LABS:                        8.2    6.57  )-----------( 182      ( 27 Dec 2023 02:23 )             25.2     12-27    150<H>  |  117<H>  |  62<H>  ----------------------------<  192<H>  4.2   |  23  |  1.58<H>    Ca    8.3<L>      27 Dec 2023 02:23  Phos  3.1     12-27  Mg     2.7     12-27    TPro  5.8<L>  /  Alb  2.8<L>  /  TBili  0.8  /  DBili  x   /  AST  79<H>  /  ALT  88<H>  /  AlkPhos  55  12-27    LIVER FUNCTIONS - ( 27 Dec 2023 02:23 )  Alb: 2.8 g/dL / Pro: 5.8 g/dL / ALK PHOS: 55 U/L / ALT: 88 U/L / AST: 79 U/L / GGT: x           PT/INR - ( 27 Dec 2023 02:23 )   PT: 12.6 sec;   INR: 1.21 ratio         PTT - ( 27 Dec 2023 02:23 )  PTT:45.7 sec  CAPILLARY BLOOD GLUCOSE      POCT Blood Glucose.: 171 mg/dL (27 Dec 2023 05:20)  POCT Blood Glucose.: 178 mg/dL (26 Dec 2023 23:08)  POCT Blood Glucose.: 166 mg/dL (26 Dec 2023 16:43)  POCT Blood Glucose.: 187 mg/dL (26 Dec 2023 13:01)    ABG - ( 27 Dec 2023 01:57 )  pH, Arterial: 7.48  pH, Blood: x     /  pCO2: 33    /  pO2: 160   / HCO3: 25    / Base Excess: 1.2   /  SaO2: 100.0                   Urinalysis Basic - ( 27 Dec 2023 02:23 )    Color: x / Appearance: x / SG: x / pH: x  Gluc: 192 mg/dL / Ketone: x  / Bili: x / Urobili: x   Blood: x / Protein: x / Nitrite: x   Leuk Esterase: x / RBC: x / WBC x   Sq Epi: x / Non Sq Epi: x / Bacteria: x          RADIOLOGY & ADDITIONAL TESTS:  CT Head No Cont (12.26.23 @ 21:27)  FINDINGS:    Stable scattered subarachnoid hemorrhage in the bilateral parietal and   posterior frontal lobes.    Stable tiny subdural hematoma along the falx.    Stable mixed density acute on chronic subdural hematomas along the high   frontal lobes.    No acute territorial infarct. No hydrocephalus. No midline shift or mass   effect.    Orbits are unremarkable. Mucosal thickening in the paranasal sinuses with  air-fluid level in the right maxillary sinus. Stable bony structures.   Similar right frontal scalp hematoma.    IMPRESSION:    No interval change in multicompartmental intracranial hemorrhage.        CXR:        Care Discussed with Consultants/Other Providers [ x] YES  [ ] NO           Patient is a 78y old  Male who presents with a chief complaint of Cardiac Arrest (26 Dec 2023 19:11)    HPI:  78M w/ PMH HTN HLD presents as transfer from [facility] s/p cardiac arrest. Per reports patient had a witness fall and arrest at home. EMS performed CPR en route to hospital was noted to be in VTach and shocked x2. ROSC was obtained just prior to arrival at [facility]. Pt was intubated placed on levo gtt. On arrival pt noted to have lacerated to R forehead. Pt had CT scans that were negative for intracranial hemorrhage, C-spine fracture, facial fractures. A R frontal hematoma was noted.     On arrival patient w/ dried blood on scalp. Also noted to have bleeding from oral mucosa w/ clots requiring suctioning. Pt otherwise off sedation and non-responsive. Per EMS report pt did receive some sedation and paralytics at [facility].     Per family patient has been feeling 'off' but managing his day to day and did not see a physician. Pt prescribed klonopin by outpatient provider and family believes pt may have been taking more than prescribed. On day of arrest, pt was initially asymptomatic carrying out his routine. He went to the bathroom, wife heard a loud thud and found patient in a pool of blood prompting call to EMS. Pt has not followed with any cardiologist. Has not had any syncope or other events preceding this.  (19 Dec 2023 14:54)       INTERVAL HPI/OVERNIGHT EVENTS:   No overnight events   Febrile to 101.8 overnight, hemodynamically stable     Subjective: Interview limited by pt mental status.     ICU Vital Signs Last 24 Hrs  T(C): 38.8 (27 Dec 2023 06:00), Max: 38.8 (27 Dec 2023 06:00)  T(F): 101.8 (27 Dec 2023 06:00), Max: 101.8 (27 Dec 2023 06:00)  HR: 73 (27 Dec 2023 06:00) (55 - 77)  BP: --  BP(mean): --  ABP: 146/53 (27 Dec 2023 06:00) (105/43 - 192/54)  ABP(mean): 80 (27 Dec 2023 06:00) (54 - 94)  RR: 22 (27 Dec 2023 06:00) (14 - 22)  SpO2: 100% (27 Dec 2023 06:00) (100% - 100%)    O2 Parameters below as of 26 Dec 2023 18:00  Patient On (Oxygen Delivery Method): ventilator    O2 Concentration (%): 30      I&O's Summary    26 Dec 2023 07:01  -  27 Dec 2023 07:00  --------------------------------------------------------  IN: 1709 mL / OUT: 2275 mL / NET: -566 mL      Mode: AC/ CMV (Assist Control/ Continuous Mandatory Ventilation)  RR (machine): 14  TV (machine): 500  FiO2: 30  PEEP: 5  ITime: 1  MAP: 9  PIP: 18      Daily     Daily     Adult Advanced Hemodynamics Last 24 Hrs  CVP(mm Hg): --  CVP(cm H2O): --  CO: --  CI: --  PA: --  PA(mean): --  PCWP: --  SVR: --  SVRI: --  PVR: --  PVRI: --    EKG/Telemetry Events:    MEDICATIONS  (STANDING):  amLODIPine   Tablet 5 milliGRAM(s) Oral daily  artificial  tears Solution 1 Drop(s) Both EYES two times a day  carvedilol 3.125 milliGRAM(s) Oral every 12 hours  chlorhexidine 0.12% Liquid 15 milliLiter(s) Oral Mucosa every 12 hours  chlorhexidine 2% Cloths 1 Application(s) Topical daily  dexMEDEtomidine Infusion 0.2 MICROgram(s)/kG/Hr (4.68 mL/Hr) IV Continuous <Continuous>  heparin  Infusion 1200 Unit(s)/Hr (13 mL/Hr) IV Continuous <Continuous>  insulin lispro (ADMELOG) corrective regimen sliding scale   SubCutaneous every 6 hours  insulin NPH human recombinant 3 Unit(s) SubCutaneous every 6 hours  levETIRAcetam  IVPB 1000 milliGRAM(s) IV Intermittent every 12 hours  pantoprazole  Injectable 40 milliGRAM(s) IV Push daily  petrolatum Ophthalmic Ointment 1 Application(s) Both EYES three times a day  trimethoprim  40 mG/sulfamethoxazole 200 mG Suspension 300 milliGRAM(s) Enteral Tube every 8 hours    MEDICATIONS  (PRN):  acetaminophen   Oral Liquid .. 650 milliGRAM(s) Oral every 6 hours PRN Temp greater or equal to 38C (100.4F)      PHYSICAL EXAM:  GENERAL: Intubated, off sedation. No independent/purposeful movements.  HEAD:  Atraumatic, Normocephalic  EYES: PERRLA, + vertical nystagmus noted this am   NECK: Supple, No JVD  NERVOUS SYSTEM: Pupils ~3mm and minimally reactive, decorticate posturing to pain in upper extremities bilaterally  CHEST/LUNG: Intubated. B/L good air entry; No rales, rhonchi, or wheezing  HEART: S1S2 normal, no S3, Regular rate and rhythm; No murmurs  ABDOMEN: Soft, Nontender, Nondistended; Bowel sounds present  EXTREMITIES:  2+ Peripheral Pulses, No clubbing, cyanosis. Edematous upper extremities.   SKIN: R periorbital ecchymosis, ecchymoses on upper extremities bilaterally.      LABS:                        8.2    6.57  )-----------( 182      ( 27 Dec 2023 02:23 )             25.2     12-27    150<H>  |  117<H>  |  62<H>  ----------------------------<  192<H>  4.2   |  23  |  1.58<H>    Ca    8.3<L>      27 Dec 2023 02:23  Phos  3.1     12-27  Mg     2.7     12-27    TPro  5.8<L>  /  Alb  2.8<L>  /  TBili  0.8  /  DBili  x   /  AST  79<H>  /  ALT  88<H>  /  AlkPhos  55  12-27    LIVER FUNCTIONS - ( 27 Dec 2023 02:23 )  Alb: 2.8 g/dL / Pro: 5.8 g/dL / ALK PHOS: 55 U/L / ALT: 88 U/L / AST: 79 U/L / GGT: x           PT/INR - ( 27 Dec 2023 02:23 )   PT: 12.6 sec;   INR: 1.21 ratio         PTT - ( 27 Dec 2023 02:23 )  PTT:45.7 sec  CAPILLARY BLOOD GLUCOSE      POCT Blood Glucose.: 171 mg/dL (27 Dec 2023 05:20)  POCT Blood Glucose.: 178 mg/dL (26 Dec 2023 23:08)  POCT Blood Glucose.: 166 mg/dL (26 Dec 2023 16:43)  POCT Blood Glucose.: 187 mg/dL (26 Dec 2023 13:01)    ABG - ( 27 Dec 2023 01:57 )  pH, Arterial: 7.48  pH, Blood: x     /  pCO2: 33    /  pO2: 160   / HCO3: 25    / Base Excess: 1.2   /  SaO2: 100.0                   Urinalysis Basic - ( 27 Dec 2023 02:23 )    Color: x / Appearance: x / SG: x / pH: x  Gluc: 192 mg/dL / Ketone: x  / Bili: x / Urobili: x   Blood: x / Protein: x / Nitrite: x   Leuk Esterase: x / RBC: x / WBC x   Sq Epi: x / Non Sq Epi: x / Bacteria: x          RADIOLOGY & ADDITIONAL TESTS:  CT Head No Cont (12.26.23 @ 21:27)  FINDINGS:    Stable scattered subarachnoid hemorrhage in the bilateral parietal and   posterior frontal lobes.    Stable tiny subdural hematoma along the falx.    Stable mixed density acute on chronic subdural hematomas along the high   frontal lobes.    No acute territorial infarct. No hydrocephalus. No midline shift or mass   effect.    Orbits are unremarkable. Mucosal thickening in the paranasal sinuses with  air-fluid level in the right maxillary sinus. Stable bony structures.   Similar right frontal scalp hematoma.    IMPRESSION:    No interval change in multicompartmental intracranial hemorrhage.        CXR:        Care Discussed with Consultants/Other Providers [ x] YES  [ ] NO           Patient is a 78y old  Male who presents with a chief complaint of Cardiac Arrest (26 Dec 2023 19:11)    HPI:  78M w/ PMH HTN HLD presents as transfer from [facility] s/p cardiac arrest. Per reports patient had a witness fall and arrest at home. EMS performed CPR en route to hospital was noted to be in VTach and shocked x2. ROSC was obtained just prior to arrival at [facility]. Pt was intubated placed on levo gtt. On arrival pt noted to have lacerated to R forehead. Pt had CT scans that were negative for intracranial hemorrhage, C-spine fracture, facial fractures. A R frontal hematoma was noted.     On arrival patient w/ dried blood on scalp. Also noted to have bleeding from oral mucosa w/ clots requiring suctioning. Pt otherwise off sedation and non-responsive. Per EMS report pt did receive some sedation and paralytics at [facility].     Per family patient has been feeling 'off' but managing his day to day and did not see a physician. Pt prescribed klonopin by outpatient provider and family believes pt may have been taking more than prescribed. On day of arrest, pt was initially asymptomatic carrying out his routine. He went to the bathroom, wife heard a loud thud and found patient in a pool of blood prompting call to EMS. Pt has not followed with any cardiologist. Has not had any syncope or other events preceding this.  (19 Dec 2023 14:54)       INTERVAL HPI/OVERNIGHT EVENTS:   No overnight events   Febrile to 101.8 overnight, hemodynamically stable     Subjective: Interview limited by pt mental status.     ICU Vital Signs Last 24 Hrs  T(C): 38.8 (27 Dec 2023 06:00), Max: 38.8 (27 Dec 2023 06:00)  T(F): 101.8 (27 Dec 2023 06:00), Max: 101.8 (27 Dec 2023 06:00)  HR: 73 (27 Dec 2023 06:00) (55 - 77)  BP: --  BP(mean): --  ABP: 146/53 (27 Dec 2023 06:00) (105/43 - 192/54)  ABP(mean): 80 (27 Dec 2023 06:00) (54 - 94)  RR: 22 (27 Dec 2023 06:00) (14 - 22)  SpO2: 100% (27 Dec 2023 06:00) (100% - 100%)    O2 Parameters below as of 26 Dec 2023 18:00  Patient On (Oxygen Delivery Method): ventilator    O2 Concentration (%): 30      I&O's Summary    26 Dec 2023 07:01  -  27 Dec 2023 07:00  --------------------------------------------------------  IN: 1709 mL / OUT: 2275 mL / NET: -566 mL      Mode: AC/ CMV (Assist Control/ Continuous Mandatory Ventilation)  RR (machine): 14  TV (machine): 500  FiO2: 30  PEEP: 5  ITime: 1  MAP: 9  PIP: 18      Daily     Daily     Adult Advanced Hemodynamics Last 24 Hrs  CVP(mm Hg): --  CVP(cm H2O): --  CO: --  CI: --  PA: --  PA(mean): --  PCWP: --  SVR: --  SVRI: --  PVR: --  PVRI: --    EKG/Telemetry Events:    MEDICATIONS  (STANDING):  amLODIPine   Tablet 5 milliGRAM(s) Oral daily  artificial  tears Solution 1 Drop(s) Both EYES two times a day  carvedilol 3.125 milliGRAM(s) Oral every 12 hours  chlorhexidine 0.12% Liquid 15 milliLiter(s) Oral Mucosa every 12 hours  chlorhexidine 2% Cloths 1 Application(s) Topical daily  dexMEDEtomidine Infusion 0.2 MICROgram(s)/kG/Hr (4.68 mL/Hr) IV Continuous <Continuous>  heparin  Infusion 1200 Unit(s)/Hr (13 mL/Hr) IV Continuous <Continuous>  insulin lispro (ADMELOG) corrective regimen sliding scale   SubCutaneous every 6 hours  insulin NPH human recombinant 3 Unit(s) SubCutaneous every 6 hours  levETIRAcetam  IVPB 1000 milliGRAM(s) IV Intermittent every 12 hours  pantoprazole  Injectable 40 milliGRAM(s) IV Push daily  petrolatum Ophthalmic Ointment 1 Application(s) Both EYES three times a day  trimethoprim  40 mG/sulfamethoxazole 200 mG Suspension 300 milliGRAM(s) Enteral Tube every 8 hours    MEDICATIONS  (PRN):  acetaminophen   Oral Liquid .. 650 milliGRAM(s) Oral every 6 hours PRN Temp greater or equal to 38C (100.4F)      PHYSICAL EXAM:  GENERAL: Intubated, off sedation. No independent/purposeful movements.  HEAD:  Atraumatic, Normocephalic  EYES: PERRLA, + vertical nystagmus noted this am   NECK: Supple, No JVD  NERVOUS SYSTEM: Pupils ~3mm and minimally reactive, decorticate posturing to pain in upper extremities bilaterally  CHEST/LUNG: Intubated. B/L good air entry; No rales, rhonchi, or wheezing  HEART: S1S2 normal, no S3, Regular rate and rhythm; No murmurs  ABDOMEN: Soft, Nontender, Nondistended; Bowel sounds present  EXTREMITIES:  2+ Peripheral Pulses, No clubbing, cyanosis. Edematous upper extremities.   SKIN: R periorbital ecchymosis, ecchymoses on upper extremities bilaterally.      LABS:                        8.2    6.57  )-----------( 182      ( 27 Dec 2023 02:23 )             25.2     12-27    150<H>  |  117<H>  |  62<H>  ----------------------------<  192<H>  4.2   |  23  |  1.58<H>    Ca    8.3<L>      27 Dec 2023 02:23  Phos  3.1     12-27  Mg     2.7     12-27    TPro  5.8<L>  /  Alb  2.8<L>  /  TBili  0.8  /  DBili  x   /  AST  79<H>  /  ALT  88<H>  /  AlkPhos  55  12-27    LIVER FUNCTIONS - ( 27 Dec 2023 02:23 )  Alb: 2.8 g/dL / Pro: 5.8 g/dL / ALK PHOS: 55 U/L / ALT: 88 U/L / AST: 79 U/L / GGT: x           PT/INR - ( 27 Dec 2023 02:23 )   PT: 12.6 sec;   INR: 1.21 ratio         PTT - ( 27 Dec 2023 02:23 )  PTT:45.7 sec  CAPILLARY BLOOD GLUCOSE      POCT Blood Glucose.: 171 mg/dL (27 Dec 2023 05:20)  POCT Blood Glucose.: 178 mg/dL (26 Dec 2023 23:08)  POCT Blood Glucose.: 166 mg/dL (26 Dec 2023 16:43)  POCT Blood Glucose.: 187 mg/dL (26 Dec 2023 13:01)    ABG - ( 27 Dec 2023 01:57 )  pH, Arterial: 7.48  pH, Blood: x     /  pCO2: 33    /  pO2: 160   / HCO3: 25    / Base Excess: 1.2   /  SaO2: 100.0                   Urinalysis Basic - ( 27 Dec 2023 02:23 )    Color: x / Appearance: x / SG: x / pH: x  Gluc: 192 mg/dL / Ketone: x  / Bili: x / Urobili: x   Blood: x / Protein: x / Nitrite: x   Leuk Esterase: x / RBC: x / WBC x   Sq Epi: x / Non Sq Epi: x / Bacteria: x          RADIOLOGY & ADDITIONAL TESTS:  CT Head No Cont (12.26.23 @ 21:27)  FINDINGS:    Stable scattered subarachnoid hemorrhage in the bilateral parietal and   posterior frontal lobes.    Stable tiny subdural hematoma along the falx.    Stable mixed density acute on chronic subdural hematomas along the high   frontal lobes.    No acute territorial infarct. No hydrocephalus. No midline shift or mass   effect.    Orbits are unremarkable. Mucosal thickening in the paranasal sinuses with  air-fluid level in the right maxillary sinus. Stable bony structures.   Similar right frontal scalp hematoma.    IMPRESSION:    No interval change in multicompartmental intracranial hemorrhage.        CXR:        Care Discussed with Consultants/Other Providers [ x] YES  [ ] NO

## 2023-12-27 NOTE — PROGRESS NOTE ADULT - CONVERSATION DETAILS
Met with family today  reviewed clinical course- anoxic brain injury, poor prognosis for meaningful recovery  reviewed options for care- trach/peg and likely lifelong need for nursing home care  reviewed alternative option including elective extubation and symptom directed care  wife clear that she doesn't want patient to suffer and wants to proceed with compassionate extubation  DNR discussed and confirmed, MOLST completed.    CICU team to prepare for extubation and will extubate in CICU when family ready.  Post extubation, if hemodynamically stable, patient will be transferred to PCU if bed available.  Family verbalized understanding and in agreement with plan.

## 2023-12-27 NOTE — PROGRESS NOTE ADULT - ASSESSMENT
78M w/ PMH HTN HLD presents as transfer from [facility] s/p VT arrest s/p shock x2 w/ unknown downtime. Pt found to have trauma to head w/ superficial scalp bleeding and oral mucosal bleeding - CTH significant for L occipital SDH and R temporal SAH, stable on subsequent imaging. Remains w/o significant improvement in mental status.     Plan:  ====================== NEUROLOGY=====================  #Intubated  Encephalopathy post arrest   - Intubated w/o sedation s/p cardiac arrest w/ unknown down time. Reported to be 20-40minutes but can be longer given ROSC achieved right before arrival to the hospital   - CTH at [facility] w/o intracranial bleed/pathology. CTH maybe too soon to show evidence of anoxic injury  - Pt remains w/o purposeful movement.   - Neuro and neurosurgery following  - VEEG: no epileptiform activity; diffuse cerebral dysfunction   - Repeat CTH shows stability of subdural hematoma & subarachnoid hemorrhage and emerging signs of anoxic injury  - f/u neuron specific enolase  - Family meeting w/ neurology attending 12/22 discussed no meaningful recovery. Pending family decision on comfort care.     #SAH & SDH  - Pt w/ acute SAH and SDH 2/2 trauma  - Was not initially seen in outside scan at [facility] likely performed too early for radiologic evidence  - Neurology and neurosurgery following  - NSGY: no intervention  - Repeat CTH shows stability of bleeds   - Maintain BP control   - Initially started on HSQ for DVT ppx, now on heparin gtt (started 12/24 with narrow therapeutic ptt window) for full AC given findings of DVTs  - 12/25 repeat CTH without expansion of hematoma with therapeutic PTT, though does have loss of grey-white matter junction congruent with anoxic brain injury  - 12/25 had systolic hypertension >200 w/ c/f worsening ICH/expansion so repeat CTH ordered -> stable ICH    #C-spine trauma  Pt w/ fall at home presented w/ c-collar.   -Per [facility] radiology report no evidence of cervical fracture  -Trauma surgery cleared pt of Cspine collar    #Myoclonic jerking  - vEEG: evidence of mycolonic seizures  - keppra 1000 BID  - d/c VEEG    ==================== RESPIRATORY======================  #Intubated  Pt intubated s/p cardiac arrest  - On full vent support: RR 14 /  / PEEP 5 / FiO2 40  - Monitor ABGs  - will trial pt on CPAP    #Oropharynx bleed  - Pt w/ blood in oropharynx requiring frequent suctioning.   - ENT consulted s/p packing     ====================CARDIOVASCULAR==================  #VT arrest  - Pt s/p VT arrest w/ unknown downtime. Pt reportedly received shock x2. No prior cardiac hx per family  - Monitor for arrhythmias   - Pt not asa/plavix loaded 2/2 substernal hematoma noted on CT chest at [facility]  - Unlikely to be a candidate for C at this time given mental status     #HTN  - Maintain BP control iso brain bleed  - Amlodipine; coreg  - PRN IVP labetalol for elevated SBP  - SBP goal<160    ===================HEMATOLOGIC/ONC ===================  #Substernal hematoma  - Pt w/ substernal hematoma noted on imaging at [facility] from trauma/cpr  - Repeat CT chest to monitor hematoma  - A/C: heparin gtt - on hold while r/o worsening ICH    #Thrombocytopenia  - Pt w/ downtrending PLT - currently stable in low 100s. Started downtrending prior to HSQ initiation.   - Likely iso critical illness     ===================== RENAL =========================  Pt presented w/ SCr 1.16, likely near baseline  - trend Cr   - montior I&Os    ==================== GASTROINTESTINAL===================  - Diet: enteral feeds   - Protonix    =======================    ENDOCRINE  =====================  #Hypothyroidism  - A1c=5.5  - monitor glucose    ========================INFECTIOUS DISEASE================  #Sepsis  - Pt w/ leukocytosis w/ fevers possibly 2/2  infection  - Pt w/ persistent fevers likely multifactorial 2/2 DVTs, underlying infection, more likely ?neurogenic/central component given lack of response to empiric broad spectrum coverage given labile pressures and hyperglycemia  - U/A grossly positive. F/u 12/23 BCx, UCx - NGTD  - Pt had crash lines placed at [facility]. Possible source of infection. MRSA negative 12/20  - 12/20 Sputum Cx + for klebsiella  - 12/22 BCx 1 bottle w/ MRSE. Possible contaminant. Check repeat BCx  - 12/25 sputum cx growing moderate gram negative rods, will f/u speciation and sensitivities  - Was on CTX 12/20 - 12/22, Zosyn 12/22 - 12/24, meropenem 12/24 - 12/26, cefepime 12/26 - (transitions to coverage made given persistent fever)  - received Vanc (12/22-12/25), discontinued given absence of evidence of further MRSE/MRSA     78M w/ PMH HTN HLD presents as transfer from [facility] s/p VT arrest s/p shock x2 w/ unknown downtime. Pt found to have trauma to head w/ superficial scalp bleeding and oral mucosal bleeding - CTH significant for L occipital SDH and R temporal SAH, stable on subsequent imaging. Remains w/o significant improvement in mental status.     Plan:  ====================== NEUROLOGY=====================  #Intubated  Encephalopathy post arrest   - Intubated w/o sedation s/p cardiac arrest w/ unknown down time. Reported to be 20-40minutes but can be longer given ROSC achieved right before arrival to the hospital   - CTH at [facility] w/o intracranial bleed/pathology. CTH maybe too soon to show evidence of anoxic injury  - Pt remains w/o purposeful movement.   - Neuro and neurosurgery following  - VEEG: no epileptiform activity; diffuse cerebral dysfunction   - Repeat CTH shows stability of subdural hematoma & subarachnoid hemorrhage and emerging signs of anoxic injury  - f/u neuron specific enolase  - Family meeting w/ neurology attending 12/22 discussed no meaningful recovery. Pending family decision on comfort care.     #SAH & SDH  - Pt w/ acute SAH and SDH 2/2 trauma  - Was not initially seen in outside scan at [facility] likely performed too early for radiologic evidence  - Neurology and neurosurgery following  - NSGY: no intervention  - Repeat CTH shows stability of bleeds   - Maintain BP control   - Initially started on HSQ for DVT ppx, now on heparin gtt (started 12/24 with narrow therapeutic ptt window) for full AC given findings of DVTs  - 12/25 repeat CTH without expansion of hematoma with therapeutic PTT, though does have loss of grey-white matter junction congruent with anoxic brain injury  - 12/25 had systolic hypertension >200 w/ c/f worsening ICH/expansion so repeat CTH ordered -> stable ICH    #C-spine trauma  Pt w/ fall at home presented w/ c-collar.   -Per [facility] radiology report no evidence of cervical fracture  -Trauma surgery cleared pt of Cspine collar    #Myoclonic jerking  - vEEG: evidence of mycolonic seizures  - keppra 1000 BID  - d/c VEEG    ==================== RESPIRATORY======================  #Intubated  Pt intubated s/p cardiac arrest  - On full vent support: RR 14 /  / PEEP 5 / FiO2 40  - Monitor ABGs  - will trial pt on CPAP    #Oropharynx bleed  - Pt w/ blood in oropharynx requiring frequent suctioning.   - ENT consulted s/p packing     ====================CARDIOVASCULAR==================  #VT arrest  - Pt s/p VT arrest w/ unknown downtime. Pt reportedly received shock x2. No prior cardiac hx per family  - Monitor for arrhythmias   - Pt not asa/plavix loaded 2/2 substernal hematoma noted on CT chest at [facility]  - Unlikely to be a candidate for C at this time given mental status     #HTN  - Maintain BP control iso brain bleed  - Amlodipine; coreg  - PRN IVP labetalol for elevated SBP  - SBP goal<160    ===================HEMATOLOGIC/ONC ===================  #Substernal hematoma  - Pt w/ substernal hematoma noted on imaging at [facility] from trauma/cpr  - Repeat CT chest to monitor hematoma  - A/C: heparin gtt - on hold while r/o worsening ICH    #Thrombocytopenia  - Pt w/ downtrending PLT - currently stable in low 100s. Started downtrending prior to HSQ initiation.   - Likely iso critical illness     ===================== RENAL =========================  Pt presented w/ SCr 1.16, likely near baseline  - trend Cr   - montior I&Os    ==================== GASTROINTESTINAL===================  - Diet: enteral feeds   - Protonix    =======================    ENDOCRINE  =====================  #Hypothyroidism  - A1c=5.5  - monitor glucose    ========================INFECTIOUS DISEASE================  #Sepsis  - Pt w/ leukocytosis w/ fevers possibly 2/2  infection  - Pt w/ persistent fevers likely multifactorial 2/2 DVTs, underlying infection, more likely ?neurogenic/central component given lack of response to empiric broad spectrum coverage given labile pressures and hyperglycemia  - U/A grossly positive. F/u 12/23 BCx, UCx - NGTD  - Pt had crash lines placed at [facility]. Possible source of infection. MRSA negative 12/20  - 12/20 Sputum Cx + for klebsiella  - 12/22 BCx 1 bottle w/ MRSE. Possible contaminant. Check repeat BCx  - 12/25 sputum cx growing moderate gram negative rods, will f/u speciation and sensitivities  - Was on CTX 12/20 - 12/22, Zosyn 12/22 - 12/24, meropenem 12/24 - 12/26, cefepime 12/26 - (transitions to coverage made given persistent fever)  - received Vanc (12/22-12/25), discontinued given absence of evidence of further MRSE/MRSA     78M w/ PMH HTN HLD presents as transfer from [facility] s/p VT arrest s/p shock x2 w/ unknown downtime. Pt found to have trauma to head w/ superficial scalp bleeding and oral mucosal bleeding - CTH significant for L occipital SDH and R temporal SAH, stable on subsequent imaging. Remains w/o significant improvement in mental status.     Plan:  ====================== NEUROLOGY=====================  #Intubated  Encephalopathy post arrest   - Intubated w/o sedation s/p cardiac arrest w/ unknown down time. Reported to be 20-40minutes but can be longer given ROSC achieved right before arrival to the hospital   - CTH at [facility] w/o intracranial bleed/pathology. CTH maybe too soon to show evidence of anoxic injury  - Pt remains w/o purposeful movement.   - Neuro and neurosurgery following  - VEEG: no epileptiform activity; diffuse cerebral dysfunction   - Repeat CTH shows stability of subdural hematoma & subarachnoid hemorrhage and emerging signs of anoxic injury  - f/u neuron specific enolase  - Family meeting w/ neurology attending 12/22 discussed no meaningful recovery. Pending family decision on comfort care.     #SAH & SDH  - Pt w/ acute SAH and SDH 2/2 trauma  - Was not initially seen in outside scan at [facility] likely performed too early for radiologic evidence  - Neurology and neurosurgery following  - NSGY: no intervention  - Repeat CTH shows stability of bleeds   - Maintain BP control   - Initially started on HSQ for DVT ppx, now on heparin gtt (started 12/24 with narrow therapeutic ptt window) for full AC given findings of DVTs  - 12/25 repeat CTH without expansion of hematoma with therapeutic PTT, though does have loss of grey-white matter junction congruent with anoxic brain injury  - 12/25 had systolic hypertension >200 w/ c/f worsening ICH/expansion so repeat CTH ordered -> stable ICH    #C-spine trauma  Pt w/ fall at home presented w/ c-collar.   -Per [facility] radiology report no evidence of cervical fracture  -Trauma surgery cleared pt of Cspine collar    #Myoclonic jerking  - vEEG: evidence of mycolonic seizures  - keppra 1000 BID  - d/c VEEG    ==================== RESPIRATORY======================  #Intubated  Pt intubated s/p cardiac arrest  - On full vent support: RR 14 /  / PEEP 5 / FiO2 40  - Monitor ABGs  - will trial pt on CPAP    #Oropharynx bleed  - Pt w/ blood in oropharynx requiring frequent suctioning.   - ENT consulted s/p packing     ====================CARDIOVASCULAR==================  #VT arrest  - Pt s/p VT arrest w/ unknown downtime. Pt reportedly received shock x2. No prior cardiac hx per family  - Monitor for arrhythmias   - Pt not asa/plavix loaded 2/2 substernal hematoma noted on CT chest at [facility]  - Unlikely to be a candidate for C at this time given mental status     #HTN  - Maintain BP control iso brain bleed  - Amlodipine; coreg  - PRN IVP labetalol for elevated SBP  - SBP goal<160    ===================HEMATOLOGIC/ONC ===================  #Substernal hematoma  - Pt w/ substernal hematoma noted on imaging at [facility] from trauma/cpr  - Repeat CT chest to monitor hematoma  - A/C: heparin gtt - on hold while r/o worsening ICH    #Thrombocytopenia  - Pt w/ downtrending PLT - currently stable in low 100s. Started downtrending prior to HSQ initiation.   - Likely iso critical illness     ===================== RENAL =========================  #Hypernatremia  #LONA  Pt presented w/ SCr 1.16, likely near baseline  - trend Cr   - montior I&Os  - on 250cc free water flushes -> inc. to 400    ==================== GASTROINTESTINAL===================  - Diet: enteral feeds   - Protonix    =======================    ENDOCRINE  =====================  #Hypothyroidism  - A1c=5.5  - monitor glucose    ========================INFECTIOUS DISEASE================  #Sepsis  - Pt w/ leukocytosis w/ fevers possibly 2/2  infection  - Pt w/ persistent fevers likely multifactorial 2/2 DVTs, underlying infection, more likely ?neurogenic/central component given lack of response to empiric broad spectrum coverage given labile pressures and hyperglycemia  - U/A grossly positive. F/u 12/23 BCx, UCx - NGTD  - Pt had crash lines placed at [facility]. Possible source of infection. MRSA negative 12/20  - 12/20 Sputum Cx + for klebsiella  - 12/22 BCx 1 bottle w/ MRSE. Possible contaminant. Check repeat BCx  - 12/25 sputum cx growing moderate gram negative rods -> stenotrophomonas, bactrim 12/27 -   - Was on CTX 12/20 - 12/22, Zosyn 12/22 - 12/24, meropenem 12/24 - 12/26, cefepime 12/26 (transitions to coverage made given persistent fever)  - received Vanc (12/22-12/25), discontinued given absence of evidence of further MRSE/MRSA     78M w/ PMH HTN HLD presents as transfer from [facility] s/p VT arrest s/p shock x2 w/ unknown downtime. Pt found to have trauma to head w/ superficial scalp bleeding and oral mucosal bleeding - CTH significant for L occipital SDH and R temporal SAH, stable on subsequent imaging. Remains w/o significant improvement in mental status.     Plan:  ====================== NEUROLOGY=====================  #Intubated  Encephalopathy post arrest   - Intubated w/o sedation s/p cardiac arrest w/ unknown down time. Reported to be 20-40minutes but can be longer given ROSC achieved right before arrival to the hospital   - CTH at [facility] w/o intracranial bleed/pathology. CTH maybe too soon to show evidence of anoxic injury  - Pt remains w/o purposeful movement.   - Neuro and neurosurgery following  - VEEG: no epileptiform activity; diffuse cerebral dysfunction   - Repeat CTH shows stability of subdural hematoma & subarachnoid hemorrhage and emerging signs of anoxic injury  - f/u neuron specific enolase  - Family meeting w/ neurology attending 12/22 discussed no meaningful recovery. Pending family decision on comfort care.     #SAH & SDH  - Pt w/ acute SAH and SDH 2/2 trauma  - Was not initially seen in outside scan at [facility] likely performed too early for radiologic evidence  - Neurology and neurosurgery following  - NSGY: no intervention  - Repeat CTH shows stability of bleeds   - Maintain BP control   - Initially started on HSQ for DVT ppx, now on heparin gtt (started 12/24 with narrow therapeutic ptt window) for full AC given findings of DVTs  - 12/25 repeat CTH without expansion of hematoma with therapeutic PTT, though does have loss of grey-white matter junction congruent with anoxic brain injury  - 12/25 had systolic hypertension >200 w/ c/f worsening ICH/expansion so repeat CTH ordered -> stable ICH    #C-spine trauma  Pt w/ fall at home presented w/ c-collar.   -Per [facility] radiology report no evidence of cervical fracture  -Trauma surgery cleared pt of Cspine collar    #Myoclonic jerking  - vEEG: evidence of mycolonic seizures  - keppra 1000 BID  - d/c VEEG    ==================== RESPIRATORY======================  #Intubated  Pt intubated s/p cardiac arrest  - On full vent support: RR 14 /  / PEEP 5 / FiO2 40  - Monitor ABGs  - will trial pt on CPAP    #Oropharynx bleed  - Pt w/ blood in oropharynx requiring frequent suctioning.   - ENT consulted s/p packing     ====================CARDIOVASCULAR==================  #VT arrest  - Pt s/p VT arrest w/ unknown downtime. Pt reportedly received shock x2. No prior cardiac hx per family  - Monitor for arrhythmias   - Pt not asa/plavix loaded 2/2 substernal hematoma noted on CT chest at [facility]  - Unlikely to be a candidate for C at this time given mental status     #HTN  - Maintain BP control iso brain bleed  - Amlodipine; coreg  - PRN IVP labetalol for elevated SBP  - SBP goal<160    ===================HEMATOLOGIC/ONC ===================  #Substernal hematoma  - Pt w/ substernal hematoma noted on imaging at [facility] from trauma/cpr  - Repeat CT chest to monitor hematoma  - A/C: heparin gtt - on hold while r/o worsening ICH    #Thrombocytopenia  - Pt w/ downtrending PLT - currently stable in low 100s. Started downtrending prior to HSQ initiation.   - Likely iso critical illness     ===================== RENAL =========================  #Hypernatremia  #LONA  Pt presented w/ SCr 1.16, likely near baseline  - trend Cr   - montior I&Os  - on 250cc free water flushes -> inc. to 400    ==================== GASTROINTESTINAL===================  - Diet: enteral feeds   - Protonix    =======================    ENDOCRINE  =====================  #Hypothyroidism  - A1c=5.5  - monitor glucose    ========================INFECTIOUS DISEASE================  #Sepsis  - Pt w/ leukocytosis w/ fevers possibly 2/2  infection  - Pt w/ persistent fevers likely multifactorial 2/2 DVTs, underlying infection, more likely ?neurogenic/central component given lack of response to empiric broad spectrum coverage given labile pressures and hyperglycemia  - U/A grossly positive. F/u 12/23 BCx, UCx - NGTD  - Pt had crash lines placed at [facility]. Possible source of infection. MRSA negative 12/20  - 12/20 Sputum Cx + for klebsiella  - 12/22 BCx 1 bottle w/ MRSE. Possible contaminant. Check repeat BCx  - 12/25 sputum cx growing moderate gram negative rods -> stenotrophomonas, bactrim 12/27 -   - Was on CTX 12/20 - 12/22, Zosyn 12/22 - 12/24, meropenem 12/24 - 12/26, cefepime 12/26 (transitions to coverage made given persistent fever)  - received Vanc (12/22-12/25), discontinued given absence of evidence of further MRSE/MRSA     78M w/ PMH HTN HLD presents as transfer from [facility] s/p VT arrest s/p shock x2 w/ unknown downtime. Pt found to have trauma to head w/ superficial scalp bleeding and oral mucosal bleeding - CTH significant for L occipital SDH and R temporal SAH, stable on subsequent imaging. Remains w/o significant improvement in mental status.     Plan:  ====================== NEUROLOGY=====================  #Intubated  Encephalopathy post arrest   - Intubated w/o sedation s/p cardiac arrest w/ unknown down time. Reported to be 20-40minutes but can be longer given ROSC achieved right before arrival to the hospital   - CTH at [facility] w/o intracranial bleed/pathology. CTH maybe too soon to show evidence of anoxic injury  - Pt remains w/o purposeful movement.   - Neuro and neurosurgery following  - VEEG: no epileptiform activity; diffuse cerebral dysfunction   - Repeat CTH shows stability of subdural hematoma & subarachnoid hemorrhage and emerging signs of anoxic injury  - f/u neuron specific enolase  - Family meeting w/ neurology attending 12/22 discussed no meaningful recovery. Pending family decision on comfort care.     #SAH & SDH  - Pt w/ acute SAH and SDH 2/2 trauma  - Was not initially seen in outside scan at [facility] likely performed too early for radiologic evidence  - Neurology and neurosurgery following  - NSGY: no intervention  - Repeat CTH shows stability of bleeds   - Maintain BP control   - Initially started on HSQ for DVT ppx, now on heparin gtt (started 12/24 with narrow therapeutic ptt window) for full AC given findings of DVTs  - 12/25 repeat CTH without expansion of hematoma with therapeutic PTT, though does have loss of grey-white matter junction congruent with anoxic brain injury  - 12/25 had systolic hypertension >200 w/ c/f worsening ICH/expansion so repeat CTH ordered -> stable ICH    #C-spine trauma  Pt w/ fall at home presented w/ c-collar.   -Per [facility] radiology report no evidence of cervical fracture  -Trauma surgery cleared pt of Cspine collar    #Myoclonic jerking  - vEEG: evidence of mycolonic seizures  - keppra 1000 BID  - d/c VEEG    ==================== RESPIRATORY======================  #Intubated  Pt intubated s/p cardiac arrest  - On full vent support: RR 14 /  / PEEP 5 / FiO2 40  - Monitor ABGs  - will trial pt on CPAP    #Oropharynx bleed  - Pt w/ blood in oropharynx requiring frequent suctioning.   - ENT consulted s/p packing     ====================CARDIOVASCULAR==================  #VT arrest  - Pt s/p VT arrest w/ unknown downtime. Pt reportedly received shock x2. No prior cardiac hx per family  - Monitor for arrhythmias   - Pt not asa/plavix loaded 2/2 substernal hematoma noted on CT chest at [facility]  - Unlikely to be a candidate for [facility] at this time given mental status     #HTN  - Maintain BP control iso brain bleed  - Amlodipine 5mg qd; coreg 6.25 BID  - PRN IVP labetalol for elevated SBP  - SBP goal<160    ===================HEMATOLOGIC/ONC ===================  #Substernal hematoma  - Pt w/ substernal hematoma noted on imaging at [facility] from trauma/cpr  - Repeat CT chest to monitor hematoma  - A/C: heparin gtt - on hold while r/o worsening ICH    #Thrombocytopenia  - Pt w/ downtrending PLT - currently stable in low 100s. Started downtrending prior to HSQ initiation.   - Likely iso critical illness     ===================== RENAL =========================  #Hypernatremia  #LONA  Pt presented w/ SCr 1.16, likely near baseline  - trend Cr   - montior I&Os  - on 250cc free water flushes q6h -> inc. to 400 q6h    ==================== GASTROINTESTINAL===================  - Diet: enteral feeds   - Protonix    =======================    ENDOCRINE  =====================  #Hypothyroidism  - A1c=5.5  - monitor glucose    ========================INFECTIOUS DISEASE================  #Sepsis  - Pt w/ leukocytosis w/ fevers possibly 2/2  infection  - Pt w/ persistent fevers likely multifactorial 2/2 DVTs, underlying infection, more likely ?neurogenic/central component given lack of response to empiric broad spectrum coverage given labile pressures and hyperglycemia  - U/A grossly positive. F/u 12/23 BCx, UCx - NGTD  - Pt had crash lines placed at [facility]. Possible source of infection. MRSA negative 12/20  - 12/20 Sputum Cx + for klebsiella  - 12/22 BCx 1 bottle w/ MRSE. Possible contaminant. Check repeat BCx  - 12/25 sputum cx growing moderate gram negative rods -> stenotrophomonas, bactrim planned 12/27 - 1/3  - Was on CTX 12/20 - 12/22, Zosyn 12/22 - 12/24, meropenem 12/24 - 12/26, cefepime 12/26 (transitions to coverage made given persistent fever)  - received Vanc (12/22-12/25), discontinued given absence of evidence of further MRSE/MRSA     78M w/ PMH HTN HLD presents as transfer from [facility] s/p VT arrest s/p shock x2 w/ unknown downtime. Pt found to have trauma to head w/ superficial scalp bleeding and oral mucosal bleeding - CTH significant for L occipital SDH and R temporal SAH, stable on subsequent imaging. Remains w/o significant improvement in mental status.     Plan:  ====================== NEUROLOGY=====================  #Intubated  Encephalopathy post arrest   - Intubated w/o sedation s/p cardiac arrest w/ unknown down time. Reported to be 20-40minutes but can be longer given ROSC achieved right before arrival to the hospital   - CTH at [facility] w/o intracranial bleed/pathology. CTH maybe too soon to show evidence of anoxic injury  - Pt remains w/o purposeful movement.   - Neuro and neurosurgery following  - VEEG: no epileptiform activity; diffuse cerebral dysfunction   - Repeat CTH shows stability of subdural hematoma & subarachnoid hemorrhage and emerging signs of anoxic injury  - f/u neuron specific enolase  - Family meeting w/ neurology attending 12/22 discussed no meaningful recovery. Pending family decision on comfort care.     #SAH & SDH  - Pt w/ acute SAH and SDH 2/2 trauma  - Was not initially seen in outside scan at [facility] likely performed too early for radiologic evidence  - Neurology and neurosurgery following  - NSGY: no intervention  - Repeat CTH shows stability of bleeds   - Maintain BP control   - Initially started on HSQ for DVT ppx, now on heparin gtt (started 12/24 with narrow therapeutic ptt window) for full AC given findings of DVTs  - 12/25 repeat CTH without expansion of hematoma with therapeutic PTT, though does have loss of grey-white matter junction congruent with anoxic brain injury  - 12/25 had systolic hypertension >200 w/ c/f worsening ICH/expansion so repeat CTH ordered -> stable ICH    #C-spine trauma  Pt w/ fall at home presented w/ c-collar.   -Per [facility] radiology report no evidence of cervical fracture  -Trauma surgery cleared pt of Cspine collar    #Myoclonic jerking  - vEEG: evidence of mycolonic seizures  - keppra 1000 BID  - d/c VEEG    ==================== RESPIRATORY======================  #Intubated  Pt intubated s/p cardiac arrest  - On full vent support: RR 14 /  / PEEP 5 / FiO2 40  - Monitor ABGs  - will trial pt on CPAP    #Oropharynx bleed  - Pt w/ blood in oropharynx requiring frequent suctioning.   - ENT consulted s/p packing     ====================CARDIOVASCULAR==================  #VT arrest  - Pt s/p VT arrest w/ unknown downtime. Pt reportedly received shock x2. No prior cardiac hx per family  - Monitor for arrhythmias   - Pt not asa/plavix loaded 2/2 substernal hematoma noted on CT chest at [facility]  - Unlikely to be a candidate for [facility] at this time given mental status     #HTN  - Maintain BP control iso brain bleed  - Amlodipine 5mg qd; coreg 6.25 BID  - PRN IVP labetalol for elevated SBP  - SBP goal<160    ===================HEMATOLOGIC/ONC ===================  #Substernal hematoma  - Pt w/ substernal hematoma noted on imaging at [facility] from trauma/cpr  - Repeat CT chest to monitor hematoma  - A/C: heparin gtt - on hold while r/o worsening ICH    #Thrombocytopenia  - Pt w/ downtrending PLT - currently stable in low 100s. Started downtrending prior to HSQ initiation.   - Likely iso critical illness     ===================== RENAL =========================  #Hypernatremia  #LONA  Pt presented w/ SCr 1.16, likely near baseline  - trend Cr   - montior I&Os  - on 250cc free water flushes q6h -> inc. to 400 q6h    ==================== GASTROINTESTINAL===================  - Diet: enteral feeds   - Protonix    =======================    ENDOCRINE  =====================  #Hypothyroidism  - A1c=5.5  - monitor glucose    ========================INFECTIOUS DISEASE================  #Sepsis  - Pt w/ leukocytosis w/ fevers possibly 2/2  infection  - Pt w/ persistent fevers likely multifactorial 2/2 DVTs, underlying infection, more likely ?neurogenic/central component given lack of response to empiric broad spectrum coverage given labile pressures and hyperglycemia  - U/A grossly positive. F/u 12/23 BCx, UCx - NGTD  - Pt had crash lines placed at [facility]. Possible source of infection. MRSA negative 12/20  - 12/20 Sputum Cx + for klebsiella  - 12/22 BCx 1 bottle w/ MRSE. Possible contaminant. Check repeat BCx  - 12/25 sputum cx growing moderate gram negative rods -> stenotrophomonas, bactrim planned 12/27 - 1/3  - Was on CTX 12/20 - 12/22, Zosyn 12/22 - 12/24, meropenem 12/24 - 12/26, cefepime 12/26 (transitions to coverage made given persistent fever)  - received Vanc (12/22-12/25), discontinued given absence of evidence of further MRSE/MRSA

## 2023-12-27 NOTE — DISCHARGE NOTE FOR THE EXPIRED PATIENT - HOSPITAL COURSE
78M with PMH HTN HLD presents as transfer from [facility] after a VT arrest s/p shock x2 with unknown downtime. He was found to have trauma to head w/ superficial scalp bleeding and oral mucosal bleeding with CTH significant for L occipital SDH and R temporal SAH. His hospital course was complicated seizures, klebsiella pneumonia, MRSA bacteremia and strenotrophonomas pneumonia. While inpatient he remained intubated without improvement in mental status post arrest. Repeat CT head with evidence of anoxic brain injury. After palliative care meeting with family, decision for compassionate extubation with comfort measures was made. Patient was palliatively extubated at 1800. Patient  at 1913.    78M with PMH HTN HLD presents as transfer from [facility] after a VT arrest s/p shock x2 with unknown downtime. He was found to have trauma to head w/ superficial scalp bleeding and oral mucosal bleeding with CTH significant for L occipital SDH and R temporal SAH. His hospital course was complicated seizures, klebsiella pneumonia, MRSA bacteremia and strenotrophonomas pneumonia. While inpatient he remained intubated without improvement in mental status post arrest. Repeat CT head with evidence of anoxic brain injury. After palliative care meeting with family, decision for compassionate extubation with comfort measures was made. Patient was palliatively extubated at 1800. Patient  at 1913.

## 2023-12-27 NOTE — AIRWAY REMOVAL NOTE  ADULT & PEDS - ARTIFICAL AIRWAY REMOVAL COMMENTS
Written order for extubation verified. The patient was identified by full name and birth date compared to the identification band. Present during the procedure was VERA Gilbert

## 2023-12-27 NOTE — PROGRESS NOTE ADULT - SUBJECTIVE AND OBJECTIVE BOX
Indication for Geriatrics and Palliative Care Services/INTERVAL HPI: goals of care  SUBJECTIVE AND OBJECTIVE: Patient remains intubated with poor neuro exam. family meeting today, narrative below    OVERNIGHT EVENTS: no acute overnight events    DNR on chart:DNI  DNI      Allergies    No Known Allergies    Intolerances    MEDICATIONS  (STANDING):  amLODIPine   Tablet 5 milliGRAM(s) Oral daily  artificial  tears Solution 1 Drop(s) Both EYES two times a day  carvedilol 6.25 milliGRAM(s) Oral every 12 hours  chlorhexidine 0.12% Liquid 15 milliLiter(s) Oral Mucosa every 12 hours  chlorhexidine 2% Cloths 1 Application(s) Topical daily  dexMEDEtomidine Infusion 0.2 MICROgram(s)/kG/Hr (4.68 mL/Hr) IV Continuous <Continuous>  glycopyrrolate Injectable 0.4 milliGRAM(s) IV Push once  HYDROmorphone  Injectable 1 milliGRAM(s) IV Push once  levETIRAcetam  IVPB 1000 milliGRAM(s) IV Intermittent every 12 hours  LORazepam   Injectable 1 milliGRAM(s) IV Push once  pantoprazole  Injectable 40 milliGRAM(s) IV Push daily  petrolatum Ophthalmic Ointment 1 Application(s) Both EYES three times a day  trimethoprim  40 mG/sulfamethoxazole 200 mG Suspension 300 milliGRAM(s) Enteral Tube every 8 hours    MEDICATIONS  (PRN):  acetaminophen   Oral Liquid .. 650 milliGRAM(s) Oral every 6 hours PRN Temp greater or equal to 38C (100.4F)  bisacodyl Suppository 10 milliGRAM(s) Rectal daily PRN Constipation  glycopyrrolate Injectable 0.4 milliGRAM(s) IV Push every 6 hours PRN secretions  HYDROmorphone  Injectable 1 milliGRAM(s) IV Push every 1 hour PRN dyspnea  HYDROmorphone  Injectable 0.5 milliGRAM(s) IV Push every 1 hour PRN Moderate Pain (4 - 6)  HYDROmorphone  Injectable 1 milliGRAM(s) IV Push every 1 hour PRN Severe Pain (7 - 10)  LORazepam   Injectable 0.5 milliGRAM(s) IV Push every 1 hour PRN anxiety, agitation, refractory dyspnea      ITEMS UNCHECKED ARE NOT PRESENT    PRESENT SYMPTOMS: [ x]Unable to self-report - see [ ] CPOT [x ] PAINADS [x ] RDOS  Source if other than patient:  [ ]Family   [ ]Team     Pain:  [ ]yes [ ]no  QOL impact -   Location -                    Aggravating factors -  Quality -  Radiation -  Timing-  Severity (0-10 scale):  Minimal acceptable level (0-10 scale):     CPOT:    https://www.[facility].org/getattachment/[email or web address]/Critical-Care-Pain-Observation-Tool-(CPOT)    Dyspnea:                           [ ]Mild [ ]Moderate [ ]Severe  Anxiety:                             [ ]Mild [ ]Moderate [ ]Severe  Fatigue:                             [ ]Mild [ ]Moderate [ ]Severe  Nausea:                             [ ]Mild [ ]Moderate [ ]Severe  Loss of appetite:              [ ]Mild [ ]Moderate [ ]Severe  Constipation:                    [ ]Mild [ ]Moderate [ ]Severe    PCSSQ[Palliative Care Spiritual Screening Question]   Severity (0-10):  Score of 4 or > indicate consideration of Chaplaincy referral.  Chaplaincy Referral: [x ] yes [ ] refused [ ] following [ ] Deferred     Caregiver [place]? : [ ] yes [ x] no [ ] Deferred [ ] Declined             Social work referral [ ] Patient & Family Centered Care Referral [ ]     Anticipatory Grief present?:  [x ] yes [ ] no  [ ] Deferred                  Social work referral [ ] Chaplaincy Referral[x ]      Other Symptoms:  [ ]All other review of systems negative   [x ]Unable to obtain due to poor mentation    Palliative Performance Status Version 2:      10   %      http://npcrc.org/files/news/palliative_performance_scale_ppsv2.pdf  PHYSICAL EXAM:  Vital Signs Last 24 Hrs  T(C): 37.8 (27 Dec 2023 12:00), Max: 38.8 (27 Dec 2023 06:00)  T(F): 100 (27 Dec 2023 12:00), Max: 101.8 (27 Dec 2023 06:00)  HR: 63 (27 Dec 2023 14:00) (59 - 77)  BP: --  BP(mean): --  RR: 19 (27 Dec 2023 14:00) (16 - 29)  SpO2: 100% (27 Dec 2023 14:00) (100% - 100%)    Parameters below as of 27 Dec 2023 14:00  Patient On (Oxygen Delivery Method): ventilator    O2 Concentration (%): 30 I&O's Summary    26 Dec 2023 07:01  -  27 Dec 2023 07:00  --------------------------------------------------------  IN: 1762 mL / OUT: 2350 mL / NET: -588 mL    27 Dec 2023 07:01  -  27 Dec 2023 15:07  --------------------------------------------------------  IN: 823 mL / OUT: 525 mL / NET: 298 mL       GENERAL: [ ]Cachexia    [ ]Alert  [ ]Oriented x   [ x]Lethargic  [ ]Unarousable  [ ]Verbal  [ ]Non-Verbal  Behavioral:   [ ]Anxiety  [ ]Delirium [ ]Agitation [ ]Other  HEENT:  [ ]Normal   [ ]Dry mouth   [ x]ET Tube/Trach  [ ]Oral lesions  PULMONARY: vent  [x ]Clear [ ]Tachypnea  [ ]Audible excessive secretions   [ ]Rhonchi        [ ]Right [ ]Left [ ]Bilateral  [ ]Crackles        [ ]Right [ ]Left [ ]Bilateral  [ ]Wheezing     [ ]Right [ ]Left [ ]Bilateral  [ ]Diminished BS [ ] Right [ ]Left [ ]Bilateral  CARDIOVASCULAR:    [ x]Regular [ ]Irregular [ ]Tachy  [ ]Miguel Angel [ ]Murmur [ ]Other  GASTROINTESTINAL:  [x ]Soft  [ ]Distended   [ ]+BS  [ ]Non tender [ ]Tender  [ ]Other [ ]PEG [ x]OGT/ NGT   Last BM:   GENITOURINARY:  [ ]Normal [ ]Incontinent   [ ]Oliguria/Anuria   [x ]Porter  MUSCULOSKELETAL:   [ ]Normal   [ x]Weakness  x[ ]Bed/Wheelchair bound [ ]Edema  NEUROLOGIC: not following commands, overbreathes vent at times  [ ]No focal deficits  [ ] Cognitive impairment  [ x] Dysphagia [ ]Dysarthria [ ] Paresis [ x]Other   SKIN:   [ ]Normal  [ ]Rash  [ ]Other  [ ]Pressure ulcer(s) [ ]y [ ]n present on admission    CRITICAL CARE:  [ ]Shock Present  [ ]Septic [ ]Cardiogenic [ ]Neurologic [ ]Hypovolemic  [ ]Vasopressors [ ]Inotropes  [x ]Respiratory failure present [x ]Mechanical Ventilation [ ]Non-invasive ventilatory support [ ]High-Flow Mode: AC/ CMV (Assist Control/ Continuous Mandatory Ventilation), RR (machine): 14, TV (machine): 500, FiO2: 30, PEEP: 5, ITime: 0.75, MAP: 9, PIP: 21  [x ]Acute  [ ]Chronic [ x]Hypoxic  [ ]Hypercarbic [ ]Other  [ ]Other organ failure     LABS:                        8.2    6.57  )-----------( 182      ( 27 Dec 2023 02:23 )             25.2   12-27    150<H>  |  117<H>  |  62<H>  ----------------------------<  192<H>  4.2   |  23  |  1.58<H>    Ca    8.3<L>      27 Dec 2023 02:23  Phos  3.1     12-27  Mg     2.7     12-27    TPro  5.8<L>  /  Alb  2.8<L>  /  TBili  0.8  /  DBili  x   /  AST  79<H>  /  ALT  88<H>  /  AlkPhos  55  12-27  PT/INR - ( 27 Dec 2023 02:23 )   PT: 12.6 sec;   INR: 1.21 ratio         PTT - ( 27 Dec 2023 10:26 )  PTT:45.8 sec    Urinalysis Basic - ( 27 Dec 2023 02:23 )    Color: x / Appearance: x / SG: x / pH: x  Gluc: 192 mg/dL / Ketone: x  / Bili: x / Urobili: x   Blood: x / Protein: x / Nitrite: x   Leuk Esterase: x / RBC: x / WBC x   Sq Epi: x / Non Sq Epi: x / Bacteria: x      RADIOLOGY & ADDITIONAL STUDIES: no new imaging    Protein Calorie Malnutrition Present: [ ]mild [ ]moderate [ ]severe [ ]underweight [ ]morbid obesity  https://www.andeal.org/vault/5340/web/files/ONC/Table_Clinical%20Characteristics%20to%20Document%20Malnutrition-White%20JV%20et%20al%202012.pdf    Height (cm): 180.3 (12-19-23 @ 14:45)  Weight (kg): 93.5 (12-19-23 @ 14:45)  BMI (kg/m2): 28.8 (12-19-23 @ 14:45)    [x ]PPSV2 < or = 30%  [ ]significant weight loss [ x]poor nutritional intake [ ]anasarca[ ]Artificial Nutrition    Other REFERRALS:  [ ]Hospice  [ ]Child Life  [ x]Social Work  [ ]Case management [ ]Holistic Therapy     Goals of Care Document: Indication for Geriatrics and Palliative Care Services/INTERVAL HPI: goals of care  SUBJECTIVE AND OBJECTIVE: Patient remains intubated with poor neuro exam. family meeting today, narrative below    OVERNIGHT EVENTS: no acute overnight events    DNR on chart:DNI  DNI      Allergies    No Known Allergies    Intolerances    MEDICATIONS  (STANDING):  amLODIPine   Tablet 5 milliGRAM(s) Oral daily  artificial  tears Solution 1 Drop(s) Both EYES two times a day  carvedilol 6.25 milliGRAM(s) Oral every 12 hours  chlorhexidine 0.12% Liquid 15 milliLiter(s) Oral Mucosa every 12 hours  chlorhexidine 2% Cloths 1 Application(s) Topical daily  dexMEDEtomidine Infusion 0.2 MICROgram(s)/kG/Hr (4.68 mL/Hr) IV Continuous <Continuous>  glycopyrrolate Injectable 0.4 milliGRAM(s) IV Push once  HYDROmorphone  Injectable 1 milliGRAM(s) IV Push once  levETIRAcetam  IVPB 1000 milliGRAM(s) IV Intermittent every 12 hours  LORazepam   Injectable 1 milliGRAM(s) IV Push once  pantoprazole  Injectable 40 milliGRAM(s) IV Push daily  petrolatum Ophthalmic Ointment 1 Application(s) Both EYES three times a day  trimethoprim  40 mG/sulfamethoxazole 200 mG Suspension 300 milliGRAM(s) Enteral Tube every 8 hours    MEDICATIONS  (PRN):  acetaminophen   Oral Liquid .. 650 milliGRAM(s) Oral every 6 hours PRN Temp greater or equal to 38C (100.4F)  bisacodyl Suppository 10 milliGRAM(s) Rectal daily PRN Constipation  glycopyrrolate Injectable 0.4 milliGRAM(s) IV Push every 6 hours PRN secretions  HYDROmorphone  Injectable 1 milliGRAM(s) IV Push every 1 hour PRN dyspnea  HYDROmorphone  Injectable 0.5 milliGRAM(s) IV Push every 1 hour PRN Moderate Pain (4 - 6)  HYDROmorphone  Injectable 1 milliGRAM(s) IV Push every 1 hour PRN Severe Pain (7 - 10)  LORazepam   Injectable 0.5 milliGRAM(s) IV Push every 1 hour PRN anxiety, agitation, refractory dyspnea      ITEMS UNCHECKED ARE NOT PRESENT    PRESENT SYMPTOMS: [ x]Unable to self-report - see [ ] CPOT [x ] PAINADS [x ] RDOS  Source if other than patient:  [ ]Family   [ ]Team     Pain:  [ ]yes [ ]no  QOL impact -   Location -                    Aggravating factors -  Quality -  Radiation -  Timing-  Severity (0-10 scale):  Minimal acceptable level (0-10 scale):     CPOT:    https://www.[facility].org/getattachment/[email or web address]-9231e6065l9m/Critical-Care-Pain-Observation-Tool-(CPOT)    Dyspnea:                           [ ]Mild [ ]Moderate [ ]Severe  Anxiety:                             [ ]Mild [ ]Moderate [ ]Severe  Fatigue:                             [ ]Mild [ ]Moderate [ ]Severe  Nausea:                             [ ]Mild [ ]Moderate [ ]Severe  Loss of appetite:              [ ]Mild [ ]Moderate [ ]Severe  Constipation:                    [ ]Mild [ ]Moderate [ ]Severe    PCSSQ[Palliative Care Spiritual Screening Question]   Severity (0-10):  Score of 4 or > indicate consideration of Chaplaincy referral.  Chaplaincy Referral: [x ] yes [ ] refused [ ] following [ ] Deferred     Caregiver [place]? : [ ] yes [ x] no [ ] Deferred [ ] Declined             Social work referral [ ] Patient & Family Centered Care Referral [ ]     Anticipatory Grief present?:  [x ] yes [ ] no  [ ] Deferred                  Social work referral [ ] Chaplaincy Referral[x ]      Other Symptoms:  [ ]All other review of systems negative   [x ]Unable to obtain due to poor mentation    Palliative Performance Status Version 2:      10   %      http://npcrc.org/files/news/palliative_performance_scale_ppsv2.pdf  PHYSICAL EXAM:  Vital Signs Last 24 Hrs  T(C): 37.8 (27 Dec 2023 12:00), Max: 38.8 (27 Dec 2023 06:00)  T(F): 100 (27 Dec 2023 12:00), Max: 101.8 (27 Dec 2023 06:00)  HR: 63 (27 Dec 2023 14:00) (59 - 77)  BP: --  BP(mean): --  RR: 19 (27 Dec 2023 14:00) (16 - 29)  SpO2: 100% (27 Dec 2023 14:00) (100% - 100%)    Parameters below as of 27 Dec 2023 14:00  Patient On (Oxygen Delivery Method): ventilator    O2 Concentration (%): 30 I&O's Summary    26 Dec 2023 07:01  -  27 Dec 2023 07:00  --------------------------------------------------------  IN: 1762 mL / OUT: 2350 mL / NET: -588 mL    27 Dec 2023 07:01  -  27 Dec 2023 15:07  --------------------------------------------------------  IN: 823 mL / OUT: 525 mL / NET: 298 mL       GENERAL: [ ]Cachexia    [ ]Alert  [ ]Oriented x   [ x]Lethargic  [ ]Unarousable  [ ]Verbal  [ ]Non-Verbal  Behavioral:   [ ]Anxiety  [ ]Delirium [ ]Agitation [ ]Other  HEENT:  [ ]Normal   [ ]Dry mouth   [ x]ET Tube/Trach  [ ]Oral lesions  PULMONARY: vent  [x ]Clear [ ]Tachypnea  [ ]Audible excessive secretions   [ ]Rhonchi        [ ]Right [ ]Left [ ]Bilateral  [ ]Crackles        [ ]Right [ ]Left [ ]Bilateral  [ ]Wheezing     [ ]Right [ ]Left [ ]Bilateral  [ ]Diminished BS [ ] Right [ ]Left [ ]Bilateral  CARDIOVASCULAR:    [ x]Regular [ ]Irregular [ ]Tachy  [ ]Miguel Angel [ ]Murmur [ ]Other  GASTROINTESTINAL:  [x ]Soft  [ ]Distended   [ ]+BS  [ ]Non tender [ ]Tender  [ ]Other [ ]PEG [ x]OGT/ NGT   Last BM:   GENITOURINARY:  [ ]Normal [ ]Incontinent   [ ]Oliguria/Anuria   [x ]Porter  MUSCULOSKELETAL:   [ ]Normal   [ x]Weakness  x[ ]Bed/Wheelchair bound [ ]Edema  NEUROLOGIC: not following commands, overbreathes vent at times  [ ]No focal deficits  [ ] Cognitive impairment  [ x] Dysphagia [ ]Dysarthria [ ] Paresis [ x]Other   SKIN:   [ ]Normal  [ ]Rash  [ ]Other  [ ]Pressure ulcer(s) [ ]y [ ]n present on admission    CRITICAL CARE:  [ ]Shock Present  [ ]Septic [ ]Cardiogenic [ ]Neurologic [ ]Hypovolemic  [ ]Vasopressors [ ]Inotropes  [x ]Respiratory failure present [x ]Mechanical Ventilation [ ]Non-invasive ventilatory support [ ]High-Flow Mode: AC/ CMV (Assist Control/ Continuous Mandatory Ventilation), RR (machine): 14, TV (machine): 500, FiO2: 30, PEEP: 5, ITime: 0.75, MAP: 9, PIP: 21  [x ]Acute  [ ]Chronic [ x]Hypoxic  [ ]Hypercarbic [ ]Other  [ ]Other organ failure     LABS:                        8.2    6.57  )-----------( 182      ( 27 Dec 2023 02:23 )             25.2   12-27    150<H>  |  117<H>  |  62<H>  ----------------------------<  192<H>  4.2   |  23  |  1.58<H>    Ca    8.3<L>      27 Dec 2023 02:23  Phos  3.1     12-27  Mg     2.7     12-27    TPro  5.8<L>  /  Alb  2.8<L>  /  TBili  0.8  /  DBili  x   /  AST  79<H>  /  ALT  88<H>  /  AlkPhos  55  12-27  PT/INR - ( 27 Dec 2023 02:23 )   PT: 12.6 sec;   INR: 1.21 ratio         PTT - ( 27 Dec 2023 10:26 )  PTT:45.8 sec    Urinalysis Basic - ( 27 Dec 2023 02:23 )    Color: x / Appearance: x / SG: x / pH: x  Gluc: 192 mg/dL / Ketone: x  / Bili: x / Urobili: x   Blood: x / Protein: x / Nitrite: x   Leuk Esterase: x / RBC: x / WBC x   Sq Epi: x / Non Sq Epi: x / Bacteria: x      RADIOLOGY & ADDITIONAL STUDIES: no new imaging    Protein Calorie Malnutrition Present: [ ]mild [ ]moderate [ ]severe [ ]underweight [ ]morbid obesity  https://www.andeal.org/vault/3990/web/files/ONC/Table_Clinical%20Characteristics%20to%20Document%20Malnutrition-White%20JV%20et%20al%202012.pdf    Height (cm): 180.3 (12-19-23 @ 14:45)  Weight (kg): 93.5 (12-19-23 @ 14:45)  BMI (kg/m2): 28.8 (12-19-23 @ 14:45)    [x ]PPSV2 < or = 30%  [ ]significant weight loss [ x]poor nutritional intake [ ]anasarca[ ]Artificial Nutrition    Other REFERRALS:  [ ]Hospice  [ ]Child Life  [ x]Social Work  [ ]Case management [ ]Holistic Therapy     Goals of Care Document:

## 2023-12-27 NOTE — PROGRESS NOTE ADULT - PROBLEM SELECTOR PLAN 2
2/2 anoxic brain injury  plan for compassionate extubation  prior to extubation will provide: IV glycopyrrolate, IV ativan and IV dilaudid  post extubation will have IV dilaudid 1mg q1h prn available for dyspnea  post extubation will have IV ativan 0.5mg q1h prn available for refractory dyspnea  DNI

## 2023-12-27 NOTE — PROGRESS NOTE ADULT - PROBLEM SELECTOR PLAN 4
DNR, do not reintubate established  MOLST completed  GOC narrative above  plan for compassionate extubation later today  [facility] contacted- not a donor candidate.

## 2023-12-27 NOTE — PROGRESS NOTE ADULT - ASSESSMENT
78M w/ PMH HTN HLD presents as transfer from [facility] s/p cardiac arrest. Per reports patient had a witness fall and arrest at home. Patient now with likely RACHEL and poor prognosis for meaningful recovery. palliative consulted for GOC discussion.  78M w/ PMH HTN HLD presents as transfer from [facility] s/p cardiac arrest. Per reports patient had a witness fall and arrest at home. Patient now with likely RACHEL and poor prognosis for meaningful recovery. palliative consulted for GOC discussion.

## 2023-12-28 LAB
-  MINOCYCLINE: SIGNIFICANT CHANGE UP
-  MINOCYCLINE: SIGNIFICANT CHANGE UP
CULTURE RESULTS: ABNORMAL
CULTURE RESULTS: SIGNIFICANT CHANGE UP
METHOD TYPE: SIGNIFICANT CHANGE UP
METHOD TYPE: SIGNIFICANT CHANGE UP
ORGANISM # SPEC MICROSCOPIC CNT: ABNORMAL
SPECIMEN SOURCE: SIGNIFICANT CHANGE UP

## 2023-12-31 LAB
CULTURE RESULTS: SIGNIFICANT CHANGE UP
CULTURE RESULTS: SIGNIFICANT CHANGE UP
SPECIMEN SOURCE: SIGNIFICANT CHANGE UP
SPECIMEN SOURCE: SIGNIFICANT CHANGE UP

## 2024-02-07 LAB
CULTURE RESULTS: SIGNIFICANT CHANGE UP
SPECIMEN SOURCE: SIGNIFICANT CHANGE UP

## 2024-02-19 RX ORDER — MIRABEGRON 50 MG/1
1 TABLET, EXTENDED RELEASE ORAL
Refills: 0 | DISCHARGE

## 2024-02-19 RX ORDER — HYDROCHLOROTHIAZIDE 25 MG
1 TABLET ORAL
Refills: 0 | DISCHARGE

## 2024-02-19 RX ORDER — CLONAZEPAM 1 MG
1 TABLET ORAL
Refills: 0 | DISCHARGE

## 2024-02-19 RX ORDER — GABAPENTIN 400 MG/1
1 CAPSULE ORAL
Refills: 0 | DISCHARGE

## 2024-02-19 RX ORDER — FINASTERIDE 5 MG/1
1 TABLET, FILM COATED ORAL
Refills: 0 | DISCHARGE

## 2024-02-19 RX ORDER — ATORVASTATIN CALCIUM 80 MG/1
1 TABLET, FILM COATED ORAL
Refills: 0 | DISCHARGE

## 2024-02-19 RX ORDER — LISINOPRIL 2.5 MG/1
1 TABLET ORAL
Refills: 0 | DISCHARGE

## 2024-02-19 RX ORDER — TAMSULOSIN HYDROCHLORIDE 0.4 MG/1
1 CAPSULE ORAL
Refills: 0 | DISCHARGE